# Patient Record
Sex: FEMALE | Race: BLACK OR AFRICAN AMERICAN | NOT HISPANIC OR LATINO | Employment: OTHER | URBAN - METROPOLITAN AREA
[De-identification: names, ages, dates, MRNs, and addresses within clinical notes are randomized per-mention and may not be internally consistent; named-entity substitution may affect disease eponyms.]

---

## 2023-02-21 LAB
APPEARANCE, URINE: NORMAL
BILIRUBIN, URINE: NEGATIVE
BLOOD, URINE: NEGATIVE
COLOR, URINE: YELLOW
GLUCOSE, URINE: NEGATIVE MG/DL
KETONES, URINE: NEGATIVE MG/DL
LEUKOCYTE ESTERASE, URINE: NEGATIVE
NITRITE, URINE: NEGATIVE
PH, URINE: 6 (ref 5–8)
PROTEIN, URINE: NEGATIVE MG/DL
SPECIFIC GRAVITY, URINE: 1.01 (ref 1–1.03)
UROBILINOGEN, URINE: <2 MG/DL (ref 0–1.9)

## 2023-02-24 LAB — URINE CULTURE: ABNORMAL

## 2023-04-04 ENCOUNTER — HOSPITAL ENCOUNTER (OUTPATIENT)
Dept: DATA CONVERSION | Facility: HOSPITAL | Age: 58
End: 2023-04-04
Attending: ANESTHESIOLOGY
Payer: MEDICARE

## 2023-04-04 DIAGNOSIS — I50.9 HEART FAILURE, UNSPECIFIED (MULTI): ICD-10-CM

## 2023-04-04 DIAGNOSIS — F12.90 CANNABIS USE, UNSPECIFIED, UNCOMPLICATED: ICD-10-CM

## 2023-04-04 DIAGNOSIS — J45.909 UNSPECIFIED ASTHMA, UNCOMPLICATED (HHS-HCC): ICD-10-CM

## 2023-04-04 DIAGNOSIS — I11.0 HYPERTENSIVE HEART DISEASE WITH HEART FAILURE (MULTI): ICD-10-CM

## 2023-04-04 DIAGNOSIS — M48.02 SPINAL STENOSIS, CERVICAL REGION: ICD-10-CM

## 2023-04-04 DIAGNOSIS — M54.12 RADICULOPATHY, CERVICAL REGION: ICD-10-CM

## 2023-04-04 DIAGNOSIS — E11.9 TYPE 2 DIABETES MELLITUS WITHOUT COMPLICATIONS (MULTI): ICD-10-CM

## 2023-04-04 DIAGNOSIS — M54.2 CERVICALGIA: ICD-10-CM

## 2023-04-04 LAB — POCT GLUCOSE: 90 MG/DL (ref 74–99)

## 2023-04-11 LAB — OSMOLALITY, RANDOM URINE: 314 MOSM/KG (ref 200–1200)

## 2023-04-12 LAB
APPEARANCE, URINE: NORMAL
BILIRUBIN, URINE: NEGATIVE
BLOOD, URINE: NEGATIVE
COLOR, URINE: YELLOW
GLUCOSE, URINE: NEGATIVE MG/DL
KETONES, URINE: NEGATIVE MG/DL
LEUKOCYTE ESTERASE, URINE: NEGATIVE
NITRITE, URINE: NEGATIVE
PH, URINE: 7 (ref 5–8)
PROTEIN, URINE: NEGATIVE MG/DL
RBC, URINE: NORMAL /HPF (ref 0–5)
SPECIFIC GRAVITY, URINE: 1.01 (ref 1–1.03)
SQUAMOUS EPITHELIAL CELLS, URINE: 11 /HPF
UROBILINOGEN, URINE: <2 MG/DL (ref 0–1.9)
WBC, URINE: 1 /HPF (ref 0–5)

## 2023-04-13 LAB — URINE CULTURE: NORMAL

## 2023-04-17 LAB
ALANINE AMINOTRANSFERASE (SGPT) (U/L) IN SER/PLAS: 16 U/L (ref 7–45)
ALBUMIN (G/DL) IN SER/PLAS: 4 G/DL (ref 3.4–5)
ALKALINE PHOSPHATASE (U/L) IN SER/PLAS: 138 U/L (ref 33–110)
ANION GAP IN SER/PLAS: 14 MMOL/L (ref 10–20)
ASPARTATE AMINOTRANSFERASE (SGOT) (U/L) IN SER/PLAS: 10 U/L (ref 9–39)
BILIRUBIN TOTAL (MG/DL) IN SER/PLAS: 0.4 MG/DL (ref 0–1.2)
CALCIDIOL (25 OH VITAMIN D3) (NG/ML) IN SER/PLAS: 50 NG/ML
CALCIUM (MG/DL) IN SER/PLAS: 9 MG/DL (ref 8.6–10.6)
CARBON DIOXIDE, TOTAL (MMOL/L) IN SER/PLAS: 26 MMOL/L (ref 21–32)
CHLORIDE (MMOL/L) IN SER/PLAS: 102 MMOL/L (ref 98–107)
CREATININE (MG/DL) IN SER/PLAS: 0.59 MG/DL (ref 0.5–1.05)
ESTIMATED AVERAGE GLUCOSE FOR HBA1C: 114 MG/DL
GFR FEMALE: >90 ML/MIN/1.73M2
GLUCOSE (MG/DL) IN SER/PLAS: 87 MG/DL (ref 74–99)
HEMOGLOBIN A1C/HEMOGLOBIN TOTAL IN BLOOD: 5.6 %
POTASSIUM (MMOL/L) IN SER/PLAS: 3.7 MMOL/L (ref 3.5–5.3)
PROTEIN TOTAL: 5.8 G/DL (ref 6.4–8.2)
SODIUM (MMOL/L) IN SER/PLAS: 138 MMOL/L (ref 136–145)
THYROTROPIN (MIU/L) IN SER/PLAS BY DETECTION LIMIT <= 0.05 MIU/L: 0.65 MIU/L (ref 0.44–3.98)
UREA NITROGEN (MG/DL) IN SER/PLAS: 7 MG/DL (ref 6–23)

## 2023-04-19 LAB
THYROGLOBULIN AB (IU/ML) IN SER/PLAS: <0.9 IU/ML (ref 0–4)
THYROGLOBULIN LC-MS/MS: NORMAL NG/ML (ref 1.3–31.8)
THYROGLOBULIN: 5.9 NG/ML (ref 1.3–31.8)

## 2023-05-05 LAB
BASOPHILS (10*3/UL) IN BLOOD BY AUTOMATED COUNT: 0.06 X10E9/L (ref 0–0.1)
BASOPHILS/100 LEUKOCYTES IN BLOOD BY AUTOMATED COUNT: 0.9 % (ref 0–2)
EOSINOPHILS (10*3/UL) IN BLOOD BY AUTOMATED COUNT: 0.08 X10E9/L (ref 0–0.7)
EOSINOPHILS/100 LEUKOCYTES IN BLOOD BY AUTOMATED COUNT: 1.2 % (ref 0–6)
ERYTHROCYTE DISTRIBUTION WIDTH (RATIO) BY AUTOMATED COUNT: 14.3 % (ref 11.5–14.5)
ERYTHROCYTE MEAN CORPUSCULAR HEMOGLOBIN CONCENTRATION (G/DL) BY AUTOMATED: 31.1 G/DL (ref 32–36)
ERYTHROCYTE MEAN CORPUSCULAR VOLUME (FL) BY AUTOMATED COUNT: 88 FL (ref 80–100)
ERYTHROCYTES (10*6/UL) IN BLOOD BY AUTOMATED COUNT: 4.99 X10E12/L (ref 4–5.2)
HEMATOCRIT (%) IN BLOOD BY AUTOMATED COUNT: 44 % (ref 36–46)
HEMOGLOBIN (G/DL) IN BLOOD: 13.7 G/DL (ref 12–16)
IMMATURE GRANULOCYTES/100 LEUKOCYTES IN BLOOD BY AUTOMATED COUNT: 0.1 % (ref 0–0.9)
LEUKOCYTES (10*3/UL) IN BLOOD BY AUTOMATED COUNT: 6.8 X10E9/L (ref 4.4–11.3)
LYMPHOCYTES (10*3/UL) IN BLOOD BY AUTOMATED COUNT: 2.08 X10E9/L (ref 1.2–4.8)
LYMPHOCYTES/100 LEUKOCYTES IN BLOOD BY AUTOMATED COUNT: 30.6 % (ref 13–44)
MONOCYTES (10*3/UL) IN BLOOD BY AUTOMATED COUNT: 0.77 X10E9/L (ref 0.1–1)
MONOCYTES/100 LEUKOCYTES IN BLOOD BY AUTOMATED COUNT: 11.3 % (ref 2–10)
NEUTROPHILS (10*3/UL) IN BLOOD BY AUTOMATED COUNT: 3.8 X10E9/L (ref 1.2–7.7)
NEUTROPHILS/100 LEUKOCYTES IN BLOOD BY AUTOMATED COUNT: 55.9 % (ref 40–80)
PLATELETS (10*3/UL) IN BLOOD AUTOMATED COUNT: 313 X10E9/L (ref 150–450)

## 2023-08-19 PROBLEM — M19.071 ARTHRITIS OF ANKLE, RIGHT: Status: ACTIVE | Noted: 2023-08-19

## 2023-08-19 PROBLEM — E89.0 POSTOPERATIVE HYPOTHYROIDISM: Status: ACTIVE | Noted: 2023-08-19

## 2023-08-19 PROBLEM — R94.6 ABNORMAL THYROID FUNCTION TEST: Status: ACTIVE | Noted: 2023-08-19

## 2023-08-19 PROBLEM — N81.89 PELVIC FLOOR WEAKNESS: Status: ACTIVE | Noted: 2023-08-19

## 2023-08-19 PROBLEM — M19.072 ARTHRITIS OF ANKLE, LEFT: Status: ACTIVE | Noted: 2023-08-19

## 2023-08-19 PROBLEM — R07.89 ATYPICAL CHEST PAIN: Status: ACTIVE | Noted: 2023-08-19

## 2023-08-19 PROBLEM — M54.16 LUMBAR RADICULAR PAIN: Status: ACTIVE | Noted: 2023-08-19

## 2023-08-19 PROBLEM — M17.0 PRIMARY OSTEOARTHRITIS OF BOTH KNEES: Status: ACTIVE | Noted: 2023-08-19

## 2023-08-19 PROBLEM — E78.2 DM TYPE 2 WITH DIABETIC MIXED HYPERLIPIDEMIA (MULTI): Status: ACTIVE | Noted: 2023-08-19

## 2023-08-19 PROBLEM — M51.17 INTERVERTEBRAL DISC DISORDER WITH RADICULOPATHY OF LUMBOSACRAL REGION: Status: ACTIVE | Noted: 2023-08-19

## 2023-08-19 PROBLEM — E04.9 GOITER: Status: ACTIVE | Noted: 2023-08-19

## 2023-08-19 PROBLEM — N81.11 MIDLINE CYSTOCELE: Status: ACTIVE | Noted: 2023-08-19

## 2023-08-19 PROBLEM — M25.562 CHRONIC PAIN OF BOTH KNEES: Status: ACTIVE | Noted: 2023-08-19

## 2023-08-19 PROBLEM — M17.12 ARTHRITIS OF KNEE, LEFT: Status: ACTIVE | Noted: 2023-08-19

## 2023-08-19 PROBLEM — J38.00: Status: ACTIVE | Noted: 2023-08-19

## 2023-08-19 PROBLEM — M25.579 ANKLE PAIN: Status: ACTIVE | Noted: 2023-08-19

## 2023-08-19 PROBLEM — R39.15 URINARY URGENCY: Status: ACTIVE | Noted: 2023-08-19

## 2023-08-19 PROBLEM — C73 MALIGNANT NEOPLASM OF THYROID GLAND (MULTI): Status: ACTIVE | Noted: 2023-08-19

## 2023-08-19 PROBLEM — K59.09 CHRONIC CONSTIPATION: Status: ACTIVE | Noted: 2023-08-19

## 2023-08-19 PROBLEM — G47.33 OSA ON CPAP: Status: ACTIVE | Noted: 2023-08-19

## 2023-08-19 PROBLEM — M46.1 SACROILIITIS (CMS-HCC): Status: ACTIVE | Noted: 2023-08-19

## 2023-08-19 PROBLEM — N81.4 UTERINE PROLAPSE: Status: ACTIVE | Noted: 2023-08-19

## 2023-08-19 PROBLEM — E04.2 MULTINODULAR THYROID: Status: ACTIVE | Noted: 2023-08-19

## 2023-08-19 PROBLEM — R76.8 ANA POSITIVE: Status: ACTIVE | Noted: 2023-08-19

## 2023-08-19 PROBLEM — I72.9 ANEURYSM (CMS-HCC): Status: ACTIVE | Noted: 2023-08-19

## 2023-08-19 PROBLEM — N39.0 CHRONIC UTI: Status: ACTIVE | Noted: 2023-08-19

## 2023-08-19 PROBLEM — R25.2 MUSCLE CRAMPS: Status: ACTIVE | Noted: 2023-08-19

## 2023-08-19 PROBLEM — Z96.0 VAGINAL PESSARY PRESENT: Status: ACTIVE | Noted: 2023-08-19

## 2023-08-19 PROBLEM — R53.1 WEAKNESS: Status: ACTIVE | Noted: 2023-08-19

## 2023-08-19 PROBLEM — E11.69 DM TYPE 2 WITH DIABETIC MIXED HYPERLIPIDEMIA (MULTI): Status: ACTIVE | Noted: 2023-08-19

## 2023-08-19 PROBLEM — M54.50 LOW BACK PAIN: Status: ACTIVE | Noted: 2023-08-19

## 2023-08-19 PROBLEM — I50.33 ACUTE ON CHRONIC DIASTOLIC HEART FAILURE (MULTI): Status: ACTIVE | Noted: 2023-08-19

## 2023-08-19 PROBLEM — N39.3 FEMALE STRESS INCONTINENCE: Status: ACTIVE | Noted: 2023-08-19

## 2023-08-19 PROBLEM — R35.0 URINARY FREQUENCY: Status: ACTIVE | Noted: 2023-08-19

## 2023-08-19 PROBLEM — M79.7 FIBROMYALGIA: Status: ACTIVE | Noted: 2023-08-19

## 2023-08-19 PROBLEM — E78.5 DYSLIPIDEMIA: Status: ACTIVE | Noted: 2023-08-19

## 2023-08-19 PROBLEM — I72.0 CAROTID PSEUDOANEURYSM (CMS-HCC): Status: ACTIVE | Noted: 2023-08-19

## 2023-08-19 PROBLEM — M54.2 NECK PAIN: Status: ACTIVE | Noted: 2023-08-19

## 2023-08-19 PROBLEM — G51.0 BELL'S PALSY: Status: ACTIVE | Noted: 2023-08-19

## 2023-08-19 PROBLEM — R49.0 MUSCLE TENSION DYSPHONIA: Status: ACTIVE | Noted: 2023-08-19

## 2023-08-19 PROBLEM — M54.12 CERVICAL RADICULOPATHY, CHRONIC: Status: ACTIVE | Noted: 2023-08-19

## 2023-08-19 PROBLEM — N36.8 PROLAPSE OF URETHRA: Status: ACTIVE | Noted: 2023-08-19

## 2023-08-19 PROBLEM — N95.2 ATROPHIC VAGINITIS: Status: ACTIVE | Noted: 2023-08-19

## 2023-08-19 PROBLEM — R06.09 DOE (DYSPNEA ON EXERTION): Status: ACTIVE | Noted: 2023-08-19

## 2023-08-19 PROBLEM — R20.2 PARESTHESIA: Status: ACTIVE | Noted: 2023-08-19

## 2023-08-19 PROBLEM — E55.9 VITAMIN D INSUFFICIENCY: Status: ACTIVE | Noted: 2023-08-19

## 2023-08-19 PROBLEM — M25.561 CHRONIC PAIN OF BOTH KNEES: Status: ACTIVE | Noted: 2023-08-19

## 2023-08-19 PROBLEM — N39.41 URGE INCONTINENCE: Status: ACTIVE | Noted: 2023-08-19

## 2023-08-19 PROBLEM — I72.9 PSEUDOANEURYSM (CMS-HCC): Status: ACTIVE | Noted: 2023-08-19

## 2023-08-19 PROBLEM — R22.1 NECK MASS: Status: ACTIVE | Noted: 2023-08-19

## 2023-08-19 PROBLEM — G89.29 CHRONIC PAIN OF BOTH KNEES: Status: ACTIVE | Noted: 2023-08-19

## 2023-08-19 PROBLEM — E87.6 HYPOKALEMIA: Status: ACTIVE | Noted: 2023-08-19

## 2023-08-19 PROBLEM — M17.11 ARTHRITIS OF KNEE, RIGHT: Status: ACTIVE | Noted: 2023-08-19

## 2023-08-19 RX ORDER — ILOPERIDONE 8 MG/1
8 TABLET ORAL 2 TIMES DAILY
COMMUNITY
Start: 2023-01-19 | End: 2023-12-28 | Stop reason: ENTERED-IN-ERROR

## 2023-08-19 RX ORDER — PRAZOSIN HYDROCHLORIDE 5 MG/1
1 CAPSULE ORAL NIGHTLY
COMMUNITY

## 2023-08-19 RX ORDER — BUMETANIDE 2 MG/1
2 TABLET ORAL 2 TIMES DAILY
Status: ON HOLD | COMMUNITY
End: 2024-01-01 | Stop reason: SDUPTHER

## 2023-08-19 RX ORDER — VIT C/E/ZN/COPPR/LUTEIN/ZEAXAN 250MG-90MG
1 CAPSULE ORAL DAILY
COMMUNITY
Start: 2021-02-11

## 2023-08-19 RX ORDER — QUETIAPINE FUMARATE 50 MG/1
50 TABLET, FILM COATED ORAL 2 TIMES DAILY
COMMUNITY
End: 2023-12-28 | Stop reason: ENTERED-IN-ERROR

## 2023-08-19 RX ORDER — POTASSIUM CHLORIDE 1500 MG/1
2 TABLET, EXTENDED RELEASE ORAL 2 TIMES DAILY
COMMUNITY
Start: 2023-01-12 | End: 2024-01-22 | Stop reason: SDUPTHER

## 2023-08-19 RX ORDER — IBUPROFEN 200 MG
1 CAPSULE ORAL DAILY
COMMUNITY
Start: 2020-04-29 | End: 2024-01-01 | Stop reason: HOSPADM

## 2023-08-19 RX ORDER — ATORVASTATIN CALCIUM 20 MG/1
1 TABLET, FILM COATED ORAL NIGHTLY
COMMUNITY
End: 2024-01-22 | Stop reason: SDUPTHER

## 2023-08-19 RX ORDER — OMEPRAZOLE 40 MG/1
1 CAPSULE, DELAYED RELEASE ORAL DAILY
COMMUNITY

## 2023-08-19 RX ORDER — CLONAZEPAM 1 MG/1
1 TABLET ORAL EVERY MORNING
COMMUNITY

## 2023-08-19 RX ORDER — ILOPERIDONE 10 MG/1
10 TABLET ORAL 2 TIMES DAILY
COMMUNITY
Start: 2023-03-17 | End: 2023-12-28 | Stop reason: ENTERED-IN-ERROR

## 2023-08-19 RX ORDER — POTASSIUM CHLORIDE 600 MG/1
8 TABLET, FILM COATED, EXTENDED RELEASE ORAL 2 TIMES DAILY
COMMUNITY
End: 2023-12-28 | Stop reason: ENTERED-IN-ERROR

## 2023-08-19 RX ORDER — LUMATEPERONE 42 MG/1
42 CAPSULE ORAL NIGHTLY
COMMUNITY
End: 2023-12-28 | Stop reason: ENTERED-IN-ERROR

## 2023-08-19 RX ORDER — BREXPIPRAZOLE 3 MG/1
1 TABLET ORAL DAILY
COMMUNITY
Start: 2022-08-17 | End: 2023-12-28 | Stop reason: ENTERED-IN-ERROR

## 2023-08-19 RX ORDER — AMITRIPTYLINE HYDROCHLORIDE 10 MG/1
1 TABLET, FILM COATED ORAL NIGHTLY
COMMUNITY

## 2023-08-19 RX ORDER — BACLOFEN 10 MG/1
10 TABLET ORAL 3 TIMES DAILY PRN
COMMUNITY
End: 2023-12-28 | Stop reason: ENTERED-IN-ERROR

## 2023-08-19 RX ORDER — SERTRALINE HYDROCHLORIDE 100 MG/1
200 TABLET, FILM COATED ORAL EVERY MORNING
COMMUNITY
Start: 2021-09-17 | End: 2024-01-01 | Stop reason: HOSPADM

## 2023-08-19 RX ORDER — CARVEDILOL 25 MG/1
1 TABLET ORAL 2 TIMES DAILY
COMMUNITY
End: 2024-01-08

## 2023-08-19 RX ORDER — OXYBUTYNIN CHLORIDE 10 MG/1
1 TABLET, EXTENDED RELEASE ORAL DAILY
COMMUNITY
End: 2024-02-05 | Stop reason: ALTCHOICE

## 2023-08-19 RX ORDER — LOSARTAN POTASSIUM 25 MG/1
2 TABLET ORAL DAILY
COMMUNITY
Start: 2020-04-16 | End: 2023-12-28 | Stop reason: ENTERED-IN-ERROR

## 2023-08-19 RX ORDER — PHENAZOPYRIDINE HYDROCHLORIDE 200 MG/1
200 TABLET, FILM COATED ORAL
COMMUNITY
Start: 2022-04-29 | End: 2023-12-28 | Stop reason: ENTERED-IN-ERROR

## 2023-08-19 RX ORDER — ASPIRIN 81 MG/1
1 TABLET ORAL DAILY
COMMUNITY
Start: 2020-04-29 | End: 2024-03-28 | Stop reason: HOSPADM

## 2023-08-19 RX ORDER — POLYETHYLENE GLYCOL 3350 17 G/17G
17 POWDER, FOR SOLUTION ORAL DAILY
COMMUNITY
Start: 2020-09-01

## 2023-08-19 RX ORDER — AMANTADINE HYDROCHLORIDE 100 MG/1
1 CAPSULE, GELATIN COATED ORAL DAILY
COMMUNITY
Start: 2023-03-20 | End: 2023-12-28 | Stop reason: ENTERED-IN-ERROR

## 2023-08-19 RX ORDER — MELOXICAM 7.5 MG/1
1 TABLET ORAL DAILY
COMMUNITY
End: 2023-12-28 | Stop reason: ENTERED-IN-ERROR

## 2023-08-19 RX ORDER — MELOXICAM 100 %
POWDER (GRAM) MISCELLANEOUS
COMMUNITY
End: 2023-12-28 | Stop reason: ENTERED-IN-ERROR

## 2023-08-19 RX ORDER — ROPINIROLE 1 MG/1
1 TABLET, FILM COATED ORAL DAILY
COMMUNITY
Start: 2023-03-18 | End: 2023-12-28 | Stop reason: ENTERED-IN-ERROR

## 2023-08-19 RX ORDER — BUDESONIDE AND FORMOTEROL FUMARATE DIHYDRATE 160; 4.5 UG/1; UG/1
AEROSOL RESPIRATORY (INHALATION)
COMMUNITY
Start: 2023-01-10 | End: 2023-12-28 | Stop reason: ENTERED-IN-ERROR

## 2023-08-19 RX ORDER — MUPIROCIN 20 MG/G
1 OINTMENT TOPICAL SEE ADMIN INSTRUCTIONS
COMMUNITY
Start: 2020-03-09 | End: 2023-12-28 | Stop reason: ENTERED-IN-ERROR

## 2023-08-19 RX ORDER — METFORMIN HYDROCHLORIDE 500 MG/1
1 TABLET, EXTENDED RELEASE ORAL 2 TIMES DAILY
COMMUNITY
Start: 2018-11-26 | End: 2023-08-27 | Stop reason: SDUPTHER

## 2023-08-19 RX ORDER — CHOLECALCIFEROL (VITAMIN D3) 50 MCG
1 TABLET ORAL DAILY
COMMUNITY

## 2023-08-19 RX ORDER — TOPIRAMATE 200 MG/1
200 TABLET ORAL 2 TIMES DAILY
Status: ON HOLD | COMMUNITY
End: 2024-01-01 | Stop reason: SDUPTHER

## 2023-08-19 RX ORDER — LIDOCAINE 50 MG/G
1 PATCH TOPICAL DAILY
COMMUNITY
Start: 2022-03-17 | End: 2023-12-18

## 2023-08-19 RX ORDER — FUROSEMIDE 20 MG/1
1 TABLET ORAL
COMMUNITY
Start: 2023-01-16 | End: 2023-12-28 | Stop reason: ENTERED-IN-ERROR

## 2023-08-19 RX ORDER — LEVOTHYROXINE SODIUM 100 UG/1
1 TABLET ORAL
COMMUNITY
End: 2024-04-12 | Stop reason: HOSPADM

## 2023-08-19 RX ORDER — IPRATROPIUM BROMIDE AND ALBUTEROL SULFATE 2.5; .5 MG/3ML; MG/3ML
3 SOLUTION RESPIRATORY (INHALATION) EVERY 4 HOURS PRN
COMMUNITY
Start: 2023-07-12

## 2023-08-19 RX ORDER — ILOPERIDONE 4 MG/1
4 TABLET ORAL 2 TIMES DAILY
COMMUNITY
Start: 2022-11-17 | End: 2023-12-28 | Stop reason: ENTERED-IN-ERROR

## 2023-08-19 RX ORDER — OLANZAPINE 5 MG/1
1 TABLET ORAL 2 TIMES DAILY
COMMUNITY
Start: 2022-10-08 | End: 2023-12-28 | Stop reason: ENTERED-IN-ERROR

## 2023-08-19 RX ORDER — ALBUTEROL SULFATE 90 UG/1
2 AEROSOL, METERED RESPIRATORY (INHALATION) EVERY 4 HOURS PRN
COMMUNITY
Start: 2023-07-12

## 2023-08-19 RX ORDER — BACLOFEN 20 MG/1
1 TABLET ORAL 3 TIMES DAILY
COMMUNITY
Start: 2023-03-13 | End: 2023-11-22

## 2023-08-19 RX ORDER — FLUTICASONE FUROATE, UMECLIDINIUM BROMIDE AND VILANTEROL TRIFENATATE 200; 62.5; 25 UG/1; UG/1; UG/1
1 POWDER RESPIRATORY (INHALATION) DAILY PRN
COMMUNITY
Start: 2023-07-06

## 2023-08-19 RX ORDER — SERTRALINE HYDROCHLORIDE 150 MG/1
1 CAPSULE ORAL
COMMUNITY
End: 2023-12-28 | Stop reason: ENTERED-IN-ERROR

## 2023-08-19 RX ORDER — TRAMADOL HYDROCHLORIDE 50 MG/1
1 TABLET ORAL EVERY 8 HOURS PRN
COMMUNITY
Start: 2022-04-09 | End: 2023-12-28 | Stop reason: ENTERED-IN-ERROR

## 2023-08-19 RX ORDER — LUMATEPERONE 21 MG/1
1 CAPSULE ORAL NIGHTLY
COMMUNITY
Start: 2023-02-02 | End: 2023-12-28 | Stop reason: ENTERED-IN-ERROR

## 2023-08-19 RX ORDER — ILOPERIDONE 2 MG/1
2 TABLET ORAL 2 TIMES DAILY
COMMUNITY
Start: 2022-10-20 | End: 2023-12-28 | Stop reason: ENTERED-IN-ERROR

## 2023-08-19 RX ORDER — AZELASTINE HCL 205.5 UG/1
2 SPRAY NASAL DAILY
COMMUNITY
Start: 2022-09-22 | End: 2022-10-22 | Stop reason: WASHOUT

## 2023-08-19 RX ORDER — OXCARBAZEPINE 600 MG/1
1 TABLET, FILM COATED ORAL EVERY MORNING
COMMUNITY

## 2023-08-19 RX ORDER — MELOXICAM 15 MG/1
1 TABLET ORAL DAILY PRN
COMMUNITY
Start: 2023-03-08 | End: 2024-03-28 | Stop reason: HOSPADM

## 2023-08-19 RX ORDER — LOSARTAN POTASSIUM 50 MG/1
1 TABLET ORAL DAILY
COMMUNITY
End: 2024-01-22 | Stop reason: SDUPTHER

## 2023-08-19 RX ORDER — METFORMIN HYDROCHLORIDE 500 MG/1
1 TABLET ORAL
COMMUNITY
Start: 2023-03-16

## 2023-08-19 RX ORDER — ILOPERIDONE 6 MG/1
6 TABLET ORAL 2 TIMES DAILY
COMMUNITY
Start: 2022-12-08 | End: 2023-12-28 | Stop reason: ENTERED-IN-ERROR

## 2023-08-19 RX ORDER — GABAPENTIN 300 MG/1
300 CAPSULE ORAL 3 TIMES DAILY
COMMUNITY

## 2023-08-19 RX ORDER — BLOOD-GLUCOSE METER
1 EACH MISCELLANEOUS
COMMUNITY
Start: 2021-09-02 | End: 2024-01-01 | Stop reason: HOSPADM

## 2023-08-19 RX ORDER — MINOCYCLINE HYDROCHLORIDE 100 MG/1
100 CAPSULE ORAL NIGHTLY
COMMUNITY
Start: 2023-03-07 | End: 2024-01-01 | Stop reason: HOSPADM

## 2023-08-19 RX ORDER — QUETIAPINE FUMARATE 300 MG/1
1 TABLET, FILM COATED ORAL NIGHTLY
COMMUNITY
End: 2024-03-06 | Stop reason: WASHOUT

## 2023-08-19 RX ORDER — ESTRADIOL 0.1 MG/G
1 CREAM VAGINAL 3 TIMES WEEKLY
COMMUNITY
Start: 2021-03-02

## 2023-08-19 RX ORDER — OXCARBAZEPINE 600 MG/1
2 TABLET, FILM COATED ORAL NIGHTLY
COMMUNITY
Start: 2022-04-29

## 2023-08-19 RX ORDER — IBUPROFEN 100 MG/5ML
1 SUSPENSION, ORAL (FINAL DOSE FORM) ORAL DAILY
COMMUNITY
Start: 2021-02-13

## 2023-08-19 RX ORDER — AZELASTINE 1 MG/ML
1 SPRAY, METERED NASAL 2 TIMES DAILY
COMMUNITY

## 2023-08-19 RX ORDER — POTASSIUM CHLORIDE 750 MG/1
20 CAPSULE, EXTENDED RELEASE ORAL 2 TIMES DAILY
COMMUNITY
End: 2023-12-28 | Stop reason: ENTERED-IN-ERROR

## 2023-08-19 RX ORDER — VALBENAZINE 40 MG/1
40 CAPSULE ORAL DAILY
COMMUNITY
Start: 2023-03-15 | End: 2023-12-28 | Stop reason: ENTERED-IN-ERROR

## 2023-08-19 RX ORDER — TIZANIDINE 4 MG/1
1 TABLET ORAL 3 TIMES DAILY
COMMUNITY
Start: 2023-03-07 | End: 2024-04-12 | Stop reason: HOSPADM

## 2023-08-27 PROBLEM — R20.9 SENSORY DISORDER: Status: ACTIVE | Noted: 2023-08-27

## 2023-08-27 PROBLEM — M62.82 RHABDOMYOLYSIS: Status: ACTIVE | Noted: 2023-08-27

## 2023-08-27 PROBLEM — R10.9 ABDOMINAL PAIN: Status: ACTIVE | Noted: 2023-08-27
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
## 2023-08-27 PROBLEM — Z85.850 HISTORY OF MALIGNANT NEOPLASM OF THYROID: Status: ACTIVE | Noted: 2022-03-07

## 2023-08-27 PROBLEM — G89.29 CHRONIC PAIN OF MULTIPLE SITES: Status: ACTIVE | Noted: 2023-08-27

## 2023-08-27 PROBLEM — M54.9 BACK PAIN: Status: ACTIVE | Noted: 2023-08-27

## 2023-08-27 PROBLEM — G24.9 DYSTONIA: Status: ACTIVE | Noted: 2023-08-27

## 2023-08-27 PROBLEM — R33.9 ACUTE ON CHRONIC URINARY RETENTION: Status: ACTIVE | Noted: 2023-08-27

## 2023-08-27 PROBLEM — K21.9 GASTROESOPHAGEAL REFLUX DISEASE: Status: ACTIVE | Noted: 2022-03-07

## 2023-08-27 PROBLEM — Z90.89 HISTORY OF THYROIDECTOMY: Status: ACTIVE | Noted: 2022-03-07

## 2023-08-27 PROBLEM — F43.10 PTSD (POST-TRAUMATIC STRESS DISORDER): Status: ACTIVE | Noted: 2023-08-27

## 2023-08-27 PROBLEM — I50.20 SYSTOLIC HEART FAILURE (MULTI): Status: ACTIVE | Noted: 2022-03-07

## 2023-08-27 PROBLEM — I11.0 BENIGN HYPERTENSIVE HEART DISEASE WITH HEART FAILURE (MULTI): Status: ACTIVE | Noted: 2022-03-07

## 2023-08-27 PROBLEM — R45.1 RESTLESSNESS AND AGITATION: Status: ACTIVE | Noted: 2023-08-27

## 2023-08-27 PROBLEM — R53.1 WEAKNESS PRESENT: Status: ACTIVE | Noted: 2023-08-27

## 2023-08-27 PROBLEM — F41.0 GENERALIZED ANXIETY DISORDER WITH PANIC ATTACKS: Status: ACTIVE | Noted: 2023-08-27

## 2023-08-27 PROBLEM — R30.0 DYSURIA: Status: ACTIVE | Noted: 2023-08-27

## 2023-08-27 PROBLEM — R26.9 GAIT ABNORMALITY: Status: ACTIVE | Noted: 2023-08-27

## 2023-08-27 PROBLEM — R29.6 FREQUENT FALLS: Status: ACTIVE | Noted: 2023-08-27

## 2023-08-27 PROBLEM — R52 CHRONIC PAIN OF MULTIPLE SITES: Status: ACTIVE | Noted: 2023-08-27

## 2023-08-27 PROBLEM — E78.5 HYPERLIPIDEMIA: Status: ACTIVE | Noted: 2022-03-07

## 2023-08-27 PROBLEM — G62.9 NEUROPATHY: Status: ACTIVE | Noted: 2023-08-27

## 2023-08-27 PROBLEM — F41.1 GENERALIZED ANXIETY DISORDER WITH PANIC ATTACKS: Status: ACTIVE | Noted: 2023-08-27

## 2023-08-27 PROBLEM — R25.1 TREMOR: Status: ACTIVE | Noted: 2023-08-27

## 2023-08-27 PROBLEM — R33.9 URINARY RETENTION: Status: ACTIVE | Noted: 2023-08-27

## 2023-08-27 PROBLEM — M50.10 CERVICAL DISC DISORDER WITH RADICULOPATHY: Status: ACTIVE | Noted: 2023-08-27

## 2023-08-27 PROBLEM — Z91.410 HISTORY OF ADULT PHYSICAL AND SEXUAL ABUSE: Status: ACTIVE | Noted: 2022-03-07

## 2023-08-27 PROBLEM — M25.469 KNEE EFFUSION: Status: ACTIVE | Noted: 2023-08-27

## 2023-08-27 PROBLEM — E11.42 DIABETIC PERIPHERAL NEUROPATHY (MULTI): Status: ACTIVE | Noted: 2023-08-27

## 2023-08-27 PROBLEM — R11.0 NAUSEA: Status: ACTIVE | Noted: 2023-08-27

## 2023-08-27 PROBLEM — I63.9 STROKE (MULTI): Status: ACTIVE | Noted: 2023-05-16

## 2023-08-27 PROBLEM — E89.0 HISTORY OF THYROIDECTOMY: Status: ACTIVE | Noted: 2022-03-07

## 2023-08-27 PROBLEM — Z98.890 HISTORY OF THYROIDECTOMY: Status: ACTIVE | Noted: 2022-03-07

## 2023-08-27 PROBLEM — I10 PRIMARY HYPERTENSION: Status: ACTIVE | Noted: 2023-08-27

## 2023-08-27 PROBLEM — E04.1 THYROID NODULE: Status: ACTIVE | Noted: 2020-11-19

## 2023-08-27 PROBLEM — R10.2 PELVIC PAIN IN FEMALE: Status: ACTIVE | Noted: 2023-08-27

## 2023-08-27 PROBLEM — R55 DROP ATTACK: Status: ACTIVE | Noted: 2023-08-27

## 2023-08-27 PROBLEM — R52 GENERALIZED PAIN: Status: ACTIVE | Noted: 2023-08-27

## 2023-08-27 PROBLEM — F31.62 BIPOLAR DISORDER, CURRENT EPISODE MIXED, MODERATE (MULTI): Status: ACTIVE | Noted: 2023-08-27

## 2023-08-27 PROBLEM — M40.209 KYPHOSIS: Status: ACTIVE | Noted: 2023-08-27

## 2023-08-27 RX ORDER — PRAZOSIN HYDROCHLORIDE 2 MG/1
2 CAPSULE ORAL NIGHTLY
COMMUNITY
End: 2023-12-28 | Stop reason: ENTERED-IN-ERROR

## 2023-08-27 RX ORDER — LEVOTHYROXINE SODIUM 200 UG/1
1 TABLET ORAL 2 TIMES WEEKLY
COMMUNITY
End: 2024-04-12 | Stop reason: HOSPADM

## 2023-08-27 RX ORDER — QUETIAPINE FUMARATE 100 MG/1
1 TABLET, FILM COATED ORAL EVERY MORNING
COMMUNITY
End: 2024-03-06 | Stop reason: WASHOUT

## 2023-08-27 RX ORDER — PANTOPRAZOLE SODIUM 40 MG/1
40 TABLET, DELAYED RELEASE ORAL
COMMUNITY
End: 2023-12-28 | Stop reason: ENTERED-IN-ERROR

## 2023-08-27 RX ORDER — TIZANIDINE 2 MG/1
2 TABLET ORAL
COMMUNITY
End: 2023-12-28 | Stop reason: ENTERED-IN-ERROR

## 2023-08-27 RX ORDER — CLONAZEPAM 1 MG/1
2 TABLET ORAL EVERY EVENING
COMMUNITY
End: 2024-05-16 | Stop reason: WASHOUT

## 2023-08-27 RX ORDER — LANCETS
1 EACH MISCELLANEOUS 3 TIMES DAILY
COMMUNITY

## 2023-08-27 RX ORDER — TERCONAZOLE 8 MG/G
1 CREAM VAGINAL NIGHTLY
COMMUNITY
End: 2023-12-28 | Stop reason: ENTERED-IN-ERROR

## 2023-08-27 RX ORDER — DICLOFENAC SODIUM 10 MG/G
4.5 GEL TOPICAL 3 TIMES DAILY PRN
COMMUNITY

## 2023-09-14 LAB
ALBUMIN (G/DL) IN SER/PLAS: 4.2 G/DL (ref 3.4–5)
ANION GAP IN SER/PLAS: 11 MMOL/L (ref 10–20)
CALCIUM (MG/DL) IN SER/PLAS: 9.1 MG/DL (ref 8.6–10.3)
CARBON DIOXIDE, TOTAL (MMOL/L) IN SER/PLAS: 32 MMOL/L (ref 21–32)
CHLORIDE (MMOL/L) IN SER/PLAS: 103 MMOL/L (ref 98–107)
CREATININE (MG/DL) IN SER/PLAS: 0.88 MG/DL (ref 0.5–1.05)
GFR FEMALE: 76 ML/MIN/1.73M2
GLUCOSE (MG/DL) IN SER/PLAS: 89 MG/DL (ref 74–99)
NATRIURETIC PEPTIDE B (PG/ML) IN SER/PLAS: 43 PG/ML (ref 0–99)
PHOSPHATE (MG/DL) IN SER/PLAS: 4 MG/DL (ref 2.5–4.9)
POTASSIUM (MMOL/L) IN SER/PLAS: 3.8 MMOL/L (ref 3.5–5.3)
SODIUM (MMOL/L) IN SER/PLAS: 142 MMOL/L (ref 136–145)
UREA NITROGEN (MG/DL) IN SER/PLAS: 19 MG/DL (ref 6–23)

## 2023-09-23 LAB
AGNA-1, S(MAYO): NEGATIVE
AMPA-R AB CBA, S(MAYO): NEGATIVE
AMPHIPHYSIN AB, S(MAYO): NEGATIVE
ANNA-1, S(MAYO): NEGATIVE
ANNA-2, S(MAYO): NEGATIVE
ANNA-3, S(MAYO): NEGATIVE
CASPR2-IGG CBA, S(MAYO): NEGATIVE
CRMP-5-IGG, S(MAYO): NEGATIVE
DPPX AB IFA, S(MAYO): NEGATIVE
ENCEPHALOPATHY, INTERPRETATION, S(MAYO): NORMAL
GABA-B-R AB CBA, S(MAYO): NEGATIVE
GFAP IFA, S(MAYO): NEGATIVE
GLUTAMATE DECARBOXYLASE 65 AB (U/ML) IN SERUM: 0 NMOL/L
IFA NOTES (ENS1): NORMAL
IGLON5 IFA, S(MAYO): NEGATIVE
LGI1-IGG CBA, S(MAYO): NEGATIVE
MGLUR1 AB IFA, S(MAYO): NEGATIVE
NEUROCHONDRIN IFA, S: NEGATIVE
NIF IFA, S(MAYO): NEGATIVE
NMDA-R AB CBA, S(MAYO): NEGATIVE
PCA-1, S(MAYO): NEGATIVE
PCA-2, S(MAYO): NEGATIVE
PCA-TR, S(MAYO): NEGATIVE
SEPTIN-7 IFA, S: NEGATIVE

## 2023-10-02 NOTE — OP NOTE
Post Operative Note:     PreOp Diagnosis: Cervical Radiculopathy   Post-Procedure Diagnosis: Cervical Radiculopathy   Procedure: 1. C6-C7 interlaminar epidural steroid  injection  2. Moderate Sedation   Surgeon: Maria Isabel Crow MD PhD   Resident/Fellow/Other Assistant: Elias Garsia MD   Estimated Blood Loss (mL): none   Specimen: no   Findings: none     Operative Report Dictated:  Dictation: not applicable - note contains Operative  Report   Operative Report:    58 year old female with MRI evidence of moderate central canal stenosis at C6-C7 presents for C6-C7 interlaminar epidural steroid injection.  She has had prior cervical  interlaminar epidural steroid injections with significant relief most recently in february 2022.    After informed consent was obtained, the patient was brought to the operating  room and placed in the prone position.  Pulse oximetry and blood pressure cuff were placed.  The neck area was prepped and draped in the usual sterile fashion.  Using fluoroscopic  guidance, the skin and subcutaneous tissue overlying needle trajectory to the C6-C7 interlaminar epidural space was anesthetized with 0.5% lidocaine.  An 18-gauge Tuohy needle was then advanced in the paraspinous approach into the epidural space.  Entry  into the epidural space was confirmed using the loss of resistance technique with a glass syringe and 2 cc of air.  Injection of contrast revealed appropriate spread without vascular uptake.  Subsequently, 2 mL of 0.5% lidocaine and 40 mg methylprednisolone  were injected.  The patient tolerated the procedure well and the needle was removed.  They were then transferred to PACU in stable condition.      FOLLOW UP:  The patient will update us on their response to this procedure, and agrees to continue currently prescribed/recommended therapies    Attestation:   Note Completion:  I am a: Resident/Fellow   Attending Attestation I was present for the entire procedure         Electronic  Signatures:  Elias Garsia (Fellow))  (Signed 04-Apr-2023 08:54)   Authored: Post Operative Note, Note Completion  Maria Isabel Crow)  (Signed 04-Apr-2023 09:04)   Authored: Note Completion   Co-Signer: Post Operative Note, Note Completion      Last Updated: 04-Apr-2023 09:04 by Maria Isabel Crow)

## 2023-10-05 ENCOUNTER — ANCILLARY PROCEDURE (OUTPATIENT)
Dept: CARDIOLOGY | Facility: CLINIC | Age: 58
End: 2023-10-05
Payer: MEDICARE

## 2023-10-05 DIAGNOSIS — I50.9 HEART FAILURE, UNSPECIFIED HF CHRONICITY, UNSPECIFIED HEART FAILURE TYPE (MULTI): ICD-10-CM

## 2023-10-05 LAB — EJECTION FRACTION APICAL 4 CHAMBER: 66.2

## 2023-10-05 PROCEDURE — 93306 TTE W/DOPPLER COMPLETE: CPT | Performed by: STUDENT IN AN ORGANIZED HEALTH CARE EDUCATION/TRAINING PROGRAM

## 2023-10-05 PROCEDURE — 93306 TTE W/DOPPLER COMPLETE: CPT

## 2023-10-05 NOTE — PROGRESS NOTES
Chief Complaint     Follow-up regarding a thyroid nodule and dysphonia.      History of Present IllnessThis lady was seen in September 2021 at the request of her endocrinologist. Approximately 5 years ago she had a total thyroidectomy for what was labeled a multinodular goiter. 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 She had significant voice issues after the procedure. She was left with a very hoarse voice. She was found to have a thyroid nodule on the left side which may need to be removed. On an ultrasound that was done in July 2021 she had a residual left side that measured 4.8 cm. She had a needle aspirate that was done in July 2021 that showed a follicular nodule. She had markers done that showed the likelihood of malignancy to be very low. I did send her for CT scanning that was done on October 8, 2021. I personally reviewed that scan. It does show this left-sided mass in the paratracheal area that is consistent with thyroid tissue. She had a repeat ultrasound on December 2, 2021 that showed a 1 cm node in the level 3 on the left side. She had a repeat ultrasound done in November 2022 that showed no changes. Her thyroglobulin level in April 2023 was 5.9 which was slightly lower than previously.  She does have some significant neurologic issues. This is being worked up.     Physical Exam  Palpation of the parotid, neck, and thyroid field fails to show any worrisome masses or adenopathies. More specifically I cannot appreciate this thyroid nodule and I also cannot feel that adenopathy.         Provider Impressions     Status post total thyroidectomy. The patient has some residual or recurrent thyroid tumor tissue on the left side. Her last thyroglobulin was slightly lower than the previous 1.  I ordered a TSH level as well as a thyroglobulin.  I also ordered an ultrasound.  I will see her in 6 months.

## 2023-10-06 ENCOUNTER — OFFICE VISIT (OUTPATIENT)
Dept: OTOLARYNGOLOGY | Facility: HOSPITAL | Age: 58
End: 2023-10-06
Payer: MEDICARE

## 2023-10-06 ENCOUNTER — APPOINTMENT (OUTPATIENT)
Dept: RADIOLOGY | Facility: CLINIC | Age: 58
End: 2023-10-06
Payer: MEDICARE

## 2023-10-06 VITALS — TEMPERATURE: 97.3 F | BODY MASS INDEX: 34.15 KG/M2 | WEIGHT: 225.34 LBS | HEIGHT: 68 IN

## 2023-10-06 DIAGNOSIS — C73 MALIGNANT NEOPLASM OF THYROID GLAND (MULTI): Primary | ICD-10-CM

## 2023-10-06 PROCEDURE — 99213 OFFICE O/P EST LOW 20 MIN: CPT | Performed by: OTOLARYNGOLOGY

## 2023-10-06 PROCEDURE — 3044F HG A1C LEVEL LT 7.0%: CPT | Performed by: OTOLARYNGOLOGY

## 2023-10-06 PROCEDURE — 3008F BODY MASS INDEX DOCD: CPT | Performed by: OTOLARYNGOLOGY

## 2023-10-06 PROCEDURE — 4010F ACE/ARB THERAPY RXD/TAKEN: CPT | Performed by: OTOLARYNGOLOGY

## 2023-10-06 RX ORDER — CALCIUM CARBONATE 500(1250)
1 TABLET ORAL 3 TIMES DAILY
COMMUNITY
Start: 2017-11-10

## 2023-10-06 RX ORDER — CARBIDOPA AND LEVODOPA 25; 100 MG/1; MG/1
1 TABLET ORAL 3 TIMES DAILY
COMMUNITY
Start: 2023-09-12 | End: 2024-01-22 | Stop reason: SDUPTHER

## 2023-10-06 RX ORDER — AMMONIUM LACTATE 12 G/100G
1 LOTION TOPICAL AS NEEDED
COMMUNITY
Start: 2021-07-26

## 2023-10-06 RX ORDER — CEPHALEXIN 500 MG/1
1 CAPSULE ORAL DAILY
COMMUNITY
Start: 2023-08-25 | End: 2024-03-26 | Stop reason: SDUPTHER

## 2023-10-06 RX ORDER — AMOXICILLIN AND CLAVULANATE POTASSIUM 875; 125 MG/1; MG/1
875 TABLET, FILM COATED ORAL 2 TIMES DAILY
COMMUNITY
Start: 2023-10-03 | End: 2023-10-10

## 2023-10-06 RX ORDER — ACETAMINOPHEN 500 MG
1 TABLET ORAL EVERY 4 HOURS PRN
COMMUNITY
Start: 2017-11-10

## 2023-10-06 RX ORDER — BUTALB/ACETAMINOPHEN/CAFFEINE 50-325-40
1 TABLET ORAL DAILY
COMMUNITY
Start: 2018-11-26 | End: 2023-10-06 | Stop reason: SDUPTHER

## 2023-10-06 ASSESSMENT — PATIENT HEALTH QUESTIONNAIRE - PHQ9
SUM OF ALL RESPONSES TO PHQ9 QUESTIONS 1 & 2: 0
2. FEELING DOWN, DEPRESSED OR HOPELESS: NOT AT ALL
1. LITTLE INTEREST OR PLEASURE IN DOING THINGS: NOT AT ALL

## 2023-10-10 ENCOUNTER — ANCILLARY PROCEDURE (OUTPATIENT)
Dept: RADIOLOGY | Facility: CLINIC | Age: 58
End: 2023-10-10
Payer: MEDICARE

## 2023-10-10 ENCOUNTER — OFFICE VISIT (OUTPATIENT)
Dept: ORTHOPEDIC SURGERY | Facility: HOSPITAL | Age: 58
End: 2023-10-10
Payer: MEDICARE

## 2023-10-10 ENCOUNTER — HOME HEALTH ADMISSION (OUTPATIENT)
Dept: HOME HEALTH SERVICES | Facility: HOME HEALTH | Age: 58
End: 2023-10-10
Payer: MEDICARE

## 2023-10-10 ENCOUNTER — LAB (OUTPATIENT)
Dept: LAB | Facility: LAB | Age: 58
End: 2023-10-10
Payer: MEDICARE

## 2023-10-10 DIAGNOSIS — M17.11 ARTHRITIS OF KNEE, RIGHT: ICD-10-CM

## 2023-10-10 DIAGNOSIS — R92.2 INCONCLUSIVE MAMMOGRAM: ICD-10-CM

## 2023-10-10 DIAGNOSIS — C73 MALIGNANT NEOPLASM OF THYROID GLAND (MULTI): ICD-10-CM

## 2023-10-10 LAB — TSH SERPL-ACNC: 0.16 MIU/L (ref 0.44–3.98)

## 2023-10-10 PROCEDURE — 77061 BREAST TOMOSYNTHESIS UNI: CPT | Mod: LEFT SIDE | Performed by: STUDENT IN AN ORGANIZED HEALTH CARE EDUCATION/TRAINING PROGRAM

## 2023-10-10 PROCEDURE — 3008F BODY MASS INDEX DOCD: CPT | Performed by: STUDENT IN AN ORGANIZED HEALTH CARE EDUCATION/TRAINING PROGRAM

## 2023-10-10 PROCEDURE — 99214 OFFICE O/P EST MOD 30 MIN: CPT | Performed by: STUDENT IN AN ORGANIZED HEALTH CARE EDUCATION/TRAINING PROGRAM

## 2023-10-10 PROCEDURE — 36415 COLL VENOUS BLD VENIPUNCTURE: CPT

## 2023-10-10 PROCEDURE — 86800 THYROGLOBULIN ANTIBODY: CPT

## 2023-10-10 PROCEDURE — 77065 DX MAMMO INCL CAD UNI: CPT | Mod: LEFT SIDE | Performed by: STUDENT IN AN ORGANIZED HEALTH CARE EDUCATION/TRAINING PROGRAM

## 2023-10-10 PROCEDURE — 77061 BREAST TOMOSYNTHESIS UNI: CPT | Mod: LT

## 2023-10-10 PROCEDURE — 4010F ACE/ARB THERAPY RXD/TAKEN: CPT | Performed by: STUDENT IN AN ORGANIZED HEALTH CARE EDUCATION/TRAINING PROGRAM

## 2023-10-10 PROCEDURE — 3044F HG A1C LEVEL LT 7.0%: CPT | Performed by: STUDENT IN AN ORGANIZED HEALTH CARE EDUCATION/TRAINING PROGRAM

## 2023-10-10 PROCEDURE — 84443 ASSAY THYROID STIM HORMONE: CPT

## 2023-10-10 PROCEDURE — 84432 ASSAY OF THYROGLOBULIN: CPT

## 2023-10-10 NOTE — PROGRESS NOTES
"REFERRAL SOURCE: No ref. provider found     CHIEF COMPLAINT: right knee pain    HISTORY OF PRESENT ILLNESS  Prema Hair is a very pleasant 58 y.o. female with history of type 2 diabetes, heart failure, vitamin D deficiency, pseudoaneurysm, fibromyalgia, torticollis  who is here for evaluation of right knee pain.     Previous history:  8/22/2023: PREMA HAIR is here for evaluation of chronic right knee pain. Patient has been previously treated by Dr. Crow and has gotten steroid injections, viscosupplementation, and right genicular nerve RFA with variable amounts of relief. She states that her knee has lots of \"crunches\" that cause pain that will radiate to her right hip. He right knee occasionally locks up. Patient has a history of multiple falls and has been in and out of the hospital and discharged with home therapy. She has developed torticollis over the past year without a clear etiology. Patient is also scheduled to meet with neurology for possible Parkinson's. Shes been using voltaren gel for the knee pain.     Interval history:  10/10/2023: Her pain is unchanged. She is here today with her daughter who provides some of the history.     MEDS    Current Outpatient Medications:     acetaminophen (Tylenol) 500 mg tablet, Take 1 tablet (500 mg) by mouth every 4 hours if needed., Disp: , Rfl:     albuterol 90 mcg/actuation inhaler, Inhale 2 puffs every 4 hours if needed., Disp: , Rfl:     amantadine (Symmetrel) 100 mg capsule, Take 1 capsule (100 mg) by mouth once daily., Disp: , Rfl:     amitriptyline (Elavil) 10 mg tablet, Take 1 tablet (10 mg) by mouth once daily at bedtime., Disp: , Rfl:     ammonium lactate (Lac-Hydrin) 12 % lotion, Apply 1 Application topically twice a day., Disp: , Rfl:     amoxicillin-pot clavulanate (Augmentin) 875-125 mg tablet, Take 1 tablet (875 mg) by mouth twice a day., Disp: , Rfl:     ascorbic acid (Vitamin C) 1,000 mg tablet, Take 1 tablet (1,000 mg) by mouth once daily., " Disp: , Rfl:     aspirin 81 mg EC tablet, Take 1 tablet (81 mg) by mouth once daily., Disp: , Rfl:     atorvastatin (Lipitor) 20 mg tablet, Take 1 tablet (20 mg) by mouth once daily at bedtime., Disp: , Rfl:     azelastine (Astelin) 137 mcg (0.1 %) nasal spray, Administer 1 spray into each nostril 2 times a day., Disp: , Rfl:     baclofen (Lioresal) 10 mg tablet, Take 1 tablet (10 mg) by mouth 3 times a day as needed for muscle spasms., Disp: , Rfl:     baclofen (Lioresal) 20 mg tablet, Take 1 tablet (20 mg) by mouth 3 times a day., Disp: , Rfl:     blood sugar diagnostic (OneTouch Verio test strips) strip, 1 strip by in vitro route once every day., Disp: , Rfl:     brexpiprazole (Rexulti) 3 mg tablet, Take 1 tablet by mouth once daily., Disp: , Rfl:     bumetanide (Bumex) 2 mg tablet, Take 1 tablet (2 mg) by mouth twice a day., Disp: , Rfl:     calcium carbonate (Oscal) 500 mg calcium (1,250 mg) tablet, Take 1 tablet (1,250 mg) by mouth 3 times a day., Disp: , Rfl:     calcium carbonate-vitamin D3 500 mg-3.125 mcg (125 unit) tablet tablet, Take 1 tablet by mouth once daily., Disp: , Rfl:     Caplyta 21 mg capsule, Take 1 capsule by mouth once daily at bedtime., Disp: , Rfl:     Caplyta 42 mg capsule, Take 1 capsule (42 mg) by mouth once daily at bedtime., Disp: , Rfl:     carbidopa-levodopa (Sinemet)  mg tablet, PLEASE SEE ATTACHED FOR DETAILED DIRECTIONS, Disp: , Rfl:     carvedilol (Coreg) 25 mg tablet, Take 1 tablet (25 mg) by mouth 2 times a day., Disp: , Rfl:     cephalexin (Keftab) 500 mg tablet, TAKE 1 TABLET TWICE DAILY FOR 7 DAYS THEN DAILY THEREAFTER., Disp: , Rfl:     cholecalciferol (Vitamin D-3) 50 MCG (2000 UT) tablet, Take 1 tablet (50 mcg) by mouth once daily., Disp: , Rfl:     clonazePAM (KlonoPIN) 0.5 mg tablet, 1 tablet (0.5 mg) as needed at bedtime for anxiety. 1/2 TABLET BY MOUTH EVERY EVENING AND AN ADDITIONAL 1/2 AS NEEDED, Disp: , Rfl:     clonazePAM (KlonoPIN) 0.5 mg tablet, Take 1  tablet (0.5 mg) by mouth 3 times a day as needed for anxiety., Disp: , Rfl:     deutetrabenazine (Austedo) 6 mg tablet, Take 1 tablet (6 mg) by mouth., Disp: , Rfl:     diclofenac sodium (Voltaren) 1 % gel gel, Apply 1 Application topically once daily., Disp: , Rfl:     DOCOSAHEXAENOIC ACID ORAL, Take 1 capsule by mouth once daily with a meal., Disp: , Rfl:     estradiol (Estrace) 0.01 % (0.1 mg/gram) vaginal cream, Insert 1 Application into the vagina 3 (three) times a week., Disp: , Rfl:     Fanapt 10 mg tablet, Take 1 tablet (10 mg) by mouth 2 times a day. Take with food., Disp: , Rfl:     Fanapt 2 mg tablet, Take 1 tablet (2 mg) by mouth 2 times a day. TAKE WITH FOOD, Disp: , Rfl:     Fanapt 4 mg tablet, Take 1 tablet (4 mg) by mouth 2 times a day. Take with food., Disp: , Rfl:     Fanapt 6 mg tablet, Take 1 tablet (6 mg) by mouth 2 times a day. Take with food., Disp: , Rfl:     Fanapt 8 mg tablet, Take 1 tablet (8 mg) by mouth 2 times a day. Take with food., Disp: , Rfl:     furosemide (Lasix) 20 mg tablet, Take 1 tablet (20 mg) by mouth every 6 hours during the day., Disp: , Rfl:     gabapentin (Neurontin) 300 mg capsule, Take 1 capsule (300 mg) by mouth 3 times a day., Disp: , Rfl:     iloperidone (Fanapt) 12 mg tablet, Take 1 tablet (12 mg) by mouth 2 times a day., Disp: , Rfl:     Ingrezza 40 mg capsule, Take 1 capsule (40 mg) by mouth once daily., Disp: , Rfl:     ipratropium-albuteroL (Duo-Neb) 0.5-2.5 mg/3 mL nebulizer solution, Inhale 3 mL every 4 hours if needed., Disp: , Rfl:     Klor-Con M20 20 mEq ER tablet, Take 1 tablet (20 mEq) by mouth 2 times a day., Disp: , Rfl:     lancets misc, 1 each 3 times a day., Disp: , Rfl:     levothyroxine (Synthroid, Levoxyl) 100 mcg tablet, Take 1 tablet (100 mcg) by mouth once daily. 1 tablet 5 days a week Mon-Friday and two tabs on Saturdays and Sundays, Disp: , Rfl:     levothyroxine (Synthroid, Levoxyl) 200 mcg tablet, Take 1 tablet (200 mcg) by mouth., Disp: ,  Rfl:     lidocaine (Lidoderm) 5 % patch, Place 1 patch on the skin once daily. Apply 1 patch and leave in place for 12 hours, then remove and leave off for 12 hours., Disp: , Rfl:     losartan (Cozaar) 25 mg tablet, Take 2 tablets (50 mg) by mouth once daily., Disp: , Rfl:     losartan (Cozaar) 50 mg tablet, Take 1 tablet (50 mg) by mouth once daily., Disp: , Rfl:     meloxicam (Mobic) 15 mg tablet, Take 1 tablet (15 mg) by mouth once daily., Disp: , Rfl:     meloxicam (Mobic) 7.5 mg tablet, Take 1 tablet (7.5 mg) by mouth once daily., Disp: , Rfl:     meloxicam, bulk, 100 % powder, USE AS DIRECTED., Disp: , Rfl:     metFORMIN (Glucophage) 500 mg tablet, Take 1 tablet (500 mg) by mouth 2 times a day with meals., Disp: , Rfl:     minocycline 100 mg capsule, Take 1 capsule (100 mg) by mouth once daily at bedtime., Disp: , Rfl:     mupirocin (Bactroban) 2 % ointment, Apply 1 Application topically see administration instructions. Apply as directed by physician, Disp: , Rfl:     OLANZapine (ZyPREXA) 5 mg tablet, Take 1 tablet (5 mg) by mouth 2 times a day., Disp: , Rfl:     omega-3 fatty acids-fish oil 360-1,200 mg capsule, Take 1 capsule (1,200 mg) by mouth once daily., Disp: , Rfl:     omeprazole (PriLOSEC) 40 mg DR capsule, Take 1 capsule (40 mg) by mouth once daily. With dinner, Disp: , Rfl:     OXcarbazepine (Trileptal) 300 mg tablet, Take 1 tablet (300 mg) by mouth once every day., Disp: , Rfl:     OXcarbazepine (Trileptal) 600 mg tablet, Take 1 tablet (600 mg) by mouth once daily at bedtime., Disp: , Rfl:     oxybutynin XL (Ditropan-XL) 10 mg 24 hr tablet, Take 1 tablet (10 mg) by mouth once daily., Disp: , Rfl:     pantoprazole (ProtoNix) 40 mg EC tablet, Take 1 tablet (40 mg) by mouth. Do not crush, chew, or split., Disp: , Rfl:     phenazopyridine (Pyridium) 200 mg tablet, Take 1 tablet (200 mg) by mouth 3 times a day after meals., Disp: , Rfl:     polyethylene glycol (Glycolax, Miralax) 17 gram/dose powder,  Take 17 g by mouth once daily. In 8oz of water, juice, or tea and drink daily, Disp: , Rfl:     potassium chloride CR (Klor-Con) 8 mEq ER tablet, Take 1 tablet (8 mEq) by mouth 2 times a day., Disp: , Rfl:     potassium chloride ER (Micro-K) 10 mEq ER capsule, Take 2 capsules (20 mEq) by mouth 2 times a day., Disp: , Rfl:     prazosin (Minipress) 2 mg capsule, Take 1 capsule (2 mg) by mouth once daily at bedtime., Disp: , Rfl:     prazosin (Minipress) 5 mg capsule, Take 1 capsule (5 mg) by mouth once daily at bedtime., Disp: , Rfl:     QUEtiapine (SEROquel) 100 mg tablet, Take 1 tablet (100 mg) by mouth., Disp: , Rfl:     QUEtiapine (Seroquel) 25 mg tablet, Take 1 tablet (25 mg) by mouth 2 times a day before meals., Disp: , Rfl:     QUEtiapine (SEROquel) 50 mg tablet, Take 1 tablet (50 mg) by mouth 2 times a day., Disp: , Rfl:     rOPINIRole (Requip) 1 mg tablet, Take 1 tablet (1 mg) by mouth once daily., Disp: , Rfl:     sertraline (Zoloft) 100 mg tablet, Take 0.5 tablets (50 mg) by mouth once daily., Disp: , Rfl:     sertraline 150 mg capsule, Take 1 capsule (150 mg) by mouth once every day., Disp: , Rfl:     Symbicort 160-4.5 mcg/actuation inhaler, Inhale., Disp: , Rfl:     terconazole (Terazol 3) 0.8 % vaginal cream, Insert 1 applicator into the vagina once daily at bedtime., Disp: , Rfl:     tiZANidine (Zanaflex) 2 mg tablet, Take 1 tablet (2 mg) by mouth. 1 tab up to three times a day as needed for neck pain, Disp: , Rfl:     tiZANidine (Zanaflex) 4 mg tablet, Take 1 tablet (4 mg) by mouth once every day., Disp: , Rfl:     topiramate (Topamax) 200 mg tablet, Take 1 tablet (200 mg) by mouth 2 times a day., Disp: , Rfl:     traMADol (Ultram) 50 mg tablet, Take 1 tablet (50 mg) by mouth every 8 hours if needed for moderate pain (4 - 6)., Disp: , Rfl:     Trelegy Ellipta 200-62.5-25 mcg blister with device, Inhale 1 puff once daily., Disp: , Rfl:     ALLERGIES  No Active Allergies    PAST MEDICAL HISTORY  Past  Medical History:   Diagnosis Date    Cervicalgia 10/09/2020    Neck pain    Dysphonia 07/05/2022    Muscle tension dysphonia    Encounter for fitting and adjustment of other specified devices 02/16/2021    Fitting and adjustment of pessary    Hyperlipidemia, unspecified 01/03/2023    Dyslipidemia    Hypokalemia 01/20/2022    Hypokalemia    Low back pain, unspecified 02/22/2021    Low back pain    Obstructive sleep apnea (adult) (pediatric) 01/03/2023    ZEINA on CPAP    Paralysis of vocal cords and larynx, 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    Laryngeal paresis    Type 2 diabetes mellitus with diabetic neuropathy, unspecified (CMS/HCC) 10/09/2020    Controlled type 2 diabetes mellitus with neuropathy    Type 2 diabetes mellitus with other specified complication (CMS/HCC) 01/03/2023    DM type 2 with diabetic mixed hyperlipidemia    Type 2 diabetes mellitus with other specified complication (CMS/HCC) 09/14/2021    Diabetes mellitus type 2 in obese       PAST SURGICAL HISTORY  Past Surgical History:   Procedure Laterality Date    OTHER SURGICAL HISTORY  10/09/2020    Hemithyroidectomy    US GUIDED THYROID BIOPSY  11/19/2020    US GUIDED THYROID BIOPSY 11/19/2020 [facility]    US GUIDED THYROID BIOPSY  11/19/2020    US GUIDED THYROID BIOPSY 11/19/2020 [facility]       SOCIAL HISTORY   Social History     Socioeconomic History    Marital status: Single     Spouse name: Not on file    Number of children: Not on file    Years of education: Not on file    Highest education level: Not on file   Occupational History    Not on file   Tobacco Use    Smoking status: Never    Smokeless tobacco: Never   Substance and Sexual Activity    Alcohol use: Not on file    Drug use: Not on file    Sexual activity: Not on file   Other Topics Concern    Not on file   Social History Narrative    Not on file     Social Determinants of Health     Financial Resource Strain: Not on file   Food Insecurity: Not on file   Transportation Needs: Not on file    Physical Activity: Not on file   Stress: Not on file   Social Connections: Not on file   Intimate Partner Violence: Not on file   Housing Stability: Not on file       FAMILY HISTORY  Family History   Problem Relation Name Age of Onset    Other (cardiac disorder) Mother      Heart failure Mother      Heart failure Father         REVIEW OF SYSTEMS  Except for those mentioned in the history of present illness, and below, a complete review of systems is negative.     Review of Systems    VITALS  There were no vitals filed for this visit.    PHYSICAL EXAMINATION   GENERAL: Awake, alert, and oriented, no apparent distress, pleasant, and cooperative  PSYC: Mood is euthymic, affect is congruent  EAR, NOSE, THROAT: Normocephalic, atraumatic, moist membranes, anicteric sclera  LUNG: Nonlabored breathing  HEART: No clubbing or cyanosis  SKIN: No increased erythema, warmth, rashes, or concerning skin lesions  NEURO: Sensation is intact in the bilateral lower extremities. Strength is grossly 5 out of 5 throughout the bilateral lower extremities, unless noted below.  GAIT: Antalgic  MUSCULOSKELETAL:  Examination of the right knee: Range of motion is significant decreased and painful, only able to flex knee to about 70 degrees, positive patellar grind, tenderness to palpation of the medial or lateral joint lines, and inferior patellar facets. Varus and valgus stress test cause pain. Positive Osmel's.     IMAGING STUDIES: MRI of the right knee dated 9/14/2023 was personally reviewed and interpreted by me, Dr. Zonia Clarke, and the findings shared with the patient.  I agree with radiology report: 1. Moderate to severe tricompartmental articular cartilage loss of the knee. This is most prominent in the lateral compartment which demonstrates large areas of full-thickness articular cartilage loss. Tricompartmental osteophytosis is noted.  2. Complex tearing involving the anterior horn, body, and posterior horn of the lateral  meniscus with horizontal oblique and radial components. Inner free edge blunting of the medial meniscus body segment with question partial thickness radial tear of the posterior root. 3. Low grade strain of the biceps femoris muscle bellies is partially visualized. 4. A mild sprain of the medial collateral ligament is also noted.     IMPRESSION  #1  Right knee pain due to acute exacerbation of chronic severe right knee arthritis with degenerative meniscus tearing and biceps femoris strain    PLAN  The following was discussed with the patient:     Prema Hair is a very pleasant 58 y.o. female Prema Hair is a very pleasant 58 y.o. female with history of type 2 diabetes, heart failure, vitamin D deficiency, pseudoaneurysm, fibromyalgia, torticollis  who is here for evaluation of right knee pain due to acute exacerbation of chronic severe right knee osteoarthritis.  She has evidence of severe arthritis on radiographs and we discussed that a knee replacement is likely going to be her only long-term solution.  However, given her medical history, she is likely not able to have a replacement.  -Physical therapy: Work on hip abductor, knee extensor, core strengthening and flexibility with PT. continue with physical therapy. At this point, she is done many months of physical therapy.  -Weight loss and physical activity: The benefits of weight loss and physical activity on improving pain experienced with arthritis were discussed.  -Bracing: Knee bracing can be considered.  -Medications: Continue to take Tylenol over the counter. We discussed that she can return to pain management for consideration of medications for her pain.  -Injections: Injections, such as corticosteroid, viscosupplementation, and PRP can be utilized. She has tried corticosteroid, viscosupplementation, and RFA without significant or lasting relief.    -Continue with neurologic evaluation for Parkinson's  -Follow-up as needed.     The patient was  counseled to remain active, but avoid activities that worsen symptoms. The patient or her daughter were in agreement with this plan. All questions were answered to the best of my ability.    PATIENT EDUCATION:  Education was discussed at today's appointment. A learning needs assessment was performed.    Primary learner: Prema Hair  Barriers to learning: None  Preferred language: English  Learning preferences include: Seeing and doing.  Discussed: Diagnosis and treatment plan.  Demonstrated: Understanding of material discussed.  Patient education materials given: None.  Learner response: Learner demonstrated understanding.    This note was dictated using Dragon speech recognition software and was not corrected for spelling or grammatical errors.

## 2023-10-10 NOTE — RESULT ENCOUNTER NOTE
Program reviewed.  Category 3 due to mild asymmetry left breast.  No definite abnormality noted.  Would like to do follow-up 6-month mammogram left breast for stability.  Order placed

## 2023-10-12 ENCOUNTER — TELEPHONE (OUTPATIENT)
Dept: OBSTETRICS AND GYNECOLOGY | Facility: CLINIC | Age: 58
End: 2023-10-12
Payer: MEDICAID

## 2023-10-13 LAB
BILL ONLY-THYROGLOBULIN: NORMAL
THYROGLOB AB SERPL-ACNC: <0.9 IU/ML (ref 0–4)
THYROGLOB SERPL-MCNC: 3.2 NG/ML (ref 1.3–31.8)
THYROGLOB SERPL-MCNC: NORMAL NG/ML (ref 1.3–31.8)

## 2023-10-19 ENCOUNTER — OFFICE VISIT (OUTPATIENT)
Dept: CARDIOLOGY | Facility: CLINIC | Age: 58
End: 2023-10-19
Payer: MEDICARE

## 2023-10-19 VITALS
OXYGEN SATURATION: 96 % | SYSTOLIC BLOOD PRESSURE: 126 MMHG | DIASTOLIC BLOOD PRESSURE: 78 MMHG | BODY MASS INDEX: 36.26 KG/M2 | HEART RATE: 79 BPM | WEIGHT: 231 LBS | HEIGHT: 67 IN

## 2023-10-19 DIAGNOSIS — I50.33 ACUTE ON CHRONIC DIASTOLIC HEART FAILURE (MULTI): Primary | ICD-10-CM

## 2023-10-19 DIAGNOSIS — E78.2 DM TYPE 2 WITH DIABETIC MIXED HYPERLIPIDEMIA (MULTI): ICD-10-CM

## 2023-10-19 DIAGNOSIS — E11.69 DM TYPE 2 WITH DIABETIC MIXED HYPERLIPIDEMIA (MULTI): ICD-10-CM

## 2023-10-19 DIAGNOSIS — E78.5 DYSLIPIDEMIA: ICD-10-CM

## 2023-10-19 DIAGNOSIS — E78.1 PURE HYPERTRIGLYCERIDEMIA: ICD-10-CM

## 2023-10-19 DIAGNOSIS — R06.09 DOE (DYSPNEA ON EXERTION): ICD-10-CM

## 2023-10-19 PROCEDURE — 99215 OFFICE O/P EST HI 40 MIN: CPT | Performed by: STUDENT IN AN ORGANIZED HEALTH CARE EDUCATION/TRAINING PROGRAM

## 2023-10-19 PROCEDURE — 3008F BODY MASS INDEX DOCD: CPT | Performed by: STUDENT IN AN ORGANIZED HEALTH CARE EDUCATION/TRAINING PROGRAM

## 2023-10-19 PROCEDURE — 3074F SYST BP LT 130 MM HG: CPT | Performed by: STUDENT IN AN ORGANIZED HEALTH CARE EDUCATION/TRAINING PROGRAM

## 2023-10-19 PROCEDURE — 4010F ACE/ARB THERAPY RXD/TAKEN: CPT | Performed by: STUDENT IN AN ORGANIZED HEALTH CARE EDUCATION/TRAINING PROGRAM

## 2023-10-19 PROCEDURE — 3078F DIAST BP <80 MM HG: CPT | Performed by: STUDENT IN AN ORGANIZED HEALTH CARE EDUCATION/TRAINING PROGRAM

## 2023-10-19 PROCEDURE — 3044F HG A1C LEVEL LT 7.0%: CPT | Performed by: STUDENT IN AN ORGANIZED HEALTH CARE EDUCATION/TRAINING PROGRAM

## 2023-10-19 PROCEDURE — 1036F TOBACCO NON-USER: CPT | Performed by: STUDENT IN AN ORGANIZED HEALTH CARE EDUCATION/TRAINING PROGRAM

## 2023-10-19 RX ORDER — METOLAZONE 2.5 MG/1
2.5 TABLET ORAL DAILY
Qty: 30 TABLET | Refills: 11 | Status: SHIPPED | OUTPATIENT
Start: 2023-10-19 | End: 2023-11-22

## 2023-10-19 NOTE — PATIENT INSTRUCTIONS
We will start Metolazone 2.5 mg daily.    Please continue your current cardiac medication including bumetanide 2 mg tablet twice daily, aspirin 81 mg, atorvastatin 20 mg, carvedilol 25 mg twice daily, losartan 50 mg daily.    We will plan to recheck blood work including renal function panel and BNP before your follow-up visit.    Please followup with me in Cardiology clinic within the next 2-3 months.  Please return to clinic sooner or seek emergent care if your symptoms reoccur or worsen.

## 2023-10-19 NOTE — PROGRESS NOTES
Follow-up (4 mo fuv)     HPI:    Prema Hair is a 58 y.o. 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    She notes increasing dyspnea on exertion and lower extremity edema.  No exacerbating or relieving factors.  Patient denies chest pain and angina.  Pt denies orthopnea, and paroxysmal nocturnal dyspnea.   Pt denies palpitations or syncope.  No recent falls.  No fever or chills.  No cough.  No change in bowel or bladder habits.  No sick contacts.  No recent travel.    12 point review of systems including (Constitutional, Eyes, ENMT, Respiratory, Cardiac, Gastrointestinal, Neurological, Psychiatric, and Hematologic) was performed and is otherwise negative.    Past medical history reviewed:   has a past medical history of Cervicalgia (10/09/2020), Dysphonia (07/05/2022), Encounter for fitting and adjustment of other specified devices (02/16/2021), Hyperlipidemia, unspecified (01/03/2023), Hypokalemia (01/20/2022), Low back pain, unspecified (02/22/2021), Obstructive sleep apnea (adult) (pediatric) (01/03/2023), Paralysis of vocal cords and larynx, unspecified (10/07/2022), Type 2 diabetes mellitus with diabetic neuropathy, unspecified (CMS/[facility])  (10/09/2020), Type 2 diabetes mellitus with other specified complication (CMS/HCC) (01/03/2023), and Type 2 diabetes mellitus with other specified complication (CMS/HCC) (09/14/2021).    Past surgical history reviewed:   has a past surgical history that includes Other surgical history (10/09/2020); US guided thyroid biopsy (11/19/2020); and US guided thyroid biopsy (11/19/2020).    Social history reviewed:   reports that she has never smoked. She has never used smokeless tobacco. No history on file for alcohol use and drug use.     Family history reviewed:    Family History   Problem Relation Name Age of Onset    Other (cardiac disorder) Mother      Heart failure Mother      Heart failure Father         Allergies reviewed: Lisinopril     Medications reviewed:   Current Outpatient Medications   Medication Instructions    acetaminophen (TYLENOL) 500 mg, oral, Every 4 hours PRN    albuterol 90 mcg/actuation inhaler 2 puffs, inhalation, Every 4 hours PRN    amantadine (Symmetrel) 100 mg capsule 1 capsule, oral, Daily    amitriptyline (ELAVIL) 10 mg, oral, Nightly    ammonium lactate (Lac-Hydrin) 12 % lotion 1 Application, Topical, 2 times daily    ascorbic acid (Vitamin C) 1,000 mg tablet 1 tablet, oral, Daily    aspirin 81 mg EC tablet 1 tablet, oral, Daily    atorvastatin (LIPITOR) 20 mg, oral, Nightly    azelastine (Astelin) 137 mcg (0.1 %) nasal spray 1 spray, Each Nostril, 2 times daily    baclofen (Lioresal) 20 mg tablet 1 tablet, oral, 3 times daily    baclofen (LIORESAL) 10 mg, oral, 3 times daily PRN    blood sugar diagnostic (OneTouch Verio test strips) strip 1 strip, in vitro, Every Day    brexpiprazole (Rexulti) 3 mg tablet 1 tablet, oral, Daily    bumetanide (BUMEX) 2 mg, oral, 2 times daily    calcium carbonate (OSCAL) 1,250 mg, oral, 3 times daily    calcium carbonate-vitamin D3 500 mg-3.125 mcg (125 unit) tablet tablet 1 tablet, oral, Daily    Caplyta 21 mg capsule 1 capsule, oral, Nightly    Caplyta 42  mg, oral, Nightly    carbidopa-levodopa (Sinemet)  mg tablet PLEASE SEE ATTACHED FOR DETAILED DIRECTIONS    carvedilol (COREG) 25 mg, oral, 2 times daily    cephalexin (Keftab) 500 mg tablet TAKE 1 TABLET TWICE DAILY FOR 7 DAYS THEN DAILY THEREAFTER.    cholecalciferol (Vitamin D-3) 50 MCG (2000 UT) tablet 1 tablet, oral, Daily    clonazePAM (KLONOPIN) 0.5 mg, Nightly PRN, 1/2 TABLET BY MOUTH EVERY EVENING AND AN ADDITIONAL 1/2 AS NEEDED<BR>    clonazePAM (KLONOPIN) 0.5 mg, oral, 3 times daily PRN    deutetrabenazine (AUSTEDO) 6 mg, oral    diclofenac sodium (Voltaren) 1 % gel gel 1 Application, Topical, Daily    DOCOSAHEXAENOIC ACID ORAL 1 capsule, oral, Daily with breakfast    estradiol (Estrace) 0.01 % (0.1 mg/gram) vaginal cream 1 Application, vaginal, 3 times weekly    Fanapt 10 mg, oral, 2 times daily, Take with food.    Fanapt 2 mg, oral, 2 times daily, TAKE WITH FOOD    Fanapt 4 mg, oral, 2 times daily, Take with food.    Fanapt 6 mg, oral, 2 times daily, Take with food.    Fanapt 8 mg, oral, 2 times daily, Take with food.    furosemide (Lasix) 20 mg tablet 1 tablet, oral, Every 6 hours during day    gabapentin (NEURONTIN) 300 mg, oral, 3 times daily    iloperidone (FANAPT) 12 mg, oral, 2 times daily    Ingrezza 40 mg, oral, Daily    ipratropium-albuteroL (Duo-Neb) 0.5-2.5 mg/3 mL nebulizer solution 3 mL, inhalation, Every 4 hours PRN    Klor-Con M20 20 mEq ER tablet 1 tablet, oral, 2 times daily    lancets misc 1 each, miscellaneous, 3 times daily    levothyroxine (SYNTHROID, LEVOXYL) 100 mcg, oral, Daily, 1 tablet 5 days a week Mon-Friday and two tabs on Saturdays and Sundays     levothyroxine (SYNTHROID, LEVOXYL) 200 mcg, oral    lidocaine (Lidoderm) 5 % patch 1 patch, transdermal, Daily, Apply 1 patch and leave in place for 12 hours, then remove and leave off for 12 hours.    losartan (Cozaar) 25 mg tablet 2 tablets, oral, Daily    losartan (COZAAR) 50 mg, oral, Daily    meloxicam (Mobic) 15 mg  tablet 1 tablet, oral, Daily    meloxicam (Mobic) 7.5 mg tablet 1 tablet, oral, Daily    meloxicam, bulk, 100 % powder USE AS DIRECTED.    metFORMIN (GLUCOPHAGE) 500 mg, oral, 2 times daily with meals    minocycline 100 mg, oral, Nightly    mupirocin (Bactroban) 2 % ointment 1 Application, Topical, See admin instructions, Apply as directed by physician    OLANZapine (ZyPREXA) 5 mg tablet 1 tablet, oral, 2 times daily    omega-3 fatty acids-fish oil 360-1,200 mg capsule 1 capsule, oral, Daily    omeprazole (PRILOSEC) 40 mg, oral, Daily, With dinner<BR>    OXcarbazepine (Trileptal) 600 mg tablet 1 tablet, oral, Nightly    OXcarbazepine (TRILEPTAL) 300 mg, oral, Every Day    oxybutynin XL (DITROPAN-XL) 10 mg, oral, Daily    pantoprazole (PROTONIX) 40 mg, oral, Do not crush, chew, or split.    phenazopyridine (PYRIDIUM) 200 mg, oral, 3 times daily after meals    polyethylene glycol (GLYCOLAX, MIRALAX) 17 g, oral, Daily, In 8oz of water, juice, or tea and drink daily<BR>    potassium chloride CR (Klor-Con) 8 mEq ER tablet 8 mEq, oral, 2 times daily    potassium chloride ER (Micro-K) 10 mEq ER capsule 20 mEq, oral, 2 times daily    prazosin (MINIPRESS) 5 mg, oral, Nightly    prazosin (MINIPRESS) 2 mg, oral, Nightly    QUEtiapine (SEROQUEL) 50 mg, oral, 2 times daily    QUEtiapine (SEROQUEL) 25 mg, oral, 2 times daily before meals    QUEtiapine (SEROQUEL) 100 mg, oral    rOPINIRole (Requip) 1 mg tablet 1 tablet, oral, Daily    sertraline (Zoloft) 100 mg tablet 0.5 tablets, oral, Daily    sertraline 150 mg capsule 1 capsule, oral, Every Day    Symbicort 160-4.5 mcg/actuation inhaler inhalation    terconazole (Terazol 3) 0.8 % vaginal cream 1 applicator, vaginal, Nightly    tiZANidine (ZANAFLEX) 4 mg, oral, Every Day    tiZANidine (ZANAFLEX) 2 mg, oral, 1 tab up to three times a day as needed for neck pain    topiramate (TOPAMAX) 200 mg, oral, 2 times daily    traMADol (Ultram) 50 mg tablet 1 tablet, oral, Every 8 hours PRN     Malgorzata Ellipta 200-62.5-25 mcg blister with device 1 puff, inhalation, Daily RT        Vitals reviewed: Visit Vitals  /78   Pulse 79       Physical Exam:   General:  Patient is awake, alert, and oriented.  Patient is in no acute distress.  HEENT:  Pupils equal and reactive.  Normocephalic.  Moist mucosa.    Neck:  No thyromegaly.  12 cm Jugular Venous Pressure.  Cardiovascular:  Regular rate and rhythm.  Normal S1 and S2.  1/6 LARY.  Pulmonary:  Clear to auscultation bilaterally.  Abdomen:  Soft. Non-tender.   Non-distended.  Positive bowel sounds.  Lower Extremities:  2+ pedal pulses. 2+ LE edema.  Neurologic:  Cranial nerves intact.  No focal deficit.   Skin: Skin warm and dry, normal skin turgor.   Psychiatric: Normal affect.    Last Labs:  CBC -      Lab Results   Component Value Date    WBC CANCELED 05/18/2023    HGB CANCELED 05/18/2023    HCT CANCELED 05/18/2023    PLT CANCELED 05/18/2023        CMP-  Lab Results   Component Value Date    GLUCOSE 89 09/14/2023     09/14/2023    K 3.8 09/14/2023     09/14/2023    CO2 32 09/14/2023    ANIONGAP 11 09/14/2023    BUN 19 09/14/2023    CREATININE 0.88 09/14/2023    CALCIUM 9.1 09/14/2023    PHOS 4.0 09/14/2023    PROT 6.1 (L) 05/15/2023    ALBUMIN 4.2 09/14/2023    AST 33 05/15/2023    ALT 21 05/15/2023    ALKPHOS 84 05/15/2023    BILITOT 0.3 05/15/2023        LIPIDS-  Lab Results   Component Value Date    CHOL 158 12/29/2022    TRIG 76 12/29/2022    HDL 62.3 12/29/2022    CHHDL 2.5 12/29/2022    VLDL 15 12/29/2022        OTHERS-  Lab Results   Component Value Date    HGBA1C 5.6 04/17/2023    HGBA1C 6.3 (H) 07/17/2020    BNP 43 09/14/2023        I personally reviewed the patient's recent vitals, labs, medications, orders, EKGs, pertinent cardiac imaging/ echocardiography and ischemic evaluations including stress testing/ cardiac catheterization.    Assessment and Plan:    Prema Hair is a 58 y.o. 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  She notes increasing dyspnea on exertion and lower extremity edema.      We will start Metolazone 2.5 mg daily.    Please continue your current cardiac medication including bumetanide 2 mg tablet twice daily, aspirin 81 mg, atorvastatin 20 mg, carvedilol 25 mg twice daily, losartan 50 mg daily.    We will plan to recheck blood work including renal function panel and BNP before your follow-up visit.    Please followup with me in Cardiology clinic within the next 2-3 months.  Please return to clinic sooner or seek emergent care if your symptoms reoccur or worsen.    Thank you for allowing me to participate in their care.  Please feel free to call me with any further questions or concerns.        Sanjiv Madison MD, FAC, DADA IVERSON  Division of Cardiovascular Medicine  Medical Director, [facility]  [facility]  Assistant Clinical Professor, Medicine  [facility]  [email or web address]  Office:  [phone]

## 2023-10-26 ENCOUNTER — APPOINTMENT (OUTPATIENT)
Dept: RADIOLOGY | Facility: CLINIC | Age: 58
End: 2023-10-26
Payer: MEDICARE

## 2023-10-27 ENCOUNTER — OFFICE VISIT (OUTPATIENT)
Dept: PAIN MEDICINE | Facility: HOSPITAL | Age: 58
End: 2023-10-27
Payer: MEDICARE

## 2023-10-27 DIAGNOSIS — M54.12 CERVICAL RADICULOPATHY, CHRONIC: ICD-10-CM

## 2023-10-27 DIAGNOSIS — M54.16 LUMBAR RADICULAR PAIN: ICD-10-CM

## 2023-10-27 DIAGNOSIS — M25.561 CHRONIC PAIN OF RIGHT KNEE: Primary | ICD-10-CM

## 2023-10-27 DIAGNOSIS — G89.29 CHRONIC PAIN OF RIGHT KNEE: Primary | ICD-10-CM

## 2023-10-27 PROCEDURE — 3008F BODY MASS INDEX DOCD: CPT | Performed by: ANESTHESIOLOGY

## 2023-10-27 PROCEDURE — 99213 OFFICE O/P EST LOW 20 MIN: CPT | Performed by: ANESTHESIOLOGY

## 2023-10-27 PROCEDURE — 99213 OFFICE O/P EST LOW 20 MIN: CPT | Mod: 25 | Performed by: ANESTHESIOLOGY

## 2023-10-27 PROCEDURE — 3044F HG A1C LEVEL LT 7.0%: CPT | Performed by: ANESTHESIOLOGY

## 2023-10-27 PROCEDURE — 1036F TOBACCO NON-USER: CPT | Performed by: ANESTHESIOLOGY

## 2023-10-27 PROCEDURE — 20610 DRAIN/INJ JOINT/BURSA W/O US: CPT | Performed by: ANESTHESIOLOGY

## 2023-10-27 PROCEDURE — 4010F ACE/ARB THERAPY RXD/TAKEN: CPT | Performed by: ANESTHESIOLOGY

## 2023-10-27 ASSESSMENT — PAIN SCALES - GENERAL: PAINLEVEL: 10-WORST PAIN EVER

## 2023-10-27 NOTE — PROGRESS NOTES
Subjective   Patient ID: Prema Hair is a 58 y.o. female.    Knee Pain       Is a 58-year-old female who presents as a follow-up visit in pain clinic today for right-sided knee pain.  Patient was last seen in the clinic on 8/2/2023, and was referred to sports medicine for additional work-up.  MRI revealed severe osteoarthritis as well as complex tear of the lateral meniscus.  Patient was also referred to neurology given shuffling gait as well as focal changes, and diagnosed with parkinsonian features; primary versus drug-induced for schizoaffective disorder.    Review of Systems  A 13 point comprehensive review of system was negative except for specific complaints as listed in the HPI.    Objective   Physical Exam  PHYSICAL EXAM  Vitals signs reviewed  Constitutional:    General: Morbidly obese, not in acute distress   Appearance: Normal appearance. Not ill-appearing.  HENT:   Head: Normocephalic and atraumatic  Eyes:   Conjunctiva/sclera normal  Cardiovascular:  No jugular venous distention bilaterally  No gross edema in lower extremities  Pulmonary:   Effort: No respiratory distress  Abdominal:  Abdomen appears nondistended  Musculoskeletal:   Patellar grind test was positive the right  Left and right knees were nonedematous, nonerythematous, nontender to palpation.  Anterior drawer test was negative on the right  Skin:   General: Skin is warm and dry  Neurological:  Shuffling gait  No discernible spasticity in bilateral upper extremities  Psychiatric:    Mood and Affect: Mood normal    Behavior: Behavior normal    Joint Injection/Aspiration    Date/Time: 10/27/2023 7:48 AM    Performed by: Suleman Becerra MD  Authorized by: Maria Isabel Crow MD PhD    Consent:     Consent obtained:  Verbal    Consent given by:  Patient    Risks, benefits, and alternatives were discussed: yes      Risks discussed:  Bleeding, infection, pain and nerve damage    Alternatives discussed:  No treatment  Universal protocol:      Procedure explained and questions answered to patient or proxy's satisfaction: yes      Relevant documents present and verified: yes      Test results available: yes      Imaging studies available: yes      Required blood products, implants, devices, and special equipment available: yes      Site/side marked: yes      Immediately prior to procedure, a time out was called: yes      Patient identity confirmed:  Verbally with patient  Location:     Location:  Knee    Knee:  R knee  Anesthesia:     Anesthesia method:  None  Procedure details:     Steroid injected: yes      Specimen collected: no    Post-procedure details:     Dressing:  Adhesive bandage    Procedure completion:  Tolerated well, no immediate complications  Comments:      The procedure risks, benefits, alternatives were reviewed with the patient. Informed verbal consent was obtained. The patient had her right knee exposed and prepped using chlorhexidine. After negative aspiration using a anterior medial approach, a 3 cc mixture containing 2.5 cc of ropivacaine 0.2% and 0.5 cc of 20 mg of methylprednisolone was injected. Needle removed, bandage applied, tolerated procedure well with no immediate complication.       Assessment/Plan   There are no diagnoses linked to this encounter.  Patient is a 58-year-old female presents as a follow-up visit in pain clinic today for right knee pain.  MRI from 9/14/2023 reviewed, notable for severe osteoarthritis as well as complex tear of the lateral meniscus.  Patient will need to lose weight prior to being a surgical candidate.  Plan on intra-articular knee injection as described above.  Offered patient genicular nerve block should pain be refractory, however counseled her that there could be decreased efficacy given her severe osteoarthritis.    Plan:  -Right intra-articular knee injection as above  - Consider genicular nerve block of right knee if pain refractory      The patient was invited to contact us back anytime  with any questions or concerns and follow-up with us in the office as needed.

## 2023-10-30 ENCOUNTER — APPOINTMENT (OUTPATIENT)
Dept: RADIOLOGY | Facility: CLINIC | Age: 58
End: 2023-10-30
Payer: MEDICARE

## 2023-10-30 ENCOUNTER — PROCEDURE VISIT (OUTPATIENT)
Dept: NEUROLOGY | Facility: CLINIC | Age: 58
End: 2023-10-30
Payer: MEDICARE

## 2023-10-30 VITALS
DIASTOLIC BLOOD PRESSURE: 81 MMHG | TEMPERATURE: 97.3 F | SYSTOLIC BLOOD PRESSURE: 138 MMHG | WEIGHT: 229 LBS | BODY MASS INDEX: 36.8 KG/M2 | HEIGHT: 66 IN | HEART RATE: 69 BPM

## 2023-10-30 DIAGNOSIS — M43.6 TORTICOLLIS: Primary | ICD-10-CM

## 2023-10-30 PROCEDURE — 64616 CHEMODENERV MUSC NECK DYSTON: CPT | Performed by: PSYCHIATRY & NEUROLOGY

## 2023-10-30 PROCEDURE — BOTOX BOTOX 1 UNIT: Performed by: PSYCHIATRY & NEUROLOGY

## 2023-10-30 NOTE — PROGRESS NOTES
"Subjective     Prema Hair is a 58 y.o. year old female here for botulinum toxin injections.    She reports that the botulinum toxin helped the torticollis, straightening her neck, for about two months before the torticollis returned.  She is now back fully in torticollis.    Allergies   Allergen Reactions    Lisinopril Unknown       Objective     /81   Pulse 69   Temp 36.3 °C (97.3 °F)   Ht 1.676 m (5' 6\")   Wt 104 kg (229 lb)   BMI 36.96 kg/m²     Botox Injection - Neck Pain, Headaches   Procedure: Botox injection.   Indication: torticollis.   Risk, benefits, alternatives, risk of worsening pain, risk of URI, risk of dysphagia, risk of focal weakness and risk of facial paresis were discussed with the patient. Verbal consent was obtained prior to the procedure. Alcohol was used to prep the area.  Procedure Note:   The patient was placed in the upright position. 200 units of Botulinum Toxin were injected 80 u right SCM in 3 sites, 20 u right trapezius in 2 sites, 80 u left splenius capitis in 3 sites, 20 u left trapezius in 2 sites.Lot number: [id number] . Expiration date: 02/2026 .  Post-Procedure: the patient tolerated the procedure well. Complications: None.   Follow-up in the office in 12 weeks for repeat injection(s).    Assessment/Plan   Diagnoses and all orders for this visit:  Torticollis      David Swanson Jr., M.D., FAAN     "

## 2023-11-02 ENCOUNTER — APPOINTMENT (OUTPATIENT)
Dept: NEUROLOGY | Facility: CLINIC | Age: 58
End: 2023-11-02
Payer: MEDICAID

## 2023-11-09 ENCOUNTER — APPOINTMENT (OUTPATIENT)
Dept: CARDIOLOGY | Facility: CLINIC | Age: 58
End: 2023-11-09
Payer: MEDICARE

## 2023-11-14 ENCOUNTER — APPOINTMENT (OUTPATIENT)
Dept: CARDIOLOGY | Facility: CLINIC | Age: 58
End: 2023-11-14
Payer: MEDICARE

## 2023-11-20 DIAGNOSIS — E11.69 DM TYPE 2 WITH DIABETIC MIXED HYPERLIPIDEMIA (MULTI): ICD-10-CM

## 2023-11-20 DIAGNOSIS — E78.2 DM TYPE 2 WITH DIABETIC MIXED HYPERLIPIDEMIA (MULTI): ICD-10-CM

## 2023-11-20 DIAGNOSIS — R06.09 DOE (DYSPNEA ON EXERTION): ICD-10-CM

## 2023-11-20 DIAGNOSIS — I50.33 ACUTE ON CHRONIC DIASTOLIC HEART FAILURE (MULTI): ICD-10-CM

## 2023-11-20 DIAGNOSIS — E78.5 DYSLIPIDEMIA: ICD-10-CM

## 2023-11-20 DIAGNOSIS — E78.1 PURE HYPERTRIGLYCERIDEMIA: ICD-10-CM

## 2023-11-21 ENCOUNTER — APPOINTMENT (OUTPATIENT)
Dept: UROLOGY | Facility: CLINIC | Age: 58
End: 2023-11-21
Payer: MEDICARE

## 2023-11-22 DIAGNOSIS — M54.16 RADICULOPATHY, LUMBAR REGION: ICD-10-CM

## 2023-11-22 DIAGNOSIS — M54.12 CERVICAL RADICULOPATHY, CHRONIC: Primary | ICD-10-CM

## 2023-11-22 RX ORDER — BACLOFEN 20 MG/1
20 TABLET ORAL 3 TIMES DAILY
Qty: 270 TABLET | Refills: 1 | Status: SHIPPED | OUTPATIENT
Start: 2023-11-22 | End: 2024-04-23

## 2023-11-22 RX ORDER — METOLAZONE 2.5 MG/1
2.5 TABLET ORAL DAILY
Qty: 90 TABLET | Refills: 4 | Status: SHIPPED | OUTPATIENT
Start: 2023-11-22 | End: 2024-01-01 | Stop reason: HOSPADM

## 2023-11-27 ENCOUNTER — APPOINTMENT (OUTPATIENT)
Dept: CARDIOLOGY | Facility: CLINIC | Age: 58
End: 2023-11-27
Payer: MEDICARE

## 2023-11-27 ENCOUNTER — APPOINTMENT (OUTPATIENT)
Dept: PAIN MEDICINE | Facility: HOSPITAL | Age: 58
End: 2023-11-27
Payer: MEDICARE

## 2023-11-28 ENCOUNTER — APPOINTMENT (OUTPATIENT)
Dept: UROLOGY | Facility: CLINIC | Age: 58
End: 2023-11-28
Payer: MEDICARE

## 2023-11-29 ENCOUNTER — APPOINTMENT (OUTPATIENT)
Dept: UROLOGY | Facility: CLINIC | Age: 58
End: 2023-11-29
Payer: MEDICARE

## 2023-11-30 ENCOUNTER — APPOINTMENT (OUTPATIENT)
Dept: CARDIOLOGY | Facility: CLINIC | Age: 58
End: 2023-11-30
Payer: MEDICARE

## 2023-12-04 ENCOUNTER — APPOINTMENT (OUTPATIENT)
Dept: PAIN MEDICINE | Facility: HOSPITAL | Age: 58
End: 2023-12-04
Payer: MEDICARE

## 2023-12-04 ENCOUNTER — OFFICE VISIT (OUTPATIENT)
Dept: PAIN MEDICINE | Facility: HOSPITAL | Age: 58
End: 2023-12-04
Payer: MEDICARE

## 2023-12-04 DIAGNOSIS — M17.11 ARTHRITIS OF KNEE, RIGHT: Primary | ICD-10-CM

## 2023-12-04 PROCEDURE — 99214 OFFICE O/P EST MOD 30 MIN: CPT | Performed by: ANESTHESIOLOGY

## 2023-12-04 PROCEDURE — 3008F BODY MASS INDEX DOCD: CPT | Performed by: ANESTHESIOLOGY

## 2023-12-04 PROCEDURE — 3044F HG A1C LEVEL LT 7.0%: CPT | Performed by: ANESTHESIOLOGY

## 2023-12-04 PROCEDURE — 1036F TOBACCO NON-USER: CPT | Performed by: ANESTHESIOLOGY

## 2023-12-04 PROCEDURE — 4010F ACE/ARB THERAPY RXD/TAKEN: CPT | Performed by: ANESTHESIOLOGY

## 2023-12-04 ASSESSMENT — ENCOUNTER SYMPTOMS
CARDIOVASCULAR NEGATIVE: 1
ARTHRALGIAS: 1
GASTROINTESTINAL NEGATIVE: 1
RESPIRATORY NEGATIVE: 1
TREMORS: 1
WEAKNESS: 1
CONSTITUTIONAL NEGATIVE: 1

## 2023-12-04 ASSESSMENT — PAIN SCALES - GENERAL: PAINLEVEL: 7

## 2023-12-08 ENCOUNTER — APPOINTMENT (OUTPATIENT)
Dept: PAIN MEDICINE | Facility: HOSPITAL | Age: 58
End: 2023-12-08
Payer: MEDICARE

## 2023-12-17 DIAGNOSIS — R52 CHRONIC PAIN OF MULTIPLE SITES: Primary | ICD-10-CM

## 2023-12-17 DIAGNOSIS — E11.69 DM TYPE 2 WITH DIABETIC MIXED HYPERLIPIDEMIA (MULTI): ICD-10-CM

## 2023-12-17 DIAGNOSIS — M51.17 INTERVERTEBRAL DISC DISORDER WITH RADICULOPATHY OF LUMBOSACRAL REGION: ICD-10-CM

## 2023-12-17 DIAGNOSIS — E78.2 DM TYPE 2 WITH DIABETIC MIXED HYPERLIPIDEMIA (MULTI): ICD-10-CM

## 2023-12-17 DIAGNOSIS — G89.29 CHRONIC PAIN OF MULTIPLE SITES: Primary | ICD-10-CM

## 2023-12-18 RX ORDER — LIDOCAINE 50 MG/G
1 PATCH TOPICAL EVERY 12 HOURS
Qty: 30 PATCH | Refills: 11 | Status: SHIPPED | OUTPATIENT
Start: 2023-12-18

## 2023-12-20 ENCOUNTER — APPOINTMENT (OUTPATIENT)
Dept: CARDIOLOGY | Facility: CLINIC | Age: 58
End: 2023-12-20
Payer: MEDICARE

## 2023-12-26 ENCOUNTER — APPOINTMENT (OUTPATIENT)
Dept: RADIOLOGY | Facility: HOSPITAL | Age: 58
End: 2023-12-26
Payer: MEDICARE

## 2023-12-27 ENCOUNTER — APPOINTMENT (OUTPATIENT)
Dept: CARDIOLOGY | Facility: HOSPITAL | Age: 58
DRG: 683 | End: 2023-12-27
Payer: MEDICARE

## 2023-12-27 ENCOUNTER — APPOINTMENT (OUTPATIENT)
Dept: RADIOLOGY | Facility: HOSPITAL | Age: 58
DRG: 683 | End: 2023-12-27
Payer: MEDICARE

## 2023-12-27 ENCOUNTER — HOSPITAL ENCOUNTER (INPATIENT)
Facility: HOSPITAL | Age: 58
LOS: 4 days | Discharge: HOME | DRG: 683 | End: 2024-01-01
Attending: EMERGENCY MEDICINE | Admitting: INTERNAL MEDICINE
Payer: MEDICARE

## 2023-12-27 DIAGNOSIS — N17.9 AKI (ACUTE KIDNEY INJURY) (CMS-HCC): Primary | ICD-10-CM

## 2023-12-27 DIAGNOSIS — M54.50 CHRONIC BILATERAL LOW BACK PAIN, UNSPECIFIED WHETHER SCIATICA PRESENT: ICD-10-CM

## 2023-12-27 DIAGNOSIS — G89.29 CHRONIC PAIN OF MULTIPLE SITES: ICD-10-CM

## 2023-12-27 DIAGNOSIS — E87.6 HYPOKALEMIA: ICD-10-CM

## 2023-12-27 DIAGNOSIS — G89.29 CHRONIC BILATERAL LOW BACK PAIN, UNSPECIFIED WHETHER SCIATICA PRESENT: ICD-10-CM

## 2023-12-27 DIAGNOSIS — R52 GENERALIZED PAIN: ICD-10-CM

## 2023-12-27 DIAGNOSIS — R52 CHRONIC PAIN OF MULTIPLE SITES: ICD-10-CM

## 2023-12-27 DIAGNOSIS — E87.1 HYPONATREMIA: ICD-10-CM

## 2023-12-27 DIAGNOSIS — I10 PRIMARY HYPERTENSION: ICD-10-CM

## 2023-12-27 DIAGNOSIS — R53.1 WEAKNESS PRESENT: ICD-10-CM

## 2023-12-27 LAB
ABO GROUP (TYPE) IN BLOOD: NORMAL
ALBUMIN SERPL BCP-MCNC: 3.9 G/DL (ref 3.4–5)
ALP SERPL-CCNC: 115 U/L (ref 33–110)
ALT SERPL W P-5'-P-CCNC: 16 U/L (ref 7–45)
ANION GAP SERPL CALC-SCNC: 16 MMOL/L (ref 10–20)
ANTIBODY SCREEN: NORMAL
APTT PPP: 31 SECONDS (ref 27–38)
AST SERPL W P-5'-P-CCNC: 29 U/L (ref 9–39)
BASOPHILS # BLD AUTO: 0.03 X10*3/UL (ref 0–0.1)
BASOPHILS NFR BLD AUTO: 0.4 %
BILIRUB SERPL-MCNC: 0.5 MG/DL (ref 0–1.2)
BUN SERPL-MCNC: 57 MG/DL (ref 6–23)
CALCIUM SERPL-MCNC: 8.5 MG/DL (ref 8.6–10.3)
CARDIAC TROPONIN I PNL SERPL HS: 8 NG/L (ref 0–13)
CHLORIDE SERPL-SCNC: 80 MMOL/L (ref 98–107)
CO2 SERPL-SCNC: 36 MMOL/L (ref 21–32)
CREAT SERPL-MCNC: 2.07 MG/DL (ref 0.5–1.05)
EOSINOPHIL # BLD AUTO: 0.01 X10*3/UL (ref 0–0.7)
EOSINOPHIL NFR BLD AUTO: 0.1 %
ERYTHROCYTE [DISTWIDTH] IN BLOOD BY AUTOMATED COUNT: 14.6 % (ref 11.5–14.5)
GFR SERPL CREATININE-BSD FRML MDRD: 27 ML/MIN/1.73M*2
GLUCOSE BLD MANUAL STRIP-MCNC: 129 MG/DL (ref 74–99)
GLUCOSE SERPL-MCNC: 117 MG/DL (ref 74–99)
HCT VFR BLD AUTO: 38.8 % (ref 36–46)
HGB BLD-MCNC: 12.7 G/DL (ref 12–16)
IMM GRANULOCYTES # BLD AUTO: 0.01 X10*3/UL (ref 0–0.7)
IMM GRANULOCYTES NFR BLD AUTO: 0.1 % (ref 0–0.9)
INR PPP: 1 (ref 0.9–1.1)
LYMPHOCYTES # BLD AUTO: 1.38 X10*3/UL (ref 1.2–4.8)
LYMPHOCYTES NFR BLD AUTO: 19.9 %
MAGNESIUM SERPL-MCNC: 2.5 MG/DL (ref 1.6–2.4)
MCH RBC QN AUTO: 28.3 PG (ref 26–34)
MCHC RBC AUTO-ENTMCNC: 32.7 G/DL (ref 32–36)
MCV RBC AUTO: 86 FL (ref 80–100)
MONOCYTES # BLD AUTO: 0.73 X10*3/UL (ref 0.1–1)
MONOCYTES NFR BLD AUTO: 10.5 %
NEUTROPHILS # BLD AUTO: 4.76 X10*3/UL (ref 1.2–7.7)
NEUTROPHILS NFR BLD AUTO: 69 %
NRBC BLD-RTO: 0 /100 WBCS (ref 0–0)
PLATELET # BLD AUTO: 238 X10*3/UL (ref 150–450)
POTASSIUM SERPL-SCNC: 3.1 MMOL/L (ref 3.5–5.3)
PROT SERPL-MCNC: 6.8 G/DL (ref 6.4–8.2)
PROTHROMBIN TIME: 11.7 SECONDS (ref 9.8–12.8)
RBC # BLD AUTO: 4.49 X10*6/UL (ref 4–5.2)
RH FACTOR (ANTIGEN D): NORMAL
SODIUM SERPL-SCNC: 129 MMOL/L (ref 136–145)
TSH SERPL-ACNC: 0.92 MIU/L (ref 0.44–3.98)
WBC # BLD AUTO: 6.9 X10*3/UL (ref 4.4–11.3)

## 2023-12-27 PROCEDURE — 82947 ASSAY GLUCOSE BLOOD QUANT: CPT

## 2023-12-27 PROCEDURE — 99285 EMERGENCY DEPT VISIT HI MDM: CPT | Performed by: EMERGENCY MEDICINE

## 2023-12-27 PROCEDURE — 70498 CT ANGIOGRAPHY NECK: CPT

## 2023-12-27 PROCEDURE — 96365 THER/PROPH/DIAG IV INF INIT: CPT

## 2023-12-27 PROCEDURE — G0378 HOSPITAL OBSERVATION PER HR: HCPCS

## 2023-12-27 PROCEDURE — 70496 CT ANGIOGRAPHY HEAD: CPT

## 2023-12-27 PROCEDURE — 36415 COLL VENOUS BLD VENIPUNCTURE: CPT | Performed by: STUDENT IN AN ORGANIZED HEALTH CARE EDUCATION/TRAINING PROGRAM

## 2023-12-27 PROCEDURE — 70450 CT HEAD/BRAIN W/O DYE: CPT

## 2023-12-27 PROCEDURE — 2500000004 HC RX 250 GENERAL PHARMACY W/ HCPCS (ALT 636 FOR OP/ED): Performed by: STUDENT IN AN ORGANIZED HEALTH CARE EDUCATION/TRAINING PROGRAM

## 2023-12-27 PROCEDURE — 85610 PROTHROMBIN TIME: CPT | Performed by: STUDENT IN AN ORGANIZED HEALTH CARE EDUCATION/TRAINING PROGRAM

## 2023-12-27 PROCEDURE — 86901 BLOOD TYPING SEROLOGIC RH(D): CPT | Performed by: STUDENT IN AN ORGANIZED HEALTH CARE EDUCATION/TRAINING PROGRAM

## 2023-12-27 PROCEDURE — 84484 ASSAY OF TROPONIN QUANT: CPT | Performed by: STUDENT IN AN ORGANIZED HEALTH CARE EDUCATION/TRAINING PROGRAM

## 2023-12-27 PROCEDURE — 85730 THROMBOPLASTIN TIME PARTIAL: CPT | Performed by: STUDENT IN AN ORGANIZED HEALTH CARE EDUCATION/TRAINING PROGRAM

## 2023-12-27 PROCEDURE — 70498 CT ANGIOGRAPHY NECK: CPT | Performed by: STUDENT IN AN ORGANIZED HEALTH CARE EDUCATION/TRAINING PROGRAM

## 2023-12-27 PROCEDURE — 83735 ASSAY OF MAGNESIUM: CPT | Performed by: STUDENT IN AN ORGANIZED HEALTH CARE EDUCATION/TRAINING PROGRAM

## 2023-12-27 PROCEDURE — 70496 CT ANGIOGRAPHY HEAD: CPT | Performed by: STUDENT IN AN ORGANIZED HEALTH CARE EDUCATION/TRAINING PROGRAM

## 2023-12-27 PROCEDURE — 2550000001 HC RX 255 CONTRASTS: Performed by: EMERGENCY MEDICINE

## 2023-12-27 PROCEDURE — 84075 ASSAY ALKALINE PHOSPHATASE: CPT | Performed by: STUDENT IN AN ORGANIZED HEALTH CARE EDUCATION/TRAINING PROGRAM

## 2023-12-27 PROCEDURE — 84443 ASSAY THYROID STIM HORMONE: CPT | Performed by: STUDENT IN AN ORGANIZED HEALTH CARE EDUCATION/TRAINING PROGRAM

## 2023-12-27 PROCEDURE — 93005 ELECTROCARDIOGRAM TRACING: CPT

## 2023-12-27 PROCEDURE — 85025 COMPLETE CBC W/AUTO DIFF WBC: CPT | Performed by: STUDENT IN AN ORGANIZED HEALTH CARE EDUCATION/TRAINING PROGRAM

## 2023-12-27 RX ORDER — POTASSIUM CHLORIDE 14.9 MG/ML
20 INJECTION INTRAVENOUS
Status: DISPENSED | OUTPATIENT
Start: 2023-12-27 | End: 2023-12-28

## 2023-12-27 RX ADMIN — SODIUM CHLORIDE 1000 ML: 9 INJECTION, SOLUTION INTRAVENOUS at 21:51

## 2023-12-27 RX ADMIN — IOHEXOL 75 ML: 350 INJECTION, SOLUTION INTRAVENOUS at 18:15

## 2023-12-27 RX ADMIN — POTASSIUM CHLORIDE 20 MEQ: 14.9 INJECTION, SOLUTION INTRAVENOUS at 21:52

## 2023-12-27 NOTE — ED PROVIDER NOTES
HPI  Patient is a 58-year-old female with PMH of Bell's palsy, HTN, DM 2, schizophrenia, CHF, and bipolar disorder presenting to the emergency department due to concerns for strokelike symptoms.  Last known well approximately 2 days prior.  Per EMS, patient was lasting by her daughter approximately 2 days prior.  Daughter evaluate her today she appeared to be in some position as when she had been in the recliner raising concerns that she had been acting really.  Patient states that she fell off of her baseline, and states that she has been feeling this for the past 2 days or so.  Is reporting some left upper extremity as well as left neck weakness.  Does have a baseline Bell's palsy, however she does feel like she is different from her baseline at this time.  Denies any recent falls.  No anticoagulation.    Physical Exam  VITALS: Vital signs reviewed in nursing triage note, EMR flow sheets, and at patient's bedside  CONSTITUTIONAL: Well-appearing, in no apparent distress  HEAD: NCAT  EYES: PERRL, EOMI  NECK: Supple, non-tender, full ROM  CARD: RRR, no m/r/g, no JVD  RESP: LCTAB, speaking full sentences, no increased work of breathing, no use of accessory respiratory muscles  ABD: Bowel sounds present, non-distended, soft and non-tender, no palpable organomegaly, no masses, no guarding or rebound tenderness  BACK: No vertebral point or CVA tenderness, no obvious bony deformities, no spinal step-offs   EXT: Normal ROM in all four extremities, 2+ radial and DP pulses bilaterally, no edema  SKIN: Dry with appropriate turgor, no apparent rashes, suspicious lesions, or masses   NEURO:       - LKW: 2 days prior to ED presentation      - 1a. Level of Consciousness: 0/3      - 1b. LOC Questions: 1/2      - 1c. LOC Commands: 0/2      - 2. Best Gaze: 0/2      - 3. Visual Fields: 0/3      - 4. Facial Palsy: 2/3      - 5a. Motor Arm (Left): 1/4      - 5b. Motor Arm (Right): 0/4      - 6a. Motor Leg (Left): 0/4      - 6b. Motor  Leg (Right): 0/4      - 7. Limb Ataxia: 0/2      - 8. Sensory: 0/2      - 9. Best Language: 0/3      - 10. Dysarthria: 1/2      - 11. Extinction/Neglect: 0/2      - TOTAL: 5/42      - TIME: 1749      - VAN: Negative  PSYCH: Appropriate mood and affect, no HI/SI, not responding to internal stimuli    Vitals:    12/27/23 1747   BP: 127/78   Pulse: 90   Resp: 16   Temp: 37 °C (98.6 °F)   SpO2: 98%       Assessment/Plan/MDM  Patient clinical stable upon arrival to the emergency department.  Normal vital signs and normal blood sugar appreciated in triage.  NIH score initially 5 with obvious left-sided deficits.  Given this, stroke alert was called and patient was sent for CT head as well as CTA.  No acute ischemic or hemorrhagic changes appreciated on initial CT imaging.  Full laboratory workup obtained including CBC, CMP, magnesium, coags, type and screen, UDS, troponin, as well as TSH with reflex T4.  EKG additionally obtained.  Ultimate disposition will be contingent on laboratory workup as well as conversation with patient and family.  If patient remains altered or different from her baseline, may require admission for nonemergent MRI.    Results  *See section(s) entitled ``Lab Results´´, ``Diagnostic Imaging Results Review´´ for entirety.  Notable results listed below  - Labs: I independently interpreted as sodium 129, potassium 3.1, creatinine 2.07, BUN 57  - Images: I independently interpreted as all CT imaging negative for ischemic or hemorrhagic changes, no other intracranial processes appreciated    ED Course as of 12/27/23 2106   Wed Dec 27, 2023   1840 Radiology states no acute ischemic or hemorrhagic changes appreciated on initial scan, will await final read [JV]   2041 SODIUM(!): 129 [JV]   2041 POTASSIUM(!): 3.1 [JV]   2041 Creatinine(!): 2.07 [JV]   2105   EKG interpreted at 1839, normal sinus rhythm, rate of 65, QTc interval 607 ms, all other intervals normal length, normal axis, no ST elevations, no  prior EKGs to compare to  [JV]      ED Course User Index  [JV] Luis Armando Garcia MD        ED Course/Progress  Patient remains clinically stable.  CT is negative on final read.  Laboratory workup does demonstrate a slight elevation in creatinine with hyponatremia and hypokalemia.  Suspect that this is likely secondary to diminished p.o. intake which would explain her weakness.  Lower concern for acute neurologic process, particularly with her returning more to baseline.  Per the daughter, she does still look slightly weaker, therefore will opt for admission in the setting of an GASTON with associated weakness as well as electrolyte disturbances.  Potassium was repleted here in the emergency department was provided with IV fluids.  Admitted in stable condition.    Clinical Impression  GASTON  Hypokalemia  Hyponatremia    Dispo  Admit to medicine    Patient seen and discussed with attending physician Dr. Conde.    Luis Armando Garcia MD  Emergency Medicine, PGY-3    Was dictated using Dragon dictation. Please excuse any errors found in the note.     Luis Armando Garcia MD  Resident  12/27/23 4641

## 2023-12-28 ENCOUNTER — APPOINTMENT (OUTPATIENT)
Dept: RADIOLOGY | Facility: HOSPITAL | Age: 58
DRG: 683 | End: 2023-12-28
Payer: MEDICARE

## 2023-12-28 ENCOUNTER — APPOINTMENT (OUTPATIENT)
Dept: NEUROLOGY | Facility: CLINIC | Age: 58
End: 2023-12-28
Payer: MEDICARE

## 2023-12-28 LAB
ALBUMIN SERPL BCP-MCNC: 4.4 G/DL (ref 3.4–5)
ALP SERPL-CCNC: 139 U/L (ref 33–110)
ALT SERPL W P-5'-P-CCNC: 23 U/L (ref 7–45)
ANION GAP SERPL CALC-SCNC: 18 MMOL/L (ref 10–20)
AST SERPL W P-5'-P-CCNC: 22 U/L (ref 9–39)
BILIRUB SERPL-MCNC: 0.5 MG/DL (ref 0–1.2)
BUN SERPL-MCNC: 32 MG/DL (ref 6–23)
CALCIUM SERPL-MCNC: 9.3 MG/DL (ref 8.6–10.3)
CHLORIDE SERPL-SCNC: 86 MMOL/L (ref 98–107)
CO2 SERPL-SCNC: 33 MMOL/L (ref 21–32)
CREAT SERPL-MCNC: 0.73 MG/DL (ref 0.5–1.05)
ERYTHROCYTE [DISTWIDTH] IN BLOOD BY AUTOMATED COUNT: 14.6 % (ref 11.5–14.5)
GFR SERPL CREATININE-BSD FRML MDRD: >90 ML/MIN/1.73M*2
GLUCOSE BLD MANUAL STRIP-MCNC: 140 MG/DL (ref 74–99)
GLUCOSE BLD MANUAL STRIP-MCNC: 164 MG/DL (ref 74–99)
GLUCOSE SERPL-MCNC: 139 MG/DL (ref 74–99)
HCT VFR BLD AUTO: 45.6 % (ref 36–46)
HGB BLD-MCNC: 14.7 G/DL (ref 12–16)
MAGNESIUM SERPL-MCNC: 2.8 MG/DL (ref 1.6–2.4)
MCH RBC QN AUTO: 28.3 PG (ref 26–34)
MCHC RBC AUTO-ENTMCNC: 32.2 G/DL (ref 32–36)
MCV RBC AUTO: 88 FL (ref 80–100)
NRBC BLD-RTO: 0 /100 WBCS (ref 0–0)
PLATELET # BLD AUTO: 284 X10*3/UL (ref 150–450)
POTASSIUM SERPL-SCNC: 2.6 MMOL/L (ref 3.5–5.3)
PROT SERPL-MCNC: 7.5 G/DL (ref 6.4–8.2)
RBC # BLD AUTO: 5.2 X10*6/UL (ref 4–5.2)
SODIUM SERPL-SCNC: 134 MMOL/L (ref 136–145)
WBC # BLD AUTO: 8.4 X10*3/UL (ref 4.4–11.3)

## 2023-12-28 PROCEDURE — 82947 ASSAY GLUCOSE BLOOD QUANT: CPT

## 2023-12-28 PROCEDURE — 1100000001 HC PRIVATE ROOM DAILY

## 2023-12-28 PROCEDURE — 85027 COMPLETE CBC AUTOMATED: CPT | Performed by: INTERNAL MEDICINE

## 2023-12-28 PROCEDURE — 83735 ASSAY OF MAGNESIUM: CPT

## 2023-12-28 PROCEDURE — 2500000002 HC RX 250 W HCPCS SELF ADMINISTERED DRUGS (ALT 637 FOR MEDICARE OP, ALT 636 FOR OP/ED): Performed by: INTERNAL MEDICINE

## 2023-12-28 PROCEDURE — 84075 ASSAY ALKALINE PHOSPHATASE: CPT | Performed by: INTERNAL MEDICINE

## 2023-12-28 PROCEDURE — 2500000001 HC RX 250 WO HCPCS SELF ADMINISTERED DRUGS (ALT 637 FOR MEDICARE OP): Performed by: INTERNAL MEDICINE

## 2023-12-28 PROCEDURE — 36415 COLL VENOUS BLD VENIPUNCTURE: CPT | Performed by: INTERNAL MEDICINE

## 2023-12-28 PROCEDURE — 72125 CT NECK SPINE W/O DYE: CPT | Performed by: RADIOLOGY

## 2023-12-28 PROCEDURE — 96372 THER/PROPH/DIAG INJ SC/IM: CPT | Performed by: INTERNAL MEDICINE

## 2023-12-28 PROCEDURE — 2500000004 HC RX 250 GENERAL PHARMACY W/ HCPCS (ALT 636 FOR OP/ED)

## 2023-12-28 PROCEDURE — 72125 CT NECK SPINE W/O DYE: CPT

## 2023-12-28 PROCEDURE — 94660 CPAP INITIATION&MGMT: CPT

## 2023-12-28 PROCEDURE — 2500000004 HC RX 250 GENERAL PHARMACY W/ HCPCS (ALT 636 FOR OP/ED): Performed by: INTERNAL MEDICINE

## 2023-12-28 RX ORDER — CARVEDILOL 25 MG/1
25 TABLET ORAL 2 TIMES DAILY
Status: DISCONTINUED | OUTPATIENT
Start: 2023-12-28 | End: 2024-01-01 | Stop reason: HOSPADM

## 2023-12-28 RX ORDER — ASPIRIN 81 MG/1
81 TABLET ORAL DAILY
Status: DISCONTINUED | OUTPATIENT
Start: 2023-12-28 | End: 2024-01-01 | Stop reason: HOSPADM

## 2023-12-28 RX ORDER — AMITRIPTYLINE HYDROCHLORIDE 10 MG/1
10 TABLET, FILM COATED ORAL NIGHTLY
Status: DISCONTINUED | OUTPATIENT
Start: 2023-12-28 | End: 2024-01-01 | Stop reason: HOSPADM

## 2023-12-28 RX ORDER — ACETAMINOPHEN 325 MG/1
650 TABLET ORAL EVERY 6 HOURS PRN
Status: DISCONTINUED | OUTPATIENT
Start: 2023-12-28 | End: 2024-01-01 | Stop reason: HOSPADM

## 2023-12-28 RX ORDER — SODIUM CHLORIDE, SODIUM LACTATE, POTASSIUM CHLORIDE, CALCIUM CHLORIDE 600; 310; 30; 20 MG/100ML; MG/100ML; MG/100ML; MG/100ML
50 INJECTION, SOLUTION INTRAVENOUS CONTINUOUS
Status: ACTIVE | OUTPATIENT
Start: 2023-12-28 | End: 2023-12-29

## 2023-12-28 RX ORDER — SODIUM CHLORIDE 9 MG/ML
75 INJECTION, SOLUTION INTRAVENOUS CONTINUOUS
Status: DISCONTINUED | OUTPATIENT
Start: 2023-12-28 | End: 2023-12-28

## 2023-12-28 RX ORDER — POLYETHYLENE GLYCOL 3350 17 G/17G
17 POWDER, FOR SOLUTION ORAL DAILY
Status: DISCONTINUED | OUTPATIENT
Start: 2023-12-28 | End: 2024-01-01 | Stop reason: HOSPADM

## 2023-12-28 RX ORDER — BACLOFEN 10 MG/1
10 TABLET ORAL 3 TIMES DAILY PRN
Status: DISCONTINUED | OUTPATIENT
Start: 2023-12-28 | End: 2023-12-28 | Stop reason: ENTERED-IN-ERROR

## 2023-12-28 RX ORDER — ALPRAZOLAM 0.5 MG/1
0.5 TABLET ORAL 2 TIMES DAILY PRN
Status: DISCONTINUED | OUTPATIENT
Start: 2023-12-28 | End: 2023-12-30 | Stop reason: ALTCHOICE

## 2023-12-28 RX ORDER — HEPARIN SODIUM 5000 [USP'U]/ML
5000 INJECTION, SOLUTION INTRAVENOUS; SUBCUTANEOUS EVERY 8 HOURS SCHEDULED
Status: DISCONTINUED | OUTPATIENT
Start: 2023-12-28 | End: 2024-01-01 | Stop reason: HOSPADM

## 2023-12-28 RX ORDER — CARBIDOPA AND LEVODOPA 25; 100 MG/1; MG/1
1 TABLET ORAL 3 TIMES DAILY
Status: DISCONTINUED | OUTPATIENT
Start: 2023-12-28 | End: 2024-01-01 | Stop reason: HOSPADM

## 2023-12-28 RX ORDER — TALC
6 POWDER (GRAM) TOPICAL NIGHTLY PRN
Status: DISCONTINUED | OUTPATIENT
Start: 2023-12-28 | End: 2024-01-01 | Stop reason: HOSPADM

## 2023-12-28 RX ORDER — POTASSIUM CHLORIDE 14.9 MG/ML
20 INJECTION INTRAVENOUS ONCE
Status: COMPLETED | OUTPATIENT
Start: 2023-12-28 | End: 2023-12-28

## 2023-12-28 RX ORDER — SERTRALINE HYDROCHLORIDE 50 MG/1
150 TABLET, FILM COATED ORAL DAILY
Status: DISCONTINUED | OUTPATIENT
Start: 2023-12-28 | End: 2023-12-31

## 2023-12-28 RX ORDER — POTASSIUM CHLORIDE 20 MEQ/1
40 TABLET, EXTENDED RELEASE ORAL ONCE
Status: COMPLETED | OUTPATIENT
Start: 2023-12-28 | End: 2023-12-28

## 2023-12-28 RX ORDER — DEXTROSE 50 % IN WATER (D50W) INTRAVENOUS SYRINGE
25
Status: DISCONTINUED | OUTPATIENT
Start: 2023-12-28 | End: 2024-01-01 | Stop reason: HOSPADM

## 2023-12-28 RX ORDER — LEVOTHYROXINE SODIUM 100 UG/1
200 TABLET ORAL
Status: DISCONTINUED | OUTPATIENT
Start: 2023-12-30 | End: 2024-01-01 | Stop reason: HOSPADM

## 2023-12-28 RX ORDER — GUAIFENESIN/DEXTROMETHORPHAN 100-10MG/5
5 SYRUP ORAL EVERY 4 HOURS PRN
Status: DISCONTINUED | OUTPATIENT
Start: 2023-12-28 | End: 2024-01-01 | Stop reason: HOSPADM

## 2023-12-28 RX ORDER — LEVOTHYROXINE SODIUM 100 UG/1
100 TABLET ORAL
Status: DISCONTINUED | OUTPATIENT
Start: 2023-12-29 | End: 2024-01-01 | Stop reason: HOSPADM

## 2023-12-28 RX ORDER — PANTOPRAZOLE SODIUM 40 MG/1
40 TABLET, DELAYED RELEASE ORAL
Status: DISCONTINUED | OUTPATIENT
Start: 2023-12-29 | End: 2023-12-28 | Stop reason: ENTERED-IN-ERROR

## 2023-12-28 RX ORDER — ACETAMINOPHEN 325 MG/1
975 TABLET ORAL ONCE
Status: COMPLETED | OUTPATIENT
Start: 2023-12-28 | End: 2023-12-28

## 2023-12-28 RX ORDER — ATORVASTATIN CALCIUM 20 MG/1
20 TABLET, FILM COATED ORAL NIGHTLY
Status: DISCONTINUED | OUTPATIENT
Start: 2023-12-28 | End: 2024-01-01 | Stop reason: HOSPADM

## 2023-12-28 RX ORDER — IPRATROPIUM BROMIDE AND ALBUTEROL SULFATE 2.5; .5 MG/3ML; MG/3ML
3 SOLUTION RESPIRATORY (INHALATION) EVERY 4 HOURS PRN
Status: DISCONTINUED | OUTPATIENT
Start: 2023-12-28 | End: 2024-01-01 | Stop reason: HOSPADM

## 2023-12-28 RX ORDER — ONDANSETRON HYDROCHLORIDE 2 MG/ML
4 INJECTION, SOLUTION INTRAVENOUS EVERY 4 HOURS PRN
Status: DISCONTINUED | OUTPATIENT
Start: 2023-12-28 | End: 2024-01-01 | Stop reason: HOSPADM

## 2023-12-28 RX ORDER — MORPHINE SULFATE 4 MG/ML
4 INJECTION, SOLUTION INTRAMUSCULAR; INTRAVENOUS
Status: DISCONTINUED | OUTPATIENT
Start: 2023-12-28 | End: 2024-01-01 | Stop reason: HOSPADM

## 2023-12-28 RX ORDER — POTASSIUM CHLORIDE 14.9 MG/ML
20 INJECTION INTRAVENOUS
Status: DISCONTINUED | OUTPATIENT
Start: 2023-12-28 | End: 2023-12-28

## 2023-12-28 RX ORDER — INSULIN LISPRO 100 [IU]/ML
0-5 INJECTION, SOLUTION INTRAVENOUS; SUBCUTANEOUS
Status: DISCONTINUED | OUTPATIENT
Start: 2023-12-28 | End: 2024-01-01 | Stop reason: HOSPADM

## 2023-12-28 RX ORDER — AMANTADINE HYDROCHLORIDE 100 MG/1
100 CAPSULE, GELATIN COATED ORAL DAILY
Status: DISCONTINUED | OUTPATIENT
Start: 2023-12-28 | End: 2023-12-28 | Stop reason: ENTERED-IN-ERROR

## 2023-12-28 RX ORDER — DIPHENHYDRAMINE HCL 25 MG
25 CAPSULE ORAL 2 TIMES DAILY PRN
Status: DISCONTINUED | OUTPATIENT
Start: 2023-12-28 | End: 2024-01-01 | Stop reason: HOSPADM

## 2023-12-28 RX ORDER — DEXTROSE MONOHYDRATE 100 MG/ML
0.3 INJECTION, SOLUTION INTRAVENOUS ONCE AS NEEDED
Status: DISCONTINUED | OUTPATIENT
Start: 2023-12-28 | End: 2024-01-01 | Stop reason: HOSPADM

## 2023-12-28 RX ADMIN — CARVEDILOL 25 MG: 25 TABLET, FILM COATED ORAL at 21:55

## 2023-12-28 RX ADMIN — SODIUM CHLORIDE 75 ML/HR: 9 INJECTION, SOLUTION INTRAVENOUS at 13:23

## 2023-12-28 RX ADMIN — MORPHINE SULFATE 4 MG: 4 INJECTION, SOLUTION INTRAMUSCULAR; INTRAVENOUS at 11:39

## 2023-12-28 RX ADMIN — SERTRALINE HYDROCHLORIDE 150 MG: 50 TABLET ORAL at 13:23

## 2023-12-28 RX ADMIN — ASPIRIN 81 MG: 81 TABLET, COATED ORAL at 09:00

## 2023-12-28 RX ADMIN — INSULIN LISPRO 1 UNITS: 100 INJECTION, SOLUTION INTRAVENOUS; SUBCUTANEOUS at 17:30

## 2023-12-28 RX ADMIN — POTASSIUM CHLORIDE 40 MEQ: 1500 TABLET, EXTENDED RELEASE ORAL at 15:49

## 2023-12-28 RX ADMIN — ACETAMINOPHEN 975 MG: 325 TABLET ORAL at 05:00

## 2023-12-28 RX ADMIN — POLYETHYLENE GLYCOL 3350 17 G: 17 POWDER, FOR SOLUTION ORAL at 13:22

## 2023-12-28 RX ADMIN — HEPARIN SODIUM 5000 UNITS: 5000 INJECTION INTRAVENOUS; SUBCUTANEOUS at 13:23

## 2023-12-28 RX ADMIN — CARBIDOPA AND LEVODOPA 1 TABLET: 25; 100 TABLET ORAL at 21:55

## 2023-12-28 RX ADMIN — MORPHINE SULFATE 4 MG: 4 INJECTION, SOLUTION INTRAMUSCULAR; INTRAVENOUS at 15:49

## 2023-12-28 RX ADMIN — HEPARIN SODIUM 5000 UNITS: 5000 INJECTION INTRAVENOUS; SUBCUTANEOUS at 08:50

## 2023-12-28 RX ADMIN — CARBIDOPA AND LEVODOPA 1 TABLET: 25; 100 TABLET ORAL at 17:30

## 2023-12-28 RX ADMIN — HEPARIN SODIUM 5000 UNITS: 5000 INJECTION INTRAVENOUS; SUBCUTANEOUS at 21:55

## 2023-12-28 RX ADMIN — POTASSIUM CHLORIDE 20 MEQ: 14.9 INJECTION, SOLUTION INTRAVENOUS at 15:49

## 2023-12-28 RX ADMIN — CARVEDILOL 25 MG: 25 TABLET, FILM COATED ORAL at 13:23

## 2023-12-28 RX ADMIN — ATORVASTATIN CALCIUM 20 MG: 20 TABLET, FILM COATED ORAL at 21:55

## 2023-12-28 RX ADMIN — CARBIDOPA AND LEVODOPA 1 TABLET: 25; 100 TABLET ORAL at 13:23

## 2023-12-28 RX ADMIN — AMITRIPTYLINE HYDROCHLORIDE 10 MG: 10 TABLET, FILM COATED ORAL at 21:55

## 2023-12-28 SDOH — SOCIAL STABILITY: SOCIAL INSECURITY: WERE YOU ABLE TO COMPLETE ALL THE BEHAVIORAL HEALTH SCREENINGS?: YES

## 2023-12-28 SDOH — SOCIAL STABILITY: SOCIAL INSECURITY: DOES ANYONE TRY TO KEEP YOU FROM HAVING/CONTACTING OTHER FRIENDS OR DOING THINGS OUTSIDE YOUR HOME?: NO

## 2023-12-28 SDOH — SOCIAL STABILITY: SOCIAL INSECURITY: ABUSE: ADULT

## 2023-12-28 SDOH — SOCIAL STABILITY: SOCIAL INSECURITY: HAS ANYONE EVER THREATENED TO HURT YOUR FAMILY OR YOUR PETS?: NO

## 2023-12-28 SDOH — SOCIAL STABILITY: SOCIAL INSECURITY: HAVE YOU HAD THOUGHTS OF HARMING ANYONE ELSE?: NO

## 2023-12-28 SDOH — SOCIAL STABILITY: SOCIAL INSECURITY: DO YOU FEEL ANYONE HAS EXPLOITED OR TAKEN ADVANTAGE OF YOU FINANCIALLY OR OF YOUR PERSONAL PROPERTY?: NO

## 2023-12-28 SDOH — SOCIAL STABILITY: SOCIAL INSECURITY: ARE YOU OR HAVE YOU BEEN THREATENED OR ABUSED PHYSICALLY, EMOTIONALLY, OR SEXUALLY BY ANYONE?: NO

## 2023-12-28 SDOH — SOCIAL STABILITY: SOCIAL INSECURITY: ARE THERE ANY APPARENT SIGNS OF INJURIES/BEHAVIORS THAT COULD BE RELATED TO ABUSE/NEGLECT?: NO

## 2023-12-28 SDOH — SOCIAL STABILITY: SOCIAL INSECURITY: DO YOU FEEL UNSAFE GOING BACK TO THE PLACE WHERE YOU ARE LIVING?: NO

## 2023-12-28 ASSESSMENT — COGNITIVE AND FUNCTIONAL STATUS - GENERAL
STANDING UP FROM CHAIR USING ARMS: TOTAL
MOVING FROM LYING ON BACK TO SITTING ON SIDE OF FLAT BED WITH BEDRAILS: A LITTLE
DRESSING REGULAR UPPER BODY CLOTHING: TOTAL
EATING MEALS: A LITTLE
CLIMB 3 TO 5 STEPS WITH RAILING: TOTAL
PERSONAL GROOMING: A LITTLE
DRESSING REGULAR UPPER BODY CLOTHING: A LITTLE
PATIENT BASELINE BEDBOUND: NO
DRESSING REGULAR LOWER BODY CLOTHING: A LOT
HELP NEEDED FOR BATHING: A LOT
DRESSING REGULAR LOWER BODY CLOTHING: A LOT
DAILY ACTIVITIY SCORE: 16
MOVING TO AND FROM BED TO CHAIR: A LOT
WALKING IN HOSPITAL ROOM: TOTAL
MOVING FROM LYING ON BACK TO SITTING ON SIDE OF FLAT BED WITH BEDRAILS: A LOT
STANDING UP FROM CHAIR USING ARMS: A LOT
TOILETING: TOTAL
TURNING FROM BACK TO SIDE WHILE IN FLAT BAD: A LOT
MOBILITY SCORE: 13
CLIMB 3 TO 5 STEPS WITH RAILING: A LOT
MOVING TO AND FROM BED TO CHAIR: A LOT
WALKING IN HOSPITAL ROOM: A LOT
MOBILITY SCORE: 9
TOILETING: A LITTLE
TURNING FROM BACK TO SIDE WHILE IN FLAT BAD: A LOT
HELP NEEDED FOR BATHING: A LOT
PERSONAL GROOMING: TOTAL
DAILY ACTIVITIY SCORE: 11

## 2023-12-28 ASSESSMENT — ACTIVITIES OF DAILY LIVING (ADL)
BATHING: NEEDS ASSISTANCE
ADEQUATE_TO_COMPLETE_ADL: YES
HEARING - RIGHT EAR: FUNCTIONAL
HEARING - LEFT EAR: FUNCTIONAL
ASSISTIVE_DEVICE: WALKER;CANE
PATIENT'S MEMORY ADEQUATE TO SAFELY COMPLETE DAILY ACTIVITIES?: NO
FEEDING YOURSELF: NEEDS ASSISTANCE
LACK_OF_TRANSPORTATION: NO
JUDGMENT_ADEQUATE_SAFELY_COMPLETE_DAILY_ACTIVITIES: NO
WALKS IN HOME: NEEDS ASSISTANCE
TOILETING: NEEDS ASSISTANCE
GROOMING: NEEDS ASSISTANCE
DRESSING YOURSELF: NEEDS ASSISTANCE

## 2023-12-28 ASSESSMENT — COLUMBIA-SUICIDE SEVERITY RATING SCALE - C-SSRS
6. HAVE YOU EVER DONE ANYTHING, STARTED TO DO ANYTHING, OR PREPARED TO DO ANYTHING TO END YOUR LIFE?: NO
2. HAVE YOU ACTUALLY HAD ANY THOUGHTS OF KILLING YOURSELF?: NO
1. IN THE PAST MONTH, HAVE YOU WISHED YOU WERE DEAD OR WISHED YOU COULD GO TO SLEEP AND NOT WAKE UP?: NO

## 2023-12-28 ASSESSMENT — LIFESTYLE VARIABLES
HOW OFTEN DO YOU HAVE 6 OR MORE DRINKS ON ONE OCCASION: NEVER
HOW MANY STANDARD DRINKS CONTAINING ALCOHOL DO YOU HAVE ON A TYPICAL DAY: PATIENT DOES NOT DRINK
HOW OFTEN DO YOU HAVE A DRINK CONTAINING ALCOHOL: NEVER
AUDIT-C TOTAL SCORE: 0
AUDIT-C TOTAL SCORE: 0
SKIP TO QUESTIONS 9-10: 1

## 2023-12-28 ASSESSMENT — PAIN - FUNCTIONAL ASSESSMENT
PAIN_FUNCTIONAL_ASSESSMENT: 0-10
PAIN_FUNCTIONAL_ASSESSMENT: 0-10

## 2023-12-28 ASSESSMENT — PATIENT HEALTH QUESTIONNAIRE - PHQ9
2. FEELING DOWN, DEPRESSED OR HOPELESS: NEARLY EVERY DAY
SUM OF ALL RESPONSES TO PHQ9 QUESTIONS 1 & 2: 4
1. LITTLE INTEREST OR PLEASURE IN DOING THINGS: SEVERAL DAYS

## 2023-12-28 ASSESSMENT — PAIN SCALES - GENERAL
PAINLEVEL_OUTOF10: 0 - NO PAIN
PAINLEVEL_OUTOF10: 7

## 2023-12-28 NOTE — PROGRESS NOTES
Transitional Care Coordination Progress Note:  Plan per Medical/Surgical team: treatment of weakness due to poor intake causing depleted electrolytes with electrolyte replacement & IV fluids  Status: inpatient  Payor source: Katie ELMORE  Discharge disposition: home alone  Potential Barriers: , K 3.1, renal 57/2.07  ADOD: 12/30/2023  L Tessa Knight RN, BSN Transitional Care Coordinator ED# [phone]      12/28/23 0659   Discharge Planning   Living Arrangements Alone   Support Systems Children   Assistance Needed IV fluids   Type of Residence Private residence   Number of Stairs to Enter Residence 12   Number of Stairs Within Residence 0   Do you have animals or pets at home? No   Home or Post Acute Services None   Patient expects to be discharged to: home alone   Does the patient need discharge transport arranged? Yes   RoundTrip coordination needed? Yes   Has discharge transport been arranged? No   Financial Resource Strain   How hard is it for you to pay for the very basics like food, housing, medical care, and heating? Not hard   Housing Stability   In the last 12 months, was there a time when you were not able to pay the mortgage or rent on time? N   In the last 12 months, how many places have you lived? 1   In the last 12 months, was there a time when you did not have a steady place to sleep or slept in a shelter (including now)? N   Transportation Needs   In the past 12 months, has lack of transportation kept you from medical appointments or from getting medications? no   In the past 12 months, has lack of transportation kept you from meetings, work, or from getting things needed for daily living? No

## 2023-12-28 NOTE — PROGRESS NOTES
Pharmacy Medication History Review    Prema Hair is a 58 y.o. female admitted for GASTON (acute kidney injury) (CMS/[facility]). Pharmacy reviewed the patient's [id number] medications and allergies for accuracy.    The list below reflectives the updated PTA list. Please review each medication in order reconciliation for additional clarification and justification.  (Not in a hospital admission)       The list below reflectives the updated allergy list. Please review each documented allergy for additional clarification and justification.  Allergies  Reviewed by Mickey Mcqueen MD on 12/27/2023        Severity Reactions Comments    Lisinopril Not Specified Unknown             Below are additional concerns with the patient's PTA list.  Prior to Admission Medications   Prescriptions Last Dose Informant Patient Reported? Taking?   Klor-Con M20 20 mEq ER tablet   Yes No   Sig: Take 2 tablets (40 mEq) by mouth 2 times a day.   OXcarbazepine (Trileptal) 600 mg tablet   Yes No   Sig: Take 1 tablet (600 mg) by mouth once daily in the morning.   OXcarbazepine (Trileptal) 600 mg tablet   Yes No   Sig: Take 2 tablets (1,200 mg) by mouth once daily at bedtime.   QUEtiapine (SEROquel) 100 mg tablet   Yes No   Sig: Take 1 tablet (100 mg) by mouth once daily in the morning.   QUEtiapine (SEROquel) 300 mg tablet   Yes No   Sig: Take 1 tablet (300 mg) by mouth once daily at bedtime.   Trelegy Ellipta 200-62.5-25 mcg blister with device   Yes No   Sig: Inhale 1 puff once daily as needed.   acetaminophen (Tylenol) 500 mg tablet   Yes No   Sig: Take 1 tablet (500 mg) by mouth every 4 hours if needed.   albuterol 90 mcg/actuation inhaler   Yes No   Sig: Inhale 2 puffs every 4 hours if needed.   amitriptyline (Elavil) 10 mg tablet   Yes No   Sig: Take 1 tablet (10 mg) by mouth once daily at bedtime.   ammonium lactate (Lac-Hydrin) 12 % lotion   Yes No   Sig: Apply 1 Application topically twice a day.   ascorbic acid (Vitamin C)  1,000 mg tablet   Yes No   Sig: Take 1 tablet (1,000 mg) by mouth once daily.   aspirin 81 mg EC tablet   Yes No   Sig: Take 1 tablet (81 mg) by mouth once daily.   atorvastatin (Lipitor) 20 mg tablet   Yes No   Sig: Take 1 tablet (20 mg) by mouth once daily at bedtime.   azelastine (Astelin) 137 mcg (0.1 %) nasal spray   Yes No   Sig: Administer 1 spray into each nostril 2 times a day.   baclofen (Lioresal) 20 mg tablet   No No   Sig: TAKE 1 TABLET BY MOUTH THREE TIMES A DAY   blood sugar diagnostic (OneTouch Verio test strips) strip   Yes No   Si strip by in vitro route once every day.   bumetanide (Bumex) 2 mg tablet   Yes No   Sig: Take 1 tablet (2 mg) by mouth twice a day.   calcium carbonate (Oscal) 500 mg calcium (1,250 mg) tablet   Yes No   Sig: Take 1 tablet (1,250 mg) by mouth 3 times a day.   calcium carbonate-vitamin D3 500 mg-3.125 mcg (125 unit) tablet tablet   Yes No   Sig: Take 1 tablet by mouth once daily.   carbidopa-levodopa (Sinemet)  mg tablet   Yes No   Sig: Take 1 tablet by mouth 3 times a day.   carvedilol (Coreg) 25 mg tablet   Yes No   Sig: Take 1 tablet (25 mg) by mouth 2 times a day.   cephalexin (Keflex) 500 mg capsule   Yes No   Sig: Take 1 capsule (500 mg) by mouth once daily.   cholecalciferol (Vitamin D-3) 50 MCG (2000 UT) tablet   Yes No   Sig: Take 1 tablet (2,000 Units) by mouth once daily.   clonazePAM (KlonoPIN) 1 mg tablet   Yes No   Sig: Take 1 tablet (1 mg) by mouth once daily in the morning. 1/2 TABLET BY MOUTH EVERY EVENING AND AN ADDITIONAL 1/2 AS NEEDED   clonazePAM (KlonoPIN) 1 mg tablet   Yes No   Sig: Take 2 tablets (2 mg) by mouth once daily in the evening.   diclofenac sodium (Voltaren) 1 % gel gel   Yes No   Sig: Apply 1 Application topically 3 times a day as needed.   estradiol (Estrace) 0.01 % (0.1 mg/gram) vaginal cream   Yes No   Sig: Insert 1 Application into the vagina 3 (three) times a week. Pea-sized amount   gabapentin (Neurontin) 300 mg capsule    Yes No   Sig: Take 3 capsules (900 mg) by mouth 3 times a day.   ipratropium-albuteroL (Duo-Neb) 0.5-2.5 mg/3 mL nebulizer solution   Yes No   Sig: Inhale 3 mL every 4 hours if needed.   lancets misc   Yes No   Si each 3 times a day.   levothyroxine (Synthroid, Levoxyl) 100 mcg tablet   Yes No   Sig: Take 1 tablet (100 mcg) by mouth once daily (M-). 1 tablet 5 days a week Mon-Friday and two tabs on  and Sundays   levothyroxine (Synthroid, Levoxyl) 200 mcg tablet   Yes No   Sig: Take 1 tablet (200 mcg) by mouth 2 times a week. Saturday and    lidocaine (Lidoderm) 5 % patch   No No   Sig: APPLY 1 PATCH AND LEAVE IN PLACE FOR 12 HOURS, THEN REMOVE AND LEAVE OFF FOR 12 HOURS   Patient taking differently: Place 1 patch on the skin once daily as needed for mild pain (1 - 3). Apply 1 patch and leave in place for 12 hours, then remove and leave off for 12 hours.   losartan (Cozaar) 50 mg tablet   Yes No   Sig: Take 1 tablet (50 mg) by mouth once daily.   meloxicam (Mobic) 15 mg tablet   Yes No   Sig: Take 1 tablet (15 mg) by mouth once daily as needed for mild pain (1 - 3).   metFORMIN (Glucophage) 500 mg tablet   Yes No   Sig: Take 1 tablet (500 mg) by mouth 2 times a day with meals.   metOLazone (Zaroxolyn) 2.5 mg tablet   No No   Sig: TAKE 1 TABLET BY MOUTH ONCE DAILY.   minocycline 100 mg capsule   Yes No   Sig: Take 1 capsule (100 mg) by mouth once daily at bedtime.   omega-3 fatty acids-fish oil 360-1,200 mg capsule   Yes No   Sig: Take 1 capsule (1,200 mg) by mouth once daily.   omeprazole (PriLOSEC) 40 mg DR capsule   Yes No   Sig: Take 1 capsule (40 mg) by mouth once daily. With dinner   oxybutynin XL (Ditropan-XL) 10 mg 24 hr tablet   Yes No   Sig: Take 1 tablet (10 mg) by mouth once daily.   polyethylene glycol (Glycolax, Miralax) 17 gram/dose powder   Yes No   Sig: Take 17 g by mouth once daily. In 8oz of water, juice, or tea and drink daily   prazosin (Minipress) 5 mg capsule   Yes No   Sig:  Take 1 capsule (5 mg) by mouth once daily at bedtime.   sertraline (Zoloft) 100 mg tablet   Yes No   Sig: Take 2 tablets (200 mg) by mouth once daily in the morning.   tiZANidine (Zanaflex) 4 mg tablet   Yes No   Sig: Take 1 tablet (4 mg) by mouth 3 times a day.   topiramate (Topamax) 200 mg tablet   Yes No   Sig: Take 1 tablet (200 mg) by mouth 2 times a day.      Facility-Administered Medications: None      Per daughters.     Patrica Hernandez, ALINAhT

## 2023-12-28 NOTE — H&P
HISTORY AND PHYSICAL EXAMINATION    PATIENT NAME: Prema Hair    MRN: [id number]  SERVICE DATE: 12/28/2023       PRIMARY CARE PHYSICIAN: Shabana Rodriguez MD          ASSESSMENT AND PLAN   # Stuttering speech/generalized weakness/confusion  -Patient initially was evaluated for rule out stroke  -Will consult neurology  #GASTON (acute kidney injury) (CMS/[facility])  -Improving well with IV fluids  -Will consult nephrology  # Hyponatremia  -As above  # Hypokalemia will replace  # History of Bell's Belsey  # History of torticollis requiring Botox injections  -Last injection in October 2023  # Diabetes mellitus   - insulin sliding scale  # Hypertension  -Continue home meds  # History of fibromyalgia  # History of schizophrenia        Discussed with nurses/case management team and the specialists involved in this patient's care. Reviewed the EMR and documentation from other care-givers.    SUBJECTIVE  CHIEF COMPLAINT:      HPI: This is a 58 y.o. female who came to the emergency room from home for stuttering speech and confusion.  She was initially evaluated for rule out CVA.  Patient has had chronic left Bell's palsy which was confounding the presentation.  She also admits to generalized weakness.  She was found to be in acute renal failure.  And hence has been admitted for evaluation and management of all the above    Patient is a 58-year-old female with PMH of Bell's palsy, HTN, DM 2, schizophrenia, CHF, and bipolar disorder presenting to the emergency department due to concerns for strokelike symptoms.  Last known well approximately 2 days prior.  Per EMS, patient was lasting by her daughter approximately 2 days prior.  Daughter evaluate her today she appeared to be in some position as when she had been in the recliner raising concerns that she had been acting really.  Patient states that she fell off of her baseline, and states that she has been feeling this for the past 2 days or so.  Is reporting some left upper  extremity as well as left neck weakness.  Does have a baseline Bell's palsy, however she does feel like she is different from her baseline at this time.  Denies any recent falls.  No anticoagulation.     PAST MEDICAL HISTORY:   Past Medical History:   Diagnosis Date    Cervicalgia 10/09/2020    Neck pain    Dysphonia 07/05/2022    Muscle tension dysphonia    Encounter for fitting and adjustment of other specified devices 02/16/2021    Fitting and adjustment of pessary    Hyperlipidemia, unspecified 01/03/2023    Dyslipidemia    Hypokalemia 01/20/2022    Hypokalemia    Low back pain, unspecified 02/22/2021    Low back pain    Obstructive sleep apnea (adult) (pediatric) 01/03/2023    ZEINA on CPAP    Paralysis of vocal cords and larynx, 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    Laryngeal paresis    Type 2 diabetes mellitus with diabetic neuropathy, unspecified (CMS/[facility]) 10/09/2020    Controlled type 2 diabetes mellitus with neuropathy    Type 2 diabetes mellitus with other specified complication (CMS/[facility]) 01/03/2023    DM type 2 with diabetic mixed hyperlipidemia    Type 2 diabetes mellitus with other specified complication (CMS/[facility]) 09/14/2021    Diabetes mellitus type 2 in obese     PAST SURGICAL HISTORY:   Past Surgical History:   Procedure Laterality Date    OTHER SURGICAL HISTORY  10/09/2020    Hemithyroidectomy    US GUIDED THYROID BIOPSY  11/19/2020    US GUIDED THYROID BIOPSY 11/19/2020 [facility]    US GUIDED THYROID BIOPSY  11/19/2020    US GUIDED THYROID BIOPSY 11/19/2020 [facility]     FAMILY HISTORY:   Family History   Problem Relation Name Age of Onset    Other (cardiac disorder) Mother      Heart failure Mother      Heart failure Father       SOCIAL HISTORY:   Social History     Tobacco Use    Smoking status: Never    Smokeless tobacco: Never   Substance Use Topics    Alcohol use: Not Currently    Drug use: Never     Problems    · Abnormal thyroid function test (794.5) (R94.6)   · Acute on chronic diastolic heart  failure (428.33) (I50.33)   · CATALINA positive (795.79) (R76.8)   · Aneurysm (442.9) (I72.9)   · Ankle pain (719.47) (M25.579)   · Arthritis of ankle, left (716.97) (M19.072)   · Arthritis of ankle, right (716.97) (M19.071)   · Arthritis of knee, left (716.96) (M17.12)   · Arthritis of knee, right (716.96) (M17.11)   · Atrophic vaginitis (627.3) (N95.2)   · Atypical chest pain (786.59) (R07.89)   · 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)   · Cervical radiculopathy, chronic (723.4) (M54.12)   · Cervicalgia (723.1) (M54.2)   · Chronic constipation (564.00) (K59.09)   · Chronic pain of both knees (719.46,338.29) (M25.561,M25.562,G89.29)   · Chronic UTI (599.0) (N39.0)   · Controlled type 2 diabetes mellitus with neuropathy (250.60,357.2) (E11.40)   · Diabetes mellitus type 2 in obese (250.00,278.00) (E11.69,E66.9)   · DM type 2 with diabetic mixed hyperlipidemia (250.80,272.2) (E11.69,E78.2)   · LUONG (dyspnea on exertion) (786.09) (R06.09)   · Dyslipidemia (272.4) (E78.5)   · Encounter for annual routine gynecological examination (V72.31) (Z01.419)   · Female stress incontinence (625.6) (N39.3)   · Fibromyalgia (729.1) (M79.7)   · Goiter (240.9) (E04.9)   · Hypokalemia (276.8) (E87.6)   · Intervertebral disc disorder with radiculopathy of lumbosacral region (724.4) (M51.17)   · 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)   · Low back pain (724.2) (M54.50)   · Lumbar radicular pain (724.4) (M54.16)   · Malignant neoplasm of thyroid gland (193) (C73)   · Midline cystocele (618.01) (N81.11)   · Multinodular thyroid (241.1) (E04.2)   · Muscle cramps (729.82) (R25.2)   · Muscle tension dysphonia (784.42) (R49.0)   · Neck mass (784.2) (R22.1)   · Neck pain (723.1) (M54.2)   · ZEINA on CPAP (327.23,V46.8) (G47.33,Z99.89)   · Osteoarthritis of both knees, unspecified osteoarthritis type (715.96) (M17.0)   · Pap smear, as part of routine gynecological examination (V76.2) (Z01.419)   · Paresthesia (782.0) (R20.2)   · Pelvic floor weakness  (618.89) (N81.89)   · Pessary maintenance (V53.99) (Z46.89)   · Postoperative hypothyroidism (244.0) (E89.0)   · Prolapse of urethra (599.5) (N36.8)   · Pseudoaneurysm (442.9) (I72.9)   · Sacroiliitis (720.2) (M46.1)   · Symptoms of urinary tract infection (788.99) (R39.9)   · Urge incontinence (788.31) (N39.41)   · Urinary frequency (788.41) (R35.0)   · Urinary urgency (788.63) (R39.15)   · Uterine prolapse (618.1) (N81.4)   · UTI (urinary tract infection) (599.0) (N39.0)   · Vaginal pessary present (V45.89) (Z96.0)   · Vitamin D insufficiency (268.9) (E55.9)   · Weakness (780.79) (R53.1)     Past Medical History  Problems    · Controlled type 2 diabetes mellitus with neuropathy (250.60,357.2) (E11.40)   · Diabetes mellitus type 2 in obese (250.00,278.00) (E11.69,E66.9)   · DM type 2 with diabetic mixed hyperlipidemia (250.80,272.2) (E11.69,E78.2)   · Dyslipidemia (272.4) (E78.5)   · History of Fitting and adjustment of pessary (V53.99) (Z46.89)   · Resolved Date: 02 Mar 2021   · Hypokalemia (276.8) (E87.6)   · 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)   · Low back pain (724.2) (M54.50)   · Muscle tension dysphonia (784.42) (R49.0)   · Neck pain (723.1) (M54.2)   · ZEINA on CPAP (327.23,V46.8) (G47.33,Z99.89)     Surgical History  Problems    · History of Hemithyroidectomy     Family History  Mother    · Family history of cardiac disorder (V17.49) (Z82.49)   · Family history of congestive heart failure (V17.49) (Z82.49)  Father    · Family history of heart failure (V17.49) (Z82.49)     Social History  Problems    · Current non-drinker of alcohol (V49.89) (Z78.9)   · Medical marijuana daily   · Never a smoker   · Never smoked any substance (V49.89) (Z78.9)     Allergies  Medication    · Lisinopril TABS   Recorded By: Casandra Abraham; 9/1/2020 4:13:42 PM  MEDICATIONS: Prior to Admission Medications  (Not in a hospital admission)     CURRENT ALLERGIES:   Allergies   Allergen Reactions    Lisinopril Unknown       COMPLETE REVIEW  "OF SYSTEMS:      GENERAL: No fever, appetite stable.  HEENT: No epistaxis, no mouth ulcers  NECK: no neck pain  RESPIRATORY: No new resp symptoms.  CARDIOVASCULAR: No cp, no leg edema, No orthopnea  GI: No NVD, no GI Bleed  : No hematuria, no dysuria  MUSCULOSKELETAL: No new jt pains or swelling  SKIN: No rashes, no ulcers  PSYCH: Denies feeling anxious or depressed.   HEMATOLOGY/LYMPHOLOGY: No bruising, no hx of VTE  ENDOCRINE: No hx of DM  NEURO: No hx of seizures or syncope, No hx of CVA      OBJECTIVE  PHYSICAL EXAM:   Heart Rate:  [62-90]   Temp:  [36.3 °C (97.3 °F)-37 °C (98.6 °F)]   Resp:  [14-18]   BP: (126-140)/(63-98)   Height:  [175.3 cm (5' 9\")]   Weight:  [104 kg (229 lb 4.5 oz)]   SpO2:  [98 %-99 %]     Body mass index is 33.86 kg/m².    GENERAL: awake, alert, Ox3, cooperative resting comfortably  SKIN: Skin turgor normal. No rashes  HEENT: no epistaxis, Moist mucosa.  NECK: supple  BACK: spine nontender to palpation, No CVAT.  LUNGS: Vesicular breath sounds, with no wheeze, no crepitations.   CARDIAC: REGULAR. S1 and S2; no rubs, no murmur  ABDOMEN: Abdomen soft, non-tender. BS+  EXTREMITIES: No edema, Good capillary refill.   NEURO: [facility]ight GOOD. Motor and sensory exam wnl.  Stuttering speech   MUSCULOSKELETAL: No acute inflammation       .  Current Facility-Administered Medications:     acetaminophen (Tylenol) tablet 650 mg, 650 mg, oral, q6h PRN, Mickey Mcqueen MD    ALPRAZolam (Xanax) tablet 0.5 mg, 0.5 mg, oral, BID PRN, Mickey Mcqueen MD    amitriptyline (Elavil) tablet 10 mg, 10 mg, oral, Nightly, Mickey Mcqueen MD    aspirin EC tablet 81 mg, 81 mg, oral, Daily, Mickey Mcqueen MD, 81 mg at 12/28/23 0900    atorvastatin (Lipitor) tablet 20 mg, 20 mg, oral, Nightly, Mickey Mcqueen MD    carbidopa-levodopa (Sinemet)  mg per tablet 1 tablet, 1 tablet, oral, TID, Mickey Mcqueen MD    carvedilol (Coreg) tablet 25 mg, 25 mg, oral, BID, Mickey Mcqueen MD    " dextromethorphan-guaifenesin (Robitussin DM)  mg/5 mL oral liquid 5 mL, 5 mL, oral, q4h PRN, Miceky Mcqueen MD    diphenhydrAMINE (BENADryl) capsule 25 mg, 25 mg, oral, BID PRN, Mickey Mcqueen MD    heparin (porcine) injection 5,000 Units, 5,000 Units, subcutaneous, q8h KATHERINE, Mickey Mcqueen MD, 5,000 Units at 12/28/23 0850    ipratropium-albuteroL (Duo-Neb) 0.5-2.5 mg/3 mL nebulizer solution 3 mL, 3 mL, nebulization, q4h PRN, Mickey Mcqueen MD    [START ON 12/29/2023] levothyroxine (Synthroid, Levoxyl) tablet 100 mcg, 100 mcg, oral, Once per day on Mon Tue Wed Thu Fri, Mickey Mcqueen MD    [START ON 12/30/2023] levothyroxine (Synthroid, Levoxyl) tablet 200 mcg, 200 mcg, oral, Once per day on Sun Sat, Mickey Mcqueen MD    melatonin tablet 6 mg, 6 mg, oral, Nightly PRN, Mickey Mcqueen MD    morphine injection 4 mg, 4 mg, intravenous, q3h PRN, Mickey Mcqueen MD, 4 mg at 12/28/23 1139    ondansetron (Zofran) injection 4 mg, 4 mg, intravenous, q4h PRN, Mickey Mcqueen MD    oxygen (O2) therapy, , inhalation, Continuous PRN - O2/gases, Mickey Mcqueen MD    polyethylene glycol (Glycolax, Miralax) packet 17 g, 17 g, oral, Daily, Mickey Mcqueen MD    sertraline (Zoloft) tablet 150 mg, 150 mg, oral, Daily, Mickey Mcqueen MD    Current Outpatient Medications:     acetaminophen (Tylenol) 500 mg tablet, Take 1 tablet (500 mg) by mouth every 4 hours if needed., Disp: , Rfl:     albuterol 90 mcg/actuation inhaler, Inhale 2 puffs every 4 hours if needed., Disp: , Rfl:     amitriptyline (Elavil) 10 mg tablet, Take 1 tablet (10 mg) by mouth once daily at bedtime., Disp: , Rfl:     ammonium lactate (Lac-Hydrin) 12 % lotion, Apply 1 Application topically twice a day., Disp: , Rfl:     ascorbic acid (Vitamin C) 1,000 mg tablet, Take 1 tablet (1,000 mg) by mouth once daily., Disp: , Rfl:     aspirin 81 mg EC tablet, Take 1 tablet (81 mg) by mouth once daily., Disp: , Rfl:      atorvastatin (Lipitor) 20 mg tablet, Take 1 tablet (20 mg) by mouth once daily at bedtime., Disp: , Rfl:     azelastine (Astelin) 137 mcg (0.1 %) nasal spray, Administer 1 spray into each nostril 2 times a day., Disp: , Rfl:     baclofen (Lioresal) 20 mg tablet, TAKE 1 TABLET BY MOUTH THREE TIMES A DAY, Disp: 270 tablet, Rfl: 1    blood sugar diagnostic (OneTouch Verio test strips) strip, 1 strip by in vitro route once every day., Disp: , Rfl:     bumetanide (Bumex) 2 mg tablet, Take 1 tablet (2 mg) by mouth twice a day., Disp: , Rfl:     calcium carbonate (Oscal) 500 mg calcium (1,250 mg) tablet, Take 1 tablet (1,250 mg) by mouth 3 times a day., Disp: , Rfl:     calcium carbonate-vitamin D3 500 mg-3.125 mcg (125 unit) tablet tablet, Take 1 tablet by mouth once daily., Disp: , Rfl:     carbidopa-levodopa (Sinemet)  mg tablet, Take 1 tablet by mouth 3 times a day., Disp: , Rfl:     carvedilol (Coreg) 25 mg tablet, Take 1 tablet (25 mg) by mouth 2 times a day., Disp: , Rfl:     cephalexin (Keflex) 500 mg capsule, Take 1 capsule (500 mg) by mouth once daily., Disp: , Rfl:     cholecalciferol (Vitamin D-3) 50 MCG (2000 UT) tablet, Take 1 tablet (2,000 Units) by mouth once daily., Disp: , Rfl:     clonazePAM (KlonoPIN) 1 mg tablet, Take 1 tablet (1 mg) by mouth once daily in the morning. 1/2 TABLET BY MOUTH EVERY EVENING AND AN ADDITIONAL 1/2 AS NEEDED, Disp: , Rfl:     clonazePAM (KlonoPIN) 1 mg tablet, Take 2 tablets (2 mg) by mouth once daily in the evening., Disp: , Rfl:     diclofenac sodium (Voltaren) 1 % gel gel, Apply 1 Application topically 3 times a day as needed., Disp: , Rfl:     estradiol (Estrace) 0.01 % (0.1 mg/gram) vaginal cream, Insert 1 Application into the vagina 3 (three) times a week. Pea-sized amount, Disp: , Rfl:     gabapentin (Neurontin) 300 mg capsule, Take 3 capsules (900 mg) by mouth 3 times a day., Disp: , Rfl:     ipratropium-albuteroL (Duo-Neb) 0.5-2.5 mg/3 mL nebulizer solution,  Inhale 3 mL every 4 hours if needed., Disp: , Rfl:     Klor-Con M20 20 mEq ER tablet, Take 2 tablets (40 mEq) by mouth 2 times a day., Disp: , Rfl:     lancets misc, 1 each 3 times a day., Disp: , Rfl:     levothyroxine (Synthroid, Levoxyl) 100 mcg tablet, Take 1 tablet (100 mcg) by mouth once daily (M-F). 1 tablet 5 days a week Mon-Friday and two tabs on Saturdays and Sundays, Disp: , Rfl:     levothyroxine (Synthroid, Levoxyl) 200 mcg tablet, Take 1 tablet (200 mcg) by mouth 2 times a week. Saturday and sunday, Disp: , Rfl:     lidocaine (Lidoderm) 5 % patch, APPLY 1 PATCH AND LEAVE IN PLACE FOR 12 HOURS, THEN REMOVE AND LEAVE OFF FOR 12 HOURS (Patient taking differently: Place 1 patch on the skin once daily as needed for mild pain (1 - 3). Apply 1 patch and leave in place for 12 hours, then remove and leave off for 12 hours.), Disp: 30 patch, Rfl: 11    losartan (Cozaar) 50 mg tablet, Take 1 tablet (50 mg) by mouth once daily., Disp: , Rfl:     meloxicam (Mobic) 15 mg tablet, Take 1 tablet (15 mg) by mouth once daily as needed for mild pain (1 - 3)., Disp: , Rfl:     metFORMIN (Glucophage) 500 mg tablet, Take 1 tablet (500 mg) by mouth 2 times a day with meals., Disp: , Rfl:     metOLazone (Zaroxolyn) 2.5 mg tablet, TAKE 1 TABLET BY MOUTH ONCE DAILY., Disp: 90 tablet, Rfl: 4    minocycline 100 mg capsule, Take 1 capsule (100 mg) by mouth once daily at bedtime., Disp: , Rfl:     omega-3 fatty acids-fish oil 360-1,200 mg capsule, Take 1 capsule (1,200 mg) by mouth once daily., Disp: , Rfl:     omeprazole (PriLOSEC) 40 mg DR capsule, Take 1 capsule (40 mg) by mouth once daily. With dinner, Disp: , Rfl:     OXcarbazepine (Trileptal) 600 mg tablet, Take 1 tablet (600 mg) by mouth once daily in the morning., Disp: , Rfl:     OXcarbazepine (Trileptal) 600 mg tablet, Take 2 tablets (1,200 mg) by mouth once daily at bedtime., Disp: , Rfl:     oxybutynin XL (Ditropan-XL) 10 mg 24 hr tablet, Take 1 tablet (10 mg) by mouth  once daily., Disp: , Rfl:     polyethylene glycol (Glycolax, Miralax) 17 gram/dose powder, Take 17 g by mouth once daily. In 8oz of water, juice, or tea and drink daily, Disp: , Rfl:     prazosin (Minipress) 5 mg capsule, Take 1 capsule (5 mg) by mouth once daily at bedtime., Disp: , Rfl:     QUEtiapine (SEROquel) 100 mg tablet, Take 1 tablet (100 mg) by mouth once daily in the morning., Disp: , Rfl:     QUEtiapine (SEROquel) 300 mg tablet, Take 1 tablet (300 mg) by mouth once daily at bedtime., Disp: , Rfl:     sertraline (Zoloft) 100 mg tablet, Take 2 tablets (200 mg) by mouth once daily in the morning., Disp: , Rfl:     tiZANidine (Zanaflex) 4 mg tablet, Take 1 tablet (4 mg) by mouth 3 times a day., Disp: , Rfl:     topiramate (Topamax) 200 mg tablet, Take 1 tablet (200 mg) by mouth 2 times a day., Disp: , Rfl:     Trelegy Ellipta 200-62.5-25 mcg blister with device, Inhale 1 puff once daily as needed., Disp: , Rfl:     DATA:   Diagnostic tests reviewed for today's visit:    Most recent labs  Admission on 12/27/2023   Component Date Value Ref Range Status    WBC 12/27/2023 6.9  4.4 - 11.3 x10*3/uL Final    nRBC 12/27/2023 0.0  0.0 - 0.0 /100 WBCs Final    RBC 12/27/2023 4.49  4.00 - 5.20 x10*6/uL Final    Hemoglobin 12/27/2023 12.7  12.0 - 16.0 g/dL Final    Hematocrit 12/27/2023 38.8  36.0 - 46.0 % Final    MCV 12/27/2023 86  80 - 100 fL Final    MCH 12/27/2023 28.3  26.0 - 34.0 pg Final    MCHC 12/27/2023 32.7  32.0 - 36.0 g/dL Final    RDW 12/27/2023 14.6 (H)  11.5 - 14.5 % Final    Platelets 12/27/2023 238  150 - 450 x10*3/uL Final    Neutrophils % 12/27/2023 69.0  40.0 - 80.0 % Final    Immature Granulocytes %, Automated 12/27/2023 0.1  0.0 - 0.9 % Final    Immature Granulocyte Count (IG) includes promyelocytes, myelocytes and metamyelocytes but does not include bands. Percent differential counts (%) should be interpreted in the context of the absolute cell counts (cells/UL).    Lymphocytes % 12/27/2023 19.9   13.0 - 44.0 % Final    Monocytes % 12/27/2023 10.5  2.0 - 10.0 % Final    Eosinophils % 12/27/2023 0.1  0.0 - 6.0 % Final    Basophils % 12/27/2023 0.4  0.0 - 2.0 % Final    Neutrophils Absolute 12/27/2023 4.76  1.20 - 7.70 x10*3/uL Final    Percent differential counts (%) should be interpreted in the context of the absolute cell counts (cells/uL).    Immature Granulocytes Absolute, Au* 12/27/2023 0.01  0.00 - 0.70 x10*3/uL Final    Lymphocytes Absolute 12/27/2023 1.38  1.20 - 4.80 x10*3/uL Final    Monocytes Absolute 12/27/2023 0.73  0.10 - 1.00 x10*3/uL Final    Eosinophils Absolute 12/27/2023 0.01  0.00 - 0.70 x10*3/uL Final    Basophils Absolute 12/27/2023 0.03  0.00 - 0.10 x10*3/uL Final    Glucose 12/27/2023 117 (H)  74 - 99 mg/dL Final    Sodium 12/27/2023 129 (L)  136 - 145 mmol/L Final    Potassium 12/27/2023 3.1 (L)  3.5 - 5.3 mmol/L Final    MILD HEMOLYSIS DETECTED. The result may be falsely elevated due to hemolysis or other interferents. Clinical correlation is recommended. Repeat testing may be considered.    Chloride 12/27/2023 80 (L)  98 - 107 mmol/L Final    Bicarbonate 12/27/2023 36 (H)  21 - 32 mmol/L Final    Anion Gap 12/27/2023 16  10 - 20 mmol/L Final    Urea Nitrogen 12/27/2023 57 (H)  6 - 23 mg/dL Final    Creatinine 12/27/2023 2.07 (H)  0.50 - 1.05 mg/dL Final    eGFR 12/27/2023 27 (L)  >60 mL/min/1.73m*2 Final    Calculations of estimated GFR are performed using the 2021 CKD-EPI Study Refit equation without the race variable for the IDMS-Traceable creatinine methods.  https://jasn.asnjournals.org/content/early/2021/09/22/ASN.7607066126    Calcium 12/27/2023 8.5 (L)  8.6 - 10.3 mg/dL Final    Albumin 12/27/2023 3.9  3.4 - 5.0 g/dL Final    Alkaline Phosphatase 12/27/2023 115 (H)  33 - 110 U/L Final    Total Protein 12/27/2023 6.8  6.4 - 8.2 g/dL Final    AST 12/27/2023 29  9 - 39 U/L Final    MILD HEMOLYSIS DETECTED. The result may be falsely elevated due to hemolysis or other  "interferents. Clinical correlation is recommended. Repeat testing may be considered.    Bilirubin, Total 12/27/2023 0.5  0.0 - 1.2 mg/dL Final    ALT 12/27/2023 16  7 - 45 U/L Final    Patients treated with Sulfasalazine may generate falsely decreased results for ALT.    Troponin I, High Sensitivity 12/27/2023 8  0 - 13 ng/L Final    Protime 12/27/2023 11.7  9.8 - 12.8 seconds Final    INR 12/27/2023 1.0  0.9 - 1.1 Final    aPTT 12/27/2023 31  27 - 38 seconds Final    Ventricular Rate 12/27/2023 65  BPM Preliminary    Atrial Rate 12/27/2023 65  BPM Preliminary    SD Interval 12/27/2023 184  ms Preliminary    QRS Duration 12/27/2023 98  ms Preliminary    QT Interval 12/27/2023 584  ms Preliminary    QTC Calculation(Bazett) 12/27/2023 607  ms Preliminary    P Axis 12/27/2023 49  degrees Preliminary    R Axis 12/27/2023 15  degrees Preliminary    T Axis 12/27/2023 11  degrees Preliminary    QRS Count 12/27/2023 11  beats Preliminary    Q Onset 12/27/2023 214  ms Preliminary    P Onset 12/27/2023 122  ms Preliminary    P Offset 12/27/2023 187  ms Preliminary    T Offset 12/27/2023 506  ms Preliminary    QTC Fredericia 12/27/2023 599  ms Preliminary    Thyroid Stimulating Hormone 12/27/2023 0.92  0.44 - 3.98 mIU/L Final    Magnesium 12/27/2023 2.50 (H)  1.60 - 2.40 mg/dL Final    MILD HEMOLYSIS DETECTED. The result may be falsely elevated due to hemolysis or other interferents. Clinical correlation is recommended. Repeat testing may be considered.    ABO TYPE 12/27/2023 O   Final    Rh TYPE 12/27/2023 POS   Final    ANTIBODY SCREEN 12/27/2023 NEG   Final    POCT Glucose 12/27/2023 129 (H)  74 - 99 mg/dL Final       No results found for: \"GLU\"     ECG 12 lead  Normal sinus rhythm  Minimal voltage criteria for LVH, may be normal variant ( R in aVL )  Possible Inferior infarct , age undetermined  Prolonged QT  Abnormal ECG  When compared with ECG of 15-MAY-2023 09:25,  Previous ECG has undetermined rhythm, needs " review  Borderline criteria for Inferior infarct are now Present  ST no longer elevated in Inferior leads  QT has lengthened        EKG:   Tele:     SIGNATURE: Mickey Mcqueen MD  DATE: December 28, 2023  TIME: 11:54 AM

## 2023-12-28 NOTE — PROGRESS NOTES
12/28/23 0658   [facility] Disability Status   Are you deaf or do you have serious difficulty hearing? N   Are you blind or do you have serious difficulty seeing, even when wearing glasses? N   Because of a physical, mental, or emotional condition, do you have serious difficulty concentrating, remembering, or making decisions? (5 years old or older) Y  (schizophrenia)   Do you have serious difficulty walking or climbing stairs? Y  (rollator, cane)   Do you have serious difficulty dressing or bathing? N   Because of a physical, mental, or emotional condition, do you have serious difficulty doing errands alone such as visiting the doctor? Y

## 2023-12-28 NOTE — PROGRESS NOTES
Home alone     12/28/23 0658   Current Planned Discharge Disposition   Current Planned Discharge Disposition Home

## 2023-12-29 ENCOUNTER — APPOINTMENT (OUTPATIENT)
Dept: CARDIOLOGY | Facility: HOSPITAL | Age: 58
DRG: 683 | End: 2023-12-29
Payer: MEDICARE

## 2023-12-29 LAB
ALBUMIN SERPL BCP-MCNC: 3.9 G/DL (ref 3.4–5)
ALP SERPL-CCNC: 121 U/L (ref 33–110)
ALT SERPL W P-5'-P-CCNC: 4 U/L (ref 7–45)
ANION GAP SERPL CALC-SCNC: 15 MMOL/L (ref 10–20)
AST SERPL W P-5'-P-CCNC: 22 U/L (ref 9–39)
BILIRUB SERPL-MCNC: 0.5 MG/DL (ref 0–1.2)
BUN SERPL-MCNC: 22 MG/DL (ref 6–23)
CALCIUM SERPL-MCNC: 9 MG/DL (ref 8.6–10.3)
CHLORIDE SERPL-SCNC: 87 MMOL/L (ref 98–107)
CO2 SERPL-SCNC: 34 MMOL/L (ref 21–32)
CREAT SERPL-MCNC: 0.56 MG/DL (ref 0.5–1.05)
ERYTHROCYTE [DISTWIDTH] IN BLOOD BY AUTOMATED COUNT: 14.8 % (ref 11.5–14.5)
GFR SERPL CREATININE-BSD FRML MDRD: >90 ML/MIN/1.73M*2
GLUCOSE BLD MANUAL STRIP-MCNC: 131 MG/DL (ref 74–99)
GLUCOSE BLD MANUAL STRIP-MCNC: 147 MG/DL (ref 74–99)
GLUCOSE SERPL-MCNC: 114 MG/DL (ref 74–99)
HCT VFR BLD AUTO: 46 % (ref 36–46)
HGB BLD-MCNC: 14.5 G/DL (ref 12–16)
MCH RBC QN AUTO: 28.7 PG (ref 26–34)
MCHC RBC AUTO-ENTMCNC: 31.5 G/DL (ref 32–36)
MCV RBC AUTO: 91 FL (ref 80–100)
NRBC BLD-RTO: 0 /100 WBCS (ref 0–0)
PLATELET # BLD AUTO: 243 X10*3/UL (ref 150–450)
POTASSIUM SERPL-SCNC: 3.2 MMOL/L (ref 3.5–5.3)
PROT SERPL-MCNC: 6.8 G/DL (ref 6.4–8.2)
RBC # BLD AUTO: 5.05 X10*6/UL (ref 4–5.2)
SODIUM SERPL-SCNC: 133 MMOL/L (ref 136–145)
WBC # BLD AUTO: 6.8 X10*3/UL (ref 4.4–11.3)

## 2023-12-29 PROCEDURE — 94660 CPAP INITIATION&MGMT: CPT

## 2023-12-29 PROCEDURE — 2500000004 HC RX 250 GENERAL PHARMACY W/ HCPCS (ALT 636 FOR OP/ED): Performed by: INTERNAL MEDICINE

## 2023-12-29 PROCEDURE — 93005 ELECTROCARDIOGRAM TRACING: CPT

## 2023-12-29 PROCEDURE — 96372 THER/PROPH/DIAG INJ SC/IM: CPT | Performed by: INTERNAL MEDICINE

## 2023-12-29 PROCEDURE — 99223 1ST HOSP IP/OBS HIGH 75: CPT | Performed by: STUDENT IN AN ORGANIZED HEALTH CARE EDUCATION/TRAINING PROGRAM

## 2023-12-29 PROCEDURE — 80053 COMPREHEN METABOLIC PANEL: CPT | Performed by: INTERNAL MEDICINE

## 2023-12-29 PROCEDURE — 85027 COMPLETE CBC AUTOMATED: CPT | Performed by: INTERNAL MEDICINE

## 2023-12-29 PROCEDURE — 82947 ASSAY GLUCOSE BLOOD QUANT: CPT

## 2023-12-29 PROCEDURE — 36415 COLL VENOUS BLD VENIPUNCTURE: CPT | Performed by: INTERNAL MEDICINE

## 2023-12-29 PROCEDURE — 1100000001 HC PRIVATE ROOM DAILY

## 2023-12-29 PROCEDURE — 2500000001 HC RX 250 WO HCPCS SELF ADMINISTERED DRUGS (ALT 637 FOR MEDICARE OP): Performed by: INTERNAL MEDICINE

## 2023-12-29 RX ORDER — POTASSIUM CHLORIDE 20 MEQ/1
40 TABLET, EXTENDED RELEASE ORAL ONCE
Status: COMPLETED | OUTPATIENT
Start: 2023-12-29 | End: 2023-12-29

## 2023-12-29 RX ADMIN — ASPIRIN 81 MG: 81 TABLET, COATED ORAL at 09:06

## 2023-12-29 RX ADMIN — LEVOTHYROXINE SODIUM 100 MCG: 100 TABLET ORAL at 06:16

## 2023-12-29 RX ADMIN — POTASSIUM CHLORIDE 40 MEQ: 1500 TABLET, EXTENDED RELEASE ORAL at 18:20

## 2023-12-29 RX ADMIN — SERTRALINE HYDROCHLORIDE 150 MG: 50 TABLET ORAL at 09:06

## 2023-12-29 RX ADMIN — MORPHINE SULFATE 4 MG: 4 INJECTION, SOLUTION INTRAMUSCULAR; INTRAVENOUS at 21:47

## 2023-12-29 RX ADMIN — MORPHINE SULFATE 4 MG: 4 INJECTION, SOLUTION INTRAMUSCULAR; INTRAVENOUS at 15:53

## 2023-12-29 RX ADMIN — HEPARIN SODIUM 5000 UNITS: 5000 INJECTION INTRAVENOUS; SUBCUTANEOUS at 06:16

## 2023-12-29 RX ADMIN — HEPARIN SODIUM 5000 UNITS: 5000 INJECTION INTRAVENOUS; SUBCUTANEOUS at 13:16

## 2023-12-29 RX ADMIN — CARVEDILOL 25 MG: 25 TABLET, FILM COATED ORAL at 20:14

## 2023-12-29 RX ADMIN — ATORVASTATIN CALCIUM 20 MG: 20 TABLET, FILM COATED ORAL at 20:14

## 2023-12-29 RX ADMIN — POTASSIUM CHLORIDE 40 MEQ: 1500 TABLET, EXTENDED RELEASE ORAL at 13:16

## 2023-12-29 RX ADMIN — MORPHINE SULFATE 4 MG: 4 INJECTION, SOLUTION INTRAMUSCULAR; INTRAVENOUS at 08:15

## 2023-12-29 RX ADMIN — CARBIDOPA AND LEVODOPA 1 TABLET: 25; 100 TABLET ORAL at 09:06

## 2023-12-29 RX ADMIN — CARBIDOPA AND LEVODOPA 1 TABLET: 25; 100 TABLET ORAL at 20:14

## 2023-12-29 RX ADMIN — HEPARIN SODIUM 5000 UNITS: 5000 INJECTION INTRAVENOUS; SUBCUTANEOUS at 20:14

## 2023-12-29 RX ADMIN — ACETAMINOPHEN 650 MG: 325 TABLET ORAL at 09:06

## 2023-12-29 RX ADMIN — CARVEDILOL 25 MG: 25 TABLET, FILM COATED ORAL at 09:06

## 2023-12-29 RX ADMIN — AMITRIPTYLINE HYDROCHLORIDE 10 MG: 10 TABLET, FILM COATED ORAL at 20:14

## 2023-12-29 RX ADMIN — CARBIDOPA AND LEVODOPA 1 TABLET: 25; 100 TABLET ORAL at 14:51

## 2023-12-29 ASSESSMENT — COGNITIVE AND FUNCTIONAL STATUS - GENERAL
DRESSING REGULAR UPPER BODY CLOTHING: TOTAL
STANDING UP FROM CHAIR USING ARMS: TOTAL
MOBILITY SCORE: 9
EATING MEALS: TOTAL
DRESSING REGULAR LOWER BODY CLOTHING: A LOT
PERSONAL GROOMING: TOTAL
MOVING FROM LYING ON BACK TO SITTING ON SIDE OF FLAT BED WITH BEDRAILS: A LOT
WALKING IN HOSPITAL ROOM: TOTAL
TURNING FROM BACK TO SIDE WHILE IN FLAT BAD: A LOT
TOILETING: TOTAL
HELP NEEDED FOR BATHING: A LOT
DAILY ACTIVITIY SCORE: 8
MOVING TO AND FROM BED TO CHAIR: A LOT
CLIMB 3 TO 5 STEPS WITH RAILING: TOTAL

## 2023-12-29 ASSESSMENT — PAIN SCALES - GENERAL
PAINLEVEL_OUTOF10: 7
PAINLEVEL_OUTOF10: 8
PAINLEVEL_OUTOF10: 8

## 2023-12-29 ASSESSMENT — VISUAL ACUITY: VA_NORMAL: 1

## 2023-12-29 ASSESSMENT — PAIN - FUNCTIONAL ASSESSMENT: PAIN_FUNCTIONAL_ASSESSMENT: 0-10

## 2023-12-29 NOTE — PROGRESS NOTES
Occupational Therapy                 Therapy Communication Note    Patient Name: Prema Hair  MRN: [id number]  Today's Date: 12/29/2023     Discipline: Occupational Therapy    Missed Visit Reason: Missed Visit Reason: Patient refused (OT chart reviewed, spoke with RN and with DO of Nephrology who was leaving pts room, both stating pt is in increasing pain and most likely will refuse. Pt did refuse OT eval at this time, requesting to come by tomorrow, despite much encouragement.)    Missed Time: Attempt    Comment:

## 2023-12-29 NOTE — CONSULTS
Reason For Consult  Acute kidney injury, hypokalemia    History Of Present Illness  Prema Hair is a 58 y.o. female with a past medical history of HFpEF, moderate pulmonary hypertension and tricuspid regurg, obstructive sleep apnea on CPAP, diabetes type 2, hypertension, TIA, torticollis, thyroid cancer status post resection complicated by vocal cord injury, pseudoaneurysm of the internal carotid artery who presents with altered mental status with concern for strokelike symptoms.  CT head and CT imaging was negative for acute pathology.  There was no evidence of large vessel occlusion or cervical stenosis.  A right sided stent was noted.  Blood pressures were not low albeit she was found to be suffering an acute kidney injury with a creatinine of 2 mg as a deciliter up from a normal baseline on 9/14. Her sodium was 129, potassium of 3.  Her Bumex 2 mg twice daily, losartan and meloxicam were held.  She was placed on IV volume expansion. Nephrology is consulted for renal care.    More alert/lucid.   She does not offer specific complaints.     Physical Exam   General:  Patient is awake, alert, and oriented.  Patient is in no acute distress.  HEENT:  Normocephalic.  Moist mucosa.    Neck:  No thyromegaly. No Jugular Venous Pressure elevation.  Cardiovascular:  Regular rate and rhythm.  Normal S1 and S2. + LARY.  Pulmonary:  Clear to auscultation bilaterally.  Abdomen:  Soft. Non-tender.   Non-distended.  Positive bowel sounds.  Lower Extremities:  trace to 1+ LE edema.  Neurologic:  Cranial nerves intact.  Lower limb weakness noted  Skin: Skin warm and dry, normal skin turgor.   Psychiatric: Normal affect.       I&O 24HR    Intake/Output Summary (Last 24 hours) at 12/29/2023 1553  Last data filed at 12/29/2023 1500  Gross per 24 hour   Intake 767.92 ml   Output 1800 ml   Net -1032.08 ml         Vitals 24HR  Heart Rate:  [56-65]   Temp:  [35.7 °C (96.2 °F)-36.9 °C (98.4 °F)]   Resp:  [18-21]   BP: (129-143)/(84-96)    SpO2:  [97 %-100 %]     Scheduled Medications  amitriptyline, 10 mg, oral, Nightly  aspirin, 81 mg, oral, Daily  atorvastatin, 20 mg, oral, Nightly  carbidopa-levodopa, 1 tablet, oral, TID  carvedilol, 25 mg, oral, BID  heparin (porcine), 5,000 Units, subcutaneous, q8h KATHERINE  insulin lispro, 0-5 Units, subcutaneous, TID with meals  levothyroxine, 100 mcg, oral, Once per day on Mon Tue Wed Thu Fri  [START ON 12/30/2023] levothyroxine, 200 mcg, oral, Once per day on Sun Sat  polyethylene glycol, 17 g, oral, Daily  potassium chloride CR, 40 mEq, oral, Once  sertraline, 150 mg, oral, Daily      Continuous medications         PRN medications: acetaminophen, ALPRAZolam, dextromethorphan-guaifenesin, dextrose 10 % in water (D10W), dextrose, diphenhydrAMINE, glucagon, ipratropium-albuteroL, melatonin, morphine, ondansetron, oxygen     Relevant Results  Results from last 7 days   Lab Units 12/29/23  0731 12/28/23  1428 12/27/23  1822   WBC AUTO x10*3/uL 6.8 8.4 6.9   HEMOGLOBIN g/dL 14.5 14.7 12.7   HEMATOCRIT % 46.0 45.6 38.8   PLATELETS AUTO x10*3/uL 243 284 238   NEUTROS PCT AUTO %  --   --  69.0   LYMPHS PCT AUTO %  --   --  19.9   MONOS PCT AUTO %  --   --  10.5   EOS PCT AUTO %  --   --  0.1        Results from last 7 days   Lab Units 12/29/23  0731 12/28/23  1427 12/27/23  1822   SODIUM mmol/L 133* 134* 129*   POTASSIUM mmol/L 3.2* 2.6* 3.1*   CHLORIDE mmol/L 87* 86* 80*   CO2 mmol/L 34* 33* 36*   BUN mg/dL 22 32* 57*   CREATININE mg/dL 0.56 0.73 2.07*   CALCIUM mg/dL 9.0 9.3 8.5*   PROTEIN TOTAL g/dL 6.8 7.5 6.8   BILIRUBIN TOTAL mg/dL 0.5 0.5 0.5   ALK PHOS U/L 121* 139* 115*   ALT U/L 4* 23 16   AST U/L 22 22 29   GLUCOSE mg/dL 114* 139* 117*        CT cervical spine wo IV contrast   Final Result   No acute cervical spine fracture is noted.        There is again evidence of reversal of the normal lordotic curvature   of the cervical spine. There is again evidence of grade 1   anterolisthesis of C3 on C4 and C4 on  C5 similar when compared with   04/20/2023.        On the coronal images, the patient's neck is tilted to the left.        There is again evidence of bony ankylosis of the left facets at the   C4/5 and C3/4 levels.        There is again evidence of multilevel cervical spondylosis with the   most pronounced posterior osteophytic spurring noted at the C5/6 and   C6/7 levels. Anterior osteophytic spurring is again noted most   pronounced at the C4/5, C5/6, and C6/7 levels. Degenerative   uncovertebral joint changes and degenerative facet changes   contributing to bony encroachment upon the left neural foramen at the   C3/4 and C4/5 levels and right neural foramen at the C6/7 level.        There is again evidence of metallic artifact from a stent and   endovascular coil mass within the suprahyoid right neck along the   expected course of the right internal carotid artery.        There is again evidence of absence of the right lobe of the thyroid   gland which may be related to previous surgical resection.             I personally reviewed the images/study and I agree with the findings   as stated by Dr. Paul Burks. This study was interpreted at   [facility], [place], [place].        Signed by: Joseph Mejia 12/29/2023 3:22 PM   Dictation workstation:   [id number]      CT angio brain attack head w IV contrast and post procedure   Final Result   1. No large vessel occlusion is identified in the proximal   intracranial circulation. No significant stenosis is present in the   visualized large arteries of the neck, although the distal most   extracranial segment of the right internal carotid artery is not well   visualized due to presence of the adjacent embolization coil. A   patent vascular stent is present in the mid course of the right   extracranial internal carotid artery.   2. Multilevel degenerative changes of the cervical spine, with likely   at least mild-to-moderate spinal  canal narrowing present at several   levels due to combination of disc osteophyte complex,   spondylolisthesis and ligamentum flavum thickening.        MACRO:   None        Signed by: Artur Nogueira 12/27/2023 6:46 PM   Dictation workstation:   [id number]      CT angio brain attack neck w IV contrast and post procedure   Final Result   1. No large vessel occlusion is identified in the proximal   intracranial circulation. No significant stenosis is present in the   visualized large arteries of the neck, although the distal most   extracranial segment of the right internal carotid artery is not well   visualized due to presence of the adjacent embolization coil. A   patent vascular stent is present in the mid course of the right   extracranial internal carotid artery.   2. Multilevel degenerative changes of the cervical spine, with likely   at least mild-to-moderate spinal canal narrowing present at several   levels due to combination of disc osteophyte complex,   spondylolisthesis and ligamentum flavum thickening.        MACRO:   None        Signed by: Artur Nogueira 12/27/2023 6:46 PM   Dictation workstation:   [id number]      CT brain attack head wo IV contrast   Final Result   1.  No evidence of hemorrhage, CT apparent transcortical infarct, or   other acute intracranial abnormality.   2. Subtle attenuation changes present in the periventricular and   subcortical white matter of bilateral cerebral hemispheres are   nonspecific, but favored to represent early changes of microvascular   disease.        MACRO:   Artur Nogueira discussed the significance and urgency of this   critical finding by epic chat with  LYNNETTE BECERRA on 12/27/2023   at 6:09 pm.  (**-RCF-**) Findings:  See findings.             Signed by: Artur Nogueira 12/27/2023 6:25 PM   Dictation workstation:   [id number]            Assessment/Plan   Prema KARLY Hair is a 58 y.o. female with a past medical history of  HFpEF, moderate pulmonary hypertension and tricuspid regurg, obstructive sleep apnea on CPAP, diabetes type 2, hypertension, TIA, torticollis, thyroid cancer status post resection complicated by vocal cord injury, pseudoaneurysm of the internal carotid artery who presents with altered mental status with concern for strokelike symptoms.  CT head and CT imaging was negative for acute pathology.  There was no evidence of large vessel occlusion or cervical stenosis.  A right sided stent was noted.  Blood pressures were not low albeit she was found to be suffering an acute kidney injury with a creatinine of 2 mg as a deciliter up from a normal baseline on 9/14.  Her sodium was 129, potassium of 3.  Her Bumex 2 mg twice daily, losartan and meloxicam were held.  She was placed on IV volume expansion.  Nephrology is consulted for renal care.     Despite not having low blood pressure Ms. Hair suffered hemodynamic acute kidney injury now improved with IV volume expansion and off the ARB/Bumex.  The hyponatremia was likely related to volume depletion which has reasonably corrected. She remains on IV lactated ringers but we must avoid overloading her. I have order additional potassium to be given today.  Nephrology will sign off. Please call with questions.     Principal Problem:    GASTON (acute kidney injury) (CMS/[facility])  Active Problems:    Hyponatremia        Bharathi Dyson, DO  Consults

## 2023-12-29 NOTE — CONSULTS
Subjective   History Of Present Illness  Prema Hair is a 58 y.o. female presenting with extensive past medical history as noted below came to the emergency department 12/27 with last known well 12/25 for symptoms concerning of stroke.  It is reported that she was in her chair for several days and has been generally feeling off.    On evaluation in the emergency room, NIH was 5 with left-sided deficits.  Patient endorses to me that she has had left-sided weakness in the past but unclear reason why.  She states she feels very weak currently and foggy but otherwise unable to give specifics.    Neurology visit records are reviewed, extensively documented 9/12/2023 by Dr Adorno -there he notes 2022 Bell's palsy diagnosis and baseline exam with left greater than right tremor, left laterocollis suggestive of drug-induced or tardive parkinsonism. She was noted to have the following symptoms: tremor at rest, pill-rolling hand tremor, loss of postural reflexes, frequent falls, postural instability, depression, anxiety and bradykinesia.     Recently filled medications are reviewed and include amitriptyline 10, atorvastatin 20, baclofen 20mg 3 times daily, Sinemet 25/100 TID, carvedilol 25, Klonopin 1 mg daily, gabapentin 300 every day, levothyroxine 100, losartan, oxcarbazepine 600 mg daily, oxybutynin 10, prazosin 5, quetiapine 350, sertraline 100, tizanidine 4 mg daily, topiramate 200 mg,    Review of systems as noted above, otherwise negative.    Past Medical History  Past Medical History:   Diagnosis Date    Cervicalgia 10/09/2020    Neck pain    Dysphonia 07/05/2022    Muscle tension dysphonia    Encounter for fitting and adjustment of other specified devices 02/16/2021    Fitting and adjustment of pessary    Hyperlipidemia, unspecified 01/03/2023    Dyslipidemia    Hypokalemia 01/20/2022    Hypokalemia    Low back pain, unspecified 02/22/2021    Low back pain    Obstructive sleep apnea (adult) (pediatric)  "01/03/2023    ZEINA on CPAP    Paralysis of vocal cords and larynx, 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    Laryngeal paresis    Type 2 diabetes mellitus with diabetic neuropathy, unspecified (CMS/[facility]) 10/09/2020    Controlled type 2 diabetes mellitus with neuropathy    Type 2 diabetes mellitus with other specified complication (CMS/[facility]) 01/03/2023    DM type 2 with diabetic mixed hyperlipidemia    Type 2 diabetes mellitus with other specified complication (CMS/[facility]) 09/14/2021    Diabetes mellitus type 2 in obese     Surgical History  Past Surgical History:   Procedure Laterality Date    OTHER SURGICAL HISTORY  10/09/2020    Hemithyroidectomy    US GUIDED THYROID BIOPSY  11/19/2020    US GUIDED THYROID BIOPSY 11/19/2020 [facility]    US GUIDED THYROID BIOPSY  11/19/2020    US GUIDED THYROID BIOPSY 11/19/2020 [facility]     Social History  Social History     Tobacco Use    Smoking status: Never    Smokeless tobacco: Never   Substance Use Topics    Alcohol use: Not Currently    Drug use: Never     Allergies  Reviewed in EMR       Objective   Last Recorded Vitals  Blood pressure 138/84, pulse 63, temperature 36.9 °C (98.4 °F), resp. rate 21, height 1.727 m (5' 8\"), weight 99.8 kg (220 lb), SpO2 99 %.    Scheduled medications  amitriptyline, 10 mg, oral, Nightly  aspirin, 81 mg, oral, Daily  atorvastatin, 20 mg, oral, Nightly  carbidopa-levodopa, 1 tablet, oral, TID  carvedilol, 25 mg, oral, BID  heparin (porcine), 5,000 Units, subcutaneous, q8h KATHERINE  insulin lispro, 0-5 Units, subcutaneous, TID with meals  levothyroxine, 100 mcg, oral, Once per day on Mon Tue Wed Thu Fri  [START ON 12/30/2023] levothyroxine, 200 mcg, oral, Once per day on Sun Sat  polyethylene glycol, 17 g, oral, Daily  sertraline, 150 mg, oral, Daily      Continuous medications     PRN medications  PRN medications: acetaminophen, ALPRAZolam, dextromethorphan-guaifenesin, dextrose 10 % in water (D10W), dextrose, diphenhydrAMINE, glucagon, ipratropium-albuteroL, " melatonin, morphine, ondansetron, oxygen         Exam   Neurological Exam  Mental Status   Oriented only to person, place and time. Recalls 0 of 3 objects immediately. Speech is normal. Language is fluent with no aphasia.  Patient with impaired attention and limited history, cannot elaborate on situation.    Cranial Nerves  CN II: Visual acuity is normal. Visual fields full to confrontation.  CN III, IV, VI: Extraocular movements intact bilaterally. Pupils equal round and reactive to light bilaterally.  CN V: Facial sensation is normal.  CN VII:  Left: There is peripheral facial weakness.  CN VIII: Hearing is normal.  CN IX, X: Palate elevates symmetrically. Normal gag reflex.  CN XI:  Left: Sternocleidomastoid strength is weak. Increased tone, consistent with laterocollis .  CN XII: Tongue midline without atrophy or fasciculations.    Motor  Normal muscle bulk throughout.  There is some asymmetry with left proximal weakness when compared to right.  Cooperation is limited by both mentation and pain.  Not out of proportion to weakness  Rest tremor noted L>R UE.    Sensory  Difficult to adequately assess, patient states light touch is similar bilaterally arm drop test is positive..    Reflexes                                            Right                      Left  Brachioradialis                    1+                         1+  Biceps                                 1+                         1+  Patellar                                1+                         1+    Coordination  Right: Finger-to-nose normal.Left: Finger-to-nose normal.    Physical Exam  Eyes:      Extraocular Movements: Extraocular movements intact.      Pupils: Pupils are equal, round, and reactive to light.   Neurological:      Deep Tendon Reflexes:      Reflex Scores:       Bicep reflexes are 1+ on the right side and 1+ on the left side.       Brachioradialis reflexes are 1+ on the right side and 1+ on the left side.       Patellar reflexes  are 1+ on the right side and 1+ on the left side.  Psychiatric:         Speech: Speech normal.         Relevant Results    Lab Results  Results from last 72 hours   Lab Units 12/29/23  0731 12/28/23  1427 12/27/23  1822   GLUCOSE mg/dL 114* 139* 117*   SODIUM mmol/L 133* 134* 129*   POTASSIUM mmol/L 3.2* 2.6* 3.1*   CHLORIDE mmol/L 87* 86* 80*   CO2 mmol/L 34* 33* 36*   ANION GAP mmol/L 15 18 16   BUN mg/dL 22 32* 57*   CREATININE mg/dL 0.56 0.73 2.07*   EGFR mL/min/1.73m*2 >90 >90 27*   CALCIUM mg/dL 9.0 9.3 8.5*   ALBUMIN g/dL 3.9 4.4 3.9   MAGNESIUM mg/dL  --  2.80* 2.50*      Results from last 72 hours   Lab Units 12/29/23  0731 12/28/23  1428   WBC AUTO x10*3/uL 6.8 8.4   NRBC AUTO /100 WBCs 0.0 0.0   RBC AUTO x10*6/uL 5.05 5.20   HEMOGLOBIN g/dL 14.5 14.7   HEMATOCRIT % 46.0 45.6   MCV fL 91 88   MCH pg 28.7 28.3   MCHC g/dL 31.5* 32.2   RDW % 14.8* 14.6*   PLATELETS AUTO x10*3/uL 243 284        CT angio brain attack head w IV contrast and post procedure   Final Result   1. No large vessel occlusion is identified in the proximal   intracranial circulation. No significant stenosis is present in the   visualized large arteries of the neck, although the distal most   extracranial segment of the right internal carotid artery is not well   visualized due to presence of the adjacent embolization coil. A   patent vascular stent is present in the mid course of the right   extracranial internal carotid artery.   2. Multilevel degenerative changes of the cervical spine, with likely   at least mild-to-moderate spinal canal narrowing present at several   levels due to combination of disc osteophyte complex,   spondylolisthesis and ligamentum flavum thickening.        MACRO:   None        Signed by: Artur Nogueira 12/27/2023 6:46 PM   Dictation workstation:   [id number]      CT angio brain attack neck w IV contrast and post procedure   Final Result   1. No large vessel occlusion is identified in the proximal    intracranial circulation. No significant stenosis is present in the   visualized large arteries of the neck, although the distal most   extracranial segment of the right internal carotid artery is not well   visualized due to presence of the adjacent embolization coil. A   patent vascular stent is present in the mid course of the right   extracranial internal carotid artery.   2. Multilevel degenerative changes of the cervical spine, with likely   at least mild-to-moderate spinal canal narrowing present at several   levels due to combination of disc osteophyte complex,   spondylolisthesis and ligamentum flavum thickening.        MACRO:   None        Signed by: Artur Nogueira 12/27/2023 6:46 PM   Dictation workstation:   [id number]      CT brain attack head wo IV contrast   Final Result   1.  No evidence of hemorrhage, CT apparent transcortical infarct, or   other acute intracranial abnormality.   2. Subtle attenuation changes present in the periventricular and   subcortical white matter of bilateral cerebral hemispheres are   nonspecific, but favored to represent early changes of microvascular   disease.        MACRO:   Artur Nogueira discussed the significance and urgency of this   critical finding by epic chat with  LYNNETTE BECERRA on 12/27/2023   at 6:09 pm.  (**-RCF-**) Findings:  See findings.             Signed by: Artur Nogueira 12/27/2023 6:25 PM   Dictation workstation:   [id number]      CT cervical spine wo IV contrast    (Results Pending)         Assessment/Plan     #Generalized weakness, multifactorial   #Toxic metabolic encephalopathy  Pt reports prior left sided weakness though details are questionable. On my review, CT of the head with some chronic changes consistent with CT and MRI.  Chronic C spine degenerative changes noted 4/2022 as well. Multiple laboratory abnormalities including GASTON, hyponatremia, hypokalemia, and elevated bicarb suggest to dehydration/poor nutrition  superimposed on polypharmacy as primary etiology. Psychogenic overlay or recrudescence phenomenon cannot be excluded    Defer MRI for now  if patient fails to improve with optimization of polypharmacy, can reconsider imaging and call back if positive.   Carbidopa levodopa 25/100 prescribed by neurology as outpatient at  8 AM, 12 PM, and 4 PM for symptomatic management of tremors. Would reduce to half dose for now while admitted and patient remains confused.   Continue ASA 81 given microvascular disease  Target normotension  Optimize stroke risk factors including LDL, DM  Continue hydration, nutrition, PT/OT eval per primary  Recommend Delirium/Dementia Precautions:       - Minimize use of benzos, opiates, anticholinergics, as these may worsen mental status       - Would use caution with narcotic pain medications       - Would still adequately controlling pain, as uncontrolled pain is also a risk factor for delirium       - Reinforce sleep hygiene; encourage patient to stay awake during the day       - Keep curtains/blinds open during the day and closed at night.       - Would recommend reorienting/redirecting patient as much as possible,        - Aim for consistent staffing, familiar objects, avoiding bright lights and loud noises, etc.       - Can consider melatonin at night before bed.    Please call with concern or questions         I spent 80 minutes in the professional and overall care of this patient.      Piotr Fraire MD

## 2023-12-29 NOTE — PROGRESS NOTES
Physical Therapy                 Therapy Communication Note    Patient Name: Prema Hair  MRN: [id number]  Today's Date: 12/29/2023     Discipline: Physical Therapy    Missed Visit Reason: Missed Visit Reason: Patient refused (Recieved PT consult, performed chart review. Notified by OT that patient already politely declined due to increased pain, attempted at later time, pt continues to declines reporting continued pain and not feeling well. Requesting reattempt at later date.)

## 2023-12-29 NOTE — CONSULTS
Reason For Consult  Acute kidney injury, hypokalemia    History Of Present Illness  Prema Hair is a 58 y.o. female with a past medical history of HFpEF, moderate pulmonary hypertension and tricuspid regurg, obstructive sleep apnea on CPAP, diabetes type 2, hypertension, TIA, torticollis, thyroid cancer status post resection complicated by vocal cord injury, pseudoaneurysm of the internal carotid artery who presents with altered mental status with concern for strokelike symptoms.  CT head and CT imaging was negative for acute pathology.  There was no evidence of large vessel occlusion or cervical stenosis.  A right sided stent was noted.  Blood pressures were not low albeit she was found to be suffering an acute kidney injury with a creatinine of 2 mg as a deciliter up from a normal baseline on 9/14.  Her sodium was 129, potassium of 3.  Her Bumex 2 mg twice daily, losartan and meloxicam were held.  She was placed on IV volume expansion.  Nephrology is consulted for renal care.     Past Medical History:   Diagnosis Date    Cervicalgia 10/09/2020    Neck pain    Dysphonia 07/05/2022    Muscle tension dysphonia    Encounter for fitting and adjustment of other specified devices 02/16/2021    Fitting and adjustment of pessary    Hyperlipidemia, unspecified 01/03/2023    Dyslipidemia    Hypokalemia 01/20/2022    Hypokalemia    Low back pain, unspecified 02/22/2021    Low back pain    Obstructive sleep apnea (adult) (pediatric) 01/03/2023    ZEINA on CPAP    Paralysis of vocal cords and larynx, 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    Laryngeal paresis    Type 2 diabetes mellitus with diabetic neuropathy, unspecified (CMS/[facility]) 10/09/2020    Controlled type 2 diabetes mellitus with neuropathy    Type 2 diabetes mellitus with other specified complication (CMS/[facility]) 01/03/2023    DM type 2 with diabetic mixed hyperlipidemia    Type 2 diabetes mellitus with other specified complication (CMS/[facility]) 09/14/2021    Diabetes mellitus type 2 in  "obese       Past Surgical History:   Procedure Laterality Date    OTHER SURGICAL HISTORY  10/09/2020    Hemithyroidectomy    US GUIDED THYROID BIOPSY  11/19/2020    US GUIDED THYROID BIOPSY 11/19/2020 [facility]    US GUIDED THYROID BIOPSY  11/19/2020    US GUIDED THYROID BIOPSY 11/19/2020 [facility]       Social History     Tobacco Use    Smoking status: Never    Smokeless tobacco: Never   Substance Use Topics    Alcohol use: Not Currently    Drug use: Never        Family History  Family History   Problem Relation Name Age of Onset    Other (cardiac disorder) Mother      Heart failure Mother      Heart failure Father          Allergies  Lisinopril    Review of Systems   Review of systems obtained and negative unless stated in HPI  Physical Exam   General:  Patient is awake, alert, and oriented.  Patient is in no acute distress.  HEENT:  Normocephalic.  Moist mucosa.    Neck:  No thyromegaly. No Jugular Venous Pressure elevation.  Cardiovascular:  Regular rate and rhythm.  Normal S1 and S2. + LARY.  Pulmonary:  Clear to auscultation bilaterally.  Abdomen:  Soft. Non-tender.   Non-distended.  Positive bowel sounds.  Lower Extremities:  trace to 1+ LE edema.  Neurologic:  Cranial nerves intact.  Lower limb weakness noted  Skin: Skin warm and dry, normal skin turgor.   Psychiatric: Normal affect.       I&O 24HR    Intake/Output Summary (Last 24 hours) at 12/28/2023 2031  Last data filed at 12/28/2023 1600  Gross per 24 hour   Intake --   Output 400 ml   Net -400 ml       Vitals 24HR  Heart Rate:  [62-81]   Temp:  [36.2 °C (97.1 °F)-36.3 °C (97.4 °F)]   Resp:  [14-18]   BP: (126-140)/(63-98)   Height:  [172.7 cm (5' 8\")]   Weight:  [99.8 kg (220 lb)]   SpO2:  [96 %-99 %]     Scheduled Medications  amitriptyline, 10 mg, oral, Nightly  aspirin, 81 mg, oral, Daily  atorvastatin, 20 mg, oral, Nightly  carbidopa-levodopa, 1 tablet, oral, TID  carvedilol, 25 mg, oral, BID  heparin (porcine), 5,000 Units, subcutaneous, " q8h [facility]  insulin lispro, 0-5 Units, subcutaneous, TID with meals  [START ON 12/29/2023] levothyroxine, 100 mcg, oral, Once per day on Mon Tue Wed Thu Fri  [START ON 12/30/2023] levothyroxine, 200 mcg, oral, Once per day on Sun Sat  polyethylene glycol, 17 g, oral, Daily  sertraline, 150 mg, oral, Daily      Continuous medications  sodium chloride 0.9%, 75 mL/hr, Last Rate: 75 mL/hr (12/28/23 1323)        PRN medications: acetaminophen, ALPRAZolam, dextromethorphan-guaifenesin, dextrose 10 % in water (D10W), dextrose, diphenhydrAMINE, glucagon, ipratropium-albuteroL, melatonin, morphine, ondansetron, oxygen     Relevant Results  Results from last 7 days   Lab Units 12/28/23  1428 12/27/23  1822   WBC AUTO x10*3/uL 8.4 6.9   HEMOGLOBIN g/dL 14.7 12.7   HEMATOCRIT % 45.6 38.8   PLATELETS AUTO x10*3/uL 284 238   NEUTROS PCT AUTO %  --  69.0   LYMPHS PCT AUTO %  --  19.9   MONOS PCT AUTO %  --  10.5   EOS PCT AUTO %  --  0.1      Results from last 7 days   Lab Units 12/28/23  1427 12/27/23  1822   SODIUM mmol/L 134* 129*   POTASSIUM mmol/L 2.6* 3.1*   CHLORIDE mmol/L 86* 80*   CO2 mmol/L 33* 36*   BUN mg/dL 32* 57*   CREATININE mg/dL 0.73 2.07*   CALCIUM mg/dL 9.3 8.5*   PROTEIN TOTAL g/dL 7.5 6.8   BILIRUBIN TOTAL mg/dL 0.5 0.5   ALK PHOS U/L 139* 115*   ALT U/L 23 16   AST U/L 22 29   GLUCOSE mg/dL 139* 117*      CT angio brain attack head w IV contrast and post procedure   Final Result   1. No large vessel occlusion is identified in the proximal   intracranial circulation. No significant stenosis is present in the   visualized large arteries of the neck, although the distal most   extracranial segment of the right internal carotid artery is not well   visualized due to presence of the adjacent embolization coil. A   patent vascular stent is present in the mid course of the right   extracranial internal carotid artery.   2. Multilevel degenerative changes of the cervical spine, with likely   at least mild-to-moderate  spinal canal narrowing present at several   levels due to combination of disc osteophyte complex,   spondylolisthesis and ligamentum flavum thickening.        MACRO:   None        Signed by: Artur Nogueira 12/27/2023 6:46 PM   Dictation workstation:   [id number]      CT angio brain attack neck w IV contrast and post procedure   Final Result   1. No large vessel occlusion is identified in the proximal   intracranial circulation. No significant stenosis is present in the   visualized large arteries of the neck, although the distal most   extracranial segment of the right internal carotid artery is not well   visualized due to presence of the adjacent embolization coil. A   patent vascular stent is present in the mid course of the right   extracranial internal carotid artery.   2. Multilevel degenerative changes of the cervical spine, with likely   at least mild-to-moderate spinal canal narrowing present at several   levels due to combination of disc osteophyte complex,   spondylolisthesis and ligamentum flavum thickening.        MACRO:   None        Signed by: Artur Nogueira 12/27/2023 6:46 PM   Dictation workstation:   [id number]      CT brain attack head wo IV contrast   Final Result   1.  No evidence of hemorrhage, CT apparent transcortical infarct, or   other acute intracranial abnormality.   2. Subtle attenuation changes present in the periventricular and   subcortical white matter of bilateral cerebral hemispheres are   nonspecific, but favored to represent early changes of microvascular   disease.        MACRO:   Artur Nogueira discussed the significance and urgency of this   critical finding by epic chat with  LYNNETTE BECERRA on 12/27/2023   at 6:09 pm.  (**-RCF-**) Findings:  See findings.             Signed by: Artur Nogueira 12/27/2023 6:25 PM   Dictation workstation:   [id number]      CT cervical spine wo IV contrast    (Results Pending)         Assessment/Plan   Prema E  Laxmi is a 58 y.o. female with a past medical history of HFpEF, moderate pulmonary hypertension and tricuspid regurg, obstructive sleep apnea on CPAP, diabetes type 2, hypertension, TIA, torticollis, thyroid cancer status post resection complicated by vocal cord injury, pseudoaneurysm of the internal carotid artery who presents with altered mental status with concern for strokelike symptoms.  CT head and CT imaging was negative for acute pathology.  There was no evidence of large vessel occlusion or cervical stenosis.  A right sided stent was noted.  Blood pressures were not low albeit she was found to be suffering an acute kidney injury with a creatinine of 2 mg as a deciliter up from a normal baseline on 9/14.  Her sodium was 129, potassium of 3.  Her Bumex 2 mg twice daily, losartan and meloxicam were held.  She was placed on IV volume expansion.  Nephrology is consulted for renal care.     Despite not having low blood pressure Ms. Hair is suffering hemodynamic acute kidney injury now improved off the ARB/Bumex.  The hyponatremia is likely related to volume depletion which is recently correcting with volume expansion.  We want to avoid overloading her therefore I will adjust her IV fluid to lactated ringer to run at a slower rate overnight. Potassium replacement is ordered. She had progressive decline in her physical and mental status.  Neurology will see her.  Trend RFP.  Nephrology will follow.    Principal Problem:    GASTON (acute kidney injury) (CMS/[facility])  Active Problems:    Hyponatremia      I spent 50 minutes in the professional and overall care of this patient.      KAILA Shennpatient consult to Nephrology  Consult performed by: Bharathi Dyson DO  Consult ordered by: Mickey Mcqueen MD

## 2023-12-29 NOTE — PROGRESS NOTES
Prema Hair is a 58 y.o. female on day 1 of admission presenting with GASTON (acute kidney injury) (CMS/[facility]).      Subjective   HPI: This is a 58 y.o. female who came to the emergency room from home for stuttering speech and confusion.  She was initially evaluated for rule out CVA.  Patient has had chronic left Bell's palsy which was confounding the presentation.  She also admits to generalized weakness.  She was found to be in acute renal failure.  And hence has been admitted for evaluation and management of all the above        Objective     Last Recorded Vitals  /86   Pulse 59   Temp 36.2 °C (97.1 °F)   Resp 20   Wt 99.8 kg (220 lb)   SpO2 97%   Intake/Output last 3 Shifts:    Intake/Output Summary (Last 24 hours) at 12/29/2023 0811  Last data filed at 12/29/2023 0027  Gross per 24 hour   Intake 767.92 ml   Output 400 ml   Net 367.92 ml       Admission Weight  Weight: 104 kg (229 lb 4.5 oz) (12/27/23 1747)    Daily Weight  12/28/23 : 99.8 kg (220 lb)    Image Results  ECG 12 lead  Normal sinus rhythm  Minimal voltage criteria for LVH, may be normal variant ( R in aVL )  Possible Inferior infarct , age undetermined  Prolonged QT  Abnormal ECG  When compared with ECG of 15-MAY-2023 09:25,  Previous ECG has undetermined rhythm, needs review  Borderline criteria for Inferior infarct are now Present  ST no longer elevated in Inferior leads  QT has lengthened      Physical Exam  GENERAL: awake, alert, Ox3, cooperative resting comfortably  SKIN: Skin turgor normal. No rashes  HEENT: no epistaxis, Moist mucosa.  NECK: supple  BACK: spine nontender to palpation, No CVAT.  LUNGS: Vesicular breath sounds, with no wheeze, no crepitations.   CARDIAC: REGULAR. S1 and S2; no rubs, no murmur  ABDOMEN: Abdomen soft, non-tender. BS+  EXTREMITIES: No edema, Good capillary refill.   NEURO: [facility] GOOD. Motor and sensory exam wnl.  Stuttering speech   MUSCULOSKELETAL: No acute inflammation     Relevant Results  Results  for orders placed or performed during the hospital encounter of 12/27/23 (from the past 24 hour(s))   Comprehensive Metabolic Panel   Result Value Ref Range    Glucose 139 (H) 74 - 99 mg/dL    Sodium 134 (L) 136 - 145 mmol/L    Potassium 2.6 (LL) 3.5 - 5.3 mmol/L    Chloride 86 (L) 98 - 107 mmol/L    Bicarbonate 33 (H) 21 - 32 mmol/L    Anion Gap 18 10 - 20 mmol/L    Urea Nitrogen 32 (H) 6 - 23 mg/dL    Creatinine 0.73 0.50 - 1.05 mg/dL    eGFR >90 >60 mL/min/1.73m*2    Calcium 9.3 8.6 - 10.3 mg/dL    Albumin 4.4 3.4 - 5.0 g/dL    Alkaline Phosphatase 139 (H) 33 - 110 U/L    Total Protein 7.5 6.4 - 8.2 g/dL    AST 22 9 - 39 U/L    Bilirubin, Total 0.5 0.0 - 1.2 mg/dL    ALT 23 7 - 45 U/L   Magnesium   Result Value Ref Range    Magnesium 2.80 (H) 1.60 - 2.40 mg/dL   CBC   Result Value Ref Range    WBC 8.4 4.4 - 11.3 x10*3/uL    nRBC 0.0 0.0 - 0.0 /100 WBCs    RBC 5.20 4.00 - 5.20 x10*6/uL    Hemoglobin 14.7 12.0 - 16.0 g/dL    Hematocrit 45.6 36.0 - 46.0 %    MCV 88 80 - 100 fL    MCH 28.3 26.0 - 34.0 pg    MCHC 32.2 32.0 - 36.0 g/dL    RDW 14.6 (H) 11.5 - 14.5 %    Platelets 284 150 - 450 x10*3/uL   POCT GLUCOSE   Result Value Ref Range    POCT Glucose 164 (H) 74 - 99 mg/dL   POCT GLUCOSE   Result Value Ref Range    POCT Glucose 140 (H) 74 - 99 mg/dL   CBC   Result Value Ref Range    WBC 6.8 4.4 - 11.3 x10*3/uL    nRBC 0.0 0.0 - 0.0 /100 WBCs    RBC 5.05 4.00 - 5.20 x10*6/uL    Hemoglobin 14.5 12.0 - 16.0 g/dL    Hematocrit 46.0 36.0 - 46.0 %    MCV 91 80 - 100 fL    MCH 28.7 26.0 - 34.0 pg    MCHC 31.5 (L) 32.0 - 36.0 g/dL    RDW 14.8 (H) 11.5 - 14.5 %    Platelets 243 150 - 450 x10*3/uL   Comprehensive Metabolic Panel   Result Value Ref Range    Glucose 114 (H) 74 - 99 mg/dL    Sodium 133 (L) 136 - 145 mmol/L    Potassium 3.2 (L) 3.5 - 5.3 mmol/L    Chloride 87 (L) 98 - 107 mmol/L    Bicarbonate 34 (H) 21 - 32 mmol/L    Anion Gap 15 10 - 20 mmol/L    Urea Nitrogen 22 6 - 23 mg/dL    Creatinine 0.56 0.50 - 1.05  mg/dL    eGFR >90 >60 mL/min/1.73m*2    Calcium 9.0 8.6 - 10.3 mg/dL    Albumin 3.9 3.4 - 5.0 g/dL    Alkaline Phosphatase 121 (H) 33 - 110 U/L    Total Protein 6.8 6.4 - 8.2 g/dL    AST 22 9 - 39 U/L    Bilirubin, Total 0.5 0.0 - 1.2 mg/dL    ALT 4 (L) 7 - 45 U/L        Assessment/Plan      # Stuttering speech/generalized weakness/confusion  -Patient initially was evaluated for rule out stroke  -Will consult neurology  #GASTON (acute kidney injury) (CMS/[facility])  -Improving well with IV fluids  -Will consult nephrology  # Hyponatremia  -As above  # Hypokalemia will replace  # History of Bell's Belsey  # History of torticollis requiring Botox injections  -Last injection in October 2023  # Diabetes mellitus   - insulin sliding scale  # Hypertension  -Continue home meds  # History of fibromyalgia  # History of schizophrenia      12/29: Patient feels better today, discussed with daughter at bedside, will replace potassium.  Will await neuro input- speech has improved - will start planning discharge accordingly as renal functions have improved.         Mickey Mcqueen MD

## 2023-12-30 ENCOUNTER — APPOINTMENT (OUTPATIENT)
Dept: RADIOLOGY | Facility: HOSPITAL | Age: 58
DRG: 683 | End: 2023-12-30
Payer: MEDICARE

## 2023-12-30 ENCOUNTER — APPOINTMENT (OUTPATIENT)
Dept: CARDIOLOGY | Facility: HOSPITAL | Age: 58
DRG: 683 | End: 2023-12-30
Payer: MEDICARE

## 2023-12-30 LAB
ALBUMIN SERPL BCP-MCNC: 3.8 G/DL (ref 3.4–5)
ALP SERPL-CCNC: 120 U/L (ref 33–110)
ALT SERPL W P-5'-P-CCNC: 5 U/L (ref 7–45)
ANION GAP SERPL CALC-SCNC: 13 MMOL/L (ref 10–20)
AST SERPL W P-5'-P-CCNC: 18 U/L (ref 9–39)
ATRIAL RATE: 65 BPM
BILIRUB SERPL-MCNC: 0.5 MG/DL (ref 0–1.2)
BUN SERPL-MCNC: 14 MG/DL (ref 6–23)
CALCIUM SERPL-MCNC: 9.2 MG/DL (ref 8.6–10.3)
CHLORIDE SERPL-SCNC: 92 MMOL/L (ref 98–107)
CO2 SERPL-SCNC: 34 MMOL/L (ref 21–32)
CREAT SERPL-MCNC: 0.47 MG/DL (ref 0.5–1.05)
ERYTHROCYTE [DISTWIDTH] IN BLOOD BY AUTOMATED COUNT: 14.6 % (ref 11.5–14.5)
GFR SERPL CREATININE-BSD FRML MDRD: >90 ML/MIN/1.73M*2
GLUCOSE BLD MANUAL STRIP-MCNC: 116 MG/DL (ref 74–99)
GLUCOSE BLD MANUAL STRIP-MCNC: 144 MG/DL (ref 74–99)
GLUCOSE BLD MANUAL STRIP-MCNC: 146 MG/DL (ref 74–99)
GLUCOSE BLD MANUAL STRIP-MCNC: 166 MG/DL (ref 74–99)
GLUCOSE SERPL-MCNC: 122 MG/DL (ref 74–99)
HCT VFR BLD AUTO: 45 % (ref 36–46)
HGB BLD-MCNC: 14.1 G/DL (ref 12–16)
MCH RBC QN AUTO: 28 PG (ref 26–34)
MCHC RBC AUTO-ENTMCNC: 31.3 G/DL (ref 32–36)
MCV RBC AUTO: 90 FL (ref 80–100)
NRBC BLD-RTO: 0 /100 WBCS (ref 0–0)
P AXIS: 49 DEGREES
P OFFSET: 187 MS
P ONSET: 122 MS
PLATELET # BLD AUTO: 241 X10*3/UL (ref 150–450)
POTASSIUM SERPL-SCNC: 3.6 MMOL/L (ref 3.5–5.3)
PR INTERVAL: 184 MS
PROT SERPL-MCNC: 6.5 G/DL (ref 6.4–8.2)
Q ONSET: 214 MS
QRS COUNT: 11 BEATS
QRS DURATION: 98 MS
QT INTERVAL: 584 MS
QTC CALCULATION(BAZETT): 607 MS
QTC FREDERICIA: 599 MS
R AXIS: 15 DEGREES
RBC # BLD AUTO: 5.03 X10*6/UL (ref 4–5.2)
SODIUM SERPL-SCNC: 135 MMOL/L (ref 136–145)
T AXIS: 11 DEGREES
T OFFSET: 506 MS
VENTRICULAR RATE: 65 BPM
WBC # BLD AUTO: 6.1 X10*3/UL (ref 4.4–11.3)

## 2023-12-30 PROCEDURE — 2500000001 HC RX 250 WO HCPCS SELF ADMINISTERED DRUGS (ALT 637 FOR MEDICARE OP): Performed by: INTERNAL MEDICINE

## 2023-12-30 PROCEDURE — 2500000002 HC RX 250 W HCPCS SELF ADMINISTERED DRUGS (ALT 637 FOR MEDICARE OP, ALT 636 FOR OP/ED): Performed by: INTERNAL MEDICINE

## 2023-12-30 PROCEDURE — 97165 OT EVAL LOW COMPLEX 30 MIN: CPT | Mod: GO

## 2023-12-30 PROCEDURE — 2500000004 HC RX 250 GENERAL PHARMACY W/ HCPCS (ALT 636 FOR OP/ED): Performed by: INTERNAL MEDICINE

## 2023-12-30 PROCEDURE — 1100000001 HC PRIVATE ROOM DAILY

## 2023-12-30 PROCEDURE — 96372 THER/PROPH/DIAG INJ SC/IM: CPT | Performed by: INTERNAL MEDICINE

## 2023-12-30 PROCEDURE — 93010 ELECTROCARDIOGRAM REPORT: CPT | Performed by: INTERNAL MEDICINE

## 2023-12-30 PROCEDURE — 2500000004 HC RX 250 GENERAL PHARMACY W/ HCPCS (ALT 636 FOR OP/ED): Performed by: STUDENT IN AN ORGANIZED HEALTH CARE EDUCATION/TRAINING PROGRAM

## 2023-12-30 PROCEDURE — 82947 ASSAY GLUCOSE BLOOD QUANT: CPT

## 2023-12-30 PROCEDURE — 97161 PT EVAL LOW COMPLEX 20 MIN: CPT | Mod: GP

## 2023-12-30 PROCEDURE — 70450 CT HEAD/BRAIN W/O DYE: CPT | Performed by: STUDENT IN AN ORGANIZED HEALTH CARE EDUCATION/TRAINING PROGRAM

## 2023-12-30 PROCEDURE — 2500000001 HC RX 250 WO HCPCS SELF ADMINISTERED DRUGS (ALT 637 FOR MEDICARE OP): Performed by: STUDENT IN AN ORGANIZED HEALTH CARE EDUCATION/TRAINING PROGRAM

## 2023-12-30 PROCEDURE — 36415 COLL VENOUS BLD VENIPUNCTURE: CPT | Performed by: INTERNAL MEDICINE

## 2023-12-30 PROCEDURE — 97535 SELF CARE MNGMENT TRAINING: CPT | Mod: GO

## 2023-12-30 PROCEDURE — 99222 1ST HOSP IP/OBS MODERATE 55: CPT | Performed by: INTERNAL MEDICINE

## 2023-12-30 PROCEDURE — 93005 ELECTROCARDIOGRAM TRACING: CPT

## 2023-12-30 PROCEDURE — 70450 CT HEAD/BRAIN W/O DYE: CPT

## 2023-12-30 PROCEDURE — 85027 COMPLETE CBC AUTOMATED: CPT | Performed by: INTERNAL MEDICINE

## 2023-12-30 PROCEDURE — 80053 COMPREHEN METABOLIC PANEL: CPT | Performed by: INTERNAL MEDICINE

## 2023-12-30 RX ORDER — OXCARBAZEPINE 300 MG/1
600 TABLET, FILM COATED ORAL NIGHTLY
Status: DISCONTINUED | OUTPATIENT
Start: 2023-12-30 | End: 2023-12-31

## 2023-12-30 RX ORDER — CLONAZEPAM 1 MG/1
1 TABLET ORAL 2 TIMES DAILY
Status: DISCONTINUED | OUTPATIENT
Start: 2023-12-30 | End: 2023-12-31

## 2023-12-30 RX ORDER — LORAZEPAM 2 MG/ML
2 INJECTION INTRAMUSCULAR ONCE
Status: COMPLETED | OUTPATIENT
Start: 2023-12-30 | End: 2023-12-30

## 2023-12-30 RX ORDER — OXCARBAZEPINE 300 MG/1
600 TABLET, FILM COATED ORAL NIGHTLY
Status: DISCONTINUED | OUTPATIENT
Start: 2023-12-30 | End: 2023-12-30

## 2023-12-30 RX ORDER — BACLOFEN 10 MG/1
20 TABLET ORAL ONCE
Status: COMPLETED | OUTPATIENT
Start: 2023-12-30 | End: 2023-12-30

## 2023-12-30 RX ORDER — BACLOFEN 10 MG/1
10 TABLET ORAL 3 TIMES DAILY
Status: DISCONTINUED | OUTPATIENT
Start: 2023-12-31 | End: 2023-12-31

## 2023-12-30 RX ORDER — QUETIAPINE FUMARATE 100 MG/1
300 TABLET, FILM COATED ORAL NIGHTLY
Status: DISCONTINUED | OUTPATIENT
Start: 2023-12-30 | End: 2024-01-01 | Stop reason: HOSPADM

## 2023-12-30 RX ADMIN — INSULIN LISPRO 1 UNITS: 100 INJECTION, SOLUTION INTRAVENOUS; SUBCUTANEOUS at 13:09

## 2023-12-30 RX ADMIN — CARBIDOPA AND LEVODOPA 1 TABLET: 25; 100 TABLET ORAL at 10:04

## 2023-12-30 RX ADMIN — SERTRALINE HYDROCHLORIDE 150 MG: 50 TABLET ORAL at 10:04

## 2023-12-30 RX ADMIN — CARBIDOPA AND LEVODOPA 1 TABLET: 25; 100 TABLET ORAL at 21:49

## 2023-12-30 RX ADMIN — HEPARIN SODIUM 5000 UNITS: 5000 INJECTION INTRAVENOUS; SUBCUTANEOUS at 21:48

## 2023-12-30 RX ADMIN — LEVOTHYROXINE SODIUM 200 MCG: 100 TABLET ORAL at 05:36

## 2023-12-30 RX ADMIN — MORPHINE SULFATE 4 MG: 4 INJECTION, SOLUTION INTRAMUSCULAR; INTRAVENOUS at 23:33

## 2023-12-30 RX ADMIN — CARBIDOPA AND LEVODOPA 1 TABLET: 25; 100 TABLET ORAL at 18:37

## 2023-12-30 RX ADMIN — AMITRIPTYLINE HYDROCHLORIDE 10 MG: 10 TABLET, FILM COATED ORAL at 21:49

## 2023-12-30 RX ADMIN — HEPARIN SODIUM 5000 UNITS: 5000 INJECTION INTRAVENOUS; SUBCUTANEOUS at 05:36

## 2023-12-30 RX ADMIN — LORAZEPAM 2 MG: 2 INJECTION INTRAMUSCULAR; INTRAVENOUS at 18:37

## 2023-12-30 RX ADMIN — HEPARIN SODIUM 5000 UNITS: 5000 INJECTION INTRAVENOUS; SUBCUTANEOUS at 13:09

## 2023-12-30 RX ADMIN — QUETIAPINE FUMARATE 300 MG: 100 TABLET ORAL at 23:26

## 2023-12-30 RX ADMIN — ATORVASTATIN CALCIUM 20 MG: 20 TABLET, FILM COATED ORAL at 21:49

## 2023-12-30 RX ADMIN — BACLOFEN 20 MG: 10 TABLET ORAL at 18:37

## 2023-12-30 RX ADMIN — CARVEDILOL 25 MG: 25 TABLET, FILM COATED ORAL at 10:04

## 2023-12-30 RX ADMIN — ASPIRIN 81 MG: 81 TABLET, COATED ORAL at 10:04

## 2023-12-30 RX ADMIN — OXCARBAZEPINE 600 MG: 300 TABLET, FILM COATED ORAL at 21:48

## 2023-12-30 ASSESSMENT — COGNITIVE AND FUNCTIONAL STATUS - GENERAL
MOVING TO AND FROM BED TO CHAIR: A LOT
DRESSING REGULAR LOWER BODY CLOTHING: A LOT
PERSONAL GROOMING: A LITTLE
DRESSING REGULAR UPPER BODY CLOTHING: A LOT
TOILETING: A LOT
CLIMB 3 TO 5 STEPS WITH RAILING: TOTAL
TURNING FROM BACK TO SIDE WHILE IN FLAT BAD: A LOT
EATING MEALS: A LITTLE
MOBILITY SCORE: 11
MOVING FROM LYING ON BACK TO SITTING ON SIDE OF FLAT BED WITH BEDRAILS: A LOT
CLIMB 3 TO 5 STEPS WITH RAILING: TOTAL
DRESSING REGULAR UPPER BODY CLOTHING: A LOT
MOVING TO AND FROM BED TO CHAIR: A LOT
STANDING UP FROM CHAIR USING ARMS: A LOT
HELP NEEDED FOR BATHING: A LOT
DRESSING REGULAR LOWER BODY CLOTHING: A LOT
STANDING UP FROM CHAIR USING ARMS: A LOT
TURNING FROM BACK TO SIDE WHILE IN FLAT BAD: A LOT
EATING MEALS: A LITTLE
PERSONAL GROOMING: A LITTLE
TOILETING: A LOT
WALKING IN HOSPITAL ROOM: A LOT
MOVING FROM LYING ON BACK TO SITTING ON SIDE OF FLAT BED WITH BEDRAILS: A LOT
WALKING IN HOSPITAL ROOM: A LOT
HELP NEEDED FOR BATHING: A LOT
DAILY ACTIVITIY SCORE: 14
DAILY ACTIVITIY SCORE: 14
MOBILITY SCORE: 11

## 2023-12-30 ASSESSMENT — PAIN SCALES - WONG BAKER: WONGBAKER_NUMERICALRESPONSE: HURTS EVEN MORE

## 2023-12-30 ASSESSMENT — PAIN - FUNCTIONAL ASSESSMENT
PAIN_FUNCTIONAL_ASSESSMENT: 0-10
PAIN_FUNCTIONAL_ASSESSMENT: 0-10
PAIN_FUNCTIONAL_ASSESSMENT: WONG-BAKER FACES

## 2023-12-30 ASSESSMENT — PAIN SCALES - GENERAL
PAINLEVEL_OUTOF10: 7
PAINLEVEL_OUTOF10: 9

## 2023-12-30 ASSESSMENT — PAIN DESCRIPTION - ORIENTATION: ORIENTATION: LEFT

## 2023-12-30 ASSESSMENT — ACTIVITIES OF DAILY LIVING (ADL)
HOME_MANAGEMENT_TIME_ENTRY: 10
ADL_ASSISTANCE: NEEDS ASSISTANCE
BATHING_ASSISTANCE: MAXIMAL
ADL_ASSISTANCE: NEEDS ASSISTANCE

## 2023-12-30 ASSESSMENT — PAIN DESCRIPTION - DESCRIPTORS: DESCRIPTORS: OTHER (COMMENT)

## 2023-12-30 ASSESSMENT — PAIN DESCRIPTION - LOCATION: LOCATION: FACE

## 2023-12-30 NOTE — CONSULTS
"Inpatient consult to Cardiology  Consult performed by: Sharla Pryor, APRN-CNP  Consult ordered by: Mickey Mcqueen MD  Reason for consult: bradycardia      History Of Present Illness:    Prema Hair is a 58 y.o. 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  Cardiology consulted for bradycardia overnight to the 30's.    States she is really unsure why she came to the hospital.  She is not even sure if she is in the hospital, and prefers to confirm with her daughter.  States she is currently \"hurting all over with fibromyalgia.\"   No exacerbating or relieving factors.  Patient denies chest pain and angina.  Pt denies shortness of breath, dyspnea on exertion, orthopnea, and paroxysmal nocturnal dyspnea.  Pt denies worsening lower extremity edema.  Pt denies palpitations or syncope.  No recent falls.  No fever or chills.  No cough.  No change in bowel or bladder habits.  No sick contacts.  No recent travel.     Current cardiac medication including metolazone 2.5mg daily, bumetanide 2 mg tablet twice daily, aspirin 81 mg, atorvastatin 20 mg, " carvedilol 25 mg twice daily, losartan 50 mg daily.     Follows with Dr. VAL Madison, last visit 10/19/2023    Past Cardiology Tests (Last 3 Years):  EKG:  Results for orders placed during the hospital encounter of 12/27/23    ECG 12 lead (Preliminary)  This result has not been signed. Information might be incomplete.    Narrative  Normal sinus rhythm  Minimal voltage criteria for LVH, may be normal variant ( R in aVL )  Possible Inferior infarct , age undetermined  Prolonged QT  Abnormal ECG  When compared with ECG of 15-MAY-2023 09:25,  Previous ECG has undetermined rhythm, needs review  Borderline criteria for Inferior infarct are now Present  ST no longer elevated in Inferior leads  QT has lengthened    Echo:  Results for orders placed in visit on 10/05/23    Transthoracic Echo (TTE) Complete    Narrative  [facility], [address] [place] [address]  Tel [phone] and Fax [phone]    TRANSTHORACIC ECHOCARDIOGRAM REPORT      Patient Name:      MANJU ISAAC     Reading Physician:    [id number] Madison MD  Study Date:        10/5/2023            Ordering Provider:    Karlene MAYFIELD    CONCLUSIONS:  1. Left ventricular systolic function is normal with a 55-60% estimated ejection fraction.  2. Spectral Doppler shows an impaired relaxation pattern of left ventricular diastolic filling.  3. Moderate tricuspid regurgitation visualized.  4. Moderately elevated pulmonary artery pressure.    Cath:  No results found for this or any previous visit.    Stress Test:  Results for orders placed in visit on 06/23/20    Nuclear Stress Test    Narrative  MRN: [id number]  Patient Name: MANJU ISAAC    Impression  1. Normal stress myocardial perfusion imaging in response to  pharmacologic stress.  2. Well-maintained left ventricular function.    Cardiac Imaging:  No results found for this or any previous visit.      Past Medical History:  She has a past medical history of Cervicalgia (10/09/2020),  Dysphonia (2022), Encounter for fitting and adjustment of other specified devices (2021), Hyperlipidemia, unspecified (2023), Hypokalemia (2022), Low back pain, unspecified (2021), Obstructive sleep apnea (adult) (pediatric) (2023), Paralysis of vocal cords and larynx, unspecified (10/07/2022), Type 2 diabetes mellitus with diabetic neuropathy, unspecified (CMS/HCC) (10/09/2020), Type 2 diabetes mellitus with other specified complication (CMS/[facility]) (2023), and Type 2 diabetes mellitus with other specified complication (CMS/[facility]) (2021).    Past Surgical History:  She has a past surgical history that includes Other surgical history (10/09/2020); US guided thyroid biopsy (2020); and US guided thyroid biopsy (2020).      Social History:  She reports that she has never smoked. She has never used smokeless tobacco. She reports that she does not currently use alcohol. She reports that she does not use drugs.    Family History:  Family History   Problem Relation Name Age of Onset    Other (cardiac disorder) Mother      Heart failure Mother      Heart failure Father          Allergies:  Lisinopril    ROS:  10 point review of systems including (Constitutional, Eyes, ENMT, Respiratory, Cardiac, Gastrointestinal, Neurological, Psychiatric, and Hematologic) was performed and is otherwise negative.    Objective Data:  Last Recorded Vitals:  Vitals:    23 1945 23 0045 23 0247 23 0800   BP: 131/84 127/85  122/82   BP Location: Right arm Right arm  Right arm   Patient Position: Lying Lying  Lying   Pulse: 96 96 53 51   Resp: 18 18 18 16   Temp: 36.4 °C (97.5 °F) 36.7 °C (98.1 °F) 36.6 °C (97.9 °F) 36.8 °C (98.2 °F)   TempSrc: Oral Oral Temporal Oral   SpO2: 100% 100% 100% 99%   Weight:       Height:         Medical Gas Therapy: Supplemental oxygen  O2 Delivery Method: Nasal cannula  Weight  Av kg (224 lb 10.2 oz)  Min: 99.8 kg (220 lb)  Max: 104  kg (229 lb 4.5 oz)    LABS:  CMP:  Results from last 7 days   Lab Units 12/30/23  0811 12/29/23  0731 12/28/23  1427 12/27/23  1822   SODIUM mmol/L 135* 133* 134* 129*   POTASSIUM mmol/L 3.6 3.2* 2.6* 3.1*   CHLORIDE mmol/L 92* 87* 86* 80*   CO2 mmol/L 34* 34* 33* 36*   ANION GAP mmol/L 13 15 18 16   BUN mg/dL 14 22 32* 57*   CREATININE mg/dL 0.47* 0.56 0.73 2.07*   EGFR mL/min/1.73m*2 >90 >90 >90 27*   MAGNESIUM mg/dL  --   --  2.80* 2.50*   ALBUMIN g/dL 3.8 3.9 4.4 3.9   ALT U/L 5* 4* 23 16   AST U/L 18 22 22 29   BILIRUBIN TOTAL mg/dL 0.5 0.5 0.5 0.5     CBC:  Results from last 7 days   Lab Units 12/30/23  0811 12/29/23  0731 12/28/23  1428 12/27/23  1822   WBC AUTO x10*3/uL 6.1 6.8 8.4 6.9   HEMOGLOBIN g/dL 14.1 14.5 14.7 12.7   HEMATOCRIT % 45.0 46.0 45.6 38.8   PLATELETS AUTO x10*3/uL 241 243 284 238   MCV fL 90 91 88 86     COAG:   Results from last 7 days   Lab Units 12/27/23  1822   INR  1.0     ABO:   ABO TYPE   Date Value Ref Range Status   12/27/2023 O  Final     HEME/ENDO:  Results from last 7 days   Lab Units 12/27/23  1822   TSH mIU/L 0.92      CARDIAC:   Results from last 7 days   Lab Units 12/27/23  1822   TROPHS ng/L 8             Last I/O:    Intake/Output Summary (Last 24 hours) at 12/30/2023 0943  Last data filed at 12/30/2023 0605  Gross per 24 hour   Intake --   Output 1000 ml   Net -1000 ml     Net IO Since Admission: -1,632.08 mL [12/30/23 0943]      Imaging Results:  CT cervical spine wo IV contrast    Result Date: 12/29/2023  Interpreted By:  Joseph Mejia and Ohs Zachary STUDY: CT CERVICAL SPINE WO IV CONTRAST;  12/28/2023 3:19 pm   INDICATION: Signs/Symptoms:Torticollis, restricted ROM.   COMPARISON: CT of the cervical spine dated 04/20/2023   ACCESSION NUMBER(S): [id number]   ORDERING CLINICIAN: ANABELLA MIKE   TECHNIQUE: Thin cut axial CT images through the cervical spine were obtained and reconstructed in the coronal and sagittal planes.   FINDINGS: No acute cervical spine  fracture is noted.   There is again evidence of reversal of the normal lordotic curvature of the cervical spine. There is again evidence of grade 1 anterolisthesis of C3 on C4 and C4 on C5 similar when compared with 04/20/2023.   On the coronal images, the patient's neck is tilted to the left.   There is again evidence of bony ankylosis of the left facets at the C4/5 and C3/4 levels.   There is again evidence of multilevel cervical spondylosis with the most pronounced posterior osteophytic spurring noted at the C5/6 and C6/7 levels. Anterior osteophytic spurring is again noted most pronounced at the C4/5, C5/6, and C6/7 levels. Degenerative uncovertebral joint changes and degenerative facet changes contributing to bony encroachment upon the left neural foramen at the C3/4 and C4/5 levels and right neural foramen at the C6/7 level.   There is again evidence of metallic artifact from a stent and endovascular coil mass within the suprahyoid right neck along the expected course of the right internal carotid artery.   There is again evidence of absence of the right lobe of the thyroid gland which may be related to previous surgical resection.       No acute cervical spine fracture is noted.   There is again evidence of reversal of the normal lordotic curvature of the cervical spine. There is again evidence of grade 1 anterolisthesis of C3 on C4 and C4 on C5 similar when compared with 04/20/2023.   On the coronal images, the patient's neck is tilted to the left.   There is again evidence of bony ankylosis of the left facets at the C4/5 and C3/4 levels.   There is again evidence of multilevel cervical spondylosis with the most pronounced posterior osteophytic spurring noted at the C5/6 and C6/7 levels. Anterior osteophytic spurring is again noted most pronounced at the C4/5, C5/6, and C6/7 levels. Degenerative uncovertebral joint changes and degenerative facet changes contributing to bony encroachment upon the left neural  foramen at the C3/4 and C4/5 levels and right neural foramen at the C6/7 level.   There is again evidence of metallic artifact from a stent and endovascular coil mass within the suprahyoid right neck along the expected course of the right internal carotid artery.   There is again evidence of absence of the right lobe of the thyroid gland which may be related to previous surgical resection.     I personally reviewed the images/study and I agree with the findings as stated by Dr. Paul Burks. This study was interpreted at [place], [place].   Signed by: Joseph Mejia 12/29/2023 3:22 PM Dictation workstation:   [id number]    ECG 12 lead    Result Date: 12/28/2023  Normal sinus rhythm Minimal voltage criteria for LVH, may be normal variant ( R in aVL ) Possible Inferior infarct , age undetermined Prolonged QT Abnormal ECG When compared with ECG of 15-MAY-2023 09:25, Previous ECG has undetermined rhythm, needs review Borderline criteria for Inferior infarct are now Present ST no longer elevated in Inferior leads QT has lengthened    CT angio brain attack head w IV contrast and post procedure    Result Date: 12/27/2023  Interpreted By:  Artur Nogueira, STUDY: CT ANGIO BRAIN ATTACK NECK W IV CONTRAST AND POST PROCEDURE; CT ANGIO BRAIN ATTACK HEAD W IV CONTRAST AND POST PROCEDURE;  12/27/2023 6:14 pm   INDICATION: Signs/Symptoms:Stroke Evaluation with VAN Positive.   COMPARISON: Noncontrast CT of the head.   ACCESSION NUMBER(S): [id number]; [id number]   ORDERING CLINICIAN: LYNNETTE BECERRA   TECHNIQUE: After 75 mL of Omnipaque 350 was administered intravenously and axial images of the head and neck were acquired.  Coronal, sagittal, and 3-D reconstructions were provided for review.   FINDINGS: Please refer to separately dictated same day noncontrast CT of the head for further characterization of nonvascular intracranial findings.   CTA HEAD FINDINGS:   Exam is  somewhat degraded by beam hardening artifact from mass coil artifact in the right retropharyngeal soft tissues, which is located immediately adjacent to the distal extracranial right internal carotid artery.   Within limits of the study, the intracranial carotid arteries demonstrate symmetric opacification bilaterally, without evidence of focal vessel occlusion. Slight atherosclerotic changes are present in the cavernous segments of the ophthalmic segments on the left. Carotid terminals demonstrate symmetric opacification.   The anterior cerebral arteries demonstrate symmetric opacification bilaterally, without evidence of focal vessel occlusion.   Middle cerebral arteries are symmetric in appearance, without evidence of high-grade stenosis in the proximal M1 segments or focal vessel occlusion in the more distal cortical M2 and M3 branches. Intracranial vertebral arteries and basilar artery are somewhat hypoplastic in appearance, likely due to the posterior cerebral arteries being supplied predominantly by the posterior communicating arteries bilaterally. No focal occlusion is identified.   The posterior cerebral arteries demonstrate symmetric opacification without evidence of focal vessel occlusion bilaterally proximally.   No enhancing masses or vascular malformations are identified.   CTA NECK FINDINGS:   A three-vessel aortic arch is present, without significant atherosclerotic changes to the arch in origin of the great vessels.   Visualized common carotid arteries demonstrate symmetric opacification bilaterally, without evidence of stenosis or focal vessel occlusion.   The proximal extracranial internal carotid arteries demonstrate symmetric opacification bilaterally without evidence of significant stenosis or occlusion.   A patent vascular stent is present in the mid to distal right extracranial internal carotid artery, with the segment of the vessel not visualized due to beam hardening artifact from adjacent  coil. More distally the extracranial internal carotid artery near the skull base is patent without evidence of occlusion or stenosis.   Extracranial vertebral arteries originate from the subclavian arteries bilaterally are somewhat hypoplastic in appearance but do not demonstrate any evidence of occlusion.   Soft tissues of the face and skull base do not demonstrate any evidence of acute abnormality.   The left thyroid lobe is heterogeneous in appearance with several low-density nodules, incompletely characterized on current exam.   Multilevel degenerative changes are present in the cervical spine with likely at least mild-to-moderate spinal canal narrowing at the level of C4-C5 due to spondylolisthesis and disc osteophyte complex and C5-C6 due to disc osteophyte complex.   Multilevel neural foraminal stenosis is present due to endplate spurring and hypertrophic facet changes.   Included lung apices are clear. Small amount of mucus/secretions are present in the partially visualized trachea.       1. No large vessel occlusion is identified in the proximal intracranial circulation. No significant stenosis is present in the visualized large arteries of the neck, although the distal most extracranial segment of the right internal carotid artery is not well visualized due to presence of the adjacent embolization coil. A patent vascular stent is present in the mid course of the right extracranial internal carotid artery. 2. Multilevel degenerative changes of the cervical spine, with likely at least mild-to-moderate spinal canal narrowing present at several levels due to combination of disc osteophyte complex, spondylolisthesis and ligamentum flavum thickening.   MACRO: None   Signed by: Artur Nogueira 12/27/2023 6:46 PM Dictation workstation:   [id number]    CT angio brain attack neck w IV contrast and post procedure    Result Date: 12/27/2023  Interpreted By:  Artur Nogueira, STUDY: CT ANGIO BRAIN ATTACK  NECK W IV CONTRAST AND POST PROCEDURE; CT ANGIO BRAIN ATTACK HEAD W IV CONTRAST AND POST PROCEDURE;  12/27/2023 6:14 pm   INDICATION: Signs/Symptoms:Stroke Evaluation with VAN Positive.   COMPARISON: Noncontrast CT of the head.   ACCESSION NUMBER(S): [id number]; [id number]   ORDERING CLINICIAN: LYNNETTE BECERRA   TECHNIQUE: After 75 mL of Omnipaque 350 was administered intravenously and axial images of the head and neck were acquired.  Coronal, sagittal, and 3-D reconstructions were provided for review.   FINDINGS: Please refer to separately dictated same day noncontrast CT of the head for further characterization of nonvascular intracranial findings.   CTA HEAD FINDINGS:   Exam is somewhat degraded by beam hardening artifact from mass coil artifact in the right retropharyngeal soft tissues, which is located immediately adjacent to the distal extracranial right internal carotid artery.   Within limits of the study, the intracranial carotid arteries demonstrate symmetric opacification bilaterally, without evidence of focal vessel occlusion. Slight atherosclerotic changes are present in the cavernous segments of the ophthalmic segments on the left. Carotid terminals demonstrate symmetric opacification.   The anterior cerebral arteries demonstrate symmetric opacification bilaterally, without evidence of focal vessel occlusion.   Middle cerebral arteries are symmetric in appearance, without evidence of high-grade stenosis in the proximal M1 segments or focal vessel occlusion in the more distal cortical M2 and M3 branches. Intracranial vertebral arteries and basilar artery are somewhat hypoplastic in appearance, likely due to the posterior cerebral arteries being supplied predominantly by the posterior communicating arteries bilaterally. No focal occlusion is identified.   The posterior cerebral arteries demonstrate symmetric opacification without evidence of focal vessel occlusion bilaterally proximally.   No  enhancing masses or vascular malformations are identified.   CTA NECK FINDINGS:   A three-vessel aortic arch is present, without significant atherosclerotic changes to the arch in origin of the great vessels.   Visualized common carotid arteries demonstrate symmetric opacification bilaterally, without evidence of stenosis or focal vessel occlusion.   The proximal extracranial internal carotid arteries demonstrate symmetric opacification bilaterally without evidence of significant stenosis or occlusion.   A patent vascular stent is present in the mid to distal right extracranial internal carotid artery, with the segment of the vessel not visualized due to beam hardening artifact from adjacent coil. More distally the extracranial internal carotid artery near the skull base is patent without evidence of occlusion or stenosis.   Extracranial vertebral arteries originate from the subclavian arteries bilaterally are somewhat hypoplastic in appearance but do not demonstrate any evidence of occlusion.   Soft tissues of the face and skull base do not demonstrate any evidence of acute abnormality.   The left thyroid lobe is heterogeneous in appearance with several low-density nodules, incompletely characterized on current exam.   Multilevel degenerative changes are present in the cervical spine with likely at least mild-to-moderate spinal canal narrowing at the level of C4-C5 due to spondylolisthesis and disc osteophyte complex and C5-C6 due to disc osteophyte complex.   Multilevel neural foraminal stenosis is present due to endplate spurring and hypertrophic facet changes.   Included lung apices are clear. Small amount of mucus/secretions are present in the partially visualized trachea.       1. No large vessel occlusion is identified in the proximal intracranial circulation. No significant stenosis is present in the visualized large arteries of the neck, although the distal most extracranial segment of the right internal  carotid artery is not well visualized due to presence of the adjacent embolization coil. A patent vascular stent is present in the mid course of the right extracranial internal carotid artery. 2. Multilevel degenerative changes of the cervical spine, with likely at least mild-to-moderate spinal canal narrowing present at several levels due to combination of disc osteophyte complex, spondylolisthesis and ligamentum flavum thickening.   MACRO: None   Signed by: Artur Nogueira 12/27/2023 6:46 PM Dictation workstation:   [id number]    CT brain attack head wo IV contrast    Result Date: 12/27/2023  Interpreted By:  Artur Nogueira, STUDY: CT BRAIN ATTACK HEAD WO IV CONTRAST;  12/27/2023 6:04 pm   INDICATION: Signs/Symptoms:Stroke Evaluation.   COMPARISON: CT of the head dated 05/15/2023.   ACCESSION NUMBER(S): [id number]   ORDERING CLINICIAN: LYNNETTE BECERRA   TECHNIQUE: Noncontrast axial CT scan of head was performed. Angled reformats in brain and bone windows were generated. The images were reviewed in bone, brain, blood and soft tissue windows.   FINDINGS: No hyperdense intracranial hemorrhage is evident. There is no mass effect or midline shift.   Gray-white differentiation is intact, without evidence of CT apparent transcortical infarct. Subtle attenuation changes present in the periventricular and subcortical white matter of bilateral cerebral hemispheres are nonspecific, but favored to represent early sequela of microvascular disease.     No ventricular dilatation is present. Basal cisterns are patent. No extra-axial fluid collections are identified.   Scalp soft tissues do not demonstrate any acute abnormalities. Calvarium is unremarkable in appearance. Mastoid air cells and middle ear cavities are well aerated without evidence of fluid fluid levels.   Visualized paranasal sinuses are unremarkable in appearance.   Embolization coil artifact is again partially visualized in the right  prevertebral soft tissues at the level of C1.       1.  No evidence of hemorrhage, CT apparent transcortical infarct, or other acute intracranial abnormality. 2. Subtle attenuation changes present in the periventricular and subcortical white matter of bilateral cerebral hemispheres are nonspecific, but favored to represent early changes of microvascular disease.   MACRO: Artur Nogueira discussed the significance and urgency of this critical finding by epic chat with  LYNNETTE BECERRA on 12/27/2023 at 6:09 pm.  (**-RCF-**) Findings:  See findings.     Signed by: Artur Nogueira 12/27/2023 6:25 PM Dictation workstation:   [id number]      Inpatient Medications:  Scheduled medications   Medication Dose Route Frequency    amitriptyline  10 mg oral Nightly    aspirin  81 mg oral Daily    atorvastatin  20 mg oral Nightly    carbidopa-levodopa  1 tablet oral TID    carvedilol  25 mg oral BID    heparin (porcine)  5,000 Units subcutaneous q8h KATHERINE    insulin lispro  0-5 Units subcutaneous TID with meals    levothyroxine  100 mcg oral Once per day on Mon Tue Wed Thu Fri    levothyroxine  200 mcg oral Once per day on Sun Sat    polyethylene glycol  17 g oral Daily    sertraline  150 mg oral Daily     PRN medications   Medication    acetaminophen    ALPRAZolam    dextromethorphan-guaifenesin    dextrose 10 % in water (D10W)    dextrose    diphenhydrAMINE    glucagon    ipratropium-albuteroL    melatonin    morphine    ondansetron    oxygen     Continuous Medications   Medication Dose Last Rate       Outpatient Medications:  Current Outpatient Medications   Medication Instructions    acetaminophen (TYLENOL) 500 mg, oral, Every 4 hours PRN    albuterol 90 mcg/actuation inhaler 2 puffs, inhalation, Every 4 hours PRN    amitriptyline (ELAVIL) 10 mg, oral, Nightly    ammonium lactate (Lac-Hydrin) 12 % lotion 1 Application, Topical, 2 times daily    ascorbic acid (Vitamin C) 1,000 mg tablet 1 tablet, oral, Daily     aspirin 81 mg EC tablet 1 tablet, oral, Daily    atorvastatin (LIPITOR) 20 mg, oral, Nightly    azelastine (Astelin) 137 mcg (0.1 %) nasal spray 1 spray, Each Nostril, 2 times daily    baclofen (LIORESAL) 20 mg, oral, 3 times daily    blood sugar diagnostic (OneTouch Verio test strips) strip 1 strip, in vitro, Every Day    bumetanide (BUMEX) 2 mg, oral, 2 times daily    calcium carbonate (OSCAL) 1,250 mg, oral, 3 times daily    calcium carbonate-vitamin D3 500 mg-3.125 mcg (125 unit) tablet tablet 1 tablet, oral, Daily    carbidopa-levodopa (Sinemet)  mg tablet Take 1 tablet by mouth 3 times a day.    carvedilol (COREG) 25 mg, oral, 2 times daily    cephalexin (Keflex) 500 mg capsule Take 1 capsule (500 mg) by mouth once daily.    cholecalciferol (Vitamin D-3) 50 MCG (2000 UT) tablet 1 tablet, oral, Daily    clonazePAM (KLONOPIN) 1 mg, oral, Every morning, 1/2 TABLET BY MOUTH EVERY EVENING AND AN ADDITIONAL 1/2 AS NEEDED<BR>    clonazePAM (KLONOPIN) 2 mg, oral, Every evening    diclofenac sodium (Voltaren) 1 % gel gel 1 Application, Topical, 3 times daily PRN    estradiol (Estrace) 0.01 % (0.1 mg/gram) vaginal cream 1 Application, vaginal, 3 times weekly, Pea-sized amount    gabapentin (NEURONTIN) 900 mg, oral, 3 times daily    ipratropium-albuteroL (Duo-Neb) 0.5-2.5 mg/3 mL nebulizer solution 3 mL, inhalation, Every 4 hours PRN    Klor-Con M20 20 mEq ER tablet 2 tablets, oral, 2 times daily    lancets misc 1 each, miscellaneous, 3 times daily    levothyroxine (SYNTHROID, LEVOXYL) 100 mcg, oral, Once daily (morning) M-F (5 days a week), 1 tablet 5 days a week Mon-Friday and two tabs on Saturdays and Sundays     levothyroxine (SYNTHROID, LEVOXYL) 200 mcg, oral, 2 times weekly, Saturday and sunday    lidocaine (Lidoderm) 5 % patch 1 patch, transdermal, Every 12 hours, Apply 1 patch and leave in place for 12 hours, then remove and leave off for 12 hours.    losartan (COZAAR) 50 mg, oral, Daily    meloxicam  (Mobic) 15 mg tablet 1 tablet, oral, Daily PRN    metFORMIN (GLUCOPHAGE) 500 mg, oral, 2 times daily with meals    metOLazone (ZAROXOLYN) 2.5 mg, oral, Daily    minocycline 100 mg, oral, Nightly    omega-3 fatty acids-fish oil 360-1,200 mg capsule 1 capsule, oral, Daily    omeprazole (PRILOSEC) 40 mg, oral, Daily, With dinner<BR>    OXcarbazepine (Trileptal) 600 mg tablet 2 tablets, oral, Nightly    OXcarbazepine (TRILEPTAL) 600 mg, oral, Every morning    oxybutynin XL (DITROPAN-XL) 10 mg, oral, Daily    polyethylene glycol (GLYCOLAX, MIRALAX) 17 g, oral, Daily, In 8oz of water, juice, or tea and drink daily<BR>    prazosin (MINIPRESS) 5 mg, oral, Nightly    QUEtiapine (SEROQUEL) 300 mg, oral, Nightly    QUEtiapine (SEROQUEL) 100 mg, oral, Every morning    sertraline (ZOLOFT) 200 mg, oral, Every morning    tiZANidine (ZANAFLEX) 4 mg, oral, 3 times daily    topiramate (TOPAMAX) 200 mg, oral, 2 times daily    Trelegy Ellipta 200-62.5-25 mcg blister with device 1 puff, inhalation, Daily PRN       Physical Exam:    General:  Patient is awake, alert, and oriented to self only, confused to location and time.   Patient is in no acute distress.  HEENT:  Normocephalic.  Moist mucosa.    Neck:  No thyromegaly. No Jugular Venous Pressure elevation.  Cardiovascular:  Regular rate and rhythm.  Normal S1 and S2. + LARY.  Pulmonary:  Clear to auscultation bilaterally.  Abdomen:  Soft. Non-tender.   Non-distended.  Positive bowel sounds.  Lower Extremities:  trace to 1+ LE edema.  Neurologic:  Lower limb weakness noted  Skin: Skin warm and dry, normal skin turgor.   Psychiatric: Normal affect.     Assessment/Plan   Prema Hair is a 58 y.o. 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  Cardiology consulted for bradycardia overnight to the 30's     12/30 > CT head and CT imaging was negative for acute pathology.  There was no evidence of large vessel occlusion or cervical stenosis.  GASTON with creatinine 2.0 and back down to 0.47 after IVF.   Sodium was 129 and up to 135. She is normotensive.  She remains confused after correction of electrolytes.     Assessment:  # Asymptomatic bradycardia, with chronic bradycardia.   # Diastolic heart failure, dehydrated on admit.   # Hypertension  # ZEINA on CPAP  # GASTON, s/p fluid bolus with creatinine back to baseline  # Hyponatremia, corrected  # Hypokalemia, corrected  # Altered mental status - neurology following , thought to be due to GASTON, electrolyte abnormalities vs dehydration vs psych component  # Prolonged QT interval, improved, now at 489    Plan:  - Decrease carvedilol from 25mg BID to 12.5mg BID  - Holding home Bumex, plan to restart at 2mg oral BID  - Plan to restart home losartan 25mg BID as BP stabilizes   - Continue ASA / statin  - Do not recommend restarting metolazone 2.5mg daily up on discharge as this likely led to her overdiuresis on admit.   - Avoid any QT prolonging medications    Code Status:  Full Code    STAFF ADDENDUM:    Both the CAROLE and I have had a face to face encounter with the patient today. I have examined  the patient and edited the documented physical examination as necessary.  I personally reviewed the patient's vital signs, telemetry, recent labs, medications, orders, EKGs, and pertinent cardiac imaging/ echocardiography.  I have reviewed the CAROLE's encounter note, approve the CAROLE's documentation and have edited the note to reflect my diagnostic and therapeutic plan.      Admission concerning for overdiuresis due to recent addition of metolazone started 2 months ago.  Renal function and potassium have now resolved.  Incidental asymptomatic bradycardia which is not new and seen previously on multiple ECGs.  We will reduce carvedilol to 12.5 mg twice daily.  ECG showing prolonged QT but improving.    No further cardiac recommendations at this time  Signing off  Please contact us back if can be of further assistance.    Jaime Schaffer, DO

## 2023-12-30 NOTE — PROGRESS NOTES
Prema Hair is a 58 y.o. female on day 2 of admission presenting with GASTON (acute kidney injury) (CMS/[facility]).      Subjective   HPI: This is a 58 y.o. female who came to the emergency room from home for stuttering speech and confusion.  She was initially evaluated for rule out CVA.  Patient has had chronic left Bell's palsy which was confounding the presentation.  She also admits to generalized weakness.  She was found to be in acute renal failure.  And hence has been admitted for evaluation and management of all the above        Objective     Last Recorded Vitals  /83   Pulse (!) 48   Temp 36.8 °C (98.2 °F) (Oral)   Resp 16   Wt 99.8 kg (220 lb)   SpO2 100%   Intake/Output last 3 Shifts:    Intake/Output Summary (Last 24 hours) at 12/30/2023 1218  Last data filed at 12/30/2023 1157  Gross per 24 hour   Intake 120 ml   Output 1000 ml   Net -880 ml         Admission Weight  Weight: 104 kg (229 lb 4.5 oz) (12/27/23 1747)    Daily Weight  12/28/23 : 99.8 kg (220 lb)    Image Results  CT cervical spine wo IV contrast  Narrative: Interpreted By:  Joseph Mejia  and Vitaliy Miller   STUDY:  CT CERVICAL SPINE WO IV CONTRAST;  12/28/2023 3:19 pm      INDICATION:  Signs/Symptoms:Torticollis, restricted ROM.      COMPARISON:  CT of the cervical spine dated 04/20/2023      ACCESSION NUMBER(S):  [id number]      ORDERING CLINICIAN:  ANABELLA MIKE      TECHNIQUE:  Thin cut axial CT images through the cervical spine were obtained and  reconstructed in the coronal and sagittal planes.      FINDINGS:  No acute cervical spine fracture is noted.      There is again evidence of reversal of the normal lordotic curvature  of the cervical spine. There is again evidence of grade 1  anterolisthesis of C3 on C4 and C4 on C5 similar when compared with  04/20/2023.      On the coronal images, the patient's neck is tilted to the left.      There is again evidence of bony ankylosis of the left facets at the  C4/5 and C3/4  levels.      There is again evidence of multilevel cervical spondylosis with the  most pronounced posterior osteophytic spurring noted at the C5/6 and  C6/7 levels. Anterior osteophytic spurring is again noted most  pronounced at the C4/5, C5/6, and C6/7 levels. Degenerative  uncovertebral joint changes and degenerative facet changes  contributing to bony encroachment upon the left neural foramen at the  C3/4 and C4/5 levels and right neural foramen at the C6/7 level.      There is again evidence of metallic artifact from a stent and  endovascular coil mass within the suprahyoid right neck along the  expected course of the right internal carotid artery.      There is again evidence of absence of the right lobe of the thyroid  gland which may be related to previous surgical resection.      Impression: No acute cervical spine fracture is noted.      There is again evidence of reversal of the normal lordotic curvature  of the cervical spine. There is again evidence of grade 1  anterolisthesis of C3 on C4 and C4 on C5 similar when compared with  04/20/2023.      On the coronal images, the patient's neck is tilted to the left.      There is again evidence of bony ankylosis of the left facets at the  C4/5 and C3/4 levels.      There is again evidence of multilevel cervical spondylosis with the  most pronounced posterior osteophytic spurring noted at the C5/6 and  C6/7 levels. Anterior osteophytic spurring is again noted most  pronounced at the C4/5, C5/6, and C6/7 levels. Degenerative  uncovertebral joint changes and degenerative facet changes  contributing to bony encroachment upon the left neural foramen at the  C3/4 and C4/5 levels and right neural foramen at the C6/7 level.      There is again evidence of metallic artifact from a stent and  endovascular coil mass within the suprahyoid right neck along the  expected course of the right internal carotid artery.      There is again evidence of absence of the right lobe  of the thyroid  gland which may be related to previous surgical resection.          I personally reviewed the images/study and I agree with the findings  as stated by Dr. Paul Burks. This study was interpreted at  [facility], [place], [place].      Signed by: Joseph Mejia 12/29/2023 3:22 PM  Dictation workstation:   [id number]      Physical Exam  GENERAL: awake, alert, Ox3, cooperative resting comfortably  SKIN: Skin turgor normal. No rashes  HEENT: no epistaxis, Moist mucosa.  NECK: supple  BACK: spine nontender to palpation, No CVAT.  LUNGS: Vesicular breath sounds, with no wheeze, no crepitations.   CARDIAC: REGULAR. S1 and S2; no rubs, no murmur  ABDOMEN: Abdomen soft, non-tender. BS+  EXTREMITIES: No edema, Good capillary refill.   NEURO: [facility] GOOD. Motor and sensory exam wnl.  Stuttering speech   MUSCULOSKELETAL: No acute inflammation     Relevant Results  Results for orders placed or performed during the hospital encounter of 12/27/23 (from the past 24 hour(s))   POCT GLUCOSE   Result Value Ref Range    POCT Glucose 131 (H) 74 - 99 mg/dL   POCT GLUCOSE   Result Value Ref Range    POCT Glucose 147 (H) 74 - 99 mg/dL   POCT GLUCOSE   Result Value Ref Range    POCT Glucose 146 (H) 74 - 99 mg/dL   CBC   Result Value Ref Range    WBC 6.1 4.4 - 11.3 x10*3/uL    nRBC 0.0 0.0 - 0.0 /100 WBCs    RBC 5.03 4.00 - 5.20 x10*6/uL    Hemoglobin 14.1 12.0 - 16.0 g/dL    Hematocrit 45.0 36.0 - 46.0 %    MCV 90 80 - 100 fL    MCH 28.0 26.0 - 34.0 pg    MCHC 31.3 (L) 32.0 - 36.0 g/dL    RDW 14.6 (H) 11.5 - 14.5 %    Platelets 241 150 - 450 x10*3/uL   Comprehensive Metabolic Panel   Result Value Ref Range    Glucose 122 (H) 74 - 99 mg/dL    Sodium 135 (L) 136 - 145 mmol/L    Potassium 3.6 3.5 - 5.3 mmol/L    Chloride 92 (L) 98 - 107 mmol/L    Bicarbonate 34 (H) 21 - 32 mmol/L    Anion Gap 13 10 - 20 mmol/L    Urea Nitrogen 14 6 - 23 mg/dL    Creatinine 0.47 (L) 0.50 - 1.05 mg/dL    eGFR  >90 >60 mL/min/1.73m*2    Calcium 9.2 8.6 - 10.3 mg/dL    Albumin 3.8 3.4 - 5.0 g/dL    Alkaline Phosphatase 120 (H) 33 - 110 U/L    Total Protein 6.5 6.4 - 8.2 g/dL    AST 18 9 - 39 U/L    Bilirubin, Total 0.5 0.0 - 1.2 mg/dL    ALT 5 (L) 7 - 45 U/L        Assessment/Plan      # Stuttering speech/generalized weakness/confusion  -Patient initially was evaluated for rule out stroke  -Will consult neurology  #GASTON (acute kidney injury) (CMS/[facility])  -Improving well with IV fluids  -Will consult nephrology  # Hyponatremia  -As above  # Hypokalemia will replace  # History of Bell's Belsey  # History of torticollis requiring Botox injections  -Last injection in October 2023  # Diabetes mellitus   - insulin sliding scale  # Hypertension  -Continue home meds  # History of fibromyalgia  # History of schizophrenia      12/29: Patient feels better today, discussed with daughter at bedside, will replace potassium.  Will await neuro input- speech has improved - will start planning discharge accordingly as renal functions have improved.     12/30-neuro input appreciated CT head results noted discussed with radiologist.  Patient on a long list of medications, will attempt to rationalize them.  Will await further neuro input.    Mickey Mcqueen MD

## 2023-12-30 NOTE — SIGNIFICANT EVENT
Brief neurology update note:    Contacted by nursing staff following stroke alert. CT head stable from prior however clinical concern for facial twitching. No hx of seizure in the past known however multiple home meds held on admission due to concern for polypharmacy. Cannot rule out baclofen withdraw or other withdraw.     -20mg baclofen now, continue at 10mg TID  -Resume home   -2mg Ativan now  -Stat cEEG ordered, if unavailable she should be transferred to [facility]   -If seizure generalizes, please transfer to ICU  -Neuro will continue to follow      Piotr Fraire MD

## 2023-12-30 NOTE — PROGRESS NOTES
"Subjective   History Of Present Illness  Prema Hair is a 58 y.o. female presenting with generalized weakness and concern for toxic metabolic encephalopathy. Initially I was contacted overnight by nursing staff with concern for worsening twitching.  At this time, there was a concern for focal seizures given the withdrawal of multiple medications.  Symptoms slowly improved/stabilized over the course of the evening.     On evaluation the morning of 12/31, movements are not seen while patient is asleep and occur semiregularly when she is awakened or attempts to converse.  Discussed extensively with daughter at bedside regarding the patient's medical past and workup.  She states this is typical of her dystonia for which she is being followed by outpatient neurology.           Objective   Last Recorded Vitals  Blood pressure 109/77, pulse 57, temperature 36.7 °C (98 °F), resp. rate 20, height 1.727 m (5' 8\"), weight 99.8 kg (220 lb), SpO2 99 %.    Scheduled medications  amitriptyline, 10 mg, oral, Nightly  aspirin, 81 mg, oral, Daily  atorvastatin, 20 mg, oral, Nightly  baclofen, 10 mg, oral, TID  carbidopa-levodopa, 1 tablet, oral, TID  [Held by provider] carvedilol, 25 mg, oral, BID  clonazePAM, 1 mg, oral, BID  heparin (porcine), 5,000 Units, subcutaneous, q8h KATHERINE  insulin lispro, 0-5 Units, subcutaneous, TID with meals  levothyroxine, 100 mcg, oral, Once per day on Mon Tue Wed Thu Fri  levothyroxine, 200 mcg, oral, Once per day on Sun Sat  OXcarbazepine, 600 mg, oral, Nightly  polyethylene glycol, 17 g, oral, Daily  QUEtiapine, 300 mg, oral, Nightly  sertraline, 150 mg, oral, Daily      Continuous medications     PRN medications  PRN medications: acetaminophen, dextromethorphan-guaifenesin, dextrose 10 % in water (D10W), dextrose, diphenhydrAMINE, glucagon, ipratropium-albuteroL, melatonin, morphine, ondansetron, oxygen         Exam   Neurological Exam  Mental Status  Arousable to verbal stimuli. Oriented only " to person and place. dysarthria present. Follows two-step commands.  Somewhat decreased attention, requires multiple cues to maintain tracking.  Is able to be alerted with verbal stimuli and answer simple questions, though hypophonic and dysarthric speech at times.    Cranial Nerves  CN II: Visual fields full to confrontation.  CN III, IV, VI: Extraocular movements intact bilaterally. Pupils equal round and reactive to light bilaterally.  CN V:  Right: Facial sensation is normal.  CN VII: There is an intermittent semirhythmic twitching of the right face that is evident during volitional movement and attempted speech.  This is associated with a slurring of her speech.  Movements remit when patient is resting..  CN VIII: Hearing is normal.  CN XI: Increased tone.  CN XII: Tongue midline without atrophy or fasciculations.    Motor    Antigravity x 4.    Sensory  Response to touch in all 4 extremities.    Physical Exam  Eyes:      Extraocular Movements: Extraocular movements intact.      Pupils: Pupils are equal, round, and reactive to light.   Neurological:      Cranial Nerves: Dysarthria present.         Relevant Results    Lab Results  Results from last 72 hours   Lab Units 12/31/23 0724 12/30/23  0811 12/29/23  0731 12/28/23  1427   GLUCOSE mg/dL 100* 122*   < > 139*   SODIUM mmol/L 138 135*   < > 134*   POTASSIUM mmol/L 3.2* 3.6   < > 2.6*   CHLORIDE mmol/L 93* 92*   < > 86*   CO2 mmol/L 37* 34*   < > 33*   ANION GAP mmol/L 11 13   < > 18   BUN mg/dL 11 14   < > 32*   CREATININE mg/dL 0.45* 0.47*   < > 0.73   EGFR mL/min/1.73m*2 >90 >90   < > >90   CALCIUM mg/dL 8.7 9.2   < > 9.3   ALBUMIN g/dL 3.4 3.8   < > 4.4   MAGNESIUM mg/dL  --   --   --  2.80*    < > = values in this interval not displayed.      Results from last 72 hours   Lab Units 12/31/23 0724 12/30/23  0811   WBC AUTO x10*3/uL 6.0 6.1   NRBC AUTO /100 WBCs 0.0 0.0   RBC AUTO x10*6/uL 4.91 5.03   HEMOGLOBIN g/dL 13.8 14.1   HEMATOCRIT % 44.8 45.0   MCV  fL 91 90   MCH pg 28.1 28.0   MCHC g/dL 30.8* 31.3*   RDW % 14.3 14.6*   PLATELETS AUTO x10*3/uL 190 241        CT brain attack head wo IV contrast   Final Result   No evidence of acute cortical infarct or intracranial hemorrhage.        . Yeison Lackey discussed the significance and urgency of this   critical finding by telephone with  Mickey Mcqueen on 12/30/2023 at   5:10 Pm.  (**-RCF-**) Findings:  See findings.                  MACRO:   None        Signed by: Yeison Lackey 12/30/2023 5:17 PM   Dictation workstation:   [id number]      CT cervical spine wo IV contrast   Final Result   No acute cervical spine fracture is noted.        There is again evidence of reversal of the normal lordotic curvature   of the cervical spine. There is again evidence of grade 1   anterolisthesis of C3 on C4 and C4 on C5 similar when compared with   04/20/2023.        On the coronal images, the patient's neck is tilted to the left.        There is again evidence of bony ankylosis of the left facets at the   C4/5 and C3/4 levels.        There is again evidence of multilevel cervical spondylosis with the   most pronounced posterior osteophytic spurring noted at the C5/6 and   C6/7 levels. Anterior osteophytic spurring is again noted most   pronounced at the C4/5, C5/6, and C6/7 levels. Degenerative   uncovertebral joint changes and degenerative facet changes   contributing to bony encroachment upon the left neural foramen at the   C3/4 and C4/5 levels and right neural foramen at the C6/7 level.        There is again evidence of metallic artifact from a stent and   endovascular coil mass within the suprahyoid right neck along the   expected course of the right internal carotid artery.        There is again evidence of absence of the right lobe of the thyroid   gland which may be related to previous surgical resection.             I personally reviewed the images/study and I agree with the findings   as stated by Dr. Paul Burks.  This study was interpreted at   [facility], [place], [place].        Signed by: Joseph Mejia 12/29/2023 3:22 PM   Dictation workstation:   [id number]      CT angio brain attack head w IV contrast and post procedure   Final Result   1. No large vessel occlusion is identified in the proximal   intracranial circulation. No significant stenosis is present in the   visualized large arteries of the neck, although the distal most   extracranial segment of the right internal carotid artery is not well   visualized due to presence of the adjacent embolization coil. A   patent vascular stent is present in the mid course of the right   extracranial internal carotid artery.   2. Multilevel degenerative changes of the cervical spine, with likely   at least mild-to-moderate spinal canal narrowing present at several   levels due to combination of disc osteophyte complex,   spondylolisthesis and ligamentum flavum thickening.        MACRO:   None        Signed by: Artur Nogueira 12/27/2023 6:46 PM   Dictation workstation:   [id number]      CT angio brain attack neck w IV contrast and post procedure   Final Result   1. No large vessel occlusion is identified in the proximal   intracranial circulation. No significant stenosis is present in the   visualized large arteries of the neck, although the distal most   extracranial segment of the right internal carotid artery is not well   visualized due to presence of the adjacent embolization coil. A   patent vascular stent is present in the mid course of the right   extracranial internal carotid artery.   2. Multilevel degenerative changes of the cervical spine, with likely   at least mild-to-moderate spinal canal narrowing present at several   levels due to combination of disc osteophyte complex,   spondylolisthesis and ligamentum flavum thickening.        MACRO:   None        Signed by: Artur Nogueira 12/27/2023 6:46 PM   Dictation  workstation:   [id number]      CT brain attack head wo IV contrast   Final Result   1.  No evidence of hemorrhage, CT apparent transcortical infarct, or   other acute intracranial abnormality.   2. Subtle attenuation changes present in the periventricular and   subcortical white matter of bilateral cerebral hemispheres are   nonspecific, but favored to represent early changes of microvascular   disease.        MACRO:   Artur Nogueira discussed the significance and urgency of this   critical finding by epic chat with  LYNNETTE BECERRA on 12/27/2023   at 6:09 pm.  (**-RCF-**) Findings:  See findings.             Signed by: Artur Nogueira 12/27/2023 6:25 PM   Dictation workstation:   [id number]            Assessment/Plan     #Concern for focal seizure  #Facial spasm  On evaluation, facial twitching observed is present only during action and remits with rest and is also associated with mild dysarthria.  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    No further seizure or inpatient neurologic workup indicated  Slowly resume home medications when polypharmacy has been excluded  Continue home oxcarbazepine 600 nightly  Continue baclofen 10 mg 3 times daily  Continue home Klonopin 1 mg twice daily  Will defer adjustments of psychiatric medications to primary/psychiatry  Please continue to follow with Dr Adorno for additional recommendations/botox once medically optimized  Please call with concerns or questions         I spent 60 minutes in the professional and overall care of this patient.      Piotr Fraire MD

## 2023-12-30 NOTE — PROGRESS NOTES
Occupational Therapy    Evaluation    Patient Name: Prema Hair  MRN: [id number]  Today's Date: 12/30/2023  Time Calculation  Start Time: 1414  Stop Time: 1439  Time Calculation (min): 25 min        Assessment:  OT Assessment: Pt is 57 yo F to ER with stroke r/o. Found to have toxic metabolic encephalopathy, sympomatic bradycardia, and GASTON. Currently requiring x 1-2 assist for all functional tasks, at risk for further decline d/t decreased activity tolerance, overall weakness/fatigue, and decreased balance, as well as impaired cognition. Pt to benefit from further acute OT skilled services to maximize indep/safety needed for ADL routine.  Prognosis: Good  Evaluation/Treatment Tolerance: Patient limited by fatigue  Medical Staff Made Aware: Yes  End of Session Communication: Bedside nurse  End of Session Patient Position: Bed, 3 rail up, Alarm on  OT Assessment Results: Decreased ADL status, Decreased cognition, Decreased endurance, Decreased functional mobility, Decreased IADLs  Prognosis: Good  Evaluation/Treatment Tolerance: Patient limited by fatigue  Medical Staff Made Aware: Yes  Plan:  Treatment Interventions: ADL retraining, Functional transfer training, UE strengthening/ROM, Endurance training, Patient/family training, Equipment evaluation/education  OT Frequency: 3 times per week  OT Discharge Recommendations: Moderate intensity level of continued care  OT - OK to Discharge: Yes (per OT POC)  Treatment Interventions: ADL retraining, Functional transfer training, UE strengthening/ROM, Endurance training, Patient/family training, Equipment evaluation/education    Subjective   Current Problem:  1. GASTON (acute kidney injury) (CMS/HCC)        2. Hypokalemia        3. Hyponatremia          General:  General  Reason for Referral: 57 yo to ER with AMS/speech difficulties. R/o CVA. Found to have symptomatic bradycardia, GASTON, and toxic metabolic encephalopathy.  Referred By: ALESHA Mcqueen  Past Medical History Relevant  "to Rehab: Bell's palsy, HTN, DM2, schizophrenia, bipolar disorder  Family/Caregiver Present: No  Co-Treatment: PT  Co-Treatment Reason: to maximize pt safety/particpiation with OOB activity  Prior to Session Communication: Bedside nurse  Patient Position Received: Bed, 3 rail up, Alarm on  General Comment: pt cooperative t/o session, encouragement provided d/t increased fibromyalgia pain this date  Precautions:  Medical Precautions: Fall precautions  Precautions Comment: telemetry, purewick  Vital Signs:  Heart Rate:  (HR ranging from 55-69 t/o session. at times reports of \"wooziness.\" tele monitoring t/o entirety of session.)  Heart Rate Source: Monitor  Pain:  Pain Assessment  Pain Assessment: Méndez-Baker FACES  Méndez-Baker FACES Pain Rating: Hurts even more  Pain Type:  (fibromyalgia pain)    Objective   Cognition:  Overall Cognitive Status: Impaired (at times sensical speech noted re: situation. appears to be somewhat limited by speech difficulties. tearful t/o session.)  Orientation Level: Disoriented to situation, Disoriented to time    Home Living:  Type of Home: Apartment  Lives With: Alone  Home Adaptive Equipment: Walker rolling or standard, Cane (Rollator)  Home Layout:  (pt with difficulties reporting layout of home d/t cognition/difficulties with concentration.)  Bathroom Shower/Tub: Tub/shower unit  Bathroom Toilet: Handicapped height  Bathroom Equipment: Grab bars in shower, Grab bars around toilet  Prior Function:  Level of [place]: Needs assistance with ADLs, Needs assistance with homemaking (Pt reporting \"it's been getting harder\")  Receives Help From: Family (Children)  ADL Assistance: Needs assistance  Ambulatory Assistance: Independent (no AD)     ADL:  Eating Assistance: Stand by  Grooming Assistance: Minimal  Bathing Assistance: Maximal  UE Dressing Assistance: Moderate  LE Dressing Assistance: Maximal  Toileting Assistance with Device: Maximal  Functional Assistance: Maximal  Activity " Tolerance:  Endurance: Tolerates 10 - 20 min exercise with multiple rests  Bed Mobility/Transfers: Bed Mobility  Bed Mobility: Yes  Bed Mobility 1  Level of Assistance 1: Maximum assistance (x 2)  Bed Mobility Comments 1: pt with initiation to EOB, however assist with follow thru/completion of all tasks to upright sitting  Bed Mobility 2  Bed Mobility  2: Sitting to supine  Level of Assistance 2: Minimum assistance (for BLE navigation into bed)    Transfers  Transfer: Yes  Transfer 1  Transfer From 1: Bed to  Transfer to 1: Commode-drop arm  Technique 1: Squat pivot  Transfer Level of Assistance 1: Moderate assistance (x 2 person)  Trials/Comments 1: assist with sequencing transition, improved WBing thru BLEs for transition to commode. transfer to R side.  Transfers 2  Transfer From 2: Commode-drop arm to  Transfer to 2: Stand  Technique 2: Sit to stand  Transfer Device 2: Walker  Transfer Level of Assistance 2: Moderate assistance (x 1)  Trials/Comments 2: sit to stand transfer for standing rear dee dee care  Transfers 3  Transfer From 3: Stand to  Transfer to 3: Bed  Technique 3: Stand pivot  Transfer Device 3: Walker  Transfer Level of Assistance 3: Moderate assistance (x 1)  Trials/Comments 3: able to complete full upright stand pivot back to EOB for returning supine    Ambulation/Gait Training:  Ambulation/Gait Training  Ambulation/Gait Training Performed:  (pt x 2 person assist for all transfers this date, not safe for mobility at this time)      Perception:  Motor Planning: Appears intact  Coordination:  Movements are Fluid and Coordinated:  (grossly WFL, no incoordination deficits noted)      Extremities: RUE   RUE : Within Functional Limits and LUE   LUE: Within Functional Limits    Outcome Measures:[facility] Daily Activity  Putting on and taking off regular lower body clothing: A lot  Bathing (including washing, rinsing, drying): A lot  Putting on and taking off regular upper body clothing: A lot  Toileting,  which includes using toilet, bedpan or urinal: A lot  Taking care of personal grooming such as brushing teeth: A little  Eating Meals: A little  Daily Activity - Total Score: 14      Goals:  Encounter Problems       Encounter Problems (Active)       ADLs       Patient with complete upper body dressing with stand by assist level of assistance donning and doffing all UE clothes with PRN adaptive equipment  (Progressing)       Start:  12/30/23    Expected End:  01/13/24            Patient with complete lower body dressing with stand by assist level of assistance donning and doffing all LE clothes  with PRN adaptive equipment  (Progressing)       Start:  12/30/23    Expected End:  01/13/24            Patient will complete daily grooming tasks with set-up level of assistance and PRN adaptive equipment. (Progressing)       Start:  12/30/23    Expected End:  01/13/24            Patient will complete toileting including hygiene clothing management/hygiene with stand by assist level of assistance and PRN adaptive equipment. (Progressing)       Start:  12/30/23    Expected End:  01/13/24               TRANSFERS       Patient will complete functional ADL transfers with least restrictive device with stand by assist level of assistance. (Progressing)       Start:  12/30/23    Expected End:  01/13/24

## 2023-12-30 NOTE — PROGRESS NOTES
Physical Therapy    Physical Therapy Evaluation    Patient Name: Prema Hair  MRN: [id number]  Today's Date: 12/30/2023   Time Calculation  Start Time: 1415  Stop Time: 1438  Time Calculation (min): 23 min    Assessment/Plan   PT Assessment  PT Assessment Results: Decreased strength, Decreased endurance, Impaired balance, Decreased mobility, Pain  Rehab Prognosis: Good  Evaluation/Treatment Tolerance: Patient limited by fatigue, Patient limited by pain  Medical Staff Made Aware: Yes  Strengths: Premorbid level of function  Barriers to Participation: Coping skills  End of Session Communication: Bedside nurse  Assessment Comment: Pt presents today with decreased strength, balance, and activity tolerance. Currently, pt is below the reported PLOF requring mod assist for transfers and max assist for bed mobility. Continued PT during the current admission would benefit the pt to address the above factors and bring pt closer to the PLOF qh8ile reducing pt's risk of falls.  End of Session Patient Position: Bed, 3 rail up, Alarm on  IP OR SWING BED PT PLAN  Inpatient or Swing Bed: Inpatient  PT Plan  Treatment/Interventions: Bed mobility, Transfer training, Gait training, Balance training, Strengthening, Endurance training, Therapeutic exercise, Therapeutic activity, Home exercise program  PT Plan: Skilled PT  PT Frequency: 3 times per week  PT Discharge Recommendations: Moderate intensity level of continued care  Equipment Recommended upon Discharge: Wheeled walker  PT Recommended Transfer Status: Assist x1, Assistive device  PT - OK to Discharge: Yes (per PT POC)      Subjective   General Visit Information:  General  Reason for Referral: 58 y.o F presenting to the ED with stuttering speech and confusion  Referred By: ALESHA Mcqueen  Past Medical History Relevant to Rehab: bell's palsy, HTn, DM2, schizophrenia, CHF, bipolar disorder  Family/Caregiver Present: No  Co-Treatment: OT  Co-Treatment Reason: Co-evaluation with OT to  maximize pt safety, transfer ability, and participation.  Prior to Session Communication: Bedside nurse  Patient Position Received: Bed, 3 rail up, Alarm on  Preferred Learning Style: verbal, visual  General Comment: Pt supine in bed upon PT arrival. Cleared to participate with RN, and agreeable to co-evaluation with PT/OT.  Home Living:  Home Living  Type of Home: Apartment  Lives With: Alone  Home Adaptive Equipment: Walker rolling or standard, Cane (Rollator)  Home Layout: Other (Comment) (Unable to obtain home layout with pt reporting increased pain during subjective portion of evaluation.)  Bathroom Shower/Tub: Tub/shower unit  Bathroom Toilet: Handicapped height  Bathroom Equipment: Grab bars in shower, Grab bars around toilet  Prior Level of Function:  Prior Function Per Pt/Caregiver Report  Level of [place]: Needs assistance with ADLs, Needs assistance with homemaking  Receives Help From: Family (Pt reports her kids assist her with I/ADLs as needed.)  ADL Assistance: Needs assistance  Ambulatory Assistance: Independent (No AD)  Precautions:  Precautions  Medical Precautions: Fall precautions, Oxygen therapy device and L/min    Objective   Pain:  Pain Assessment  Pain Assessment: 0-10  Pain Score: 9  Pain Type: Chronic pain  Pain Location: Generalized  Pain Descriptors: Other (Comment) (Difficult to understand pt, but pt reports fibromyalgia)  Cognition:  Cognition  Orientation Level:  (Oriented to person, place, and time)    Activity Tolerance  Endurance: Tolerates 10 - 20 min exercise with multiple rests    Postural Control  Postural Control: Impaired  Head Control: Head rests in left lateral flexion in standing/sitting  Trunk Control: Forward felxed trunk in standing  Posture Comment: Rounded shoulders in sitting/standing    Static Sitting Balance  Static Sitting-Balance Support: Bilateral upper extremity supported, Feet supported  Static Sitting-Comment/Number of Minutes: Pt initialy CGA but  progressed to mostly close supervision    Static Standing Balance  Static Standing-Balance Support: Bilateral upper extremity supported  Static Standing-Comment/Number of Minutes: Pt able to tolerate static standing for about 1-2 minutes for hygiene care with mod assist and FWW.  Functional Assessments:  Bed Mobility  Bed Mobility: Yes  Bed Mobility 1  Bed Mobility 1: Supine to sitting  Level of Assistance 1: Maximum assistance (2-person)  Bed Mobility Comments 1: Assist with trunk and BLE into upright sitting. Pt able to begin progressing LLE toward EOB and minimally the RLE with TC.  Bed Mobility 2  Bed Mobility  2: Sitting to supine  Level of Assistance 2: Minimum assistance  Bed Mobility Comments 2: Assist with BLE lift into bed and trunk positioning in supine. Pt able to begin lifting BLE to bed but assist required to complete.  Bed Mobility 3  Bed Mobility 3: Scooting  Level of Assistance 3: Dependent  Bed Mobility Comments 3: 2-person dependent scoot toward HOB for better positioning in supine.    Transfers  Transfer: Yes  Transfer 1  Transfer From 1: Bed to  Transfer to 1: Commode-drop arm  Technique 1: Squat pivot  Transfer Level of Assistance 1: Moderate assistance, +2, Minimal verbal cues, Minimal tactile cues  Trials/Comments 1: VC for BUE push from bed to assist with transfer. Left knee block. Pt able to assist with push through BLE on floor to assist in transfer to commode. Assist with approximation of pt and commode seat.  Transfers 2  Transfer From 2: Commode-drop arm to  Transfer to 2: Bed  Technique 2: Stand pivot  Transfer Device 2: Walker  Transfer Level of Assistance 2: Moderate assistance, Moderate verbal cues, Moderate tactile cues  Trials/Comments 2: x1 person assist. VC/TC for BUE push from armrests of commode to stand. Decreased tolerance to standing with pt opting to sit mid transfer. Assist with approximating pt to bed.    Ambulation/Gait Training  Ambulation/Gait Training Performed:  Yes  Ambulation/Gait Training 1  Surface 1: Level tile  Device 1: Rolling walker  Assistance 1: Moderate assistance  Comments/Distance (ft) 1: 2-3 small steps from commode to bed during stand-pivot transfer. Decreased control with assist required to maintain balance. Pt forward flexed at trunk wtih head leaned toward left.    Stairs  Stairs: No  Extremity/Trunk Assessments:  RLE   RLE : Exceptions to WFL  Strength RLE  R Hip Flexion:  (At least 2+/5)  R Knee Extension:  (at least 2+/5)  R Ankle Dorsiflexion: 3/5  R Ankle Plantar Flexion:  (At least 2+/5)  LLE   LLE : Exceptions to WFL  Strength LLE  L Hip Flexion:  (At least 2+/5)  L Knee Extension:  (At least 2+/5)  L Ankle Dorsiflexion: 3/5  L Ankle Plantar Flexion:  (At least 2+/5)  Outcome Measures:  [facility] Basic Mobility  Turning from your back to your side while in a flat bed without using bedrails: A lot  Moving from lying on your back to sitting on the side of a flat bed without using bedrails: A lot  Moving to and from bed to chair (including a wheelchair): A lot  Standing up from a chair using your arms (e.g. wheelchair or bedside chair): A lot  To walk in hospital room: A lot  Climbing 3-5 steps with railing: Total  Basic Mobility - Total Score: 11    Encounter Problems       Encounter Problems (Active)       Balance       Goal 1 (Progressing)       Start:  12/30/23    Expected End:  01/13/24       Pt performs all sitting balance with close supervision and standing balance with min assist and LRAD            Mobility       STG - Patient will ambulate (Progressing)       Start:  12/30/23    Expected End:  01/13/24       10 ft with min assist and LRAD            PT Problem       PT Goal 1 (Not Progressing)       Start:  12/30/23    Expected End:  01/13/24       Pt demonstrates IND in performing 2 sets of 12 reps of prescribed BLE HEP            Transfers       STG - Patient to transfer to and from sit to supine (Progressing)       Start:  12/30/23     Expected End:  01/13/24       With mod assist x 1         STG - Patient will transfer sit to and from stand (Progressing)       Start:  12/30/23    Expected End:  01/13/24       With min assist x 1 and LRAD                Education Documentation  Body Mechanics, taught by Kevyn Zuniga, PT at 12/30/2023  3:36 PM.  Learner: Patient  Readiness: Acceptance  Method: Explanation, Demonstration  Response: Needs Reinforcement    Mobility Training, taught by Kevyn Zuniga PT at 12/30/2023  3:36 PM.  Learner: Patient  Readiness: Acceptance  Method: Explanation, Demonstration  Response: Needs Reinforcement    Education Comments  No comments found.

## 2023-12-31 LAB
ALBUMIN SERPL BCP-MCNC: 3.4 G/DL (ref 3.4–5)
ALP SERPL-CCNC: 103 U/L (ref 33–110)
ALT SERPL W P-5'-P-CCNC: 8 U/L (ref 7–45)
ANION GAP SERPL CALC-SCNC: 11 MMOL/L (ref 10–20)
AST SERPL W P-5'-P-CCNC: 17 U/L (ref 9–39)
BILIRUB SERPL-MCNC: 0.4 MG/DL (ref 0–1.2)
BUN SERPL-MCNC: 11 MG/DL (ref 6–23)
CALCIUM SERPL-MCNC: 8.7 MG/DL (ref 8.6–10.3)
CHLORIDE SERPL-SCNC: 93 MMOL/L (ref 98–107)
CO2 SERPL-SCNC: 37 MMOL/L (ref 21–32)
CREAT SERPL-MCNC: 0.45 MG/DL (ref 0.5–1.05)
ERYTHROCYTE [DISTWIDTH] IN BLOOD BY AUTOMATED COUNT: 14.3 % (ref 11.5–14.5)
GFR SERPL CREATININE-BSD FRML MDRD: >90 ML/MIN/1.73M*2
GLUCOSE BLD MANUAL STRIP-MCNC: 118 MG/DL (ref 74–99)
GLUCOSE BLD MANUAL STRIP-MCNC: 118 MG/DL (ref 74–99)
GLUCOSE BLD MANUAL STRIP-MCNC: 122 MG/DL (ref 74–99)
GLUCOSE SERPL-MCNC: 100 MG/DL (ref 74–99)
HCT VFR BLD AUTO: 44.8 % (ref 36–46)
HGB BLD-MCNC: 13.8 G/DL (ref 12–16)
MCH RBC QN AUTO: 28.1 PG (ref 26–34)
MCHC RBC AUTO-ENTMCNC: 30.8 G/DL (ref 32–36)
MCV RBC AUTO: 91 FL (ref 80–100)
NRBC BLD-RTO: 0 /100 WBCS (ref 0–0)
PLATELET # BLD AUTO: 190 X10*3/UL (ref 150–450)
POTASSIUM SERPL-SCNC: 3.2 MMOL/L (ref 3.5–5.3)
PROT SERPL-MCNC: 5.6 G/DL (ref 6.4–8.2)
RBC # BLD AUTO: 4.91 X10*6/UL (ref 4–5.2)
SODIUM SERPL-SCNC: 138 MMOL/L (ref 136–145)
TSH SERPL-ACNC: 6.8 MIU/L (ref 0.44–3.98)
WBC # BLD AUTO: 6 X10*3/UL (ref 4.4–11.3)

## 2023-12-31 PROCEDURE — 82947 ASSAY GLUCOSE BLOOD QUANT: CPT

## 2023-12-31 PROCEDURE — 1100000001 HC PRIVATE ROOM DAILY

## 2023-12-31 PROCEDURE — 2500000004 HC RX 250 GENERAL PHARMACY W/ HCPCS (ALT 636 FOR OP/ED): Performed by: INTERNAL MEDICINE

## 2023-12-31 PROCEDURE — 2500000001 HC RX 250 WO HCPCS SELF ADMINISTERED DRUGS (ALT 637 FOR MEDICARE OP): Performed by: INTERNAL MEDICINE

## 2023-12-31 PROCEDURE — 84443 ASSAY THYROID STIM HORMONE: CPT | Performed by: INTERNAL MEDICINE

## 2023-12-31 PROCEDURE — 2500000001 HC RX 250 WO HCPCS SELF ADMINISTERED DRUGS (ALT 637 FOR MEDICARE OP): Performed by: STUDENT IN AN ORGANIZED HEALTH CARE EDUCATION/TRAINING PROGRAM

## 2023-12-31 PROCEDURE — 96372 THER/PROPH/DIAG INJ SC/IM: CPT | Performed by: INTERNAL MEDICINE

## 2023-12-31 PROCEDURE — 99233 SBSQ HOSP IP/OBS HIGH 50: CPT | Performed by: STUDENT IN AN ORGANIZED HEALTH CARE EDUCATION/TRAINING PROGRAM

## 2023-12-31 PROCEDURE — 36415 COLL VENOUS BLD VENIPUNCTURE: CPT | Performed by: INTERNAL MEDICINE

## 2023-12-31 PROCEDURE — 85027 COMPLETE CBC AUTOMATED: CPT | Performed by: INTERNAL MEDICINE

## 2023-12-31 PROCEDURE — 84075 ASSAY ALKALINE PHOSPHATASE: CPT | Performed by: INTERNAL MEDICINE

## 2023-12-31 RX ORDER — POTASSIUM CHLORIDE 20 MEQ/1
40 TABLET, EXTENDED RELEASE ORAL 2 TIMES DAILY
Status: DISCONTINUED | OUTPATIENT
Start: 2023-12-31 | End: 2024-01-01 | Stop reason: HOSPADM

## 2023-12-31 RX ORDER — CLONAZEPAM 1 MG/1
2 TABLET ORAL NIGHTLY
Status: DISCONTINUED | OUTPATIENT
Start: 2023-12-31 | End: 2024-01-01 | Stop reason: HOSPADM

## 2023-12-31 RX ORDER — GABAPENTIN 300 MG/1
900 CAPSULE ORAL 3 TIMES DAILY
Status: DISCONTINUED | OUTPATIENT
Start: 2023-12-31 | End: 2024-01-01 | Stop reason: HOSPADM

## 2023-12-31 RX ORDER — QUETIAPINE FUMARATE 100 MG/1
100 TABLET, FILM COATED ORAL DAILY
Status: DISCONTINUED | OUTPATIENT
Start: 2024-01-01 | End: 2024-01-01 | Stop reason: HOSPADM

## 2023-12-31 RX ORDER — OXCARBAZEPINE 300 MG/1
600 TABLET, FILM COATED ORAL DAILY
Status: DISCONTINUED | OUTPATIENT
Start: 2024-01-01 | End: 2024-01-01 | Stop reason: HOSPADM

## 2023-12-31 RX ORDER — OXCARBAZEPINE 300 MG/1
1200 TABLET, FILM COATED ORAL NIGHTLY
Status: DISCONTINUED | OUTPATIENT
Start: 2023-12-31 | End: 2024-01-01 | Stop reason: HOSPADM

## 2023-12-31 RX ORDER — SERTRALINE HYDROCHLORIDE 50 MG/1
200 TABLET, FILM COATED ORAL DAILY
Status: DISCONTINUED | OUTPATIENT
Start: 2024-01-01 | End: 2024-01-01 | Stop reason: HOSPADM

## 2023-12-31 RX ORDER — POTASSIUM CHLORIDE 20 MEQ/1
20 TABLET, EXTENDED RELEASE ORAL ONCE
Status: COMPLETED | OUTPATIENT
Start: 2023-12-31 | End: 2023-12-31

## 2023-12-31 RX ORDER — PRAZOSIN HYDROCHLORIDE 1 MG/1
5 CAPSULE ORAL NIGHTLY
Status: DISCONTINUED | OUTPATIENT
Start: 2023-12-31 | End: 2024-01-01 | Stop reason: HOSPADM

## 2023-12-31 RX ORDER — BACLOFEN 10 MG/1
20 TABLET ORAL 3 TIMES DAILY
Status: DISCONTINUED | OUTPATIENT
Start: 2023-12-31 | End: 2024-01-01 | Stop reason: HOSPADM

## 2023-12-31 RX ORDER — CLONAZEPAM 1 MG/1
1 TABLET ORAL DAILY
Status: DISCONTINUED | OUTPATIENT
Start: 2024-01-01 | End: 2024-01-01 | Stop reason: HOSPADM

## 2023-12-31 RX ADMIN — POTASSIUM CHLORIDE 20 MEQ: 1500 TABLET, EXTENDED RELEASE ORAL at 18:10

## 2023-12-31 RX ADMIN — GABAPENTIN 900 MG: 300 CAPSULE ORAL at 21:59

## 2023-12-31 RX ADMIN — LEVOTHYROXINE SODIUM 200 MCG: 100 TABLET ORAL at 06:29

## 2023-12-31 RX ADMIN — CARBIDOPA AND LEVODOPA 1 TABLET: 25; 100 TABLET ORAL at 09:00

## 2023-12-31 RX ADMIN — QUETIAPINE FUMARATE 300 MG: 100 TABLET ORAL at 22:02

## 2023-12-31 RX ADMIN — CARBIDOPA AND LEVODOPA 1 TABLET: 25; 100 TABLET ORAL at 22:00

## 2023-12-31 RX ADMIN — AMITRIPTYLINE HYDROCHLORIDE 10 MG: 10 TABLET, FILM COATED ORAL at 22:01

## 2023-12-31 RX ADMIN — BACLOFEN 10 MG: 10 TABLET ORAL at 09:26

## 2023-12-31 RX ADMIN — CLONAZEPAM 1 MG: 1 TABLET ORAL at 02:41

## 2023-12-31 RX ADMIN — HEPARIN SODIUM 5000 UNITS: 5000 INJECTION INTRAVENOUS; SUBCUTANEOUS at 06:29

## 2023-12-31 RX ADMIN — POLYETHYLENE GLYCOL 3350 17 G: 17 POWDER, FOR SOLUTION ORAL at 09:26

## 2023-12-31 RX ADMIN — HEPARIN SODIUM 5000 UNITS: 5000 INJECTION INTRAVENOUS; SUBCUTANEOUS at 22:00

## 2023-12-31 RX ADMIN — CLONAZEPAM 2 MG: 1 TABLET ORAL at 22:00

## 2023-12-31 RX ADMIN — CARBIDOPA AND LEVODOPA 1 TABLET: 25; 100 TABLET ORAL at 16:49

## 2023-12-31 RX ADMIN — HEPARIN SODIUM 5000 UNITS: 5000 INJECTION INTRAVENOUS; SUBCUTANEOUS at 16:49

## 2023-12-31 RX ADMIN — MORPHINE SULFATE 4 MG: 4 INJECTION, SOLUTION INTRAMUSCULAR; INTRAVENOUS at 18:10

## 2023-12-31 RX ADMIN — OXCARBAZEPINE 1200 MG: 300 TABLET, FILM COATED ORAL at 21:59

## 2023-12-31 RX ADMIN — ATORVASTATIN CALCIUM 20 MG: 20 TABLET, FILM COATED ORAL at 22:00

## 2023-12-31 RX ADMIN — ASPIRIN 81 MG: 81 TABLET, COATED ORAL at 09:26

## 2023-12-31 RX ADMIN — SERTRALINE HYDROCHLORIDE 150 MG: 50 TABLET ORAL at 09:26

## 2023-12-31 RX ADMIN — CLONAZEPAM 1 MG: 1 TABLET ORAL at 09:27

## 2023-12-31 RX ADMIN — BACLOFEN 20 MG: 10 TABLET ORAL at 16:49

## 2023-12-31 RX ADMIN — POTASSIUM CHLORIDE 40 MEQ: 1500 TABLET, EXTENDED RELEASE ORAL at 22:05

## 2023-12-31 RX ADMIN — GABAPENTIN 900 MG: 300 CAPSULE ORAL at 18:03

## 2023-12-31 RX ADMIN — PRAZOSIN HYDROCHLORIDE 5 MG: 1 CAPSULE ORAL at 22:01

## 2023-12-31 RX ADMIN — BACLOFEN 20 MG: 10 TABLET ORAL at 22:01

## 2023-12-31 RX ADMIN — MORPHINE SULFATE 4 MG: 4 INJECTION, SOLUTION INTRAMUSCULAR; INTRAVENOUS at 04:22

## 2023-12-31 ASSESSMENT — COGNITIVE AND FUNCTIONAL STATUS - GENERAL
PERSONAL GROOMING: A LITTLE
TURNING FROM BACK TO SIDE WHILE IN FLAT BAD: A LOT
STANDING UP FROM CHAIR USING ARMS: A LOT
CLIMB 3 TO 5 STEPS WITH RAILING: A LOT
DAILY ACTIVITIY SCORE: 14
MOVING FROM LYING ON BACK TO SITTING ON SIDE OF FLAT BED WITH BEDRAILS: A LOT
DRESSING REGULAR LOWER BODY CLOTHING: A LOT
DAILY ACTIVITIY SCORE: 14
TOILETING: A LOT
CLIMB 3 TO 5 STEPS WITH RAILING: TOTAL
DRESSING REGULAR LOWER BODY CLOTHING: A LOT
MOVING TO AND FROM BED TO CHAIR: A LOT
PERSONAL GROOMING: A LITTLE
MOVING FROM LYING ON BACK TO SITTING ON SIDE OF FLAT BED WITH BEDRAILS: A LOT
MOBILITY SCORE: 11
WALKING IN HOSPITAL ROOM: A LOT
TURNING FROM BACK TO SIDE WHILE IN FLAT BAD: A LOT
HELP NEEDED FOR BATHING: A LOT
DRESSING REGULAR UPPER BODY CLOTHING: A LOT
STANDING UP FROM CHAIR USING ARMS: A LOT
WALKING IN HOSPITAL ROOM: A LOT
EATING MEALS: A LITTLE
MOBILITY SCORE: 12
HELP NEEDED FOR BATHING: A LOT
TOILETING: A LOT
MOVING TO AND FROM BED TO CHAIR: A LOT
DRESSING REGULAR UPPER BODY CLOTHING: A LOT
EATING MEALS: A LITTLE

## 2023-12-31 ASSESSMENT — PAIN DESCRIPTION - ORIENTATION: ORIENTATION: RIGHT

## 2023-12-31 ASSESSMENT — PAIN - FUNCTIONAL ASSESSMENT
PAIN_FUNCTIONAL_ASSESSMENT: 0-10
PAIN_FUNCTIONAL_ASSESSMENT: 0-10

## 2023-12-31 ASSESSMENT — PAIN SCALES - GENERAL
PAINLEVEL_OUTOF10: 8
PAINLEVEL_OUTOF10: 0 - NO PAIN
PAINLEVEL_OUTOF10: 7

## 2023-12-31 ASSESSMENT — PAIN DESCRIPTION - LOCATION: LOCATION: FACE

## 2023-12-31 NOTE — CARE PLAN
The patient's goals for the shift include      The clinical goals for the shift include pt will remain safe and clinically stable

## 2023-12-31 NOTE — NURSING NOTE
I found the note for a stroke alert that was called yesterday on 12/30/2023 at 4:31 p.m. misfiled in another's patient's chart. Timeline copied and pasted below regarding the stroke alert for this patient.     Speech garbled, facial twitching, stroke alert called  430 Stroke alert called  431 Dr. Sharma arrives  /97, HR 51, spO2 100, blood sugar 144  436 Dr. Sharma orders CT head

## 2023-12-31 NOTE — PROGRESS NOTES
Prema Hair is a 58 y.o. female on day 3 of admission presenting with GASTON (acute kidney injury) (CMS/[facility]).      Subjective   HPI: This is a 58 y.o. female who came to the emergency room from home for stuttering speech and confusion.  She was initially evaluated for rule out CVA.  Patient has had chronic left Bell's palsy which was confounding the presentation.  She also admits to generalized weakness.  She was found to be in acute renal failure.  And hence has been admitted for evaluation and management of all the above        Objective     Last Recorded Vitals  /80 (BP Location: Left arm, Patient Position: Lying)   Pulse 55   Temp 36.4 °C (97.5 °F) (Temporal)   Resp 18   Wt 99.8 kg (220 lb)   SpO2 100%   Intake/Output last 3 Shifts:    Intake/Output Summary (Last 24 hours) at 12/31/2023 1459  Last data filed at 12/30/2023 1500  Gross per 24 hour   Intake 120 ml   Output --   Net 120 ml         Admission Weight  Weight: 104 kg (229 lb 4.5 oz) (12/27/23 1747)    Daily Weight  12/28/23 : 99.8 kg (220 lb)    Image Results  ECG 12 lead  Normal sinus rhythm  Minimal voltage criteria for LVH, may be normal variant ( R in aVL )  Possible Inferior infarct , age undetermined  Prolonged QT  Abnormal ECG  When compared with ECG of 15-MAY-2023 09:25,  Previous ECG has undetermined rhythm, needs review  Borderline criteria for Inferior infarct are now Present  ST no longer elevated in Inferior leads  QT has lengthened  See ED provider note for full interpretation and clinical correlation  Confirmed by Charleen Guillaume ([id number]) on 12/30/2023 7:37:49 PM  CT brain attack head wo IV contrast  Narrative: Interpreted By:  Yeison Lackey,   STUDY:  CT BRAIN ATTACK HEAD WO IV CONTRAST;  12/30/2023 4:48 pm      INDICATION:  Signs/Symptoms:garbled speech.      COMPARISON:  CT and CTA dated 12/27/2022.      ACCESSION NUMBER(S):  [id number]      ORDERING CLINICIAN:  EVELYN EDMONDS      TECHNIQUE:  Noncontrast axial CT scan  of head was performed. Angled reformats in  brain and bone windows were generated. The images were reviewed in  bone, brain, blood and soft tissue windows.      FINDINGS:  CSF Spaces: The ventricles, sulci and basal cisterns are within  normal limits. There is no extraaxial fluid collection.      Parenchyma: There are mild periventricular and basal ganglia  hypodensities suggestive of mild chronic ischemic change. The  grey-white differentiation is intact. There is no mass effect or  midline shift.  There is no intracranial hemorrhage.      There are partially visualized surgical coils in the upper right neck  better evaluated on previous CTA.      Calvarium: The calvarium is unremarkable.      Paranasal sinuses and mastoids: Visualized paranasal sinuses and  mastoids are clear.      Impression: No evidence of acute cortical infarct or intracranial hemorrhage.      . Yeison Lackey discussed the significance and urgency of this  critical finding by telephone with  Mickey Mcqueen on 12/30/2023 at  5:10 Pm.  (**-RCF-**) Findings:  See findings.              MACRO:  None      Signed by: Yeison Lackey 12/30/2023 5:17 PM  Dictation workstation:   [id number]      Physical Exam  GENERAL: awake, alert, Ox3, cooperative resting comfortably  SKIN: Skin turgor normal. No rashes  HEENT: no epistaxis, Moist mucosa.  NECK: supple  BACK: spine nontender to palpation, No CVAT.  LUNGS: Vesicular breath sounds, with no wheeze, no crepitations.   CARDIAC: REGULAR. S1 and S2; no rubs, no murmur  ABDOMEN: Abdomen soft, non-tender. BS+  EXTREMITIES: No edema, Good capillary refill.   NEURO: [facility] GOOD. Motor and sensory exam wnl.  Stuttering speech   MUSCULOSKELETAL: No acute inflammation     Relevant Results  Results for orders placed or performed during the hospital encounter of 12/27/23 (from the past 24 hour(s))   POCT GLUCOSE   Result Value Ref Range    POCT Glucose 144 (H) 74 - 99 mg/dL   POCT GLUCOSE   Result Value Ref Range     POCT Glucose 116 (H) 74 - 99 mg/dL   CBC   Result Value Ref Range    WBC 6.0 4.4 - 11.3 x10*3/uL    nRBC 0.0 0.0 - 0.0 /100 WBCs    RBC 4.91 4.00 - 5.20 x10*6/uL    Hemoglobin 13.8 12.0 - 16.0 g/dL    Hematocrit 44.8 36.0 - 46.0 %    MCV 91 80 - 100 fL    MCH 28.1 26.0 - 34.0 pg    MCHC 30.8 (L) 32.0 - 36.0 g/dL    RDW 14.3 11.5 - 14.5 %    Platelets 190 150 - 450 x10*3/uL   Comprehensive Metabolic Panel   Result Value Ref Range    Glucose 100 (H) 74 - 99 mg/dL    Sodium 138 136 - 145 mmol/L    Potassium 3.2 (L) 3.5 - 5.3 mmol/L    Chloride 93 (L) 98 - 107 mmol/L    Bicarbonate 37 (H) 21 - 32 mmol/L    Anion Gap 11 10 - 20 mmol/L    Urea Nitrogen 11 6 - 23 mg/dL    Creatinine 0.45 (L) 0.50 - 1.05 mg/dL    eGFR >90 >60 mL/min/1.73m*2    Calcium 8.7 8.6 - 10.3 mg/dL    Albumin 3.4 3.4 - 5.0 g/dL    Alkaline Phosphatase 103 33 - 110 U/L    Total Protein 5.6 (L) 6.4 - 8.2 g/dL    AST 17 9 - 39 U/L    Bilirubin, Total 0.4 0.0 - 1.2 mg/dL    ALT 8 7 - 45 U/L   TSH   Result Value Ref Range    Thyroid Stimulating Hormone 6.80 (H) 0.44 - 3.98 mIU/L   POCT GLUCOSE   Result Value Ref Range    POCT Glucose 122 (H) 74 - 99 mg/dL        Assessment/Plan      # Stuttering speech/generalized weakness/confusion  -Patient initially was evaluated for rule out stroke  -Will consult neurology  #GASTON (acute kidney injury) (CMS/HCC)  -Improving well with IV fluids  -Will consult nephrology  # Hyponatremia  -As above  # Hypokalemia will replace  # History of Bell's [place]  # History of torticollis requiring Botox injections  -Last injection in October 2023  # Diabetes mellitus   - insulin sliding scale  # Hypertension  -Continue home meds  # History of fibromyalgia  # History of schizophrenia      12/29: Patient feels better today, discussed with daughter at bedside, will replace potassium.  Will await neuro input- speech has improved - will start planning discharge accordingly as renal functions have improved.     12/30-neuro input  appreciated CT head results noted discussed with radiologist.  Patient on a long list of medications, will attempt to rationalize them.  Will await further neuro input.    12/31-discussed with daughter at bedside, all medications reviewed with her.  Daughter wants most of her medications restarted, feels patient declines when meds are changed.  Neuro input appreciated.  Right facial spasms-has had history of the same.  Has history of torticollis for which she has received Botox injections.  I am still concerned about the long list of her medications.  Family insistent on having patient on them.  Med changes have to be addressed by PCP as outpatient.  Will plan discharge when okay with neurology.    Mickey Mcqueen MD

## 2024-01-01 VITALS
BODY MASS INDEX: 33.34 KG/M2 | HEIGHT: 68 IN | RESPIRATION RATE: 16 BRPM | SYSTOLIC BLOOD PRESSURE: 128 MMHG | TEMPERATURE: 98 F | HEART RATE: 63 BPM | WEIGHT: 220 LBS | DIASTOLIC BLOOD PRESSURE: 83 MMHG | OXYGEN SATURATION: 100 %

## 2024-01-01 LAB
ALBUMIN SERPL BCP-MCNC: 3.3 G/DL (ref 3.4–5)
ALP SERPL-CCNC: 98 U/L (ref 33–110)
ALT SERPL W P-5'-P-CCNC: 12 U/L (ref 7–45)
ANION GAP SERPL CALC-SCNC: 13 MMOL/L (ref 10–20)
AST SERPL W P-5'-P-CCNC: 15 U/L (ref 9–39)
BILIRUB SERPL-MCNC: 0.4 MG/DL (ref 0–1.2)
BUN SERPL-MCNC: 11 MG/DL (ref 6–23)
CALCIUM SERPL-MCNC: 8.7 MG/DL (ref 8.6–10.3)
CHLORIDE SERPL-SCNC: 94 MMOL/L (ref 98–107)
CO2 SERPL-SCNC: 35 MMOL/L (ref 21–32)
CREAT SERPL-MCNC: 0.48 MG/DL (ref 0.5–1.05)
ERYTHROCYTE [DISTWIDTH] IN BLOOD BY AUTOMATED COUNT: 14.6 % (ref 11.5–14.5)
GFR SERPL CREATININE-BSD FRML MDRD: >90 ML/MIN/1.73M*2
GLUCOSE BLD MANUAL STRIP-MCNC: 107 MG/DL (ref 74–99)
GLUCOSE BLD MANUAL STRIP-MCNC: 107 MG/DL (ref 74–99)
GLUCOSE BLD MANUAL STRIP-MCNC: 119 MG/DL (ref 74–99)
GLUCOSE BLD MANUAL STRIP-MCNC: 180 MG/DL (ref 74–99)
GLUCOSE SERPL-MCNC: 90 MG/DL (ref 74–99)
HCT VFR BLD AUTO: 42.8 % (ref 36–46)
HGB BLD-MCNC: 13.3 G/DL (ref 12–16)
MCH RBC QN AUTO: 27.9 PG (ref 26–34)
MCHC RBC AUTO-ENTMCNC: 31.1 G/DL (ref 32–36)
MCV RBC AUTO: 90 FL (ref 80–100)
NRBC BLD-RTO: 0 /100 WBCS (ref 0–0)
PLATELET # BLD AUTO: 172 X10*3/UL (ref 150–450)
POTASSIUM SERPL-SCNC: 3.8 MMOL/L (ref 3.5–5.3)
PROT SERPL-MCNC: 5.6 G/DL (ref 6.4–8.2)
RBC # BLD AUTO: 4.76 X10*6/UL (ref 4–5.2)
SODIUM SERPL-SCNC: 138 MMOL/L (ref 136–145)
WBC # BLD AUTO: 5.8 X10*3/UL (ref 4.4–11.3)

## 2024-01-01 PROCEDURE — 85027 COMPLETE CBC AUTOMATED: CPT | Performed by: INTERNAL MEDICINE

## 2024-01-01 PROCEDURE — 36415 COLL VENOUS BLD VENIPUNCTURE: CPT | Performed by: INTERNAL MEDICINE

## 2024-01-01 PROCEDURE — 82947 ASSAY GLUCOSE BLOOD QUANT: CPT

## 2024-01-01 PROCEDURE — 2500000004 HC RX 250 GENERAL PHARMACY W/ HCPCS (ALT 636 FOR OP/ED): Performed by: INTERNAL MEDICINE

## 2024-01-01 PROCEDURE — 96372 THER/PROPH/DIAG INJ SC/IM: CPT | Performed by: INTERNAL MEDICINE

## 2024-01-01 PROCEDURE — 84075 ASSAY ALKALINE PHOSPHATASE: CPT | Performed by: INTERNAL MEDICINE

## 2024-01-01 PROCEDURE — 2500000001 HC RX 250 WO HCPCS SELF ADMINISTERED DRUGS (ALT 637 FOR MEDICARE OP): Performed by: INTERNAL MEDICINE

## 2024-01-01 RX ORDER — SERTRALINE HYDROCHLORIDE 100 MG/1
200 TABLET, FILM COATED ORAL DAILY
Qty: 30 TABLET | Refills: 0 | Status: SHIPPED | OUTPATIENT
Start: 2024-01-02

## 2024-01-01 RX ORDER — TOPIRAMATE 200 MG/1
100 TABLET ORAL 2 TIMES DAILY
Qty: 60 TABLET | Refills: 0 | Status: SHIPPED | OUTPATIENT
Start: 2024-01-01 | End: 2024-03-07 | Stop reason: SDUPTHER

## 2024-01-01 RX ORDER — BUMETANIDE 2 MG/1
2 TABLET ORAL DAILY
Qty: 30 TABLET | Refills: 0 | Status: SHIPPED | OUTPATIENT
Start: 2024-01-01 | End: 2024-01-22 | Stop reason: SDUPTHER

## 2024-01-01 RX ADMIN — CARBIDOPA AND LEVODOPA 1 TABLET: 25; 100 TABLET ORAL at 11:05

## 2024-01-01 RX ADMIN — CARBIDOPA AND LEVODOPA 1 TABLET: 25; 100 TABLET ORAL at 14:50

## 2024-01-01 RX ADMIN — HEPARIN SODIUM 5000 UNITS: 5000 INJECTION INTRAVENOUS; SUBCUTANEOUS at 05:33

## 2024-01-01 RX ADMIN — GABAPENTIN 900 MG: 300 CAPSULE ORAL at 14:50

## 2024-01-01 RX ADMIN — BACLOFEN 20 MG: 10 TABLET ORAL at 11:06

## 2024-01-01 RX ADMIN — ASPIRIN 81 MG: 81 TABLET, COATED ORAL at 11:05

## 2024-01-01 RX ADMIN — POLYETHYLENE GLYCOL 3350 17 G: 17 POWDER, FOR SOLUTION ORAL at 11:04

## 2024-01-01 RX ADMIN — BACLOFEN 20 MG: 10 TABLET ORAL at 14:50

## 2024-01-01 RX ADMIN — OXCARBAZEPINE 600 MG: 300 TABLET, FILM COATED ORAL at 11:05

## 2024-01-01 RX ADMIN — SERTRALINE HYDROCHLORIDE 200 MG: 50 TABLET ORAL at 11:06

## 2024-01-01 RX ADMIN — HEPARIN SODIUM 5000 UNITS: 5000 INJECTION INTRAVENOUS; SUBCUTANEOUS at 14:50

## 2024-01-01 RX ADMIN — LEVOTHYROXINE SODIUM 100 MCG: 100 TABLET ORAL at 05:33

## 2024-01-01 RX ADMIN — QUETIAPINE FUMARATE 100 MG: 100 TABLET ORAL at 11:09

## 2024-01-01 RX ADMIN — CLONAZEPAM 1 MG: 1 TABLET ORAL at 11:06

## 2024-01-01 RX ADMIN — GABAPENTIN 900 MG: 300 CAPSULE ORAL at 11:05

## 2024-01-01 RX ADMIN — POTASSIUM CHLORIDE 40 MEQ: 1500 TABLET, EXTENDED RELEASE ORAL at 11:06

## 2024-01-01 RX ADMIN — ACETAMINOPHEN 650 MG: 325 TABLET ORAL at 11:09

## 2024-01-01 ASSESSMENT — PAIN - FUNCTIONAL ASSESSMENT: PAIN_FUNCTIONAL_ASSESSMENT: 0-10

## 2024-01-01 ASSESSMENT — COGNITIVE AND FUNCTIONAL STATUS - GENERAL
MOVING TO AND FROM BED TO CHAIR: A LITTLE
EATING MEALS: A LITTLE
TOILETING: A LOT
HELP NEEDED FOR BATHING: A LITTLE
TURNING FROM BACK TO SIDE WHILE IN FLAT BAD: A LITTLE
DRESSING REGULAR LOWER BODY CLOTHING: A LITTLE
MOBILITY SCORE: 17
STANDING UP FROM CHAIR USING ARMS: A LITTLE
MOVING FROM LYING ON BACK TO SITTING ON SIDE OF FLAT BED WITH BEDRAILS: A LITTLE
CLIMB 3 TO 5 STEPS WITH RAILING: A LOT
PERSONAL GROOMING: A LITTLE
DAILY ACTIVITIY SCORE: 17
DRESSING REGULAR UPPER BODY CLOTHING: A LITTLE
WALKING IN HOSPITAL ROOM: A LITTLE

## 2024-01-01 ASSESSMENT — PAIN SCALES - GENERAL: PAINLEVEL_OUTOF10: 0 - NO PAIN

## 2024-01-01 NOTE — DISCHARGE SUMMARY
Discharge Diagnosis  GASTON (acute kidney injury) (CMS/Formerly [facility])    Issues Requiring Follow-Up  Neurology  PCP    Discharge Meds     Your medication list        CHANGE how you take these medications        Instructions Last Dose Given Next Dose Due   bumetanide 2 mg tablet  Commonly known as: Bumex  What changed: when to take this      Take 1 tablet (2 mg) by mouth once daily.       lidocaine 5 % patch  Commonly known as: Lidoderm  What changed:   when to take this  reasons to take this      APPLY 1 PATCH AND LEAVE IN PLACE FOR 12 HOURS, THEN REMOVE AND LEAVE OFF FOR 12 HOURS       sertraline 100 mg tablet  Commonly known as: Zoloft  Start taking on: January 2, 2024  What changed: when to take this      Take 2 tablets (200 mg) by mouth once daily. Do not start before January 2, 2024.       topiramate 200 mg tablet  Commonly known as: Topamax  What changed: how much to take      Take 0.5 tablets (100 mg) by mouth 2 times a day.              CONTINUE taking these medications        Instructions Last Dose Given Next Dose Due   acetaminophen 500 mg tablet  Commonly known as: Tylenol           albuterol 90 mcg/actuation inhaler           amitriptyline 10 mg tablet  Commonly known as: Elavil           ammonium lactate 12 % lotion  Commonly known as: Lac-Hydrin           ascorbic acid 1,000 mg tablet  Commonly known as: Vitamin C           aspirin 81 mg EC tablet           atorvastatin 20 mg tablet  Commonly known as: Lipitor           azelastine 137 mcg (0.1 %) nasal spray  Commonly known as: Astelin           baclofen 20 mg tablet  Commonly known as: Lioresal      TAKE 1 TABLET BY MOUTH THREE TIMES A DAY       calcium carbonate 500 mg calcium (1,250 mg) tablet  Commonly known as: Oscal           carbidopa-levodopa  mg tablet  Commonly known as: Sinemet           carvedilol 25 mg tablet  Commonly known as: Coreg           cholecalciferol 50 MCG (2000 UT) tablet  Commonly known as: Vitamin D-3           clonazePAM 1 mg  tablet  Commonly known as: KlonoPIN           clonazePAM 1 mg tablet  Commonly known as: KlonoPIN           diclofenac sodium 1 % gel gel  Commonly known as: Voltaren           estradiol 0.01 % (0.1 mg/gram) vaginal cream  Commonly known as: Estrace           gabapentin 300 mg capsule  Commonly known as: Neurontin           ipratropium-albuteroL 0.5-2.5 mg/3 mL nebulizer solution  Commonly known as: Duo-Neb           Klor-Con M20 20 mEq ER tablet  Generic drug: potassium chloride CR           lancets misc           levothyroxine 200 mcg tablet  Commonly known as: Synthroid, Levoxyl           levothyroxine 100 mcg tablet  Commonly known as: Synthroid, Levoxyl           losartan 50 mg tablet  Commonly known as: Cozaar           meloxicam 15 mg tablet  Commonly known as: Mobic           metFORMIN 500 mg tablet  Commonly known as: Glucophage           omega-3 fatty acids-fish oil 360-1,200 mg capsule           omeprazole 40 mg DR capsule  Commonly known as: PriLOSEC           OXcarbazepine 600 mg tablet  Commonly known as: Trileptal           OXcarbazepine 600 mg tablet  Commonly known as: Trileptal           oxybutynin XL 10 mg 24 hr tablet  Commonly known as: Ditropan-XL           polyethylene glycol 17 gram/dose powder  Commonly known as: Glycolax, Miralax           prazosin 5 mg capsule  Commonly known as: Minipress           QUEtiapine 300 mg tablet  Commonly known as: SEROquel           QUEtiapine 100 mg tablet  Commonly known as: SEROquel           tiZANidine 4 mg tablet  Commonly known as: Zanaflex           Trelegy Ellipta 200-62.5-25 mcg blister with device  Generic drug: fluticasone-umeclidin-vilanter                  STOP taking these medications      calcium carbonate-vitamin D3 500 mg-3.125 mcg (125 unit) tablet tablet        cephalexin 500 mg capsule  Commonly known as: Keflex        metOLazone 2.5 mg tablet  Commonly known as: Zaroxolyn        minocycline 100 mg capsule        OneTouch Verio test strips  strip  Generic drug: blood sugar diagnostic                  Where to Get Your Medications        These medications were sent to [facility]  [address] , [address], [facility] [address]      Hours: 8 AM to 6 PM Mon-Fri, 9 AM to 1 PM Saturday Phone: [phone]   bumetanide 2 mg tablet  sertraline 100 mg tablet  topiramate 200 mg tablet         Test Results Pending At Discharge  Pending Labs       No current pending labs.            Hospital Course  HPI: This is a 58 y.o. female who came to the emergency room from home for stuttering speech and confusion.  She was initially evaluated for rule out CVA.  Patient has had chronic left Bell's palsy which was confounding the presentation.  She also admits to generalized weakness.  She was found to be in acute renal failure.  And hence has been admitted for evaluation and management of all the above   Assessment/Plan   # Stuttering speech/generalized weakness/confusion  -Patient initially was evaluated for rule out stroke  -Will consult neurology  #GASTON (acute kidney injury) (CMS/[facility])  -Improving well with IV fluids  -Will consult nephrology  # Hyponatremia  -As above  # Hypokalemia will replace  # History of Bell's Belsey  # History of torticollis requiring Botox injections  -Last injection in October 2023  # Diabetes mellitus   - insulin sliding scale  # Hypertension  -Continue home meds  # History of fibromyalgia  # History of schizophrenia      12/29: Patient feels better today, discussed with daughter at bedside, will replace potassium.  Will await neuro input- speech has improved - will start planning discharge accordingly as renal functions have improved.        12/30-neuro input appreciated CT head results noted discussed with radiologist.  Patient on a long list of medications, will attempt to rationalize them.  Will await further neuro input.     12/31-discussed with daughter at bedside, all medications reviewed with her.  Daughter wants most of her  medications restarted, feels patient declines when meds are changed.  Neuro input appreciated.  Right facial spasms-has had history of the same.  Has history of torticollis for which she has received Botox injections.  I am still concerned about the long list of her medications.  Family insistent on having patient on them.  Med changes have to be addressed by PCP as outpatient.  Will plan discharge when okay with neurology.     1/1-patient feels well, discussed with daughter at bedside, all medications for home-going discussed, changes with medications discussed, follow-up discussed.  They both feel comfortable with discharge. Discharge is planned for today . Discharge medications were reconciled. Discharge orders completed. Hospital course , medication changes and Investigation's conducted were reviewed with the patient / Family. Activity, Diet and Medications after discharge were reviewed with the patient / Family. Medication reconciliation done - pls refer to medication reconciliation sheet.Follow up with Primary Care Physician were reviewed with the patient / Family. Discharge planning was discussed with staff. Refer to discharge orders sheet. Total time to coordinate disch process incl counseling family, coordinating with other care givers,  and pharmacist was >35  Pertinent Physical Exam At Time of Discharge  Physical Exam    Outpatient Follow-Up  Future Appointments   Date Time Provider Department Center   1/2/2024  9:40 AM Norbert Acevedo MD [id number] [facility]   1/12/2024  9:00 AM Sanjiv GAYTAN MD [id number] None   1/18/2024  4:20 PM Angela Shah MD [id number] [facility]   1/22/2024  3:00 PM Deshawn Adorno MD [id number] [facility]   2/1/2024  3:30 PM David Swanson MD [id number] [facility]   4/5/2024 10:00 AM Jaycob Jurado MD [id number] [facility]   4/10/2024  1:00 PM [facility] [place] [facility] KEYONA Mcqueen MD

## 2024-01-01 NOTE — PROGRESS NOTES
Prema Hair is a 58 y.o. female on day 4 of admission presenting with GASTON (acute kidney injury) (CMS/[facility]).      Subjective   HPI: This is a 58 y.o. female who came to the emergency room from home for stuttering speech and confusion.  She was initially evaluated for rule out CVA.  Patient has had chronic left Bell's palsy which was confounding the presentation.  She also admits to generalized weakness.  She was found to be in acute renal failure.  And hence has been admitted for evaluation and management of all the above        Objective     Last Recorded Vitals  /75   Pulse 62   Temp 35.8 °C (96.5 °F) (Temporal)   Resp 17   Wt 99.8 kg (220 lb)   SpO2 98%   Intake/Output last 3 Shifts:  No intake or output data in the 24 hours ending 01/01/24 1458      Admission Weight  Weight: 104 kg (229 lb 4.5 oz) (12/27/23 1747)    Daily Weight  12/28/23 : 99.8 kg (220 lb)    Image Results  ECG 12 lead  Normal sinus rhythm  Minimal voltage criteria for LVH, may be normal variant ( R in aVL )  Possible Inferior infarct , age undetermined  Prolonged QT  Abnormal ECG  When compared with ECG of 15-MAY-2023 09:25,  Previous ECG has undetermined rhythm, needs review  Borderline criteria for Inferior infarct are now Present  ST no longer elevated in Inferior leads  QT has lengthened  See ED provider note for full interpretation and clinical correlation  Confirmed by Charleen Guillaume ([id number]) on 12/30/2023 7:37:49 PM  CT brain attack head wo IV contrast  Narrative: Interpreted By:  Yeison Lackey,   STUDY:  CT BRAIN ATTACK HEAD WO IV CONTRAST;  12/30/2023 4:48 pm      INDICATION:  Signs/Symptoms:garbled speech.      COMPARISON:  CT and CTA dated 12/27/2022.      ACCESSION NUMBER(S):  [id number]      ORDERING CLINICIAN:  EVELYN EDMONDS      TECHNIQUE:  Noncontrast axial CT scan of head was performed. Angled reformats in  brain and bone windows were generated. The images were reviewed in  bone, brain, blood and soft  tissue windows.      FINDINGS:  CSF Spaces: The ventricles, sulci and basal cisterns are within  normal limits. There is no extraaxial fluid collection.      Parenchyma: There are mild periventricular and basal ganglia  hypodensities suggestive of mild chronic ischemic change. The  grey-white differentiation is intact. There is no mass effect or  midline shift.  There is no intracranial hemorrhage.      There are partially visualized surgical coils in the upper right neck  better evaluated on previous CTA.      Calvarium: The calvarium is unremarkable.      Paranasal sinuses and mastoids: Visualized paranasal sinuses and  mastoids are clear.      Impression: No evidence of acute cortical infarct or intracranial hemorrhage.      . Yeison Lackey discussed the significance and urgency of this  critical finding by telephone with  Mickey Mcqueen on 12/30/2023 at  5:10 Pm.  (**-RCF-**) Findings:  See findings.              MACRO:  None      Signed by: Yeison Lackey 12/30/2023 5:17 PM  Dictation workstation:   [id number]      Physical Exam  GENERAL: awake, alert, Ox3, cooperative resting comfortably  SKIN: Skin turgor normal. No rashes  HEENT: no epistaxis, Moist mucosa.  NECK: supple  BACK: spine nontender to palpation, No CVAT.  LUNGS: Vesicular breath sounds, with no wheeze, no crepitations.   CARDIAC: REGULAR. S1 and S2; no rubs, no murmur  ABDOMEN: Abdomen soft, non-tender. BS+  EXTREMITIES: No edema, Good capillary refill.   NEURO: Insight GOOD. Motor and sensory exam wnl.  Stuttering speech   MUSCULOSKELETAL: No acute inflammation     Relevant Results  Results for orders placed or performed during the hospital encounter of 12/27/23 (from the past 24 hour(s))   POCT GLUCOSE   Result Value Ref Range    POCT Glucose 118 (H) 74 - 99 mg/dL   POCT GLUCOSE   Result Value Ref Range    POCT Glucose 118 (H) 74 - 99 mg/dL   CBC   Result Value Ref Range    WBC 5.8 4.4 - 11.3 x10*3/uL    nRBC 0.0 0.0 - 0.0 /100 WBCs    RBC  4.76 4.00 - 5.20 x10*6/uL    Hemoglobin 13.3 12.0 - 16.0 g/dL    Hematocrit 42.8 36.0 - 46.0 %    MCV 90 80 - 100 fL    MCH 27.9 26.0 - 34.0 pg    MCHC 31.1 (L) 32.0 - 36.0 g/dL    RDW 14.6 (H) 11.5 - 14.5 %    Platelets 172 150 - 450 x10*3/uL   Comprehensive Metabolic Panel   Result Value Ref Range    Glucose 90 74 - 99 mg/dL    Sodium 138 136 - 145 mmol/L    Potassium 3.8 3.5 - 5.3 mmol/L    Chloride 94 (L) 98 - 107 mmol/L    Bicarbonate 35 (H) 21 - 32 mmol/L    Anion Gap 13 10 - 20 mmol/L    Urea Nitrogen 11 6 - 23 mg/dL    Creatinine 0.48 (L) 0.50 - 1.05 mg/dL    eGFR >90 >60 mL/min/1.73m*2    Calcium 8.7 8.6 - 10.3 mg/dL    Albumin 3.3 (L) 3.4 - 5.0 g/dL    Alkaline Phosphatase 98 33 - 110 U/L    Total Protein 5.6 (L) 6.4 - 8.2 g/dL    AST 15 9 - 39 U/L    Bilirubin, Total 0.4 0.0 - 1.2 mg/dL    ALT 12 7 - 45 U/L   POCT GLUCOSE   Result Value Ref Range    POCT Glucose 107 (H) 74 - 99 mg/dL   POCT GLUCOSE   Result Value Ref Range    POCT Glucose 119 (H) 74 - 99 mg/dL        Assessment/Plan      # Stuttering speech/generalized weakness/confusion  -Patient initially was evaluated for rule out stroke  -Will consult neurology  #GASTON (acute kidney injury) (CMS/[facility])  -Improving well with IV fluids  -Will consult nephrology  # Hyponatremia  -As above  # Hypokalemia will replace  # History of Bell's Belsey  # History of torticollis requiring Botox injections  -Last injection in October 2023  # Diabetes mellitus   - insulin sliding scale  # Hypertension  -Continue home meds  # History of fibromyalgia  # History of schizophrenia      12/29: Patient feels better today, discussed with daughter at bedside, will replace potassium.  Will await neuro input- speech has improved - will start planning discharge accordingly as renal functions have improved.     12/30-neuro input appreciated CT head results noted discussed with radiologist.  Patient on a long list of medications, will attempt to rationalize them.  Will await further  neuro input.    12/31-discussed with daughter at bedside, all medications reviewed with her.  Daughter wants most of her medications restarted, feels patient declines when meds are changed.  Neuro input appreciated.  Right facial spasms-has had history of the same.  Has history of torticollis for which she has received Botox injections.  I am still concerned about the long list of her medications.  Family insistent on having patient on them.  Med changes have to be addressed by PCP as outpatient.  Will plan discharge when okay with neurology.    1/1-patient feels well, discussed with daughter at bedside, all medications for home-going discussed, changes with medications discussed, follow-up discussed.  They both feel comfortable with discharge. Discharge is planned for today . Discharge medications were reconciled. Discharge orders completed. Hospital course , medication changes and Investigation's conducted were reviewed with the patient / Family. Activity, Diet and Medications after discharge were reviewed with the patient / Family. Medication reconciliation done - pls refer to medication reconciliation sheet.Follow up with Primary Care Physician were reviewed with the patient / Family. Discharge planning was discussed with staff. Refer to discharge orders sheet. Total time to coordinate disch process incl counseling family, coordinating with other care givers,  and pharmacist was >35 min.     Mickey Mcqueen MD

## 2024-01-01 NOTE — CARE PLAN
The patient's goals for the shift include      The clinical goals for the shift include Patient will remain HD stable    Over the shift, the patient did make progress toward the following goals.

## 2024-01-02 ENCOUNTER — DOCUMENTATION (OUTPATIENT)
Dept: HOME HEALTH SERVICES | Facility: HOME HEALTH | Age: 59
End: 2024-01-02
Payer: MEDICARE

## 2024-01-02 ENCOUNTER — HOME HEALTH ADMISSION (OUTPATIENT)
Dept: HOME HEALTH SERVICES | Facility: HOME HEALTH | Age: 59
End: 2024-01-02
Payer: MEDICARE

## 2024-01-02 ENCOUNTER — APPOINTMENT (OUTPATIENT)
Dept: RADIOLOGY | Facility: HOSPITAL | Age: 59
End: 2024-01-02
Payer: MEDICARE

## 2024-01-02 NOTE — HH CARE COORDINATION
Home Care received a Referral for Nursing. PT OTWe have processed the referral for a Start of Care on 1.4.2024.     If you have any questions or concerns, please feel free to contact us at [phone]. Follow the prompts, enter your five digit zip code, and you will be directed to your care team on CENTL 2.

## 2024-01-05 ENCOUNTER — HOME CARE VISIT (OUTPATIENT)
Dept: HOME HEALTH SERVICES | Facility: HOME HEALTH | Age: 59
End: 2024-01-05
Payer: MEDICARE

## 2024-01-05 VITALS
TEMPERATURE: 96.8 F | HEART RATE: 64 BPM | DIASTOLIC BLOOD PRESSURE: 78 MMHG | OXYGEN SATURATION: 98 % | RESPIRATION RATE: 16 BRPM | SYSTOLIC BLOOD PRESSURE: 110 MMHG

## 2024-01-05 PROCEDURE — 169592 NO-PAY CLAIM PROCEDURE

## 2024-01-05 PROCEDURE — G0151 HHCP-SERV OF PT,EA 15 MIN: HCPCS | Mod: HHH

## 2024-01-05 PROCEDURE — 1090000001 HH PPS REVENUE CREDIT

## 2024-01-05 PROCEDURE — 0023 HH SOC

## 2024-01-05 PROCEDURE — 1090000002 HH PPS REVENUE DEBIT

## 2024-01-05 SDOH — HEALTH STABILITY: PHYSICAL HEALTH: EXERCISE TYPE: NOT TODAY

## 2024-01-05 ASSESSMENT — ENCOUNTER SYMPTOMS
PAIN LOCATION: RIGHT KNEE
MUSCLE WEAKNESS: 1
SUBJECTIVE PAIN PROGRESSION: UNCHANGED
LOWEST PAIN SEVERITY IN PAST 24 HOURS: 7/10
FATIGUES EASILY: 1
OCCASIONAL FEELINGS OF UNSTEADINESS: 0
HYPERTENSION: 1
PERSON REPORTING PAIN: PATIENT
HIGHEST PAIN SEVERITY IN PAST 24 HOURS: 10/10
PAIN: 1
PAIN LOCATION - RELIEVING FACTORS: MEDS
DEPRESSION: 0
LOSS OF SENSATION IN FEET: 1
PAIN LOCATION - EXACERBATING FACTORS: OA

## 2024-01-05 ASSESSMENT — PAIN SCALES - PAIN ASSESSMENT IN ADVANCED DEMENTIA (PAINAD)
TOTALSCORE: 0
NEGVOCALIZATION: 0 - NONE.
BODYLANGUAGE: 0
BREATHING: 0
BODYLANGUAGE: 0 - RELAXED.
NEGVOCALIZATION: 0
CONSOLABILITY: 0
CONSOLABILITY: 0 - NO NEED TO CONSOLE.
FACIALEXPRESSION: 0
FACIALEXPRESSION: 0 - SMILING OR INEXPRESSIVE.

## 2024-01-05 ASSESSMENT — ACTIVITIES OF DAILY LIVING (ADL)
OASIS_M1830: 03
AMBULATION ASSISTANCE: 1
CURRENT_FUNCTION: SUPERVISION
AMBULATION ASSISTANCE ON FLAT SURFACES: 1
PHYSICAL TRANSFERS ASSESSED: 1
AMBULATION ASSISTANCE: SUPERVISION
ENTERING_EXITING_HOME: NEEDS ASSISTANCE

## 2024-01-06 ENCOUNTER — HOME CARE VISIT (OUTPATIENT)
Dept: HOME HEALTH SERVICES | Facility: HOME HEALTH | Age: 59
End: 2024-01-06
Payer: MEDICARE

## 2024-01-06 PROCEDURE — 1090000001 HH PPS REVENUE CREDIT

## 2024-01-06 PROCEDURE — 1090000002 HH PPS REVENUE DEBIT

## 2024-01-07 DIAGNOSIS — I50.33 ACUTE ON CHRONIC DIASTOLIC HEART FAILURE (MULTI): Primary | ICD-10-CM

## 2024-01-07 PROCEDURE — 1090000001 HH PPS REVENUE CREDIT

## 2024-01-07 PROCEDURE — 1090000002 HH PPS REVENUE DEBIT

## 2024-01-08 ENCOUNTER — HOME CARE VISIT (OUTPATIENT)
Dept: HOME HEALTH SERVICES | Facility: HOME HEALTH | Age: 59
End: 2024-01-08
Payer: MEDICARE

## 2024-01-08 LAB
ATRIAL RATE: 49 BPM
P AXIS: 45 DEGREES
P OFFSET: 190 MS
P ONSET: 130 MS
PR INTERVAL: 176 MS
Q ONSET: 218 MS
QRS COUNT: 8 BEATS
QRS DURATION: 88 MS
QT INTERVAL: 544 MS
QTC CALCULATION(BAZETT): 491 MS
QTC FREDERICIA: 508 MS
R AXIS: -4 DEGREES
T AXIS: 12 DEGREES
T OFFSET: 490 MS
VENTRICULAR RATE: 49 BPM

## 2024-01-08 PROCEDURE — 1090000001 HH PPS REVENUE CREDIT

## 2024-01-08 PROCEDURE — G0152 HHCP-SERV OF OT,EA 15 MIN: HCPCS | Mod: HHH

## 2024-01-08 PROCEDURE — 1090000002 HH PPS REVENUE DEBIT

## 2024-01-08 RX ORDER — CARVEDILOL 25 MG/1
25 TABLET ORAL 2 TIMES DAILY
Qty: 180 TABLET | Refills: 1 | Status: SHIPPED | OUTPATIENT
Start: 2024-01-08 | End: 2024-01-22 | Stop reason: SDUPTHER

## 2024-01-08 ASSESSMENT — ENCOUNTER SYMPTOMS
PAIN LOCATION - RELIEVING FACTORS: MEDICATION, REST
PAIN: 1
PAIN SEVERITY GOAL: 5/10
PAIN LOCATION - PAIN SEVERITY: 10/10
PAIN LOCATION - EXACERBATING FACTORS: MOBILITY
PAIN LOCATION - PAIN DURATION: CONSTANT
HIGHEST PAIN SEVERITY IN PAST 24 HOURS: 10/10
PAIN LOCATION: LEFT KNEE
PERSON REPORTING PAIN: PATIENT
SUBJECTIVE PAIN PROGRESSION: WAXING AND WANING
PAIN LOCATION - PAIN QUALITY: ACHE
LOWEST PAIN SEVERITY IN PAST 24 HOURS: 7/10
PAIN LOCATION - PAIN FREQUENCY: CONSTANT

## 2024-01-08 ASSESSMENT — ACTIVITIES OF DAILY LIVING (ADL)
DRESSING_UB_CURRENT_FUNCTION: STAND BY ASSIST
LAUNDRY: DEPENDENT
BATHING ASSESSED: 1
BATHING_CURRENT_FUNCTION: MODERATE ASSIST
DRESSING_LB_CURRENT_FUNCTION: STAND BY ASSIST
PREPARING MEALS: DEPENDENT
LAUNDRY ASSESSED: 1

## 2024-01-09 PROCEDURE — 1090000002 HH PPS REVENUE DEBIT

## 2024-01-09 PROCEDURE — 1090000001 HH PPS REVENUE CREDIT

## 2024-01-10 ENCOUNTER — HOME CARE VISIT (OUTPATIENT)
Dept: HOME HEALTH SERVICES | Facility: HOME HEALTH | Age: 59
End: 2024-01-10
Payer: MEDICARE

## 2024-01-10 PROCEDURE — G0158 HHC OT ASSISTANT EA 15: HCPCS | Mod: HHH

## 2024-01-10 PROCEDURE — 1090000001 HH PPS REVENUE CREDIT

## 2024-01-10 PROCEDURE — G0157 HHC PT ASSISTANT EA 15: HCPCS | Mod: HHH

## 2024-01-10 PROCEDURE — 1090000002 HH PPS REVENUE DEBIT

## 2024-01-11 VITALS
HEART RATE: 73 BPM | OXYGEN SATURATION: 97 % | RESPIRATION RATE: 18 BRPM | TEMPERATURE: 97.6 F | DIASTOLIC BLOOD PRESSURE: 67 MMHG | SYSTOLIC BLOOD PRESSURE: 127 MMHG

## 2024-01-11 PROCEDURE — 1090000001 HH PPS REVENUE CREDIT

## 2024-01-11 PROCEDURE — 1090000002 HH PPS REVENUE DEBIT

## 2024-01-11 ASSESSMENT — ENCOUNTER SYMPTOMS
PAIN LOCATION - PAIN SEVERITY: 3/10
PAIN LOCATION: LEFT KNEE
PAIN LOCATION - RELIEVING FACTORS: REST
PAIN SEVERITY GOAL: 1/10
PAIN LOCATION - PAIN SEVERITY: 2/10
LOWEST PAIN SEVERITY IN PAST 24 HOURS: 1/10
PAIN LOCATION - EXACERBATING FACTORS: MOVING
PAIN LOCATION - PAIN FREQUENCY: INTERMITTENT
PAIN LOCATION - PAIN FREQUENCY: INTERMITTENT
HIGHEST PAIN SEVERITY IN PAST 24 HOURS: 5/10
PAIN LOCATION: NECK
PAIN LOCATION: RIGHT KNEE
PAIN LOCATION - RELIEVING FACTORS: REST
SUBJECTIVE PAIN PROGRESSION: UNCHANGED
PAIN LOCATION - PAIN FREQUENCY: INTERMITTENT
PAIN LOCATION - RELIEVING FACTORS: REST
PAIN LOCATION - PAIN SEVERITY: 4/10
PAIN LOCATION - EXACERBATING FACTORS: MOVING
PAIN LOCATION - EXACERBATING FACTORS: MOVING

## 2024-01-11 ASSESSMENT — ACTIVITIES OF DAILY LIVING (ADL)
DRESSING_UB_CURRENT_FUNCTION: MINIMUM ASSIST
AMBULATION ASSISTANCE: STAND BY ASSIST
DRESSING_UB_CURRENT_FUNCTION: CONTACT GUARD ASSIST
AMBULATION ASSISTANCE: CONTACT GUARD ASSIST
AMBULATION ASSISTANCE: 1

## 2024-01-12 ENCOUNTER — OFFICE VISIT (OUTPATIENT)
Dept: CARDIOLOGY | Facility: CLINIC | Age: 59
End: 2024-01-12
Payer: MEDICARE

## 2024-01-12 ENCOUNTER — HOME CARE VISIT (OUTPATIENT)
Dept: HOME HEALTH SERVICES | Facility: HOME HEALTH | Age: 59
End: 2024-01-12
Payer: MEDICARE

## 2024-01-12 VITALS
OXYGEN SATURATION: 99 % | HEART RATE: 79 BPM | HEIGHT: 68 IN | DIASTOLIC BLOOD PRESSURE: 70 MMHG | SYSTOLIC BLOOD PRESSURE: 118 MMHG | WEIGHT: 232 LBS | BODY MASS INDEX: 35.16 KG/M2

## 2024-01-12 DIAGNOSIS — E11.69 DM TYPE 2 WITH DIABETIC MIXED HYPERLIPIDEMIA (MULTI): ICD-10-CM

## 2024-01-12 DIAGNOSIS — E78.2 DM TYPE 2 WITH DIABETIC MIXED HYPERLIPIDEMIA (MULTI): ICD-10-CM

## 2024-01-12 DIAGNOSIS — R06.09 DOE (DYSPNEA ON EXERTION): ICD-10-CM

## 2024-01-12 DIAGNOSIS — I50.23 ACUTE ON CHRONIC SYSTOLIC HEART FAILURE (MULTI): ICD-10-CM

## 2024-01-12 DIAGNOSIS — E78.5 DYSLIPIDEMIA: ICD-10-CM

## 2024-01-12 DIAGNOSIS — I72.0 CAROTID PSEUDOANEURYSM (CMS-HCC): ICD-10-CM

## 2024-01-12 DIAGNOSIS — I50.33 ACUTE ON CHRONIC DIASTOLIC HEART FAILURE (MULTI): Primary | ICD-10-CM

## 2024-01-12 DIAGNOSIS — I11.0 BENIGN HYPERTENSIVE HEART DISEASE WITH HEART FAILURE (MULTI): ICD-10-CM

## 2024-01-12 PROCEDURE — 1036F TOBACCO NON-USER: CPT | Performed by: STUDENT IN AN ORGANIZED HEALTH CARE EDUCATION/TRAINING PROGRAM

## 2024-01-12 PROCEDURE — 3078F DIAST BP <80 MM HG: CPT | Performed by: STUDENT IN AN ORGANIZED HEALTH CARE EDUCATION/TRAINING PROGRAM

## 2024-01-12 PROCEDURE — 3074F SYST BP LT 130 MM HG: CPT | Performed by: STUDENT IN AN ORGANIZED HEALTH CARE EDUCATION/TRAINING PROGRAM

## 2024-01-12 PROCEDURE — 4010F ACE/ARB THERAPY RXD/TAKEN: CPT | Performed by: STUDENT IN AN ORGANIZED HEALTH CARE EDUCATION/TRAINING PROGRAM

## 2024-01-12 PROCEDURE — 3008F BODY MASS INDEX DOCD: CPT | Performed by: STUDENT IN AN ORGANIZED HEALTH CARE EDUCATION/TRAINING PROGRAM

## 2024-01-12 PROCEDURE — 1090000002 HH PPS REVENUE DEBIT

## 2024-01-12 PROCEDURE — 99215 OFFICE O/P EST HI 40 MIN: CPT | Performed by: STUDENT IN AN ORGANIZED HEALTH CARE EDUCATION/TRAINING PROGRAM

## 2024-01-12 PROCEDURE — 1090000001 HH PPS REVENUE CREDIT

## 2024-01-12 NOTE — PROGRESS NOTES
Follow-up     HPI:    Pream Hair is a 58 y.o. 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    She is accompanied by her daughter who provides history and has questions.  Unfortunately she had a fall this morning that was mechanical in nature.  We reviewed and discussed her recent hospital course.  She is no longer on metolazone.  Dyspnea on exertion lower extremity edema is relatively stable.  No exacerbating or relieving factors.  Patient denies chest pain and angina.  Pt denies orthopnea, and paroxysmal nocturnal dyspnea.   Pt denies palpitations or syncope.  No recent falls.  No fever or chills.  No cough.  No change in bowel or bladder habits.  No sick contacts.  No recent travel.    12 point review of systems including (Constitutional, Eyes, ENMT, Respiratory, Cardiac, Gastrointestinal, Neurological, Psychiatric, and Hematologic) was performed and is otherwise negative.    Past medical history reviewed:   has a past medical history of Cervicalgia (10/09/2020), Dysphonia (07/05/2022), Encounter for fitting and adjustment of other specified devices (02/16/2021), Hyperlipidemia, unspecified (01/03/2023), Hypokalemia  (01/20/2022), Low back pain, unspecified (02/22/2021), Obstructive sleep apnea (adult) (pediatric) (01/03/2023), Paralysis of vocal cords and larynx, unspecified (10/07/2022), Type 2 diabetes mellitus with diabetic neuropathy, unspecified (CMS/[facility]) (10/09/2020), Type 2 diabetes mellitus with other specified complication (CMS/[facility]) (01/03/2023), and Type 2 diabetes mellitus with other specified complication (CMS/[facility]) (09/14/2021).    Past surgical history reviewed:   has a past surgical history that includes Other surgical history (10/09/2020); US guided thyroid biopsy (11/19/2020); and US guided thyroid biopsy (11/19/2020).    Social history reviewed:   reports that she has never smoked. She has never used smokeless tobacco. She reports that she does not currently use alcohol. She reports that she does not use drugs.     Family history reviewed:    Family History   Problem Relation Name Age of Onset    Other (cardiac disorder) Mother      Heart failure Mother      Heart failure Father         Allergies reviewed: Lisinopril     Medications reviewed:   Current Outpatient Medications   Medication Instructions    acetaminophen (Tylenol) 500 mg tablet 1 tablet, oral, Every 4 hours PRN    albuterol 90 mcg/actuation inhaler 2 puffs, inhalation, Every 4 hours PRN    amitriptyline (Elavil) 10 mg tablet 1 tablet, oral, Nightly    ammonium lactate (Lac-Hydrin) 12 % lotion 1 Application, Topical, 2 times daily    ascorbic acid (Vitamin C) 1,000 mg tablet 1 tablet, oral, Daily    aspirin 81 mg EC tablet 1 tablet, oral, Daily    atorvastatin (Lipitor) 20 mg tablet 1 tablet, oral, Nightly    azelastine (Astelin) 137 mcg (0.1 %) nasal spray 1 spray, Each Nostril, 2 times daily    baclofen (LIORESAL) 20 mg, oral, 3 times daily    bumetanide (BUMEX) 2 mg, oral, Daily    calcium carbonate (Oscal) 500 mg calcium (1,250 mg) tablet 1 tablet, oral, 3 times daily    carbidopa-levodopa (Sinemet)  mg tablet Take 1 tablet by mouth 3  times a day.    carvedilol (COREG) 25 mg, oral, 2 times daily    cephalexin (Keflex) 500 mg capsule 1 capsule, oral, Daily    cholecalciferol (Vitamin D-3) 50 MCG (2000 UT) tablet 1 tablet, oral, Daily    clonazePAM (KlonoPIN) 1 mg tablet 1 tablet, oral, Every morning, 1/2 TABLET BY MOUTH EVERY EVENING AND AN ADDITIONAL 1/2 AS NEEDED<BR>    clonazePAM (KlonoPIN) 1 mg tablet 2 tablets, oral, Every evening    diclofenac sodium (Voltaren) 1 % gel gel 1 Application, Topical, 3 times daily PRN    estradiol (Estrace) 0.01 % (0.1 mg/gram) vaginal cream 1 Application, vaginal, 3 times weekly, Pea-sized amount    gabapentin (Neurontin) 300 mg capsule 3 capsules, oral, 3 times daily    ipratropium-albuteroL (Duo-Neb) 0.5-2.5 mg/3 mL nebulizer solution 3 mL, inhalation, Every 4 hours PRN    Klor-Con M20 20 mEq ER tablet 2 tablets, oral, 2 times daily    lancets misc 1 each, miscellaneous, 3 times daily    levothyroxine (Synthroid, Levoxyl) 100 mcg tablet 1 tablet, oral, Once daily (morning) M-F (5 days a week), 1 tablet 5 days a week Mon-Friday and two tabs on Saturdays and Sundays     levothyroxine (Synthroid, Levoxyl) 200 mcg tablet 1 tablet, oral, 2 times weekly, Saturday and sunday    lidocaine (Lidoderm) 5 % patch 1 patch, transdermal, Every 12 hours, Apply 1 patch and leave in place for 12 hours, then remove and leave off for 12 hours.    losartan (Cozaar) 50 mg tablet 1 tablet, oral, Daily    meloxicam (Mobic) 15 mg tablet 1 tablet, oral, Daily PRN    metFORMIN (Glucophage) 500 mg tablet 1 tablet, oral, 2 times daily with meals    omega-3 fatty acids-fish oil 360-1,200 mg capsule 1 capsule, oral, Daily    omeprazole (PriLOSEC) 40 mg DR capsule 1 capsule, oral, Daily, With dinner<BR>    OXcarbazepine (Trileptal) 600 mg tablet 1 tablet, oral, Every morning    OXcarbazepine (Trileptal) 600 mg tablet 2 tablets, oral, Nightly    oxybutynin XL (Ditropan-XL) 10 mg 24 hr tablet 1 tablet, oral, Daily    polyethylene glycol  (GLYCOLAX, MIRALAX) 17 g, oral, Daily, In 8oz of water, juice, or tea and drink daily<BR>    prazosin (Minipress) 5 mg capsule 1 capsule, oral, Nightly    QUEtiapine (SEROquel) 100 mg tablet 1 tablet, oral, Every morning    QUEtiapine (SEROquel) 300 mg tablet 1 tablet, oral, Nightly    sertraline (Zoloft) 100 mg tablet Take 2 tablets (200 mg) by mouth once daily. Do not start before January 2, 2024.    tiZANidine (Zanaflex) 4 mg tablet 1 tablet, oral, 3 times daily    topiramate (TOPAMAX) 100 mg, oral, 2 times daily    Trelegy Ellipta 200-62.5-25 mcg blister with device 1 puff, inhalation, Daily PRN        Vitals reviewed: Visit Vitals  /70   Pulse 79       Physical Exam:   General:  Patient is awake, alert, and oriented.  Patient is in no acute distress.  HEENT:  Pupils equal and reactive.  Normocephalic.  Moist mucosa.    Neck:  No thyromegaly.  10 cm Jugular Venous Pressure.  Cardiovascular:  Regular rate and rhythm.  Normal S1 and S2.  1/6 LARY.  Pulmonary:  Clear to auscultation bilaterally.  Abdomen:  Soft. Non-tender.   Non-distended.  Positive bowel sounds.  Lower Extremities:  2+ pedal pulses. 1+ LE edema.  Neurologic:  Cranial nerves intact.  No focal deficit.   Skin: Skin warm and dry, normal skin turgor.   Psychiatric: Normal affect.    Last Labs:  CBC -      Lab Results   Component Value Date    WBC 5.8 01/01/2024    HGB 13.3 01/01/2024    HCT 42.8 01/01/2024     01/01/2024        CMP-  Lab Results   Component Value Date    GLUCOSE 90 01/01/2024     01/01/2024    K 3.8 01/01/2024    CL 94 (L) 01/01/2024    CO2 35 (H) 01/01/2024    ANIONGAP 13 01/01/2024    BUN 11 01/01/2024    CREATININE 0.48 (L) 01/01/2024    EGFR >90 01/01/2024    CALCIUM 8.7 01/01/2024    PHOS 4.0 09/14/2023    PROT 5.6 (L) 01/01/2024    ALBUMIN 3.3 (L) 01/01/2024    AST 15 01/01/2024    ALT 12 01/01/2024    ALKPHOS 98 01/01/2024    BILITOT 0.4 01/01/2024        LIPIDS-  Lab Results   Component Value Date    CHOL  158 12/29/2022    TRIG 76 12/29/2022    HDL 62.3 12/29/2022    CHHDL 2.5 12/29/2022    VLDL 15 12/29/2022        OTHERS-  Lab Results   Component Value Date    HGBA1C 5.6 04/17/2023    HGBA1C 6.3 (H) 07/17/2020    BNP 43 09/14/2023        I personally reviewed the patient's recent vitals, labs, medications, orders, EKGs, pertinent cardiac imaging/ echocardiography and ischemic evaluations including stress testing/ cardiac catheterization.    Assessment and Plan:    Prema Hair is a 58 y.o. 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  She is accompanied by her daughter who provides history and has questions.  Unfortunately she had a fall this morning that was mechanical in nature.  We reviewed and discussed her recent hospital course.  She is no longer on metolazone.  Dyspnea on exertion lower extremity edema is relatively stable.  She appears near euvolemic.  Her BNP has come down and her creatinine is stable.    Please continue your current cardiac medication including bumetanide 2 mg tablet twice daily, aspirin 81 mg, atorvastatin 20 mg, carvedilol 25 mg twice daily, losartan 50 mg daily.    Please followup with me in  Cardiology clinic within the next 3 months.  Please return to clinic sooner or seek emergent care if your symptoms reoccur or worsen.    Thank you for allowing me to participate in their care.  Please feel free to call me with any further questions or concerns.        Sanjiv Madison MD, FAC, DADA IVERSON  Division of Cardiovascular Medicine  Medical Director, [facility]  [facility]  Assistant Clinical Professor, Medicine  [facility] [employer]  [email or web address]  Office:  [phone]

## 2024-01-12 NOTE — PATIENT INSTRUCTIONS
Please continue your current cardiac medication including bumetanide 2 mg tablet twice daily, aspirin 81 mg, atorvastatin 20 mg, carvedilol 25 mg twice daily, losartan 50 mg daily.    Please followup with me in Cardiology clinic within the next 3 months.  Please return to clinic sooner or seek emergent care if your symptoms reoccur or worsen.

## 2024-01-13 PROCEDURE — 1090000001 HH PPS REVENUE CREDIT

## 2024-01-13 PROCEDURE — 1090000002 HH PPS REVENUE DEBIT

## 2024-01-14 PROCEDURE — 1090000001 HH PPS REVENUE CREDIT

## 2024-01-14 PROCEDURE — 1090000002 HH PPS REVENUE DEBIT

## 2024-01-15 ENCOUNTER — HOME CARE VISIT (OUTPATIENT)
Dept: HOME HEALTH SERVICES | Facility: HOME HEALTH | Age: 59
End: 2024-01-15
Payer: MEDICARE

## 2024-01-15 PROCEDURE — 1090000001 HH PPS REVENUE CREDIT

## 2024-01-15 PROCEDURE — 1090000002 HH PPS REVENUE DEBIT

## 2024-01-15 PROCEDURE — G0157 HHC PT ASSISTANT EA 15: HCPCS | Mod: HHH

## 2024-01-16 ENCOUNTER — HOME CARE VISIT (OUTPATIENT)
Dept: HOME HEALTH SERVICES | Facility: HOME HEALTH | Age: 59
End: 2024-01-16
Payer: MEDICARE

## 2024-01-16 PROCEDURE — 1090000002 HH PPS REVENUE DEBIT

## 2024-01-16 PROCEDURE — G0158 HHC OT ASSISTANT EA 15: HCPCS | Mod: HHH

## 2024-01-16 PROCEDURE — 1090000001 HH PPS REVENUE CREDIT

## 2024-01-17 VITALS
HEART RATE: 76 BPM | DIASTOLIC BLOOD PRESSURE: 85 MMHG | TEMPERATURE: 97.4 F | SYSTOLIC BLOOD PRESSURE: 132 MMHG | RESPIRATION RATE: 19 BRPM | OXYGEN SATURATION: 96 %

## 2024-01-17 PROCEDURE — 1090000002 HH PPS REVENUE DEBIT

## 2024-01-17 PROCEDURE — 1090000001 HH PPS REVENUE CREDIT

## 2024-01-18 ENCOUNTER — HOME CARE VISIT (OUTPATIENT)
Dept: HOME HEALTH SERVICES | Facility: HOME HEALTH | Age: 59
End: 2024-01-18
Payer: MEDICARE

## 2024-01-18 ENCOUNTER — OFFICE VISIT (OUTPATIENT)
Dept: ENDOCRINOLOGY | Facility: CLINIC | Age: 59
End: 2024-01-18
Payer: MEDICARE

## 2024-01-18 ENCOUNTER — TELEPHONE (OUTPATIENT)
Dept: PAIN MEDICINE | Facility: HOSPITAL | Age: 59
End: 2024-01-18

## 2024-01-18 VITALS
BODY MASS INDEX: 37.07 KG/M2 | SYSTOLIC BLOOD PRESSURE: 136 MMHG | HEART RATE: 73 BPM | DIASTOLIC BLOOD PRESSURE: 94 MMHG | TEMPERATURE: 98 F | WEIGHT: 236.2 LBS | HEIGHT: 67 IN

## 2024-01-18 DIAGNOSIS — E04.1 THYROID NODULE: ICD-10-CM

## 2024-01-18 DIAGNOSIS — E89.0 POST-SURGICAL HYPOTHYROIDISM: Primary | ICD-10-CM

## 2024-01-18 PROCEDURE — 3080F DIAST BP >= 90 MM HG: CPT | Performed by: STUDENT IN AN ORGANIZED HEALTH CARE EDUCATION/TRAINING PROGRAM

## 2024-01-18 PROCEDURE — G0158 HHC OT ASSISTANT EA 15: HCPCS | Mod: HHH

## 2024-01-18 PROCEDURE — 3075F SYST BP GE 130 - 139MM HG: CPT | Performed by: STUDENT IN AN ORGANIZED HEALTH CARE EDUCATION/TRAINING PROGRAM

## 2024-01-18 PROCEDURE — 3008F BODY MASS INDEX DOCD: CPT | Performed by: STUDENT IN AN ORGANIZED HEALTH CARE EDUCATION/TRAINING PROGRAM

## 2024-01-18 PROCEDURE — 1090000001 HH PPS REVENUE CREDIT

## 2024-01-18 PROCEDURE — 99215 OFFICE O/P EST HI 40 MIN: CPT | Performed by: STUDENT IN AN ORGANIZED HEALTH CARE EDUCATION/TRAINING PROGRAM

## 2024-01-18 PROCEDURE — 4010F ACE/ARB THERAPY RXD/TAKEN: CPT | Performed by: STUDENT IN AN ORGANIZED HEALTH CARE EDUCATION/TRAINING PROGRAM

## 2024-01-18 PROCEDURE — 1090000002 HH PPS REVENUE DEBIT

## 2024-01-18 PROCEDURE — 1036F TOBACCO NON-USER: CPT | Performed by: STUDENT IN AN ORGANIZED HEALTH CARE EDUCATION/TRAINING PROGRAM

## 2024-01-18 PROCEDURE — G0157 HHC PT ASSISTANT EA 15: HCPCS | Mod: HHH

## 2024-01-18 NOTE — PATIENT INSTRUCTIONS
-get your thyroid ultrasound  -get your thyroid labs done     Follow up in May to June    Angela Shah MD  Divison of Endocrinology   [facility]   Phone: [phone]    option 4, then option 1  Fax: [phone]

## 2024-01-18 NOTE — TELEPHONE ENCOUNTER
Left msg with call back # to r/s patient as she is improperly scheduled for her RFA with Dr. Crow this Tuesday.

## 2024-01-18 NOTE — PROGRESS NOTES
58 [facility]: CHF, bells palsy, follows neurology for torticollis, HLD, ZEINA, ICA pseudoaneurysm    Following up today regarding her thyroid, previously seen Dr. 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   Was then found to have residual thyroid tissue on the left side.  FNA in 2020 was benign  On an ultrasound that was done in July 2021 she had a residual left sided tissue that measured about 3cm and 2 nodules left upper pole 1.3cm, left lower pole 1.8cm. She had an FNA in 7/2021 that showed follicular neoplasm with molecular analysis that was negative    Repeat ultrasoud in 11/2022  Left nodule 1.7cm and lower left meauring 2.6cm     Last seen Dr. Jurado in 10/2023   And ordered repeat ultrasound, this is still pending     Current regimen   100 M-F 200 on the weekends     She currently has multiple comorbidities, recently admitted in the hospital in December for a possible stroke       PMH: as above   Family History   Problem Relation Name Age of Onset    Other (cardiac disorder) Mother      Heart failure Mother      Heart failure Father       Social History     Socioeconomic History    Marital status: Single     Spouse name: Not on file    Number of children: Not on file    Years of education: Not on file    Highest education level: Not on file   Occupational History    Not on file   Tobacco Use    Smoking status: Never    Smokeless tobacco: Never   Substance and Sexual Activity    Alcohol use: Not Currently    Drug use: Never    Sexual activity: Not on file   Other Topics Concern    Not on file   Social History Narrative    Not on file     Social Determinants of Health     Financial Resource Strain: Low Risk  (12/28/2023)    Overall Financial Resource Strain (CARDIA)     Difficulty of Paying Living Expenses: Not hard at all   Food Insecurity: Not on file    Transportation Needs: No Transportation Needs (1/5/2024)    OASIS : Transportation     Lack of Transportation (Medical): No     Lack of Transportation (Non-Medical): No     Patient Unable or Declines to Respond: No   Physical Activity: Not on file   Stress: Not on file   Social Connections: Feeling Socially Integrated (1/5/2024)    OASIS : Social Isolation     Frequency of experiencing loneliness or isolation: Never   Intimate Partner Violence: Not on file   Housing Stability: Low Risk  (12/28/2023)    Housing Stability Vital Sign     Unable to Pay for Housing in the Last Year: No     Number of Places Lived in the Last Year: 1     Unstable Housing in the Last Year: No     ROS reviewed and is negative except for pertinent findings noted on HPI    Physical Exam  Neck:      Comments: Thyroidectomy scar, unable to appreciate nodule due positioning  Cardiovascular:      Rate and Rhythm: Normal rate and regular rhythm.   Abdominal:      Comments: Abdominal obesity   Musculoskeletal:      Comments: Neck contracture on the left side    Skin:     General: Skin is warm.   Neurological:      Mental Status: She is alert.      Comments: Left sided tremors, voice hoarseness           Problem List Items Addressed This Visit       Thyroid nodule    Relevant Orders    US thyroid     Other Visit Diagnoses       Post-surgical hypothyroidism    -  Primary    Relevant Orders    US thyroid    Thyroid Stimulating Hormone    Thyroxine, Free    Triiodothyronine, Total    Thyroglobulin and Antithyroglobulin          -I reordered the ultrasound that Dr. Jurado, and instructed patient this is to be done prior to her appt with him in April    Likely a high risk surgical candidate due to current comorbidities.  During my exam today she is unable to extend her neck due to the torticollis.  She is seeing neurology for this.  Although the patient did express that she would like to proceed with another FNA if necessary based on these  ultrasound results      -we will check TFTs with TG now     Will call daughter regarding levothyroxine dose adjustement    Follow up in May to June

## 2024-01-18 NOTE — Clinical Note
Saw Ms. Laxmi for the first time today.  I reordered the thyroid ultrasound you ordered in October since they had missed their appointment.    She has a ff up with you in April

## 2024-01-19 VITALS
DIASTOLIC BLOOD PRESSURE: 65 MMHG | OXYGEN SATURATION: 97 % | SYSTOLIC BLOOD PRESSURE: 128 MMHG | RESPIRATION RATE: 18 BRPM | HEART RATE: 76 BPM | TEMPERATURE: 97.4 F

## 2024-01-19 PROCEDURE — 1090000001 HH PPS REVENUE CREDIT

## 2024-01-19 PROCEDURE — 1090000002 HH PPS REVENUE DEBIT

## 2024-01-19 ASSESSMENT — ENCOUNTER SYMPTOMS
PAIN LOCATION - PAIN SEVERITY: 1/10
PAIN LOCATION - PAIN SEVERITY: 4/10
PAIN LOCATION - PAIN SEVERITY: 1/10
PAIN LOCATION: RIGHT KNEE
PAIN LOCATION - EXACERBATING FACTORS: MOVING
HIGHEST PAIN SEVERITY IN PAST 24 HOURS: 4/10
PAIN LOCATION: LEFT ARM
PAIN LOCATION - EXACERBATING FACTORS: MOVING
PAIN LOCATION - RELIEVING FACTORS: REST
PAIN LOCATION - RELIEVING FACTORS: REST
PAIN LOCATION - PAIN SEVERITY: 2/10
SUBJECTIVE PAIN PROGRESSION: UNCHANGED
PAIN LOCATION - EXACERBATING FACTORS: MOVING
PAIN LOCATION - EXACERBATING FACTORS: MOVING
PAIN LOCATION: BACK
PAIN LOCATION - PAIN FREQUENCY: INTERMITTENT
PAIN LOCATION - RELIEVING FACTORS: REST
PAIN LOCATION - PAIN FREQUENCY: INTERMITTENT
PAIN LOCATION - RELIEVING FACTORS: REST
PAIN LOCATION: NECK
PAIN LOCATION - PAIN FREQUENCY: INTERMITTENT
PAIN LOCATION - EXACERBATING FACTORS: MOVING
PAIN LOCATION - RELIEVING FACTORS: REST
PAIN LOCATION - PAIN SEVERITY: 3/10
PAIN LOCATION - PAIN FREQUENCY: INTERMITTENT
PAIN SEVERITY GOAL: 1/10
PAIN LOCATION - PAIN FREQUENCY: INTERMITTENT
LOWEST PAIN SEVERITY IN PAST 24 HOURS: 0/10
PAIN LOCATION: LEFT KNEE

## 2024-01-19 ASSESSMENT — ACTIVITIES OF DAILY LIVING (ADL)
AMBULATION ASSISTANCE: CONTACT GUARD ASSIST
AMBULATION ASSISTANCE: 1
AMBULATION ASSISTANCE: STAND BY ASSIST

## 2024-01-20 PROCEDURE — 1090000001 HH PPS REVENUE CREDIT

## 2024-01-20 PROCEDURE — 1090000002 HH PPS REVENUE DEBIT

## 2024-01-21 PROCEDURE — 1090000001 HH PPS REVENUE CREDIT

## 2024-01-21 PROCEDURE — 1090000002 HH PPS REVENUE DEBIT

## 2024-01-22 ENCOUNTER — OFFICE VISIT (OUTPATIENT)
Dept: NEUROLOGY | Facility: HOSPITAL | Age: 59
End: 2024-01-22
Payer: MEDICARE

## 2024-01-22 ENCOUNTER — HOME CARE VISIT (OUTPATIENT)
Dept: HOME HEALTH SERVICES | Facility: HOME HEALTH | Age: 59
End: 2024-01-22
Payer: MEDICARE

## 2024-01-22 ENCOUNTER — APPOINTMENT (OUTPATIENT)
Dept: NEUROLOGY | Facility: CLINIC | Age: 59
End: 2024-01-22
Payer: MEDICARE

## 2024-01-22 VITALS
BODY MASS INDEX: 37.04 KG/M2 | RESPIRATION RATE: 18 BRPM | HEART RATE: 78 BPM | TEMPERATURE: 97.7 F | HEIGHT: 67 IN | DIASTOLIC BLOOD PRESSURE: 79 MMHG | SYSTOLIC BLOOD PRESSURE: 126 MMHG | WEIGHT: 236 LBS

## 2024-01-22 DIAGNOSIS — G20.C PARKINSONISM, UNSPECIFIED PARKINSONISM TYPE (MULTI): Primary | ICD-10-CM

## 2024-01-22 DIAGNOSIS — I10 PRIMARY HYPERTENSION: ICD-10-CM

## 2024-01-22 DIAGNOSIS — I50.33 ACUTE ON CHRONIC DIASTOLIC HEART FAILURE (MULTI): ICD-10-CM

## 2024-01-22 PROCEDURE — 1090000001 HH PPS REVENUE CREDIT

## 2024-01-22 PROCEDURE — 99213 OFFICE O/P EST LOW 20 MIN: CPT | Performed by: PSYCHIATRY & NEUROLOGY

## 2024-01-22 PROCEDURE — 3078F DIAST BP <80 MM HG: CPT | Performed by: PSYCHIATRY & NEUROLOGY

## 2024-01-22 PROCEDURE — 4010F ACE/ARB THERAPY RXD/TAKEN: CPT | Performed by: PSYCHIATRY & NEUROLOGY

## 2024-01-22 PROCEDURE — G0157 HHC PT ASSISTANT EA 15: HCPCS | Mod: HHH

## 2024-01-22 PROCEDURE — 3008F BODY MASS INDEX DOCD: CPT | Performed by: PSYCHIATRY & NEUROLOGY

## 2024-01-22 PROCEDURE — 1090000002 HH PPS REVENUE DEBIT

## 2024-01-22 PROCEDURE — 3074F SYST BP LT 130 MM HG: CPT | Performed by: PSYCHIATRY & NEUROLOGY

## 2024-01-22 PROCEDURE — 1036F TOBACCO NON-USER: CPT | Performed by: PSYCHIATRY & NEUROLOGY

## 2024-01-22 RX ORDER — BUMETANIDE 2 MG/1
2 TABLET ORAL DAILY
Qty: 30 TABLET | Refills: 0 | Status: SHIPPED | OUTPATIENT
Start: 2024-01-22 | End: 2024-02-15

## 2024-01-22 RX ORDER — POTASSIUM CHLORIDE 1500 MG/1
40 TABLET, EXTENDED RELEASE ORAL 2 TIMES DAILY
Qty: 360 TABLET | Refills: 3 | Status: SHIPPED | OUTPATIENT
Start: 2024-01-22 | End: 2025-01-21

## 2024-01-22 RX ORDER — CARBIDOPA AND LEVODOPA 25; 100 MG/1; MG/1
1.5 TABLET ORAL 3 TIMES DAILY
Qty: 135 TABLET | Refills: 6 | Status: SHIPPED | OUTPATIENT
Start: 2024-01-22 | End: 2024-04-02

## 2024-01-22 RX ORDER — CARVEDILOL 25 MG/1
25 TABLET ORAL 2 TIMES DAILY
Qty: 180 TABLET | Refills: 1 | Status: SHIPPED | OUTPATIENT
Start: 2024-01-22

## 2024-01-22 RX ORDER — ATORVASTATIN CALCIUM 20 MG/1
20 TABLET, FILM COATED ORAL NIGHTLY
Qty: 90 TABLET | Refills: 3 | Status: SHIPPED | OUTPATIENT
Start: 2024-01-22 | End: 2025-01-21

## 2024-01-22 RX ORDER — LOSARTAN POTASSIUM 50 MG/1
50 TABLET ORAL DAILY
Qty: 90 TABLET | Refills: 3 | Status: SHIPPED | OUTPATIENT
Start: 2024-01-22 | End: 2025-01-21

## 2024-01-22 ASSESSMENT — PAIN SCALES - GENERAL: PAINLEVEL: 8

## 2024-01-22 NOTE — PATIENT INSTRUCTIONS
Your MRI of the brain was normal, which is great.     Your blood work was also negative for cancer-related antibodies against the brain, which is also great.     I'm glad your symptoms improved after you stopped the fanapt and started carbidopa/levdopa.     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     For now, I suggest you increase your carbidopa/levodopa to 1.5 tablet three times a day.     Please continue fast paced exercise and botox with Dr. Swanson.     Follow up after six months.     Thank you visiting the clinic today    Deshawn Adorno MD

## 2024-01-22 NOTE — PROGRESS NOTES
Diagnoses/Problems     · Parkinsonism (332.0) (G20)   · Laterocollis (723.8) (G24.3)   · Neck mass (784.2) (R22.1)       Chief Complaint  parkinsonism.      History of Present Illness  This is a 57 yo right handed AAF with hx of HTN, DM, HPL, ZEINA, obesity, OA, carotid pseudoaneurysm, thyroid cancer s/p thyroidectomy with residual paratracheal mass, PTSD, depression, and schizoaffective disorder with exposure to multiple antipsychotics who is here for follow up.     Interval hx:  Experienced noticeable improvement in her tremor and other symptoms after she stopped fanapt and started sinemet, and without side effects. Her brain MRI and ENS1 were negative. Had an episode of garbled speech last month and was found to have GASTON on admission. CT/CTA brain at that time were negative.         MANJU ISAAC is here for an initial evaluation. She is a right-handed 58 year-old woman   She is currently experiencing the following symptoms: tremor at rest, pill-rolling hand tremor, loss of postural reflexes, frequent falls, postural instability, depression, anxiety and bradykinesia.   She experiences no noticeable benefit from the medications. Her response is complicated by not dyskinesia.  By report, there is good compliance with treatment, poor tolerance of treatment and poor symptom control.   Previous medications included: ropinerole and amantadine.    She reports balance problems and falls. She reports 7 falls per year.   She reports speech problems.   She reports constipation.   She reports she has symptoms of depression and anxiety.   She reports no acting out of dreams.   She reports no hallucinations and no delusions.      Review of Systems  All systems reviewed and are negative except as mentioned in the HPI.        Active Problems  Problems    · Abnormal thyroid function test (794.5) (R94.6)   · Acute on chronic diastolic heart failure (428.33) (I50.33)   · Acute vaginitis (616.10) (N76.0)   · CATALINA positive (795.79)  (R76.8)   · Aneurysm (442.9) (I72.9)   · Ankle pain (719.47) (M25.579)   · Arthritis of ankle, left (716.97) (M19.072)   · Arthritis of ankle, right (716.97) (M19.071)   · Arthritis of knee, left (716.96) (M17.12)   · Arthritis of knee, right (716.96) (M17.11)   · Atrophic vaginitis (627.3) (N95.2)   · Atypical chest pain (786.59) (R07.89)   · Bell's palsy (351.0) (G51.0)   · 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)   · Cervical radiculopathy, chronic (723.4) (M54.12)   · Cervicalgia (723.1) (M54.2)   · Chronic constipation (564.00) (K59.09)   · Chronic pain of both knees (719.46,338.29) (M25.561,M25.562,G89.29)   · Chronic UTI (599.0) (N39.0)   · Controlled type 2 diabetes mellitus with neuropathy (250.60,357.2) (E11.40)   · Diabetes mellitus type 2 in obese (250.00,278.00) (E11.69,E66.9)   · DM type 2 with diabetic mixed hyperlipidemia (250.80,272.2) (E11.69,E78.2)   · LUONG (dyspnea on exertion) (786.09) (R06.09)   · Dyslipidemia (272.4) (E78.5)   · Encounter for annual routine gynecological examination (V72.31) (Z01.419)   · Encounter for screening mammogram for malignant neoplasm of breast (V76.12)  (Z12.31)   · Female stress incontinence (625.6) (N39.3)   · Fibromyalgia (729.1) (M79.7)   · Fitting and adjustment of pessary (V53.99) (Z46.89)   · Goiter (240.9) (E04.9)   · Hypokalemia (276.8) (E87.6)   · Hyponatremia (276.1) (E87.1)   · Incomplete bladder emptying (788.21) (R33.9)   · Intervertebral disc disorder with radiculopathy of lumbosacral region (724.4) (M51.17)   · 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)   · Low back pain (724.2) (M54.50)   · Lumbar radicular pain (724.4) (M54.16)   · Malignant neoplasm of thyroid gland (193) (C73)   · Midline cystocele (618.01) (N81.11)   · Multinodular thyroid (241.1) (E04.2)   · Muscle cramps (729.82) (R25.2)   · Muscle tension dysphonia (784.42) (R49.0)   · Neck mass (784.2) (R22.1)   · Neck pain (723.1) (M54.2)   · ZEINA on CPAP (327.23) (G47.33)   · Osteopenia of both hips  (733.90) (M85.851,M85.852)   · Pap smear, as part of routine gynecological examination (V76.2) (Z01.419)   · Paresthesia (782.0) (R20.2)   · Pelvic floor weakness (618.89) (N81.89)   · Pessary maintenance (V53.99) (Z46.89)   · Postoperative hypothyroidism (244.0) (E89.0)   · Primary osteoarthritis of both knees (715.16) (M17.0)   · Prolapse of urethra (599.5) (N36.8)   · Pseudoaneurysm (442.9) (I72.9)   · Sacroiliitis (720.2) (M46.1)   · Spasmodic torticollis (333.83) (G24.3)   · Strain of neck muscle, initial encounter (847.0) (S16.1XXA)   · Symptoms of urinary tract infection (788.99) (R39.9)   · Tear of meniscus, medial (836.0) (S83.249A)   · Tremor (781.0) (R25.1)   · Urge incontinence (788.31) (N39.41)   · Urinary frequency (788.41) (R35.0)   · Urinary urgency (788.63) (R39.15)   · Uterine prolapse (618.1) (N81.4)   · UTI (urinary tract infection) (599.0) (N39.0)   · Vaginal pessary present (V45.89) (Z96.0)   · Vitamin D insufficiency (268.9) (E55.9)   · Weakness (780.79) (R53.1)     Past Medical History  Problems    · Controlled type 2 diabetes mellitus with neuropathy (250.60,357.2) (E11.40)   · Diabetes mellitus type 2 in obese (250.00,278.00) (E11.69,E66.9)   · DM type 2 with diabetic mixed hyperlipidemia (250.80,272.2) (E11.69,E78.2)   · Dyslipidemia (272.4) (E78.5)   · Hypokalemia (276.8) (E87.6)   · 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)   · Low back pain (724.2) (M54.50)   · Muscle tension dysphonia (784.42) (R49.0)   · Neck pain (723.1) (M54.2)   · ZEINA on CPAP (327.23) (G47.33)     Surgical History  Problems    · History of Hemithyroidectomy     Family History  Mother    · Family history of cardiac disorder (V17.49) (Z82.49)   · Family history of congestive heart failure (V17.49) (Z82.49)  Father    · Family history of heart failure (V17.49) (Z82.49)     Social History  Problems    · Current non-drinker of alcohol (V49.89) (Z78.9)   · Medical marijuana daily   · Never a smoker   · Never smoked any  substance (V49.89) (Z78.9)     Allergies  Medication    · Lisinopril TABS  Recorded By: Casandra Abraham; 9/1/2020 4:13:42 PM          Physical Exam  Constitutional: General appearance: no acute distress   Mental status: The patient was in no distress, alert, interactive and cooperative. Affect is appropriate.   Orientation: oriented to person, oriented to place and oriented to time.   Memory: recent memory intact and remote memory intact.   Attention: normal attention span and normal concentrating ability.   Language: normal comprehension and no difficulty naming common objects.   Fund of knowledge: Patient displays adequate knowledge of current events, adequate fund of knowledge regarding past history and adequate fund of knowledge regarding vocabulary. voice tremor noted   Cranial nerve II: Visual fields full to confrontation.   Cranial nerves III, IV, and VI: Pupils round, equally reactive to light; no ptosis. EOMs intact. No nystagmus.   Cranial Nerve V: Facial sensation intact bilaterally.   Cranial nerve VII:. hypomimia and positive glabellar.   Cranial nerve VIII: Hearing is intact bilaterally to finger rub / whisper.   Cranial nerves IX and X: Palate elevates symmetrically.   Cranial nerve XI: Shoulder shrug and neck rotation strength are intact . right shoulder elevation and left laterocollis noted.   Cranial nerve XII: Tongue midline with normal strength.   Motor: Muscle bulk was normal in both upper and lower extremities. slight rigidity in the LUE. Muscle strength was 5/5 throughout. moderately severe rest tremor of the LUE and LLE and to a lesser extent the right side.   Deep Tendon Reflexes: left biceps 0 , right biceps 0, left triceps 0, right triceps 0, left brachioradialis 0, right brachioradialis 0, left patella 2+, right patella 2+, left ankle jerk 0, right ankle jerk 0   Sensory Exam:. no hemihyposthesia.   Coordination: There is no limb dystaxia and rapid alternating movements are intact. diffuse  bradykinesia more on the left.   Gait:. uses a walker with short steps but not shuffling, freezing, or festination.     Provider Impressions  This is a 59 yo right handed AAF with hx of HTN, DM, HPL, ZEINA, obesity, OA, carotid pseudoaneurysm, thyroid cancer s/p thyroidectomy with residual paratracheal mass, PTSD, depression, and schizoaffective disorder with exposure to multiple antipsychotics (currently fanapt, previously haldol, zyprexa, risperidone, ruxelti) who presens with at least one-year-hx of rest tremor of all limbs more on the left, poor balance with frequent falls, and six months of neck dystonia. After being diagnosed with schizoaffective disorder in her twenties, she did well mentally off antipsychotics for several years befor she was put back on fanapt and seroquel in 2019 after she had a mental breakdown and started thinking about past traumatic experiences and having delusions. she did not respond to and could not tolerate austedo, ingrezza, requip, and amantadine. She has since been cutting down on her fanapt dose with some subsequent improvement in tremor. Dr. Swanson started her on botox for her cervical dystonia with some short lived benefit. She denies dream enactment but admits to chronic constipation and a constant sense of bad smell. she had an episode of decreased responsiveness and right facial droop in 2022 and was diagnosed with Bell's palsy at that time (CT unremarkable). Her exam is positive for L>R tremor-predominant parkinsonism and left laterocollis with right shoulder elevation. 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 However, underlying Parkinson's disease is possible given asymmetric findings. We also need to rule out other secondary causes of parkinsonism given her hx of cancer and prior neurological events. Will start with brain MRI and neuronal antibodies.     1/22/2024: Experienced noticeable improvement in her tremor and other symptoms after she stopped  fanapt and started sinemet, and without side effects. Her brain MRI and ENS1 were negative. Had an episode of garbled speech last month and was found to have GASTON on admission. CT/CTA brain at that time were negative. Still very symptomatic on exam. Will increase sinemet to 1.5 TID and will consider adding a fourth dose in the next visit.       PLAN:  Your MRI of the brain was normal, which is great.     Your blood work was also negative for cancer-related antibodies against the brain, which is also great.     I'm glad your symptoms improved after you stopped the fanapt and started carbidopa/levdopa.     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     For now, I suggest you increase your carbidopa/levodopa to 1.5 tablet three times a day.     Please continue fast paced exercise and botox with Dr. Swanson.     Follow up after six months.     The impression and plan were discussed with the patient and their family (if present) in detail and all questions answered. They agreed to the plan as outlined above.     Will communicate my recommendations to the referring physician via e-chart or written report.     I spent 20 minutes with this patient. Greater than 50% of this time was spent in counseling and/or coordination of care.        Deshawn Adorno MD, PhD   of Neurology  [facility] [employer]  Director of Neuroimmunology and staff neurologist at the [facility]  [facility]

## 2024-01-23 ENCOUNTER — HOME CARE VISIT (OUTPATIENT)
Dept: HOME HEALTH SERVICES | Facility: HOME HEALTH | Age: 59
End: 2024-01-23
Payer: MEDICARE

## 2024-01-23 ENCOUNTER — APPOINTMENT (OUTPATIENT)
Dept: RADIOLOGY | Facility: HOSPITAL | Age: 59
End: 2024-01-23
Payer: MEDICARE

## 2024-01-23 VITALS
TEMPERATURE: 97 F | HEART RATE: 71 BPM | RESPIRATION RATE: 17 BRPM | SYSTOLIC BLOOD PRESSURE: 126 MMHG | DIASTOLIC BLOOD PRESSURE: 80 MMHG | OXYGEN SATURATION: 98 %

## 2024-01-23 PROCEDURE — 1090000001 HH PPS REVENUE CREDIT

## 2024-01-23 PROCEDURE — 1090000002 HH PPS REVENUE DEBIT

## 2024-01-23 PROCEDURE — G0158 HHC OT ASSISTANT EA 15: HCPCS | Mod: HHH

## 2024-01-24 PROCEDURE — 1090000002 HH PPS REVENUE DEBIT

## 2024-01-24 PROCEDURE — 1090000001 HH PPS REVENUE CREDIT

## 2024-01-25 ENCOUNTER — HOME CARE VISIT (OUTPATIENT)
Dept: HOME HEALTH SERVICES | Facility: HOME HEALTH | Age: 59
End: 2024-01-25
Payer: MEDICARE

## 2024-01-25 PROCEDURE — 1090000002 HH PPS REVENUE DEBIT

## 2024-01-25 PROCEDURE — G0158 HHC OT ASSISTANT EA 15: HCPCS | Mod: HHH

## 2024-01-25 PROCEDURE — 1090000001 HH PPS REVENUE CREDIT

## 2024-01-26 ENCOUNTER — HOME CARE VISIT (OUTPATIENT)
Dept: HOME HEALTH SERVICES | Facility: HOME HEALTH | Age: 59
End: 2024-01-26
Payer: MEDICARE

## 2024-01-26 PROCEDURE — 1090000002 HH PPS REVENUE DEBIT

## 2024-01-26 PROCEDURE — 1090000001 HH PPS REVENUE CREDIT

## 2024-01-26 PROCEDURE — G0157 HHC PT ASSISTANT EA 15: HCPCS | Mod: HHH

## 2024-01-27 PROCEDURE — 1090000002 HH PPS REVENUE DEBIT

## 2024-01-27 PROCEDURE — 1090000001 HH PPS REVENUE CREDIT

## 2024-01-28 PROCEDURE — 1090000002 HH PPS REVENUE DEBIT

## 2024-01-28 PROCEDURE — 1090000001 HH PPS REVENUE CREDIT

## 2024-01-29 PROCEDURE — 1090000002 HH PPS REVENUE DEBIT

## 2024-01-29 PROCEDURE — 1090000001 HH PPS REVENUE CREDIT

## 2024-01-30 ENCOUNTER — HOSPITAL ENCOUNTER (OUTPATIENT)
Dept: RADIOLOGY | Facility: HOSPITAL | Age: 59
Discharge: HOME | End: 2024-01-30
Payer: MEDICARE

## 2024-01-30 VITALS
BODY MASS INDEX: 35.16 KG/M2 | OXYGEN SATURATION: 97 % | RESPIRATION RATE: 16 BRPM | HEART RATE: 75 BPM | WEIGHT: 232 LBS | TEMPERATURE: 97.6 F | DIASTOLIC BLOOD PRESSURE: 90 MMHG | HEIGHT: 68 IN | SYSTOLIC BLOOD PRESSURE: 123 MMHG

## 2024-01-30 VITALS
DIASTOLIC BLOOD PRESSURE: 67 MMHG | TEMPERATURE: 97.8 F | SYSTOLIC BLOOD PRESSURE: 124 MMHG | OXYGEN SATURATION: 98 % | RESPIRATION RATE: 18 BRPM

## 2024-01-30 DIAGNOSIS — M17.11 ARTHRITIS OF KNEE, RIGHT: ICD-10-CM

## 2024-01-30 PROCEDURE — 64624 DSTRJ NULYT AGT GNCLR NRV: CPT | Performed by: ANESTHESIOLOGY

## 2024-01-30 PROCEDURE — 99153 MOD SED SAME PHYS/QHP EA: CPT | Performed by: ANESTHESIOLOGY

## 2024-01-30 PROCEDURE — 1090000002 HH PPS REVENUE DEBIT

## 2024-01-30 PROCEDURE — 2500000004 HC RX 250 GENERAL PHARMACY W/ HCPCS (ALT 636 FOR OP/ED): Performed by: ANESTHESIOLOGY

## 2024-01-30 PROCEDURE — 64624 DSTRJ NULYT AGT GNCLR NRV: CPT | Mod: RT | Performed by: ANESTHESIOLOGY

## 2024-01-30 PROCEDURE — 1090000001 HH PPS REVENUE CREDIT

## 2024-01-30 PROCEDURE — 77003 FLUOROGUIDE FOR SPINE INJECT: CPT

## 2024-01-30 PROCEDURE — 2720000007 HC OR 272 NO HCPCS

## 2024-01-30 PROCEDURE — 77003 FLUOROGUIDE FOR SPINE INJECT: CPT | Performed by: ANESTHESIOLOGY

## 2024-01-30 PROCEDURE — 99152 MOD SED SAME PHYS/QHP 5/>YRS: CPT | Performed by: ANESTHESIOLOGY

## 2024-01-30 RX ORDER — FENTANYL CITRATE 50 UG/ML
INJECTION, SOLUTION INTRAMUSCULAR; INTRAVENOUS
Status: COMPLETED | OUTPATIENT
Start: 2024-01-30 | End: 2024-01-30

## 2024-01-30 RX ORDER — MIDAZOLAM HYDROCHLORIDE 1 MG/ML
INJECTION INTRAMUSCULAR; INTRAVENOUS
Status: COMPLETED | OUTPATIENT
Start: 2024-01-30 | End: 2024-01-30

## 2024-01-30 RX ADMIN — MIDAZOLAM HYDROCHLORIDE 2 MG: 1 INJECTION INTRAMUSCULAR; INTRAVENOUS at 08:10

## 2024-01-30 RX ADMIN — FENTANYL CITRATE 50 MCG: 50 INJECTION, SOLUTION INTRAMUSCULAR; INTRAVENOUS at 08:10

## 2024-01-30 ASSESSMENT — PAIN - FUNCTIONAL ASSESSMENT
PAIN_FUNCTIONAL_ASSESSMENT: 0-10

## 2024-01-30 ASSESSMENT — PAIN SCALES - GENERAL
PAINLEVEL_OUTOF10: 3
PAINLEVEL_OUTOF10: 9
PAINLEVEL_OUTOF10: 6
PAINLEVEL_OUTOF10: 4
PAINLEVEL_OUTOF10: 10 - WORST POSSIBLE PAIN
PAINLEVEL_OUTOF10: 3
PAINLEVEL_OUTOF10: 5 - MODERATE PAIN
PAINLEVEL_OUTOF10: 4

## 2024-01-30 NOTE — H&P
History Of Present Illness  Prema Hair is a 58 y.o. female presents for procedure state below. Endorses no changes in past medical history or medical health since last seen in clinic.     Past Medical History  She has a past medical history of Cervicalgia (10/09/2020), Dysphonia (07/05/2022), Encounter for fitting and adjustment of other specified devices (02/16/2021), Hyperlipidemia, unspecified (01/03/2023), Hypokalemia (01/20/2022), Low back pain, unspecified (02/22/2021), Obstructive sleep apnea (adult) (pediatric) (01/03/2023), Paralysis of vocal cords and larynx, unspecified (10/07/2022), Type 2 diabetes mellitus with diabetic neuropathy, unspecified (CMS/HCC) (10/09/2020), Type 2 diabetes mellitus with other specified complication (CMS/HCC) (01/03/2023), and Type 2 diabetes mellitus with other specified complication (CMS/HCC) (09/14/2021).    Surgical History  She has a past surgical history that includes Other surgical history (10/09/2020); US guided thyroid biopsy (11/19/2020); and US guided thyroid biopsy (11/19/2020).     Social History  She reports that she has never smoked. She has never used smokeless tobacco. She reports that she does not currently use alcohol. She reports that she does not use drugs.    Family History  Family History   Problem Relation Name Age of Onset    Other (cardiac disorder) Mother      Heart failure Mother      Heart failure Father          Allergies  Lisinopril    Review of Symptoms:   Constitutional: Negative for chills, diaphoresis or fever  HENT: Negative for neck swelling  Eyes:.  Negative for eye pain  Respiratory:.  Negative for cough, shortness of breath or wheezing    Cardiovascular:.  Negative for chest pain or palpitations  Gastrointestinal:.  Negative for abdominal pain, nausea and vomiting  Genitourinary:.  Negative for urgency  Musculoskeletal:  Positive for bilateral knee pain. Positive for joint pain. Denies falls within the past 3 months.  Skin: Negative  for wounds or itching   Neurological: Negative for dizziness, seizures, loss of consciousness and weakness  Endo/Heme/Allergies: Does not bruise/bleed easily  Psychiatric/Behavioral: Negative for depression. The patient does not appear anxious.       PHYSICAL EXAM  Vitals signs reviewed  Constitutional:       General: Not in acute distress     Appearance: Normal appearance. Not ill-appearing.  HENT:     Head: Normocephalic and atraumatic  Eyes:     Conjunctiva/sclera: Conjunctivae normal  Cardiovascular:     Rate and Rhythm: Normal rate and regular rhythm  Pulmonary:     Effort: No respiratory distress  Abdominal:     Palpations: Abdomen is soft  Musculoskeletal: ALCALA  Skin:     General: Skin is warm and dry  Neurological:     General: No focal deficit present  Psychiatric:         Mood and Affect: Mood normal         Behavior: Behavior normal     Last Recorded Vitals  There were no vitals taken for this visit.    Relevant Results  Current Outpatient Medications   Medication Instructions    acetaminophen (Tylenol) 500 mg tablet 1 tablet, oral, Every 4 hours PRN    albuterol 90 mcg/actuation inhaler 2 puffs, inhalation, Every 4 hours PRN    amitriptyline (Elavil) 10 mg tablet 1 tablet, oral, Nightly    ammonium lactate (Lac-Hydrin) 12 % lotion 1 Application, Topical, 2 times daily    ascorbic acid (Vitamin C) 1,000 mg tablet 1 tablet, oral, Daily    aspirin 81 mg EC tablet 1 tablet, oral, Daily    atorvastatin (LIPITOR) 20 mg, oral, Nightly    azelastine (Astelin) 137 mcg (0.1 %) nasal spray 1 spray, Each Nostril, 2 times daily    baclofen (LIORESAL) 20 mg, oral, 3 times daily    bumetanide (BUMEX) 2 mg, oral, Daily    calcium carbonate (Oscal) 500 mg calcium (1,250 mg) tablet 1 tablet, oral, 3 times daily    carbidopa-levodopa (Sinemet)  mg tablet 1.5 tablets, oral, 3 times daily    carvedilol (COREG) 25 mg, oral, 2 times daily    cephalexin (Keflex) 500 mg capsule 1 capsule, oral, Daily    cholecalciferol  (Vitamin D-3) 50 MCG (2000 UT) tablet 1 tablet, oral, Daily    clonazePAM (KlonoPIN) 1 mg tablet 1 tablet, oral, Every morning, 1/2 TABLET BY MOUTH EVERY EVENING AND AN ADDITIONAL 1/2 AS NEEDED<BR>    clonazePAM (KlonoPIN) 1 mg tablet 2 tablets, oral, Every evening    diclofenac sodium (Voltaren) 1 % gel gel 1 Application, Topical, 3 times daily PRN    estradiol (Estrace) 0.01 % (0.1 mg/gram) vaginal cream 1 Application, vaginal, 3 times weekly, Pea-sized amount    gabapentin (Neurontin) 300 mg capsule 3 capsules, oral, 3 times daily    ipratropium-albuteroL (Duo-Neb) 0.5-2.5 mg/3 mL nebulizer solution 3 mL, inhalation, Every 4 hours PRN    Klor-Con M20 20 mEq ER tablet 40 mEq, oral, 2 times daily    lancets misc 1 each, miscellaneous, 3 times daily    levothyroxine (Synthroid, Levoxyl) 100 mcg tablet 1 tablet, oral, Once daily (morning) M-F (5 days a week), 1 tablet 5 days a week Mon-Friday and two tabs on Saturdays and Sundays     levothyroxine (Synthroid, Levoxyl) 200 mcg tablet 1 tablet, oral, 2 times weekly, Saturday and sunday    lidocaine (Lidoderm) 5 % patch 1 patch, transdermal, Every 12 hours, Apply 1 patch and leave in place for 12 hours, then remove and leave off for 12 hours.    losartan (COZAAR) 50 mg, oral, Daily    meloxicam (Mobic) 15 mg tablet 1 tablet, oral, Daily PRN    metFORMIN (Glucophage) 500 mg tablet 1 tablet, oral, 2 times daily with meals    omega-3 fatty acids-fish oil 360-1,200 mg capsule 1 capsule, oral, Daily    omeprazole (PriLOSEC) 40 mg DR capsule 1 capsule, oral, Daily, With dinner<BR>    OXcarbazepine (Trileptal) 600 mg tablet 1 tablet, oral, Every morning    OXcarbazepine (Trileptal) 600 mg tablet 2 tablets, oral, Nightly    oxybutynin XL (Ditropan-XL) 10 mg 24 hr tablet 1 tablet, oral, Daily    polyethylene glycol (GLYCOLAX, MIRALAX) 17 g, oral, Daily, In 8oz of water, juice, or tea and drink daily<BR>    prazosin (Minipress) 5 mg capsule 1 capsule, oral, Nightly    QUEtiapine  (SEROquel) 100 mg tablet 1 tablet, oral, Every morning    QUEtiapine (SEROquel) 300 mg tablet 1 tablet, oral, Nightly    sertraline (Zoloft) 100 mg tablet Take 2 tablets (200 mg) by mouth once daily. Do not start before January 2, 2024.    tiZANidine (Zanaflex) 4 mg tablet 1 tablet, oral, 3 times daily    topiramate (TOPAMAX) 100 mg, oral, 2 times daily    Trelegy Ellipta 200-62.5-25 mcg blister with device 1 puff, inhalation, Daily PRN         XR CERVICAL SPINE 2-3 VIEWS 01/16/2023    Narrative  MRN: [id number]  Patient Name: MANJU ISAAC    STUDY:  SPINE, CERVICAL, 2 OR 3 VIEWS;  1/16/2023 8:59 am    INDICATION:  Cervicalgia.    COMPARISON:  None.    ACCESSION NUMBER(S):  [id number]    ORDERING CLINICIAN:  OFELIA WALLACE    TECHNIQUE:  AP and lateral cervical spine images were obtained.    FINDINGS:  There is moderate reversal of normal cervical lordosis centered at  C6. Moderate disc space narrowing with endplate osteophytosis at C5-6  and C6-7 with mild changes at C4-5. No cervical spine compression  fracture or listhesis. There is uncovertebral hypertrophy with spur  formation at C5-6 and C6-7. No prevertebral soft tissue swelling. No  destructive bone lesion. There is no tracheal deviation or  pneumothorax. The imaged medial lung apices were clear.    Impression  Arthritic changes in the cervical spine as described.      MR LUMBAR SPINE WO IV CONTRAST 04/20/2022    Narrative  MRN: [id number]  Patient Name: MANJU ISAAC    STUDY:  MRI L-SPINE WO;  4/20/2022 3:40 pm    INDICATION:  Neck pain and lower back pain with perineal parasthesia .    COMPARISON:  None.    ACCESSION NUMBER(S):  [id number]    ORDERING CLINICIAN:  OFELIA WALLACE    TECHNIQUE:  Sagittal T1, T2, STIR, axial T1 and T2 weighted images of the lumbar  spine were acquired.    FINDINGS:  For counting purposes the last lumbarized vertebral body is labeled  L5. Alignment, vertebral body heights and marrow signal pattern are  within normal limits.  No bone marrow edema. Desiccated disc signal  throughout the lumbar spine without significant loss of disc height.  The conus terminates at L1 and is unremarkable. Evaluation of the  paraspinal soft tissues is unremarkable.    Evaluation by level:    T12-L1: No significant spinal canal or neural foraminal stenosis.    L1-L2: No significant spinal canal or neural foraminal stenosis.    L2-L3: Mild disc bulge and facet arthrosis. No significant spinal  canal stenosis. No significant neural foraminal stenosis.    L3-L4: Mild disc bulge and facet arthrosis. No significant spinal  canal stenosis. Mild left and no significant right neural foraminal  stenosis.    L4-L5: Disc bulge and facet arthrosis. No significant spinal canal  stenosis, narrowing of the left subarticular recess and mild  bilateral neural foraminal stenosis.    L5-S1: No significant spinal canal stenosis. No significant neural  foraminal stenosis.    Impression  Mild degenerative changes of the lumbar spine without significant  spinal canal stenosis. Mild neural foraminal narrowing as detailed  above.      MR CERVICAL SPINE WO IV CONTRAST 04/20/2022    Narrative  MRN: [id number]  Patient Name: MANJU ISAAC    STUDY:  MRI CERVICAL WO;  4/20/2022 3:39 pm    INDICATION:  Neck pain .    COMPARISON:  CT 04/09/2022.    ACCESSION NUMBER(S):  [id number]    ORDERING CLINICIAN:  OFELIA WALLACE    TECHNIQUE:  Sagittal T1, T2, STIR, axial T1 and axial T2 weighted images were  acquired through the cervical spine.    FINDINGS:  Craniocervical junction is within normal limits. Cerebellar tonsils  are at the level of the foramen magnum. There is straightening the  normal cervical lordosis. There is trace anterolisthesis of C4 on C5,  similar to the prior CT. Alignment, vertebral body heights and marrow  signal pattern otherwise within normal limits. No bone marrow edema  is noted. Desiccated disc signal throughout the cervical spine with  mild disc height loss C3-C4,  C4-C5, C5-C6 and C6-C7. Cervical cord is  unremarkable. Prevertebral soft tissues are not thickened.    Evaluation by level:    C1-C2: No significant spinal canal stenosis.    C2-C3: Mild uncovertebral joint hypertrophy. No significant spinal  canal and mild neural foraminal stenosis.    C3-C4: Disc osteophyte complex and uncovertebral joint hypertrophy  and facet arthrosis. Mild spinal canal stenosis, moderate to severe  left and mild right neural foraminal stenosis.    C4-C5: Disc osteophyte complex, uncovertebral joint hypertrophy and  facet arthrosis. No significant spinal canal stenosis, moderate left  and no significant right neural foraminal stenosis.    C5-C6: Disc osteophyte complex and uncovertebral joint hypertrophy  and facet arthrosis. No significant spinal canal and mild bilateral  neural foraminal stenosis.    C6-C7: Disc osteophyte complex and uncovertebral joint hypertrophy  and facet arthrosis. Moderate spinal canal and neural foraminal  stenosis. Prominent nerve root sleeve versus perineural cyst  bilaterally.    C7-T1: No significant spinal canal or neural foraminal stenosis    Impression  Multilevel degenerative changes most pronounced at C6-C7 with  moderate spinal canal and moderate bilateral neural foraminal  stenosis.      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    COMPARISON:  CT of the neck dated 10/08/2021.    ACCESSION NUMBER(S):  [id number]; [id number]; [id number]    ORDERING CLINICIAN:  JACINDA MORALES    TECHNIQUE:  T2 sagittal, T1 sagittal, and stir sagittal, images of the cervical,  thoracic and lumbar spine were obtained. Per technician  documentation: Patient was unable to tolerate  completion of exam.  Limited axial T1 and T2 images of the cervical and thoracic spine  were obtained.    FINDINGS:  Limitations: Exam is limited due to patient movement. In addition,  patient was unable to tolerate the completion of exam.    Cervical spine:    Alignment: There is straightening of the normal cervical lordosis,  likely positional versus muscle spasm.    Vertebrae/Intervertebral Discs: Vertebral body heights are  maintained. Bone marrow signal is benign and there is no evidence of  bone marrow edema. Multilevel endplate degenerative changes with  posterior osteophytosis C5-C6 and C6-C7.    Spinal cord: No gross spinal cord signal abnormality however  evaluation is significantly limited by motion artifact.    C1-C2: The cervicomedullary junction appears unremarkable. There is  no spinal canal stenosis.    C2-C3: No significant canal stenosis.    C3-C4: Disc bulge and posterolateral osseous spurring contributing to  mild to moderate left foraminal stenosis. No significant right  foraminal or central canal stenosis.    C4-C5: Disc bulge and posterolateral osseous spurring contributes to  effacement of the ventral thecal sac with mild central canal stenosis  and mild to moderate left foraminal stenosis. No significant right  foraminal stenosis.    C5-C6: There is a disc osteophyte complex with more focal  osteophytosis in the left paracentral region with resultant  effacement of the left ventral thecal space and mild central canal  stenosis. There is mild bilateral foraminal narrowing.    C6-C7: Disc osteophyte complex and ligamentum flavum infolding  results in almost complete effacement of the thecal space with mild  to moderate central canal stenosis. There is also mild to moderate  right greater than left foraminal stenosis.    C7-T1: No significant canal stenosis.      The prevertebral and posterior paraspinous soft tissues are within  normal limits. There is a 3 cm lobulated soft tissue in the  left  hemithyroidectomy bed, not significantly changed from prior CT of the  neck taking into account differences in technique.      Thoracic spine:    Alignment: There is normal thoracic kyphosis..    Vertebrae/Intervertebral Discs: The vertebral body heights are  preserved. Bone marrow signal is benign. There is no significant bone  marrow edema. Multilevel disc desiccation is seen.    Spinal cord: The spinal cord is normal in caliber and signal  intensity.    T1-12:There is no high-grade spinal canal or neural foraminal  stenosis. There are a few small disc bulges and disc protrusions, for  example left paracentral disc protrusion at T7-T8, resulting in mild  central canal stenosis.    Paraspinous Soft Tissues: Within normal limits.      Lumbar spine:    Alignment: There is normal lumbar lordosis.    Vertebrae/Intervertebral Discs: The vertebral bodies demonstrate  expected height.There is no significant bone marrow edema on stir  sagittal images. There are mild endplate degenerative changes and a  few disc bulges with resultant mild bilateral foraminal narrowing at  L4-L5 due to disc bulge. There is no evidence of high-grade central  canal foraminal stenosis.    Conus medullaris: The conus medullaris terminates at approximately  the L1 level.    Impression  1. Limited incomplete evaluation with motion artifact, without gross  evidence of suspicious bone marrow lesions or cord edema.  2. Degenerative changes most pronounced at C6-C7, where disc  osteophyte complex results in mild to moderate central canal stenosis.      I personally reviewed the images/study and I agree with the findings  as stated. This study was interpreted at [place], [place].      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    COMPARISON:  CT of the neck dated 10/08/2021.    ACCESSION NUMBER(S):  [id number]; [id number]; [id number]    ORDERING CLINICIAN:  JACINDA MORALES    TECHNIQUE:  T2 sagittal, T1 sagittal, and stir sagittal, images of the cervical,  thoracic and lumbar spine were obtained. Per technician  documentation: Patient was unable to tolerate completion of exam.  Limited axial T1 and T2 images of the cervical and thoracic spine  were obtained.    FINDINGS:  Limitations: Exam is limited due to patient movement. In addition,  patient was unable to tolerate the completion of exam.    Cervical spine:    Alignment: There is straightening of the normal cervical lordosis,  likely positional versus muscle spasm.    Vertebrae/Intervertebral Discs: Vertebral body heights are  maintained. Bone marrow signal is benign and there is no evidence of  bone marrow edema. Multilevel endplate degenerative changes with  posterior osteophytosis C5-C6 and C6-C7.    Spinal cord: No gross spinal cord signal abnormality however  evaluation is significantly limited by motion artifact.    C1-C2: The cervicomedullary junction appears unremarkable. There is  no spinal canal stenosis.    C2-C3: No significant canal stenosis.    C3-C4: Disc bulge and posterolateral osseous spurring contributing to  mild to moderate left foraminal stenosis. No significant right  foraminal or central canal stenosis.    C4-C5: Disc bulge and posterolateral osseous spurring contributes to  effacement of the ventral thecal sac with mild central canal stenosis  and mild to moderate left foraminal stenosis. No significant right  foraminal stenosis.    C5-C6: There is a disc osteophyte complex with more focal  osteophytosis in the left paracentral region with resultant  effacement of the left ventral thecal space and mild central canal  stenosis. There is  mild bilateral foraminal narrowing.    C6-C7: Disc osteophyte complex and ligamentum flavum infolding  results in almost complete effacement of the thecal space with mild  to moderate central canal stenosis. There is also mild to moderate  right greater than left foraminal stenosis.    C7-T1: No significant canal stenosis.      The prevertebral and posterior paraspinous soft tissues are within  normal limits. There is a 3 cm lobulated soft tissue in the left  hemithyroidectomy bed, not significantly changed from prior CT of the  neck taking into account differences in technique.      Thoracic spine:    Alignment: There is normal thoracic kyphosis..    Vertebrae/Intervertebral Discs: The vertebral body heights are  preserved. Bone marrow signal is benign. There is no significant bone  marrow edema. Multilevel disc desiccation is seen.    Spinal cord: The spinal cord is normal in caliber and signal  intensity.    T1-12:There is no high-grade spinal canal or neural foraminal  stenosis. There are a few small disc bulges and disc protrusions, for  example left paracentral disc protrusion at T7-T8, resulting in mild  central canal stenosis.    Paraspinous Soft Tissues: Within normal limits.      Lumbar spine:    Alignment: There is normal lumbar lordosis.    Vertebrae/Intervertebral Discs: The vertebral bodies demonstrate  expected height.There is no significant bone marrow edema on stir  sagittal images. There are mild endplate degenerative changes and a  few disc bulges with resultant mild bilateral foraminal narrowing at  L4-L5 due to disc bulge. There is no evidence of high-grade central  canal foraminal stenosis.    Conus medullaris: The conus medullaris terminates at approximately  the L1 level.    Impression  1. Limited incomplete evaluation with motion artifact, without gross  evidence of suspicious bone marrow lesions or cord edema.  2. Degenerative changes most pronounced at C6-C7, where disc  osteophyte complex  results in mild to moderate central canal stenosis.      I personally reviewed the images/study and I agree with the findings  as stated. This study was interpreted at [facility], [place], [place].     No image results found.       1. Arthritis of knee, right  Radiofrequency Ablation    Radiofrequency Ablation    FL pain management TC    FL pain management TC           ASSESSMENT/PLAN  Prema Hair is a 58 y.o. female presents for a right genicular nerve RFA    Our plan is as follows:  - Follow In pain clinic  - Continue to participate in physical therapy as well as physician directed home exercises  - Continue pain medications as prescribed       Antonio Arellano MD

## 2024-01-30 NOTE — PROCEDURES
Pre-Op Diagnosis: Right knee degeneration and pain  Post-Procedure Diagnosis: Same as preop diagnosis  Procedure: Right knee genicular radiofrequency neurotomy using fluoroscopic guidance  Surgeon: Maria Isabel Crow MD, PhD   Resident/Fellow/Other Assistant: Antonio Arellano MD     Procedure Note:     Ms. Prema Hair is a 58 y.o. female with bilateral knee degeneration presents today for right knee genicular radiofrequency neurotomy.  Following informed consent, the patient was brought to the operating room and placed in the supine position with a roll underneath the right knee.  Chlorhexidine was used for skin prep and the area was draped in a sterile fashion.  The skin and subcutaneous tissue overlying needle trajectories to the target sites were anesthetized with a total of 4 cc 0.5% lidocaine superficially.  16-gauge radiofrequency needles with 10 mm active tips were then advanced under fluoroscopic guidance to the appropriate target locations.  Stimulation at 50 Hz resulted in paresthesias at current below 0.5 V.  Thereafter, 1 cc 2% lidocaine was applied at each needle tip and radiofrequency neurotomy was carried out at 80 degrees x 2.  At the conclusion of the procedures the needles were removed, Band-Aids were applied and the patient was transferred to recovery room in stable condition.  The patient will follow-up with us on an outpatient basis and update us on the response to the procedure.  The patient has knee degeneration on the left side and has requested that we perform the procedure also on the left.

## 2024-01-31 ENCOUNTER — HOME CARE VISIT (OUTPATIENT)
Dept: HOME HEALTH SERVICES | Facility: HOME HEALTH | Age: 59
End: 2024-01-31
Payer: MEDICARE

## 2024-01-31 PROCEDURE — G0157 HHC PT ASSISTANT EA 15: HCPCS | Mod: HHH

## 2024-01-31 PROCEDURE — G0158 HHC OT ASSISTANT EA 15: HCPCS | Mod: HHH

## 2024-01-31 PROCEDURE — 1090000001 HH PPS REVENUE CREDIT

## 2024-01-31 PROCEDURE — 1090000002 HH PPS REVENUE DEBIT

## 2024-02-01 ENCOUNTER — HOME CARE VISIT (OUTPATIENT)
Dept: HOME HEALTH SERVICES | Facility: HOME HEALTH | Age: 59
End: 2024-02-01
Payer: MEDICARE

## 2024-02-01 ENCOUNTER — OFFICE VISIT (OUTPATIENT)
Dept: NEUROLOGY | Facility: CLINIC | Age: 59
End: 2024-02-01
Payer: MEDICARE

## 2024-02-01 ENCOUNTER — APPOINTMENT (OUTPATIENT)
Dept: NEUROLOGY | Facility: CLINIC | Age: 59
End: 2024-02-01
Payer: MEDICARE

## 2024-02-01 VITALS
SYSTOLIC BLOOD PRESSURE: 97 MMHG | TEMPERATURE: 96.9 F | HEART RATE: 80 BPM | WEIGHT: 231 LBS | DIASTOLIC BLOOD PRESSURE: 71 MMHG | BODY MASS INDEX: 35.65 KG/M2

## 2024-02-01 DIAGNOSIS — M43.6 TORTICOLLIS: Primary | ICD-10-CM

## 2024-02-01 PROCEDURE — 4010F ACE/ARB THERAPY RXD/TAKEN: CPT | Performed by: PSYCHIATRY & NEUROLOGY

## 2024-02-01 PROCEDURE — 3078F DIAST BP <80 MM HG: CPT | Performed by: PSYCHIATRY & NEUROLOGY

## 2024-02-01 PROCEDURE — 3008F BODY MASS INDEX DOCD: CPT | Performed by: PSYCHIATRY & NEUROLOGY

## 2024-02-01 PROCEDURE — 1090000001 HH PPS REVENUE CREDIT

## 2024-02-01 PROCEDURE — 3074F SYST BP LT 130 MM HG: CPT | Performed by: PSYCHIATRY & NEUROLOGY

## 2024-02-01 PROCEDURE — 1090000002 HH PPS REVENUE DEBIT

## 2024-02-01 PROCEDURE — G0151 HHCP-SERV OF PT,EA 15 MIN: HCPCS | Mod: HHH

## 2024-02-01 PROCEDURE — 1036F TOBACCO NON-USER: CPT | Performed by: PSYCHIATRY & NEUROLOGY

## 2024-02-01 PROCEDURE — 64616 CHEMODENERV MUSC NECK DYSTON: CPT | Performed by: PSYCHIATRY & NEUROLOGY

## 2024-02-01 PROCEDURE — G0152 HHCP-SERV OF OT,EA 15 MIN: HCPCS | Mod: HHH

## 2024-02-01 SDOH — HEALTH STABILITY: PHYSICAL HEALTH: EXERCISE TYPE: EXS TO IMPROVE MM STRENGTH OF BLE AND IMPROVE FUNCTIONAL MOBILITY

## 2024-02-01 ASSESSMENT — ENCOUNTER SYMPTOMS
PAIN LOCATION - RELIEVING FACTORS: MEDS
PAIN: 1
PERSON REPORTING PAIN: PATIENT
HIGHEST PAIN SEVERITY IN PAST 24 HOURS: 8/10
PAIN LOCATION - PAIN FREQUENCY: CONSTANT
SUBJECTIVE PAIN PROGRESSION: UNCHANGED
LOWEST PAIN SEVERITY IN PAST 24 HOURS: 5/10
MUSCLE WEAKNESS: 1
PAIN LOCATION: GENERALIZED

## 2024-02-01 ASSESSMENT — PAIN SCALES - PAIN ASSESSMENT IN ADVANCED DEMENTIA (PAINAD)
BODYLANGUAGE: 0
TOTALSCORE: 0
FACIALEXPRESSION: 0
BREATHING: 0
BODYLANGUAGE: 0 - RELAXED.
CONSOLABILITY: 0
NEGVOCALIZATION: 0
NEGVOCALIZATION: 0 - NONE.
FACIALEXPRESSION: 0 - SMILING OR INEXPRESSIVE.
CONSOLABILITY: 0 - NO NEED TO CONSOLE.

## 2024-02-01 ASSESSMENT — GAIT ASSESSMENTS
TRUNK SCORE: 0
TRUNK: 0 - MARKED SWAY OR USES WALKING AID
STEP SYMMETRY: 1 - RIGHT AND LEFT STEP LENGTH APPEAR EQUAL
GAIT SCORE: 8
BALANCE AND GAIT SCORE: 13
STEP CONTINUITY: 1 - STEPS APPEAR CONTINUOUS
PATH SCORE: 1
INITIATION OF GAIT IMMEDIATELY AFTER GO: 1 - NO HESITANCY
WALKING STANCE: 1 - HEELS ALMOST TOUCHING WHILE WALKING
PATH: 1 - MILD/MODERATE DEVIATION OR USES WALKING AID

## 2024-02-01 ASSESSMENT — ACTIVITIES OF DAILY LIVING (ADL)
AMBULATION ASSISTANCE: ONE PERSON
CURRENT_FUNCTION: SUPERVISION
PHYSICAL TRANSFERS ASSESSED: 1
AMBULATION ASSISTANCE: SUPERVISION
AMBULATION ASSISTANCE ON FLAT SURFACES: 1
CURRENT_FUNCTION: ONE PERSON
AMBULATION ASSISTANCE: 1

## 2024-02-01 ASSESSMENT — BALANCE ASSESSMENTS
NUDGED SCORE: 0
EYES CLOSED AT MAXIMUM POSITION NUDGED: 0 - UNSTEADY
NUDGED: 0 - BEGINS TO FALL
BALANCE SCORE: 5
SITTING DOWN: 0 - UNSAFE (MISJUDGES DISTANCE, FALLS INTO CHAIR)
IMMEDIATE STANDING BALANCE FIRST 5 SECONDS: 1 - STEADY BUT USES WALKER OR OTHER SUPPORT
ATTEMPTS TO ARISE: 1 - ABLE, REQUIRES MORE THAN ONE ATTEMPT
TURNING 360 DEGREES STEPS: 0 - DISCONTINUOUS STEPS
ARISING SCORE: 1
STANDING BALANCE: 1 - STEADY BUT WIDE STANCE AND USES CANE OR OTHER SUPPORT
ARISES: 1 - ABLE, USES ARMS TO HELP
SITTING BALANCE: 1 - STEADY, SAFE

## 2024-02-01 NOTE — PROGRESS NOTES
Subjective     Prema Hair is a 58 y.o. year old female here for botulinum toxin injections.    She reports that the botulinum toxin helps the torticollis, straightening her neck, for about two months before the torticollis returned.  She is now back fully in torticollis.  Further, she has been imbalanced since a recent hospitalization; she is about a week home from acute rehab.    Allergies   Allergen Reactions    Lisinopril Unknown       Objective     There were no vitals taken for this visit.    Botox Injection - Torticollis  Procedure: Botox injection.   Indication: chronic migraine headaches.   Risk, benefits, alternatives, risk of worsening pain, risk of URI, risk of dysphagia, risk of focal weakness and risk of facial paresis were discussed with the patient. Verbal consent was obtained prior to the procedure. Alcohol was used to prep the area.  Procedure Note:   The patient was placed in the upright position. 200 units of Botulinum Toxin were injected 80 u right SCM in 3 sites, 20 u right trapezius in 2 sites, 80 u left splenius capitis in 3 sites, 20 u left trapezius in 2 sites. Lot number: [id number] . Expiration date: 06/2026.  Post-Procedure: the patient tolerated the procedure well. Complications: None.   Follow-up in the office in 12 weeks for repeat injection(s).    Assessment/Plan   Diagnoses and all orders for this visit:  Cecillis      David Swanson Jr., M.D., FAAN

## 2024-02-02 ENCOUNTER — APPOINTMENT (OUTPATIENT)
Dept: ORTHOPEDIC SURGERY | Facility: HOSPITAL | Age: 59
End: 2024-02-02
Payer: MEDICARE

## 2024-02-02 VITALS
OXYGEN SATURATION: 96 % | TEMPERATURE: 97.7 F | HEART RATE: 75 BPM | SYSTOLIC BLOOD PRESSURE: 129 MMHG | RESPIRATION RATE: 17 BRPM | DIASTOLIC BLOOD PRESSURE: 85 MMHG

## 2024-02-02 DIAGNOSIS — M54.16 LUMBAR RADICULAR PAIN: Primary | ICD-10-CM

## 2024-02-02 PROCEDURE — 1090000002 HH PPS REVENUE DEBIT

## 2024-02-02 PROCEDURE — 1090000001 HH PPS REVENUE CREDIT

## 2024-02-02 ASSESSMENT — ENCOUNTER SYMPTOMS
PAIN LOCATION - PAIN QUALITY: ACHE
PAIN LOCATION - PAIN SEVERITY: 8/10
PAIN LOCATION - PAIN SEVERITY: 8/10
PAIN LOCATION - PAIN FREQUENCY: CONSTANT
PAIN LOCATION - PAIN QUALITY: ACHE
PAIN LOCATION - PAIN DURATION: CONSTANT
PAIN LOCATION - EXACERBATING FACTORS: MOBILITY
PERSON REPORTING PAIN: PATIENT
PAIN LOCATION: RIGHT KNEE
PAIN: 1
PAIN LOCATION - PAIN DURATION: CONSTANT
LOWEST PAIN SEVERITY IN PAST 24 HOURS: 5/10
HIGHEST PAIN SEVERITY IN PAST 24 HOURS: 8/10
PAIN LOCATION - EXACERBATING FACTORS: MOBILITY
PAIN LOCATION - RELIEVING FACTORS: MEDICATION
PAIN LOCATION - PAIN FREQUENCY: CONSTANT
SUBJECTIVE PAIN PROGRESSION: WAXING AND WANING
PAIN SEVERITY GOAL: 3/10
PAIN LOCATION - RELIEVING FACTORS: MEDICATION
PAIN LOCATION: LEFT KNEE

## 2024-02-03 PROCEDURE — 1090000002 HH PPS REVENUE DEBIT

## 2024-02-03 PROCEDURE — 1090000001 HH PPS REVENUE CREDIT

## 2024-02-04 PROCEDURE — 1090000001 HH PPS REVENUE CREDIT

## 2024-02-04 PROCEDURE — 1090000002 HH PPS REVENUE DEBIT

## 2024-02-05 ENCOUNTER — OFFICE VISIT (OUTPATIENT)
Dept: UROLOGY | Facility: CLINIC | Age: 59
End: 2024-02-05
Payer: MEDICARE

## 2024-02-05 VITALS
SYSTOLIC BLOOD PRESSURE: 134 MMHG | HEART RATE: 73 BPM | DIASTOLIC BLOOD PRESSURE: 69 MMHG | BODY MASS INDEX: 35.15 KG/M2 | WEIGHT: 227.8 LBS

## 2024-02-05 DIAGNOSIS — N81.89 PELVIC FLOOR WEAKNESS: ICD-10-CM

## 2024-02-05 DIAGNOSIS — N81.11 MIDLINE CYSTOCELE: ICD-10-CM

## 2024-02-05 DIAGNOSIS — R33.9 URINARY RETENTION: ICD-10-CM

## 2024-02-05 DIAGNOSIS — R35.0 URINARY FREQUENCY: ICD-10-CM

## 2024-02-05 DIAGNOSIS — Z96.0 VAGINAL PESSARY PRESENT: Primary | ICD-10-CM

## 2024-02-05 DIAGNOSIS — N95.2 ATROPHIC VAGINITIS: ICD-10-CM

## 2024-02-05 LAB
POC APPEARANCE, URINE: CLEAR
POC BILIRUBIN, URINE: NEGATIVE
POC BLOOD, URINE: NEGATIVE
POC COLOR, URINE: YELLOW
POC GLUCOSE, URINE: NEGATIVE MG/DL
POC KETONES, URINE: NEGATIVE MG/DL
POC LEUKOCYTES, URINE: NEGATIVE
POC NITRITE,URINE: NEGATIVE
POC PH, URINE: 5.5 PH
POC PROTEIN, URINE: NEGATIVE MG/DL
POC SPECIFIC GRAVITY, URINE: 1.01
POC UROBILINOGEN, URINE: 0.2 EU/DL

## 2024-02-05 PROCEDURE — 51701 INSERT BLADDER CATHETER: CPT | Performed by: OBSTETRICS & GYNECOLOGY

## 2024-02-05 PROCEDURE — 1090000001 HH PPS REVENUE CREDIT

## 2024-02-05 PROCEDURE — 1090000002 HH PPS REVENUE DEBIT

## 2024-02-05 PROCEDURE — 3008F BODY MASS INDEX DOCD: CPT | Performed by: OBSTETRICS & GYNECOLOGY

## 2024-02-05 PROCEDURE — 57160 INSERT PESSARY/OTHER DEVICE: CPT | Performed by: OBSTETRICS & GYNECOLOGY

## 2024-02-05 PROCEDURE — 3078F DIAST BP <80 MM HG: CPT | Performed by: OBSTETRICS & GYNECOLOGY

## 2024-02-05 PROCEDURE — 99214 OFFICE O/P EST MOD 30 MIN: CPT | Performed by: OBSTETRICS & GYNECOLOGY

## 2024-02-05 PROCEDURE — 87086 URINE CULTURE/COLONY COUNT: CPT | Performed by: OBSTETRICS & GYNECOLOGY

## 2024-02-05 PROCEDURE — 4010F ACE/ARB THERAPY RXD/TAKEN: CPT | Performed by: OBSTETRICS & GYNECOLOGY

## 2024-02-05 PROCEDURE — 3075F SYST BP GE 130 - 139MM HG: CPT | Performed by: OBSTETRICS & GYNECOLOGY

## 2024-02-05 PROCEDURE — 81003 URINALYSIS AUTO W/O SCOPE: CPT | Performed by: OBSTETRICS & GYNECOLOGY

## 2024-02-05 PROCEDURE — 1036F TOBACCO NON-USER: CPT | Performed by: OBSTETRICS & GYNECOLOGY

## 2024-02-05 RX ORDER — TAMSULOSIN HYDROCHLORIDE 0.4 MG/1
0.4 CAPSULE ORAL DAILY
Qty: 30 CAPSULE | Refills: 11 | Status: SHIPPED | OUTPATIENT
Start: 2024-02-05 | End: 2024-04-05 | Stop reason: SDUPTHER

## 2024-02-05 NOTE — PROGRESS NOTES
Subjective   Patient ID: Prema Hair is a 58 y.o. female who presents for follow up.    HPI  58-year-old with symptomatic cystocele, pelvic floor weakness, urinary urgency and frequency, and vaginal atrophy with pessary in place with history of incomplete bladder emptying and worsening urinary incontinence complaints.    The patient presents with her daughter. She notes worsening urinary urgency and frequency with incontinence.  She is continuing her Oxybutynin without much relief. Her  ml by ultrasound. She denies any UTI like symptoms.     She thinks her pessary expulsed due to the constipation a little over 1 week ago.  She denies any vaginal complaints, no abnormal bleeding or discharge.  She is utilizing the vaginal estrogen cream.  She does feel her vaginal bulge with Valsalva.     She also complaints of constipation, she has been utilizing colace and MiraLAX. No fecal or flatal incontinence.     She has no other complaints.     From Previous note  This visit was performed through telemedicine  58-year-old with symptomatic cystocele, pelvic floor weakness, urinary urgency and frequency, and vaginal atrophy with pessary in place with history of incomplete bladder emptying and worsening urinary incontinence complaints.     The patient has had improved control with her oxybutynin. She denies any dry mouth or constipation complaints. She denies any retention concerns. However she has noted terminal urge incontinence and stress incontinence complaints when she waits 4 to 5 hours between urinations. She denies any UTI symptoms.     She denies any bowel related complaints.      She has no other complaints.        From previous note  58-year-old with symptomatic cystocele, pelvic floor weakness, urinary urgency and frequency, and vaginal atrophy with pessary in place with history of incomplete bladder emptying and worsening stress urinary incontinence complaints.     The patient reports that she has been  "leaking urine more but had no problem emptying her bladder since the last visit. She states that she is wet during the night. She is compliant with tamsulosin. PVR today is 0. Urine appears negative.     She denies any vaginal complaints, no abnormal vaginal bleeding or discharge. She is satisfied from the pessary standpoint. She is utilizing her vaginal estrogen therapy.     She denies any bowel related complaints, no fecal or flatal incontinence.     She has no other complaints.     From previous note  58-year-old with symptomatic cystocele, pelvic floor weakness, urinary urgency and frequency, and vaginal atrophy with pessary in place with incomplete bladder emptying.     The patient appears to be emptying her bladder well, PVR today is 0. She is utilizing the tamsulosin with improvements in her bladder emptying but does need to double void. She continues to utilize the daily cephalexin therapy, cranberry extract tablets and denies any UTI like symptoms. he does note episodic leaking on laughing, coughing and sneezing.     She denies any vaginal complaints, no abnormal vaginal bleeding or discharge. She is satisfied from the pessary standpoint. She is utilizing her vaginal estrogen therapy.     She denies any bowel related complaints, no fecal or flatal incontinence.     She has no other complaints.     From previous note  58-year-old with symptomatic cystocele, pelvic floor weakness, urinary urgency and frequency, and vaginal atrophy with pessary in place with incomplete bladder emptying.     The patient appears to be emptying her bladder well, PVR today is 0. She states the past week was \"challenging\" and she has to double void and bear down to empty. She has stopped her oxybutynin and appears to be having benefits with her tamsulosin. She continues her daily low-dose cephalexin. She denies any UTI like symptoms. Urinalysis is negative today      She denies any vaginal complaints, no abnormal vaginal bleeding " or discharge. She is satisfied from the pessary standpoint. She is utilizing her vaginal estrogen therapy.     She denies any bowel related complaints, no fecal or flatal incontinence.     She has no other complaints.     From previous note  58-year-old with symptomatic cystocele, pelvic floor weakness, urinary urgency and frequency, and vaginal atrophy with pessary in place presenting for follow-up regarding her pessary and acute urinary tract infection.     The patient presents today with a roughly 70 history of worsening lower urinary tract symptoms. PVR today is 300 cc with a negative urinalysis. She did undergo a cervical epidural C6/C7 injection 1 week ago. She continues her oxybutynin therapy and daily low-dose cephalexin. She is utilizing her vaginal estrogen therapy.     She denies any worsening constipation complaints. She was recently started on a new diuretic therapy.     She has no other complaints.     From previous note  57-year-old with symptomatic cystocele, pelvic floor weakness, urinary urgency and frequency, and vaginal atrophy with pessary in place presenting for follow-up regarding her lower urinary tract symptoms and recent urinary retention.     The patient underwent pain management surgery on 02/03/23. She states at that time her physician told her that she had a urinary tract infection. She notes some sensation of this prior to this procedure. The symptoms are now bothersome for her as she is experiencing discomfort, burning micturition and urgency. She continues to utilize the Macrodantin, Oxybutynin and vaginal estrogen cream. She is satisfied from the pessary standpoint.      She denies any vaginal complaints, no abnormal vaginal bleeding or discharge.     She denies any bowel related complaints, no fecal or flatal incontinence.     She has no other complaints.        From previous note  57-year-old with symptomatic cystocele, pelvic floor weakness, urinary urgency and frequency, and  "vaginal atrophy with pessary in place presenting for follow-up regarding her lower urinary tract symptoms and recent urinary retention.     The patient states that she is not is not emptying well for the past week. She has been UTI free for 6 months.     Urinalysis today is grossly infected.     She denies any abnormal vaginal bleeding or discharge. However, she states that she is experiencing vaginal pain for the past week.      She does have a longstanding history of chronic constipation, she states that her bowel complaints have improved. She otherwise denies any fecal or flatal incontinence.        She has no new complaints.      From previous note   The following visit was performed to telemedicine  57-year-old presenting with history of symptomatic cystocele, urinary urgency, pelvic floor weakness, and vaginal atrophy with a #3 ring without support.     The patient is overall very satisfied with her present therapy. She is utilizing her vaginal estrogen therapy and her oxybutynin. She feels well controlled from a urinary urgency and frequency standpoint. She denies any abnormal vaginal bleeding or discharge. She feels that her pessary is \"working\".      She has no new complaints.     From previous note  55-year-old presenting as referral from Dr. Marino with complaints of \"bladder and uterus falling\".     The patient noted roughly 1 month ago a feeling of a vaginal bulge with acute onset vaginal pressure and pulling. She took a picture of this and was evaluated by Dr. Marino who noted an anterior wall descent. Since that time she has denied any abnormal vaginal bleeding or discharge. She is not sexually active. She denies any significant changes in her bowel related complaints. She does have a longer standing history of urinary urgency and stress urinary incontinence complaints predating the prolapse complaints. She presents today to discuss her prolapse complaints.     The patient notes a history of stress " urinary incontinence both with a full and empty bladder noted over the last year. She also notes urinary urgency up to every 1-2 hours. She notes 0-1 episodes of nocturia. She denies enuresis. She denies a history of nephrolithiasis, chronic urinary tract infections, or any gross hematuria. She is never been evaluated for these complaints.     She does have a longstanding history of chronic constipation for which she takes daily fiber therapy and titrates MiraLAX for a soft bowel movement every day to 2 days. She otherwise denies any fecal or flatal incontinence.     As above, the patient is not sexually active. She denies any abnormal vaginal bleeding or discharge.     The patient does drink a large volume of fluid up to 6 to 816 ounce bottles of water daily. She also utilizes a CPAP machine at night.     She has no other complaints.   Review of Systems  Constitutional: No fever, No chills and No fatigue.   Eyes: No vision problems and No dryness of the eyes.   ENT: No dry mouth, No hearing loss and No nosebleeds.   Cardiovascular: No chest pain, No palpitations and No orthopnea.   Respiratory: No shortness of breath, No cough and No wheezing.   Gastrointestinal: No abdominal pain, No constipation, No nausea, No diarrhea, No vomiting and No melena.   Genitourinary: As noted in HPI.   Musculoskeletal: No back pain, No myalgias, No muscle weakness, No joint swelling and No leg edema.   Integumentary: No rashes, No skin lesion and No itching.   Neurological: No headache, No numbness and No dizziness.   Psychiatric: No sleep disturbances, No anxiety and No depression.   Endocrine: No hot flashes, No loss of hair and No hirsutism.   Hematologic/Lymphatic: No swollen glands, No tendency for easy bleeding and No tendency for easy bruising.   All other systems have been reviewed and are negative for complaint.        Objective   Physical Exam    PHYSICAL EXAMINATION:  No LMP recorded.  Body mass index is 35.15 kg/m².  BP  134/69   Pulse 73   Wt 103 kg (227 lb 12.8 oz)   BMI 35.15 kg/m²   General Appearance: well appearing, significantly decreased hand mobility using a walker with the assistance of her daughter.  Answering questions appropriately.  Neuro: Alert and oriented   HEENT: mucous membranes moist, neck supple  Resp: No respiratory distress, normal work of breathing  MSK: normal range of motion, gait appropriate    Under normal sterile technique, the bladder was catheterized with a 12 Scottish straight cath for 400 cc of clear urine.    No vaginal excoriations.  No other gross abnormalities.  No pessary is noted on exam.  The patient was refitted with a 3 inch ring with support without difficulty.    Assessment/Plan   58-year-old with symptomatic cystocele, pelvic floor weakness, urinary urgency and frequency, and vaginal atrophy with 3 inch ring with support pessary in place with worsening incomplete bladder emptying and urinary incontinence complaints.     1. We have previously discussed the patient's symptomatic vaginal bulge complaints. She was successfully fitted with a #3 ring with support 2/16/2021. She has noted worsening stress urinary incontinence complaints. We discussed refitting her with a #3 ring with knob which was performed 6/27/2023.  This was expulsed a few weeks ago.  She was refitted with a 3 inch ring with support today 2/5/2024.  She will require every 4 month pessary follow-up.      2. The patient was again noted to have incomplete bladder emptying 4/12/2023 with a PVR of 300 cc and today 2/5/2024 with 400 cc.  We discussed stopping her oxybutynin therapy now.  She has previously had improvements with improved voiding techniques and tamsulosin.  She will be restarted on tamsulosin therapy now.  The patient successfully passed her voiding trial 5/31/2022. It is unclear why she had an acute episode of urinary retention and was likely related to a urinary tract infection. She appears to be having benefits  with her oxybutynin and will continue this moving forward. We again discussed the importance of timed voiding every 3 hours. We did discuss utilizing CIC but she is unable to do this due to fine motor skills and she is not interested in a catheter placement. We did discuss the possibility of sacral neuromodulation moving forward. Should she continue to have bothersome urinary incontinence complaints, we discussed proceeding with urodynamic testing.     3. We discussed the safety, efficacy, proper utilization of vaginal estrogen therapy. She will continue her vaginal estrogen therapy 3 times weekly.     4. As above, we discussed the patient's weak pelvic floor as relates to her lower urinary tract symptoms and symptomatic pelvic organ prolapse. She is not interested in pelvic floor physical therapy at this time.     5. We again discussed her chronic urinary tract infections. She will continue her low-dose cephalexin therapy. She has previously utilized low-dose Macrobid but this has been changed due to recent sensitivities. She was provided a new standing order for urinalysis and urine culture. As above she will continue her vaginal estrogen therapy. She will also continue daily cranberry extract tablets and continue to push fluids.  Urine appears negative today 2/5/2024.     6. The patient will follow-up in June 2024 for pessary maintenance.  She will be contacted with the results of her kidney ultrasound when available to discuss her urinary retention complaints.     TYREL Acevedo MD       Scribe Attestation  By signing my name below, Charito PRAJAPATI Scribe attest that this documentation has been prepared under the direction and in the presence of Norbert Acevedo MD. All medical record entries made by the Scribe were at my direction or personally dictated by me. I have reviewed the chart and agree that the record accurately reflects my personal performance of the history, physical exam, discussion and  plan.

## 2024-02-05 NOTE — PATIENT INSTRUCTIONS
Please follow-up with a kidney ultrasound.  Get this scheduled by calling [phone].    You will be contacted with these results and to discuss her lower urinary tract symptoms when available.    Please stop your oxybutynin therapy now.    Please restart your tamsulosin therapy.    Please work aggressively on treating your constipation complaints.    Please follow-up in June 2024 for pessary maintenance.    Please contact the clinic with any questions or concerns.    [phone]

## 2024-02-06 ENCOUNTER — HOSPITAL ENCOUNTER (OUTPATIENT)
Dept: RADIOLOGY | Facility: CLINIC | Age: 59
Discharge: HOME | End: 2024-02-06
Payer: MEDICARE

## 2024-02-06 ENCOUNTER — OFFICE VISIT (OUTPATIENT)
Dept: ORTHOPEDIC SURGERY | Facility: CLINIC | Age: 59
End: 2024-02-06
Payer: MEDICARE

## 2024-02-06 ENCOUNTER — APPOINTMENT (OUTPATIENT)
Dept: RADIOLOGY | Facility: CLINIC | Age: 59
End: 2024-02-06
Payer: MEDICARE

## 2024-02-06 ENCOUNTER — HOME CARE VISIT (OUTPATIENT)
Dept: HOME HEALTH SERVICES | Facility: HOME HEALTH | Age: 59
End: 2024-02-06
Payer: MEDICARE

## 2024-02-06 VITALS
HEART RATE: 74 BPM | RESPIRATION RATE: 19 BRPM | OXYGEN SATURATION: 96 % | SYSTOLIC BLOOD PRESSURE: 134 MMHG | DIASTOLIC BLOOD PRESSURE: 64 MMHG | TEMPERATURE: 97.4 F

## 2024-02-06 VITALS — WEIGHT: 227 LBS | HEIGHT: 67 IN | BODY MASS INDEX: 35.63 KG/M2

## 2024-02-06 DIAGNOSIS — M25.562 ACUTE BILATERAL KNEE PAIN: ICD-10-CM

## 2024-02-06 DIAGNOSIS — M17.0 PRIMARY OSTEOARTHRITIS OF BOTH KNEES: Primary | ICD-10-CM

## 2024-02-06 DIAGNOSIS — M25.561 ACUTE BILATERAL KNEE PAIN: ICD-10-CM

## 2024-02-06 PROCEDURE — 2500000004 HC RX 250 GENERAL PHARMACY W/ HCPCS (ALT 636 FOR OP/ED): Performed by: ORTHOPAEDIC SURGERY

## 2024-02-06 PROCEDURE — 1036F TOBACCO NON-USER: CPT | Performed by: ORTHOPAEDIC SURGERY

## 2024-02-06 PROCEDURE — 20610 DRAIN/INJ JOINT/BURSA W/O US: CPT | Performed by: ORTHOPAEDIC SURGERY

## 2024-02-06 PROCEDURE — 4010F ACE/ARB THERAPY RXD/TAKEN: CPT | Performed by: ORTHOPAEDIC SURGERY

## 2024-02-06 PROCEDURE — 1090000001 HH PPS REVENUE CREDIT

## 2024-02-06 PROCEDURE — 3008F BODY MASS INDEX DOCD: CPT | Performed by: ORTHOPAEDIC SURGERY

## 2024-02-06 PROCEDURE — 2500000005 HC RX 250 GENERAL PHARMACY W/O HCPCS: Performed by: ORTHOPAEDIC SURGERY

## 2024-02-06 PROCEDURE — 1090000002 HH PPS REVENUE DEBIT

## 2024-02-06 PROCEDURE — 73560 X-RAY EXAM OF KNEE 1 OR 2: CPT | Mod: 50

## 2024-02-06 PROCEDURE — G0158 HHC OT ASSISTANT EA 15: HCPCS | Mod: HHH

## 2024-02-06 PROCEDURE — 99214 OFFICE O/P EST MOD 30 MIN: CPT | Mod: 57 | Performed by: ORTHOPAEDIC SURGERY

## 2024-02-06 PROCEDURE — 0023 HH SOC

## 2024-02-06 PROCEDURE — 99204 OFFICE O/P NEW MOD 45 MIN: CPT | Performed by: ORTHOPAEDIC SURGERY

## 2024-02-06 RX ORDER — LIDOCAINE HYDROCHLORIDE 20 MG/ML
2 INJECTION, SOLUTION INFILTRATION; PERINEURAL
Status: COMPLETED | OUTPATIENT
Start: 2024-02-06 | End: 2024-02-06

## 2024-02-06 RX ORDER — METHYLPREDNISOLONE ACETATE 40 MG/ML
40 INJECTION, SUSPENSION INTRA-ARTICULAR; INTRALESIONAL; INTRAMUSCULAR; SOFT TISSUE
Status: COMPLETED | OUTPATIENT
Start: 2024-02-06 | End: 2024-02-06

## 2024-02-06 RX ADMIN — LIDOCAINE HYDROCHLORIDE 2 ML: 20 INJECTION, SOLUTION INFILTRATION; PERINEURAL at 17:57

## 2024-02-06 RX ADMIN — METHYLPREDNISOLONE ACETATE 40 MG: 40 INJECTION, SUSPENSION INTRALESIONAL; INTRAMUSCULAR; INTRASYNOVIAL; SOFT TISSUE at 17:57

## 2024-02-06 ASSESSMENT — PAIN SCALES - GENERAL: PAINLEVEL_OUTOF10: 10 - WORST POSSIBLE PAIN

## 2024-02-06 ASSESSMENT — PAIN - FUNCTIONAL ASSESSMENT: PAIN_FUNCTIONAL_ASSESSMENT: 0-10

## 2024-02-06 ASSESSMENT — PAIN DESCRIPTION - DESCRIPTORS: DESCRIPTORS: SHARP;ACHING;THROBBING

## 2024-02-06 NOTE — PROGRESS NOTES
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  All other body systems have been reviewed and are negative for complaint.  Constitutional: Awake alert appropriate  Contorted neck     Eyes: Sclerae anicteric, pupils equal and round  HENT: Normocephalic atraumatic  Cardiovascular: Pulses full, regular rate and rhythm  Respiratory: Breathing not labored, no wheezing  Integumentary: Skin intact, no lesions or rashes  Neurological: Sensation intact, no gross strength deficits, reflexes equal  Psychiatric: Alert oriented and appropriate  Hematologic/lymphatic: No lymphadenopathy  Right knee: Valgus deformity range of motion 5 to about 90 degrees small effusion maximally tender laterally no gross instability left knee: Generalized joint line tenderness and crepitus mobility is maintained  X-rays of the right knee show KL 4 arthritis  Left knee less severe arthritis  Impression bilateral knee arthritis exhausted treatment options for the right knee recommended proceeding with total knee replacement  This patient has failed conservative therapy for knee arthritis.  This has included activity modification, attempts at weight loss, antiinflammatory medication, injectables, and physical therapy.  Pain is severely affecting activities of daily living and present at rest as well.    I have recommended proceeding with total knee replacement.  We discussed the potential risks including but not limited to persistent pain, DVT, infection, stiffness, periprosthetic fracture, and loosening.   Additionally we have discussed potential prolonged rehabilitation of several months.  We have also discussed preoperative medical screening and joint class.  The plan is for the procedure to be done as an outpatient.  The patient has been instructed that they need to make appropriate preparations for assistance at home in the immediate postoperative period  Injected right knee today  L Inj/Asp: R knee on 2/6/2024 5:57 PM  Indications: pain, joint swelling and diagnostic evaluation  Details: 21 G needle, anterolateral approach  Medications: 40 mg methylPREDNISolone acetate 40 mg/mL; 2 mL lidocaine 20 mg/mL (2 %)  Outcome: tolerated well, no immediate complications  Procedure, treatment alternatives, risks and benefits explained, specific risks discussed. Consent was given by the patient. Immediately prior to procedure a time out was called to verify the correct patient, procedure, equipment, support staff and site/side marked as required. Patient was prepped and draped in the usual sterile fashion.

## 2024-02-07 ENCOUNTER — HOME CARE VISIT (OUTPATIENT)
Dept: HOME HEALTH SERVICES | Facility: HOME HEALTH | Age: 59
End: 2024-02-07
Payer: MEDICARE

## 2024-02-07 LAB — BACTERIA UR CULT: NO GROWTH

## 2024-02-07 PROCEDURE — 1090000001 HH PPS REVENUE CREDIT

## 2024-02-07 PROCEDURE — 1090000002 HH PPS REVENUE DEBIT

## 2024-02-07 PROCEDURE — G0157 HHC PT ASSISTANT EA 15: HCPCS | Mod: HHH

## 2024-02-08 ENCOUNTER — DOCUMENTATION (OUTPATIENT)
Dept: CARDIOLOGY | Facility: CLINIC | Age: 59
End: 2024-02-08
Payer: MEDICARE

## 2024-02-08 ENCOUNTER — HOME CARE VISIT (OUTPATIENT)
Dept: HOME HEALTH SERVICES | Facility: HOME HEALTH | Age: 59
End: 2024-02-08
Payer: MEDICARE

## 2024-02-08 VITALS
HEART RATE: 72 BPM | RESPIRATION RATE: 17 BRPM | DIASTOLIC BLOOD PRESSURE: 78 MMHG | TEMPERATURE: 97.2 F | SYSTOLIC BLOOD PRESSURE: 134 MMHG

## 2024-02-08 PROCEDURE — G0158 HHC OT ASSISTANT EA 15: HCPCS | Mod: HHH

## 2024-02-08 PROCEDURE — 1090000001 HH PPS REVENUE CREDIT

## 2024-02-08 PROCEDURE — 1090000002 HH PPS REVENUE DEBIT

## 2024-02-09 ENCOUNTER — HOME CARE VISIT (OUTPATIENT)
Dept: HOME HEALTH SERVICES | Facility: HOME HEALTH | Age: 59
End: 2024-02-09
Payer: MEDICARE

## 2024-02-09 PROCEDURE — 1090000002 HH PPS REVENUE DEBIT

## 2024-02-09 PROCEDURE — 1090000001 HH PPS REVENUE CREDIT

## 2024-02-09 PROCEDURE — G0157 HHC PT ASSISTANT EA 15: HCPCS | Mod: HHH

## 2024-02-09 NOTE — PROGRESS NOTES
Our cardiology office at the [facility] has been asked to perform perioperative risk stratification.  Prior cardiac testing including blood work, ECG, echocardiography, stress testing, ischemic evaluations, and functional status have been reviewed.    Prema Hair is a 58 y.o. 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  Dyspnea on exertion lower extremity edema is relatively stable.  She appears near euvolemic.  Her BNP has come down and her creatinine is stable.     The patient is LOW TO INTERMEDIATE risk for moderate risk surgery and can PROCEED without further risk stratification.  Please contact us with any further questions or concerns.    -Aspirin can be held for 5 days prior to surgery and restarted when safe from a surgical perspective.        Sanjiv Madison MD, FAC, DADA IVERSON  Medical Director, [facility]  Clinical , [facility] [employer]  [email or web address]\[email or web address] [place] [email or web address]\"".[facility]

## 2024-02-10 PROCEDURE — 1090000002 HH PPS REVENUE DEBIT

## 2024-02-10 PROCEDURE — 1090000001 HH PPS REVENUE CREDIT

## 2024-02-11 DIAGNOSIS — M54.16 RADICULOPATHY, LUMBAR REGION: ICD-10-CM

## 2024-02-11 PROCEDURE — 1090000001 HH PPS REVENUE CREDIT

## 2024-02-11 PROCEDURE — 1090000002 HH PPS REVENUE DEBIT

## 2024-02-12 PROCEDURE — 1090000002 HH PPS REVENUE DEBIT

## 2024-02-12 PROCEDURE — 1090000001 HH PPS REVENUE CREDIT

## 2024-02-12 RX ORDER — BACLOFEN 10 MG/1
10 TABLET ORAL 3 TIMES DAILY PRN
Qty: 270 TABLET | Refills: 3 | Status: SHIPPED | OUTPATIENT
Start: 2024-02-12 | End: 2024-03-28 | Stop reason: HOSPADM

## 2024-02-13 ENCOUNTER — HOME CARE VISIT (OUTPATIENT)
Dept: HOME HEALTH SERVICES | Facility: HOME HEALTH | Age: 59
End: 2024-02-13
Payer: MEDICARE

## 2024-02-13 PROCEDURE — 1090000001 HH PPS REVENUE CREDIT

## 2024-02-13 PROCEDURE — G0158 HHC OT ASSISTANT EA 15: HCPCS | Mod: HHH

## 2024-02-13 PROCEDURE — G0157 HHC PT ASSISTANT EA 15: HCPCS | Mod: HHH

## 2024-02-13 PROCEDURE — 1090000002 HH PPS REVENUE DEBIT

## 2024-02-14 PROCEDURE — 1090000002 HH PPS REVENUE DEBIT

## 2024-02-14 PROCEDURE — 1090000001 HH PPS REVENUE CREDIT

## 2024-02-15 ENCOUNTER — HOME CARE VISIT (OUTPATIENT)
Dept: HOME HEALTH SERVICES | Facility: HOME HEALTH | Age: 59
End: 2024-02-15
Payer: MEDICARE

## 2024-02-15 DIAGNOSIS — I10 PRIMARY HYPERTENSION: ICD-10-CM

## 2024-02-15 PROCEDURE — 1090000002 HH PPS REVENUE DEBIT

## 2024-02-15 PROCEDURE — G0158 HHC OT ASSISTANT EA 15: HCPCS | Mod: HHH

## 2024-02-15 PROCEDURE — 1090000001 HH PPS REVENUE CREDIT

## 2024-02-15 RX ORDER — BUMETANIDE 2 MG/1
2 TABLET ORAL DAILY
Qty: 90 TABLET | Refills: 1 | Status: ON HOLD | OUTPATIENT
Start: 2024-02-15 | End: 2024-03-28 | Stop reason: SDUPTHER

## 2024-02-16 ENCOUNTER — HOME CARE VISIT (OUTPATIENT)
Dept: HOME HEALTH SERVICES | Facility: HOME HEALTH | Age: 59
End: 2024-02-16
Payer: MEDICARE

## 2024-02-16 VITALS
DIASTOLIC BLOOD PRESSURE: 73 MMHG | DIASTOLIC BLOOD PRESSURE: 67 MMHG | RESPIRATION RATE: 16 BRPM | TEMPERATURE: 97.8 F | OXYGEN SATURATION: 97 % | SYSTOLIC BLOOD PRESSURE: 128 MMHG | SYSTOLIC BLOOD PRESSURE: 123 MMHG | HEART RATE: 75 BPM | OXYGEN SATURATION: 96 % | RESPIRATION RATE: 18 BRPM | HEART RATE: 67 BPM | TEMPERATURE: 97.8 F

## 2024-02-16 PROCEDURE — 1090000002 HH PPS REVENUE DEBIT

## 2024-02-16 PROCEDURE — 1090000001 HH PPS REVENUE CREDIT

## 2024-02-17 PROCEDURE — 1090000001 HH PPS REVENUE CREDIT

## 2024-02-17 PROCEDURE — 1090000002 HH PPS REVENUE DEBIT

## 2024-02-18 PROCEDURE — 1090000001 HH PPS REVENUE CREDIT

## 2024-02-18 PROCEDURE — 1090000002 HH PPS REVENUE DEBIT

## 2024-02-19 PROCEDURE — 1090000001 HH PPS REVENUE CREDIT

## 2024-02-19 PROCEDURE — 1090000002 HH PPS REVENUE DEBIT

## 2024-02-20 ENCOUNTER — HOSPITAL ENCOUNTER (OUTPATIENT)
Dept: RADIOLOGY | Facility: HOSPITAL | Age: 59
Discharge: HOME | End: 2024-02-20
Payer: MEDICARE

## 2024-02-20 ENCOUNTER — HOME CARE VISIT (OUTPATIENT)
Dept: HOME HEALTH SERVICES | Facility: HOME HEALTH | Age: 59
End: 2024-02-20
Payer: MEDICARE

## 2024-02-20 VITALS
DIASTOLIC BLOOD PRESSURE: 64 MMHG | TEMPERATURE: 96.9 F | OXYGEN SATURATION: 97 % | SYSTOLIC BLOOD PRESSURE: 115 MMHG | RESPIRATION RATE: 17 BRPM | HEART RATE: 71 BPM

## 2024-02-20 VITALS
OXYGEN SATURATION: 95 % | TEMPERATURE: 97.3 F | SYSTOLIC BLOOD PRESSURE: 98 MMHG | HEIGHT: 67 IN | WEIGHT: 220 LBS | DIASTOLIC BLOOD PRESSURE: 71 MMHG | HEART RATE: 65 BPM | RESPIRATION RATE: 16 BRPM | BODY MASS INDEX: 34.53 KG/M2

## 2024-02-20 DIAGNOSIS — M54.16 LUMBAR RADICULAR PAIN: ICD-10-CM

## 2024-02-20 PROCEDURE — 99153 MOD SED SAME PHYS/QHP EA: CPT | Performed by: ANESTHESIOLOGY

## 2024-02-20 PROCEDURE — 2720000007 HC OR 272 NO HCPCS

## 2024-02-20 PROCEDURE — 99152 MOD SED SAME PHYS/QHP 5/>YRS: CPT | Performed by: ANESTHESIOLOGY

## 2024-02-20 PROCEDURE — 77003 FLUOROGUIDE FOR SPINE INJECT: CPT

## 2024-02-20 PROCEDURE — 1090000001 HH PPS REVENUE CREDIT

## 2024-02-20 PROCEDURE — 64624 DSTRJ NULYT AGT GNCLR NRV: CPT | Mod: LT | Performed by: ANESTHESIOLOGY

## 2024-02-20 PROCEDURE — 1090000002 HH PPS REVENUE DEBIT

## 2024-02-20 PROCEDURE — 64624 DSTRJ NULYT AGT GNCLR NRV: CPT | Performed by: ANESTHESIOLOGY

## 2024-02-20 PROCEDURE — 2500000004 HC RX 250 GENERAL PHARMACY W/ HCPCS (ALT 636 FOR OP/ED): Performed by: ANESTHESIOLOGY

## 2024-02-20 PROCEDURE — G0158 HHC OT ASSISTANT EA 15: HCPCS | Mod: HHH

## 2024-02-20 RX ORDER — FENTANYL CITRATE 50 UG/ML
INJECTION, SOLUTION INTRAMUSCULAR; INTRAVENOUS
Status: COMPLETED | OUTPATIENT
Start: 2024-02-20 | End: 2024-02-20

## 2024-02-20 RX ORDER — MIDAZOLAM HYDROCHLORIDE 1 MG/ML
INJECTION INTRAMUSCULAR; INTRAVENOUS
Status: COMPLETED | OUTPATIENT
Start: 2024-02-20 | End: 2024-02-20

## 2024-02-20 RX ADMIN — FENTANYL CITRATE 50 MCG: 50 INJECTION, SOLUTION INTRAMUSCULAR; INTRAVENOUS at 09:03

## 2024-02-20 RX ADMIN — MIDAZOLAM HYDROCHLORIDE 2 MG: 1 INJECTION INTRAMUSCULAR; INTRAVENOUS at 09:03

## 2024-02-20 ASSESSMENT — PAIN SCALES - GENERAL
PAINLEVEL_OUTOF10: 0 - NO PAIN
PAINLEVEL_OUTOF10: 2
PAINLEVEL_OUTOF10: 0 - NO PAIN
PAINLEVEL_OUTOF10: 2
PAINLEVEL_OUTOF10: 10 - WORST POSSIBLE PAIN
PAINLEVEL_OUTOF10: 0 - NO PAIN
PAINLEVEL_OUTOF10: 1
PAINLEVEL_OUTOF10: 2

## 2024-02-20 ASSESSMENT — PAIN - FUNCTIONAL ASSESSMENT
PAIN_FUNCTIONAL_ASSESSMENT: 0-10

## 2024-02-20 NOTE — H&P
History Of Present Illness  Prema Hair is a 58 y.o. female presents for procedure state below. Endorses no changes in past medical history or medical health since last seen in clinic.     Past Medical History  She has a past medical history of Cervicalgia (10/09/2020), Dysphonia (07/05/2022), Encounter for fitting and adjustment of other specified devices (02/16/2021), Hyperlipidemia, unspecified (01/03/2023), Hypokalemia (01/20/2022), Low back pain, unspecified (02/22/2021), Obstructive sleep apnea (adult) (pediatric) (01/03/2023), Paralysis of vocal cords and larynx, unspecified (10/07/2022), Type 2 diabetes mellitus with diabetic neuropathy, unspecified (CMS/HCC) (10/09/2020), Type 2 diabetes mellitus with other specified complication (CMS/HCC) (01/03/2023), and Type 2 diabetes mellitus with other specified complication (CMS/HCC) (09/14/2021).    Surgical History  She has a past surgical history that includes Other surgical history (10/09/2020); US guided thyroid biopsy (11/19/2020); and US guided thyroid biopsy (11/19/2020).     Social History  She reports that she has never smoked. She has never used smokeless tobacco. She reports that she does not currently use alcohol. She reports that she does not use drugs.    Family History  Family History   Problem Relation Name Age of Onset    Other (cardiac disorder) Mother      Heart failure Mother      Heart failure Father          Allergies  Lisinopril    Review of Symptoms:   Constitutional: Negative for chills, diaphoresis or fever  HENT: Negative for neck swelling  Eyes:.  Negative for eye pain  Respiratory:.  Negative for cough, shortness of breath or wheezing    Cardiovascular:.  Negative for chest pain or palpitations  Gastrointestinal:.  Negative for abdominal pain, nausea and vomiting  Genitourinary:.  Negative for urgency  Musculoskeletal:  Positive for knee pain. Positive for joint pain. Denies falls within the past 3 months.  Skin: Negative for  wounds or itching   Neurological: Negative for dizziness, seizures, loss of consciousness and weakness  Endo/Heme/Allergies: Does not bruise/bleed easily  Psychiatric/Behavioral: Negative for depression. The patient does not appear anxious.       PHYSICAL EXAM  Vitals signs reviewed  Constitutional:       General: Not in acute distress     Appearance: Normal appearance. Not ill-appearing.  HENT:     Head: Normocephalic and atraumatic  Eyes:     Conjunctiva/sclera: Conjunctivae normal  Cardiovascular:     Rate and Rhythm: Normal rate and regular rhythm  Pulmonary:     Effort: No respiratory distress  Abdominal:     Palpations: Abdomen is soft  Musculoskeletal: ALCALA  Skin:     General: Skin is warm and dry  Neurological:     General: No focal deficit present  Psychiatric:         Mood and Affect: Mood normal         Behavior: Behavior normal     Last Recorded Vitals  /64   Pulse 73   Temp 36.5 °C (97.7 °F)   Resp 17   Wt 99.8 kg (220 lb)   SpO2 94%     Relevant Results  Current Outpatient Medications   Medication Instructions    acetaminophen (Tylenol) 500 mg tablet 1 tablet, oral, Every 4 hours PRN    albuterol 90 mcg/actuation inhaler 2 puffs, inhalation, Every 4 hours PRN    amitriptyline (Elavil) 10 mg tablet 1 tablet, oral, Nightly    ammonium lactate (Lac-Hydrin) 12 % lotion 1 Application, Topical, 2 times daily    ascorbic acid (Vitamin C) 1,000 mg tablet 1 tablet, oral, Daily    aspirin 81 mg EC tablet 1 tablet, oral, Daily    atorvastatin (LIPITOR) 20 mg, oral, Nightly    azelastine (Astelin) 137 mcg (0.1 %) nasal spray 1 spray, Each Nostril, 2 times daily    baclofen (LIORESAL) 20 mg, oral, 3 times daily    baclofen (LIORESAL) 10 mg, oral, 3 times daily PRN    bumetanide (BUMEX) 2 mg, oral, Daily    calcium carbonate (Oscal) 500 mg calcium (1,250 mg) tablet 1 tablet, oral, 3 times daily    carbidopa-levodopa (Sinemet)  mg tablet 1.5 tablets, oral, 3 times daily    carvedilol (COREG) 25 mg,  oral, 2 times daily    cephalexin (Keflex) 500 mg capsule 1 capsule, oral, Daily    cholecalciferol (Vitamin D-3) 50 MCG (2000 UT) tablet 1 tablet, oral, Daily    clonazePAM (KlonoPIN) 1 mg tablet 1 tablet, oral, Every morning, 1/2 TABLET BY MOUTH EVERY EVENING AND AN ADDITIONAL 1/2 AS NEEDED<BR>    clonazePAM (KlonoPIN) 1 mg tablet 2 tablets, oral, Every evening    diclofenac sodium (Voltaren) 1 % gel gel 1 Application, Topical, 3 times daily PRN    estradiol (Estrace) 0.01 % (0.1 mg/gram) vaginal cream 1 Application, vaginal, 3 times weekly, Pea-sized amount    gabapentin (Neurontin) 300 mg capsule 3 capsules, oral, 3 times daily    ipratropium-albuteroL (Duo-Neb) 0.5-2.5 mg/3 mL nebulizer solution 3 mL, inhalation, Every 4 hours PRN    Klor-Con M20 20 mEq ER tablet 40 mEq, oral, 2 times daily    lancets misc 1 each, miscellaneous, 3 times daily    levothyroxine (Synthroid, Levoxyl) 100 mcg tablet 1 tablet, oral, Once daily (morning) M-F (5 days a week), 1 tablet 5 days a week Mon-Friday and two tabs on Saturdays and Sundays     levothyroxine (Synthroid, Levoxyl) 200 mcg tablet 1 tablet, oral, 2 times weekly, Saturday and sunday    lidocaine (Lidoderm) 5 % patch 1 patch, transdermal, Every 12 hours, Apply 1 patch and leave in place for 12 hours, then remove and leave off for 12 hours.    losartan (COZAAR) 50 mg, oral, Daily    meloxicam (Mobic) 15 mg tablet 1 tablet, oral, Daily PRN    meloxicam (MOBIC) 15 mg, oral, Daily, pain    metFORMIN (Glucophage) 500 mg tablet 1 tablet, oral, 2 times daily with meals    omega-3 fatty acids-fish oil 360-1,200 mg capsule 1 capsule, oral, Daily    omeprazole (PriLOSEC) 40 mg DR capsule 1 capsule, oral, Daily, With dinner<BR>    OXcarbazepine (Trileptal) 600 mg tablet 1 tablet, oral, Every morning    OXcarbazepine (Trileptal) 600 mg tablet 2 tablets, oral, Nightly    polyethylene glycol (GLYCOLAX, MIRALAX) 17 g, oral, Daily, In 8oz of water, juice, or tea and drink daily<BR>     prazosin (Minipress) 5 mg capsule 1 capsule, oral, Nightly    QUEtiapine (SEROquel) 100 mg tablet 1 tablet, oral, Every morning    QUEtiapine (SEROquel) 300 mg tablet 1 tablet, oral, Nightly    QUEtiapine (SEROQUEL) 400 mg, oral, Nightly    sertraline (Zoloft) 100 mg tablet Take 2 tablets (200 mg) by mouth once daily. Do not start before January 2, 2024.    tamsulosin (FLOMAX) 0.4 mg, oral, Daily    tiZANidine (Zanaflex) 4 mg tablet 1 tablet, oral, 3 times daily    topiramate (TOPAMAX) 100 mg, oral, 2 times daily    Trelegy Ellipta 200-62.5-25 mcg blister with device 1 puff, inhalation, Daily PRN         XR CERVICAL SPINE 2-3 VIEWS 01/16/2023    Narrative  MRN: [id number]  Patient Name: MANJU ISAAC    STUDY:  SPINE, CERVICAL, 2 OR 3 VIEWS;  1/16/2023 8:59 am    INDICATION:  Cervicalgia.    COMPARISON:  None.    ACCESSION NUMBER(S):  [id number]    ORDERING CLINICIAN:  OFELIA WALLACE    TECHNIQUE:  AP and lateral cervical spine images were obtained.    FINDINGS:  There is moderate reversal of normal cervical lordosis centered at  C6. Moderate disc space narrowing with endplate osteophytosis at C5-6  and C6-7 with mild changes at C4-5. No cervical spine compression  fracture or listhesis. There is uncovertebral hypertrophy with spur  formation at C5-6 and C6-7. No prevertebral soft tissue swelling. No  destructive bone lesion. There is no tracheal deviation or  pneumothorax. The imaged medial lung apices were clear.    Impression  Arthritic changes in the cervical spine as described.      MR LUMBAR SPINE WO IV CONTRAST 04/20/2022    Narrative  MRN: [id number]  Patient Name: PONCHOMANJU JUNE    STUDY:  MRI L-SPINE WO;  4/20/2022 3:40 pm    INDICATION:  Neck pain and lower back pain with perineal parasthesia .    COMPARISON:  None.    ACCESSION NUMBER(S):  [id number]    ORDERING CLINICIAN:  OFELIA WALLACE    TECHNIQUE:  Sagittal T1, T2, STIR, axial T1 and T2 weighted images of the lumbar  spine were  acquired.    FINDINGS:  For counting purposes the last lumbarized vertebral body is labeled  L5. Alignment, vertebral body heights and marrow signal pattern are  within normal limits. No bone marrow edema. Desiccated disc signal  throughout the lumbar spine without significant loss of disc height.  The conus terminates at L1 and is unremarkable. Evaluation of the  paraspinal soft tissues is unremarkable.    Evaluation by level:    T12-L1: No significant spinal canal or neural foraminal stenosis.    L1-L2: No significant spinal canal or neural foraminal stenosis.    L2-L3: Mild disc bulge and facet arthrosis. No significant spinal  canal stenosis. No significant neural foraminal stenosis.    L3-L4: Mild disc bulge and facet arthrosis. No significant spinal  canal stenosis. Mild left and no significant right neural foraminal  stenosis.    L4-L5: Disc bulge and facet arthrosis. No significant spinal canal  stenosis, narrowing of the left subarticular recess and mild  bilateral neural foraminal stenosis.    L5-S1: No significant spinal canal stenosis. No significant neural  foraminal stenosis.    Impression  Mild degenerative changes of the lumbar spine without significant  spinal canal stenosis. Mild neural foraminal narrowing as detailed  above.      MR CERVICAL SPINE WO IV CONTRAST 04/20/2022    Narrative  MRN: [id number]  Patient Name: MANJU ISAAC    STUDY:  MRI CERVICAL WO;  4/20/2022 3:39 pm    INDICATION:  Neck pain .    COMPARISON:  CT 04/09/2022.    ACCESSION NUMBER(S):  [id number]    ORDERING CLINICIAN:  OFELIA WALLACE    TECHNIQUE:  Sagittal T1, T2, STIR, axial T1 and axial T2 weighted images were  acquired through the cervical spine.    FINDINGS:  Craniocervical junction is within normal limits. Cerebellar tonsils  are at the level of the foramen magnum. There is straightening the  normal cervical lordosis. There is trace anterolisthesis of C4 on C5,  similar to the prior CT. Alignment, vertebral body  heights and marrow  signal pattern otherwise within normal limits. No bone marrow edema  is noted. Desiccated disc signal throughout the cervical spine with  mild disc height loss C3-C4, C4-C5, C5-C6 and C6-C7. Cervical cord is  unremarkable. Prevertebral soft tissues are not thickened.    Evaluation by level:    C1-C2: No significant spinal canal stenosis.    C2-C3: Mild uncovertebral joint hypertrophy. No significant spinal  canal and mild neural foraminal stenosis.    C3-C4: Disc osteophyte complex and uncovertebral joint hypertrophy  and facet arthrosis. Mild spinal canal stenosis, moderate to severe  left and mild right neural foraminal stenosis.    C4-C5: Disc osteophyte complex, uncovertebral joint hypertrophy and  facet arthrosis. No significant spinal canal stenosis, moderate left  and no significant right neural foraminal stenosis.    C5-C6: Disc osteophyte complex and uncovertebral joint hypertrophy  and facet arthrosis. No significant spinal canal and mild bilateral  neural foraminal stenosis.    C6-C7: Disc osteophyte complex and uncovertebral joint hypertrophy  and facet arthrosis. Moderate spinal canal and neural foraminal  stenosis. Prominent nerve root sleeve versus perineural cyst  bilaterally.    C7-T1: No significant spinal canal or neural foraminal stenosis    Impression  Multilevel degenerative changes most pronounced at C6-C7 with  moderate spinal canal and moderate bilateral neural foraminal  stenosis.      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    COMPARISON:  CT of the neck dated 10/08/2021.    ACCESSION NUMBER(S):  [id number]; [id number]; [id number]    ORDERING CLINICIAN:  JACINDA  DIMEO    TECHNIQUE:  T2 sagittal, T1 sagittal, and stir sagittal, images of the cervical,  thoracic and lumbar spine were obtained. Per technician  documentation: Patient was unable to tolerate completion of exam.  Limited axial T1 and T2 images of the cervical and thoracic spine  were obtained.    FINDINGS:  Limitations: Exam is limited due to patient movement. In addition,  patient was unable to tolerate the completion of exam.    Cervical spine:    Alignment: There is straightening of the normal cervical lordosis,  likely positional versus muscle spasm.    Vertebrae/Intervertebral Discs: Vertebral body heights are  maintained. Bone marrow signal is benign and there is no evidence of  bone marrow edema. Multilevel endplate degenerative changes with  posterior osteophytosis C5-C6 and C6-C7.    Spinal cord: No gross spinal cord signal abnormality however  evaluation is significantly limited by motion artifact.    C1-C2: The cervicomedullary junction appears unremarkable. There is  no spinal canal stenosis.    C2-C3: No significant canal stenosis.    C3-C4: Disc bulge and posterolateral osseous spurring contributing to  mild to moderate left foraminal stenosis. No significant right  foraminal or central canal stenosis.    C4-C5: Disc bulge and posterolateral osseous spurring contributes to  effacement of the ventral thecal sac with mild central canal stenosis  and mild to moderate left foraminal stenosis. No significant right  foraminal stenosis.    C5-C6: There is a disc osteophyte complex with more focal  osteophytosis in the left paracentral region with resultant  effacement of the left ventral thecal space and mild central canal  stenosis. There is mild bilateral foraminal narrowing.    C6-C7: Disc osteophyte complex and ligamentum flavum infolding  results in almost complete effacement of the thecal space with mild  to moderate central canal stenosis. There is also mild to moderate  right greater than left  foraminal stenosis.    C7-T1: No significant canal stenosis.      The prevertebral and posterior paraspinous soft tissues are within  normal limits. There is a 3 cm lobulated soft tissue in the left  hemithyroidectomy bed, not significantly changed from prior CT of the  neck taking into account differences in technique.      Thoracic spine:    Alignment: There is normal thoracic kyphosis..    Vertebrae/Intervertebral Discs: The vertebral body heights are  preserved. Bone marrow signal is benign. There is no significant bone  marrow edema. Multilevel disc desiccation is seen.    Spinal cord: The spinal cord is normal in caliber and signal  intensity.    T1-12:There is no high-grade spinal canal or neural foraminal  stenosis. There are a few small disc bulges and disc protrusions, for  example left paracentral disc protrusion at T7-T8, resulting in mild  central canal stenosis.    Paraspinous Soft Tissues: Within normal limits.      Lumbar spine:    Alignment: There is normal lumbar lordosis.    Vertebrae/Intervertebral Discs: The vertebral bodies demonstrate  expected height.There is no significant bone marrow edema on stir  sagittal images. There are mild endplate degenerative changes and a  few disc bulges with resultant mild bilateral foraminal narrowing at  L4-L5 due to disc bulge. There is no evidence of high-grade central  canal foraminal stenosis.    Conus medullaris: The conus medullaris terminates at approximately  the L1 level.    Impression  1. Limited incomplete evaluation with motion artifact, without gross  evidence of suspicious bone marrow lesions or cord edema.  2. Degenerative changes most pronounced at C6-C7, where disc  osteophyte complex results in mild to moderate central canal stenosis.      I personally reviewed the images/study and I agree with the findings  as stated. This study was interpreted at [facility], [place], [place].      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    COMPARISON:  CT of the neck dated 10/08/2021.    ACCESSION NUMBER(S):  [id number]; [id number]; [id number]    ORDERING CLINICIAN:  JACINDA MORALES    TECHNIQUE:  T2 sagittal, T1 sagittal, and stir sagittal, images of the cervical,  thoracic and lumbar spine were obtained. Per technician  documentation: Patient was unable to tolerate completion of exam.  Limited axial T1 and T2 images of the cervical and thoracic spine  were obtained.    FINDINGS:  Limitations: Exam is limited due to patient movement. In addition,  patient was unable to tolerate the completion of exam.    Cervical spine:    Alignment: There is straightening of the normal cervical lordosis,  likely positional versus muscle spasm.    Vertebrae/Intervertebral Discs: Vertebral body heights are  maintained. Bone marrow signal is benign and there is no evidence of  bone marrow edema. Multilevel endplate degenerative changes with  posterior osteophytosis C5-C6 and C6-C7.    Spinal cord: No gross spinal cord signal abnormality however  evaluation is significantly limited by motion artifact.    C1-C2: The cervicomedullary junction appears unremarkable. There is  no spinal canal stenosis.    C2-C3: No significant canal stenosis.    C3-C4: Disc bulge and posterolateral osseous spurring contributing to  mild to moderate left foraminal stenosis. No significant right  foraminal or central canal stenosis.    C4-C5: Disc bulge and posterolateral osseous spurring contributes to  effacement of the ventral thecal sac with mild central canal stenosis  and mild to moderate left foraminal stenosis. No significant right  foraminal stenosis.    C5-C6: There is  a disc osteophyte complex with more focal  osteophytosis in the left paracentral region with resultant  effacement of the left ventral thecal space and mild central canal  stenosis. There is mild bilateral foraminal narrowing.    C6-C7: Disc osteophyte complex and ligamentum flavum infolding  results in almost complete effacement of the thecal space with mild  to moderate central canal stenosis. There is also mild to moderate  right greater than left foraminal stenosis.    C7-T1: No significant canal stenosis.      The prevertebral and posterior paraspinous soft tissues are within  normal limits. There is a 3 cm lobulated soft tissue in the left  hemithyroidectomy bed, not significantly changed from prior CT of the  neck taking into account differences in technique.      Thoracic spine:    Alignment: There is normal thoracic kyphosis..    Vertebrae/Intervertebral Discs: The vertebral body heights are  preserved. Bone marrow signal is benign. There is no significant bone  marrow edema. Multilevel disc desiccation is seen.    Spinal cord: The spinal cord is normal in caliber and signal  intensity.    T1-12:There is no high-grade spinal canal or neural foraminal  stenosis. There are a few small disc bulges and disc protrusions, for  example left paracentral disc protrusion at T7-T8, resulting in mild  central canal stenosis.    Paraspinous Soft Tissues: Within normal limits.      Lumbar spine:    Alignment: There is normal lumbar lordosis.    Vertebrae/Intervertebral Discs: The vertebral bodies demonstrate  expected height.There is no significant bone marrow edema on stir  sagittal images. There are mild endplate degenerative changes and a  few disc bulges with resultant mild bilateral foraminal narrowing at  L4-L5 due to disc bulge. There is no evidence of high-grade central  canal foraminal stenosis.    Conus medullaris: The conus medullaris terminates at approximately  the L1 level.    Impression  1. Limited  incomplete evaluation with motion artifact, without gross  evidence of suspicious bone marrow lesions or cord edema.  2. Degenerative changes most pronounced at C6-C7, where disc  osteophyte complex results in mild to moderate central canal stenosis.      I personally reviewed the images/study and I agree with the findings  as stated. This study was interpreted at [facility], [place], [place].     No image results found.       1. Lumbar radicular pain  FL pain management TC    FL pain management TC    Radiofrequency Ablation    Radiofrequency Ablation           ASSESSMENT/PLAN  Prema Hari is a 58 y.o. female presents for a left genicular nerve radiofrequency ablation    Our plan is as follows:  - Follow In pain clinic  - Continue to participate in physical therapy as well as physician directed home exercises  - Continue pain medications as prescribed       Antonio Arellano MD

## 2024-02-20 NOTE — PROCEDURES
Pre-Op Diagnosis: Left knee degeneration and pain   Post-Procedure Diagnosis: Same as preop diagnosis  Procedure: Left genicular radiofrequency ablation using fluoroscopic guidance  Surgeon: Maria Isabel Crow MD, PhD   Resident/Fellow/Other Assistant: Antonio Arellano MD     Procedure Note:     Ms. Prema Hair is a 58 y.o. female with history of knee degeneration pain presents today for radiofrequency neurotomy of the left genicular nerves.  She has had a similar procedure in the past with good relief.  Following informed consent, the patient was brought to the operating room and placed in the supine position with a roll underneath the left knee.  Chlorhexidine was used for skin prep and the area was draped in a sterile fashion.  The skin and subcutaneous tissue overlying needle trajectories to the target sites were anesthetized with a total of 4 cc 0.5% lidocaine superficially.  16-gauge radiofrequency needles with 10 mm active tips were then advanced under fluoroscopic guidance to the appropriate target locations.  Stimulation at 2 Hz did not result in lower extremity contractions.  Thereafter, 1 cc 2% lidocaine was applied at each needle tip and radiofrequency neurotomy was carried out at 80 degrees x 2.  At the conclusion of the procedures the needles were removed, Band-Aids were applied and the patient was transferred to recovery room in stable condition.  The patient will follow-up with us on an outpatient basis and update us on the response to the procedure

## 2024-02-21 ENCOUNTER — HOME CARE VISIT (OUTPATIENT)
Dept: HOME HEALTH SERVICES | Facility: HOME HEALTH | Age: 59
End: 2024-02-21
Payer: MEDICARE

## 2024-02-21 PROCEDURE — G0151 HHCP-SERV OF PT,EA 15 MIN: HCPCS | Mod: HHH

## 2024-02-21 PROCEDURE — 1090000002 HH PPS REVENUE DEBIT

## 2024-02-21 PROCEDURE — 1090000001 HH PPS REVENUE CREDIT

## 2024-02-21 SDOH — HEALTH STABILITY: PHYSICAL HEALTH: EXERCISE TYPE: INDEP WITH CURRENT HEP

## 2024-02-21 ASSESSMENT — PAIN SCALES - PAIN ASSESSMENT IN ADVANCED DEMENTIA (PAINAD)
NEGVOCALIZATION: 0
FACIALEXPRESSION: 0 - SMILING OR INEXPRESSIVE.
CONSOLABILITY: 0
CONSOLABILITY: 0 - NO NEED TO CONSOLE.
BODYLANGUAGE: 0
NEGVOCALIZATION: 0 - NONE.
FACIALEXPRESSION: 0
TOTALSCORE: 0
BREATHING: 0
BODYLANGUAGE: 0 - RELAXED.

## 2024-02-21 ASSESSMENT — GAIT ASSESSMENTS
BALANCE AND GAIT SCORE: 16
GAIT SCORE: 9
WALKING STANCE: 1 - HEELS ALMOST TOUCHING WHILE WALKING
TRUNK SCORE: 0
STEP CONTINUITY: 1 - STEPS APPEAR CONTINUOUS
INITIATION OF GAIT IMMEDIATELY AFTER GO: 1 - NO HESITANCY
PATH SCORE: 1
PATH: 1 - MILD/MODERATE DEVIATION OR USES WALKING AID
STEP SYMMETRY: 1 - RIGHT AND LEFT STEP LENGTH APPEAR EQUAL
TRUNK: 0 - MARKED SWAY OR USES WALKING AID

## 2024-02-21 ASSESSMENT — ACTIVITIES OF DAILY LIVING (ADL)
PHYSICAL TRANSFERS ASSESSED: 1
AMBULATION ASSISTANCE ON FLAT SURFACES: 1
AMBULATION ASSISTANCE: 1

## 2024-02-21 ASSESSMENT — ENCOUNTER SYMPTOMS
PAIN LOCATION - EXACERBATING FACTORS: ARTHRITIS
LOWEST PAIN SEVERITY IN PAST 24 HOURS: 7/10
PAIN: 1
PAIN LOCATION - RELIEVING FACTORS: MEDS
PAIN LOCATION - RELIEVING FACTORS: MEDS
PAIN LOCATION: RIGHT KNEE
PAIN LOCATION - EXACERBATING FACTORS: ARTHRITIS
PAIN SEVERITY GOAL: 6/10
HIGHEST PAIN SEVERITY IN PAST 24 HOURS: 9/10
PAIN LOCATION: LEFT KNEE
LOSS OF SENSATION IN FEET: 1
OCCASIONAL FEELINGS OF UNSTEADINESS: 1
PAIN LOCATION - PAIN FREQUENCY: CONSTANT
SUBJECTIVE PAIN PROGRESSION: UNCHANGED
PAIN LOCATION - PAIN FREQUENCY: CONSTANT
DEPRESSION: 1

## 2024-02-21 ASSESSMENT — BALANCE ASSESSMENTS
SITTING DOWN: 0 - UNSAFE (MISJUDGES DISTANCE, FALLS INTO CHAIR)
BALANCE SCORE: 7
NUDGED SCORE: 1
EYES CLOSED AT MAXIMUM POSITION NUDGED: 0 - UNSTEADY
ARISES: 1 - ABLE, USES ARMS TO HELP
ATTEMPTS TO ARISE: 2 - ABLE TO RISE, ONE ATTEMPT
STANDING BALANCE: 1 - STEADY BUT WIDE STANCE AND USES CANE OR OTHER SUPPORT
TURNING 360 DEGREES STEPS: 0 - DISCONTINUOUS STEPS
ARISING SCORE: 1
SITTING BALANCE: 1 - STEADY, SAFE
IMMEDIATE STANDING BALANCE FIRST 5 SECONDS: 1 - STEADY BUT USES WALKER OR OTHER SUPPORT
NUDGED: 1 - STAGGERS, GRABS, CATCHES SELF

## 2024-02-22 ENCOUNTER — HOME CARE VISIT (OUTPATIENT)
Dept: HOME HEALTH SERVICES | Facility: HOME HEALTH | Age: 59
End: 2024-02-22
Payer: MEDICARE

## 2024-02-22 PROCEDURE — 1090000001 HH PPS REVENUE CREDIT

## 2024-02-22 PROCEDURE — G0158 HHC OT ASSISTANT EA 15: HCPCS | Mod: HHH

## 2024-02-22 PROCEDURE — 1090000002 HH PPS REVENUE DEBIT

## 2024-02-23 PROCEDURE — 1090000001 HH PPS REVENUE CREDIT

## 2024-02-23 PROCEDURE — 1090000002 HH PPS REVENUE DEBIT

## 2024-02-24 VITALS
TEMPERATURE: 97.5 F | RESPIRATION RATE: 16 BRPM | OXYGEN SATURATION: 96 % | HEART RATE: 72 BPM | SYSTOLIC BLOOD PRESSURE: 116 MMHG | DIASTOLIC BLOOD PRESSURE: 78 MMHG

## 2024-02-24 PROCEDURE — 1090000001 HH PPS REVENUE CREDIT

## 2024-02-24 PROCEDURE — 1090000002 HH PPS REVENUE DEBIT

## 2024-02-25 PROCEDURE — 1090000002 HH PPS REVENUE DEBIT

## 2024-02-25 PROCEDURE — 1090000001 HH PPS REVENUE CREDIT

## 2024-02-26 PROCEDURE — 1090000001 HH PPS REVENUE CREDIT

## 2024-02-26 PROCEDURE — 1090000002 HH PPS REVENUE DEBIT

## 2024-02-27 ENCOUNTER — HOME CARE VISIT (OUTPATIENT)
Dept: HOME HEALTH SERVICES | Facility: HOME HEALTH | Age: 59
End: 2024-02-27
Payer: MEDICARE

## 2024-02-27 VITALS
HEART RATE: 67 BPM | RESPIRATION RATE: 17 BRPM | TEMPERATURE: 97.1 F | SYSTOLIC BLOOD PRESSURE: 125 MMHG | OXYGEN SATURATION: 97 % | DIASTOLIC BLOOD PRESSURE: 72 MMHG

## 2024-02-27 PROCEDURE — 1090000001 HH PPS REVENUE CREDIT

## 2024-02-27 PROCEDURE — G0158 HHC OT ASSISTANT EA 15: HCPCS | Mod: HHH

## 2024-02-27 PROCEDURE — 1090000002 HH PPS REVENUE DEBIT

## 2024-02-28 PROCEDURE — 1090000002 HH PPS REVENUE DEBIT

## 2024-02-28 PROCEDURE — 1090000001 HH PPS REVENUE CREDIT

## 2024-02-29 PROCEDURE — 1090000001 HH PPS REVENUE CREDIT

## 2024-02-29 PROCEDURE — 1090000002 HH PPS REVENUE DEBIT

## 2024-03-01 ENCOUNTER — HOME CARE VISIT (OUTPATIENT)
Dept: HOME HEALTH SERVICES | Facility: HOME HEALTH | Age: 59
End: 2024-03-01
Payer: MEDICARE

## 2024-03-01 PROCEDURE — G0152 HHCP-SERV OF OT,EA 15 MIN: HCPCS | Mod: HHH

## 2024-03-01 PROCEDURE — 1090000001 HH PPS REVENUE CREDIT

## 2024-03-01 PROCEDURE — 1090000002 HH PPS REVENUE DEBIT

## 2024-03-01 ASSESSMENT — ENCOUNTER SYMPTOMS
PAIN LOCATION - PAIN DURATION: CONSTANT
SUBJECTIVE PAIN PROGRESSION: WAXING AND WANING
PAIN LOCATION - PAIN QUALITY: ACHE
PAIN SEVERITY GOAL: 4/10
PAIN LOCATION - RELIEVING FACTORS: REST, ICE
PAIN LOCATION - PAIN SEVERITY: 7/10
PAIN: 1
LOWEST PAIN SEVERITY IN PAST 24 HOURS: 7/10
HIGHEST PAIN SEVERITY IN PAST 24 HOURS: 10/10
PAIN LOCATION - EXACERBATING FACTORS: MOBILITY
PAIN LOCATION - PAIN FREQUENCY: CONSTANT
PAIN LOCATION: RIGHT KNEE
PERSON REPORTING PAIN: PATIENT

## 2024-03-01 ASSESSMENT — ACTIVITIES OF DAILY LIVING (ADL)
OASIS_M1830: 03
ENTERING_EXITING_HOME: STAND BY ASSIST

## 2024-03-02 VITALS
SYSTOLIC BLOOD PRESSURE: 118 MMHG | OXYGEN SATURATION: 96 % | TEMPERATURE: 97.9 F | RESPIRATION RATE: 16 BRPM | HEART RATE: 70 BPM | DIASTOLIC BLOOD PRESSURE: 70 MMHG

## 2024-03-02 PROCEDURE — 1090000002 HH PPS REVENUE DEBIT

## 2024-03-02 PROCEDURE — 1090000001 HH PPS REVENUE CREDIT

## 2024-03-03 PROCEDURE — 1090000002 HH PPS REVENUE DEBIT

## 2024-03-03 PROCEDURE — 1090000001 HH PPS REVENUE CREDIT

## 2024-03-04 PROCEDURE — 1090000003 HH PPS REVENUE ADJ

## 2024-03-04 PROCEDURE — 1090000001 HH PPS REVENUE CREDIT

## 2024-03-04 PROCEDURE — 1090000002 HH PPS REVENUE DEBIT

## 2024-03-05 ENCOUNTER — HOME CARE VISIT (OUTPATIENT)
Dept: HOME HEALTH SERVICES | Facility: HOME HEALTH | Age: 59
End: 2024-03-05
Payer: MEDICARE

## 2024-03-05 VITALS
TEMPERATURE: 97.2 F | SYSTOLIC BLOOD PRESSURE: 95 MMHG | OXYGEN SATURATION: 95 % | DIASTOLIC BLOOD PRESSURE: 61 MMHG | HEART RATE: 72 BPM | RESPIRATION RATE: 16 BRPM

## 2024-03-05 PROCEDURE — 1090000001 HH PPS REVENUE CREDIT

## 2024-03-05 PROCEDURE — 400014 HH F/U

## 2024-03-05 PROCEDURE — G0158 HHC OT ASSISTANT EA 15: HCPCS | Mod: HHH

## 2024-03-05 PROCEDURE — 1090000002 HH PPS REVENUE DEBIT

## 2024-03-06 ENCOUNTER — HOME CARE VISIT (OUTPATIENT)
Dept: HOME HEALTH SERVICES | Facility: HOME HEALTH | Age: 59
End: 2024-03-06
Payer: MEDICARE

## 2024-03-06 ENCOUNTER — TELEMEDICINE CLINICAL SUPPORT (OUTPATIENT)
Dept: PREADMISSION TESTING | Facility: HOSPITAL | Age: 59
End: 2024-03-06
Payer: MEDICARE

## 2024-03-06 DIAGNOSIS — M17.11 UNILATERAL PRIMARY OSTEOARTHRITIS, RIGHT KNEE: ICD-10-CM

## 2024-03-06 PROCEDURE — 1090000002 HH PPS REVENUE DEBIT

## 2024-03-06 PROCEDURE — G0158 HHC OT ASSISTANT EA 15: HCPCS | Mod: HHH

## 2024-03-06 PROCEDURE — 1090000001 HH PPS REVENUE CREDIT

## 2024-03-06 RX ORDER — DOCUSATE SODIUM 100 MG/1
100 CAPSULE, LIQUID FILLED ORAL 2 TIMES DAILY
COMMUNITY
End: 2024-03-28 | Stop reason: HOSPADM

## 2024-03-07 DIAGNOSIS — M54.50 CHRONIC BILATERAL LOW BACK PAIN, UNSPECIFIED WHETHER SCIATICA PRESENT: ICD-10-CM

## 2024-03-07 DIAGNOSIS — G89.29 CHRONIC BILATERAL LOW BACK PAIN, UNSPECIFIED WHETHER SCIATICA PRESENT: ICD-10-CM

## 2024-03-07 PROCEDURE — 1090000001 HH PPS REVENUE CREDIT

## 2024-03-07 PROCEDURE — 1090000002 HH PPS REVENUE DEBIT

## 2024-03-07 RX ORDER — TOPIRAMATE 200 MG/1
200 TABLET ORAL 2 TIMES DAILY
Qty: 60 TABLET | Refills: 3 | Status: SHIPPED | OUTPATIENT
Start: 2024-03-07 | End: 2024-05-09 | Stop reason: SDUPTHER

## 2024-03-08 PROCEDURE — 1090000002 HH PPS REVENUE DEBIT

## 2024-03-08 PROCEDURE — 1090000001 HH PPS REVENUE CREDIT

## 2024-03-09 VITALS
HEART RATE: 73 BPM | TEMPERATURE: 97.4 F | SYSTOLIC BLOOD PRESSURE: 123 MMHG | OXYGEN SATURATION: 97 % | RESPIRATION RATE: 17 BRPM | DIASTOLIC BLOOD PRESSURE: 67 MMHG

## 2024-03-09 PROCEDURE — 1090000002 HH PPS REVENUE DEBIT

## 2024-03-09 PROCEDURE — 1090000001 HH PPS REVENUE CREDIT

## 2024-03-10 PROCEDURE — 1090000001 HH PPS REVENUE CREDIT

## 2024-03-10 PROCEDURE — 1090000002 HH PPS REVENUE DEBIT

## 2024-03-11 PROCEDURE — 1090000002 HH PPS REVENUE DEBIT

## 2024-03-11 PROCEDURE — 1090000001 HH PPS REVENUE CREDIT

## 2024-03-12 ENCOUNTER — HOME CARE VISIT (OUTPATIENT)
Dept: HOME HEALTH SERVICES | Facility: HOME HEALTH | Age: 59
End: 2024-03-12
Payer: MEDICARE

## 2024-03-12 VITALS
OXYGEN SATURATION: 98 % | DIASTOLIC BLOOD PRESSURE: 60 MMHG | RESPIRATION RATE: 18 BRPM | SYSTOLIC BLOOD PRESSURE: 112 MMHG | TEMPERATURE: 96.5 F | HEART RATE: 67 BPM

## 2024-03-12 PROCEDURE — 1090000001 HH PPS REVENUE CREDIT

## 2024-03-12 PROCEDURE — G0158 HHC OT ASSISTANT EA 15: HCPCS | Mod: HHH

## 2024-03-12 PROCEDURE — 1090000002 HH PPS REVENUE DEBIT

## 2024-03-13 ENCOUNTER — APPOINTMENT (OUTPATIENT)
Dept: PREADMISSION TESTING | Facility: HOSPITAL | Age: 59
End: 2024-03-13
Payer: MEDICARE

## 2024-03-13 PROCEDURE — 1090000001 HH PPS REVENUE CREDIT

## 2024-03-13 PROCEDURE — 1090000002 HH PPS REVENUE DEBIT

## 2024-03-14 PROCEDURE — 1090000001 HH PPS REVENUE CREDIT

## 2024-03-14 PROCEDURE — 1090000002 HH PPS REVENUE DEBIT

## 2024-03-15 ENCOUNTER — HOME CARE VISIT (OUTPATIENT)
Dept: HOME HEALTH SERVICES | Facility: HOME HEALTH | Age: 59
End: 2024-03-15
Payer: MEDICARE

## 2024-03-15 VITALS
HEART RATE: 67 BPM | OXYGEN SATURATION: 97 % | DIASTOLIC BLOOD PRESSURE: 82 MMHG | RESPIRATION RATE: 18 BRPM | TEMPERATURE: 97.1 F | SYSTOLIC BLOOD PRESSURE: 124 MMHG

## 2024-03-15 PROCEDURE — G0158 HHC OT ASSISTANT EA 15: HCPCS | Mod: HHH

## 2024-03-15 PROCEDURE — 1090000001 HH PPS REVENUE CREDIT

## 2024-03-15 PROCEDURE — 1090000002 HH PPS REVENUE DEBIT

## 2024-03-16 PROCEDURE — 1090000001 HH PPS REVENUE CREDIT

## 2024-03-16 PROCEDURE — 1090000002 HH PPS REVENUE DEBIT

## 2024-03-17 ENCOUNTER — HOME CARE VISIT (OUTPATIENT)
Dept: HOME HEALTH SERVICES | Facility: HOME HEALTH | Age: 59
End: 2024-03-17
Payer: MEDICARE

## 2024-03-17 PROCEDURE — 1090000002 HH PPS REVENUE DEBIT

## 2024-03-17 PROCEDURE — 1090000001 HH PPS REVENUE CREDIT

## 2024-03-18 ASSESSMENT — ACTIVITIES OF DAILY LIVING (ADL)
OASIS_M1830: 02
HOME_HEALTH_OASIS: 01

## 2024-03-19 ENCOUNTER — APPOINTMENT (OUTPATIENT)
Dept: ORTHOPEDIC SURGERY | Facility: CLINIC | Age: 59
End: 2024-03-19
Payer: MEDICARE

## 2024-03-21 ENCOUNTER — TELEPHONE (OUTPATIENT)
Dept: ORTHOPEDIC SURGERY | Facility: HOSPITAL | Age: 59
End: 2024-03-21
Payer: MEDICARE

## 2024-03-21 ASSESSMENT — KOOS JR
KOOS JR SCORING: 0
HOW SEVERE IS YOUR KNEE STIFFNESS AFTER FIRST WAKING IN MORNING: EXTREME
RISING FROM SITTING: EXTREME
STANDING UPRIGHT: EXTREME
GOING UP OR DOWN STAIRS: EXTREME
BENDING TO THE FLOOR TO PICK UP OBJECT: EXTREME
STRAIGHTENING KNEE FULLY: EXTREME
TWISING OR PIVOTING ON KNEE: EXTREME

## 2024-03-21 NOTE — TELEPHONE ENCOUNTER
Prema Hair  attended joint class on 3/21/2024 and Brought a care partner to the class. The preop survey was completed and the patient provided attestation that they understand the content reviewed and that they do have a care partner identified to assist with recovery. Patient was provided with a folder of materials and instructed to call orthopedic navigator with questions.

## 2024-03-22 ENCOUNTER — LAB (OUTPATIENT)
Dept: LAB | Facility: LAB | Age: 59
End: 2024-03-22
Payer: MEDICARE

## 2024-03-22 ENCOUNTER — HOSPITAL ENCOUNTER (OUTPATIENT)
Dept: RADIOLOGY | Facility: CLINIC | Age: 59
Discharge: HOME | End: 2024-03-22
Payer: MEDICARE

## 2024-03-22 ENCOUNTER — PRE-ADMISSION TESTING (OUTPATIENT)
Dept: PREADMISSION TESTING | Facility: HOSPITAL | Age: 59
End: 2024-03-22
Payer: MEDICARE

## 2024-03-22 VITALS
WEIGHT: 216.49 LBS | SYSTOLIC BLOOD PRESSURE: 126 MMHG | HEART RATE: 77 BPM | OXYGEN SATURATION: 99 % | TEMPERATURE: 96.8 F | DIASTOLIC BLOOD PRESSURE: 76 MMHG | RESPIRATION RATE: 18 BRPM | BODY MASS INDEX: 34.79 KG/M2 | HEIGHT: 66 IN

## 2024-03-22 DIAGNOSIS — R52 PAIN: Primary | ICD-10-CM

## 2024-03-22 DIAGNOSIS — Z01.818 PREOP TESTING: ICD-10-CM

## 2024-03-22 DIAGNOSIS — R73.9 ELEVATED BLOOD SUGAR: ICD-10-CM

## 2024-03-22 DIAGNOSIS — R52 PAIN: ICD-10-CM

## 2024-03-22 DIAGNOSIS — E89.0 POST-SURGICAL HYPOTHYROIDISM: ICD-10-CM

## 2024-03-22 LAB
ANION GAP SERPL CALC-SCNC: 15 MMOL/L (ref 10–20)
BASOPHILS # BLD AUTO: 0.04 X10*3/UL (ref 0–0.1)
BASOPHILS NFR BLD AUTO: 0.8 %
BUN SERPL-MCNC: 13 MG/DL (ref 6–23)
CALCIUM SERPL-MCNC: 8.8 MG/DL (ref 8.6–10.3)
CHLORIDE SERPL-SCNC: 101 MMOL/L (ref 98–107)
CO2 SERPL-SCNC: 29 MMOL/L (ref 21–32)
CREAT SERPL-MCNC: 0.73 MG/DL (ref 0.5–1.05)
EGFRCR SERPLBLD CKD-EPI 2021: >90 ML/MIN/1.73M*2
EOSINOPHIL # BLD AUTO: 0.12 X10*3/UL (ref 0–0.7)
EOSINOPHIL NFR BLD AUTO: 2.4 %
ERYTHROCYTE [DISTWIDTH] IN BLOOD BY AUTOMATED COUNT: 15.7 % (ref 11.5–14.5)
EST. AVERAGE GLUCOSE BLD GHB EST-MCNC: 117 MG/DL
GLUCOSE SERPL-MCNC: 88 MG/DL (ref 74–99)
HBA1C MFR BLD: 5.7 %
HCT VFR BLD AUTO: 42.8 % (ref 36–46)
HGB BLD-MCNC: 13.1 G/DL (ref 12–16)
IMM GRANULOCYTES # BLD AUTO: 0.02 X10*3/UL (ref 0–0.7)
IMM GRANULOCYTES NFR BLD AUTO: 0.4 % (ref 0–0.9)
LYMPHOCYTES # BLD AUTO: 1.55 X10*3/UL (ref 1.2–4.8)
LYMPHOCYTES NFR BLD AUTO: 31.3 %
MCH RBC QN AUTO: 27.7 PG (ref 26–34)
MCHC RBC AUTO-ENTMCNC: 30.6 G/DL (ref 32–36)
MCV RBC AUTO: 91 FL (ref 80–100)
MONOCYTES # BLD AUTO: 0.47 X10*3/UL (ref 0.1–1)
MONOCYTES NFR BLD AUTO: 9.5 %
NEUTROPHILS # BLD AUTO: 2.76 X10*3/UL (ref 1.2–7.7)
NEUTROPHILS NFR BLD AUTO: 55.6 %
NRBC BLD-RTO: 0 /100 WBCS (ref 0–0)
PLATELET # BLD AUTO: 247 X10*3/UL (ref 150–450)
POTASSIUM SERPL-SCNC: 3.8 MMOL/L (ref 3.5–5.3)
RBC # BLD AUTO: 4.73 X10*6/UL (ref 4–5.2)
SODIUM SERPL-SCNC: 141 MMOL/L (ref 136–145)
T3 SERPL-MCNC: 59 NG/DL (ref 60–200)
T4 FREE SERPL-MCNC: 0.69 NG/DL (ref 0.61–1.12)
TSH SERPL-ACNC: 0.6 MIU/L (ref 0.44–3.98)
WBC # BLD AUTO: 5 X10*3/UL (ref 4.4–11.3)

## 2024-03-22 PROCEDURE — 86800 THYROGLOBULIN ANTIBODY: CPT

## 2024-03-22 PROCEDURE — 83036 HEMOGLOBIN GLYCOSYLATED A1C: CPT

## 2024-03-22 PROCEDURE — 99204 OFFICE O/P NEW MOD 45 MIN: CPT | Performed by: PHYSICIAN ASSISTANT

## 2024-03-22 PROCEDURE — 85025 COMPLETE CBC W/AUTO DIFF WBC: CPT

## 2024-03-22 PROCEDURE — 77073 BONE LENGTH STUDIES: CPT | Performed by: RADIOLOGY

## 2024-03-22 PROCEDURE — 73562 X-RAY EXAM OF KNEE 3: CPT | Mod: RT

## 2024-03-22 PROCEDURE — 73562 X-RAY EXAM OF KNEE 3: CPT | Mod: RIGHT SIDE | Performed by: RADIOLOGY

## 2024-03-22 PROCEDURE — 77073 BONE LENGTH STUDIES: CPT

## 2024-03-22 PROCEDURE — 80048 BASIC METABOLIC PNL TOTAL CA: CPT

## 2024-03-22 PROCEDURE — 87081 CULTURE SCREEN ONLY: CPT | Mod: AHULAB | Performed by: PHYSICIAN ASSISTANT

## 2024-03-22 PROCEDURE — 36415 COLL VENOUS BLD VENIPUNCTURE: CPT

## 2024-03-22 PROCEDURE — 84480 ASSAY TRIIODOTHYRONINE (T3): CPT

## 2024-03-22 PROCEDURE — 84432 ASSAY OF THYROGLOBULIN: CPT

## 2024-03-22 PROCEDURE — 84439 ASSAY OF FREE THYROXINE: CPT

## 2024-03-22 PROCEDURE — 84443 ASSAY THYROID STIM HORMONE: CPT

## 2024-03-22 RX ORDER — CHLORHEXIDINE GLUCONATE ORAL RINSE 1.2 MG/ML
SOLUTION DENTAL
Qty: 473 ML | Refills: 0 | Status: SHIPPED | OUTPATIENT
Start: 2024-03-22 | End: 2024-03-28 | Stop reason: HOSPADM

## 2024-03-22 RX ORDER — NAPROXEN SODIUM 220 MG
220 TABLET ORAL
COMMUNITY
End: 2024-03-28 | Stop reason: HOSPADM

## 2024-03-22 ASSESSMENT — ENCOUNTER SYMPTOMS
GASTROINTESTINAL NEGATIVE: 1
HEMATOLOGIC/LYMPHATIC NEGATIVE: 1
EYES NEGATIVE: 1
ALLERGIC/IMMUNOLOGIC NEGATIVE: 1
CONSTITUTIONAL NEGATIVE: 1
ARTHRALGIAS: 1
CARDIOVASCULAR NEGATIVE: 1
ENDOCRINE NEGATIVE: 1
RESPIRATORY NEGATIVE: 1
TREMORS: 1
PSYCHIATRIC NEGATIVE: 1

## 2024-03-22 NOTE — PREPROCEDURE INSTRUCTIONS
Medication List            Accurate as of March 22, 2024  9:20 AM. Always use your most recent med list.                acetaminophen 500 mg tablet  Commonly known as: Tylenol  Notes to patient: Use if needed morning of surgery     albuterol 90 mcg/actuation inhaler  Notes to patient: Use morning of surgery     amitriptyline 10 mg tablet  Commonly known as: Elavil  Notes to patient: Continue night prior to surgery     ammonium lactate 12 % lotion  Commonly known as: Lac-Hydrin  Medication Adjustments for Surgery: Continue until night before surgery     ascorbic acid 1,000 mg tablet  Commonly known as: Vitamin C  Medication Adjustments for Surgery: Stop 7 days before surgery     aspirin 81 mg EC tablet  Medication Adjustments for Surgery: Take morning of surgery with sip of water, no other fluids     atorvastatin 20 mg tablet  Commonly known as: Lipitor  Take 1 tablet (20 mg) by mouth once daily at bedtime.  Notes to patient: Continue night prior to surgery     azelastine 137 mcg (0.1 %) nasal spray  Commonly known as: Astelin  Notes to patient: Use if needed morning of surgery     * baclofen 20 mg tablet  Commonly known as: Lioresal  TAKE 1 TABLET BY MOUTH THREE TIMES A DAY  Notes to patient: Use if needed morning of surgery     * baclofen 10 mg tablet  Commonly known as: Lioresal  TAKE 1 TABLET BY MOUTH THREE TIMES A DAY AS NEEDED FOR MUSCLE SPASMS  Notes to patient: States no longer taking     bumetanide 2 mg tablet  Commonly known as: Bumex  TAKE 1 TABLET BY MOUTH ONCE DAILY.  Medication Adjustments for Surgery: Take morning of surgery with sip of water, no other fluids     calcium carbonate 500 mg calcium (1,250 mg) tablet  Commonly known as: Oscal  Medication Adjustments for Surgery: Continue until night before surgery     carbidopa-levodopa  mg tablet  Commonly known as: Sinemet  Take 1.5 tablets by mouth 3 times a day.  Medication Adjustments for Surgery: Take morning of surgery with sip of water, no  other fluids     carvedilol 25 mg tablet  Commonly known as: Coreg  Take 1 tablet (25 mg) by mouth 2 times a day.  Medication Adjustments for Surgery: Take morning of surgery with sip of water, no other fluids     cephalexin 500 mg capsule  Commonly known as: Keflex  Medication Adjustments for Surgery: Take morning of surgery with sip of water, no other fluids     cholecalciferol 50 MCG (2000 UT) tablet  Commonly known as: Vitamin D-3  Medication Adjustments for Surgery: Continue until night before surgery     * clonazePAM 1 mg tablet  Commonly known as: KlonoPIN  Medication Adjustments for Surgery: Take morning of surgery with sip of water, no other fluids     * clonazePAM 1 mg tablet  Commonly known as: KlonoPIN  Medication Adjustments for Surgery: Take morning of surgery with sip of water, no other fluids     diclofenac sodium 1 % gel  Commonly known as: Voltaren  Medication Adjustments for Surgery: Stop 1 day before surgery     docusate sodium 100 mg capsule  Commonly known as: Colace  Medication Adjustments for Surgery: Continue until night before surgery     estradiol 0.01 % (0.1 mg/gram) vaginal cream  Commonly known as: Estrace  Medication Adjustments for Surgery: Continue until night before surgery     gabapentin 300 mg capsule  Commonly known as: Neurontin  Medication Adjustments for Surgery: Take morning of surgery with sip of water, no other fluids     ipratropium-albuteroL 0.5-2.5 mg/3 mL nebulizer solution  Commonly known as: Duo-Neb  Notes to patient: Use morning of surgery     Klor-Con M20 20 mEq ER tablet  Generic drug: potassium chloride CR  Take 2 tablets (40 mEq) by mouth 2 times a day.  Medication Adjustments for Surgery: Continue until night before surgery     lancets misc     * levothyroxine 200 mcg tablet  Commonly known as: Synthroid, Levoxyl  Medication Adjustments for Surgery: Take morning of surgery with sip of water, no other fluids     * levothyroxine 100 mcg tablet  Commonly known as:  Synthroid, Levoxyl  Medication Adjustments for Surgery: Take morning of surgery with sip of water, no other fluids     lidocaine 5 % patch  Commonly known as: Lidoderm  APPLY 1 PATCH AND LEAVE IN PLACE FOR 12 HOURS, THEN REMOVE AND LEAVE OFF FOR 12 HOURS  Medication Adjustments for Surgery: Continue until night before surgery     losartan 50 mg tablet  Commonly known as: Cozaar  Take 1 tablet (50 mg) by mouth once daily.  Notes to patient: HOLD 24 hours prior to surgery     meloxicam 15 mg tablet  Commonly known as: Mobic  Medication Adjustments for Surgery: Stop 7 days before surgery     metFORMIN 500 mg tablet  Commonly known as: Glucophage  Medication Adjustments for Surgery: Continue until night before surgery     naproxen sodium 220 mg tablet  Commonly known as: Aleve  Medication Adjustments for Surgery: Stop 7 days before surgery     omega-3 fatty acids-fish oil 360-1,200 mg capsule  Medication Adjustments for Surgery: Stop 7 days before surgery     omeprazole 40 mg DR capsule  Commonly known as: PriLOSEC  Medication Adjustments for Surgery: Take morning of surgery with sip of water, no other fluids     * OXcarbazepine 600 mg tablet  Commonly known as: Trileptal  Medication Adjustments for Surgery: Take morning of surgery with sip of water, no other fluids     * OXcarbazepine 600 mg tablet  Commonly known as: Trileptal  Medication Adjustments for Surgery: Take morning of surgery with sip of water, no other fluids     polyethylene glycol 17 gram/dose powder  Commonly known as: Glycolax, Miralax  Medication Adjustments for Surgery: Continue until night before surgery     prazosin 5 mg capsule  Commonly known as: Minipress  Medication Adjustments for Surgery: Take morning of surgery with sip of water, no other fluids     SeroqueL 400 mg tablet  Generic drug: QUEtiapine  Medication Adjustments for Surgery: Take morning of surgery with sip of water, no other fluids     sertraline 100 mg tablet  Commonly known as:  Zoloft  Take 2 tablets (200 mg) by mouth once daily. Do not start before January 2, 2024.  Medication Adjustments for Surgery: Take morning of surgery with sip of water, no other fluids     tamsulosin 0.4 mg 24 hr capsule  Commonly known as: Flomax  Take 1 capsule (0.4 mg) by mouth once daily.  Medication Adjustments for Surgery: Take morning of surgery with sip of water, no other fluids     tiZANidine 4 mg tablet  Commonly known as: Zanaflex  Medication Adjustments for Surgery: Take morning of surgery with sip of water, no other fluids     topiramate 200 mg tablet  Commonly known as: Topamax  Take 1 tablet (200 mg) by mouth 2 times a day.  Medication Adjustments for Surgery: Take morning of surgery with sip of water, no other fluids     Trelegy Ellipta 200-62.5-25 mcg blister with device  Generic drug: fluticasone-umeclidin-vilanter  Medication Adjustments for Surgery: Stop 7 days before surgery           * This list has 8 medication(s) that are the same as other medications prescribed for you. Read the directions carefully, and ask your doctor or other care provider to review them with you.                     Concerning above medication instructions- If medication is normally taken at night continue normal schedule - do not take night prior and morning of surgery.     CONTACT SURGEON'S OFFICE IF YOU DEVELOP:  * Fever = 100.4 F   * New respiratory symptoms (e.g. cough, shortness of breath, respiratory distress, sore throat)  * Recent loss of taste or smell  *Flu like symptoms such as headache, fatigue or gastrointestinal symptoms  * You develop any open sores, shingles, burning or painful urination   AND/OR:  * You no longer wish to have the surgery.  * Any other personal circumstances change that may lead to the need to cancel or defer this surgery.  *You were admitted to any hospital within one week of your planned procedure.    SMOKING:  *Quitting smoking can make a huge difference to your health and recovery  from surgery.    *If you need help with quitting, call 3-070-QUIT-NOW.    DAY BEFORE SURGERY:  Nothing by mouth (solid or liquid) after midnight the night before your surgery/procedure.           If DIABETIC - Please check fasting blood sugar upon waking up.  If fasting sugar is <80 mg/dl, please drink 100ml/3oz of apple juice no later than 2 hours prior to arrival time to surgery.      SURGICAL TIME  *You will be contacted between 2 p.m. and 6 p.m. the business day before your surgery with your arrival time.  *If you haven't received a call by 6pm, call [phone].  *Scheduled surgery times may change and you will be notified if this occurs-check your personal voicemail for any updates.    ON THE MORNING OF SURGERY:  *Wear comfortable, loose fitting clothing.   *Do not use moisturizers, creams, lotions or perfume.  *All jewelry and valuables should be left at home.  *Prosthetic devices such as contact lenses, hearing aids, dentures, eyelash extensions, hairpins and body piercing must be removed before surgery.    BRING WITH YOU:  *Photo ID and insurance card  *Current list of medicines and allergies  *Pacemaker/Defibrillator/Heart stent cards  *CPAP machine and mask  *Slings/splints/crutches  *Copy of your complete Advanced Directive/DHPOA-if applicable  *Neurostimulator implant remote    PARKING AND ARRIVAL:  *Check in at the Main Entrance desk and let them know you are here for surgery.  *You will be directed to the 2nd floor surgical waiting area.    AFTER OUTPATIENT SURGERY:  *A responsible adult MUST accompany you at the time of discharge and stay with you for 24 hours after your surgery.  *You may NOT drive yourself home after surgery.  *You may use a taxi or ride sharing service ([employer], Uber) to return home ONLY if you are accompanied by a friend or family member.  *Instructions for resuming your medications will be provided by your surgeon.

## 2024-03-22 NOTE — H&P (VIEW-ONLY)
[facility]/PAT Evaluation       Name: Prema Hair (Prema Hair)  /Age: 1965/59 y.o.     In-Person       Chief Complaint: Primary osteoarthritis of both knees     HPI    Date of Consult: 3/22/24    Referring Provider: Dr. Hammer     Surgery, Date, and Length: Right Knee Total Arthroplasty ; 3/27/24; 150 minutes     Prema Hair is a 59 year-old female who presents to the [facility] for perioperative risk assessment prior to surgery.   Patient presents with chronic progressive pain in both knees right worse than left she has had injections in the past no longer effective on the right side.  She Has Tried Extensive Physical Therapy and Injections on the Right Side without relief.  Constant pain in the knee with swelling, popping, clicking and instability of the knee.  Ambulating with rolling walker.    This note was created in part upon personal review of patient's medical records.      Patient is scheduled to have Right Knee Total Arthroplasty    Medical History  Past Medical History:   Diagnosis Date    CATALINA positive     Arthritis     Asthma     Bell's palsy     x 2    Bilateral leg edema     Bipolar disorder (CMS/HCC)     CHF (congestive heart failure) (CMS/[facility])     Chronic allergic rhinitis     Chronic constipation     Diabetes mellitus (CMS/HCC)     Diabetic neuropathy (CMS/HCC)     Dysphonia 2022    Muscle tension dysphonia    Fibromyalgia     GERD (gastroesophageal reflux disease)     under control with medication    High pulmonary arterial pressure (CMS/HCC)     moderately elevated on ECHO 10.5.23    Hyperlipidemia, unspecified 2023    Dyslipidemia    Hypertension     Hypokalemia     1.1.24- K 3.8 - on oral supplements    Hypothyroidism     Low back pain, unspecified 2021    Low back pain    Moderate tricuspid regurgitation     Obstructive sleep apnea (adult) (pediatric) 2023    ZEINA on CPAP    Paralysis of vocal cords and larynx, 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    Laryngeal paresis     Parkinsonism     Pseudoaneurysm of carotid artery (CMS/HCC) 2020    s/p right pipeline and coil embolization of Right ICA pseudoaneurysm    PTSD (post-traumatic stress disorder)     Thyroid cancer (CMS/HCC)     s/p total thyroidectomy, residual tissue on left side - FNA benign 2020 and 2021    Torticollis     botox injections    Vitamin D deficiency         STOP BANG = +ZEINA    Caprini =  7  (age, surgery, BMI>25, hx malignancy)    Surgical History  Past Surgical History:   Procedure Laterality Date    COLONOSCOPY      OTHER SURGICAL HISTORY Right 2020    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    TOTAL THYROIDECTOMY  2017    US GUIDED THYROID BIOPSY  11/19/2020    US GUIDED THYROID BIOPSY 11/19/2020 [facility] AIB LEGACY    US GUIDED THYROID BIOPSY  11/19/2020    US GUIDED THYROID BIOPSY 11/19/2020 [facility]             Family history:  Family History   Problem Relation Name Age of Onset    Heart failure Mother      Pancreatic cancer Mother      Heart failure Father      Parkinsonism Father      Breast cancer Sister          Social history:  Social History     Socioeconomic History    Marital status: Single     Spouse name: Not on file    Number of children: Not on file    Years of education: Not on file    Highest education level: Not on file   Occupational History    Not on file   Tobacco Use    Smoking status: Never    Smokeless tobacco: Never   Substance and Sexual Activity    Alcohol use: Not Currently    Drug use: Not Currently     Comment: medical marijuana last used >1 year ago    Sexual activity: Not on file   Other Topics Concern    Not on file   Social History Narrative    Not on file     Social Determinants of Health     Financial Resource Strain: Low Risk  (12/28/2023)    Overall Financial Resource Strain (CARDIA)     Difficulty of Paying Living Expenses: Not hard at all   Food Insecurity: Not on file   Transportation Needs: No Transportation Needs (3/17/2024)    OASIS :  Transportation     Lack of Transportation (Medical): No     Lack of Transportation (Non-Medical): No     Patient Unable or Declines to Respond: No   Physical Activity: Not on file   Stress: Not on file   Social Connections: Feeling Socially Integrated (3/17/2024)    OASIS : Social Isolation     Frequency of experiencing loneliness or isolation: Never   Intimate Partner Violence: Not on file   Housing Stability: Low Risk  (12/28/2023)    Housing Stability Vital Sign     Unable to Pay for Housing in the Last Year: No     Number of Places Lived in the Last Year: 1     Unstable Housing in the Last Year: No          Current Outpatient Medications:     acetaminophen (Tylenol) 500 mg tablet, Take 1 tablet (500 mg) by mouth every 4 hours if needed., Disp: , Rfl:     albuterol 90 mcg/actuation inhaler, Inhale 2 puffs every 4 hours if needed., Disp: , Rfl:     amitriptyline (Elavil) 10 mg tablet, Take 1 tablet (10 mg) by mouth once daily at bedtime., Disp: , Rfl:     ammonium lactate (Lac-Hydrin) 12 % lotion, Apply 1 Application topically if needed., Disp: , Rfl:     ascorbic acid (Vitamin C) 1,000 mg tablet, Take 1 tablet (1,000 mg) by mouth once daily., Disp: , Rfl:     aspirin 81 mg EC tablet, Take 1 tablet (81 mg) by mouth once daily., Disp: , Rfl:     atorvastatin (Lipitor) 20 mg tablet, Take 1 tablet (20 mg) by mouth once daily at bedtime., Disp: 90 tablet, Rfl: 3    baclofen (Lioresal) 20 mg tablet, TAKE 1 TABLET BY MOUTH THREE TIMES A DAY, Disp: 270 tablet, Rfl: 1    bumetanide (Bumex) 2 mg tablet, TAKE 1 TABLET BY MOUTH ONCE DAILY. (Patient taking differently: Take 1 tablet (2 mg) by mouth 2 times a day.), Disp: 90 tablet, Rfl: 1    calcium carbonate (Oscal) 500 mg calcium (1,250 mg) tablet, Take 1 tablet (1,250 mg) by mouth 3 times a day., Disp: , Rfl:     carbidopa-levodopa (Sinemet)  mg tablet, Take 1.5 tablets by mouth 3 times a day., Disp: 135 tablet, Rfl: 6    carvedilol (Coreg) 25 mg tablet, Take 1  tablet (25 mg) by mouth 2 times a day., Disp: 180 tablet, Rfl: 1    cephalexin (Keflex) 500 mg capsule, Take 1 capsule (500 mg) by mouth once daily., Disp: , Rfl:     cholecalciferol (Vitamin D-3) 50 MCG (2000 UT) tablet, Take 1 tablet (2,000 Units) by mouth once daily., Disp: , Rfl:     clonazePAM (KlonoPIN) 1 mg tablet, Take 1 tablet (1 mg) by mouth once daily in the morning. 1/2 TABLET BY MOUTH EVERY EVENING AND AN ADDITIONAL 1/2 AS NEEDED, Disp: , Rfl:     clonazePAM (KlonoPIN) 1 mg tablet, Take 2 tablets (2 mg) by mouth once daily in the evening., Disp: , Rfl:     diclofenac sodium (Voltaren) 1 % gel gel, Apply 4.5 inches (1 Application) topically 3 times a day as needed., Disp: , Rfl:     docusate sodium (Colace) 100 mg capsule, Take 1 capsule (100 mg) by mouth 2 times a day., Disp: , Rfl:     estradiol (Estrace) 0.01 % (0.1 mg/gram) vaginal cream, Insert 1 Application into the vagina 3 (three) times a week. Pea-sized amount, Disp: , Rfl:     gabapentin (Neurontin) 300 mg capsule, Take 3 capsules (900 mg) by mouth 3 times a day., Disp: , Rfl:     ipratropium-albuteroL (Duo-Neb) 0.5-2.5 mg/3 mL nebulizer solution, Inhale 3 mL every 4 hours if needed., Disp: , Rfl:     Klor-Con M20 20 mEq ER tablet, Take 2 tablets (40 mEq) by mouth 2 times a day., Disp: 360 tablet, Rfl: 3    levothyroxine (Synthroid, Levoxyl) 100 mcg tablet, Take 1 tablet (100 mcg) by mouth once daily (M-F). 1 tablet 5 days a week Mon-Friday and two tabs on Saturdays and Sundays, Disp: , Rfl:     levothyroxine (Synthroid, Levoxyl) 200 mcg tablet, Take 1 tablet (200 mcg) by mouth 2 times a week. Saturday and sunday, Disp: , Rfl:     lidocaine (Lidoderm) 5 % patch, APPLY 1 PATCH AND LEAVE IN PLACE FOR 12 HOURS, THEN REMOVE AND LEAVE OFF FOR 12 HOURS, Disp: 30 patch, Rfl: 11    losartan (Cozaar) 50 mg tablet, Take 1 tablet (50 mg) by mouth once daily., Disp: 90 tablet, Rfl: 3    meloxicam (Mobic) 15 mg tablet, Take 1 tablet (15 mg) by mouth once  daily as needed for mild pain (1 - 3)., Disp: , Rfl:     metFORMIN (Glucophage) 500 mg tablet, Take 1 tablet (500 mg) by mouth 2 times a day with meals., Disp: , Rfl:     naproxen sodium (Aleve) 220 mg tablet, Take 1 tablet (220 mg) by mouth 2 times a day with meals., Disp: , Rfl:     omega-3 fatty acids-fish oil 360-1,200 mg capsule, Take 1 capsule (1,200 mg) by mouth once daily., Disp: , Rfl:     omeprazole (PriLOSEC) 40 mg DR capsule, Take 1 capsule (40 mg) by mouth once daily. With dinner, Disp: , Rfl:     OXcarbazepine (Trileptal) 600 mg tablet, Take 1 tablet (600 mg) by mouth once daily in the morning., Disp: , Rfl:     OXcarbazepine (Trileptal) 600 mg tablet, Take 2 tablets (1,200 mg) by mouth once daily at bedtime., Disp: , Rfl:     polyethylene glycol (Glycolax, Miralax) 17 gram/dose powder, Take 17 g by mouth once daily. In 8oz of water, juice, or tea and drink daily, Disp: , Rfl:     prazosin (Minipress) 5 mg capsule, Take 1 capsule (5 mg) by mouth once daily at bedtime., Disp: , Rfl:     QUEtiapine (SeroqueL) 400 mg tablet, Take 2 tablets (800 mg) by mouth once daily at bedtime., Disp: , Rfl:     sertraline (Zoloft) 100 mg tablet, Take 2 tablets (200 mg) by mouth once daily. Do not start before January 2, 2024., Disp: 30 tablet, Rfl: 0    tamsulosin (Flomax) 0.4 mg 24 hr capsule, Take 1 capsule (0.4 mg) by mouth once daily., Disp: 30 capsule, Rfl: 11    tiZANidine (Zanaflex) 4 mg tablet, Take 1 tablet (4 mg) by mouth 3 times a day., Disp: , Rfl:     topiramate (Topamax) 200 mg tablet, Take 1 tablet (200 mg) by mouth 2 times a day., Disp: 60 tablet, Rfl: 3    Trelegy Ellipta 200-62.5-25 mcg blister with device, Inhale 1 puff once daily as needed., Disp: , Rfl:     azelastine (Astelin) 137 mcg (0.1 %) nasal spray, Administer 1 spray into each nostril 2 times a day., Disp: , Rfl:     baclofen (Lioresal) 10 mg tablet, TAKE 1 TABLET BY MOUTH THREE TIMES A DAY AS NEEDED FOR MUSCLE SPASMS (Patient not taking:  "Reported on 3/22/2024), Disp: 270 tablet, Rfl: 3    chlorhexidine (Peridex) 0.12 % solution, 15 ml swish and spit for 30 seconds night prior to surgery and morning of surgery, Disp: 473 mL, Rfl: 0    lancets misc, 1 each 3 times a day., Disp: , Rfl:        Visit Vitals  /76   Pulse 77   Temp 36 °C (96.8 °F)   Resp 18   Ht 1.676 m (5' 6\")   Wt 98.2 kg (216 lb 7.9 oz)   SpO2 99%   BMI 34.94 kg/m²   OB Status Postmenopausal   Smoking Status Never   BSA 2.14 m²      Review of Systems   Constitutional: Negative.    HENT: Negative.     Eyes: Negative.    Respiratory: Negative.     Cardiovascular: Negative.         METS 3  normal self care / walking with rolling walker   Gastrointestinal: Negative.    Endocrine: Negative.    Genitourinary: Negative.    Musculoskeletal:  Positive for arthralgias.        Bilateral knee pain    Skin: Negative.    Allergic/Immunologic: Negative.    Neurological:  Positive for tremors (parkinson's associated).   Hematological: Negative.    Psychiatric/Behavioral: Negative.          Physical Exam  Vitals reviewed.   Constitutional:       Appearance: Normal appearance.   HENT:      Head: Normocephalic and atraumatic.      Right Ear: External ear normal.      Left Ear: External ear normal.      Nose: Nose normal.      Mouth/Throat:      Pharynx: Oropharynx is clear.   Eyes:      Extraocular Movements: Extraocular movements intact.      Conjunctiva/sclera: Conjunctivae normal.      Pupils: Pupils are equal, round, and reactive to light.   Neck:      Comments: Very limited ROM neck   Cardiovascular:      Rate and Rhythm: Normal rate and regular rhythm.      Heart sounds: Normal heart sounds.   Pulmonary:      Effort: Pulmonary effort is normal.      Breath sounds: Normal breath sounds.   Abdominal:      Palpations: Abdomen is soft.   Musculoskeletal:         General: Normal range of motion.   Skin:     General: Skin is warm and dry.   Neurological:      General: No focal deficit present.      " Mental Status: She is alert and oriented to person, place, and time.      Comments: Tremors left arm    Psychiatric:         Mood and Affect: Mood normal.         Behavior: Behavior normal.          PAT AIRWAY:   Airway:     Mallampati::  IV    Neck ROM::  Limited    Lab Results   Component Value Date    WBC 5.0 03/22/2024    HGB 13.1 03/22/2024    HCT 42.8 03/22/2024    MCV 91 03/22/2024     03/22/2024      Lab Results   Component Value Date    GLUCOSE 88 03/22/2024    CALCIUM 8.8 03/22/2024     03/22/2024    K 3.8 03/22/2024    CO2 29 03/22/2024     03/22/2024    BUN 13 03/22/2024    CREATININE 0.73 03/22/2024      Lab Results   Component Value Date    HGBA1C 5.7 (H) 03/22/2024      Encounter Date: 12/27/23   ECG 12 Lead   Result Value    Ventricular Rate 49    Atrial Rate 49    VT Interval 176    QRS Duration 88    QT Interval 544    QTC Calculation(Bazett) 491    P Axis 45    R Axis -4    T Axis 12    QRS Count 8    Q Onset 218    P Onset 130    P Offset 190    T Offset 490    QTC Fredericia 508    Narrative    Marked sinus bradycardia  Possible Anterolateral infarct , age undetermined  Abnormal ECG  When compared with ECG of 27-DEC-2023 18:33,  Fusion complexes are now Present  Borderline criteria for Anterior infarct are now Present  Borderline criteria for Anterolateral infarct are now Present  Borderline criteria for Inferior infarct are no longer Present  QT has shortened  Confirmed by Jaime Schaffer ([id number]) on 1/8/2024 11:41:36 AM        TTE 10/5/23  CONCLUSIONS:   1. Left ventricular systolic function is normal with a 55-60% estimated ejection fraction.   2. Spectral Doppler shows an impaired relaxation pattern of left ventricular diastolic filling.   3. Moderate tricuspid regurgitation visualized.   4. Moderately elevated pulmonary artery pressure.    RCRI  2 , 10 % Risk of MACE    Cardiac  Clearance Dr. Madison 2/8/24 (full note in EPIC) - The patient is LOW TO INTERMEDIATE risk for  moderate risk surgery and can PROCEED without further risk stratification.    Pulmonary  ZEINA - Please bring CPAP with you DOS    Hematology       Patient instructed to ambulate as soon as possible postoperatively to decrease thromboembolic risk.      Initiate mechanical DVT prophylaxis as soon as possible and initiate chemical prophylaxis when deemed safe from a bleeding standpoint post surgery.       VTE prophylaxis per surgical team       Tests ordered in PAT: cbc, bmp, A1c, mrsa  LABS REVIEWED: unremarkable     Risk assessment complete.  Patient is scheduled for a intermediate surgical risk procedure.        Preoperative medication instructions were provided and reviewed with the patient.  Any additional testing or evaluation was explained to the patient.  Nothing by mouth instructions were discussed and patient's questions were answered prior to conclusion to this encounter.  Patient verbalized understanding of preoperative instructions given in preadmission testing; discharge instructions available in EMR.    This note was dictated by a speech recognition.  Minor errors may have been detected in a speech recognition.

## 2024-03-22 NOTE — CPM/PAT H&P
[facility]/PAT Evaluation       Name: Prema Hair (Prema Hair)  /Age: 1965/59 y.o.     In-Person       Chief Complaint: Primary osteoarthritis of both knees     HPI    Date of Consult: 3/22/24    Referring Provider: Dr. Hammer     Surgery, Date, and Length: Right Knee Total Arthroplasty ; 3/27/24; 150 minutes     Prema Hair is a 59 year-old female who presents to the [facility] for perioperative risk assessment prior to surgery.   Patient presents with chronic progressive pain in both knees right worse than left she has had injections in the past no longer effective on the right side.  She Has Tried Extensive Physical Therapy and Injections on the Right Side without relief.  Constant pain in the knee with swelling, popping, clicking and instability of the knee.  Ambulating with rolling walker.    This note was created in part upon personal review of patient's medical records.      Patient is scheduled to have Right Knee Total Arthroplasty    Medical History  Past Medical History:   Diagnosis Date    CATALINA positive     Arthritis     Asthma     Bell's palsy     x 2    Bilateral leg edema     Bipolar disorder (CMS/HCC)     CHF (congestive heart failure) (CMS/[facility])     Chronic allergic rhinitis     Chronic constipation     Diabetes mellitus (CMS/HCC)     Diabetic neuropathy (CMS/HCC)     Dysphonia 2022    Muscle tension dysphonia    Fibromyalgia     GERD (gastroesophageal reflux disease)     under control with medication    High pulmonary arterial pressure (CMS/HCC)     moderately elevated on ECHO 10.5.23    Hyperlipidemia, unspecified 2023    Dyslipidemia    Hypertension     Hypokalemia     1.1.24- K 3.8 - on oral supplements    Hypothyroidism     Low back pain, unspecified 2021    Low back pain    Moderate tricuspid regurgitation     Obstructive sleep apnea (adult) (pediatric) 2023    ZEINA on CPAP    Paralysis of vocal cords and larynx, 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    Laryngeal paresis     Parkinsonism     Pseudoaneurysm of carotid artery (CMS/HCC) 2020    s/p right pipeline and coil embolization of Right ICA pseudoaneurysm    PTSD (post-traumatic stress disorder)     Thyroid cancer (CMS/HCC)     s/p total thyroidectomy, residual tissue on left side - FNA benign 2020 and 2021    Torticollis     botox injections    Vitamin D deficiency         STOP BANG = +ZEINA    Caprini =  7  (age, surgery, BMI>25, hx malignancy)    Surgical History  Past Surgical History:   Procedure Laterality Date    COLONOSCOPY      OTHER SURGICAL HISTORY Right 2020    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    TOTAL THYROIDECTOMY  2017    US GUIDED THYROID BIOPSY  11/19/2020    US GUIDED THYROID BIOPSY 11/19/2020 [facility]    US GUIDED THYROID BIOPSY  11/19/2020    US GUIDED THYROID BIOPSY 11/19/2020 [facility]             Family history:  Family History   Problem Relation Name Age of Onset    Heart failure Mother      Pancreatic cancer Mother      Heart failure Father      Parkinsonism Father      Breast cancer Sister          Social history:  Social History     Socioeconomic History    Marital status: Single     Spouse name: Not on file    Number of children: Not on file    Years of education: Not on file    Highest education level: Not on file   Occupational History    Not on file   Tobacco Use    Smoking status: Never    Smokeless tobacco: Never   Substance and Sexual Activity    Alcohol use: Not Currently    Drug use: Not Currently     Comment: medical marijuana last used >1 year ago    Sexual activity: Not on file   Other Topics Concern    Not on file   Social History Narrative    Not on file     Social Determinants of Health     Financial Resource Strain: Low Risk  (12/28/2023)    Overall Financial Resource Strain (CARDIA)     Difficulty of Paying Living Expenses: Not hard at all   Food Insecurity: Not on file   Transportation Needs: No Transportation Needs (3/17/2024)    OASIS :  Transportation     Lack of Transportation (Medical): No     Lack of Transportation (Non-Medical): No     Patient Unable or Declines to Respond: No   Physical Activity: Not on file   Stress: Not on file   Social Connections: Feeling Socially Integrated (3/17/2024)    OASIS : Social Isolation     Frequency of experiencing loneliness or isolation: Never   Intimate Partner Violence: Not on file   Housing Stability: Low Risk  (12/28/2023)    Housing Stability Vital Sign     Unable to Pay for Housing in the Last Year: No     Number of Places Lived in the Last Year: 1     Unstable Housing in the Last Year: No          Current Outpatient Medications:     acetaminophen (Tylenol) 500 mg tablet, Take 1 tablet (500 mg) by mouth every 4 hours if needed., Disp: , Rfl:     albuterol 90 mcg/actuation inhaler, Inhale 2 puffs every 4 hours if needed., Disp: , Rfl:     amitriptyline (Elavil) 10 mg tablet, Take 1 tablet (10 mg) by mouth once daily at bedtime., Disp: , Rfl:     ammonium lactate (Lac-Hydrin) 12 % lotion, Apply 1 Application topically if needed., Disp: , Rfl:     ascorbic acid (Vitamin C) 1,000 mg tablet, Take 1 tablet (1,000 mg) by mouth once daily., Disp: , Rfl:     aspirin 81 mg EC tablet, Take 1 tablet (81 mg) by mouth once daily., Disp: , Rfl:     atorvastatin (Lipitor) 20 mg tablet, Take 1 tablet (20 mg) by mouth once daily at bedtime., Disp: 90 tablet, Rfl: 3    baclofen (Lioresal) 20 mg tablet, TAKE 1 TABLET BY MOUTH THREE TIMES A DAY, Disp: 270 tablet, Rfl: 1    bumetanide (Bumex) 2 mg tablet, TAKE 1 TABLET BY MOUTH ONCE DAILY. (Patient taking differently: Take 1 tablet (2 mg) by mouth 2 times a day.), Disp: 90 tablet, Rfl: 1    calcium carbonate (Oscal) 500 mg calcium (1,250 mg) tablet, Take 1 tablet (1,250 mg) by mouth 3 times a day., Disp: , Rfl:     carbidopa-levodopa (Sinemet)  mg tablet, Take 1.5 tablets by mouth 3 times a day., Disp: 135 tablet, Rfl: 6    carvedilol (Coreg) 25 mg tablet, Take 1  tablet (25 mg) by mouth 2 times a day., Disp: 180 tablet, Rfl: 1    cephalexin (Keflex) 500 mg capsule, Take 1 capsule (500 mg) by mouth once daily., Disp: , Rfl:     cholecalciferol (Vitamin D-3) 50 MCG (2000 UT) tablet, Take 1 tablet (2,000 Units) by mouth once daily., Disp: , Rfl:     clonazePAM (KlonoPIN) 1 mg tablet, Take 1 tablet (1 mg) by mouth once daily in the morning. 1/2 TABLET BY MOUTH EVERY EVENING AND AN ADDITIONAL 1/2 AS NEEDED, Disp: , Rfl:     clonazePAM (KlonoPIN) 1 mg tablet, Take 2 tablets (2 mg) by mouth once daily in the evening., Disp: , Rfl:     diclofenac sodium (Voltaren) 1 % gel gel, Apply 4.5 inches (1 Application) topically 3 times a day as needed., Disp: , Rfl:     docusate sodium (Colace) 100 mg capsule, Take 1 capsule (100 mg) by mouth 2 times a day., Disp: , Rfl:     estradiol (Estrace) 0.01 % (0.1 mg/gram) vaginal cream, Insert 1 Application into the vagina 3 (three) times a week. Pea-sized amount, Disp: , Rfl:     gabapentin (Neurontin) 300 mg capsule, Take 3 capsules (900 mg) by mouth 3 times a day., Disp: , Rfl:     ipratropium-albuteroL (Duo-Neb) 0.5-2.5 mg/3 mL nebulizer solution, Inhale 3 mL every 4 hours if needed., Disp: , Rfl:     Klor-Con M20 20 mEq ER tablet, Take 2 tablets (40 mEq) by mouth 2 times a day., Disp: 360 tablet, Rfl: 3    levothyroxine (Synthroid, Levoxyl) 100 mcg tablet, Take 1 tablet (100 mcg) by mouth once daily (M-F). 1 tablet 5 days a week Mon-Friday and two tabs on Saturdays and Sundays, Disp: , Rfl:     levothyroxine (Synthroid, Levoxyl) 200 mcg tablet, Take 1 tablet (200 mcg) by mouth 2 times a week. Saturday and sunday, Disp: , Rfl:     lidocaine (Lidoderm) 5 % patch, APPLY 1 PATCH AND LEAVE IN PLACE FOR 12 HOURS, THEN REMOVE AND LEAVE OFF FOR 12 HOURS, Disp: 30 patch, Rfl: 11    losartan (Cozaar) 50 mg tablet, Take 1 tablet (50 mg) by mouth once daily., Disp: 90 tablet, Rfl: 3    meloxicam (Mobic) 15 mg tablet, Take 1 tablet (15 mg) by mouth once  daily as needed for mild pain (1 - 3)., Disp: , Rfl:     metFORMIN (Glucophage) 500 mg tablet, Take 1 tablet (500 mg) by mouth 2 times a day with meals., Disp: , Rfl:     naproxen sodium (Aleve) 220 mg tablet, Take 1 tablet (220 mg) by mouth 2 times a day with meals., Disp: , Rfl:     omega-3 fatty acids-fish oil 360-1,200 mg capsule, Take 1 capsule (1,200 mg) by mouth once daily., Disp: , Rfl:     omeprazole (PriLOSEC) 40 mg DR capsule, Take 1 capsule (40 mg) by mouth once daily. With dinner, Disp: , Rfl:     OXcarbazepine (Trileptal) 600 mg tablet, Take 1 tablet (600 mg) by mouth once daily in the morning., Disp: , Rfl:     OXcarbazepine (Trileptal) 600 mg tablet, Take 2 tablets (1,200 mg) by mouth once daily at bedtime., Disp: , Rfl:     polyethylene glycol (Glycolax, Miralax) 17 gram/dose powder, Take 17 g by mouth once daily. In 8oz of water, juice, or tea and drink daily, Disp: , Rfl:     prazosin (Minipress) 5 mg capsule, Take 1 capsule (5 mg) by mouth once daily at bedtime., Disp: , Rfl:     QUEtiapine (SeroqueL) 400 mg tablet, Take 2 tablets (800 mg) by mouth once daily at bedtime., Disp: , Rfl:     sertraline (Zoloft) 100 mg tablet, Take 2 tablets (200 mg) by mouth once daily. Do not start before January 2, 2024., Disp: 30 tablet, Rfl: 0    tamsulosin (Flomax) 0.4 mg 24 hr capsule, Take 1 capsule (0.4 mg) by mouth once daily., Disp: 30 capsule, Rfl: 11    tiZANidine (Zanaflex) 4 mg tablet, Take 1 tablet (4 mg) by mouth 3 times a day., Disp: , Rfl:     topiramate (Topamax) 200 mg tablet, Take 1 tablet (200 mg) by mouth 2 times a day., Disp: 60 tablet, Rfl: 3    Trelegy Ellipta 200-62.5-25 mcg blister with device, Inhale 1 puff once daily as needed., Disp: , Rfl:     azelastine (Astelin) 137 mcg (0.1 %) nasal spray, Administer 1 spray into each nostril 2 times a day., Disp: , Rfl:     baclofen (Lioresal) 10 mg tablet, TAKE 1 TABLET BY MOUTH THREE TIMES A DAY AS NEEDED FOR MUSCLE SPASMS (Patient not taking:  "Reported on 3/22/2024), Disp: 270 tablet, Rfl: 3    chlorhexidine (Peridex) 0.12 % solution, 15 ml swish and spit for 30 seconds night prior to surgery and morning of surgery, Disp: 473 mL, Rfl: 0    lancets misc, 1 each 3 times a day., Disp: , Rfl:        Visit Vitals  /76   Pulse 77   Temp 36 °C (96.8 °F)   Resp 18   Ht 1.676 m (5' 6\")   Wt 98.2 kg (216 lb 7.9 oz)   SpO2 99%   BMI 34.94 kg/m²   OB Status Postmenopausal   Smoking Status Never   BSA 2.14 m²      Review of Systems   Constitutional: Negative.    HENT: Negative.     Eyes: Negative.    Respiratory: Negative.     Cardiovascular: Negative.         METS 3  normal self care / walking with rolling walker   Gastrointestinal: Negative.    Endocrine: Negative.    Genitourinary: Negative.    Musculoskeletal:  Positive for arthralgias.        Bilateral knee pain    Skin: Negative.    Allergic/Immunologic: Negative.    Neurological:  Positive for tremors (parkinson's associated).   Hematological: Negative.    Psychiatric/Behavioral: Negative.          Physical Exam  Vitals reviewed.   Constitutional:       Appearance: Normal appearance.   HENT:      Head: Normocephalic and atraumatic.      Right Ear: External ear normal.      Left Ear: External ear normal.      Nose: Nose normal.      Mouth/Throat:      Pharynx: Oropharynx is clear.   Eyes:      Extraocular Movements: Extraocular movements intact.      Conjunctiva/sclera: Conjunctivae normal.      Pupils: Pupils are equal, round, and reactive to light.   Neck:      Comments: Very limited ROM neck   Cardiovascular:      Rate and Rhythm: Normal rate and regular rhythm.      Heart sounds: Normal heart sounds.   Pulmonary:      Effort: Pulmonary effort is normal.      Breath sounds: Normal breath sounds.   Abdominal:      Palpations: Abdomen is soft.   Musculoskeletal:         General: Normal range of motion.   Skin:     General: Skin is warm and dry.   Neurological:      General: No focal deficit present.      " Mental Status: She is alert and oriented to person, place, and time.      Comments: Tremors left arm    Psychiatric:         Mood and Affect: Mood normal.         Behavior: Behavior normal.          PAT AIRWAY:   Airway:     Mallampati::  IV    Neck ROM::  Limited    Lab Results   Component Value Date    WBC 5.0 03/22/2024    HGB 13.1 03/22/2024    HCT 42.8 03/22/2024    MCV 91 03/22/2024     03/22/2024      Lab Results   Component Value Date    GLUCOSE 88 03/22/2024    CALCIUM 8.8 03/22/2024     03/22/2024    K 3.8 03/22/2024    CO2 29 03/22/2024     03/22/2024    BUN 13 03/22/2024    CREATININE 0.73 03/22/2024      Lab Results   Component Value Date    HGBA1C 5.7 (H) 03/22/2024      Encounter Date: 12/27/23   ECG 12 Lead   Result Value    Ventricular Rate 49    Atrial Rate 49    LA Interval 176    QRS Duration 88    QT Interval 544    QTC Calculation(Bazett) 491    P Axis 45    R Axis -4    T Axis 12    QRS Count 8    Q Onset 218    P Onset 130    P Offset 190    T Offset 490    QTC Fredericia 508    Narrative    Marked sinus bradycardia  Possible Anterolateral infarct , age undetermined  Abnormal ECG  When compared with ECG of 27-DEC-2023 18:33,  Fusion complexes are now Present  Borderline criteria for Anterior infarct are now Present  Borderline criteria for Anterolateral infarct are now Present  Borderline criteria for Inferior infarct are no longer Present  QT has shortened  Confirmed by Jaime Schaffer ([id number]) on 1/8/2024 11:41:36 AM        TTE 10/5/23  CONCLUSIONS:   1. Left ventricular systolic function is normal with a 55-60% estimated ejection fraction.   2. Spectral Doppler shows an impaired relaxation pattern of left ventricular diastolic filling.   3. Moderate tricuspid regurgitation visualized.   4. Moderately elevated pulmonary artery pressure.    RCRI  2 , 10 % Risk of MACE    Cardiac  Clearance Dr. Madison 2/8/24 (full note in EPIC) - The patient is LOW TO INTERMEDIATE risk for  moderate risk surgery and can PROCEED without further risk stratification.    Pulmonary  ZEINA - Please bring CPAP with you DOS    Hematology       Patient instructed to ambulate as soon as possible postoperatively to decrease thromboembolic risk.      Initiate mechanical DVT prophylaxis as soon as possible and initiate chemical prophylaxis when deemed safe from a bleeding standpoint post surgery.       VTE prophylaxis per surgical team       Tests ordered in PAT: cbc, bmp, A1c, mrsa  LABS REVIEWED: unremarkable     Risk assessment complete.  Patient is scheduled for a intermediate surgical risk procedure.        Preoperative medication instructions were provided and reviewed with the patient.  Any additional testing or evaluation was explained to the patient.  Nothing by mouth instructions were discussed and patient's questions were answered prior to conclusion to this encounter.  Patient verbalized understanding of preoperative instructions given in preadmission testing; discharge instructions available in EMR.    This note was dictated by a speech recognition.  Minor errors may have been detected in a speech recognition.

## 2024-03-23 LAB
BILL ONLY-THYROGLOBULIN: NORMAL
THYROGLOB AB SERPL-ACNC: <0.9 IU/ML (ref 0–4)
THYROGLOB SERPL-MCNC: 3.4 NG/ML (ref 1.3–31.8)
THYROGLOB SERPL-MCNC: NORMAL NG/ML (ref 1.3–31.8)

## 2024-03-24 LAB — STAPHYLOCOCCUS SPEC CULT: NORMAL

## 2024-03-26 ENCOUNTER — LAB (OUTPATIENT)
Dept: LAB | Facility: LAB | Age: 59
End: 2024-03-26
Payer: MEDICARE

## 2024-03-26 DIAGNOSIS — E78.5 HYPERLIPIDEMIA, UNSPECIFIED: ICD-10-CM

## 2024-03-26 DIAGNOSIS — N39.0 CHRONIC UTI: Primary | ICD-10-CM

## 2024-03-26 DIAGNOSIS — E55.9 VITAMIN D DEFICIENCY, UNSPECIFIED: ICD-10-CM

## 2024-03-26 DIAGNOSIS — I10 ESSENTIAL (PRIMARY) HYPERTENSION: ICD-10-CM

## 2024-03-26 DIAGNOSIS — E11.8 TYPE 2 DIABETES MELLITUS WITH UNSPECIFIED COMPLICATIONS (MULTI): Primary | ICD-10-CM

## 2024-03-26 DIAGNOSIS — R73.9 HYPERGLYCEMIA, UNSPECIFIED: ICD-10-CM

## 2024-03-26 LAB
25(OH)D3 SERPL-MCNC: 42 NG/ML (ref 30–100)
ALBUMIN SERPL BCP-MCNC: 4.1 G/DL (ref 3.4–5)
ALP SERPL-CCNC: 127 U/L (ref 33–110)
ALT SERPL W P-5'-P-CCNC: 10 U/L (ref 7–45)
ANION GAP SERPL CALC-SCNC: 14 MMOL/L (ref 10–20)
AST SERPL W P-5'-P-CCNC: 11 U/L (ref 9–39)
BILIRUB SERPL-MCNC: 0.3 MG/DL (ref 0–1.2)
BUN SERPL-MCNC: 16 MG/DL (ref 6–23)
CALCIUM SERPL-MCNC: 9.5 MG/DL (ref 8.6–10.6)
CHLORIDE SERPL-SCNC: 104 MMOL/L (ref 98–107)
CHOLEST SERPL-MCNC: 267 MG/DL (ref 0–199)
CHOLESTEROL/HDL RATIO: 4.4
CO2 SERPL-SCNC: 28 MMOL/L (ref 21–32)
CREAT SERPL-MCNC: 0.7 MG/DL (ref 0.5–1.05)
EGFRCR SERPLBLD CKD-EPI 2021: >90 ML/MIN/1.73M*2
ERYTHROCYTE [DISTWIDTH] IN BLOOD BY AUTOMATED COUNT: 16.2 % (ref 11.5–14.5)
EST. AVERAGE GLUCOSE BLD GHB EST-MCNC: 123 MG/DL
GLUCOSE SERPL-MCNC: 89 MG/DL (ref 74–99)
HBA1C MFR BLD: 5.9 %
HCT VFR BLD AUTO: 46.1 % (ref 36–46)
HDLC SERPL-MCNC: 60.9 MG/DL
HGB BLD-MCNC: 13.9 G/DL (ref 12–16)
LDLC SERPL CALC-MCNC: 175 MG/DL
MCH RBC QN AUTO: 28.3 PG (ref 26–34)
MCHC RBC AUTO-ENTMCNC: 30.2 G/DL (ref 32–36)
MCV RBC AUTO: 94 FL (ref 80–100)
NON HDL CHOLESTEROL: 206 MG/DL (ref 0–149)
NRBC BLD-RTO: 0 /100 WBCS (ref 0–0)
PLATELET # BLD AUTO: 228 X10*3/UL (ref 150–450)
POTASSIUM SERPL-SCNC: 4.2 MMOL/L (ref 3.5–5.3)
PROT SERPL-MCNC: 6.6 G/DL (ref 6.4–8.2)
RBC # BLD AUTO: 4.91 X10*6/UL (ref 4–5.2)
SODIUM SERPL-SCNC: 142 MMOL/L (ref 136–145)
TRIGL SERPL-MCNC: 158 MG/DL (ref 0–149)
TSH SERPL-ACNC: 0.6 MIU/L (ref 0.44–3.98)
VLDL: 32 MG/DL (ref 0–40)
WBC # BLD AUTO: 5 X10*3/UL (ref 4.4–11.3)

## 2024-03-26 PROCEDURE — 82043 UR ALBUMIN QUANTITATIVE: CPT

## 2024-03-26 PROCEDURE — 83036 HEMOGLOBIN GLYCOSYLATED A1C: CPT

## 2024-03-26 PROCEDURE — 36415 COLL VENOUS BLD VENIPUNCTURE: CPT

## 2024-03-26 PROCEDURE — 84443 ASSAY THYROID STIM HORMONE: CPT

## 2024-03-26 PROCEDURE — 82306 VITAMIN D 25 HYDROXY: CPT

## 2024-03-26 PROCEDURE — 85027 COMPLETE CBC AUTOMATED: CPT

## 2024-03-26 PROCEDURE — 80061 LIPID PANEL: CPT

## 2024-03-26 PROCEDURE — 80053 COMPREHEN METABOLIC PANEL: CPT

## 2024-03-26 PROCEDURE — 82570 ASSAY OF URINE CREATININE: CPT

## 2024-03-26 RX ORDER — CEPHALEXIN 250 MG/1
250 CAPSULE ORAL DAILY
Qty: 90 CAPSULE | Refills: 3 | Status: SHIPPED | OUTPATIENT
Start: 2024-03-26 | End: 2024-03-28 | Stop reason: HOSPADM

## 2024-03-26 RX ORDER — CEPHALEXIN 500 MG/1
TABLET ORAL
Qty: 90 TABLET | Refills: 5 | OUTPATIENT
Start: 2024-03-26

## 2024-03-26 NOTE — TELEPHONE ENCOUNTER
Not sure if this is a duplicate but the pharmacy requested a refill on this medication for this patient.

## 2024-03-27 ENCOUNTER — HOSPITAL ENCOUNTER (OUTPATIENT)
Facility: HOSPITAL | Age: 59
LOS: 1 days | Discharge: HOME | End: 2024-03-28
Attending: ORTHOPAEDIC SURGERY | Admitting: ORTHOPAEDIC SURGERY
Payer: MEDICARE

## 2024-03-27 ENCOUNTER — ANESTHESIA EVENT (OUTPATIENT)
Dept: OPERATING ROOM | Facility: HOSPITAL | Age: 59
End: 2024-03-27
Payer: MEDICARE

## 2024-03-27 ENCOUNTER — ANESTHESIA (OUTPATIENT)
Dept: OPERATING ROOM | Facility: HOSPITAL | Age: 59
End: 2024-03-27
Payer: MEDICARE

## 2024-03-27 ENCOUNTER — HOME HEALTH ADMISSION (OUTPATIENT)
Dept: HOME HEALTH SERVICES | Facility: HOME HEALTH | Age: 59
End: 2024-03-27
Payer: MEDICARE

## 2024-03-27 ENCOUNTER — APPOINTMENT (OUTPATIENT)
Dept: RADIOLOGY | Facility: HOSPITAL | Age: 59
End: 2024-03-27
Payer: MEDICARE

## 2024-03-27 ENCOUNTER — DOCUMENTATION (OUTPATIENT)
Dept: HOME HEALTH SERVICES | Facility: HOME HEALTH | Age: 59
End: 2024-03-27

## 2024-03-27 DIAGNOSIS — I10 PRIMARY HYPERTENSION: ICD-10-CM

## 2024-03-27 DIAGNOSIS — M17.11 ARTHRITIS OF KNEE, RIGHT: Primary | ICD-10-CM

## 2024-03-27 DIAGNOSIS — M17.0 PRIMARY OSTEOARTHRITIS OF BOTH KNEES: ICD-10-CM

## 2024-03-27 PROBLEM — Z96.651 TOTAL KNEE REPLACEMENT STATUS, RIGHT: Status: ACTIVE | Noted: 2024-03-27

## 2024-03-27 LAB
CREAT UR-MCNC: 88.9 MG/DL (ref 20–320)
GLUCOSE BLD MANUAL STRIP-MCNC: 218 MG/DL (ref 74–99)
GLUCOSE BLD MANUAL STRIP-MCNC: 72 MG/DL (ref 74–99)
GLUCOSE BLD MANUAL STRIP-MCNC: 96 MG/DL (ref 74–99)
MICROALBUMIN UR-MCNC: <7 MG/L
MICROALBUMIN/CREAT UR: NORMAL MG/G{CREAT}

## 2024-03-27 PROCEDURE — 2500000004 HC RX 250 GENERAL PHARMACY W/ HCPCS (ALT 636 FOR OP/ED): Performed by: ANESTHESIOLOGY

## 2024-03-27 PROCEDURE — 97116 GAIT TRAINING THERAPY: CPT | Mod: GP

## 2024-03-27 PROCEDURE — 97161 PT EVAL LOW COMPLEX 20 MIN: CPT | Mod: GP

## 2024-03-27 PROCEDURE — 2500000004 HC RX 250 GENERAL PHARMACY W/ HCPCS (ALT 636 FOR OP/ED): Mod: MUE | Performed by: ORTHOPAEDIC SURGERY

## 2024-03-27 PROCEDURE — RXMED WILLOW AMBULATORY MEDICATION CHARGE

## 2024-03-27 PROCEDURE — 7100000011 HC EXTENDED STAY RECOVERY HOURLY - NURSING UNIT

## 2024-03-27 PROCEDURE — 2500000005 HC RX 250 GENERAL PHARMACY W/O HCPCS: Performed by: ANESTHESIOLOGIST ASSISTANT

## 2024-03-27 PROCEDURE — 2500000002 HC RX 250 W HCPCS SELF ADMINISTERED DRUGS (ALT 637 FOR MEDICARE OP, ALT 636 FOR OP/ED): Mod: MUE | Performed by: PHARMACIST

## 2024-03-27 PROCEDURE — C1713 ANCHOR/SCREW BN/BN,TIS/BN: HCPCS | Performed by: ORTHOPAEDIC SURGERY

## 2024-03-27 PROCEDURE — 27447 TOTAL KNEE ARTHROPLASTY: CPT | Performed by: ORTHOPAEDIC SURGERY

## 2024-03-27 PROCEDURE — 9420000001 HC RT PATIENT EDUCATION 5 MIN

## 2024-03-27 PROCEDURE — 2720000007 HC OR 272 NO HCPCS: Performed by: ORTHOPAEDIC SURGERY

## 2024-03-27 PROCEDURE — 3600000018 HC OR TIME - INITIAL BASE CHARGE - PROCEDURE LEVEL SIX: Performed by: ORTHOPAEDIC SURGERY

## 2024-03-27 PROCEDURE — 94640 AIRWAY INHALATION TREATMENT: CPT

## 2024-03-27 PROCEDURE — 2500000001 HC RX 250 WO HCPCS SELF ADMINISTERED DRUGS (ALT 637 FOR MEDICARE OP)

## 2024-03-27 PROCEDURE — 3700000001 HC GENERAL ANESTHESIA TIME - INITIAL BASE CHARGE: Performed by: ORTHOPAEDIC SURGERY

## 2024-03-27 PROCEDURE — 3600000017 HC OR TIME - EACH INCREMENTAL 1 MINUTE - PROCEDURE LEVEL SIX: Performed by: ORTHOPAEDIC SURGERY

## 2024-03-27 PROCEDURE — 2500000002 HC RX 250 W HCPCS SELF ADMINISTERED DRUGS (ALT 637 FOR MEDICARE OP, ALT 636 FOR OP/ED)

## 2024-03-27 PROCEDURE — 2500000005 HC RX 250 GENERAL PHARMACY W/O HCPCS

## 2024-03-27 PROCEDURE — C1776 JOINT DEVICE (IMPLANTABLE): HCPCS | Performed by: ORTHOPAEDIC SURGERY

## 2024-03-27 PROCEDURE — 2500000002 HC RX 250 W HCPCS SELF ADMINISTERED DRUGS (ALT 637 FOR MEDICARE OP, ALT 636 FOR OP/ED): Mod: MUE

## 2024-03-27 PROCEDURE — 2500000004 HC RX 250 GENERAL PHARMACY W/ HCPCS (ALT 636 FOR OP/ED): Performed by: ANESTHESIOLOGIST ASSISTANT

## 2024-03-27 PROCEDURE — 7100000001 HC RECOVERY ROOM TIME - INITIAL BASE CHARGE: Performed by: ORTHOPAEDIC SURGERY

## 2024-03-27 PROCEDURE — 2500000002 HC RX 250 W HCPCS SELF ADMINISTERED DRUGS (ALT 637 FOR MEDICARE OP, ALT 636 FOR OP/ED): Performed by: PHARMACIST

## 2024-03-27 PROCEDURE — A6213 FOAM DRG >16<=48 SQ IN W/BDR: HCPCS | Performed by: ORTHOPAEDIC SURGERY

## 2024-03-27 PROCEDURE — 64447 NJX AA&/STRD FEMORAL NRV IMG: CPT | Performed by: ANESTHESIOLOGY

## 2024-03-27 PROCEDURE — G0378 HOSPITAL OBSERVATION PER HR: HCPCS

## 2024-03-27 PROCEDURE — A27447 PR TOTAL KNEE ARTHROPLASTY: Performed by: ANESTHESIOLOGIST ASSISTANT

## 2024-03-27 PROCEDURE — 2500000001 HC RX 250 WO HCPCS SELF ADMINISTERED DRUGS (ALT 637 FOR MEDICARE OP): Performed by: PHARMACIST

## 2024-03-27 PROCEDURE — 3700000002 HC GENERAL ANESTHESIA TIME - EACH INCREMENTAL 1 MINUTE: Performed by: ORTHOPAEDIC SURGERY

## 2024-03-27 PROCEDURE — A27447 PR TOTAL KNEE ARTHROPLASTY: Performed by: ANESTHESIOLOGY

## 2024-03-27 PROCEDURE — 82947 ASSAY GLUCOSE BLOOD QUANT: CPT

## 2024-03-27 PROCEDURE — 2500000004 HC RX 250 GENERAL PHARMACY W/ HCPCS (ALT 636 FOR OP/ED)

## 2024-03-27 PROCEDURE — 73560 X-RAY EXAM OF KNEE 1 OR 2: CPT | Mod: RIGHT SIDE | Performed by: RADIOLOGY

## 2024-03-27 PROCEDURE — 7100000002 HC RECOVERY ROOM TIME - EACH INCREMENTAL 1 MINUTE: Performed by: ORTHOPAEDIC SURGERY

## 2024-03-27 PROCEDURE — 2780000003 HC OR 278 NO HCPCS: Performed by: ORTHOPAEDIC SURGERY

## 2024-03-27 PROCEDURE — 97110 THERAPEUTIC EXERCISES: CPT | Mod: GP

## 2024-03-27 PROCEDURE — A4217 STERILE WATER/SALINE, 500 ML: HCPCS | Mod: MUE | Performed by: ORTHOPAEDIC SURGERY

## 2024-03-27 PROCEDURE — 73560 X-RAY EXAM OF KNEE 1 OR 2: CPT | Mod: RT

## 2024-03-27 DEVICE — ATTUNE KNEE SYSTEM TIBIAL INSERT FIXED BEARING POSTERIOR STABILIZED 5 5MM AOX
Type: IMPLANTABLE DEVICE | Site: KNEE | Status: FUNCTIONAL
Brand: ATTUNE

## 2024-03-27 DEVICE — ATTUNE KNEE SYSTEM TIBIAL BASE FIXED BEARING SIZE 5 CEMENTED
Type: IMPLANTABLE DEVICE | Site: KNEE | Status: FUNCTIONAL
Brand: ATTUNE

## 2024-03-27 DEVICE — SMARTSET GHV GENTAMICIN HIGH VISCOSITY BONE CEMENT 40G
Type: IMPLANTABLE DEVICE | Site: KNEE | Status: FUNCTIONAL
Brand: SMARTSET

## 2024-03-27 DEVICE — ATTUNE PATELLA MEDIALIZED DOME 38MM CEMENTED AOX
Type: IMPLANTABLE DEVICE | Site: PATELLA | Status: FUNCTIONAL
Brand: ATTUNE

## 2024-03-27 DEVICE — ATTUNE KNEE SYSTEM FEMORAL POSTERIOR STABILIZED SIZE 5 RIGHT CEMENTED
Type: IMPLANTABLE DEVICE | Site: KNEE | Status: FUNCTIONAL
Brand: ATTUNE

## 2024-03-27 RX ORDER — ALBUTEROL SULFATE 90 UG/1
2 AEROSOL, METERED RESPIRATORY (INHALATION) EVERY 4 HOURS PRN
Status: DISCONTINUED | OUTPATIENT
Start: 2024-03-27 | End: 2024-03-27

## 2024-03-27 RX ORDER — DEXTROSE 50 % IN WATER (D50W) INTRAVENOUS SYRINGE
12.5
Status: DISCONTINUED | OUTPATIENT
Start: 2024-03-27 | End: 2024-03-28 | Stop reason: HOSPADM

## 2024-03-27 RX ORDER — ALUMINUM HYDROXIDE, MAGNESIUM HYDROXIDE, AND SIMETHICONE 1200; 120; 1200 MG/30ML; MG/30ML; MG/30ML
5 SUSPENSION ORAL ONCE AS NEEDED
Status: DISCONTINUED | OUTPATIENT
Start: 2024-03-27 | End: 2024-03-27 | Stop reason: HOSPADM

## 2024-03-27 RX ORDER — SODIUM CHLORIDE 0.9 G/100ML
IRRIGANT IRRIGATION AS NEEDED
Status: DISCONTINUED | OUTPATIENT
Start: 2024-03-27 | End: 2024-03-27 | Stop reason: HOSPADM

## 2024-03-27 RX ORDER — ONDANSETRON HYDROCHLORIDE 2 MG/ML
INJECTION, SOLUTION INTRAVENOUS AS NEEDED
Status: DISCONTINUED | OUTPATIENT
Start: 2024-03-27 | End: 2024-03-27

## 2024-03-27 RX ORDER — ACETAMINOPHEN 325 MG/1
650 TABLET ORAL EVERY 6 HOURS PRN
Status: DISCONTINUED | OUTPATIENT
Start: 2024-03-27 | End: 2024-03-28 | Stop reason: HOSPADM

## 2024-03-27 RX ORDER — CEFAZOLIN SODIUM 2 G/100ML
2 INJECTION, SOLUTION INTRAVENOUS EVERY 6 HOURS
Status: DISCONTINUED | OUTPATIENT
Start: 2024-03-27 | End: 2024-03-27 | Stop reason: HOSPADM

## 2024-03-27 RX ORDER — TIZANIDINE 4 MG/1
4 TABLET ORAL 3 TIMES DAILY
Status: DISCONTINUED | OUTPATIENT
Start: 2024-03-27 | End: 2024-03-28 | Stop reason: HOSPADM

## 2024-03-27 RX ORDER — ASPIRIN 81 MG/1
81 TABLET ORAL 2 TIMES DAILY
Status: DISCONTINUED | OUTPATIENT
Start: 2024-03-28 | End: 2024-03-28 | Stop reason: HOSPADM

## 2024-03-27 RX ORDER — CLONAZEPAM 0.5 MG/1
0.5 TABLET ORAL DAILY PRN
Status: DISCONTINUED | OUTPATIENT
Start: 2024-03-28 | End: 2024-03-28 | Stop reason: HOSPADM

## 2024-03-27 RX ORDER — ACETAMINOPHEN 325 MG/1
975 TABLET ORAL ONCE
Status: COMPLETED | OUTPATIENT
Start: 2024-03-27 | End: 2024-03-27

## 2024-03-27 RX ORDER — TRANEXAMIC ACID 100 MG/ML
INJECTION, SOLUTION INTRAVENOUS AS NEEDED
Status: DISCONTINUED | OUTPATIENT
Start: 2024-03-27 | End: 2024-03-27

## 2024-03-27 RX ORDER — CYCLOBENZAPRINE HCL 5 MG
5 TABLET ORAL 3 TIMES DAILY PRN
Status: DISCONTINUED | OUTPATIENT
Start: 2024-03-27 | End: 2024-03-28 | Stop reason: HOSPADM

## 2024-03-27 RX ORDER — SODIUM CHLORIDE, SODIUM LACTATE, POTASSIUM CHLORIDE, CALCIUM CHLORIDE 600; 310; 30; 20 MG/100ML; MG/100ML; MG/100ML; MG/100ML
100 INJECTION, SOLUTION INTRAVENOUS CONTINUOUS
Status: DISCONTINUED | OUTPATIENT
Start: 2024-03-27 | End: 2024-03-27 | Stop reason: HOSPADM

## 2024-03-27 RX ORDER — DOCUSATE SODIUM 100 MG/1
100 CAPSULE, LIQUID FILLED ORAL 2 TIMES DAILY
Status: DISCONTINUED | OUTPATIENT
Start: 2024-03-27 | End: 2024-03-28 | Stop reason: HOSPADM

## 2024-03-27 RX ORDER — FENTANYL CITRATE 50 UG/ML
INJECTION, SOLUTION INTRAMUSCULAR; INTRAVENOUS CONTINUOUS PRN
Status: DISCONTINUED | OUTPATIENT
Start: 2024-03-27 | End: 2024-03-27

## 2024-03-27 RX ORDER — BUMETANIDE 1 MG/1
2 TABLET ORAL 2 TIMES DAILY
Status: DISCONTINUED | OUTPATIENT
Start: 2024-03-27 | End: 2024-03-28 | Stop reason: HOSPADM

## 2024-03-27 RX ORDER — NALOXONE HYDROCHLORIDE 0.4 MG/ML
0.2 INJECTION, SOLUTION INTRAMUSCULAR; INTRAVENOUS; SUBCUTANEOUS EVERY 5 MIN PRN
Status: DISCONTINUED | OUTPATIENT
Start: 2024-03-27 | End: 2024-03-27 | Stop reason: HOSPADM

## 2024-03-27 RX ORDER — DOCUSATE SODIUM 100 MG/1
100 CAPSULE, LIQUID FILLED ORAL 2 TIMES DAILY
Qty: 60 CAPSULE | Refills: 0 | Status: SHIPPED | OUTPATIENT
Start: 2024-03-27 | End: 2024-04-27

## 2024-03-27 RX ORDER — BUDESONIDE 0.5 MG/2ML
0.5 INHALANT ORAL
Status: DISCONTINUED | OUTPATIENT
Start: 2024-03-27 | End: 2024-03-28 | Stop reason: HOSPADM

## 2024-03-27 RX ORDER — IPRATROPIUM BROMIDE AND ALBUTEROL SULFATE 2.5; .5 MG/3ML; MG/3ML
3 SOLUTION RESPIRATORY (INHALATION)
Status: DISCONTINUED | OUTPATIENT
Start: 2024-03-27 | End: 2024-03-28

## 2024-03-27 RX ORDER — BACLOFEN 10 MG/1
20 TABLET ORAL 3 TIMES DAILY
Status: DISCONTINUED | OUTPATIENT
Start: 2024-03-27 | End: 2024-03-28 | Stop reason: HOSPADM

## 2024-03-27 RX ORDER — OXCARBAZEPINE 300 MG/1
600 TABLET, FILM COATED ORAL EVERY MORNING
Status: DISCONTINUED | OUTPATIENT
Start: 2024-03-28 | End: 2024-03-28 | Stop reason: HOSPADM

## 2024-03-27 RX ORDER — NALOXONE HYDROCHLORIDE 0.4 MG/ML
0.2 INJECTION, SOLUTION INTRAMUSCULAR; INTRAVENOUS; SUBCUTANEOUS EVERY 5 MIN PRN
Status: DISCONTINUED | OUTPATIENT
Start: 2024-03-27 | End: 2024-03-28 | Stop reason: HOSPADM

## 2024-03-27 RX ORDER — SODIUM CHLORIDE, SODIUM LACTATE, POTASSIUM CHLORIDE, CALCIUM CHLORIDE 600; 310; 30; 20 MG/100ML; MG/100ML; MG/100ML; MG/100ML
100 INJECTION, SOLUTION INTRAVENOUS CONTINUOUS
Status: DISCONTINUED | OUTPATIENT
Start: 2024-03-27 | End: 2024-03-28 | Stop reason: HOSPADM

## 2024-03-27 RX ORDER — ATORVASTATIN CALCIUM 20 MG/1
20 TABLET, FILM COATED ORAL NIGHTLY
Status: DISCONTINUED | OUTPATIENT
Start: 2024-03-27 | End: 2024-03-28 | Stop reason: HOSPADM

## 2024-03-27 RX ORDER — FLUTICASONE FUROATE AND VILANTEROL 200; 25 UG/1; UG/1
1 POWDER RESPIRATORY (INHALATION)
Status: DISCONTINUED | OUTPATIENT
Start: 2024-03-28 | End: 2024-03-27

## 2024-03-27 RX ORDER — CARVEDILOL 25 MG/1
25 TABLET ORAL
Status: DISCONTINUED | OUTPATIENT
Start: 2024-03-28 | End: 2024-03-28 | Stop reason: HOSPADM

## 2024-03-27 RX ORDER — CARBIDOPA AND LEVODOPA 25; 100 MG/1; MG/1
1.5 TABLET ORAL 3 TIMES DAILY
Status: DISCONTINUED | OUTPATIENT
Start: 2024-03-27 | End: 2024-03-28 | Stop reason: HOSPADM

## 2024-03-27 RX ORDER — BISACODYL 10 MG/1
10 SUPPOSITORY RECTAL DAILY PRN
Status: DISCONTINUED | OUTPATIENT
Start: 2024-03-27 | End: 2024-03-28 | Stop reason: HOSPADM

## 2024-03-27 RX ORDER — ASPIRIN 81 MG/1
81 TABLET ORAL 2 TIMES DAILY
Qty: 56 TABLET | Refills: 0 | Status: SHIPPED | OUTPATIENT
Start: 2024-03-27 | End: 2024-04-25

## 2024-03-27 RX ORDER — QUETIAPINE FUMARATE 100 MG/1
800 TABLET, FILM COATED ORAL NIGHTLY
Status: DISCONTINUED | OUTPATIENT
Start: 2024-03-27 | End: 2024-03-28 | Stop reason: HOSPADM

## 2024-03-27 RX ORDER — MORPHINE SULFATE 0.5 MG/ML
INJECTION, SOLUTION EPIDURAL; INTRATHECAL; INTRAVENOUS AS NEEDED
Status: DISCONTINUED | OUTPATIENT
Start: 2024-03-27 | End: 2024-03-27 | Stop reason: HOSPADM

## 2024-03-27 RX ORDER — CEFAZOLIN SODIUM 2 G/100ML
2 INJECTION, SOLUTION INTRAVENOUS ONCE
Status: DISCONTINUED | OUTPATIENT
Start: 2024-03-27 | End: 2024-03-27 | Stop reason: HOSPADM

## 2024-03-27 RX ORDER — CLONAZEPAM 1 MG/1
1 TABLET ORAL EVERY MORNING
Status: DISCONTINUED | OUTPATIENT
Start: 2024-03-28 | End: 2024-03-28 | Stop reason: HOSPADM

## 2024-03-27 RX ORDER — CEFAZOLIN 1 G/1
INJECTION, POWDER, FOR SOLUTION INTRAVENOUS AS NEEDED
Status: DISCONTINUED | OUTPATIENT
Start: 2024-03-27 | End: 2024-03-27

## 2024-03-27 RX ORDER — OXYCODONE HYDROCHLORIDE 5 MG/1
5 TABLET ORAL EVERY 4 HOURS PRN
Status: DISCONTINUED | OUTPATIENT
Start: 2024-03-27 | End: 2024-03-28 | Stop reason: HOSPADM

## 2024-03-27 RX ORDER — DEXTROSE 50 % IN WATER (D50W) INTRAVENOUS SYRINGE
25
Status: DISCONTINUED | OUTPATIENT
Start: 2024-03-27 | End: 2024-03-28 | Stop reason: HOSPADM

## 2024-03-27 RX ORDER — PROMETHAZINE HYDROCHLORIDE 25 MG/ML
6.25 INJECTION, SOLUTION INTRAMUSCULAR; INTRAVENOUS ONCE AS NEEDED
Status: DISCONTINUED | OUTPATIENT
Start: 2024-03-27 | End: 2024-03-27 | Stop reason: HOSPADM

## 2024-03-27 RX ORDER — TRANEXAMIC ACID 650 MG/1
1950 TABLET ORAL ONCE
Status: COMPLETED | OUTPATIENT
Start: 2024-03-27 | End: 2024-03-27

## 2024-03-27 RX ORDER — OXYCODONE AND ACETAMINOPHEN 5; 325 MG/1; MG/1
1 TABLET ORAL EVERY 6 HOURS PRN
Qty: 28 TABLET | Refills: 0 | Status: SHIPPED | OUTPATIENT
Start: 2024-03-27 | End: 2024-04-12 | Stop reason: HOSPADM

## 2024-03-27 RX ORDER — CLONAZEPAM 1 MG/1
2 TABLET ORAL EVERY EVENING
Status: DISCONTINUED | OUTPATIENT
Start: 2024-03-27 | End: 2024-03-28 | Stop reason: HOSPADM

## 2024-03-27 RX ORDER — KETOROLAC TROMETHAMINE 30 MG/ML
30 INJECTION, SOLUTION INTRAMUSCULAR; INTRAVENOUS ONCE
Status: COMPLETED | OUTPATIENT
Start: 2024-03-27 | End: 2024-03-27

## 2024-03-27 RX ORDER — NORETHINDRONE AND ETHINYL ESTRADIOL 0.5-0.035
KIT ORAL CONTINUOUS PRN
Status: DISCONTINUED | OUTPATIENT
Start: 2024-03-27 | End: 2024-03-27

## 2024-03-27 RX ORDER — PROPOFOL 10 MG/ML
INJECTION, EMULSION INTRAVENOUS CONTINUOUS PRN
Status: DISCONTINUED | OUTPATIENT
Start: 2024-03-27 | End: 2024-03-27

## 2024-03-27 RX ORDER — OXCARBAZEPINE 300 MG/1
1200 TABLET, FILM COATED ORAL NIGHTLY
Status: DISCONTINUED | OUTPATIENT
Start: 2024-03-27 | End: 2024-03-28 | Stop reason: HOSPADM

## 2024-03-27 RX ORDER — OXYCODONE HYDROCHLORIDE 5 MG/1
5 TABLET ORAL EVERY 4 HOURS PRN
Status: DISCONTINUED | OUTPATIENT
Start: 2024-03-27 | End: 2024-03-27

## 2024-03-27 RX ORDER — CEFAZOLIN SODIUM 2 G/100ML
2 INJECTION, SOLUTION INTRAVENOUS EVERY 6 HOURS
Status: COMPLETED | OUTPATIENT
Start: 2024-03-27 | End: 2024-03-28

## 2024-03-27 RX ORDER — TOPIRAMATE 100 MG/1
200 TABLET, FILM COATED ORAL 2 TIMES DAILY
Status: DISCONTINUED | OUTPATIENT
Start: 2024-03-27 | End: 2024-03-28 | Stop reason: HOSPADM

## 2024-03-27 RX ORDER — OXYCODONE HYDROCHLORIDE 5 MG/1
10 TABLET ORAL EVERY 4 HOURS PRN
Status: DISCONTINUED | OUTPATIENT
Start: 2024-03-27 | End: 2024-03-28 | Stop reason: HOSPADM

## 2024-03-27 RX ORDER — CYCLOBENZAPRINE HCL 10 MG
10 TABLET ORAL ONCE AS NEEDED
Status: COMPLETED | OUTPATIENT
Start: 2024-03-27 | End: 2024-03-27

## 2024-03-27 RX ORDER — FORMOTEROL FUMARATE DIHYDRATE 20 UG/2ML
20 SOLUTION RESPIRATORY (INHALATION)
Status: DISCONTINUED | OUTPATIENT
Start: 2024-03-27 | End: 2024-03-28 | Stop reason: HOSPADM

## 2024-03-27 RX ORDER — ONDANSETRON 4 MG/1
4 TABLET, FILM COATED ORAL EVERY 8 HOURS PRN
Status: DISCONTINUED | OUTPATIENT
Start: 2024-03-27 | End: 2024-03-28 | Stop reason: HOSPADM

## 2024-03-27 RX ORDER — PRAZOSIN HYDROCHLORIDE 1 MG/1
5 CAPSULE ORAL NIGHTLY
Status: DISCONTINUED | OUTPATIENT
Start: 2024-03-27 | End: 2024-03-28 | Stop reason: HOSPADM

## 2024-03-27 RX ORDER — METOCLOPRAMIDE HYDROCHLORIDE 5 MG/ML
10 INJECTION INTRAMUSCULAR; INTRAVENOUS EVERY 6 HOURS PRN
Status: DISCONTINUED | OUTPATIENT
Start: 2024-03-27 | End: 2024-03-27

## 2024-03-27 RX ORDER — GABAPENTIN 300 MG/1
900 CAPSULE ORAL 3 TIMES DAILY
Status: DISCONTINUED | OUTPATIENT
Start: 2024-03-27 | End: 2024-03-28 | Stop reason: HOSPADM

## 2024-03-27 RX ORDER — DIPHENHYDRAMINE HYDROCHLORIDE 50 MG/ML
12.5 INJECTION INTRAMUSCULAR; INTRAVENOUS EVERY 6 HOURS PRN
Status: DISCONTINUED | OUTPATIENT
Start: 2024-03-27 | End: 2024-03-28 | Stop reason: HOSPADM

## 2024-03-27 RX ORDER — LEVOTHYROXINE SODIUM 100 UG/1
100 TABLET ORAL
Status: DISCONTINUED | OUTPATIENT
Start: 2024-03-28 | End: 2024-03-28 | Stop reason: HOSPADM

## 2024-03-27 RX ORDER — POLYETHYLENE GLYCOL 3350 17 G/17G
17 POWDER, FOR SOLUTION ORAL DAILY
Status: DISCONTINUED | OUTPATIENT
Start: 2024-03-28 | End: 2024-03-28 | Stop reason: HOSPADM

## 2024-03-27 RX ORDER — AMITRIPTYLINE HYDROCHLORIDE 10 MG/1
10 TABLET, FILM COATED ORAL NIGHTLY
Status: DISCONTINUED | OUTPATIENT
Start: 2024-03-27 | End: 2024-03-28 | Stop reason: HOSPADM

## 2024-03-27 RX ORDER — CLONAZEPAM 1 MG/1
0.5 TABLET ORAL EVERY EVENING
Status: DISCONTINUED | OUTPATIENT
Start: 2024-03-27 | End: 2024-03-27

## 2024-03-27 RX ORDER — LIDOCAINE HYDROCHLORIDE 10 MG/ML
0.1 INJECTION, SOLUTION EPIDURAL; INFILTRATION; INTRACAUDAL; PERINEURAL ONCE
Status: DISCONTINUED | OUTPATIENT
Start: 2024-03-27 | End: 2024-03-27 | Stop reason: HOSPADM

## 2024-03-27 RX ORDER — PREGABALIN 75 MG/1
75 CAPSULE ORAL ONCE
Status: DISCONTINUED | OUTPATIENT
Start: 2024-03-27 | End: 2024-03-27 | Stop reason: HOSPADM

## 2024-03-27 RX ORDER — BISACODYL 5 MG
10 TABLET, DELAYED RELEASE (ENTERIC COATED) ORAL DAILY PRN
Status: DISCONTINUED | OUTPATIENT
Start: 2024-03-27 | End: 2024-03-28 | Stop reason: HOSPADM

## 2024-03-27 RX ORDER — POTASSIUM CHLORIDE 20 MEQ/1
40 TABLET, EXTENDED RELEASE ORAL 2 TIMES DAILY
Status: DISCONTINUED | OUTPATIENT
Start: 2024-03-27 | End: 2024-03-28 | Stop reason: HOSPADM

## 2024-03-27 RX ORDER — ALBUTEROL SULFATE 0.83 MG/ML
2.5 SOLUTION RESPIRATORY (INHALATION) EVERY 4 HOURS PRN
Status: DISCONTINUED | OUTPATIENT
Start: 2024-03-27 | End: 2024-03-28 | Stop reason: HOSPADM

## 2024-03-27 RX ORDER — ONDANSETRON HYDROCHLORIDE 2 MG/ML
4 INJECTION, SOLUTION INTRAVENOUS EVERY 8 HOURS PRN
Status: DISCONTINUED | OUTPATIENT
Start: 2024-03-27 | End: 2024-03-28 | Stop reason: HOSPADM

## 2024-03-27 RX ORDER — PANTOPRAZOLE SODIUM 40 MG/1
40 TABLET, DELAYED RELEASE ORAL
Status: DISCONTINUED | OUTPATIENT
Start: 2024-03-28 | End: 2024-03-28 | Stop reason: HOSPADM

## 2024-03-27 RX ORDER — SERTRALINE HYDROCHLORIDE 50 MG/1
200 TABLET, FILM COATED ORAL DAILY
Status: DISCONTINUED | OUTPATIENT
Start: 2024-03-28 | End: 2024-03-28 | Stop reason: HOSPADM

## 2024-03-27 RX ORDER — HYDROMORPHONE HYDROCHLORIDE 1 MG/ML
1 INJECTION, SOLUTION INTRAMUSCULAR; INTRAVENOUS; SUBCUTANEOUS EVERY 2 HOUR PRN
Status: DISCONTINUED | OUTPATIENT
Start: 2024-03-27 | End: 2024-03-28 | Stop reason: HOSPADM

## 2024-03-27 RX ORDER — TAMSULOSIN HYDROCHLORIDE 0.4 MG/1
0.4 CAPSULE ORAL DAILY
Status: DISCONTINUED | OUTPATIENT
Start: 2024-03-28 | End: 2024-03-28 | Stop reason: HOSPADM

## 2024-03-27 RX ORDER — SODIUM CHLORIDE, SODIUM LACTATE, POTASSIUM CHLORIDE, CALCIUM CHLORIDE 600; 310; 30; 20 MG/100ML; MG/100ML; MG/100ML; MG/100ML
50 INJECTION, SOLUTION INTRAVENOUS CONTINUOUS
Status: DISCONTINUED | OUTPATIENT
Start: 2024-03-27 | End: 2024-03-28 | Stop reason: HOSPADM

## 2024-03-27 RX ORDER — AZELASTINE 1 MG/ML
1 SPRAY, METERED NASAL 2 TIMES DAILY
Status: DISCONTINUED | OUTPATIENT
Start: 2024-03-27 | End: 2024-03-27

## 2024-03-27 RX ORDER — BUPIVACAINE HYDROCHLORIDE 5 MG/ML
INJECTION, SOLUTION EPIDURAL; INTRACAUDAL AS NEEDED
Status: DISCONTINUED | OUTPATIENT
Start: 2024-03-27 | End: 2024-03-27 | Stop reason: HOSPADM

## 2024-03-27 RX ORDER — HYDROMORPHONE HYDROCHLORIDE 1 MG/ML
1 INJECTION, SOLUTION INTRAMUSCULAR; INTRAVENOUS; SUBCUTANEOUS EVERY 2 HOUR PRN
Status: DISCONTINUED | OUTPATIENT
Start: 2024-03-27 | End: 2024-03-27 | Stop reason: HOSPADM

## 2024-03-27 RX ORDER — METOCLOPRAMIDE 10 MG/1
10 TABLET ORAL EVERY 6 HOURS PRN
Status: DISCONTINUED | OUTPATIENT
Start: 2024-03-27 | End: 2024-03-27

## 2024-03-27 RX ORDER — ONDANSETRON HYDROCHLORIDE 2 MG/ML
4 INJECTION, SOLUTION INTRAVENOUS ONCE AS NEEDED
Status: DISCONTINUED | OUTPATIENT
Start: 2024-03-27 | End: 2024-03-27 | Stop reason: HOSPADM

## 2024-03-27 RX ORDER — KETOROLAC TROMETHAMINE 10 MG/1
10 TABLET, FILM COATED ORAL EVERY 8 HOURS
Qty: 9 TABLET | Refills: 0 | Status: SHIPPED | OUTPATIENT
Start: 2024-03-27 | End: 2024-03-31

## 2024-03-27 RX ORDER — ACETAMINOPHEN 325 MG/1
650 TABLET ORAL EVERY 4 HOURS PRN
Status: DISCONTINUED | OUTPATIENT
Start: 2024-03-27 | End: 2024-03-27 | Stop reason: HOSPADM

## 2024-03-27 RX ORDER — INSULIN LISPRO 100 [IU]/ML
0-10 INJECTION, SOLUTION INTRAVENOUS; SUBCUTANEOUS
Status: DISCONTINUED | OUTPATIENT
Start: 2024-03-28 | End: 2024-03-28 | Stop reason: HOSPADM

## 2024-03-27 RX ORDER — LEVOTHYROXINE SODIUM 100 UG/1
200 TABLET ORAL
Status: DISCONTINUED | OUTPATIENT
Start: 2024-03-30 | End: 2024-03-28 | Stop reason: HOSPADM

## 2024-03-27 RX ORDER — OXYCODONE HYDROCHLORIDE 5 MG/1
10 TABLET ORAL EVERY 4 HOURS PRN
Status: DISCONTINUED | OUTPATIENT
Start: 2024-03-27 | End: 2024-03-27

## 2024-03-27 RX ORDER — MELOXICAM 7.5 MG/1
7.5 TABLET ORAL ONCE
Status: DISCONTINUED | OUTPATIENT
Start: 2024-03-27 | End: 2024-03-27 | Stop reason: HOSPADM

## 2024-03-27 RX ADMIN — CEFAZOLIN SODIUM 2 G: 2 INJECTION, SOLUTION INTRAVENOUS at 21:26

## 2024-03-27 RX ADMIN — TIZANIDINE 4 MG: 4 TABLET ORAL at 21:30

## 2024-03-27 RX ADMIN — CEFAZOLIN 2 G: 1 INJECTION, POWDER, FOR SOLUTION INTRAMUSCULAR; INTRAVENOUS at 10:17

## 2024-03-27 RX ADMIN — CLONAZEPAM 2 MG: 1 TABLET ORAL at 21:57

## 2024-03-27 RX ADMIN — BUMETANIDE 2 MG: 1 TABLET ORAL at 21:40

## 2024-03-27 RX ADMIN — PROPOFOL 50 MCG/KG/MIN: 10 INJECTION, EMULSION INTRAVENOUS at 10:18

## 2024-03-27 RX ADMIN — CEFAZOLIN SODIUM 2 G: 2 INJECTION, SOLUTION INTRAVENOUS at 15:40

## 2024-03-27 RX ADMIN — SODIUM CHLORIDE, POTASSIUM CHLORIDE, SODIUM LACTATE AND CALCIUM CHLORIDE 100 ML/HR: 600; 310; 30; 20 INJECTION, SOLUTION INTRAVENOUS at 12:05

## 2024-03-27 RX ADMIN — ACETAMINOPHEN 975 MG: 325 TABLET ORAL at 09:18

## 2024-03-27 RX ADMIN — HYDROMORPHONE HYDROCHLORIDE 1 MG: 1 INJECTION, SOLUTION INTRAMUSCULAR; INTRAVENOUS; SUBCUTANEOUS at 17:27

## 2024-03-27 RX ADMIN — POVIDONE-IODINE 1 APPLICATION: 5 SOLUTION TOPICAL at 09:19

## 2024-03-27 RX ADMIN — ATORVASTATIN CALCIUM 20 MG: 20 TABLET, FILM COATED ORAL at 21:28

## 2024-03-27 RX ADMIN — TRANEXAMIC ACID 1000 MG: 1 INJECTION, SOLUTION INTRAVENOUS at 10:15

## 2024-03-27 RX ADMIN — HYDROMORPHONE HYDROCHLORIDE 0.5 MG: 1 INJECTION, SOLUTION INTRAMUSCULAR; INTRAVENOUS; SUBCUTANEOUS at 12:36

## 2024-03-27 RX ADMIN — OXCARBAZEPINE 1200 MG: 300 TABLET, FILM COATED ORAL at 21:28

## 2024-03-27 RX ADMIN — CYCLOBENZAPRINE 10 MG: 10 TABLET, FILM COATED ORAL at 15:56

## 2024-03-27 RX ADMIN — CARBIDOPA AND LEVODOPA 1.5 TABLET: 25; 100 TABLET ORAL at 21:29

## 2024-03-27 RX ADMIN — TOPIRAMATE 200 MG: 100 TABLET, FILM COATED ORAL at 21:29

## 2024-03-27 RX ADMIN — ACETAMINOPHEN 650 MG: 325 TABLET ORAL at 15:57

## 2024-03-27 RX ADMIN — BUDESONIDE INHALATION 0.5 MG: 0.5 SUSPENSION RESPIRATORY (INHALATION) at 22:20

## 2024-03-27 RX ADMIN — BACLOFEN 20 MG: 10 TABLET ORAL at 21:30

## 2024-03-27 RX ADMIN — QUETIAPINE FUMARATE 800 MG: 100 TABLET ORAL at 21:40

## 2024-03-27 RX ADMIN — POTASSIUM CHLORIDE 40 MEQ: 1500 TABLET, EXTENDED RELEASE ORAL at 21:28

## 2024-03-27 RX ADMIN — OXYCODONE HYDROCHLORIDE 5 MG: 5 TABLET ORAL at 18:52

## 2024-03-27 RX ADMIN — SODIUM CHLORIDE, SODIUM LACTATE, POTASSIUM CHLORIDE, AND CALCIUM CHLORIDE: 600; 310; 30; 20 INJECTION, SOLUTION INTRAVENOUS at 09:50

## 2024-03-27 RX ADMIN — DOCUSATE SODIUM 100 MG: 100 CAPSULE, LIQUID FILLED ORAL at 21:40

## 2024-03-27 RX ADMIN — SODIUM CHLORIDE, POTASSIUM CHLORIDE, SODIUM LACTATE AND CALCIUM CHLORIDE 100 ML/HR: 600; 310; 30; 20 INJECTION, SOLUTION INTRAVENOUS at 09:20

## 2024-03-27 RX ADMIN — AMITRIPTYLINE HYDROCHLORIDE 10 MG: 10 TABLET, FILM COATED ORAL at 21:29

## 2024-03-27 RX ADMIN — GABAPENTIN 900 MG: 300 CAPSULE ORAL at 21:28

## 2024-03-27 RX ADMIN — PRAZOSIN HYDROCHLORIDE 5 MG: 1 CAPSULE ORAL at 21:29

## 2024-03-27 RX ADMIN — FORMOTEROL FUMARATE DIHYDRATE 20 MCG: 20 SOLUTION RESPIRATORY (INHALATION) at 22:20

## 2024-03-27 RX ADMIN — ONDANSETRON 4 MG: 2 INJECTION INTRAMUSCULAR; INTRAVENOUS at 11:36

## 2024-03-27 RX ADMIN — Medication 2 L/MIN: at 21:00

## 2024-03-27 RX ADMIN — KETOROLAC TROMETHAMINE 30 MG: 30 INJECTION, SOLUTION INTRAMUSCULAR at 13:16

## 2024-03-27 RX ADMIN — TRANEXAMIC ACID 1950 MG: 650 TABLET ORAL at 14:06

## 2024-03-27 RX ADMIN — SODIUM CHLORIDE, POTASSIUM CHLORIDE, SODIUM LACTATE AND CALCIUM CHLORIDE 50 ML/HR: 600; 310; 30; 20 INJECTION, SOLUTION INTRAVENOUS at 21:41

## 2024-03-27 RX ADMIN — IPRATROPIUM BROMIDE AND ALBUTEROL SULFATE 3 ML: 2.5; .5 SOLUTION RESPIRATORY (INHALATION) at 22:20

## 2024-03-27 SDOH — SOCIAL STABILITY: SOCIAL INSECURITY: ARE YOU OR HAVE YOU BEEN THREATENED OR ABUSED PHYSICALLY, EMOTIONALLY, OR SEXUALLY BY ANYONE?: NO

## 2024-03-27 SDOH — SOCIAL STABILITY: SOCIAL INSECURITY: DO YOU FEEL ANYONE HAS EXPLOITED OR TAKEN ADVANTAGE OF YOU FINANCIALLY OR OF YOUR PERSONAL PROPERTY?: NO

## 2024-03-27 SDOH — SOCIAL STABILITY: SOCIAL INSECURITY: HAVE YOU HAD THOUGHTS OF HARMING ANYONE ELSE?: NO

## 2024-03-27 SDOH — SOCIAL STABILITY: SOCIAL INSECURITY: HAS ANYONE EVER THREATENED TO HURT YOUR FAMILY OR YOUR PETS?: NO

## 2024-03-27 SDOH — SOCIAL STABILITY: SOCIAL INSECURITY: DOES ANYONE TRY TO KEEP YOU FROM HAVING/CONTACTING OTHER FRIENDS OR DOING THINGS OUTSIDE YOUR HOME?: NO

## 2024-03-27 SDOH — SOCIAL STABILITY: SOCIAL INSECURITY: ABUSE: ADULT

## 2024-03-27 SDOH — SOCIAL STABILITY: SOCIAL INSECURITY: DO YOU FEEL UNSAFE GOING BACK TO THE PLACE WHERE YOU ARE LIVING?: NO

## 2024-03-27 SDOH — SOCIAL STABILITY: SOCIAL INSECURITY: ARE THERE ANY APPARENT SIGNS OF INJURIES/BEHAVIORS THAT COULD BE RELATED TO ABUSE/NEGLECT?: NO

## 2024-03-27 ASSESSMENT — COLUMBIA-SUICIDE SEVERITY RATING SCALE - C-SSRS
1. IN THE PAST MONTH, HAVE YOU WISHED YOU WERE DEAD OR WISHED YOU COULD GO TO SLEEP AND NOT WAKE UP?: NO
6. HAVE YOU EVER DONE ANYTHING, STARTED TO DO ANYTHING, OR PREPARED TO DO ANYTHING TO END YOUR LIFE?: NO
2. HAVE YOU ACTUALLY HAD ANY THOUGHTS OF KILLING YOURSELF?: NO

## 2024-03-27 ASSESSMENT — PAIN - FUNCTIONAL ASSESSMENT
PAIN_FUNCTIONAL_ASSESSMENT: 0-10

## 2024-03-27 ASSESSMENT — COGNITIVE AND FUNCTIONAL STATUS - GENERAL
MOVING FROM LYING ON BACK TO SITTING ON SIDE OF FLAT BED WITH BEDRAILS: A LITTLE
WALKING IN HOSPITAL ROOM: A LITTLE
MOVING TO AND FROM BED TO CHAIR: A LITTLE
CLIMB 3 TO 5 STEPS WITH RAILING: A LOT
STANDING UP FROM CHAIR USING ARMS: A LITTLE
MOVING TO AND FROM BED TO CHAIR: A LITTLE
DRESSING REGULAR UPPER BODY CLOTHING: A LITTLE
CLIMB 3 TO 5 STEPS WITH RAILING: A LOT
HELP NEEDED FOR BATHING: A LITTLE
TOILETING: A LITTLE
TURNING FROM BACK TO SIDE WHILE IN FLAT BAD: A LITTLE
TURNING FROM BACK TO SIDE WHILE IN FLAT BAD: A LITTLE
DRESSING REGULAR LOWER BODY CLOTHING: A LITTLE
PATIENT BASELINE BEDBOUND: NO
WALKING IN HOSPITAL ROOM: A LITTLE
TURNING FROM BACK TO SIDE WHILE IN FLAT BAD: A LITTLE
PERSONAL GROOMING: A LITTLE
CLIMB 3 TO 5 STEPS WITH RAILING: A LOT
MOBILITY SCORE: 17
MOBILITY SCORE: 17
MOVING TO AND FROM BED TO CHAIR: A LITTLE
WALKING IN HOSPITAL ROOM: A LITTLE
MOVING FROM LYING ON BACK TO SITTING ON SIDE OF FLAT BED WITH BEDRAILS: A LITTLE
MOVING FROM LYING ON BACK TO SITTING ON SIDE OF FLAT BED WITH BEDRAILS: A LITTLE
MOBILITY SCORE: 17
DAILY ACTIVITIY SCORE: 19
STANDING UP FROM CHAIR USING ARMS: A LITTLE
STANDING UP FROM CHAIR USING ARMS: A LITTLE

## 2024-03-27 ASSESSMENT — PAIN SCALES - GENERAL
PAIN_LEVEL: 1
PAINLEVEL_OUTOF10: 7
PAINLEVEL_OUTOF10: 3
PAINLEVEL_OUTOF10: 5 - MODERATE PAIN
PAINLEVEL_OUTOF10: 4
PAINLEVEL_OUTOF10: 5 - MODERATE PAIN
PAINLEVEL_OUTOF10: 7
PAINLEVEL_OUTOF10: 7
PAINLEVEL_OUTOF10: 3
PAINLEVEL_OUTOF10: 0 - NO PAIN
PAINLEVEL_OUTOF10: 3
PAINLEVEL_OUTOF10: 3
PAINLEVEL_OUTOF10: 7

## 2024-03-27 ASSESSMENT — ACTIVITIES OF DAILY LIVING (ADL)
GROOMING: INDEPENDENT
TOILETING: INDEPENDENT
ASSISTIVE_DEVICE: WALKER
JUDGMENT_ADEQUATE_SAFELY_COMPLETE_DAILY_ACTIVITIES: YES
LACK_OF_TRANSPORTATION: NO
FEEDING YOURSELF: INDEPENDENT
HEARING - RIGHT EAR: FUNCTIONAL
BATHING: INDEPENDENT
ADL_ASSISTANCE: NEEDS ASSISTANCE
PATIENT'S MEMORY ADEQUATE TO SAFELY COMPLETE DAILY ACTIVITIES?: YES
WALKS IN HOME: INDEPENDENT
DRESSING YOURSELF: INDEPENDENT
ADEQUATE_TO_COMPLETE_ADL: YES
HEARING - LEFT EAR: FUNCTIONAL

## 2024-03-27 ASSESSMENT — PATIENT HEALTH QUESTIONNAIRE - PHQ9
SUM OF ALL RESPONSES TO PHQ9 QUESTIONS 1 & 2: 4
1. LITTLE INTEREST OR PLEASURE IN DOING THINGS: SEVERAL DAYS
2. FEELING DOWN, DEPRESSED OR HOPELESS: NEARLY EVERY DAY

## 2024-03-27 ASSESSMENT — PAIN DESCRIPTION - ORIENTATION: ORIENTATION: RIGHT

## 2024-03-27 ASSESSMENT — LIFESTYLE VARIABLES
SUBSTANCE_ABUSE_PAST_12_MONTHS: NO
AUDIT-C TOTAL SCORE: 0
HOW OFTEN DO YOU HAVE 6 OR MORE DRINKS ON ONE OCCASION: NEVER
HOW MANY STANDARD DRINKS CONTAINING ALCOHOL DO YOU HAVE ON A TYPICAL DAY: PATIENT DOES NOT DRINK
HOW OFTEN DO YOU HAVE A DRINK CONTAINING ALCOHOL: NEVER
SKIP TO QUESTIONS 9-10: 1
AUDIT-C TOTAL SCORE: 0
PRESCIPTION_ABUSE_PAST_12_MONTHS: NO

## 2024-03-27 ASSESSMENT — PAIN SCALES - WONG BAKER: WONGBAKER_NUMERICALRESPONSE: HURTS WHOLE LOT

## 2024-03-27 ASSESSMENT — PAIN DESCRIPTION - LOCATION: LOCATION: KNEE

## 2024-03-27 NOTE — HH CARE COORDINATION
Home Care received a Referral for Physical Therapy. We have processed the referral for a Start of Care on 24 hours .     If you have any questions or concerns, please feel free to contact us at [phone]. Follow the prompts, enter your five digit zip code, and you will be directed to your care team on [facility] 2.

## 2024-03-27 NOTE — NURSING NOTE
12:05- Patient arrived to bay anesthesia at bedside VS WNL pt. Still sedated RASS score of -3 no s/s of pain or discomfort    12:30- Pt. Placed on 2L NC    12:32- pt. Had sips of water no s/s of nausea or vomiting    12:37 medicated for pain    12:45 pt. On RA    12:52- pt tolerating crackers    1307- Xray complete  1308- pt. Placed back on O2 3L NC     1400 pt. Received txa pt. Back on RA      1420- updated family via phone    14:55- therapy at bedside

## 2024-03-27 NOTE — DISCHARGE INSTRUCTIONS
Postoperative Instructions: Total Joint Replacement    POSTOPERATIVE MEDICATIONS  PAIN MEDICATION  Pain medications have been prescribed for post-operative pain control. Take in conjunction with ice/cold therapy to assist with swelling and pain. If prescribed multiple pain medications, be sure to alternate administration times throughout the day so that you can take something every few hours. Consider taking Tylenol in between narcotic administration times (keep in mind Percocet/Vicodin contain Tylenol and not to exceed Tylenol limit of 4grams in 24 hours). Prescription will generally be for 7 days at a time and refills will be sent upon request. For refills, request via [employer] or call surgeon's office during business hours and follow up with your pharmacy regarding status and pickup.    Side effects may be constipation and nausea, vomiting, sleepiness, dizziness, lightheadedness, headache, blurred vision, dry mouth, sweating, itching (if you have itching, over-the -counter Benadryl can be used as needed).  You may NOT operate a motor vehicle while taking narcotic pain medication.    BLOOD THINNER  Aspirin or another medication has been prescribed as a blood thinner to prevent blood clots in your leg or lungs. Take as prescribed on the bottle. You will not receive a refill on this medication.  Do not take this medication if you are on another blood thinner.  Do not take any anti-inflammatory medications such as Meloxicam, Celebrex, Ibuprofen, Motrin, Aleve, or Advil while using the Aspirin or another blood thinner unless instructed otherwise by your surgeon.       STOOL SOFTENERS/LAXATIVES  Post-operative constipation can result due to a combination of inactivity, anesthesia and pain medication. To help prevent this, you should increase your water and fiber intake. Physical activity, such as walking, will also help stimulate the bowels. If you are not having regular bowel movements, increase your bowel  "regimen!  Consider constipation prevention and treatment medications if not prescribed by your surgeon. These are available over the counter at the drug store (The pharmacist on staff may also make recommendations):  Stool Softener: Colace  Laxative: Senna, Miralax, Milk of Magnesia, Magnesium Citrate    WOUND CARE  Pain and swelling are normal following surgery and can last for weeks to months depending on the patient.  To help relieve these symptoms, please follow the post-operative pain regimen as it has been prescribed, use ice often, wear compression stockings every day as prescribed, and elevate your leg every hour.   You have a waterproof bandage on your wound and may shower with this on. The waterproof bandage is to remain in place for a minimum of 6 -7 days. Home care will remove it. You may leave your incision open to air after the bandage has been removed. Once the dressing is removed, you may see steri strips, surgical mesh, or glue. Do not peel or cut any of these items, they will fall off on their own or your surgeon will address at your follow up appointment.   DO NOT soak your incision in a bath, hot tub, pool or pond/lake for a minimum of 8 weeks following your surgery.  DO NOT use lotions, creams, ointments on your wound for a minimum of 6 weeks following your surgery. At that time you may use vitamin E to assist with softening of your incision.    _____ TOTAL HIP ARTHROPLASTY  After surgery, you will have a compression stocking on your operative leg. Continue to wear the compression stocking for 4 weeks to prevent blood clots in your leg or lungs. Remove compression stockings at night.  If there is continued drainage or bleeding, cover with an abdominal pad and tape.   posterior hip precautions:   Don't lean forward while you sit down or stand up, and don't bend past 90 degrees (like the angle in a letter \"L\"). This means you can't try to  something off the floor or bend down to tie your " shoes. Don't lift your knee higher than your hip.  Don't sit on low chairs, beds, or toilets. You may want to use a raised toilet seat for a while. Sit in chairs with arms. Imagine there's a line running down the middle of your body. Keep your legs from crossing over it.  When you get into a car, back up to the seat of the car, and then sit and slide across the seat toward the middle of the car with your knees about 12 inches apart. A plastic bag on the seat can help you slide in and out of the car.  Don't cross your legs when you sit.  Don't cross your ankles while lying down.  It may help to keep a pillow between your knees when you're in bed.    _____ TOTAL KNEE ARTHROPLASTY  After surgery, you will have a compression stocking and ACE wrap on your operative leg. Continue to wear the compression stocking for 4 weeks to prevent blood clots in your leg or lungs. Remove compression stockings at night. ACE wrap can be removed on postop day 1 or removed by homecare therapist at their first visit. Can use ACE wrap intermittently for swelling.  Elevate your leg periodically to help with swelling. BE SURE to place the support under your ankle, NOT under your knee. You want your knee as straight as possible.    JOINT CARE TEAM  For nursing or wound care questions within the first 6 weeks after surgery, please contact the joint replacement nurse at the facility where you had surgery.  If you are leaving a message, please include your full name, date of birth and date of surgery so that we can identify correctly identify you.  For messages left outside of normal business hours, your call will be returned on the next business day.  Please do not leave emergent messages outside of normal business hours that cannot wait until the next business day.  For orthopedic concerns longer than 6 weeks after your surgery, you will need to call the office to schedule an appointment to be seen.    [facility]:  Anoop Wiggins RN,  [phone] or Cookie Carrillo RN, [phone]        RESTARTING HOME MEDICATIONS/DIET  You may restart your home medications the following day after your surgery UNLESS you have been given alternate instructions.  Follow the instructions given to you on your hospital discharge instructions for more information regarding your home medications.  Resume your normal diet after surgery. If you are on a specific type of diet for your condition, resume that instead.  Choose foods that help promote good bowel habits and prevent constipation, such as foods high in fiber. Be sure to drink water to stay hydrated and to prevent constipation.       DENTAL PROCEDURES & CLEANINGS  All patients must wait a minimum of 3 months for elective dental appointments, including routine cleanings, as to prevent total joint replacement infections. Please refer to your surgeon as to whether you will need antibiotics for future dental appointments.     EMERGENCIES  When to contact our office immediately:  Fever >101.5 for at least 48 hours after surgery or chills.  Excessive bleeding from incision(s). A small amount of drainage is normal and expected.  Signs of infection of incision(s)-excessive drainage that is soaking through your dressing (especially if it is pus-like), redness that is spreading out from the edges of your incision, or increased warmth around the area.  Excruciating pain for which the pain medication, taken as instructed, is not helping.  Severe calf pain.  Go directly to the emergency room or call 911, if you are experiencing chest pain, shortness of breath, or difficulty breathing.    IN-HOME PHYSICAL THERAPY & OUTPATIENT PHYSICAL THERAPY  In-home physical therapy will start 1-2 days after you get home from the hospital.  The home care agency will call you prior to their first visit.  Please refer to the contact information on your hospital discharge instructions if you need to contact them.  Make sure to provide a  phone number with the ability for the home care staff to leave a message if you do not answer your phone.  Please continue to complete the assigned home exercises two times per day on the days that physical therapy is not scheduled to come to your home.    Following a total knee replacement, you should plan to transition from in-home therapy to outpatient therapy 2-3 weeks after surgery.  Patients should be making their first appointment several weeks in advance to avoid delays. You may use the physical therapy location of your choice; it is the patient's responsibility to make sure the location chosen is covered by insurance.  If you are having a hip replacement and need additional therapy, please contact the office for an order.    It is common to have a temporary increase in pain and swelling upon starting outpatient physical therapy and/or changing your exercise routine.  Continue to use ice to help with symptoms.    DRIVING & TRAVEL AFTER SURGERY   Patients should anticipate waiting at least 4-6 weeks before traveling long distances after surgery.  You will need to stop to walk around ever 1 hour during your travel to help with blood clot prevention.  Please call the office or your joint nurse to discuss prior to post-surgical travel.  Patients may not drive until cleared by the joint nurse or the office.    FOLLOW-UP APPOINTMENT  Please call your surgeon's office within 2 weeks following surgery to schedule a follow up visit.    ICE  You have been prescribed to ice your total joint at a minimum of twice per hour for 20 minutes while awake during the first 6 weeks after surgery if you are using ice packs. This will help with pain control. Be sure to have a layer of protection between the ice pack and your skin. Ice packs should be rotated on for 20 minutes and off for 20 minutes to prevent frost bite.   If you are using an ice machine, please follow ice machine instructions and tips below.    [facility] Cold  Therapy Machine Recommendations    Cold therapy devices can be used before and after surgery to assist in comfort and help to reduce pain and swelling.  These devices differ from ice or ice packs whereas the mechanism circulates water through tubing and a pad to provide longer periods of cold therapy to the desired site.  While in the hospital, you can use your cold devices around the clock for optimal comfort.  We recommend using cold therapy after working with therapy or completing exercises on your own.  Once you are discharged home, there is no set schedule in which you must follow while using cold therapy.  Below are a few points to remember when using a cold therapy device:    Read the 's instructions prior to first the use.  Follow instructions for filling the cooler (water first, then ice).  Always make sure there is a layer of protection between the cold pad and your skin (Clothing, Towel, Ace Bandage, etc.)  Allow the device to circulate cold water throughout the pad prior wrapping the pad around your leg (approximately 10 minutes).  Place the pad on your leg in the desired position to meet your pain management needs and use the wraps provided to secure the pad to your body.  The purpose of this device is to use consistently throughout the day.  You do not need to need to use the 20 on, 20 off method.  During waking hours, remove the cold pad every 1-2 hours to perform a skin check  Detach the pad from the cooler and ambulate at least once every hour  After removing the pad, allow at least 30 minutes before resuming cold therapy  You may wear the cold therapy device during periods of sleep including overnight    If you wake up during the night, you can check the skin at this time.  You do not need to wake up specifically to perform skin checks.  Empty the cooler and pad when device is not in use.  Follow 's instructions for cleaning your cold therapy device.     DME can assist  with problems related to products purchased through the [facility]  [phone] - Delia   or   [phone] - Shira

## 2024-03-27 NOTE — ANESTHESIA PROCEDURE NOTES
Peripheral Block    Patient location during procedure: pre-op  Start time: 3/27/2024 9:30 AM  End time: 3/27/2024 9:40 AM  Reason for block: at surgeon's request and post-op pain management  Staffing  Performed: attending   Authorized by: Krissy Haley MD    Performed by: Krissy Haley MD  Preanesthetic Checklist  Completed: patient identified, IV checked, site marked, risks and benefits discussed, surgical consent, monitors and equipment checked, pre-op evaluation and timeout performed   Timeout performed at: 3/27/2024 9:30 AM  Peripheral Block  Patient position: laying flat  Prep: ChloraPrep  Patient monitoring: heart rate and continuous pulse ox  Block type: adductor canal  Laterality: right  Injection technique: single-shot  Guidance: ultrasound guided  Infiltration strength: 0.5 %  Dose: 30 mL  Needle  Needle type: pencil-tip   Needle length: 8 cm  Needle localization: ultrasound guidance  Assessment  Injection assessment: negative aspiration for heme, no paresthesia on injection, incremental injection and local visualized surrounding nerve on ultrasound  Heart rate change: no  Slow fractionated injection: yes  Additional Notes  Decadron 4 mg, Epinephrine 5 mcg/ml added to the block solution.

## 2024-03-27 NOTE — ANESTHESIA PROCEDURE NOTES
Spinal Block    Patient location during procedure: OR  Start time: 3/27/2024 10:00 AM  End time: 3/27/2024 10:10 AM  Reason for block: primary anesthetic and at surgeon's request  Staffing  Performed: KENROY   Authorized by: Krissy Haley MD    Performed by: KENROY Ortiz    Preanesthetic Checklist  Completed: patient identified, IV checked, risks and benefits discussed, surgical consent, pre-op evaluation, timeout performed and sterile techniques followed  Block Timeout  RN/Licensed healthcare professional reads aloud to the Anesthesia provider and entire team: Patient identity, procedure with side and site, patient position, and as applicable the availability of implants/special equipment/special requirements.  Patient on coagulant treatment: no  Timeout performed at:   Spinal Block  Patient position: sitting  Prep: Betadine  Sterility prep: gloves and drape  Sedation level: moderate sedation  Patient monitoring: continuous pulse oximetry and blood pressure  Approach: midline  Vertebral space: L3-4  Injection technique: single-shot  Needle  Needle type: short-bevel   Needle gauge: 24 G  Needle length: 4 in  Free flowing CSF: yes    Assessment  Sensory level: T10 bilateral  Block outcome: patient comfortable  Procedure assessment: patient sedated but conversant throughout procedure and patient tolerated procedure well with no immediate complications  Additional Notes  Skin wheal wih 1% lidocaine made   1.6mL spinal marcaine 0.75% with dextrose 8.25% injected

## 2024-03-27 NOTE — OP NOTE
Right Knee Total Arthroplasty (R) Operative Note     Date: 3/27/2024  OR Location: [facility] A OR    Name: Prema Hair, : 1965, Age: 59 y.o., MRN: [id number], Sex: female    Diagnosis  Pre-op Diagnosis     * Primary osteoarthritis of both knees [M17.0] Post-op Diagnosis     * Primary osteoarthritis of both knees [M17.0]     Procedures  Right Knee Total Arthroplasty  88739 - [place] ARTHRP KNE CONDYLE&PLATU MEDIAL&LAT COMPARTMENTS      Surgeons      * Jasiel Hammer - Primary    Resident/Fellow/Other Assistant:  Surgeon(s) and Role:     * Asaf Paz MD - Resident - Assisting    Procedure Summary  Anesthesia: Consult  ASA: III  Anesthesia Staff: Anesthesiologist: Krissy Haley MD  C-AA: KENROY Ortiz  Estimated Blood Loss: 0mL  Intra-op Medications:   Administrations occurring from 1000 to 1230 on 24:   Medication Name Total Dose   morphine PF (Duramorph) injection 5 mg   bupivacaine PF (Marcaine) 0.5 % (5 mg/mL) injection 30 mL   sodium chloride 0.9 % irrigation solution 3,000 mL   lactated Ringer's infusion 41.67 mL              Anesthesia Record               Intraprocedure I/O Totals          Intake    LR bolus 900.00 mL    Tranexamic Acid 0.00 mL    The total shown is the total volume documented since Anesthesia Start was filed.    Propofol Drip 0.00 mL    The total shown is the total volume documented since Anesthesia Start was filed.    Total Intake 900 mL       Output    Est. Blood Loss 20 mL    Total Output 20 mL       Net    Net Volume 880 mL          Specimen: No specimens collected     Staff:   Circulator: Bianca Gaaln RN  Relief Circulator: Cookie Crooks RN  Relief Scrub: Mini Guerrero  Scrub Person: Jolanta Zuniga; Sobia Lazo         Drains and/or Catheters: * None in log *    Tourniquet Times:     Total Tourniquet Time Documented:  Thigh (Right) - 92 minutes  Total: Thigh (Right) - 92 minutes      Implants:  Implants       Type Name Action Serial No.       Implant CEMENT, GENTAMICIN, SMARSET GHV, 40 GM - [id number] Implanted      Joint Knee INSERT, ATTUNE PS FB, SZ 5, 5MM - SNA - [id number] Implanted NA     Joint Knee TIBAL BASE ATTUNE FB, SZ 5 CHANDLER - SNA - [id number] Implanted NA     Joint Knee FEMORAL, ATTUNE PS, CHANDLER, SZ 5, RT - SNA - [id number] Implanted NA     Joint Knee DOME, PATELLA, MEDIALIZED, 38MM - SNA - [id number] Implanted NA              Findings: djd    Indications: Prema Hair is an 59 y.o. female who is having surgery for Primary osteoarthritis of both knees [M17.0].     The patient was seen in the preoperative area. The risks, benefits, complications, treatment options, non-operative alternatives, expected recovery and outcomes were discussed with the patient. The possibilities of reaction to medication, pulmonary aspiration, injury to surrounding structures, bleeding, recurrent infection, the need for additional procedures, failure to diagnose a condition, and creating a complication requiring transfusion or operation were discussed with the patient. The patient concurred with the proposed plan, giving informed consent.  The site of surgery was properly noted/marked if necessary per policy. The patient has been actively warmed in preoperative area. Preoperative antibiotics have been ordered and given within 1 hours of incision. Venous thrombosis prophylaxis have been ordered including unilateral sequential compression device    Procedure Details: Preoperative huddle was performed patient identification procedure and site verification.  Patient given a spinal anesthetic with IV sedation.  Prophylactic antibiotics and TXA were given.  Patient was positioned in the supine position.  The right leg was sterilely prepped and draped in usual fashion.  Thigh tourniquet was inflated after exsanguination Esmarch bandage.  A standard medial parapatellar retinacular incision was made.  Patella was everted and the fat pad was excised.  Minor medial release was  performed for exposure.  Anterior synovectomy was performed.  Restore Flow Allografts posterior stabilized system was utilized.  The femur was cut with a 9 mm distal femoral cut 5 degrees of valgus using intramedullary guide.  The tibia was cut using extramedullary guide.  The patella was cut freehand.  Ultimately a size 5 femur was utilized a size 5 tibial baseplate and a 5 mm poly insert with a 38 mm talar dome.  All components were cemented using antibiotic impregnated cement given the patient's history of diabetes.  Components were balanced in flexion flexion and extension.  The patella tracked nicely.  Prior to closure the wound was vigorously irrigated and locally infiltrated with Marcaine and Duramorph.  Wound was closed in layers.  The skin was closed in running to running subcuticular suture.  Sterile dressings and applied  Complications:  None; patient tolerated the procedure well.    Disposition: PACU - hemodynamically stable.  Condition: stable         Additional Details:   Attending Attestation: I was present for the entire procedure.    Jasiel Hammer  Phone Number: [phone]

## 2024-03-27 NOTE — BRIEF OP NOTE
Date: 3/27/2024  OR Location: [facility] OR    Name: Prema Hair, : 1965, Age: 59 y.o., MRN: [id number], Sex: female    Diagnosis  Pre-op Diagnosis     * Primary osteoarthritis of both knees [M17.0] Post-op Diagnosis     * Primary osteoarthritis of both knees [M17.0]     Procedures  Right Knee Total Arthroplasty  69605 - [place] ARTHRP KNE CONDYLE&PLATU MEDIAL&LAT COMPARTMENTS      Surgeons      * Jasiel Hammer - Primary    Resident/Fellow/Other Assistant:  Surgeon(s) and Role:     * Asaf Paz MD - Resident - Assisting    Procedure Summary  Anesthesia: Consult  ASA: III  Anesthesia Staff: Anesthesiologist: Krissy Haley MD  C-AA: KENROY Ortiz  Estimated Blood Loss: 0mL  Intra-op Medications:   Administrations occurring from 1000 to 1230 on 24:   Medication Name Total Dose   morphine PF (Duramorph) injection 5 mg   bupivacaine PF (Marcaine) 0.5 % (5 mg/mL) injection 30 mL   sodium chloride 0.9 % irrigation solution 3,000 mL              Anesthesia Record               Intraprocedure I/O Totals          Intake    LR bolus 900.00 mL    Tranexamic Acid 0.00 mL    The total shown is the total volume documented since Anesthesia Start was filed.    Propofol Drip 0.00 mL    The total shown is the total volume documented since Anesthesia Start was filed.    Total Intake 900 mL       Output    Est. Blood Loss 20 mL    Total Output 20 mL       Net    Net Volume 880 mL          Specimen: No specimens collected     Staff:   Circulator: Bianca Galan RN  Relief Circulator: Cookie Crooks RN  Scrub Person: Jolanta Zuniga; Sobia Lazo          Findings: DJD R knee    Complications:  None; patient tolerated the procedure well.     Disposition: PACU - hemodynamically stable.  Condition: stable  Specimens Collected: No specimens collected  Attending Attestation:     Jasiel Hammer  Phone Number: [phone]

## 2024-03-27 NOTE — ANESTHESIA PREPROCEDURE EVALUATION
Patient: Prema Hair    Procedure Information       Date/Time: 03/27/24 1000    Procedure: Right Knee Total Arthroplasty (Right: Knee)    Location: [facility] OR 05 / [facility] OR    Surgeons: Jasiel Hammer MD            Relevant Problems   Cardiac   (+) Atypical chest pain   (+) DM type 2 with diabetic mixed hyperlipidemia (CMS/HCC)   (+) Hyperlipidemia   (+) Primary hypertension      Pulmonary   (+) LUONG (dyspnea on exertion)   (+) ZEINA on CPAP      Neuro   (+) Bell's palsy   (+) Bipolar disorder, current episode mixed, moderate (CMS/HCC)   (+) Carotid pseudoaneurysm (CMS/HCC)   (+) Cervical radiculopathy, chronic   (+) Diabetic peripheral neuropathy (CMS/HCC)   (+) Generalized anxiety disorder with panic attacks   (+) Intervertebral disc disorder with radiculopathy of lumbosacral region   (+) PTSD (post-traumatic stress disorder)   (+) Stroke (CMS/HCC)      GI   (+) Gastroesophageal reflux disease      /Renal   (+) Chronic UTI   (+) Hyponatremia      Endocrine   (+) Diabetic peripheral neuropathy (CMS/HCC)   (+) Goiter   (+) Malignant neoplasm of thyroid gland (CMS/HCC)   (+) Multinodular thyroid   (+) Postoperative hypothyroidism      Musculoskeletal   (+) Fibromyalgia   (+) Primary osteoarthritis of both knees      ID   (+) Chronic UTI       Clinical information reviewed:   Tobacco  Allergies  Meds   Med Hx  Surg Hx  OB Status  Fam Hx  Soc   Hx        NPO Detail:  NPO/Void Status  Carbohydrate Drink Given Prior to Surgery? : N  Date of Last Liquid: 03/27/24  Time of Last Liquid: 0600  Date of Last Solid: 03/26/24  Time of Last Solid: 2000  Last Intake Type: Clear fluids  Time of Last Void: 0600         PHYSICAL EXAM    Anesthesia Plan    History of general anesthesia?: yes  History of complications of general anesthesia?: no    ASA 3     spinal and regional     intravenous induction   Postoperative administration of opioids is intended.  Anesthetic plan and risks discussed with patient.    Plan  discussed with CAA and CRNA.

## 2024-03-27 NOTE — ANESTHESIA POSTPROCEDURE EVALUATION
Patient: Prema Hair    Procedure Summary       Date: 03/27/24 Room / Location: [facility] A OR 05 / [facility] A OR    Anesthesia Start: 0954 Anesthesia Stop: 1209    Procedure: Right Knee Total Arthroplasty (Right: Knee) Diagnosis:       Primary osteoarthritis of both knees      (Primary osteoarthritis of both knees [M17.0])    Surgeons: Jasiel Hammer MD Responsible Provider: Krissy Haley MD    Anesthesia Type: spinal, regional ASA Status: 3            Anesthesia Type: spinal, regional    Vitals Value Taken Time   BP 96/46 03/27/24 1247   Temp 37.1 °C (98.8 °F) 03/27/24 1245   Pulse 55 03/27/24 1253   Resp 12 03/27/24 1245   SpO2 100 % 03/27/24 1253   Vitals shown include unvalidated device data.    Anesthesia Post Evaluation    Patient location during evaluation: PACU  Patient participation: complete - patient participated  Level of consciousness: awake and alert  Pain score: 1  Pain management: adequate  Airway patency: patent  Cardiovascular status: acceptable  Respiratory status: acceptable  Hydration status: acceptable  Postoperative Nausea and Vomiting: none        No notable events documented.

## 2024-03-27 NOTE — ANESTHESIA PREPROCEDURE EVALUATION
Patient: Prema Hair    Procedure Information       Anesthesia Start Date/Time: 03/27/24 0954    Procedure: Right Knee Total Arthroplasty (Right: Knee)    Location: [facility] A OR 05 / [facility] A OR    Surgeons: Jasiel Hammer MD            Relevant Problems   Cardiac   (+) Atypical chest pain   (+) DM type 2 with diabetic mixed hyperlipidemia (CMS/HCC)   (+) Hyperlipidemia   (+) Primary hypertension      Pulmonary   (+) LUONG (dyspnea on exertion)   (+) ZEINA on CPAP      Neuro   (+) Bell's palsy   (+) Bipolar disorder, current episode mixed, moderate (CMS/HCC)   (+) Carotid pseudoaneurysm (CMS/HCC)   (+) Cervical radiculopathy, chronic   (+) Diabetic peripheral neuropathy (CMS/HCC)   (+) Generalized anxiety disorder with panic attacks   (+) Intervertebral disc disorder with radiculopathy of lumbosacral region   (+) PTSD (post-traumatic stress disorder)   (+) Stroke (CMS/HCC)      GI   (+) Gastroesophageal reflux disease      /Renal   (+) Chronic UTI   (+) Hyponatremia      Endocrine   (+) Diabetic peripheral neuropathy (CMS/HCC)   (+) Goiter   (+) Malignant neoplasm of thyroid gland (CMS/HCC)   (+) Multinodular thyroid   (+) Postoperative hypothyroidism      Musculoskeletal   (+) Fibromyalgia   (+) Primary osteoarthritis of both knees      ID   (+) Chronic UTI       Clinical information reviewed:   Tobacco  Allergies  Meds   Med Hx  Surg Hx  OB Status  Fam Hx  Soc   Hx        NPO Detail:  NPO/Void Status  Carbohydrate Drink Given Prior to Surgery? : N  Date of Last Liquid: 03/27/24  Time of Last Liquid: 0600  Date of Last Solid: 03/26/24  Time of Last Solid: 2000  Last Intake Type: Clear fluids  Time of Last Void: 0600         Physical Exam    Airway  Mallampati: IV  TM distance: >3 FB  Neck ROM: limited     Cardiovascular    Dental    Pulmonary    Abdominal            Anesthesia Plan    History of general anesthesia?: yes  History of complications of general anesthesia?: no    ASA 3     spinal and  regional       Plan discussed with attending and CAA.

## 2024-03-27 NOTE — PROGRESS NOTES
"Prema Hair is a 59 y.o. female on day 0 of admission presenting with Primary osteoarthritis of both knees.    Subjective   Pt evaluated in PACU. Pain controlled. Denies n/t, cp, sob.     ROS: complete ROS reviewed and negative    Objective     Constitutional: Comfortable, NAD  HEENT: hearing and vision grossly intact, MMM  Resp: breathing comfortably   CV: extremities warm, well perfused  GI: abd soft  Neuro: AAOx3, sensory and motor grossly intact  Psych: Appropriate mood and affect  MSK: RLE  - dressing cdi  - motor/sensation intact munguia/sa/tib/dp  - palpable DP pulse  - compartments soft    Last Recorded Vitals  Blood pressure (!) 114/97, pulse 60, temperature 36.3 °C (97.3 °F), temperature source Tympanic, resp. rate 18, height 1.702 m (5' 7\"), weight 100 kg (221 lb 1.9 oz), SpO2 94 %.  Intake/Output last 3 Shifts:  No intake/output data recorded.    Relevant Results      Scheduled medications  ceFAZolin, 2 g, intravenous, Once  ceFAZolin, 2 g, intravenous, q6h  lactated Ringer's, 500 mL, intravenous, Once  lidocaine, 0.1 mL, subcutaneous, Once  meloxicam, 7.5 mg, oral, Once  pregabalin, 75 mg, oral, Once      Continuous medications  lactated Ringer's, 100 mL/hr, Last Rate: 100 mL/hr (03/27/24 0920)  lactated Ringer's, 100 mL/hr, Last Rate: 100 mL/hr (03/27/24 1205)      PRN medications  PRN medications: acetaminophen, alum-mag hydroxide-simeth, benzocaine-menthol, cyclobenzaprine, HYDROmorphone, HYDROmorphone, HYDROmorphone, naloxone, ondansetron, promethazine  Results for orders placed or performed during the hospital encounter of 03/27/24 (from the past 24 hour(s))   POCT GLUCOSE   Result Value Ref Range    POCT Glucose 72 (L) 74 - 99 mg/dL   POCT GLUCOSE   Result Value Ref Range    POCT Glucose 96 74 - 99 mg/dL                            Assessment/Plan   Principal Problem:    Primary osteoarthritis of both knees    Patient is s/p R TKA on 3/27, doing well.    Plan:  - Weight bearing: WBAT  - DVT ppx: " SCDs. ASA 81 BID  - Diet: Clear liquid diet. advanced as tolerated  - Pain protocol  - Antibiotics: perioperative abx x 3  - FEN: Continue NS at 100cc/hr; HLIV with good PO intake  - Bowel Regimen  - PT/OT consult  - Pulm: Encourage IS  - Continue home medications    Dispo: anticipate same day DC pending PT             Asaf Paz MD

## 2024-03-27 NOTE — PROGRESS NOTES
Physical Therapy    Physical Therapy Evaluation & Treatment    Patient Name: Prema Hair  MRN: [id number]  Today's Date: 3/27/2024   Time Calculation  Start Time: 1448  Stop Time: 1535  Time Calculation (min): 47 min    Assessment/Plan   PT Assessment  PT Assessment Results: Decreased strength, Decreased range of motion, Decreased endurance, Impaired balance, Decreased mobility, Orthopedic restrictions, Pain  Rehab Prognosis: Good  End of Session Communication: Bedside nurse  Assessment Comment: Pt is POD #0, s/p L TKA & is WBAT.  The pt presents with diminished safety & independence regarding bed mobility, transfers, and gait.  The patient is also unable to ascend/descend stairs and requires cueing in regard to TKA precautions.  Contributing to these impairments are post-op pain, decreased L knee ROM & strength.  The patient would benefit from continued PT to address the above functional limitations & improve independence/safety.  Anticipate low frequency PT needs, at discharge  End of Session Patient Position: Up in chair, Alarm off, not on at start of session (in PACU)   IP OR SWING BED PT PLAN  Inpatient or Swing Bed: Inpatient  PT Plan  Treatment/Interventions: Bed mobility, Transfer training, Gait training, Stair training, Balance training, Strengthening, Endurance training, Range of motion, Therapeutic exercise, Therapeutic activity, Home exercise program  PT Plan: Skilled PT  PT Frequency: BID  PT Discharge Recommendations: Low intensity level of continued care  PT Recommended Transfer Status: Assist x1  PT - OK to Discharge: Yes (PT POC established.)      Subjective     General Visit Information:  General  Reason for Referral: R TKA  Referred By: Asaf Paz MD  Past Medical History Relevant to Rehab:   Past Medical History:   Diagnosis Date    CATALINA positive     Arthritis     Asthma     Bell's palsy     x 2    Bilateral leg edema     Bipolar disorder (CMS/HCC)     CHF (congestive heart failure)  (CMS/HCC)     Chronic allergic rhinitis     Chronic constipation     Diabetes mellitus (CMS/HCC)     Diabetic neuropathy (CMS/HCC)     Dysphonia 07/05/2022    Muscle tension dysphonia    Fibromyalgia     GERD (gastroesophageal reflux disease)     under control with medication    High pulmonary arterial pressure (CMS/HCC)     moderately elevated on ECHO 10.5.23    Hyperlipidemia, unspecified 01/03/2023    Dyslipidemia    Hypertension     Hypokalemia     1.1.24- K 3.8 - on oral supplements    Hypothyroidism     Low back pain, unspecified 02/22/2021    Low back pain    Moderate tricuspid regurgitation     Obstructive sleep apnea (adult) (pediatric) 01/03/2023    ZEINA on CPAP    Paralysis of vocal cords and larynx, 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    Laryngeal paresis    Parkinsonism     Pseudoaneurysm of carotid artery (CMS/HCC) 2020    s/p right pipeline and coil embolization of Right ICA pseudoaneurysm    PTSD (post-traumatic stress disorder)     Thyroid cancer (CMS/HCC)     s/p total thyroidectomy, residual tissue on left side - FNA benign 2020 and 2021    Torticollis     botox injections    Vitamin D deficiency        Prior to Session Communication: Bedside nurse  Patient Position Received: On cart, Alarm off, not on at start of session  General Comment: Pt pleasant and agreeable to eval. Pt's daughters present for session.  Home Living:  Home Living  Type of Home: Apartment  Lives With: Alone  Home Adaptive Equipment: Walker rolling or standard, Cane (rollator, toilet riser)  Home Layout: One level  Home Access:  (threshold entrance)  Prior Level of Function:  Prior Function Per Pt/Caregiver Report  Level of [place]: Needs assistance with ADLs, Needs assistance with homemaking  Receives Help From:  (Daughters will be staying with patient to assist)  ADL Assistance: Needs assistance  Homemaking Assistance: Needs assistance  Ambulatory Assistance: Needs assistance  Prior Function Comments: Reports 6 falls in the past 2  months. Does not drive.  Precautions:  Precautions  LE Weight Bearing Status: Weight Bearing as Tolerated  Medical Precautions: Fall precautions  Post-Surgical Precautions: Right total knee precautions  Vital Signs:  Vital Signs  BP: (!) 129/100 (Sitting 133/88)    Objective   Pain:  Pain Assessment  Pain Assessment: 0-10  Pain Score: 5 - Moderate pain  Pain Location:  (R knee)  Cognition:  Cognition  Overall Cognitive Status: Within Functional Limits (Lethargic, easily arousable to voice/light touch)  Orientation Level: Oriented X4    General Assessments:  Activity Tolerance  Endurance: Tolerates 30 min exercise with multiple rests    Sensation  Light Touch: No apparent deficits    Postural Control  Postural Control: Within Functional Limits    Static Sitting Balance  Static Sitting-Balance Support: Feet supported, Bilateral upper extremity supported  Static Sitting-Level of Assistance: Contact guard  Dynamic Sitting Balance  Dynamic Sitting-Balance Support: Feet supported, Bilateral upper extremity supported  Dynamic Sitting-Comments: CGA-min A due to retro leaning.    Static Standing Balance  Static Standing-Balance Support: Bilateral upper extremity supported  Static Standing-Level of Assistance: Contact guard  Dynamic Standing Balance  Dynamic Standing-Balance Support: Bilateral upper extremity supported  Dynamic Standing-Comments: CGA-min A  Functional Assessments:  Bed Mobility  Bed Mobility: Yes  Bed Mobility 1  Bed Mobility 1: Supine to sitting  Level of Assistance 1: Close supervision  Bed Mobility Comments 1: Increased time and effort to complete.    Transfers  Transfer: Yes  Transfer 1  Technique 1: Sit to stand, Stand to sit  Transfer Device 1: Walker  Transfer Level of Assistance 1: Minimum assistance  Trials/Comments 1: Cues for hand placement and to back up fully prior to sitting.    Ambulation/Gait Training  Ambulation/Gait Training Performed: Yes  Ambulation/Gait Training 1  Surface 1: Level  tile  Device 1: Rolling walker  Assistance 1: Minimum assistance  Comments/Distance (ft) 1: 35 ft. Pt ambulates with decreased toshia and step length. Pt moderately unsteady with assist increasing as patient fatigues. Decreased TKE kathleen. Moderate swaying.  Extremity/Trunk Assessments:  RUE   RUE : Within Functional Limits  LUE   LUE: Within Functional Limits  RLE   RLE :  (R knee 7-72 degrees. Strength grossly 3/5 based on observation of functional mobility.)  LLE   LLE : Within Functional Limits  Treatments:  Therapeutic Exercise  Therapeutic Exercise Performed: Yes  Therapeutic Exercise Activity 1: Pt completes each of the following x15 reps in order to improve strength and endurance: ankle pumps, glute sets, quad sets, heel slides (x10), SAQs, LAQs, seated knee flexion. Cues for proper form and technique.    Therapeutic Activity  Therapeutic Activity Performed: Yes  Therapeutic Activity 1: Pt educated on proper transfer technique including hand placement, foot placement, scooting to the EOB and anterior weight shifting. Cues for gait training including walker placement and sequencing. Educated on WB status and TKA precautions.  Outcome Measures:  [facility] Basic Mobility  Turning from your back to your side while in a flat bed without using bedrails: A little  Moving from lying on your back to sitting on the side of a flat bed without using bedrails: A little  Moving to and from bed to chair (including a wheelchair): A little  Standing up from a chair using your arms (e.g. wheelchair or bedside chair): A little  To walk in hospital room: A little  Climbing 3-5 steps with railing: A lot  Basic Mobility - Total Score: 17    Encounter Problems       Encounter Problems (Active)       Mobility       STG - Patient will ambulate 150 ft mod I with a RW.        Start:  03/27/24    Expected End:  04/10/24            STG - Patient will ascend and descend 3 stairs using unilateral HR and LRAD.        Start:  03/27/24    Expected  End:  04/10/24            Pt will complete 15 reps of each LE exercise independently in order to improve strength and endurance.        Start:  03/27/24    Expected End:  04/10/24               PT Transfers       STG - Patient will perform bed mobility independently.        Start:  03/27/24    Expected End:  04/10/24            STG - Patient will transfer sit to and from stand mod I with a RW.        Start:  03/27/24    Expected End:  04/10/24            STG - Patient will perform car transfer mod I.        Start:  03/27/24    Expected End:  04/10/24                   Education Documentation  Handouts, taught by Zuly Montgomery, PT at 3/27/2024  4:14 PM.  Learner: Patient  Readiness: Acceptance  Method: Explanation  Response: Verbalizes Understanding    Precautions, taught by Zuly Montgomery PT at 3/27/2024  4:14 PM.  Learner: Patient  Readiness: Acceptance  Method: Explanation  Response: Verbalizes Understanding    Home Exercise Program, taught by Zuly Montgomery, PT at 3/27/2024  4:14 PM.  Learner: Patient  Readiness: Acceptance  Method: Explanation  Response: Verbalizes Understanding    Body Mechanics, taught by Zuly Montgomery, PT at 3/27/2024  4:14 PM.  Learner: Patient  Readiness: Acceptance  Method: Explanation  Response: Verbalizes Understanding    Mobility Training, taught by Zuly Montgomery, PT at 3/27/2024  4:14 PM.  Learner: Patient  Readiness: Acceptance  Method: Explanation  Response: Verbalizes Understanding    Education Comments  No comments found.

## 2024-03-28 ENCOUNTER — APPOINTMENT (OUTPATIENT)
Dept: CARDIOLOGY | Facility: HOSPITAL | Age: 59
End: 2024-03-28
Payer: MEDICARE

## 2024-03-28 ENCOUNTER — PHARMACY VISIT (OUTPATIENT)
Dept: PHARMACY | Facility: CLINIC | Age: 59
End: 2024-03-28
Payer: MEDICARE

## 2024-03-28 VITALS
BODY MASS INDEX: 34.71 KG/M2 | RESPIRATION RATE: 15 BRPM | OXYGEN SATURATION: 99 % | SYSTOLIC BLOOD PRESSURE: 111 MMHG | WEIGHT: 221.12 LBS | HEIGHT: 67 IN | DIASTOLIC BLOOD PRESSURE: 72 MMHG | TEMPERATURE: 98.2 F | HEART RATE: 87 BPM

## 2024-03-28 LAB
ANION GAP SERPL CALC-SCNC: 12 MMOL/L (ref 10–20)
BUN SERPL-MCNC: 15 MG/DL (ref 6–23)
CALCIUM SERPL-MCNC: 8.4 MG/DL (ref 8.6–10.3)
CHLORIDE SERPL-SCNC: 104 MMOL/L (ref 98–107)
CO2 SERPL-SCNC: 28 MMOL/L (ref 21–32)
CREAT SERPL-MCNC: 0.66 MG/DL (ref 0.5–1.05)
EGFRCR SERPLBLD CKD-EPI 2021: >90 ML/MIN/1.73M*2
ERYTHROCYTE [DISTWIDTH] IN BLOOD BY AUTOMATED COUNT: 16.2 % (ref 11.5–14.5)
GLUCOSE SERPL-MCNC: 105 MG/DL (ref 74–99)
HCT VFR BLD AUTO: 40.6 % (ref 36–46)
HGB BLD-MCNC: 11.6 G/DL (ref 12–16)
MCH RBC QN AUTO: 27.7 PG (ref 26–34)
MCHC RBC AUTO-ENTMCNC: 28.6 G/DL (ref 32–36)
MCV RBC AUTO: 97 FL (ref 80–100)
NRBC BLD-RTO: 0 /100 WBCS (ref 0–0)
PLATELET # BLD AUTO: 157 X10*3/UL (ref 150–450)
POTASSIUM SERPL-SCNC: 4.2 MMOL/L (ref 3.5–5.3)
RBC # BLD AUTO: 4.19 X10*6/UL (ref 4–5.2)
SODIUM SERPL-SCNC: 140 MMOL/L (ref 136–145)
WBC # BLD AUTO: 7.5 X10*3/UL (ref 4.4–11.3)

## 2024-03-28 PROCEDURE — 2500000001 HC RX 250 WO HCPCS SELF ADMINISTERED DRUGS (ALT 637 FOR MEDICARE OP)

## 2024-03-28 PROCEDURE — 2500000002 HC RX 250 W HCPCS SELF ADMINISTERED DRUGS (ALT 637 FOR MEDICARE OP, ALT 636 FOR OP/ED): Performed by: ORTHOPAEDIC SURGERY

## 2024-03-28 PROCEDURE — 82374 ASSAY BLOOD CARBON DIOXIDE: CPT

## 2024-03-28 PROCEDURE — 2500000002 HC RX 250 W HCPCS SELF ADMINISTERED DRUGS (ALT 637 FOR MEDICARE OP, ALT 636 FOR OP/ED): Mod: MUE | Performed by: PHARMACIST

## 2024-03-28 PROCEDURE — 93005 ELECTROCARDIOGRAM TRACING: CPT

## 2024-03-28 PROCEDURE — 2500000002 HC RX 250 W HCPCS SELF ADMINISTERED DRUGS (ALT 637 FOR MEDICARE OP, ALT 636 FOR OP/ED): Mod: MUE | Performed by: ORTHOPAEDIC SURGERY

## 2024-03-28 PROCEDURE — 85027 COMPLETE CBC AUTOMATED: CPT

## 2024-03-28 PROCEDURE — 7100000011 HC EXTENDED STAY RECOVERY HOURLY - NURSING UNIT

## 2024-03-28 PROCEDURE — 2500000002 HC RX 250 W HCPCS SELF ADMINISTERED DRUGS (ALT 637 FOR MEDICARE OP, ALT 636 FOR OP/ED)

## 2024-03-28 PROCEDURE — 97116 GAIT TRAINING THERAPY: CPT | Mod: GP,CQ

## 2024-03-28 PROCEDURE — 94640 AIRWAY INHALATION TREATMENT: CPT

## 2024-03-28 PROCEDURE — 2500000001 HC RX 250 WO HCPCS SELF ADMINISTERED DRUGS (ALT 637 FOR MEDICARE OP): Performed by: PHARMACIST

## 2024-03-28 PROCEDURE — 2500000002 HC RX 250 W HCPCS SELF ADMINISTERED DRUGS (ALT 637 FOR MEDICARE OP, ALT 636 FOR OP/ED): Performed by: PHARMACIST

## 2024-03-28 PROCEDURE — 2500000002 HC RX 250 W HCPCS SELF ADMINISTERED DRUGS (ALT 637 FOR MEDICARE OP, ALT 636 FOR OP/ED): Mod: MUE

## 2024-03-28 PROCEDURE — 2500000004 HC RX 250 GENERAL PHARMACY W/ HCPCS (ALT 636 FOR OP/ED)

## 2024-03-28 PROCEDURE — 9420000001 HC RT PATIENT EDUCATION 5 MIN

## 2024-03-28 PROCEDURE — 99232 SBSQ HOSP IP/OBS MODERATE 35: CPT | Performed by: STUDENT IN AN ORGANIZED HEALTH CARE EDUCATION/TRAINING PROGRAM

## 2024-03-28 PROCEDURE — 94660 CPAP INITIATION&MGMT: CPT

## 2024-03-28 PROCEDURE — 36415 COLL VENOUS BLD VENIPUNCTURE: CPT

## 2024-03-28 PROCEDURE — 97110 THERAPEUTIC EXERCISES: CPT | Mod: GP,CQ

## 2024-03-28 PROCEDURE — 97530 THERAPEUTIC ACTIVITIES: CPT | Mod: GP,CQ

## 2024-03-28 RX ORDER — BUMETANIDE 2 MG/1
2 TABLET ORAL 2 TIMES DAILY
COMMUNITY
Start: 2024-03-28

## 2024-03-28 RX ORDER — IPRATROPIUM BROMIDE AND ALBUTEROL SULFATE 2.5; .5 MG/3ML; MG/3ML
3 SOLUTION RESPIRATORY (INHALATION)
Status: DISCONTINUED | OUTPATIENT
Start: 2024-03-28 | End: 2024-03-28 | Stop reason: HOSPADM

## 2024-03-28 RX ADMIN — OXYCODONE HYDROCHLORIDE 10 MG: 5 TABLET ORAL at 09:42

## 2024-03-28 RX ADMIN — BUDESONIDE INHALATION 0.5 MG: 0.5 SUSPENSION RESPIRATORY (INHALATION) at 07:09

## 2024-03-28 RX ADMIN — CARVEDILOL 25 MG: 25 TABLET, FILM COATED ORAL at 08:55

## 2024-03-28 RX ADMIN — ASPIRIN 81 MG: 81 TABLET, COATED ORAL at 08:54

## 2024-03-28 RX ADMIN — CARVEDILOL 25 MG: 25 TABLET, FILM COATED ORAL at 17:51

## 2024-03-28 RX ADMIN — FORMOTEROL FUMARATE DIHYDRATE 20 MCG: 20 SOLUTION RESPIRATORY (INHALATION) at 19:04

## 2024-03-28 RX ADMIN — DOCUSATE SODIUM 100 MG: 100 CAPSULE, LIQUID FILLED ORAL at 09:00

## 2024-03-28 RX ADMIN — TIZANIDINE 4 MG: 4 TABLET ORAL at 15:48

## 2024-03-28 RX ADMIN — BUMETANIDE 2 MG: 1 TABLET ORAL at 17:51

## 2024-03-28 RX ADMIN — BACLOFEN 20 MG: 10 TABLET ORAL at 15:48

## 2024-03-28 RX ADMIN — CARBIDOPA AND LEVODOPA 1.5 TABLET: 25; 100 TABLET ORAL at 08:54

## 2024-03-28 RX ADMIN — TIZANIDINE 4 MG: 4 TABLET ORAL at 08:54

## 2024-03-28 RX ADMIN — GABAPENTIN 900 MG: 300 CAPSULE ORAL at 09:01

## 2024-03-28 RX ADMIN — SERTRALINE HYDROCHLORIDE 200 MG: 50 TABLET ORAL at 08:54

## 2024-03-28 RX ADMIN — BUDESONIDE INHALATION 0.5 MG: 0.5 SUSPENSION RESPIRATORY (INHALATION) at 19:04

## 2024-03-28 RX ADMIN — IPRATROPIUM BROMIDE AND ALBUTEROL SULFATE 3 ML: 2.5; .5 SOLUTION RESPIRATORY (INHALATION) at 07:09

## 2024-03-28 RX ADMIN — LEVOTHYROXINE SODIUM 100 MCG: 100 TABLET ORAL at 06:28

## 2024-03-28 RX ADMIN — CEFAZOLIN SODIUM 2 G: 2 INJECTION, SOLUTION INTRAVENOUS at 03:05

## 2024-03-28 RX ADMIN — IPRATROPIUM BROMIDE AND ALBUTEROL SULFATE 3 ML: 2.5; .5 SOLUTION RESPIRATORY (INHALATION) at 19:04

## 2024-03-28 RX ADMIN — TAMSULOSIN HYDROCHLORIDE 0.4 MG: 0.4 CAPSULE ORAL at 08:54

## 2024-03-28 RX ADMIN — BUMETANIDE 2 MG: 1 TABLET ORAL at 09:07

## 2024-03-28 RX ADMIN — OXYCODONE HYDROCHLORIDE 10 MG: 5 TABLET ORAL at 13:37

## 2024-03-28 RX ADMIN — CYCLOBENZAPRINE HYDROCHLORIDE 5 MG: 5 TABLET, FILM COATED ORAL at 08:54

## 2024-03-28 RX ADMIN — POTASSIUM CHLORIDE 40 MEQ: 1500 TABLET, EXTENDED RELEASE ORAL at 08:55

## 2024-03-28 RX ADMIN — OXCARBAZEPINE 600 MG: 300 TABLET, FILM COATED ORAL at 08:58

## 2024-03-28 RX ADMIN — GABAPENTIN 900 MG: 300 CAPSULE ORAL at 15:48

## 2024-03-28 RX ADMIN — TIOTROPIUM BROMIDE INHALATION SPRAY 2 PUFF: 3.12 SPRAY, METERED RESPIRATORY (INHALATION) at 07:13

## 2024-03-28 RX ADMIN — PANTOPRAZOLE SODIUM 40 MG: 40 TABLET, DELAYED RELEASE ORAL at 06:28

## 2024-03-28 RX ADMIN — TOPIRAMATE 200 MG: 100 TABLET, FILM COATED ORAL at 08:55

## 2024-03-28 RX ADMIN — POLYETHYLENE GLYCOL 3350 17 G: 17 POWDER, FOR SOLUTION ORAL at 08:53

## 2024-03-28 RX ADMIN — HYDROMORPHONE HYDROCHLORIDE 1 MG: 1 INJECTION, SOLUTION INTRAMUSCULAR; INTRAVENOUS; SUBCUTANEOUS at 15:52

## 2024-03-28 RX ADMIN — FORMOTEROL FUMARATE DIHYDRATE 20 MCG: 20 SOLUTION RESPIRATORY (INHALATION) at 07:09

## 2024-03-28 RX ADMIN — BACLOFEN 20 MG: 10 TABLET ORAL at 08:55

## 2024-03-28 RX ADMIN — CLONAZEPAM 1 MG: 1 TABLET ORAL at 08:55

## 2024-03-28 RX ADMIN — CARBIDOPA AND LEVODOPA 1.5 TABLET: 25; 100 TABLET ORAL at 15:47

## 2024-03-28 RX ADMIN — CEFAZOLIN SODIUM 2 G: 2 INJECTION, SOLUTION INTRAVENOUS at 08:54

## 2024-03-28 ASSESSMENT — PAIN SCALES - GENERAL
PAINLEVEL_OUTOF10: 5 - MODERATE PAIN
PAINLEVEL_OUTOF10: 10 - WORST POSSIBLE PAIN
PAINLEVEL_OUTOF10: 9
PAINLEVEL_OUTOF10: 10 - WORST POSSIBLE PAIN
PAINLEVEL_OUTOF10: 6
PAINLEVEL_OUTOF10: 10 - WORST POSSIBLE PAIN

## 2024-03-28 ASSESSMENT — COGNITIVE AND FUNCTIONAL STATUS - GENERAL
WALKING IN HOSPITAL ROOM: A LITTLE
TURNING FROM BACK TO SIDE WHILE IN FLAT BAD: A LITTLE
DRESSING REGULAR LOWER BODY CLOTHING: A LITTLE
MOVING FROM LYING ON BACK TO SITTING ON SIDE OF FLAT BED WITH BEDRAILS: A LITTLE
CLIMB 3 TO 5 STEPS WITH RAILING: A LOT
HELP NEEDED FOR BATHING: A LITTLE
STANDING UP FROM CHAIR USING ARMS: A LITTLE
MOVING TO AND FROM BED TO CHAIR: A LITTLE
STANDING UP FROM CHAIR USING ARMS: A LOT
WALKING IN HOSPITAL ROOM: A LITTLE
MOVING TO AND FROM BED TO CHAIR: A LITTLE
MOVING TO AND FROM BED TO CHAIR: A LITTLE
STANDING UP FROM CHAIR USING ARMS: A LITTLE
WALKING IN HOSPITAL ROOM: A LITTLE
CLIMB 3 TO 5 STEPS WITH RAILING: A LOT
MOVING FROM LYING ON BACK TO SITTING ON SIDE OF FLAT BED WITH BEDRAILS: A LITTLE
CLIMB 3 TO 5 STEPS WITH RAILING: A LOT
PERSONAL GROOMING: A LITTLE
MOBILITY SCORE: 17
TURNING FROM BACK TO SIDE WHILE IN FLAT BAD: A LITTLE
TOILETING: A LITTLE
MOBILITY SCORE: 17
MOBILITY SCORE: 16
DRESSING REGULAR UPPER BODY CLOTHING: A LITTLE
DAILY ACTIVITIY SCORE: 19
MOVING FROM LYING ON BACK TO SITTING ON SIDE OF FLAT BED WITH BEDRAILS: A LITTLE
TURNING FROM BACK TO SIDE WHILE IN FLAT BAD: A LITTLE

## 2024-03-28 ASSESSMENT — PAIN - FUNCTIONAL ASSESSMENT
PAIN_FUNCTIONAL_ASSESSMENT: 0-10

## 2024-03-28 ASSESSMENT — PAIN SCALES - WONG BAKER: WONGBAKER_NUMERICALRESPONSE: HURTS WORST

## 2024-03-28 ASSESSMENT — ACTIVITIES OF DAILY LIVING (ADL): LACK_OF_TRANSPORTATION: NO

## 2024-03-28 NOTE — SIGNIFICANT EVENT
Pt placed on bipap 20/10 set rate 14. Tried placing pt on autocpap [id number] per pt's home settings . However, pt went apneic multiple times and pt desat to 87%. RT attempted to start pt on lower bipap settings 10/5, 15/8 , however lower tidal volume and minute ventilation observed.  With the current bipap settings pt satting 97%. RN aware and message sent to Dr. Hammer and Dr. Paz and inform them about the changes.    03/28/24 0000   Non-Invasive Ventilation   Mode - Non-Invasive BiPAP   Mask Type Face mask   Mask Size B   NIV ID [id number]   NIV ON/OFF On   Skin Integrity Checked Yes   Inspiratory Positive Airway Pressure (IPAP) (cmH20) 20 cmH20   Expiratory Positive Airway Pressure (EPAP) (cmH20) 10 cmH20   Estimated Leak (L/m) 20 L/m   $ BiPAP/CPAP Charge BiPAP   Readings   Resp 14   Tidal Volume Spontaneous (mL)  434 mL   Minute Ventilation (L/min) 6.4 L/min   Alarms   High Respiratory Rate (breaths/min) 50 breaths/min   MV Low (L) 1 L   Alarm Volume audible   Disconnect Verification Performed yes

## 2024-03-28 NOTE — PROGRESS NOTES
Physical Therapy                 Therapy Communication Note    Patient Name: Prema Hair  MRN: [id number]  Today's Date: 3/28/2024     Discipline: Physical Therapy    Missed Visit Reason: Missed Visit Reason:  (pt eating lunch at this time, RN aware)    Missed Time: Attempt    Comment:

## 2024-03-28 NOTE — PROGRESS NOTES
Physical Therapy    Physical Therapy Treatment    Patient Name: Prema Hair  MRN: [id number]  Today's Date: 3/28/2024  Time Calculation  Start Time: 1415  Stop Time: 1448  Time Calculation (min): 33 min       Assessment/Plan   PT Assessment  End of Session Communication: Bedside nurse  End of Session Patient Position: Bed, 3 rail up, Alarm on  PT Plan  Inpatient/Swing Bed or Outpatient: Inpatient  PT Plan  Treatment/Interventions: Bed mobility, Transfer training, Gait training, Strengthening  PT Plan: Skilled PT  PT Frequency: BID  PT Discharge Recommendations: Low intensity level of continued care  PT Recommended Transfer Status: Assist x1 (mod assist)  PT - OK to Discharge:  (per POC)      General Visit Information:   PT  Visit  PT Received On: 03/28/24  General  Reason for Referral: R TKA  Referred By: Asaf Paz MD  Prior to Session Communication: Bedside nurse  Patient Position Received: Bed, 3 rail up, Alarm on  General Comment: pt req more time for tx, fatigue    Subjective   Precautions:  Precautions  LE Weight Bearing Status: Weight Bearing as Tolerated  Post-Surgical Precautions: Right total knee precautions  Vital Signs:  Vital Signs  Heart Rate:  (83-84 bpm)  SpO2:  (96%)    Objective   Pain:  Pain Assessment  Pain Assessment: 0-10  Pain Score: 5 - Moderate pain  Pain Type: Surgical pain  Pain Location: Knee  Pain Orientation: Right  Pain Interventions: Medication (See MAR)  Cognition:     Postural Control:     Extremity/Trunk Assessments:    Activity Tolerance:     Treatments:  Therapeutic Exercise  Therapeutic Exercise Performed: Yes (pt performed EOB knee flex 15x, LAQ 15x, vc/tc req for corrections)    Bed Mobility  Bed Mobility: Yes  Bed Mobility 1  Level of Assistance 1: Contact guard, Close supervision  Bed Mobility Comments 1:  (HOB elevated, pt slowly performed supine<>sit CGA/close SUP)    Ambulation/Gait Training  Ambulation/Gait Training Performed: Yes  Ambulation/Gait Training  1  Surface 1: Level tile  Device 1: Rolling walker  Gait Support Devices: Gait belt  Assistance 1: Contact guard  Quality of Gait 1: Antalgic, Decreased step length, Forward flexed posture  Comments/Distance (ft) 1: 30x1, pt req vc for hand placement, keeping AD closer, and posture correction, poor walker management bumping into objects    Transfers  Transfer: Yes  Transfer 1  Technique 1: Sit to stand, Stand to sit  Transfer Device 1: Walker, Gait belt  Transfer Level of Assistance 1: Moderate assistance  Trials/Comments 1: pt req vc for hand placement on bed and not RW    Outcome Measures:  [facility] Basic Mobility  Turning from your back to your side while in a flat bed without using bedrails: A little  Moving from lying on your back to sitting on the side of a flat bed without using bedrails: A little  Moving to and from bed to chair (including a wheelchair): A little  Standing up from a chair using your arms (e.g. wheelchair or bedside chair): A little  To walk in hospital room: A little  Climbing 3-5 steps with railing: A lot  Basic Mobility - Total Score: 17    Participated in performance of tx and documentation under the direct supervision of CI, student Sushila HEARD     Education Documentation  Handouts, taught by JOE Woody at 3/28/2024  3:27 PM.  Learner: Patient  Readiness: Acceptance  Method: Explanation  Response: Verbalizes Understanding    Precautions, taught by JOE Woody at 3/28/2024  3:27 PM.  Learner: Patient  Readiness: Acceptance  Method: Explanation  Response: Verbalizes Understanding    Home Exercise Program, taught by JOE Woody at 3/28/2024  3:27 PM.  Learner: Patient  Readiness: Acceptance  Method: Explanation  Response: Verbalizes Understanding    Body Mechanics, taught by JOE Woody at 3/28/2024  3:27 PM.  Learner: Patient  Readiness: Acceptance  Method: Explanation  Response: Verbalizes Understanding    Mobility Training, taught by  Sushila Dawson, S-PTA at 3/28/2024  3:27 PM.  Learner: Patient  Readiness: Acceptance  Method: Explanation  Response: Verbalizes Understanding    Education Comments  No comments found.        OP EDUCATION:       Encounter Problems       Encounter Problems (Active)       Mobility       STG - Patient will ambulate 150 ft mod I with a RW.  (Progressing)       Start:  03/27/24    Expected End:  04/10/24            STG - Patient will ascend and descend 3 stairs using unilateral HR and LRAD.  (Not Progressing)       Start:  03/27/24    Expected End:  04/10/24            Pt will complete 15 reps of each LE exercise independently in order to improve strength and endurance.  (Progressing)       Start:  03/27/24    Expected End:  04/10/24               PT Transfers       STG - Patient will perform bed mobility independently.  (Progressing)       Start:  03/27/24    Expected End:  04/10/24            STG - Patient will transfer sit to and from stand mod I with a RW.  (Progressing)       Start:  03/27/24    Expected End:  04/10/24            STG - Patient will perform car transfer mod I.  (Progressing)       Start:  03/27/24    Expected End:  04/10/24

## 2024-03-28 NOTE — DISCHARGE SUMMARY
Discharge Diagnosis  Primary osteoarthritis of both knees    Issues Requiring Follow-Up  S/P right knee replacement    Test Results Pending At Discharge  Pending Labs       No current pending labs.            Hospital Course   Briefly, the patient is a 59 year-old female with past Hx of osteoarthritis of right knee.  Pt is now S/P right knee replacement       HOSPITAL COURSE: The patient underwent right TKA on 3/27/24 which patient tolerated well and remained stable in the postoperative period. Patient was ordered oral and intravenous narcotics for pain control.  On day of discharge patient was tolerating a diet well, afebrile with stable vital signs and lab values, and had adequate pain control. The wound was clean and dry. Patient was instructed to follow up in the office within 2 weeks and was given prescription for oxycodone for pain. Aspirin 81 mg twice daily was prescribed and compression stockings were provided for DVT prophylaxis. Colace was prescribed as needed for constipation. Home PT/ OT was arranged prior to discharge.      Pertinent Physical Exam At Time of Discharge  PE:  Constitutional: A&Ox3, calm and cooperative, NAD  Eyes: EOMI, clear sclera   Cardiovascular: Normal rate and regular rhythm. No murmurs  Respiratory/Thorax: CTAB, on RA  Genitourinary: Voiding independently   Musculoskeletal: right knee mepilex CDI w/o surrounding erythema or drainage. fires EHL/FHL/TA/TP/EDL/FDL. SILT in SP/DP/T distribution. 2+ DP/PT, <2 CRT. Compartments soft, compressible.   Extremities: No peripheral edema  Neurological: A&Ox3, No focal deficits   Psychological: Appropriate mood and behavior       Home Medications     Medication List      START taking these medications     acetaminophen 500 mg tablet; Commonly known as: Tylenol   aspirin 81 mg EC tablet; Take 1 tablet (81 mg) by mouth 2 times a day   for 28 days.   ketorolac 10 mg tablet; Commonly known as: Toradol; Take 1 tablet (10   mg) by mouth every 8 hours  for 3 days.   oxyCODONE-acetaminophen 5-325 mg tablet; Commonly known as: Percocet;   Take 1 tablet by mouth every 6 hours if needed for severe pain (7 - 10)   for up to 7 days.   polyethylene glycol 17 gram/dose powder; Commonly known as: Glycolax,   Miralax     CONTINUE taking these medications     albuterol 90 mcg/actuation inhaler   amitriptyline 10 mg tablet; Commonly known as: Elavil   ammonium lactate 12 % lotion; Commonly known as: Lac-Hydrin   ascorbic acid 1,000 mg tablet; Commonly known as: Vitamin C   atorvastatin 20 mg tablet; Commonly known as: Lipitor; Take 1 tablet (20   mg) by mouth once daily at bedtime.   azelastine 137 mcg (0.1 %) nasal spray; Commonly known as: Astelin   baclofen 20 mg tablet; Commonly known as: Lioresal; TAKE 1 TABLET BY   MOUTH THREE TIMES A DAY   bumetanide 2 mg tablet; Commonly known as: Bumex   calcium carbonate 500 mg calcium (1,250 mg) tablet; Commonly known as:   Oscal   carbidopa-levodopa  mg tablet; Commonly known as: Sinemet; Take   1.5 tablets by mouth 3 times a day.   carvedilol 25 mg tablet; Commonly known as: Coreg; Take 1 tablet (25 mg)   by mouth 2 times a day.   cholecalciferol 50 MCG (2000 UT) tablet; Commonly known as: Vitamin D-3   * clonazePAM 1 mg tablet; Commonly known as: KlonoPIN   * clonazePAM 1 mg tablet; Commonly known as: KlonoPIN   diclofenac sodium 1 % gel; Commonly known as: Voltaren   docusate sodium 100 mg capsule; Commonly known as: Colace; Take 1   capsule (100 mg) by mouth 2 times a day.   estradiol 0.01 % (0.1 mg/gram) vaginal cream; Commonly known as: Estrace   gabapentin 300 mg capsule; Commonly known as: Neurontin   ipratropium-albuteroL 0.5-2.5 mg/3 mL nebulizer solution; Commonly known   as: Duo-Neb   Klor-Con M20 20 mEq ER tablet; Generic drug: potassium chloride CR; Take   2 tablets (40 mEq) by mouth 2 times a day.   lancets misc   * levothyroxine 200 mcg tablet; Commonly known as: Synthroid, Levoxyl   * levothyroxine 100 mcg  tablet; Commonly known as: Synthroid, Levoxyl   lidocaine 5 % patch; Commonly known as: Lidoderm; APPLY 1 PATCH AND   LEAVE IN PLACE FOR 12 HOURS, THEN REMOVE AND LEAVE OFF FOR 12 HOURS   losartan 50 mg tablet; Commonly known as: Cozaar; Take 1 tablet (50 mg)   by mouth once daily.   metFORMIN 500 mg tablet; Commonly known as: Glucophage   omega-3 fatty acids-fish oil 360-1,200 mg capsule   omeprazole 40 mg DR capsule; Commonly known as: PriLOSEC   * OXcarbazepine 600 mg tablet; Commonly known as: Trileptal   * OXcarbazepine 600 mg tablet; Commonly known as: Trileptal   prazosin 5 mg capsule; Commonly known as: Minipress   SeroqueL 400 mg tablet; Generic drug: QUEtiapine   sertraline 100 mg tablet; Commonly known as: Zoloft; Take 2 tablets (200   mg) by mouth once daily. Do not start before January 2, 2024.   tamsulosin 0.4 mg 24 hr capsule; Commonly known as: Flomax; Take 1   capsule (0.4 mg) by mouth once daily.   tiZANidine 4 mg tablet; Commonly known as: Zanaflex   topiramate 200 mg tablet; Commonly known as: Topamax; Take 1 tablet (200   mg) by mouth 2 times a day.   Trelegy Ellipta 200-62.5-25 mcg blister with device; Generic drug:   fluticasone-umeclidin-vilanter  * This list has 6 medication(s) that are the same as other medications   prescribed for you. Read the directions carefully, and ask your doctor or   other care provider to review them with you.     STOP taking these medications     cephalexin 250 mg capsule; Commonly known as: Keflex   chlorhexidine 0.12 % solution; Commonly known as: Peridex   meloxicam 15 mg tablet; Commonly known as: Mobic   naproxen sodium 220 mg tablet; Commonly known as: Aleve       Outpatient Follow-Up  Future Appointments   Date Time Provider Department [place]   3/29/2024  2:00 PM Jazmin Hawkins PT [facility]   4/5/2024 10:00 AM Jaycob Jurado MD [address]   4/10/2024  1:00 PM [facility]   5/2/2024  3:45 PM David Swanson MD  [id number] [facility]   5/16/2024  9:45 AM Sanjiv GAYTAN MD [id number] None   6/4/2024  3:30 PM Norbert Acevedo MD [id number] [facility]   7/25/2024  3:00 PM Angela Shah MD [id number] [facility]       GRICEL MorenoC

## 2024-03-28 NOTE — PROGRESS NOTES
Prema Hair is a 59 y.o. female on day 1 of admission presenting with Primary osteoarthritis of both knees.      Subjective   DANIKA. Pain controlled, tolerating diet, +void    Denies N/T, F/C, CP, SOB, N/V      Objective     PE:  Constitutional: A&Ox3, calm and cooperative, NAD  Eyes: EOMI, clear sclera   Cardiovascular: Normal rate and regular rhythm. No murmurs  Respiratory/Thorax: CTAB, on RA  Genitourinary: Voiding independently   Musculoskeletal: right knee mepilex CDI w/o surrounding erythema or drainage. fires EHL/FHL/TA/TP/EDL/FDL. SILT in SP/DP/T distribution. 2+ DP/PT, <2 CRT. Compartments soft, compressible.   Extremities: No peripheral edema  Neurological: A&Ox3, No focal deficits   Psychological: Appropriate mood and behavior      Last Recorded Vitals  Vitals:    03/28/24 0109 03/28/24 0307 03/28/24 0709 03/28/24 0741   BP: 127/75   100/85   BP Location:       Patient Position:       Pulse: 66   82   Resp: 18 14  18   Temp: 36.9 °C (98.5 °F)   36.1 °C (97 °F)   TempSrc:    Temporal   SpO2:  98% 97% 97%   Weight:       Height:             Relevant Results    Imaging:     .=== 03/27/24 ===    XR KNEE 1-2 VIEWS RIGHT    - Impression -  Status post right knee arthroplasty.    MACRO:  none    Signed by: Anoop Young 3/27/2024 2:02 PM  Dictation workstation:   [id number]   .=== 12/27/23 ===    CT BRAIN ATTACK HEAD WO CONTRAST    - Impression -  No evidence of acute cortical infarct or intracranial hemorrhage.    . Yeison Lackey discussed the significance and urgency of this  critical finding by telephone with  Mickey Mcqueen on 12/30/2023 at  5:10 Pm.  (**-RCF-**) Findings:  See findings.        MACRO:  None    Signed by: Yeison Lackey 12/30/2023 5:17 PM  Dictation workstation:   [id number]         Lab Results:   Lab Results   Component Value Date    WBC 7.5 03/28/2024    HGB 11.6 (L) 03/28/2024    HCT 40.6 03/28/2024    MCV 97 03/28/2024     03/28/2024     Lab Results   Component Value Date     GLUCOSE 105 (H) 03/28/2024    CALCIUM 8.4 (L) 03/28/2024     03/28/2024    K 4.2 03/28/2024    CO2 28 03/28/2024     03/28/2024    BUN 15 03/28/2024    CREATININE 0.66 03/28/2024     Results from last 72 hours   Lab Units 03/26/24  1024   ALK PHOS U/L 127*   BILIRUBIN TOTAL mg/dL 0.3   PROTEIN TOTAL g/dL 6.6   ALT U/L 10   AST U/L 11   ALBUMIN g/dL 4.1     Estimated Creatinine Clearance: 111.6 mL/min (by C-G formula based on SCr of 0.66 mg/dL).  CRP   Date/Time Value Ref Range Status   12/16/2020 05:10 PM 0.41 mg/dL Final     Comment:     REF VALUE  < 1.00           Assessment/Plan   Prema Hair is a 59 y.o. female on day 1 of admission presenting with Primary osteoarthritis of both knees.      POD1 R TKA   Ortho/ Musculoskeletal: Denies numbness, dressing C/D/I, able to wiggle toes, neurovascularly intact, <CRT, 2+ DP/PT pulses  -WBAT with FWW   -OOBT/ PT/ Mobilize   -Ice to affected area   -Keep mepilex in place x1 week postoperative     Neuro: Acute postop pain as expected - well controlled on current medication regimen   Hx: bells palsy x 2, bipolar disorder, PTSD, Parkinson's, dysphonia, fibromyalgia,   -Continue current pain regimen   - continue home sinemet, klonopin, oxcarbazepine, prazosin, quetiapine, and  zoloft    - continue home baclofen, gabapentin, and zanaflex   - continue home topamax    CV: BP stable  Hx: bilateral leg edema, CHF, pulmonary hypertension, HLD, HTN, mod tricuspid regurgitation, carotid artery pseudoaneurysm  -Continue to monitor vital signs   - continue home statin and coreg  -continue home bumax     Resp: CTAB, on RA  Hx: Asthma, ZEINA on CPAP, laryngeneal paresis  -IS Q 1 hour while awake   - continue home pulmicort, perforomist, duoneb,.  spirivia     GI: Tolerating diet  -Regular diet   -PRN Antiemetic   -Stool softener/ laxative   - daily PPI    : Voiding independently   Hx: hypokalemia,   - Cr. 0.66  -continue home  flomax   - cointnue home Klor-con      Heme: H/H 11.6/40.6  -DVT proph: SCDs/ TEDs   -ASA 81mg BID       Endo:   Hx: DM, thyroid CA S/P total thyroidectomy, hypothyroidism   - ISS 1-10   - continue ome synthroid     ID: Afebrile, wbc 7.5  -Monitor for s/s of infection   - ancef x 3, completed    Dispo: Anticipate discharge today pending PT eval.     I spent 35 minutes in the professional and overall care of this patient.      GRICEL MorenoC

## 2024-03-28 NOTE — PROGRESS NOTES
03/28/24 1232   Discharge Planning   Living Arrangements Children   Support Systems Children   Assistance Needed None   Type of Residence Private residence   Number of Stairs to Enter Residence 12   Number of Stairs Within Residence 0   Do you have animals or pets at home? No   Who is requesting discharge planning? Patient   Home or Post Acute Services None;In home services   Type of Home Care Services Home OT;Home PT   Patient expects to be discharged to: Home   Does the patient need discharge transport arranged? No   RoundTrip coordination needed? No   Financial Resource Strain   How hard is it for you to pay for the very basics like food, housing, medical care, and heating? Not hard   Housing Stability   In the last 12 months, was there a time when you were not able to pay the mortgage or rent on time? N   In the last 12 months, was there a time when you did not have a steady place to sleep or slept in a shelter (including now)? N   Transportation Needs   In the past 12 months, has lack of transportation kept you from medical appointments or from getting medications? no   In the past 12 months, has lack of transportation kept you from meetings, work, or from getting things needed for daily living? No   Patient Choice   Provider Choice list and CMS website (https://medicare.gov/care-compare#search) for post-acute Quality and Resource Measure Data were provided and reviewed with: Patient   Patient / Family choosing to utilize agency / facility established prior to hospitalization Yes     Patient is POD 1 for a right TKA.  [facility] confirmed SOC for 3/29 per the daughter's request.  PLAN: PT, pain control  DISP: home with [facility]  ADOD: today  Tamanna Angel RN

## 2024-03-28 NOTE — CARE PLAN
Problem: Pain  Goal: My pain/discomfort is manageable  Outcome: Progressing   The patient's goals for the shift include safety/pain control    The clinical goals for the shift include safety/pain control

## 2024-03-28 NOTE — NURSING NOTE
Interdisciplinary team present: NP, PT, NM, CC, SW, Orthopedic Coordinator, and bedside RN.  Pain - controlled  Nausea - none  Discharge barrier - none  Discharge plan - Home with [facility]  Discharge date/time - today after PT session

## 2024-03-28 NOTE — PROGRESS NOTES
Physical Therapy    Physical Therapy Treatment    Patient Name: Prema Hair  MRN: [id number]  Today's Date: 3/28/2024  Time Calculation  Start Time: 0923  Stop Time: 1018  Time Calculation (min): 55 min       Assessment/Plan   PT Assessment  End of Session Communication: Bedside nurse  End of Session Patient Position: Bed, 3 rail up, Alarm on  PT Plan  Inpatient/Swing Bed or Outpatient: Inpatient  PT Plan  Treatment/Interventions: Bed mobility, Transfer training, Gait training, Strengthening  PT Plan: Skilled PT  PT Frequency: BID  PT Discharge Recommendations: Low intensity level of continued care  PT Recommended Transfer Status: Assist x1 (mod assist)  PT - OK to Discharge: Yes (per POC)      General Visit Information:   PT  Visit  PT Received On: 03/28/24  General  Reason for Referral: R TKA  Referred By: Asaf Paz MD  Family/Caregiver Present: No  Prior to Session Communication: Bedside nurse  Patient Position Received: Bed, 3 rail up, Alarm on  General Comment: pt req increaesd time with all activiity 2/2 pain and difficult time keeping eyes open    Subjective   Precautions:     Vital Signs:  Vital Signs  Heart Rate: 86  SpO2: 98 %    Objective   Pain:  Pain Assessment  Pain Score: 10 - Worst possible pain  Pain Type: Surgical pain  Pain Location: Knee  Pain Orientation: Right  Pain Interventions: Medication (See MAR)  Cognition:  Cognition  Overall Cognitive Status: Within Functional Limits  Postural Control:     Extremity/Trunk Assessments:    Activity Tolerance:     Treatments:  Therapeutic Exercise  Therapeutic Exercise Performed: Yes (pt performed supine ther ex of RLE x10-20: AP, QS, GS, SAQ AA, SLR AA, supine knee flexion AA,  vc/tc for correct form and hold time)         Bed Mobility  Bed Mobility: Yes  Bed Mobility 1  Bed Mobility 1: Supine to sitting, Sitting to supine  Level of Assistance 1: Contact guard  Bed Mobility Comments 1: HOB elevated, assist to bring RLE over EOB, pt performed  slowly with all bed mobility    Ambulation/Gait Training  Ambulation/Gait Training Performed: Yes  Ambulation/Gait Training 1  Surface 1: Level tile  Device 1: Rolling walker  Gait Support Devices: Gait belt  Assistance 1: Contact guard  Quality of Gait 1: Antalgic, Decreased step length, Forward flexed posture  Comments/Distance (ft) 1: 10x2 (VC to stay inside RW, pt performed very slowly 2/2 pain)  Transfers  Transfer: Yes  Transfer 1  Technique 1: Sit to stand, Stand to sit  Transfer Device 1: Walker  Transfer Level of Assistance 1: Moderate assistance, Maximum assistance  Trials/Comments 1: increased assist req from low surface, VC for hand placement  on solid sitting surfaces and not RW.  pt performed x2    Outcome Measures:  [facility] Basic Mobility  Turning from your back to your side while in a flat bed without using bedrails: A little  Moving from lying on your back to sitting on the side of a flat bed without using bedrails: A little  Moving to and from bed to chair (including a wheelchair): A little  Standing up from a chair using your arms (e.g. wheelchair or bedside chair): A lot  To walk in hospital room: A little  Climbing 3-5 steps with railing: A lot  Basic Mobility - Total Score: 16    Education Documentation  Precautions, taught by John Malone PTA at 3/28/2024  1:03 PM.  Learner: Patient  Readiness: Acceptance  Method: Explanation  Response: Verbalizes Understanding    Home Exercise Program, taught by John Malone PTA at 3/28/2024  1:03 PM.  Learner: Patient  Readiness: Acceptance  Method: Explanation  Response: Verbalizes Understanding    Mobility Training, taught by John Malone PTA at 3/28/2024  1:03 PM.  Learner: Patient  Readiness: Acceptance  Method: Explanation  Response: Verbalizes Understanding    Education Comments  No comments found.        OP EDUCATION:  Outpatient Education  Education Comment: edcuated pt on  knee precatuions, pt stated understanding    Encounter Problems        Encounter Problems (Active)       Mobility       STG - Patient will ambulate 150 ft mod I with a RW.  (Progressing)       Start:  03/27/24    Expected End:  04/10/24            STG - Patient will ascend and descend 3 stairs using unilateral HR and LRAD.  (Not Progressing)       Start:  03/27/24    Expected End:  04/10/24            Pt will complete 15 reps of each LE exercise independently in order to improve strength and endurance.  (Progressing)       Start:  03/27/24    Expected End:  04/10/24               PT Transfers       STG - Patient will perform bed mobility independently.  (Progressing)       Start:  03/27/24    Expected End:  04/10/24            STG - Patient will transfer sit to and from stand mod I with a RW.  (Progressing)       Start:  03/27/24    Expected End:  04/10/24            STG - Patient will perform car transfer mod I.        Start:  03/27/24    Expected End:  04/10/24

## 2024-03-28 NOTE — NURSING NOTE
Met with Patient at bedside- Patient is s/p Right Total Knee Replacement with Dr. Hammer. Discussion with patient included education on the following topics: [facility] Education: Wound Care, Post-Op Activity, Post-Op Precautions, Cold-Therapy, Importance of post-op prescriptions, When to call the Surgeon's Office, Use of MyChart, and When to call 9-1-1. Patient Live class prior to surgery. Patient is able to verbalize understanding of class content/discussion. Contact information was provided to patient for support and assistance during the post-operative period. Met with patient, discussed total knee replacement, importance of range of motion/physical therapy, and activity/assistive device. Reviewed constipation, pain management/medications, and cold therapy. Reviewed signs and symptoms of infection, when to call MD/navigator, when to call 911. Discussed DVT prophylaxis and discharge planning. Patient verbalizes understanding of when to call Navigator/MD with questions or concerns following discharge. Patient was given My Medication Education tool as a reference for her discharge medications.

## 2024-03-28 NOTE — CARE PLAN
The patient's goals for the shift include safety/pain control    The clinical goals for the shift include safety/pain control    Over the shift, the patient did not make progress toward the following goals. Barriers to progression include . Recommendations to address these barriers include .

## 2024-03-28 NOTE — NURSING NOTE
Pt assisted up to BSC at this time. Pt transfers with assist x2 and walker. Pt abble to take a few steps with minimal pain. Pt tolerated sitting on BSC x 20 mins.

## 2024-03-28 NOTE — NURSING NOTE
Received pt back from PACU via wheelchair. Pt transferred into bed with 2 person assist. Pt oriented to surroundings. Call light reviewed with pt. Daughter at bedside. Admission completed at this time.

## 2024-03-29 ENCOUNTER — HOME CARE VISIT (OUTPATIENT)
Dept: HOME HEALTH SERVICES | Facility: HOME HEALTH | Age: 59
End: 2024-03-29
Payer: MEDICARE

## 2024-03-29 PROCEDURE — 1090000002 HH PPS REVENUE DEBIT

## 2024-03-29 PROCEDURE — 1090000001 HH PPS REVENUE CREDIT

## 2024-03-29 PROCEDURE — G0151 HHCP-SERV OF PT,EA 15 MIN: HCPCS | Mod: HHH

## 2024-03-29 PROCEDURE — 0023 HH SOC

## 2024-03-29 PROCEDURE — 169592 NO-PAY CLAIM PROCEDURE

## 2024-03-30 PROCEDURE — 1090000001 HH PPS REVENUE CREDIT

## 2024-03-30 PROCEDURE — 1090000002 HH PPS REVENUE DEBIT

## 2024-03-31 PROCEDURE — 1090000001 HH PPS REVENUE CREDIT

## 2024-03-31 PROCEDURE — 1090000002 HH PPS REVENUE DEBIT

## 2024-04-01 DIAGNOSIS — G20.C PARKINSONISM, UNSPECIFIED PARKINSONISM TYPE (MULTI): ICD-10-CM

## 2024-04-01 PROCEDURE — 1090000002 HH PPS REVENUE DEBIT

## 2024-04-01 PROCEDURE — 1090000001 HH PPS REVENUE CREDIT

## 2024-04-02 ENCOUNTER — TELEPHONE (OUTPATIENT)
Dept: ENDOCRINOLOGY | Facility: HOSPITAL | Age: 59
End: 2024-04-02
Payer: MEDICARE

## 2024-04-02 ENCOUNTER — TELEPHONE (OUTPATIENT)
Dept: ORTHOPEDIC SURGERY | Facility: HOSPITAL | Age: 59
End: 2024-04-02
Payer: MEDICARE

## 2024-04-02 PROCEDURE — 1090000001 HH PPS REVENUE CREDIT

## 2024-04-02 PROCEDURE — 1090000002 HH PPS REVENUE DEBIT

## 2024-04-02 RX ORDER — CARBIDOPA AND LEVODOPA 25; 100 MG/1; MG/1
TABLET ORAL
Qty: 270 TABLET | Refills: 2 | Status: ON HOLD | OUTPATIENT
Start: 2024-04-02 | End: 2024-04-11

## 2024-04-02 NOTE — TELEPHONE ENCOUNTER
Spoke with patient during follow-up phone call.  Patient indicates that they are having a lot of pain .  She is taking the pain medications as prescribed.  She is using ice therapy when her family is with her. Recommended she try 2 tabs of Oxycodone when her next dose is due.  Patient denies questions related to discharge instructions or home going medications.  Patient is aware of follow up visit with surgeon's office.  Home Care has NOT established a first visit with patient.   Home care was contacted to find out why patient has not been seen. The SOC was dated for 3/29/2024. The [facility] note said HHC incomplete. Awaiting a call back from Nuzhat from [facility].  Patient denies addtional questions or concerns at this time and verbalizes understanding to call RN Navigator or Surgeon's office with any new or worsening symptoms.

## 2024-04-02 NOTE — TELEPHONE ENCOUNTER
Home health care returned my call and patient will be seen on 4/3/2024. Patient notified that PT will be out tomorrow.

## 2024-04-03 ENCOUNTER — TELEPHONE (OUTPATIENT)
Dept: ENDOCRINOLOGY | Facility: HOSPITAL | Age: 59
End: 2024-04-03
Payer: MEDICARE

## 2024-04-03 ENCOUNTER — HOME CARE VISIT (OUTPATIENT)
Dept: HOME HEALTH SERVICES | Facility: HOME HEALTH | Age: 59
End: 2024-04-03
Payer: MEDICARE

## 2024-04-03 PROCEDURE — 1090000001 HH PPS REVENUE CREDIT

## 2024-04-03 PROCEDURE — 1090000002 HH PPS REVENUE DEBIT

## 2024-04-03 NOTE — TELEPHONE ENCOUNTER
Left voicemail to give attached message from provider. Office number was given for a call back in case of any questions or concerns.

## 2024-04-04 PROCEDURE — 1090000001 HH PPS REVENUE CREDIT

## 2024-04-04 PROCEDURE — 1090000002 HH PPS REVENUE DEBIT

## 2024-04-04 SDOH — HEALTH STABILITY: PHYSICAL HEALTH: EXERCISE TYPE: NONE EXISTING

## 2024-04-04 SDOH — HEALTH STABILITY: PHYSICAL HEALTH: EXERCISE COMMENTS: M AND TECHNIQUE.

## 2024-04-04 SDOH — HEALTH STABILITY: PHYSICAL HEALTH
EXERCISE COMMENTS: INSTRUCTED PT AND PERFORMED SEATED THERAPEUTIC EXERCISES TO INCREASE STRENGTH AND IMPROVE FUNCTIONAL TRANSFERS AND GAIT WITH LAQ, HIP FLEX, HIP ABD/ADD WITH RESISTANCE AND HAMSTRING CURLS WITH THERABAND, 2 SETS OF 10 WITH VERBAL CUEING FOR PROPER FOR

## 2024-04-04 SDOH — HEALTH STABILITY: PHYSICAL HEALTH: EXERCISE TYPE: BLE STRENGTHENING EX

## 2024-04-04 ASSESSMENT — ENCOUNTER SYMPTOMS
PAIN LOCATION - PAIN QUALITY: ACHE
PAIN LOCATION - PAIN SEVERITY: 7/10
PAIN LOCATION - PAIN FREQUENCY: CONSTANT
HYPERTENSION: 1
HIGHEST PAIN SEVERITY IN PAST 24 HOURS: 7/10
DIZZINESS: 1
AGITATION: 1
PERSON REPORTING PAIN: PATIENT
PAIN LOCATION - PAIN FREQUENCY: CONSTANT
PAIN LOCATION - PAIN FREQUENCY: CONSTANT
DEPRESSION: 1
PAIN LOCATION - PAIN QUALITY: ACHING
PAIN LOCATION - PAIN SEVERITY: 7/10
PAIN LOCATION: RIGHT KNEE
PAIN LOCATION - PAIN QUALITY: ACHE
PAIN: 1
FATIGUES EASILY: 1
PAIN LOCATION - PAIN DURATION: -
PAIN LOCATION - PAIN QUALITY: ACHE
HIGHEST PAIN SEVERITY IN PAST 24 HOURS: 10/10
PAIN SEVERITY GOAL: 0/10
OCCASIONAL FEELINGS OF UNSTEADINESS: 1
PAIN LOCATION: RIGHT KNEE
SHORTNESS OF BREATH: T
LOSS OF SENSATION IN FEET: 1
PAIN LOCATION - PAIN SEVERITY: 10/10
LOWEST PAIN SEVERITY IN PAST 24 HOURS: 7/10
PAIN LOCATION: BACK
LIMITED RANGE OF MOTION: 1
PAIN LOCATION - RELIEVING FACTORS: MEDS AND ICE
SUBJECTIVE PAIN PROGRESSION: WAXING AND WANING
DYSPNEA ON EXERTION: 1
PAIN LOCATION: NECK
PERSON REPORTING PAIN: PATIENT
PAIN LOCATION - PAIN SEVERITY: 7/10
MUSCLE WEAKNESS: 1
ARTHRALGIAS: 1
PAIN LOCATION - PAIN DURATION: SINCE SX
PAIN: 1

## 2024-04-04 ASSESSMENT — ACTIVITIES OF DAILY LIVING (ADL)
AMBULATION ASSISTANCE: 1
PHYSICAL_TRANSFERS_DEVICES: FWW
AMBULATION_DISTANCE/DURATION_TOLERATED: 20
AMBULATION ASSISTANCE ON FLAT SURFACES: 1
AMBULATION_DISTANCE/DURATION_TOLERATED: 30-40FT
CURRENT_FUNCTION: MINIMUM ASSIST
AMBULATION ASSISTANCE: STAND BY ASSIST
AMBULATION ASSISTANCE ON FLAT SURFACES: 1
PHYSICAL TRANSFERS ASSESSED: 1
ENTERING_EXITING_HOME: ONE PERSON

## 2024-04-04 NOTE — HOME HEALTH
needs ST for: volume, check swallow (coughs when eating), memory.  speech slurred at times.   Has Parkinsons, on carbidopa . 'Has hx nodules in neck that are cancerous.   L radiculitis,   Takes muscle relaxants and botulin toxin for extreme torticollis and head laterally flexed 45 deg at rest to the Left, demonstrating poor head control.  pt states Left breast is pre-cancerous - it's pinching her. they monitor every 6 mos.  has prescription glasses.  Cee Biggs - counselor-does virtual visits since she moved her office to [place] . I  helped her get over her moms death in 2018. Pain is still poignant for her .  has a CPAP  mon, wed.

## 2024-04-05 ENCOUNTER — TELEPHONE (OUTPATIENT)
Dept: UROLOGY | Facility: CLINIC | Age: 59
End: 2024-04-05

## 2024-04-05 ENCOUNTER — HOME CARE VISIT (OUTPATIENT)
Dept: HOME HEALTH SERVICES | Facility: HOME HEALTH | Age: 59
End: 2024-04-05
Payer: MEDICARE

## 2024-04-05 ENCOUNTER — APPOINTMENT (OUTPATIENT)
Dept: OTOLARYNGOLOGY | Facility: HOSPITAL | Age: 59
End: 2024-04-05
Payer: MEDICARE

## 2024-04-05 DIAGNOSIS — R33.9 URINARY RETENTION: ICD-10-CM

## 2024-04-05 DIAGNOSIS — N39.0 CHRONIC UTI: ICD-10-CM

## 2024-04-05 PROCEDURE — 1090000002 HH PPS REVENUE DEBIT

## 2024-04-05 PROCEDURE — G0157 HHC PT ASSISTANT EA 15: HCPCS | Mod: HHH

## 2024-04-05 PROCEDURE — 1090000001 HH PPS REVENUE CREDIT

## 2024-04-05 RX ORDER — TAMSULOSIN HYDROCHLORIDE 0.4 MG/1
0.4 CAPSULE ORAL DAILY
Qty: 30 CAPSULE | Refills: 11 | Status: ON HOLD | OUTPATIENT
Start: 2024-04-05 | End: 2024-04-08 | Stop reason: SDUPTHER

## 2024-04-05 RX ORDER — CEPHALEXIN 250 MG/1
250 CAPSULE ORAL DAILY
Qty: 90 CAPSULE | Refills: 3 | Status: ON HOLD | OUTPATIENT
Start: 2024-04-05 | End: 2024-04-08 | Stop reason: SDUPTHER

## 2024-04-06 PROCEDURE — 1090000001 HH PPS REVENUE CREDIT

## 2024-04-06 PROCEDURE — 1090000002 HH PPS REVENUE DEBIT

## 2024-04-06 ASSESSMENT — ACTIVITIES OF DAILY LIVING (ADL): OASIS_M1830: 05

## 2024-04-07 ENCOUNTER — APPOINTMENT (OUTPATIENT)
Dept: CARDIOLOGY | Facility: HOSPITAL | Age: 59
DRG: 092 | End: 2024-04-07
Payer: MEDICARE

## 2024-04-07 ENCOUNTER — HOSPITAL ENCOUNTER (INPATIENT)
Facility: HOSPITAL | Age: 59
LOS: 4 days | Discharge: NURSING, REHAB OR LONG-TERM CARE | DRG: 092 | End: 2024-04-12
Attending: GENERAL PRACTICE | Admitting: INTERNAL MEDICINE
Payer: MEDICARE

## 2024-04-07 ENCOUNTER — APPOINTMENT (OUTPATIENT)
Dept: RADIOLOGY | Facility: HOSPITAL | Age: 59
DRG: 092 | End: 2024-04-07
Payer: MEDICARE

## 2024-04-07 DIAGNOSIS — E11.69 DM TYPE 2 WITH DIABETIC MIXED HYPERLIPIDEMIA (MULTI): ICD-10-CM

## 2024-04-07 DIAGNOSIS — N30.00 ACUTE CYSTITIS WITHOUT HEMATURIA: ICD-10-CM

## 2024-04-07 DIAGNOSIS — Z96.651 TOTAL KNEE REPLACEMENT STATUS, RIGHT: ICD-10-CM

## 2024-04-07 DIAGNOSIS — M17.11 ARTHRITIS OF KNEE, RIGHT: ICD-10-CM

## 2024-04-07 DIAGNOSIS — G20.C PARKINSONISM, UNSPECIFIED PARKINSONISM TYPE (MULTI): ICD-10-CM

## 2024-04-07 DIAGNOSIS — L73.9 FOLLICULITIS: ICD-10-CM

## 2024-04-07 DIAGNOSIS — R41.0 DELIRIUM: Primary | ICD-10-CM

## 2024-04-07 DIAGNOSIS — C73 CANCER OF THYROID (MULTI): ICD-10-CM

## 2024-04-07 DIAGNOSIS — E78.2 DM TYPE 2 WITH DIABETIC MIXED HYPERLIPIDEMIA (MULTI): ICD-10-CM

## 2024-04-07 LAB
ALBUMIN SERPL BCP-MCNC: 3.7 G/DL (ref 3.4–5)
ALP SERPL-CCNC: 135 U/L (ref 33–110)
ALT SERPL W P-5'-P-CCNC: 6 U/L (ref 7–45)
ANION GAP SERPL CALC-SCNC: 14 MMOL/L (ref 10–20)
APAP SERPL-MCNC: 27.2 UG/ML
AST SERPL W P-5'-P-CCNC: 19 U/L (ref 9–39)
BASOPHILS # BLD AUTO: 0.07 X10*3/UL (ref 0–0.1)
BASOPHILS NFR BLD AUTO: 0.8 %
BILIRUB SERPL-MCNC: 0.5 MG/DL (ref 0–1.2)
BUN SERPL-MCNC: 26 MG/DL (ref 6–23)
CALCIUM SERPL-MCNC: 8.7 MG/DL (ref 8.6–10.3)
CARDIAC TROPONIN I PNL SERPL HS: 3 NG/L (ref 0–13)
CHLORIDE SERPL-SCNC: 99 MMOL/L (ref 98–107)
CK SERPL-CCNC: 108 U/L (ref 0–215)
CO2 SERPL-SCNC: 26 MMOL/L (ref 21–32)
CREAT SERPL-MCNC: 0.83 MG/DL (ref 0.5–1.05)
EGFRCR SERPLBLD CKD-EPI 2021: 81 ML/MIN/1.73M*2
EOSINOPHIL # BLD AUTO: 0.2 X10*3/UL (ref 0–0.7)
EOSINOPHIL NFR BLD AUTO: 2.4 %
ERYTHROCYTE [DISTWIDTH] IN BLOOD BY AUTOMATED COUNT: 16.8 % (ref 11.5–14.5)
ETHANOL SERPL-MCNC: <10 MG/DL
FLUAV RNA RESP QL NAA+PROBE: NOT DETECTED
FLUBV RNA RESP QL NAA+PROBE: NOT DETECTED
GLUCOSE BLD MANUAL STRIP-MCNC: 92 MG/DL (ref 74–99)
GLUCOSE SERPL-MCNC: 100 MG/DL (ref 74–99)
HCT VFR BLD AUTO: 35 % (ref 36–46)
HGB BLD-MCNC: 10.9 G/DL (ref 12–16)
IMM GRANULOCYTES # BLD AUTO: 0.07 X10*3/UL (ref 0–0.7)
IMM GRANULOCYTES NFR BLD AUTO: 0.8 % (ref 0–0.9)
LYMPHOCYTES # BLD AUTO: 1.38 X10*3/UL (ref 1.2–4.8)
LYMPHOCYTES NFR BLD AUTO: 16.6 %
MAGNESIUM SERPL-MCNC: 2.1 MG/DL (ref 1.6–2.4)
MCH RBC QN AUTO: 28.4 PG (ref 26–34)
MCHC RBC AUTO-ENTMCNC: 31.1 G/DL (ref 32–36)
MCV RBC AUTO: 91 FL (ref 80–100)
MONOCYTES # BLD AUTO: 0.8 X10*3/UL (ref 0.1–1)
MONOCYTES NFR BLD AUTO: 9.6 %
NEUTROPHILS # BLD AUTO: 5.8 X10*3/UL (ref 1.2–7.7)
NEUTROPHILS NFR BLD AUTO: 69.8 %
NRBC BLD-RTO: 0 /100 WBCS (ref 0–0)
PLATELET # BLD AUTO: 301 X10*3/UL (ref 150–450)
POTASSIUM SERPL-SCNC: 4.2 MMOL/L (ref 3.5–5.3)
PROT SERPL-MCNC: 6.4 G/DL (ref 6.4–8.2)
RBC # BLD AUTO: 3.84 X10*6/UL (ref 4–5.2)
SALICYLATES SERPL-MCNC: <3 MG/DL
SARS-COV-2 RNA RESP QL NAA+PROBE: NOT DETECTED
SODIUM SERPL-SCNC: 135 MMOL/L (ref 136–145)
WBC # BLD AUTO: 8.3 X10*3/UL (ref 4.4–11.3)

## 2024-04-07 PROCEDURE — 36415 COLL VENOUS BLD VENIPUNCTURE: CPT | Performed by: GENERAL PRACTICE

## 2024-04-07 PROCEDURE — 82947 ASSAY GLUCOSE BLOOD QUANT: CPT

## 2024-04-07 PROCEDURE — 70450 CT HEAD/BRAIN W/O DYE: CPT | Performed by: RADIOLOGY

## 2024-04-07 PROCEDURE — 73560 X-RAY EXAM OF KNEE 1 OR 2: CPT | Mod: RIGHT SIDE | Performed by: RADIOLOGY

## 2024-04-07 PROCEDURE — 73560 X-RAY EXAM OF KNEE 1 OR 2: CPT | Mod: RT

## 2024-04-07 PROCEDURE — 70450 CT HEAD/BRAIN W/O DYE: CPT

## 2024-04-07 PROCEDURE — 99285 EMERGENCY DEPT VISIT HI MDM: CPT | Mod: 25

## 2024-04-07 PROCEDURE — 85025 COMPLETE CBC W/AUTO DIFF WBC: CPT | Performed by: GENERAL PRACTICE

## 2024-04-07 PROCEDURE — 80143 DRUG ASSAY ACETAMINOPHEN: CPT | Performed by: GENERAL PRACTICE

## 2024-04-07 PROCEDURE — 84484 ASSAY OF TROPONIN QUANT: CPT | Performed by: GENERAL PRACTICE

## 2024-04-07 PROCEDURE — 83735 ASSAY OF MAGNESIUM: CPT | Performed by: GENERAL PRACTICE

## 2024-04-07 PROCEDURE — 82550 ASSAY OF CK (CPK): CPT | Performed by: GENERAL PRACTICE

## 2024-04-07 PROCEDURE — 93005 ELECTROCARDIOGRAM TRACING: CPT

## 2024-04-07 PROCEDURE — 73552 X-RAY EXAM OF FEMUR 2/>: CPT | Mod: RT

## 2024-04-07 PROCEDURE — 87636 SARSCOV2 & INF A&B AMP PRB: CPT | Performed by: GENERAL PRACTICE

## 2024-04-07 PROCEDURE — 1090000002 HH PPS REVENUE DEBIT

## 2024-04-07 PROCEDURE — 84520 ASSAY OF UREA NITROGEN: CPT | Performed by: GENERAL PRACTICE

## 2024-04-07 PROCEDURE — 71045 X-RAY EXAM CHEST 1 VIEW: CPT

## 2024-04-07 PROCEDURE — 73552 X-RAY EXAM OF FEMUR 2/>: CPT | Mod: RIGHT SIDE | Performed by: RADIOLOGY

## 2024-04-07 PROCEDURE — 71045 X-RAY EXAM CHEST 1 VIEW: CPT | Mod: FOREIGN READ | Performed by: RADIOLOGY

## 2024-04-07 PROCEDURE — 1090000001 HH PPS REVENUE CREDIT

## 2024-04-07 ASSESSMENT — COLUMBIA-SUICIDE SEVERITY RATING SCALE - C-SSRS
6. HAVE YOU EVER DONE ANYTHING, STARTED TO DO ANYTHING, OR PREPARED TO DO ANYTHING TO END YOUR LIFE?: NO
1. IN THE PAST MONTH, HAVE YOU WISHED YOU WERE DEAD OR WISHED YOU COULD GO TO SLEEP AND NOT WAKE UP?: NO
2. HAVE YOU ACTUALLY HAD ANY THOUGHTS OF KILLING YOURSELF?: NO

## 2024-04-07 ASSESSMENT — PAIN DESCRIPTION - PROGRESSION: CLINICAL_PROGRESSION: RESOLVED

## 2024-04-07 ASSESSMENT — PAIN - FUNCTIONAL ASSESSMENT: PAIN_FUNCTIONAL_ASSESSMENT: 0-10

## 2024-04-07 ASSESSMENT — PAIN SCALES - GENERAL
PAINLEVEL_OUTOF10: 0 - NO PAIN
PAINLEVEL_OUTOF10: 0 - NO PAIN

## 2024-04-07 NOTE — ED TRIAGE NOTES
Pt presents to ED via EMS for a complaint of lethargy and increase in weakness. Pt presents GCS 12 (Normal basline per EMS), has a patent airway, and seems clammy and scared.   Pt has a hx of Belles palsy and TIA. Per EMS: the daughter repots the pts last known well was the night prior between 21:00-23:00.  Per EMS: the daughter has concern of her mother being more confused than normal.

## 2024-04-08 ENCOUNTER — APPOINTMENT (OUTPATIENT)
Dept: ORTHOPEDIC SURGERY | Facility: CLINIC | Age: 59
End: 2024-04-08
Payer: MEDICARE

## 2024-04-08 ENCOUNTER — APPOINTMENT (OUTPATIENT)
Dept: RADIOLOGY | Facility: CLINIC | Age: 59
End: 2024-04-08
Payer: MEDICARE

## 2024-04-08 DIAGNOSIS — R33.9 URINARY RETENTION: ICD-10-CM

## 2024-04-08 DIAGNOSIS — N39.0 CHRONIC UTI: ICD-10-CM

## 2024-04-08 PROBLEM — R41.0 DELIRIUM: Status: ACTIVE | Noted: 2024-04-08

## 2024-04-08 LAB
ALBUMIN SERPL BCP-MCNC: 3.4 G/DL (ref 3.4–5)
ALP SERPL-CCNC: 124 U/L (ref 33–110)
ALT SERPL W P-5'-P-CCNC: 13 U/L (ref 7–45)
ANION GAP SERPL CALC-SCNC: 14 MMOL/L (ref 10–20)
APPEARANCE UR: CLEAR
AST SERPL W P-5'-P-CCNC: 15 U/L (ref 9–39)
ATRIAL RATE: 68 BPM
BASOPHILS # BLD AUTO: 0.05 X10*3/UL (ref 0–0.1)
BASOPHILS NFR BLD AUTO: 0.7 %
BILIRUB SERPL-MCNC: 0.4 MG/DL (ref 0–1.2)
BILIRUB UR STRIP.AUTO-MCNC: NEGATIVE MG/DL
BUN SERPL-MCNC: 22 MG/DL (ref 6–23)
CALCIUM SERPL-MCNC: 8.3 MG/DL (ref 8.6–10.3)
CHLORIDE SERPL-SCNC: 101 MMOL/L (ref 98–107)
CO2 SERPL-SCNC: 26 MMOL/L (ref 21–32)
COLOR UR: NORMAL
CREAT SERPL-MCNC: 0.64 MG/DL (ref 0.5–1.05)
EGFRCR SERPLBLD CKD-EPI 2021: >90 ML/MIN/1.73M*2
EOSINOPHIL # BLD AUTO: 0.27 X10*3/UL (ref 0–0.7)
EOSINOPHIL NFR BLD AUTO: 3.8 %
ERYTHROCYTE [DISTWIDTH] IN BLOOD BY AUTOMATED COUNT: 17.2 % (ref 11.5–14.5)
GLUCOSE BLD MANUAL STRIP-MCNC: 104 MG/DL (ref 74–99)
GLUCOSE BLD MANUAL STRIP-MCNC: 71 MG/DL (ref 74–99)
GLUCOSE BLD MANUAL STRIP-MCNC: 98 MG/DL (ref 74–99)
GLUCOSE SERPL-MCNC: 84 MG/DL (ref 74–99)
GLUCOSE UR STRIP.AUTO-MCNC: NORMAL MG/DL
HCT VFR BLD AUTO: 35.3 % (ref 36–46)
HGB BLD-MCNC: 10.6 G/DL (ref 12–16)
IMM GRANULOCYTES # BLD AUTO: 0.02 X10*3/UL (ref 0–0.7)
IMM GRANULOCYTES NFR BLD AUTO: 0.3 % (ref 0–0.9)
KETONES UR STRIP.AUTO-MCNC: NEGATIVE MG/DL
LEUKOCYTE ESTERASE UR QL STRIP.AUTO: NEGATIVE
LYMPHOCYTES # BLD AUTO: 1.22 X10*3/UL (ref 1.2–4.8)
LYMPHOCYTES NFR BLD AUTO: 17 %
MCH RBC QN AUTO: 28.2 PG (ref 26–34)
MCHC RBC AUTO-ENTMCNC: 30 G/DL (ref 32–36)
MCV RBC AUTO: 94 FL (ref 80–100)
MONOCYTES # BLD AUTO: 0.82 X10*3/UL (ref 0.1–1)
MONOCYTES NFR BLD AUTO: 11.4 %
NEUTROPHILS # BLD AUTO: 4.8 X10*3/UL (ref 1.2–7.7)
NEUTROPHILS NFR BLD AUTO: 66.8 %
NITRITE UR QL STRIP.AUTO: NEGATIVE
NRBC BLD-RTO: 0 /100 WBCS (ref 0–0)
P AXIS: 62 DEGREES
P OFFSET: 183 MS
P ONSET: 122 MS
PH UR STRIP.AUTO: 5 [PH]
PLATELET # BLD AUTO: 291 X10*3/UL (ref 150–450)
POTASSIUM SERPL-SCNC: 3.9 MMOL/L (ref 3.5–5.3)
PR INTERVAL: 190 MS
PROT SERPL-MCNC: 5.7 G/DL (ref 6.4–8.2)
PROT UR STRIP.AUTO-MCNC: NEGATIVE MG/DL
Q ONSET: 217 MS
QRS COUNT: 11 BEATS
QRS DURATION: 90 MS
QT INTERVAL: 438 MS
QTC CALCULATION(BAZETT): 465 MS
QTC FREDERICIA: 456 MS
R AXIS: 18 DEGREES
RBC # BLD AUTO: 3.76 X10*6/UL (ref 4–5.2)
RBC # UR STRIP.AUTO: NEGATIVE /UL
SODIUM SERPL-SCNC: 137 MMOL/L (ref 136–145)
SP GR UR STRIP.AUTO: 1.01
T AXIS: 31 DEGREES
T OFFSET: 436 MS
UROBILINOGEN UR STRIP.AUTO-MCNC: NORMAL MG/DL
VENTRICULAR RATE: 68 BPM
WBC # BLD AUTO: 7.2 X10*3/UL (ref 4.4–11.3)

## 2024-04-08 PROCEDURE — 2500000002 HC RX 250 W HCPCS SELF ADMINISTERED DRUGS (ALT 637 FOR MEDICARE OP, ALT 636 FOR OP/ED): Mod: MUE | Performed by: PHARMACIST

## 2024-04-08 PROCEDURE — 2500000001 HC RX 250 WO HCPCS SELF ADMINISTERED DRUGS (ALT 637 FOR MEDICARE OP): Performed by: PHYSICIAN ASSISTANT

## 2024-04-08 PROCEDURE — 82947 ASSAY GLUCOSE BLOOD QUANT: CPT

## 2024-04-08 PROCEDURE — 2500000004 HC RX 250 GENERAL PHARMACY W/ HCPCS (ALT 636 FOR OP/ED): Performed by: PHYSICIAN ASSISTANT

## 2024-04-08 PROCEDURE — 94640 AIRWAY INHALATION TREATMENT: CPT

## 2024-04-08 PROCEDURE — 2500000002 HC RX 250 W HCPCS SELF ADMINISTERED DRUGS (ALT 637 FOR MEDICARE OP, ALT 636 FOR OP/ED): Performed by: PHARMACIST

## 2024-04-08 PROCEDURE — 96374 THER/PROPH/DIAG INJ IV PUSH: CPT

## 2024-04-08 PROCEDURE — 2500000005 HC RX 250 GENERAL PHARMACY W/O HCPCS: Performed by: INTERNAL MEDICINE

## 2024-04-08 PROCEDURE — 2500000002 HC RX 250 W HCPCS SELF ADMINISTERED DRUGS (ALT 637 FOR MEDICARE OP, ALT 636 FOR OP/ED): Performed by: INTERNAL MEDICINE

## 2024-04-08 PROCEDURE — 1200000002 HC GENERAL ROOM WITH TELEMETRY DAILY

## 2024-04-08 PROCEDURE — 1090000001 HH PPS REVENUE CREDIT

## 2024-04-08 PROCEDURE — 2500000004 HC RX 250 GENERAL PHARMACY W/ HCPCS (ALT 636 FOR OP/ED): Performed by: INTERNAL MEDICINE

## 2024-04-08 PROCEDURE — 99223 1ST HOSP IP/OBS HIGH 75: CPT | Performed by: PSYCHIATRY & NEUROLOGY

## 2024-04-08 PROCEDURE — 80053 COMPREHEN METABOLIC PANEL: CPT | Performed by: PHYSICIAN ASSISTANT

## 2024-04-08 PROCEDURE — 2500000004 HC RX 250 GENERAL PHARMACY W/ HCPCS (ALT 636 FOR OP/ED): Performed by: EMERGENCY MEDICINE

## 2024-04-08 PROCEDURE — 2500000002 HC RX 250 W HCPCS SELF ADMINISTERED DRUGS (ALT 637 FOR MEDICARE OP, ALT 636 FOR OP/ED): Mod: MUE | Performed by: INTERNAL MEDICINE

## 2024-04-08 PROCEDURE — 1090000002 HH PPS REVENUE DEBIT

## 2024-04-08 PROCEDURE — 81003 URINALYSIS AUTO W/O SCOPE: CPT | Performed by: GENERAL PRACTICE

## 2024-04-08 PROCEDURE — 85025 COMPLETE CBC W/AUTO DIFF WBC: CPT | Performed by: PHYSICIAN ASSISTANT

## 2024-04-08 PROCEDURE — 2500000001 HC RX 250 WO HCPCS SELF ADMINISTERED DRUGS (ALT 637 FOR MEDICARE OP): Performed by: INTERNAL MEDICINE

## 2024-04-08 PROCEDURE — 36415 COLL VENOUS BLD VENIPUNCTURE: CPT | Performed by: PHYSICIAN ASSISTANT

## 2024-04-08 RX ORDER — TALC
3 POWDER (GRAM) TOPICAL
Status: DISCONTINUED | OUTPATIENT
Start: 2024-04-08 | End: 2024-04-08

## 2024-04-08 RX ORDER — TOPIRAMATE 100 MG/1
200 TABLET, FILM COATED ORAL 2 TIMES DAILY
Status: DISCONTINUED | OUTPATIENT
Start: 2024-04-08 | End: 2024-04-12 | Stop reason: HOSPADM

## 2024-04-08 RX ORDER — BUDESONIDE 0.5 MG/2ML
0.5 INHALANT ORAL
Status: DISCONTINUED | OUTPATIENT
Start: 2024-04-08 | End: 2024-04-12 | Stop reason: HOSPADM

## 2024-04-08 RX ORDER — DEXTROSE 50 % IN WATER (D50W) INTRAVENOUS SYRINGE
25
Status: DISCONTINUED | OUTPATIENT
Start: 2024-04-08 | End: 2024-04-12 | Stop reason: HOSPADM

## 2024-04-08 RX ORDER — FORMOTEROL FUMARATE DIHYDRATE 20 UG/2ML
2 SOLUTION RESPIRATORY (INHALATION) EVERY 12 HOURS
COMMUNITY
Start: 2024-03-27

## 2024-04-08 RX ORDER — ATORVASTATIN CALCIUM 20 MG/1
20 TABLET, FILM COATED ORAL NIGHTLY
Status: DISCONTINUED | OUTPATIENT
Start: 2024-04-08 | End: 2024-04-12 | Stop reason: HOSPADM

## 2024-04-08 RX ORDER — AMITRIPTYLINE HYDROCHLORIDE 10 MG/1
10 TABLET, FILM COATED ORAL NIGHTLY
Status: DISCONTINUED | OUTPATIENT
Start: 2024-04-08 | End: 2024-04-12 | Stop reason: HOSPADM

## 2024-04-08 RX ORDER — MELOXICAM 15 MG/1
15 TABLET ORAL DAILY
COMMUNITY
End: 2024-04-12 | Stop reason: HOSPADM

## 2024-04-08 RX ORDER — CLONAZEPAM 1 MG/1
2 TABLET ORAL EVERY EVENING
Status: DISCONTINUED | OUTPATIENT
Start: 2024-04-08 | End: 2024-04-12 | Stop reason: HOSPADM

## 2024-04-08 RX ORDER — CEFTRIAXONE 1 G/50ML
1 INJECTION, SOLUTION INTRAVENOUS EVERY 24 HOURS
Status: DISCONTINUED | OUTPATIENT
Start: 2024-04-09 | End: 2024-04-08

## 2024-04-08 RX ORDER — METOLAZONE 2.5 MG/1
2.5 TABLET ORAL
COMMUNITY
Start: 2023-10-19 | End: 2024-04-12 | Stop reason: HOSPADM

## 2024-04-08 RX ORDER — HEPARIN SODIUM 5000 [USP'U]/ML
5000 INJECTION, SOLUTION INTRAVENOUS; SUBCUTANEOUS EVERY 8 HOURS SCHEDULED
Status: DISCONTINUED | OUTPATIENT
Start: 2024-04-08 | End: 2024-04-08

## 2024-04-08 RX ORDER — BUMETANIDE 1 MG/1
2 TABLET ORAL 2 TIMES DAILY
Status: DISCONTINUED | OUTPATIENT
Start: 2024-04-08 | End: 2024-04-12 | Stop reason: HOSPADM

## 2024-04-08 RX ORDER — INSULIN LISPRO 100 [IU]/ML
0-0.1 INJECTION, SOLUTION INTRAVENOUS; SUBCUTANEOUS
COMMUNITY
Start: 2024-03-28 | End: 2024-04-30

## 2024-04-08 RX ORDER — DOCUSATE SODIUM 100 MG/1
100 CAPSULE, LIQUID FILLED ORAL 2 TIMES DAILY
Status: DISCONTINUED | OUTPATIENT
Start: 2024-04-08 | End: 2024-04-08

## 2024-04-08 RX ORDER — DICLOFENAC SODIUM 10 MG/G
4 GEL TOPICAL 3 TIMES DAILY PRN
Status: DISCONTINUED | OUTPATIENT
Start: 2024-04-08 | End: 2024-04-12 | Stop reason: HOSPADM

## 2024-04-08 RX ORDER — QUETIAPINE FUMARATE 100 MG/1
800 TABLET, FILM COATED ORAL NIGHTLY
Status: DISCONTINUED | OUTPATIENT
Start: 2024-04-08 | End: 2024-04-12 | Stop reason: HOSPADM

## 2024-04-08 RX ORDER — ENOXAPARIN SODIUM 100 MG/ML
40 INJECTION SUBCUTANEOUS EVERY 24 HOURS
Status: DISCONTINUED | OUTPATIENT
Start: 2024-04-08 | End: 2024-04-12 | Stop reason: HOSPADM

## 2024-04-08 RX ORDER — POLYETHYLENE GLYCOL 3350 17 G/17G
17 POWDER, FOR SOLUTION ORAL DAILY
Status: DISCONTINUED | OUTPATIENT
Start: 2024-04-08 | End: 2024-04-12 | Stop reason: HOSPADM

## 2024-04-08 RX ORDER — DULOXETIN HYDROCHLORIDE 30 MG/1
30 CAPSULE, DELAYED RELEASE ORAL DAILY
COMMUNITY
Start: 2024-04-02 | End: 2024-04-12 | Stop reason: HOSPADM

## 2024-04-08 RX ORDER — DOCUSATE SODIUM 100 MG/1
100 CAPSULE, LIQUID FILLED ORAL 2 TIMES DAILY
Status: DISCONTINUED | OUTPATIENT
Start: 2024-04-08 | End: 2024-04-12 | Stop reason: HOSPADM

## 2024-04-08 RX ORDER — LEVOTHYROXINE SODIUM 100 UG/1
100 TABLET ORAL
Status: DISCONTINUED | OUTPATIENT
Start: 2024-04-10 | End: 2024-04-12 | Stop reason: HOSPADM

## 2024-04-08 RX ORDER — FLUTICASONE FUROATE AND VILANTEROL 200; 25 UG/1; UG/1
1 POWDER RESPIRATORY (INHALATION)
Status: DISCONTINUED | OUTPATIENT
Start: 2024-04-08 | End: 2024-04-08

## 2024-04-08 RX ORDER — GABAPENTIN 300 MG/1
900 CAPSULE ORAL 3 TIMES DAILY
Status: DISCONTINUED | OUTPATIENT
Start: 2024-04-08 | End: 2024-04-12 | Stop reason: HOSPADM

## 2024-04-08 RX ORDER — PRAZOSIN HYDROCHLORIDE 1 MG/1
5 CAPSULE ORAL NIGHTLY
Status: DISCONTINUED | OUTPATIENT
Start: 2024-04-08 | End: 2024-04-12 | Stop reason: HOSPADM

## 2024-04-08 RX ORDER — ASPIRIN 81 MG/1
81 TABLET ORAL 2 TIMES DAILY
Status: DISCONTINUED | OUTPATIENT
Start: 2024-04-08 | End: 2024-04-12 | Stop reason: HOSPADM

## 2024-04-08 RX ORDER — TAMSULOSIN HYDROCHLORIDE 0.4 MG/1
0.4 CAPSULE ORAL DAILY
Status: DISCONTINUED | OUTPATIENT
Start: 2024-04-08 | End: 2024-04-12 | Stop reason: HOSPADM

## 2024-04-08 RX ORDER — BUDESONIDE AND FORMOTEROL FUMARATE DIHYDRATE 160; 4.5 UG/1; UG/1
2 AEROSOL RESPIRATORY (INHALATION)
COMMUNITY
Start: 2024-02-19

## 2024-04-08 RX ORDER — TAMSULOSIN HYDROCHLORIDE 0.4 MG/1
0.4 CAPSULE ORAL DAILY
Qty: 30 CAPSULE | Refills: 11 | Status: SHIPPED | OUTPATIENT
Start: 2024-04-08 | End: 2024-04-25 | Stop reason: SDUPTHER

## 2024-04-08 RX ORDER — PANTOPRAZOLE SODIUM 40 MG/1
40 TABLET, DELAYED RELEASE ORAL
Status: DISCONTINUED | OUTPATIENT
Start: 2024-04-08 | End: 2024-04-08

## 2024-04-08 RX ORDER — SERTRALINE HYDROCHLORIDE 50 MG/1
200 TABLET, FILM COATED ORAL DAILY
Status: DISCONTINUED | OUTPATIENT
Start: 2024-04-08 | End: 2024-04-12 | Stop reason: HOSPADM

## 2024-04-08 RX ORDER — ONDANSETRON HYDROCHLORIDE 2 MG/ML
4 INJECTION, SOLUTION INTRAVENOUS EVERY 6 HOURS PRN
Status: DISCONTINUED | OUTPATIENT
Start: 2024-04-08 | End: 2024-04-12 | Stop reason: HOSPADM

## 2024-04-08 RX ORDER — ACETAMINOPHEN 325 MG/1
950 TABLET ORAL EVERY 6 HOURS PRN
Status: DISCONTINUED | OUTPATIENT
Start: 2024-04-08 | End: 2024-04-08

## 2024-04-08 RX ORDER — PANTOPRAZOLE SODIUM 40 MG/1
40 TABLET, DELAYED RELEASE ORAL
COMMUNITY
Start: 2023-05-03 | End: 2024-04-18

## 2024-04-08 RX ORDER — POTASSIUM CHLORIDE 20 MEQ/1
40 TABLET, EXTENDED RELEASE ORAL
Status: DISCONTINUED | OUTPATIENT
Start: 2024-04-08 | End: 2024-04-12 | Stop reason: HOSPADM

## 2024-04-08 RX ORDER — OXCARBAZEPINE 300 MG/1
600 TABLET, FILM COATED ORAL EVERY MORNING
Status: DISCONTINUED | OUTPATIENT
Start: 2024-04-08 | End: 2024-04-12 | Stop reason: HOSPADM

## 2024-04-08 RX ORDER — DEXTROSE 50 % IN WATER (D50W) INTRAVENOUS SYRINGE
12.5
Status: DISCONTINUED | OUTPATIENT
Start: 2024-04-08 | End: 2024-04-12 | Stop reason: HOSPADM

## 2024-04-08 RX ORDER — CEFTRIAXONE 1 G/50ML
1 INJECTION, SOLUTION INTRAVENOUS ONCE
Status: COMPLETED | OUTPATIENT
Start: 2024-04-08 | End: 2024-04-08

## 2024-04-08 RX ORDER — CLONAZEPAM 1 MG/1
1 TABLET ORAL EVERY MORNING
Status: DISCONTINUED | OUTPATIENT
Start: 2024-04-08 | End: 2024-04-12 | Stop reason: HOSPADM

## 2024-04-08 RX ORDER — ACETAMINOPHEN 325 MG/1
650 TABLET ORAL EVERY 6 HOURS PRN
Status: DISCONTINUED | OUTPATIENT
Start: 2024-04-08 | End: 2024-04-12 | Stop reason: HOSPADM

## 2024-04-08 RX ORDER — LEVOTHYROXINE SODIUM 100 UG/1
200 TABLET ORAL
Status: DISCONTINUED | OUTPATIENT
Start: 2024-04-09 | End: 2024-04-12 | Stop reason: HOSPADM

## 2024-04-08 RX ORDER — LOSARTAN POTASSIUM 50 MG/1
50 TABLET ORAL DAILY
Status: DISCONTINUED | OUTPATIENT
Start: 2024-04-08 | End: 2024-04-12 | Stop reason: HOSPADM

## 2024-04-08 RX ORDER — CARBIDOPA AND LEVODOPA 25; 100 MG/1; MG/1
1.5 TABLET ORAL
Status: DISCONTINUED | OUTPATIENT
Start: 2024-04-08 | End: 2024-04-12 | Stop reason: HOSPADM

## 2024-04-08 RX ORDER — BACLOFEN 10 MG/1
20 TABLET ORAL 3 TIMES DAILY
Status: DISCONTINUED | OUTPATIENT
Start: 2024-04-08 | End: 2024-04-12 | Stop reason: HOSPADM

## 2024-04-08 RX ORDER — INSULIN LISPRO 100 [IU]/ML
0-5 INJECTION, SOLUTION INTRAVENOUS; SUBCUTANEOUS
Status: DISCONTINUED | OUTPATIENT
Start: 2024-04-08 | End: 2024-04-12 | Stop reason: HOSPADM

## 2024-04-08 RX ORDER — FORMOTEROL FUMARATE DIHYDRATE 20 UG/2ML
20 SOLUTION RESPIRATORY (INHALATION)
Status: DISCONTINUED | OUTPATIENT
Start: 2024-04-08 | End: 2024-04-12 | Stop reason: HOSPADM

## 2024-04-08 RX ORDER — LEVOTHYROXINE SODIUM 100 UG/1
200 TABLET ORAL
Status: DISCONTINUED | OUTPATIENT
Start: 2024-04-13 | End: 2024-04-08

## 2024-04-08 RX ORDER — CARVEDILOL 25 MG/1
25 TABLET ORAL 2 TIMES DAILY
Status: DISCONTINUED | OUTPATIENT
Start: 2024-04-08 | End: 2024-04-12 | Stop reason: HOSPADM

## 2024-04-08 RX ORDER — OXCARBAZEPINE 300 MG/1
1200 TABLET, FILM COATED ORAL NIGHTLY
Status: DISCONTINUED | OUTPATIENT
Start: 2024-04-08 | End: 2024-04-12 | Stop reason: HOSPADM

## 2024-04-08 RX ORDER — CEPHALEXIN 250 MG/1
250 CAPSULE ORAL DAILY
Qty: 90 CAPSULE | Refills: 3 | Status: SHIPPED | OUTPATIENT
Start: 2024-04-08 | End: 2024-04-25 | Stop reason: SDUPTHER

## 2024-04-08 RX ORDER — PANTOPRAZOLE SODIUM 40 MG/1
40 TABLET, DELAYED RELEASE ORAL
Status: DISCONTINUED | OUTPATIENT
Start: 2024-04-08 | End: 2024-04-12 | Stop reason: HOSPADM

## 2024-04-08 RX ORDER — IPRATROPIUM BROMIDE AND ALBUTEROL SULFATE 2.5; .5 MG/3ML; MG/3ML
3 SOLUTION RESPIRATORY (INHALATION) EVERY 4 HOURS PRN
Status: DISCONTINUED | OUTPATIENT
Start: 2024-04-08 | End: 2024-04-12 | Stop reason: HOSPADM

## 2024-04-08 RX ORDER — AMMONIUM LACTATE 12 G/100G
1 LOTION TOPICAL AS NEEDED
Status: DISCONTINUED | OUTPATIENT
Start: 2024-04-08 | End: 2024-04-12 | Stop reason: HOSPADM

## 2024-04-08 RX ORDER — LEVOTHYROXINE SODIUM 100 UG/1
100 TABLET ORAL
Status: DISCONTINUED | OUTPATIENT
Start: 2024-04-08 | End: 2024-04-08

## 2024-04-08 RX ORDER — METFORMIN HYDROCHLORIDE 500 MG/1
500 TABLET ORAL
Status: DISCONTINUED | OUTPATIENT
Start: 2024-04-08 | End: 2024-04-12 | Stop reason: HOSPADM

## 2024-04-08 RX ORDER — LIDOCAINE 560 MG/1
1 PATCH PERCUTANEOUS; TOPICAL; TRANSDERMAL DAILY
Status: DISCONTINUED | OUTPATIENT
Start: 2024-04-08 | End: 2024-04-12 | Stop reason: HOSPADM

## 2024-04-08 RX ADMIN — TIOTROPIUM BROMIDE INHALATION SPRAY 2 PUFF: 3.12 SPRAY, METERED RESPIRATORY (INHALATION) at 11:24

## 2024-04-08 RX ADMIN — OXCARBAZEPINE 1200 MG: 300 TABLET, FILM COATED ORAL at 22:47

## 2024-04-08 RX ADMIN — POTASSIUM CHLORIDE 40 MEQ: 1500 TABLET, EXTENDED RELEASE ORAL at 16:48

## 2024-04-08 RX ADMIN — GABAPENTIN 900 MG: 300 CAPSULE ORAL at 08:59

## 2024-04-08 RX ADMIN — CLONAZEPAM 1 MG: 1 TABLET ORAL at 09:00

## 2024-04-08 RX ADMIN — CEFTRIAXONE SODIUM 1 G: 1 INJECTION, SOLUTION INTRAVENOUS at 02:01

## 2024-04-08 RX ADMIN — CARVEDILOL 25 MG: 25 TABLET, FILM COATED ORAL at 08:59

## 2024-04-08 RX ADMIN — BACLOFEN 20 MG: 10 TABLET ORAL at 16:47

## 2024-04-08 RX ADMIN — ASPIRIN 81 MG: 81 TABLET, COATED ORAL at 09:01

## 2024-04-08 RX ADMIN — BUMETANIDE 2 MG: 1 TABLET ORAL at 08:59

## 2024-04-08 RX ADMIN — BACLOFEN 20 MG: 10 TABLET ORAL at 09:00

## 2024-04-08 RX ADMIN — BUDESONIDE 0.5 MG: 0.5 INHALANT ORAL at 20:42

## 2024-04-08 RX ADMIN — POLYETHYLENE GLYCOL 3350 17 G: 17 POWDER, FOR SOLUTION ORAL at 09:15

## 2024-04-08 RX ADMIN — ENOXAPARIN SODIUM 40 MG: 40 INJECTION SUBCUTANEOUS at 03:50

## 2024-04-08 RX ADMIN — GABAPENTIN 900 MG: 300 CAPSULE ORAL at 22:46

## 2024-04-08 RX ADMIN — SERTRALINE HYDROCHLORIDE 200 MG: 50 TABLET ORAL at 09:01

## 2024-04-08 RX ADMIN — OXCARBAZEPINE 600 MG: 300 TABLET, FILM COATED ORAL at 09:01

## 2024-04-08 RX ADMIN — ATORVASTATIN CALCIUM 20 MG: 20 TABLET, FILM COATED ORAL at 22:47

## 2024-04-08 RX ADMIN — ONDANSETRON 4 MG: 2 INJECTION INTRAMUSCULAR; INTRAVENOUS at 16:48

## 2024-04-08 RX ADMIN — LOSARTAN POTASSIUM 50 MG: 50 TABLET, FILM COATED ORAL at 09:00

## 2024-04-08 RX ADMIN — ACETAMINOPHEN 650 MG: 325 TABLET ORAL at 22:47

## 2024-04-08 RX ADMIN — ASPIRIN 81 MG: 81 TABLET, COATED ORAL at 22:47

## 2024-04-08 RX ADMIN — PRAZOSIN HYDROCHLORIDE 5 MG: 1 CAPSULE ORAL at 22:46

## 2024-04-08 RX ADMIN — ACETAMINOPHEN 650 MG: 325 TABLET ORAL at 16:47

## 2024-04-08 RX ADMIN — TOPIRAMATE 200 MG: 100 TABLET, FILM COATED ORAL at 22:47

## 2024-04-08 RX ADMIN — CARBIDOPA AND LEVODOPA 1.5 TABLET: 25; 100 TABLET ORAL at 16:48

## 2024-04-08 RX ADMIN — ACETAMINOPHEN 650 MG: 325 TABLET ORAL at 09:15

## 2024-04-08 RX ADMIN — FORMOTEROL FUMARATE DIHYDRATE 20 MCG: 20 SOLUTION RESPIRATORY (INHALATION) at 20:42

## 2024-04-08 RX ADMIN — ACETAMINOPHEN 975 MG: 325 TABLET ORAL at 03:50

## 2024-04-08 RX ADMIN — FORMOTEROL FUMARATE DIHYDRATE 20 MCG: 20 SOLUTION RESPIRATORY (INHALATION) at 11:24

## 2024-04-08 RX ADMIN — POTASSIUM CHLORIDE 40 MEQ: 1500 TABLET, EXTENDED RELEASE ORAL at 08:59

## 2024-04-08 RX ADMIN — TAMSULOSIN HYDROCHLORIDE 0.4 MG: 0.4 CAPSULE ORAL at 09:00

## 2024-04-08 RX ADMIN — AMITRIPTYLINE HYDROCHLORIDE 10 MG: 10 TABLET, FILM COATED ORAL at 22:47

## 2024-04-08 RX ADMIN — BUMETANIDE 2 MG: 1 TABLET ORAL at 16:48

## 2024-04-08 RX ADMIN — DOCUSATE SODIUM 100 MG: 100 CAPSULE, LIQUID FILLED ORAL at 09:01

## 2024-04-08 RX ADMIN — DOCUSATE SODIUM 100 MG: 100 CAPSULE, LIQUID FILLED ORAL at 03:50

## 2024-04-08 RX ADMIN — CARBIDOPA AND LEVODOPA 1.5 TABLET: 25; 100 TABLET ORAL at 12:41

## 2024-04-08 RX ADMIN — CARBIDOPA AND LEVODOPA 1.5 TABLET: 25; 100 TABLET ORAL at 09:12

## 2024-04-08 RX ADMIN — BUDESONIDE 0.5 MG: 0.5 INHALANT ORAL at 11:23

## 2024-04-08 RX ADMIN — LIDOCAINE 4% 1 PATCH: 40 PATCH TOPICAL at 09:03

## 2024-04-08 RX ADMIN — PANTOPRAZOLE SODIUM 40 MG: 40 TABLET, DELAYED RELEASE ORAL at 06:51

## 2024-04-08 RX ADMIN — DOCUSATE SODIUM 100 MG: 100 CAPSULE, LIQUID FILLED ORAL at 22:47

## 2024-04-08 RX ADMIN — TOPIRAMATE 200 MG: 100 TABLET, FILM COATED ORAL at 08:59

## 2024-04-08 RX ADMIN — CARVEDILOL 25 MG: 25 TABLET, FILM COATED ORAL at 22:47

## 2024-04-08 RX ADMIN — BACLOFEN 20 MG: 10 TABLET ORAL at 22:47

## 2024-04-08 RX ADMIN — QUETIAPINE FUMARATE 800 MG: 100 TABLET ORAL at 22:47

## 2024-04-08 RX ADMIN — GABAPENTIN 900 MG: 300 CAPSULE ORAL at 16:47

## 2024-04-08 RX ADMIN — CLONAZEPAM 2 MG: 1 TABLET ORAL at 22:47

## 2024-04-08 SDOH — HEALTH STABILITY: MENTAL HEALTH: HOW MANY STANDARD DRINKS CONTAINING ALCOHOL DO YOU HAVE ON A TYPICAL DAY?: PATIENT DOES NOT DRINK

## 2024-04-08 SDOH — HEALTH STABILITY: MENTAL HEALTH: HOW OFTEN DO YOU HAVE A DRINK CONTAINING ALCOHOL?: NEVER

## 2024-04-08 SDOH — HEALTH STABILITY: MENTAL HEALTH: HOW OFTEN DO YOU HAVE 6 OR MORE DRINKS ON ONE OCCASION?: NEVER

## 2024-04-08 SDOH — SOCIAL STABILITY: SOCIAL INSECURITY: WERE YOU ABLE TO COMPLETE ALL THE BEHAVIORAL HEALTH SCREENINGS?: YES

## 2024-04-08 SDOH — SOCIAL STABILITY: SOCIAL INSECURITY: ABUSE: ADULT

## 2024-04-08 SDOH — ECONOMIC STABILITY: TRANSPORTATION INSECURITY
IN THE PAST 12 MONTHS, HAS THE LACK OF TRANSPORTATION KEPT YOU FROM MEDICAL APPOINTMENTS OR FROM GETTING MEDICATIONS?: NO

## 2024-04-08 SDOH — ECONOMIC STABILITY: TRANSPORTATION INSECURITY
IN THE PAST 12 MONTHS, HAS LACK OF TRANSPORTATION KEPT YOU FROM MEETINGS, WORK, OR FROM GETTING THINGS NEEDED FOR DAILY LIVING?: NO

## 2024-04-08 SDOH — ECONOMIC STABILITY: HOUSING INSECURITY
IN THE LAST 12 MONTHS, WAS THERE A TIME WHEN YOU DID NOT HAVE A STEADY PLACE TO SLEEP OR SLEPT IN A SHELTER (INCLUDING NOW)?: NO

## 2024-04-08 SDOH — SOCIAL STABILITY: SOCIAL INSECURITY: HAVE YOU HAD THOUGHTS OF HARMING ANYONE ELSE?: NO

## 2024-04-08 SDOH — SOCIAL STABILITY: SOCIAL INSECURITY: DO YOU FEEL ANYONE HAS EXPLOITED OR TAKEN ADVANTAGE OF YOU FINANCIALLY OR OF YOUR PERSONAL PROPERTY?: NO

## 2024-04-08 SDOH — ECONOMIC STABILITY: INCOME INSECURITY: HOW HARD IS IT FOR YOU TO PAY FOR THE VERY BASICS LIKE FOOD, HOUSING, MEDICAL CARE, AND HEATING?: NOT HARD AT ALL

## 2024-04-08 SDOH — SOCIAL STABILITY: SOCIAL INSECURITY: ARE YOU OR HAVE YOU BEEN THREATENED OR ABUSED PHYSICALLY, EMOTIONALLY, OR SEXUALLY BY ANYONE?: NO

## 2024-04-08 SDOH — ECONOMIC STABILITY: INCOME INSECURITY: IN THE LAST 12 MONTHS, WAS THERE A TIME WHEN YOU WERE NOT ABLE TO PAY THE MORTGAGE OR RENT ON TIME?: NO

## 2024-04-08 SDOH — SOCIAL STABILITY: SOCIAL INSECURITY: DO YOU FEEL UNSAFE GOING BACK TO THE PLACE WHERE YOU ARE LIVING?: NO

## 2024-04-08 SDOH — SOCIAL STABILITY: SOCIAL INSECURITY: HAS ANYONE EVER THREATENED TO HURT YOUR FAMILY OR YOUR PETS?: NO

## 2024-04-08 SDOH — ECONOMIC STABILITY: HOUSING INSECURITY: IN THE LAST 12 MONTHS, HOW MANY PLACES HAVE YOU LIVED?: 1

## 2024-04-08 SDOH — SOCIAL STABILITY: SOCIAL INSECURITY: DOES ANYONE TRY TO KEEP YOU FROM HAVING/CONTACTING OTHER FRIENDS OR DOING THINGS OUTSIDE YOUR HOME?: NO

## 2024-04-08 SDOH — SOCIAL STABILITY: SOCIAL INSECURITY: ARE THERE ANY APPARENT SIGNS OF INJURIES/BEHAVIORS THAT COULD BE RELATED TO ABUSE/NEGLECT?: NO

## 2024-04-08 ASSESSMENT — LIFESTYLE VARIABLES
SKIP TO QUESTIONS 9-10: 1
HOW OFTEN DO YOU HAVE A DRINK CONTAINING ALCOHOL: NEVER
HOW OFTEN DO YOU HAVE 6 OR MORE DRINKS ON ONE OCCASION: NEVER
AUDIT-C TOTAL SCORE: 0
SKIP TO QUESTIONS 9-10: 1
AUDIT-C TOTAL SCORE: 0
SKIP TO QUESTIONS 9-10: 1
AUDIT-C TOTAL SCORE: 0
HOW MANY STANDARD DRINKS CONTAINING ALCOHOL DO YOU HAVE ON A TYPICAL DAY: PATIENT DOES NOT DRINK
AUDIT-C TOTAL SCORE: 0

## 2024-04-08 ASSESSMENT — PAIN - FUNCTIONAL ASSESSMENT: PAIN_FUNCTIONAL_ASSESSMENT: 0-10

## 2024-04-08 ASSESSMENT — ACTIVITIES OF DAILY LIVING (ADL)
HEARING - RIGHT EAR: FUNCTIONAL
BATHING: NEEDS ASSISTANCE
JUDGMENT_ADEQUATE_SAFELY_COMPLETE_DAILY_ACTIVITIES: YES
ADEQUATE_TO_COMPLETE_ADL: YES
FEEDING YOURSELF: NEEDS ASSISTANCE
TOILETING: NEEDS ASSISTANCE
PATIENT'S MEMORY ADEQUATE TO SAFELY COMPLETE DAILY ACTIVITIES?: YES
HEARING - LEFT EAR: FUNCTIONAL
ASSISTIVE_DEVICE: WALKER;EYEGLASSES
GROOMING: NEEDS ASSISTANCE
DRESSING YOURSELF: NEEDS ASSISTANCE
WALKS IN HOME: NEEDS ASSISTANCE
LACK_OF_TRANSPORTATION: NO

## 2024-04-08 ASSESSMENT — PAIN DESCRIPTION - ORIENTATION: ORIENTATION: RIGHT

## 2024-04-08 ASSESSMENT — COGNITIVE AND FUNCTIONAL STATUS - GENERAL
TOILETING: A LOT
MOVING FROM LYING ON BACK TO SITTING ON SIDE OF FLAT BED WITH BEDRAILS: A LITTLE
WALKING IN HOSPITAL ROOM: A LOT
MOVING TO AND FROM BED TO CHAIR: A LOT
HELP NEEDED FOR BATHING: A LOT
DRESSING REGULAR UPPER BODY CLOTHING: A LITTLE
DRESSING REGULAR LOWER BODY CLOTHING: A LOT
TURNING FROM BACK TO SIDE WHILE IN FLAT BAD: A LITTLE
CLIMB 3 TO 5 STEPS WITH RAILING: TOTAL
PATIENT BASELINE BEDBOUND: NO
EATING MEALS: A LITTLE
PERSONAL GROOMING: A LOT
MOBILITY SCORE: 13
DAILY ACTIVITIY SCORE: 14
STANDING UP FROM CHAIR USING ARMS: A LOT

## 2024-04-08 ASSESSMENT — PAIN SCALES - WONG BAKER: WONGBAKER_NUMERICALRESPONSE: NO HURT

## 2024-04-08 ASSESSMENT — PATIENT HEALTH QUESTIONNAIRE - PHQ9
SUM OF ALL RESPONSES TO PHQ9 QUESTIONS 1 & 2: 2
1. LITTLE INTEREST OR PLEASURE IN DOING THINGS: SEVERAL DAYS
2. FEELING DOWN, DEPRESSED OR HOPELESS: SEVERAL DAYS

## 2024-04-08 ASSESSMENT — PAIN SCALES - GENERAL
PAINLEVEL_OUTOF10: 0 - NO PAIN
PAINLEVEL_OUTOF10: 10 - WORST POSSIBLE PAIN
PAINLEVEL_OUTOF10: 4

## 2024-04-08 ASSESSMENT — PAIN DESCRIPTION - LOCATION: LOCATION: KNEE

## 2024-04-08 NOTE — PROGRESS NOTES
Pharmacy Medication History Review    Prema Hair is a 59 y.o. female admitted for Delirium. Pharmacy reviewed the patient's [id number] medications and allergies for accuracy.    The list below reflectives the updated PTA list. Please review each medication in order reconciliation for additional clarification and justification.  Prior to Admission Medications   Prescriptions Last Dose Informant     DULoxetine (Cymbalta) 30 mg DR capsule       Sig: Take 1 capsule (30 mg) by mouth once daily.   Klor-Con M20 20 mEq ER tablet 4/6/2024      Sig: Take 2 tablets (40 mEq) by mouth 2 times a day.   OXcarbazepine (Trileptal) 600 mg tablet 4/6/2024      Sig: Take 1 tablet (600 mg) by mouth once daily in the morning.   OXcarbazepine (Trileptal) 600 mg tablet 4/6/2024      Sig: Take 2 tablets (1,200 mg) by mouth once daily at bedtime.   QUEtiapine (SeroqueL) 400 mg tablet 4/6/2024      Sig: Take 2 tablets (800 mg) by mouth once daily at bedtime.   Trelegy Ellipta 200-62.5-25 mcg blister with device Past Month      Sig: Inhale 1 puff once daily as needed.   acetaminophen (Tylenol) 500 mg tablet Past Week      Sig: Take 1 tablet (500 mg) by mouth every 4 hours if needed.   albuterol 90 mcg/actuation inhaler Past Week      Sig: Inhale 2 puffs every 4 hours if needed.   amitriptyline (Elavil) 10 mg tablet 4/6/2024      Sig: Take 1 tablet (10 mg) by mouth once daily at bedtime.   ammonium lactate (Lac-Hydrin) 12 % lotion Past Week      Sig: Apply 1 Application topically if needed.   ascorbic acid (Vitamin C) 1,000 mg tablet 4/6/2024      Sig: Take 1 tablet (1,000 mg) by mouth once daily.   aspirin 81 mg EC tablet 4/6/2024      Sig: Take 1 tablet (81 mg) by mouth 2 times a day for 28 days.   atorvastatin (Lipitor) 20 mg tablet 4/6/2024      Sig: Take 1 tablet (20 mg) by mouth once daily at bedtime.   azelastine (Astelin) 137 mcg (0.1 %) nasal spray Past Month      Sig: Administer 1 spray into each nostril 2 times a day.    baclofen (Lioresal) 20 mg tablet 4/6/2024      Sig: TAKE 1 TABLET BY MOUTH THREE TIMES A DAY   budesonide-formoteroL (Symbicort) 160-4.5 mcg/actuation inhaler Past Month      Sig: Inhale 2 puffs 2 times a day.   bumetanide (Bumex) 2 mg tablet 4/6/2024      Sig: Take 1 tablet (2 mg) by mouth 2 times a day.   calcium carbonate (Oscal) 500 mg calcium (1,250 mg) tablet Past Week      Sig: Take 1 tablet (1,250 mg) by mouth 3 times a day.   carbidopa-levodopa (Sinemet)  mg tablet 4/6/2024 Care Giver     Sig: TAKE 1/2 TABLET 3 TIMES DAILY AT 8 AM, 12 PM, 4 PM X2 WEEKS, THEN 1 TABLET 3 TIMES DAILY IF NO SIDE EFFECTS   Patient taking differently: 1.5 tablets 3x daily   carvedilol (Coreg) 25 mg tablet 4/6/2024      Sig: Take 1 tablet (25 mg) by mouth 2 times a day.   cephalexin (Keflex) 250 mg capsule 4/6/2024      Sig: Take 1 capsule (250 mg) by mouth once daily.   Patient taking differently: Take 2 capsules (500 mg) by mouth once daily.   cholecalciferol (Vitamin D-3) 50 MCG (2000 UT) tablet Past Week      Sig: Take 1 tablet (2,000 Units) by mouth once daily.   clonazePAM (KlonoPIN) 1 mg tablet 4/6/2024      Sig: Take 1 tablet (1 mg) by mouth once daily in the morning. 1/2 TABLET BY MOUTH EVERY EVENING AND AN ADDITIONAL 1/2 AS NEEDED   clonazePAM (KlonoPIN) 1 mg tablet 4/6/2024      Sig: Take 2 tablets (2 mg) by mouth once daily in the evening.   diclofenac sodium (Voltaren) 1 % gel gel Unknown      Sig: Apply 4.5 inches (1 Application) topically 3 times a day as needed.   docusate sodium (Colace) 100 mg capsule Past Month      Sig: Take 1 capsule (100 mg) by mouth 2 times a day.   estradiol (Estrace) 0.01 % (0.1 mg/gram) vaginal cream Past Month      Sig: Insert 1 Application into the vagina 3 (three) times a week. Pea-sized amount   formoterol (Perforomist) 20 mcg/2 mL nebulizer solution 4/5/2024      Sig: Inhale 2 mL (20 mcg) every 12 hours.   gabapentin (Neurontin) 300 mg capsule 4/6/2024      Sig: Take 3  capsules (900 mg) by mouth 3 times a day.   insulin lispro (HumaLOG) 100 unit/mL injection       Sig: Inject 0-0.1 mL (0-10 Units) under the skin 3 times a day with meals. If Blood Glucose is between 0-70= 0 unit(s); =2 unit(s); 151-200= 4 unit(s) 201-250=6 unit(s); 251-300=8 unit(s); 301-350=10 unit(s); 351-400 Notify provider   ipratropium-albuteroL (Duo-Neb) 0.5-2.5 mg/3 mL nebulizer solution Past Month      Sig: Inhale 3 mL every 4 hours if needed.   ketorolac (Toradol) 10 mg tablet       Sig: Take 1 tablet (10 mg) by mouth every 8 hours for 3 days.   ketorolac (Toradol) 10 mg tablet Past Week      Sig: Take 1 tablet (10 mg) by mouth every 8 hours if needed for severe pain (7 - 10) (x 3 days).   lancets misc Unknown      Si each 3 times a day.   levothyroxine (Synthroid, Levoxyl) 100 mcg tablet 2024      Sig: Take 1 tablet (100 mcg) by mouth once daily (M-F). 1 tablet 5 days a week Mon-Friday and two tabs on  and Sundays   levothyroxine (Synthroid, Levoxyl) 200 mcg tablet 2024      Sig: Take 1 tablet (200 mcg) by mouth 2 times a week. Saturday and    lidocaine (Lidoderm) 5 % patch Past Month      Sig: APPLY 1 PATCH AND LEAVE IN PLACE FOR 12 HOURS, THEN REMOVE AND LEAVE OFF FOR 12 HOURS   losartan (Cozaar) 50 mg tablet 2024      Sig: Take 1 tablet (50 mg) by mouth once daily.   meloxicam (Mobic) 15 mg tablet Past Week      Sig: Take 1 tablet (15 mg) by mouth once daily.   metFORMIN (Glucophage) 500 mg tablet 2024      Sig: Take 1 tablet (500 mg) by mouth 2 times a day with meals.             omega-3 fatty acids-fish oil 360-1,200 mg capsule Past Week      Sig: Take 1 capsule (1,200 mg) by mouth once daily.   omeprazole (PriLOSEC) 40 mg DR capsule Past Week      Sig: Take 1 capsule (40 mg) by mouth once daily. With dinner             oxyCODONE-acetaminophen (Percocet) 5-325 mg tablet Past Week      Sig: Take 1 tablet by mouth every 6 hours if needed for severe pain (7 -  10).   pantoprazole (ProtoNix) 40 mg EC tablet 4/6/2024      Sig: Take 1 tablet (40 mg) by mouth once daily in the morning. Take before meals.   polyethylene glycol (Glycolax, Miralax) 17 gram/dose powder Past Month      Sig: Take 17 g by mouth once daily. In 8oz of water, juice, or tea and drink daily   prazosin (Minipress) 5 mg capsule 4/6/2024      Sig: Take 1 capsule (5 mg) by mouth once daily at bedtime.   sertraline (Zoloft) 100 mg tablet 4/6/2024      Sig: Take 2 tablets (200 mg) by mouth once daily. Do not start before January 2, 2024.   tamsulosin (Flomax) 0.4 mg 24 hr capsule 4/5/2024      Sig: Take 1 capsule (0.4 mg) by mouth once daily.   tiZANidine (Zanaflex) 4 mg tablet 4/6/2024      Sig: Take 1 tablet (4 mg) by mouth 3 times a day.   tiotropium (Spiriva Respimat) 2.5 mcg/actuation inhaler Past Month      Sig: Inhale 2 puffs once daily.   topiramate (Topamax) 200 mg tablet 4/6/2024      Sig: Take 1 tablet (200 mg) by mouth 2 times a day.      Facility-Administered Medications: None      Allergies  Reviewed by Richard Soler RN on 4/8/2024        Severity Reactions Comments    Lisinopril Medium Swelling             Below are additional concerns with the patient's PTA list.  Patient and daughter verified all medications and doses.    Keren Broussard

## 2024-04-08 NOTE — PROGRESS NOTES
Emergency Medicine Transition of Care Note.    I received Prema Hair in signout from Dr. Leon.  Please see the previous ED provider note for all HPI, PE and MDM up to the time of signout at 8 PM . This is in addition to the primary record.    In brief Prema Hair is an 59 y.o. female presenting for   Chief Complaint   Patient presents with    Lethargy     At the time of signout we were awaiting: imaging patient was received in signout.    Diagnoses as of 04/08/24 0154   Delirium   Acute cystitis without hematuria       Medical Decision Making  She does present with worsening delirium, worsening weakness and multiple falls.  Trauma imaging was negative.  Per discussion with daughter patient has not been eating well for the last 2 days.  Concern for worsening mental status overall.  She does have a known history of UTIs.  Her labs do not show any elevated blood cell count for systemic infection.  No acute kidney injury.  No CK for rhabdo.  Negative troponin.  She did have Tylenol noted at 27 but no other acute toxicities.  No acute kidney injury.  She did have CT scan of the head which was negative for acute bleed.  Given patient's persistent delirium show require admission for further evaluation and management.  Case is signed out to Dr. Nguyễn for admission and reassessment      Amount and/or Complexity of Data Reviewed  Labs: ordered. Decision-making details documented in ED Course.  Radiology: ordered. Decision-making details documented in ED Course.        Final diagnoses:   [R41.0] Delirium   [N30.00] Acute cystitis without hematuria           Procedure  Procedures    Femi Rich MD

## 2024-04-08 NOTE — CONSULTS
INITIAL NEUROLOGY CONSULT NOTE    IMPRESSION:  Transient encephalopathy.  Most likely, mild concussion in the context of closed head injury with one of her falls.  UTI not yet ruled out as U/A pending.  Presentation not suggestive of stroke or TIA.    RECOMMENDATIONS:  Awaiting U/A.  No other acute neurological intervention required at this time.  I am happy to see her again as needed or as requested.    David Swanson Jr., M.D., FAAN       History Of Present Illness  Prema Hair is a 59 y.o. female presenting with altered mentation.    I see this patient on an outpatient basis for treatment of cervical torticollis with botulinum toxin.  I last saw her in 2/2024 for her routine injection.  She is currently at her cognitive baseline.    She was found lethargic yesterday evening after having been last well the night before.  She had reduced oral input in the prior two days and had been imbalanced when walking because of bilateral knee pain, more so in the right knee that was operated on 3/27/24.      Her daughter summoned EMS and she [place] [facility] [place]. She is cognitively better today than at admission.    She denies other focal neurological symptoms including dysarthria, dysphagia, diplopia, focal weakness, focal sensory change, ataxia, vertigo, or bowel/bladder incontinence, among others.      Past Medical History  Past Medical History:   Diagnosis Date    CATALINA positive     Arthritis     Asthma     Bell's palsy     x 2    Bilateral leg edema     Bipolar disorder (CMS/[facility])     CHF (congestive heart failure) (CMS/[facility])     Chronic allergic rhinitis     Chronic constipation     Diabetes mellitus (CMS/[facility])     Diabetic neuropathy (CMS/[facility])     Dysphonia 07/05/2022    Muscle tension dysphonia    Fibromyalgia     GERD (gastroesophageal reflux disease)     under control with medication    High pulmonary arterial pressure (CMS/[facility])     moderately elevated on ECHO 10.5.23    Hyperlipidemia,  unspecified 01/03/2023    Dyslipidemia    Hypertension     Hypokalemia     1.1.24- K 3.8 - on oral supplements    Hypothyroidism     Low back pain, unspecified 02/22/2021    Low back pain    Moderate tricuspid regurgitation     Obstructive sleep apnea (adult) (pediatric) 01/03/2023    ZEINA on CPAP    Paralysis of vocal cords and larynx, 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    Laryngeal paresis    Parkinsonism     Pseudoaneurysm of carotid artery (CMS/HCC) 2020    s/p right pipeline and coil embolization of Right ICA pseudoaneurysm    PTSD (post-traumatic stress disorder)     Thyroid cancer (CMS/[facility])     s/p total thyroidectomy, residual tissue on left side - FNA benign 2020 and 2021    Torticollis     botox injections    Vitamin D deficiency      Surgical History  Past Surgical History:   Procedure Laterality Date    COLONOSCOPY      KNEE ARTHROPLASTY      OTHER SURGICAL HISTORY Right 2020    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    TOTAL THYROIDECTOMY  2017    US GUIDED THYROID BIOPSY  11/19/2020    US GUIDED THYROID BIOPSY 11/19/2020 [facility]    US GUIDED THYROID BIOPSY  11/19/2020    US GUIDED THYROID BIOPSY 11/19/2020 [facility]     Social History  Social History     Tobacco Use    Smoking status: Never    Smokeless tobacco: Never   Substance Use Topics    Alcohol use: Not Currently    Drug use: Not Currently     Comment: medical marijuana last used >1 year ago       Scheduled medications  amitriptyline, 10 mg, oral, Nightly  aspirin, 81 mg, oral, BID  atorvastatin, 20 mg, oral, Nightly  baclofen, 20 mg, oral, TID  budesonide, 0.5 mg, nebulization, BID  bumetanide, 2 mg, oral, BID  carbidopa-levodopa, 1.5 tablet, oral, 3 times per day  carvedilol, 25 mg, oral, BID  clonazePAM, 1 mg, oral, q AM  clonazePAM, 2 mg, oral, q PM  docusate sodium, 100 mg, oral, BID  enoxaparin, 40 mg, subcutaneous, q24h  formoterol, 20 mcg, nebulization, BID  gabapentin, 900 mg, oral, TID  insulin lispro, 0-5 Units,  subcutaneous, Before meals & nightly  levothyroxine, 100 mcg, oral, Once per day on Mon Tue Wed Thu Fri  [START ON 4/13/2024] levothyroxine, 200 mcg, oral, Once per day on Sun Sat  lidocaine, 1 patch, transdermal, Daily  losartan, 50 mg, oral, Daily  metFORMIN, 500 mg, oral, BID with meals  OXcarbazepine, 1,200 mg, oral, Nightly  OXcarbazepine, 600 mg, oral, q AM  pantoprazole, 40 mg, oral, Daily before breakfast  polyethylene glycol, 17 g, oral, Daily  potassium chloride CR, 40 mEq, oral, BID with meals  prazosin, 5 mg, oral, Nightly  QUEtiapine, 800 mg, oral, Nightly  sertraline, 200 mg, oral, Daily  tamsulosin, 0.4 mg, oral, Daily  tiotropium, 2 puff, inhalation, Daily  topiramate, 200 mg, oral, BID      Continuous medications     PRN medications  PRN medications: acetaminophen, ammonium lactate, dextrose, dextrose, diclofenac sodium, glucagon, glucagon, ipratropium-albuteroL, ondansetron      Family History   Problem Relation Name Age of Onset    Heart failure Mother      Pancreatic cancer Mother      Heart failure Father      Parkinsonism Father      Breast cancer Sister       Allergies  Lisinopril  Medications Prior to Admission   Medication Sig Dispense Refill Last Dose    budesonide-formoteroL (Symbicort) 160-4.5 mcg/actuation inhaler Inhale 2 puffs 2 times a day.   Past Month    DULoxetine (Cymbalta) 30 mg DR capsule Take 1 capsule (30 mg) by mouth once daily.       formoterol (Perforomist) 20 mcg/2 mL nebulizer solution Inhale 2 mL (20 mcg) every 12 hours.   4/5/2024    insulin lispro (HumaLOG) 100 unit/mL injection Inject 0-0.1 mL (0-10 Units) under the skin 3 times a day with meals. If Blood Glucose is between 0-70= 0 unit(s); =2 unit(s); 151-200= 4 unit(s) 201-250=6 unit(s); 251-300=8 unit(s); 301-350=10 unit(s); 351-400 Notify provider       metOLazone (Zaroxolyn) 2.5 mg tablet Take 1 tablet (2.5 mg) by mouth once daily.       pantoprazole (ProtoNix) 40 mg EC tablet Take 1 tablet (40 mg) by mouth  once daily in the morning. Take before meals.   4/6/2024    tiotropium (Spiriva Respimat) 2.5 mcg/actuation inhaler Inhale 2 puffs once daily.   Past Month    acetaminophen (Tylenol) 500 mg tablet Take 1 tablet (500 mg) by mouth every 4 hours if needed.   Past Week    albuterol 90 mcg/actuation inhaler Inhale 2 puffs every 4 hours if needed.   Past Week    amitriptyline (Elavil) 10 mg tablet Take 1 tablet (10 mg) by mouth once daily at bedtime.   4/6/2024    ammonium lactate (Lac-Hydrin) 12 % lotion Apply 1 Application topically if needed.   Past Week    ascorbic acid (Vitamin C) 1,000 mg tablet Take 1 tablet (1,000 mg) by mouth once daily.   4/6/2024    aspirin 81 mg EC tablet Take 1 tablet (81 mg) by mouth 2 times a day for 28 days. 56 tablet 0 4/6/2024    atorvastatin (Lipitor) 20 mg tablet Take 1 tablet (20 mg) by mouth once daily at bedtime. 90 tablet 3 4/6/2024    azelastine (Astelin) 137 mcg (0.1 %) nasal spray Administer 1 spray into each nostril 2 times a day.   Past Month    baclofen (Lioresal) 20 mg tablet TAKE 1 TABLET BY MOUTH THREE TIMES A  tablet 1 4/6/2024    bumetanide (Bumex) 2 mg tablet Take 1 tablet (2 mg) by mouth 2 times a day.   4/6/2024    calcium carbonate (Oscal) 500 mg calcium (1,250 mg) tablet Take 1 tablet (1,250 mg) by mouth 3 times a day.   Past Week    carbidopa-levodopa (Sinemet)  mg tablet TAKE 1/2 TABLET 3 TIMES DAILY AT 8 AM, 12 PM, 4 PM X2 WEEKS, THEN 1 TABLET 3 TIMES DAILY IF NO SIDE EFFECTS (Patient taking differently: 1.5 tablets 3x daily) 270 tablet 2 4/6/2024    carvedilol (Coreg) 25 mg tablet Take 1 tablet (25 mg) by mouth 2 times a day. 180 tablet 1 4/6/2024    cephalexin (Keflex) 250 mg capsule Take 1 capsule (250 mg) by mouth once daily. (Patient taking differently: Take 2 capsules (500 mg) by mouth once daily.) 90 capsule 3 4/6/2024    cholecalciferol (Vitamin D-3) 50 MCG (2000 UT) tablet Take 1 tablet (2,000 Units) by mouth once daily.   Past Week     clonazePAM (KlonoPIN) 1 mg tablet Take 1 tablet (1 mg) by mouth once daily in the morning. 1/2 TABLET BY MOUTH EVERY EVENING AND AN ADDITIONAL 1/2 AS NEEDED   4/6/2024    clonazePAM (KlonoPIN) 1 mg tablet Take 2 tablets (2 mg) by mouth once daily in the evening.   4/6/2024    diclofenac sodium (Voltaren) 1 % gel gel Apply 4.5 inches (1 Application) topically 3 times a day as needed.   Unknown    docusate sodium (Colace) 100 mg capsule Take 1 capsule (100 mg) by mouth 2 times a day. 60 capsule 0 Past Month    estradiol (Estrace) 0.01 % (0.1 mg/gram) vaginal cream Insert 1 Application into the vagina 3 (three) times a week. Pea-sized amount   Past Month    gabapentin (Neurontin) 300 mg capsule Take 3 capsules (900 mg) by mouth 3 times a day.   4/6/2024    ipratropium-albuteroL (Duo-Neb) 0.5-2.5 mg/3 mL nebulizer solution Inhale 3 mL every 4 hours if needed.   Past Month    ketorolac (Toradol) 10 mg tablet Take 1 tablet (10 mg) by mouth every 8 hours if needed for severe pain (7 - 10) (x 3 days).   Past Week    Klor-Con M20 20 mEq ER tablet Take 2 tablets (40 mEq) by mouth 2 times a day. 360 tablet 3 4/6/2024    lancets misc 1 each 3 times a day.   Unknown    levothyroxine (Synthroid, Levoxyl) 100 mcg tablet Take 1 tablet (100 mcg) by mouth once daily (M-F). 1 tablet 5 days a week Mon-Friday and two tabs on Saturdays and Sundays   4/5/2024    levothyroxine (Synthroid, Levoxyl) 200 mcg tablet Take 1 tablet (200 mcg) by mouth 2 times a week. Saturday and sunday 4/6/2024    lidocaine (Lidoderm) 5 % patch APPLY 1 PATCH AND LEAVE IN PLACE FOR 12 HOURS, THEN REMOVE AND LEAVE OFF FOR 12 HOURS 30 patch 11 Past Month    losartan (Cozaar) 50 mg tablet Take 1 tablet (50 mg) by mouth once daily. 90 tablet 3 4/6/2024    meloxicam (Mobic) 15 mg tablet Take 1 tablet (15 mg) by mouth once daily.   Past Week    metFORMIN (Glucophage) 500 mg tablet Take 1 tablet (500 mg) by mouth 2 times a day with meals.   4/6/2024    omega-3 fatty  acids-fish oil 360-1,200 mg capsule Take 1 capsule (1,200 mg) by mouth once daily.   Past Week    omeprazole (PriLOSEC) 40 mg DR capsule Take 1 capsule (40 mg) by mouth once daily. With dinner   Past Week    OXcarbazepine (Trileptal) 600 mg tablet Take 1 tablet (600 mg) by mouth once daily in the morning.   2024    OXcarbazepine (Trileptal) 600 mg tablet Take 2 tablets (1,200 mg) by mouth once daily at bedtime.   2024    [] oxyCODONE-acetaminophen (Percocet) 5-325 mg tablet Take 1 tablet by mouth every 6 hours if needed for severe pain (7 - 10) for up to 7 days. 28 tablet 0     oxyCODONE-acetaminophen (Percocet) 5-325 mg tablet Take 1 tablet by mouth every 6 hours if needed for severe pain (7 - 10).   Past Week    polyethylene glycol (Glycolax, Miralax) 17 gram/dose powder Take 17 g by mouth once daily. In 8oz of water, juice, or tea and drink daily   Past Month    prazosin (Minipress) 5 mg capsule Take 1 capsule (5 mg) by mouth once daily at bedtime.   2024    QUEtiapine (SeroqueL) 400 mg tablet Take 2 tablets (800 mg) by mouth once daily at bedtime.   2024    sertraline (Zoloft) 100 mg tablet Take 2 tablets (200 mg) by mouth once daily. Do not start before 2024. 30 tablet 0 2024    tamsulosin (Flomax) 0.4 mg 24 hr capsule Take 1 capsule (0.4 mg) by mouth once daily. 30 capsule 11 2024    tiZANidine (Zanaflex) 4 mg tablet Take 1 tablet (4 mg) by mouth 3 times a day.   2024    topiramate (Topamax) 200 mg tablet Take 1 tablet (200 mg) by mouth 2 times a day. 60 tablet 3 2024    Trelegy Ellipta 200-62.5-25 mcg blister with device Inhale 1 puff once daily as needed.   Past Month       Scheduled medications  amitriptyline, 10 mg, oral, Nightly  aspirin, 81 mg, oral, BID  atorvastatin, 20 mg, oral, Nightly  baclofen, 20 mg, oral, TID  budesonide, 0.5 mg, nebulization, BID  bumetanide, 2 mg, oral, BID  carbidopa-levodopa, 1.5 tablet, oral, 3 times per day  carvedilol, 25  "mg, oral, BID  clonazePAM, 1 mg, oral, q AM  clonazePAM, 2 mg, oral, q PM  docusate sodium, 100 mg, oral, BID  enoxaparin, 40 mg, subcutaneous, q24h  formoterol, 20 mcg, nebulization, BID  gabapentin, 900 mg, oral, TID  insulin lispro, 0-5 Units, subcutaneous, Before meals & nightly  levothyroxine, 100 mcg, oral, Once per day on Mon Tue Wed Thu Fri  [START ON 4/13/2024] levothyroxine, 200 mcg, oral, Once per day on Sun Sat  lidocaine, 1 patch, transdermal, Daily  losartan, 50 mg, oral, Daily  metFORMIN, 500 mg, oral, BID with meals  OXcarbazepine, 1,200 mg, oral, Nightly  OXcarbazepine, 600 mg, oral, q AM  pantoprazole, 40 mg, oral, Daily before breakfast  polyethylene glycol, 17 g, oral, Daily  potassium chloride CR, 40 mEq, oral, BID with meals  prazosin, 5 mg, oral, Nightly  QUEtiapine, 800 mg, oral, Nightly  sertraline, 200 mg, oral, Daily  tamsulosin, 0.4 mg, oral, Daily  tiotropium, 2 puff, inhalation, Daily  topiramate, 200 mg, oral, BID      Continuous medications     PRN medications  PRN medications: acetaminophen, ammonium lactate, dextrose, dextrose, diclofenac sodium, glucagon, glucagon, ipratropium-albuteroL, ondansetron    Review of Systems   All other systems reviewed and are negative.      Last Recorded Vitals  Blood pressure (!) 149/93, pulse 78, temperature 36.4 °C (97.5 °F), temperature source Temporal, resp. rate 18, height 1.702 m (5' 7\"), weight 100 kg (221 lb), SpO2 99 %.    CONSTITUTIONAL:  No acute distress    CARDIOVASCULAR:  Normal pulses in the distal legs, no edema of either arm or either leg.  No carotid bruits.    MENTAL STATUS:  Awake, alert, fully oriented to self, place, and time, with present short-term memory, good awareness of recent events, normal attention span, concentration, and fund of knowledge.    SPEECH AND LANGUAGE:  Can name and repeat, follows all commands, has no dysarthria    FUNDOSCOPIC:  No papilledema    CRANIAL NERVES:  II-Vision present, visual fields full to " confrontational testing    III/IV/VI--EOMs are present in all directions.  Pupils are symmetrically reactive in dim light.  No ptosis.    V--Normal facial sensation.    VII--No facial asymmetry.    VIII--Hearing present to voice bilaterally.    IX/X--Symmetric soft palate rise.    XI--Normal trapezius power bilaterally.    XII--Tongue protrudes without deviation.    MOTOR:  Normal power, tone, and bulk in both arms and both legs.  Limited power of the right leg around the knee because of severe pain.    SENSORY:  Normal pin sensation in both arms and both legs without distal-proximal gradient, asymmetry, or spinal sensory level.    COORDINATION:  Normal finger-to-nose and heel-to-shin testing in both arms and both legs.    REFLEXES are normal and symmetric at the biceps, triceps, brachioradialis, patellae, and ankles.  The plantar responses are flexor.    GAIT deferred because of severe right knee pain.    Relevant Results  Results for orders placed or performed during the hospital encounter of 04/07/24 (from the past 24 hour(s))   Electrocardiogram, 12-lead PRN ACS symptoms   Result Value Ref Range    Ventricular Rate 68 BPM    Atrial Rate 68 BPM    MO Interval 190 ms    QRS Duration 90 ms    QT Interval 438 ms    QTC Calculation(Bazett) 465 ms    P Axis 62 degrees    R Axis 18 degrees    T Axis 31 degrees    QRS Count 11 beats    Q Onset 217 ms    P Onset 122 ms    P Offset 183 ms    T Offset 436 ms    QTC Fredericia 456 ms   CBC and Auto Differential   Result Value Ref Range    WBC 8.3 4.4 - 11.3 x10*3/uL    nRBC 0.0 0.0 - 0.0 /100 WBCs    RBC 3.84 (L) 4.00 - 5.20 x10*6/uL    Hemoglobin 10.9 (L) 12.0 - 16.0 g/dL    Hematocrit 35.0 (L) 36.0 - 46.0 %    MCV 91 80 - 100 fL    MCH 28.4 26.0 - 34.0 pg    MCHC 31.1 (L) 32.0 - 36.0 g/dL    RDW 16.8 (H) 11.5 - 14.5 %    Platelets 301 150 - 450 x10*3/uL    Neutrophils % 69.8 40.0 - 80.0 %    Immature Granulocytes %, Automated 0.8 0.0 - 0.9 %    Lymphocytes % 16.6 13.0 -  44.0 %    Monocytes % 9.6 2.0 - 10.0 %    Eosinophils % 2.4 0.0 - 6.0 %    Basophils % 0.8 0.0 - 2.0 %    Neutrophils Absolute 5.80 1.20 - 7.70 x10*3/uL    Immature Granulocytes Absolute, Automated 0.07 0.00 - 0.70 x10*3/uL    Lymphocytes Absolute 1.38 1.20 - 4.80 x10*3/uL    Monocytes Absolute 0.80 0.10 - 1.00 x10*3/uL    Eosinophils Absolute 0.20 0.00 - 0.70 x10*3/uL    Basophils Absolute 0.07 0.00 - 0.10 x10*3/uL   Magnesium   Result Value Ref Range    Magnesium 2.10 1.60 - 2.40 mg/dL   Comprehensive metabolic panel   Result Value Ref Range    Glucose 100 (H) 74 - 99 mg/dL    Sodium 135 (L) 136 - 145 mmol/L    Potassium 4.2 3.5 - 5.3 mmol/L    Chloride 99 98 - 107 mmol/L    Bicarbonate 26 21 - 32 mmol/L    Anion Gap 14 10 - 20 mmol/L    Urea Nitrogen 26 (H) 6 - 23 mg/dL    Creatinine 0.83 0.50 - 1.05 mg/dL    eGFR 81 >60 mL/min/1.73m*2    Calcium 8.7 8.6 - 10.3 mg/dL    Albumin 3.7 3.4 - 5.0 g/dL    Alkaline Phosphatase 135 (H) 33 - 110 U/L    Total Protein 6.4 6.4 - 8.2 g/dL    AST 19 9 - 39 U/L    Bilirubin, Total 0.5 0.0 - 1.2 mg/dL    ALT 6 (L) 7 - 45 U/L   Troponin I, High Sensitivity   Result Value Ref Range    Troponin I, High Sensitivity 3 0 - 13 ng/L   Sars-CoV-2 and Influenza A/B PCR   Result Value Ref Range    Flu A Result Not Detected Not Detected    Flu B Result Not Detected Not Detected    Coronavirus 2019, PCR Not Detected Not Detected   Acute Toxicology Panel, Blood   Result Value Ref Range    Acetaminophen 27.2 10.0 - 30.0 ug/mL    Salicylate  <3 4 - 20 mg/dL    Alcohol <10 <=10 mg/dL   Creatine Kinase   Result Value Ref Range    Creatine Kinase 108 0 - 215 U/L   POCT GLUCOSE   Result Value Ref Range    POCT Glucose 92 74 - 99 mg/dL   POCT GLUCOSE   Result Value Ref Range    POCT Glucose 71 (L) 74 - 99 mg/dL   Comprehensive metabolic panel   Result Value Ref Range    Glucose 84 74 - 99 mg/dL    Sodium 137 136 - 145 mmol/L    Potassium 3.9 3.5 - 5.3 mmol/L    Chloride 101 98 - 107 mmol/L     Bicarbonate 26 21 - 32 mmol/L    Anion Gap 14 10 - 20 mmol/L    Urea Nitrogen 22 6 - 23 mg/dL    Creatinine 0.64 0.50 - 1.05 mg/dL    eGFR >90 >60 mL/min/1.73m*2    Calcium 8.3 (L) 8.6 - 10.3 mg/dL    Albumin 3.4 3.4 - 5.0 g/dL    Alkaline Phosphatase 124 (H) 33 - 110 U/L    Total Protein 5.7 (L) 6.4 - 8.2 g/dL    AST 15 9 - 39 U/L    Bilirubin, Total 0.4 0.0 - 1.2 mg/dL    ALT 13 7 - 45 U/L   CBC and Auto Differential   Result Value Ref Range    WBC 7.2 4.4 - 11.3 x10*3/uL    nRBC 0.0 0.0 - 0.0 /100 WBCs    RBC 3.76 (L) 4.00 - 5.20 x10*6/uL    Hemoglobin 10.6 (L) 12.0 - 16.0 g/dL    Hematocrit 35.3 (L) 36.0 - 46.0 %    MCV 94 80 - 100 fL    MCH 28.2 26.0 - 34.0 pg    MCHC 30.0 (L) 32.0 - 36.0 g/dL    RDW 17.2 (H) 11.5 - 14.5 %    Platelets 291 150 - 450 x10*3/uL    Neutrophils % 66.8 40.0 - 80.0 %    Immature Granulocytes %, Automated 0.3 0.0 - 0.9 %    Lymphocytes % 17.0 13.0 - 44.0 %    Monocytes % 11.4 2.0 - 10.0 %    Eosinophils % 3.8 0.0 - 6.0 %    Basophils % 0.7 0.0 - 2.0 %    Neutrophils Absolute 4.80 1.20 - 7.70 x10*3/uL    Immature Granulocytes Absolute, Automated 0.02 0.00 - 0.70 x10*3/uL    Lymphocytes Absolute 1.22 1.20 - 4.80 x10*3/uL    Monocytes Absolute 0.82 0.10 - 1.00 x10*3/uL    Eosinophils Absolute 0.27 0.00 - 0.70 x10*3/uL    Basophils Absolute 0.05 0.00 - 0.10 x10*3/uL   POCT GLUCOSE   Result Value Ref Range    POCT Glucose 98 74 - 99 mg/dL         I have personally reviewed the following imaging results:  CT head wo IV contrast    Result Date: 4/7/2024  Interpreted By:  Arina Hodges, STUDY: CT HEAD WO IV CONTRAST;  4/7/2024 7:23 pm   INDICATION: Signs/Symptoms:Altered mental status, last time well unknown, history of chronic dysarthria.   COMPARISON: 12/30/2023   ACCESSION NUMBER(S): [id number]   ORDERING CLINICIAN: RAFA REDD   TECHNIQUE: Axial noncontrast CT images of the head.   FINDINGS: BRAIN PARENCHYMA: Generalized parenchymal volume loss noted with concordant ventricular  enlargement. Non-specific white matter changes noted, which may be related to small vessel disease.  No mass effect or midline shift. Expansile partially empty sella turcica.   HEMORRHAGE: No acute intracranial hemorrhage. VENTRICLES and EXTRA-AXIAL SPACES: Generalized parenchymal volume loss noted with concordant ventricular enlargement. Non-specific white matter changes noted, which may be related to small vessel disease. EXTRACRANIAL SOFT TISSUES: Within normal limits. PARANASAL SINUSES/MASTOIDS: The visualized paranasal sinuses and mastoid air cells are aerated. CALVARIUM: No depressed skull fracture. No destructive osseous lesion.   OTHER FINDINGS: Indeterminate metallic artifact near the level of C1 on the right, similar to prior imaging.       No acute intracranial hemorrhage or mass effect.   Nonspecific white matter changes and parenchymal volume loss. Findings are grossly similar to prior imaging.     MACRO: None.   Signed by: Arina Hodges 4/7/2024 8:18 PM Dictation workstation:   [id number]    No MRI head results found for the past 14 days   No results found for this or any previous visit.      Urine analysis pending    David Swanson Jr., M.D., FAAN

## 2024-04-08 NOTE — H&P
HISTORY AND PHYSICAL EXAMINATION    PATIENT NAME: Prema Hair    MRN: [id number]  SERVICE DATE: 4/8/2024       PRIMARY CARE PHYSICIAN: Shabana Rodriguez MD          ASSESSMENT AND PLAN     Principal Problem:    Delirium      Altered mental status - probable toxometabolic encephalopathy - multifactorial etiol. Opiates, head injury causing concussion.  Impaired mobility with falls at home. S/p R TKA 3/27/24  DM2 fair control. A1c 5.9 3/24  HTN contr  Hx of Ca thyroid on supplement Rx to suppress TSH  HPL  Drug induced Parkinson's  Obesity Class I.  Torticollis  OA knees      PLAN:  Defer Abx pending focus of infection.  Caution with opiates.  Fall prec  PT OT eval. Pt reluctantly agrees with placement.  Continue levothyroxine - aim for suppressed TSH (0.6 3/26/24)  DVT proph  D/w pt and dtr at bedside.    Discussed with nurses/case management team and the specialists involved in this patient's care. Reviewed the EMR and documentation from other care-givers.    SUBJECTIVE  CHIEF COMPLAINT:  lethargy, 2 falls at home    HPI: This is a 59 y.o. female who was brought to ER after she had 2 falls at home and had increasing difficulty with mobility; she had R TKA on 3/27 and was DC home post op. Pt declined SNF. She participated with PT OT at home and was moving around farily well, per dtr, until she fell 3d ago. Dtr noted pt was slightly confused and lethargic yest.    Today, she feels that pt is at baseline mentation.      PAST MEDICAL HISTORY:   Past Medical History:   Diagnosis Date    CATALINA positive     Arthritis     Asthma     Bell's palsy     x 2    Bilateral leg edema     Bipolar disorder (CMS/Formerly [facility])     CHF (congestive heart failure) (CMS/HCC)     Chronic allergic rhinitis     Chronic constipation     Diabetes mellitus (CMS/HCC)     Diabetic neuropathy (CMS/HCC)     Dysphonia 07/05/2022    Muscle tension dysphonia    Fibromyalgia     GERD (gastroesophageal reflux disease)     under control with medication    High  pulmonary arterial pressure (CMS/HCC)     moderately elevated on ECHO 10.5.23    Hyperlipidemia, unspecified 01/03/2023    Dyslipidemia    Hypertension     Hypokalemia     1.1.24- K 3.8 - on oral supplements    Hypothyroidism     Low back pain, unspecified 02/22/2021    Low back pain    Moderate tricuspid regurgitation     Obstructive sleep apnea (adult) (pediatric) 01/03/2023    ZEINA on CPAP    Paralysis of vocal cords and larynx, 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    Laryngeal paresis    Parkinsonism     Pseudoaneurysm of carotid artery (CMS/HCC) 2020    s/p right pipeline and coil embolization of Right ICA pseudoaneurysm    PTSD (post-traumatic stress disorder)     Thyroid cancer (CMS/Formerly [facility])     s/p total thyroidectomy, residual tissue on left side - FNA benign 2020 and 2021    Torticollis     botox injections    Vitamin D deficiency      PAST SURGICAL HISTORY:   Past Surgical History:   Procedure Laterality Date    COLONOSCOPY      KNEE ARTHROPLASTY      OTHER SURGICAL HISTORY Right 2020    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    TOTAL THYROIDECTOMY  2017    US GUIDED THYROID BIOPSY  11/19/2020    US GUIDED THYROID BIOPSY 11/19/2020 [facility]    US GUIDED THYROID BIOPSY  11/19/2020    US GUIDED THYROID BIOPSY 11/19/2020 [facility]     FAMILY HISTORY:   Family History   Problem Relation Name Age of Onset    Heart failure Mother      Pancreatic cancer Mother      Heart failure Father      Parkinsonism Father      Breast cancer Sister     throiidectomy 2017, right carotid stenosis - stented 2020.    SOCIAL HISTORY:   Social History     Tobacco Use    Smoking status: Never    Smokeless tobacco: Never   Substance Use Topics    Alcohol use: Not Currently    Drug use: Not Currently     Comment: medical marijuana last used >1 year ago       MEDICATIONS: Prior to Admission Medications  Medications Prior to Admission   Medication Sig Dispense Refill Last Dose    acetaminophen (Tylenol) 500 mg tablet Take 1  tablet (500 mg) by mouth every 4 hours if needed.   Unknown    albuterol 90 mcg/actuation inhaler Inhale 2 puffs every 4 hours if needed.   Unknown    amitriptyline (Elavil) 10 mg tablet Take 1 tablet (10 mg) by mouth once daily at bedtime.   Unknown    ammonium lactate (Lac-Hydrin) 12 % lotion Apply 1 Application topically if needed.   Unknown    ascorbic acid (Vitamin C) 1,000 mg tablet Take 1 tablet (1,000 mg) by mouth once daily.   Unknown    aspirin 81 mg EC tablet Take 1 tablet (81 mg) by mouth 2 times a day for 28 days. 56 tablet 0 Unknown    atorvastatin (Lipitor) 20 mg tablet Take 1 tablet (20 mg) by mouth once daily at bedtime. 90 tablet 3 Unknown    azelastine (Astelin) 137 mcg (0.1 %) nasal spray Administer 1 spray into each nostril 2 times a day.   Unknown    baclofen (Lioresal) 20 mg tablet TAKE 1 TABLET BY MOUTH THREE TIMES A  tablet 1 Unknown    bumetanide (Bumex) 2 mg tablet Take 1 tablet (2 mg) by mouth 2 times a day.   Unknown    calcium carbonate (Oscal) 500 mg calcium (1,250 mg) tablet Take 1 tablet (1,250 mg) by mouth 3 times a day.   Unknown    carbidopa-levodopa (Sinemet)  mg tablet TAKE 1/2 TABLET 3 TIMES DAILY AT 8 AM, 12 PM, 4 PM X2 WEEKS, THEN 1 TABLET 3 TIMES DAILY IF NO SIDE EFFECTS (Patient taking differently: 1.5 tablets 3x daily) 270 tablet 2 Unknown    carvedilol (Coreg) 25 mg tablet Take 1 tablet (25 mg) by mouth 2 times a day. 180 tablet 1 Unknown    cephalexin (Keflex) 250 mg capsule Take 1 capsule (250 mg) by mouth once daily. 90 capsule 3 Unknown    cholecalciferol (Vitamin D-3) 50 MCG (2000 UT) tablet Take 1 tablet (2,000 Units) by mouth once daily.   Unknown    clonazePAM (KlonoPIN) 1 mg tablet Take 1 tablet (1 mg) by mouth once daily in the morning. 1/2 TABLET BY MOUTH EVERY EVENING AND AN ADDITIONAL 1/2 AS NEEDED   Unknown    clonazePAM (KlonoPIN) 1 mg tablet Take 2 tablets (2 mg) by mouth once daily in the evening.   Unknown    diclofenac sodium (Voltaren) 1 %  gel gel Apply 4.5 inches (1 Application) topically 3 times a day as needed.   Unknown    docusate sodium (Colace) 100 mg capsule Take 1 capsule (100 mg) by mouth 2 times a day. 60 capsule 0 Unknown    estradiol (Estrace) 0.01 % (0.1 mg/gram) vaginal cream Insert 1 Application into the vagina 3 (three) times a week. Pea-sized amount   Unknown    gabapentin (Neurontin) 300 mg capsule Take 3 capsules (900 mg) by mouth 3 times a day.   Unknown    ipratropium-albuteroL (Duo-Neb) 0.5-2.5 mg/3 mL nebulizer solution Inhale 3 mL every 4 hours if needed.   Unknown    ketorolac (Toradol) 10 mg tablet Take 1 tablet (10 mg) by mouth every 8 hours if needed for severe pain (7 - 10) (x 3 days).   Unknown    Klor-Con M20 20 mEq ER tablet Take 2 tablets (40 mEq) by mouth 2 times a day. 360 tablet 3 Unknown    lancets misc 1 each 3 times a day.   Unknown    levothyroxine (Synthroid, Levoxyl) 100 mcg tablet Take 1 tablet (100 mcg) by mouth once daily (M-F). 1 tablet 5 days a week Mon-Friday and two tabs on Saturdays and Sundays   Unknown    levothyroxine (Synthroid, Levoxyl) 200 mcg tablet Take 1 tablet (200 mcg) by mouth 2 times a week. Saturday and sunday   Unknown    lidocaine (Lidoderm) 5 % patch APPLY 1 PATCH AND LEAVE IN PLACE FOR 12 HOURS, THEN REMOVE AND LEAVE OFF FOR 12 HOURS 30 patch 11 Unknown    losartan (Cozaar) 50 mg tablet Take 1 tablet (50 mg) by mouth once daily. 90 tablet 3 Unknown    meloxicam (Mobic) 15 mg tablet Take 1 tablet (15 mg) by mouth once daily.   Unknown    metFORMIN (Glucophage) 500 mg tablet Take 1 tablet (500 mg) by mouth 2 times a day with meals.   Unknown    omega-3 fatty acids-fish oil 360-1,200 mg capsule Take 1 capsule (1,200 mg) by mouth once daily.   Unknown    omeprazole (PriLOSEC) 40 mg DR capsule Take 1 capsule (40 mg) by mouth once daily. With dinner   Unknown    OXcarbazepine (Trileptal) 600 mg tablet Take 1 tablet (600 mg) by mouth once daily in the morning.   Unknown    OXcarbazepine  (Trileptal) 600 mg tablet Take 2 tablets (1,200 mg) by mouth once daily at bedtime.   Unknown    [] oxyCODONE-acetaminophen (Percocet) 5-325 mg tablet Take 1 tablet by mouth every 6 hours if needed for severe pain (7 - 10) for up to 7 days. 28 tablet 0     oxyCODONE-acetaminophen (Percocet) 5-325 mg tablet Take 1 tablet by mouth every 6 hours if needed for severe pain (7 - 10).   Unknown    polyethylene glycol (Glycolax, Miralax) 17 gram/dose powder Take 17 g by mouth once daily. In 8oz of water, juice, or tea and drink daily   Unknown    prazosin (Minipress) 5 mg capsule Take 1 capsule (5 mg) by mouth once daily at bedtime.   Unknown    QUEtiapine (SeroqueL) 400 mg tablet Take 2 tablets (800 mg) by mouth once daily at bedtime.   Unknown    sertraline (Zoloft) 100 mg tablet Take 2 tablets (200 mg) by mouth once daily. Do not start before 2024. 30 tablet 0 Unknown    tamsulosin (Flomax) 0.4 mg 24 hr capsule Take 1 capsule (0.4 mg) by mouth once daily. 30 capsule 11 Unknown    tiZANidine (Zanaflex) 4 mg tablet Take 1 tablet (4 mg) by mouth 3 times a day.   Unknown    topiramate (Topamax) 200 mg tablet Take 1 tablet (200 mg) by mouth 2 times a day. 60 tablet 3 Unknown    Trelegy Ellipta 200-62.5-25 mcg blister with device Inhale 1 puff once daily as needed.   Unknown      CURRENT ALLERGIES:   Allergies   Allergen Reactions    Lisinopril Swelling       COMPLETE REVIEW OF SYSTEMS:      GENERAL: No fever, appetite stable.  HEENT: No epistaxis, no mouth ulcers  NECK: no neck pain  RESPIRATORY: No new resp symptoms.  CARDIOVASCULAR: No cp, no leg edema, No orthopnea  GI: No NVD, no GI Bleed  : No hematuria, no dysuria  MUSCULOSKELETAL: R knee pain post op  SKIN: No rashes, no ulcers  PSYCH: Denies feeling anxious or depressed.   HEMATOLOGY/LYMPHOLOGY: No bruising, no hx of VTE  ENDOCRINE: No hx of DM  NEURO: No hx of seizures. +parkinson's      OBJECTIVE  PHYSICAL EXAM:       2024     7:13 PM  "4/7/2024     7:45 PM 4/7/2024     9:00 PM 4/7/2024    10:00 PM 4/8/2024     1:15 AM 4/8/2024     2:15 AM 4/8/2024     3:08 AM   Vitals   Systolic  111 144 127 150 152 169   Diastolic  82 91 79 93 100 102   Heart Rate  65 66 62 62 63 67   Temp       36.4 °C (97.5 °F)   Resp  18 17 18 10 16 18   Height (in) 1.702 m (5' 7\")         Weight (lb) 221         BMI 34.61 kg/m2         BSA (m2) 2.17 m2           Body mass index is 34.61 kg/m².    GENERAL: awake, alert, Ox3, cooperative resting comfortably. Obese  SKIN: Skin turgor normal. No rashes  HEENT: no epistaxis, Moist mucosa.  NECK: supple  BACK: spine nontender to palpation, No CVAT.  LUNGS: Vesicular breath sounds, with no wheeze, no crepitations.   CARDIAC: REGULAR. S1 and S2; no rubs, no murmur  ABDOMEN: Abdomen soft, non-tender. BS+  EXTREMITIES: +mild edema, Good capillary refill.   NEURO: [facility] GOOD. +invol movements  MUSCULOSKELETAL: R TKA surg wound clean.       .  Current Facility-Administered Medications:     acetaminophen (Tylenol) tablet 650 mg, 650 mg, oral, q6h PRN, Shabana Rodriguez MD    acetaminophen (Tylenol) tablet 975 mg, 975 mg, oral, q6h PRN, Iris Miranda PA-C, 975 mg at 04/08/24 0350    amitriptyline (Elavil) tablet 10 mg, 10 mg, oral, Nightly, Shabana Rodriguez MD    ammonium lactate (Lac-Hydrin) 12 % lotion 1 Application, 1 Application, Topical, PRN, Shabana Rodriguez MD    aspirin EC tablet 81 mg, 81 mg, oral, BID, Shabana Rodriguez MD    atorvastatin (Lipitor) tablet 20 mg, 20 mg, oral, Nightly, Shabana Rodriguez MD    baclofen (Lioresal) tablet 20 mg, 20 mg, oral, TID, Shabana Rodriguez MD    bumetanide (Bumex) tablet 2 mg, 2 mg, oral, BID, Shabana Rodriguez MD    carbidopa-levodopa (Sinemet)  mg per tablet 1.5 tablet, 1.5 tablet, oral, 3 times per day, Shabana Rodriguez MD    carvedilol (Coreg) tablet 25 mg, 25 mg, oral, BID, Shabana Rodriguez MD    clonazePAM (KlonoPIN) tablet 1 mg, 1 mg, oral, q AM, " Shabana Rodriguez MD    clonazePAM (KlonoPIN) tablet 2 mg, 2 mg, oral, q PM, Shabana Rodriguez MD    dextrose 50 % injection 12.5 g, 12.5 g, intravenous, q15 min PRN, Iris Miranda PA-C    dextrose 50 % injection 25 g, 25 g, intravenous, q15 min PRN, Iris Miranda PA-C    diclofenac sodium (Voltaren) 1 % gel 4 g, 4 g, Topical, TID PRN, Shabana Rodriguez MD    docusate sodium (Colace) capsule 100 mg, 100 mg, oral, BID, Shabana Rodriguez MD    enoxaparin (Lovenox) syringe 40 mg, 40 mg, subcutaneous, q24h, Shabana Rodriguez MD, 40 mg at 04/08/24 0350    tiotropium (Spiriva Respimat) 2.5 mcg/actuation inhaler 2 puff, 2 puff, inhalation, Daily **AND** fluticasone furoate-vilanteroL (Breo Ellipta) 200-25 mcg/dose inhaler 1 puff, 1 puff, inhalation, Daily, Shabana Rodriguez MD    gabapentin (Neurontin) capsule 900 mg, 900 mg, oral, TID, Shabana Rodriguez MD    glucagon (Glucagen) injection 1 mg, 1 mg, intramuscular, q15 min PRN, Iris Miranda PA-C    glucagon (Glucagen) injection 1 mg, 1 mg, intramuscular, q15 min PRN, Iris Miranda PA-C    heparin (porcine) injection 5,000 Units, 5,000 Units, subcutaneous, q8h KATHERINE, Shabana Rodriguez MD    insulin lispro (HumaLOG) injection 0-5 Units, 0-5 Units, subcutaneous, Before meals & nightly, Shabana Rodriguez MD    ipratropium-albuteroL (Duo-Neb) 0.5-2.5 mg/3 mL nebulizer solution 3 mL, 3 mL, nebulization, q4h PRN, Shabana Rodriguez MD    levothyroxine (Synthroid, Levoxyl) tablet 100 mcg, 100 mcg, oral, Once per day on Mon Tue Wed Thu Fri, Shabana Rodriguez MD    [START ON 4/13/2024] levothyroxine (Synthroid, Levoxyl) tablet 200 mcg, 200 mcg, oral, Once per day on Sun Sat, Shabana Rodriguez MD    lidocaine 4 % patch 1 patch, 1 patch, transdermal, Daily, Shabana Rodriguez MD    losartan (Cozaar) tablet 50 mg, 50 mg, oral, Daily, Shabana Rodriguez MD    metFORMIN (Glucophage) tablet 500 mg, 500 mg, oral, BID with meals, Shabana Rodriguez MD    " ondansetron (Zofran) injection 4 mg, 4 mg, intravenous, q6h PRN, Shabana Rodriguez MD    OXcarbazepine (Trileptal) tablet 1,200 mg, 1,200 mg, oral, Nightly, Shabana Rodriguez MD    OXcarbazepine (Trileptal) tablet 600 mg, 600 mg, oral, q AM, Shabana Rodriguez MD    pantoprazole (ProtoNix) EC tablet 40 mg, 40 mg, oral, Daily before breakfast, Shabana Rodriguez MD, 40 mg at 04/08/24 0651    pantoprazole (ProtoNix) EC tablet 40 mg, 40 mg, oral, Daily before breakfast, Shabana Rodriguez MD    polyethylene glycol (Glycolax, Miralax) powder 17 g, 17 g, oral, Daily, Shabana Rodriguez MD    potassium chloride CR (Klor-Con M20) ER tablet 40 mEq, 40 mEq, oral, BID, Shabana Rodriguez MD    prazosin (Minipress) capsule 5 mg, 5 mg, oral, Nightly, Shabana Rodriguez MD    QUEtiapine (SEROquel) tablet 800 mg, 800 mg, oral, Nightly, Shabana Rodriguez MD    sertraline (Zoloft) tablet 200 mg, 200 mg, oral, Daily, Shabana Rodriguez MD    tamsulosin (Flomax) 24 hr capsule 0.4 mg, 0.4 mg, oral, Daily, Shabana Rodriguez MD    topiramate (Topamax) tablet 200 mg, 200 mg, oral, BID, Shabana Rodriguez MD    DATA:   Diagnostic tests reviewed for today's visit:    Most recent labs  Results from last 7 days   Lab Units 04/08/24  0555 04/07/24  1746   WBC AUTO x10*3/uL 7.2 8.3   HEMOGLOBIN g/dL 10.6* 10.9*   HEMATOCRIT % 35.3* 35.0*   PLATELETS AUTO x10*3/uL 291 301     Results from last 7 days   Lab Units 04/08/24  0555 04/07/24  1746   SODIUM mmol/L 137 135*   POTASSIUM mmol/L 3.9 4.2   CHLORIDE mmol/L 101 99   CO2 mmol/L 26 26   BUN mg/dL 22 26*   CREATININE mg/dL 0.64 0.83   CALCIUM mg/dL 8.3* 8.7   PROTEIN TOTAL g/dL 5.7* 6.4   BILIRUBIN TOTAL mg/dL 0.4 0.5   ALK PHOS U/L 124* 135*   ALT U/L 13 6*   AST U/L 15 19   GLUCOSE mg/dL 84 100*         Results from last 7 days   Lab Units 04/07/24  1746   CK TOTAL U/L 108     No results found for: \"TROPONINT\"      Results from last 7 days   Lab Units 04/08/24  0348 " 04/07/24  1753   POCT GLUCOSE mg/dL 71* 92        XR chest 1 view   Final Result   No acute pulmonary abnormality.   Signed by Binu Anderson MD      XR femur right 2+ views   Final Result   No acute osseous abnormality.   Signed by Binu Anderson MD      XR knee right 1-2 views   Final Result   No acute osseous abnormality.   Signed by Binu Anderson MD      CT head wo IV contrast   Final Result   No acute intracranial hemorrhage or mass effect.        Nonspecific white matter changes and parenchymal volume loss.   Findings are grossly similar to prior imaging.             MACRO:   None.        Signed by: Arina Hodges 4/7/2024 8:18 PM   Dictation workstation:   [id number]        EKG: SR       SIGNATURE: Shabana Rodriguez MD  DATE: April 8, 2024  TIME: 7:51 AM

## 2024-04-09 ENCOUNTER — HOME CARE VISIT (OUTPATIENT)
Dept: HOME HEALTH SERVICES | Facility: HOME HEALTH | Age: 59
End: 2024-04-09
Payer: MEDICARE

## 2024-04-09 LAB
ALBUMIN SERPL BCP-MCNC: 3.1 G/DL (ref 3.4–5)
ALP SERPL-CCNC: 114 U/L (ref 33–110)
ALT SERPL W P-5'-P-CCNC: 12 U/L (ref 7–45)
ANION GAP SERPL CALC-SCNC: 11 MMOL/L (ref 10–20)
AST SERPL W P-5'-P-CCNC: 11 U/L (ref 9–39)
BILIRUB SERPL-MCNC: 0.4 MG/DL (ref 0–1.2)
BUN SERPL-MCNC: 15 MG/DL (ref 6–23)
CALCIUM SERPL-MCNC: 7.8 MG/DL (ref 8.6–10.3)
CHLORIDE SERPL-SCNC: 101 MMOL/L (ref 98–107)
CO2 SERPL-SCNC: 32 MMOL/L (ref 21–32)
CREAT SERPL-MCNC: 0.62 MG/DL (ref 0.5–1.05)
EGFRCR SERPLBLD CKD-EPI 2021: >90 ML/MIN/1.73M*2
ERYTHROCYTE [DISTWIDTH] IN BLOOD BY AUTOMATED COUNT: 17.2 % (ref 11.5–14.5)
GLUCOSE BLD MANUAL STRIP-MCNC: 102 MG/DL (ref 74–99)
GLUCOSE BLD MANUAL STRIP-MCNC: 102 MG/DL (ref 74–99)
GLUCOSE BLD MANUAL STRIP-MCNC: 117 MG/DL (ref 74–99)
GLUCOSE BLD MANUAL STRIP-MCNC: 86 MG/DL (ref 74–99)
GLUCOSE BLD MANUAL STRIP-MCNC: 90 MG/DL (ref 74–99)
GLUCOSE SERPL-MCNC: 87 MG/DL (ref 74–99)
HCT VFR BLD AUTO: 33 % (ref 36–46)
HGB BLD-MCNC: 10.1 G/DL (ref 12–16)
HOLD SPECIMEN: NORMAL
MCH RBC QN AUTO: 28.1 PG (ref 26–34)
MCHC RBC AUTO-ENTMCNC: 30.6 G/DL (ref 32–36)
MCV RBC AUTO: 92 FL (ref 80–100)
NRBC BLD-RTO: 0 /100 WBCS (ref 0–0)
PLATELET # BLD AUTO: 272 X10*3/UL (ref 150–450)
POTASSIUM SERPL-SCNC: 3.6 MMOL/L (ref 3.5–5.3)
PROT SERPL-MCNC: 5.3 G/DL (ref 6.4–8.2)
RBC # BLD AUTO: 3.59 X10*6/UL (ref 4–5.2)
SODIUM SERPL-SCNC: 140 MMOL/L (ref 136–145)
WBC # BLD AUTO: 5.9 X10*3/UL (ref 4.4–11.3)

## 2024-04-09 PROCEDURE — 2500000001 HC RX 250 WO HCPCS SELF ADMINISTERED DRUGS (ALT 637 FOR MEDICARE OP): Performed by: INTERNAL MEDICINE

## 2024-04-09 PROCEDURE — 94640 AIRWAY INHALATION TREATMENT: CPT

## 2024-04-09 PROCEDURE — 2500000002 HC RX 250 W HCPCS SELF ADMINISTERED DRUGS (ALT 637 FOR MEDICARE OP, ALT 636 FOR OP/ED): Performed by: PHARMACIST

## 2024-04-09 PROCEDURE — 1200000002 HC GENERAL ROOM WITH TELEMETRY DAILY

## 2024-04-09 PROCEDURE — 97530 THERAPEUTIC ACTIVITIES: CPT | Mod: GP

## 2024-04-09 PROCEDURE — 1090000001 HH PPS REVENUE CREDIT

## 2024-04-09 PROCEDURE — 2500000004 HC RX 250 GENERAL PHARMACY W/ HCPCS (ALT 636 FOR OP/ED): Performed by: INTERNAL MEDICINE

## 2024-04-09 PROCEDURE — 2500000002 HC RX 250 W HCPCS SELF ADMINISTERED DRUGS (ALT 637 FOR MEDICARE OP, ALT 636 FOR OP/ED): Performed by: INTERNAL MEDICINE

## 2024-04-09 PROCEDURE — 85027 COMPLETE CBC AUTOMATED: CPT | Performed by: INTERNAL MEDICINE

## 2024-04-09 PROCEDURE — 2500000005 HC RX 250 GENERAL PHARMACY W/O HCPCS: Performed by: INTERNAL MEDICINE

## 2024-04-09 PROCEDURE — 36415 COLL VENOUS BLD VENIPUNCTURE: CPT | Performed by: INTERNAL MEDICINE

## 2024-04-09 PROCEDURE — 2500000002 HC RX 250 W HCPCS SELF ADMINISTERED DRUGS (ALT 637 FOR MEDICARE OP, ALT 636 FOR OP/ED): Mod: MUE | Performed by: INTERNAL MEDICINE

## 2024-04-09 PROCEDURE — 97161 PT EVAL LOW COMPLEX 20 MIN: CPT | Mod: GP

## 2024-04-09 PROCEDURE — 97165 OT EVAL LOW COMPLEX 30 MIN: CPT | Mod: GO

## 2024-04-09 PROCEDURE — 82947 ASSAY GLUCOSE BLOOD QUANT: CPT

## 2024-04-09 PROCEDURE — 1090000002 HH PPS REVENUE DEBIT

## 2024-04-09 PROCEDURE — 80053 COMPREHEN METABOLIC PANEL: CPT | Performed by: INTERNAL MEDICINE

## 2024-04-09 PROCEDURE — 2500000002 HC RX 250 W HCPCS SELF ADMINISTERED DRUGS (ALT 637 FOR MEDICARE OP, ALT 636 FOR OP/ED): Mod: MUE | Performed by: PHARMACIST

## 2024-04-09 RX ORDER — CEPHALEXIN 250 MG/1
250 CAPSULE ORAL DAILY
Status: DISCONTINUED | OUTPATIENT
Start: 2024-04-09 | End: 2024-04-12 | Stop reason: HOSPADM

## 2024-04-09 RX ADMIN — BUMETANIDE 2 MG: 1 TABLET ORAL at 09:28

## 2024-04-09 RX ADMIN — BUDESONIDE 0.5 MG: 0.5 INHALANT ORAL at 19:35

## 2024-04-09 RX ADMIN — BUDESONIDE 0.5 MG: 0.5 INHALANT ORAL at 07:24

## 2024-04-09 RX ADMIN — CARVEDILOL 25 MG: 25 TABLET, FILM COATED ORAL at 20:42

## 2024-04-09 RX ADMIN — FORMOTEROL FUMARATE DIHYDRATE 20 MCG: 20 SOLUTION RESPIRATORY (INHALATION) at 19:35

## 2024-04-09 RX ADMIN — ASPIRIN 81 MG: 81 TABLET, COATED ORAL at 09:30

## 2024-04-09 RX ADMIN — TOPIRAMATE 200 MG: 100 TABLET, FILM COATED ORAL at 20:44

## 2024-04-09 RX ADMIN — POTASSIUM CHLORIDE 40 MEQ: 1500 TABLET, EXTENDED RELEASE ORAL at 17:44

## 2024-04-09 RX ADMIN — AMITRIPTYLINE HYDROCHLORIDE 10 MG: 10 TABLET, FILM COATED ORAL at 20:45

## 2024-04-09 RX ADMIN — PANTOPRAZOLE SODIUM 40 MG: 40 TABLET, DELAYED RELEASE ORAL at 09:29

## 2024-04-09 RX ADMIN — QUETIAPINE FUMARATE 800 MG: 100 TABLET ORAL at 20:43

## 2024-04-09 RX ADMIN — OXCARBAZEPINE 600 MG: 300 TABLET, FILM COATED ORAL at 09:28

## 2024-04-09 RX ADMIN — SERTRALINE HYDROCHLORIDE 200 MG: 50 TABLET ORAL at 09:31

## 2024-04-09 RX ADMIN — LEVOTHYROXINE SODIUM 200 MCG: 0.1 TABLET ORAL at 06:37

## 2024-04-09 RX ADMIN — GABAPENTIN 900 MG: 300 CAPSULE ORAL at 09:28

## 2024-04-09 RX ADMIN — ENOXAPARIN SODIUM 40 MG: 40 INJECTION SUBCUTANEOUS at 20:45

## 2024-04-09 RX ADMIN — GABAPENTIN 900 MG: 300 CAPSULE ORAL at 15:23

## 2024-04-09 RX ADMIN — PRAZOSIN HYDROCHLORIDE 5 MG: 1 CAPSULE ORAL at 20:49

## 2024-04-09 RX ADMIN — BACLOFEN 20 MG: 10 TABLET ORAL at 09:29

## 2024-04-09 RX ADMIN — BACLOFEN 20 MG: 10 TABLET ORAL at 15:23

## 2024-04-09 RX ADMIN — METFORMIN HYDROCHLORIDE 500 MG: 500 TABLET ORAL at 09:29

## 2024-04-09 RX ADMIN — POLYETHYLENE GLYCOL 3350 17 G: 17 POWDER, FOR SOLUTION ORAL at 09:28

## 2024-04-09 RX ADMIN — CLONAZEPAM 1 MG: 1 TABLET ORAL at 09:29

## 2024-04-09 RX ADMIN — METFORMIN HYDROCHLORIDE 500 MG: 500 TABLET ORAL at 17:45

## 2024-04-09 RX ADMIN — LIDOCAINE 4% 1 PATCH: 40 PATCH TOPICAL at 09:29

## 2024-04-09 RX ADMIN — CARBIDOPA AND LEVODOPA 1.5 TABLET: 25; 100 TABLET ORAL at 15:23

## 2024-04-09 RX ADMIN — DOCUSATE SODIUM 100 MG: 100 CAPSULE, LIQUID FILLED ORAL at 20:44

## 2024-04-09 RX ADMIN — ACETAMINOPHEN 650 MG: 325 TABLET ORAL at 06:37

## 2024-04-09 RX ADMIN — GABAPENTIN 900 MG: 300 CAPSULE ORAL at 20:43

## 2024-04-09 RX ADMIN — TOPIRAMATE 200 MG: 100 TABLET, FILM COATED ORAL at 09:28

## 2024-04-09 RX ADMIN — BUMETANIDE 2 MG: 1 TABLET ORAL at 17:44

## 2024-04-09 RX ADMIN — CARBIDOPA AND LEVODOPA 1.5 TABLET: 25; 100 TABLET ORAL at 12:29

## 2024-04-09 RX ADMIN — ENOXAPARIN SODIUM 40 MG: 40 INJECTION SUBCUTANEOUS at 03:11

## 2024-04-09 RX ADMIN — CEPHALEXIN 250 MG: 250 CAPSULE ORAL at 17:44

## 2024-04-09 RX ADMIN — FORMOTEROL FUMARATE DIHYDRATE 20 MCG: 20 SOLUTION RESPIRATORY (INHALATION) at 07:25

## 2024-04-09 RX ADMIN — DOCUSATE SODIUM 100 MG: 100 CAPSULE, LIQUID FILLED ORAL at 09:30

## 2024-04-09 RX ADMIN — CLONAZEPAM 2 MG: 1 TABLET ORAL at 20:41

## 2024-04-09 RX ADMIN — TAMSULOSIN HYDROCHLORIDE 0.4 MG: 0.4 CAPSULE ORAL at 09:28

## 2024-04-09 RX ADMIN — ASPIRIN 81 MG: 81 TABLET, COATED ORAL at 20:44

## 2024-04-09 RX ADMIN — OXCARBAZEPINE 1200 MG: 300 TABLET, FILM COATED ORAL at 20:44

## 2024-04-09 RX ADMIN — ATORVASTATIN CALCIUM 20 MG: 20 TABLET, FILM COATED ORAL at 20:44

## 2024-04-09 RX ADMIN — TIOTROPIUM BROMIDE INHALATION SPRAY 2 PUFF: 3.12 SPRAY, METERED RESPIRATORY (INHALATION) at 07:24

## 2024-04-09 RX ADMIN — POTASSIUM CHLORIDE 40 MEQ: 1500 TABLET, EXTENDED RELEASE ORAL at 09:29

## 2024-04-09 RX ADMIN — CARBIDOPA AND LEVODOPA 1.5 TABLET: 25; 100 TABLET ORAL at 09:28

## 2024-04-09 RX ADMIN — ACETAMINOPHEN 650 MG: 325 TABLET ORAL at 17:44

## 2024-04-09 RX ADMIN — BACLOFEN 20 MG: 10 TABLET ORAL at 20:42

## 2024-04-09 ASSESSMENT — ACTIVITIES OF DAILY LIVING (ADL)
ADL_ASSISTANCE: NEEDS ASSISTANCE
BATHING_ASSISTANCE: MODERATE
LACK_OF_TRANSPORTATION: NO
BATHING_ASSISTANCE: MODERATE
ADL_ASSISTANCE: INDEPENDENT

## 2024-04-09 ASSESSMENT — PAIN SCALES - GENERAL
PAINLEVEL_OUTOF10: 10 - WORST POSSIBLE PAIN

## 2024-04-09 ASSESSMENT — PAIN - FUNCTIONAL ASSESSMENT
PAIN_FUNCTIONAL_ASSESSMENT: 0-10
PAIN_FUNCTIONAL_ASSESSMENT: 0-10

## 2024-04-09 ASSESSMENT — COGNITIVE AND FUNCTIONAL STATUS - GENERAL
CLIMB 3 TO 5 STEPS WITH RAILING: TOTAL
TOILETING: A LOT
HELP NEEDED FOR BATHING: A LOT
MOVING FROM LYING ON BACK TO SITTING ON SIDE OF FLAT BED WITH BEDRAILS: A LITTLE
DRESSING REGULAR UPPER BODY CLOTHING: A LITTLE
TURNING FROM BACK TO SIDE WHILE IN FLAT BAD: A LITTLE
MOBILITY SCORE: 16
PERSONAL GROOMING: A LITTLE
MOVING TO AND FROM BED TO CHAIR: A LITTLE
DRESSING REGULAR LOWER BODY CLOTHING: A LOT
WALKING IN HOSPITAL ROOM: A LITTLE
STANDING UP FROM CHAIR USING ARMS: A LITTLE
DAILY ACTIVITIY SCORE: 16

## 2024-04-09 ASSESSMENT — PAIN DESCRIPTION - DESCRIPTORS: DESCRIPTORS: ACHING;SHARP

## 2024-04-09 NOTE — PROGRESS NOTES
Occupational Therapy    Evaluation    Patient Name: Prema Hair  MRN: [id number]  Today's Date: 4/9/2024  Time Calculation  Start Time: 1005  Stop Time: 1029  Time Calculation (min): 24 min    Assessment  IP OT Assessment  OT Assessment:  (OT Eval complete, patient at risk for falls and requires increased assist with all transitional movements. Patient requires increased assist with LB ADLS and would benefit from moderate intensity therapy.)  Prognosis: Good  Medical Staff Made Aware: Yes  End of Session Communication: Bedside nurse  End of Session Patient Position: Bed, 2 rail up, Alarm on  Plan:  Treatment Interventions: ADL retraining, Functional transfer training, Endurance training, Patient/family training, Neuromuscular reeducation  OT Frequency: 3 times per week  OT Discharge Recommendations: Moderate intensity level of continued care  Equipment Recommended upon Discharge:  (Hip Kit)  OT - OK to Discharge: Yes    Subjective   Current Problem:  1. Delirium        2. Acute cystitis without hematuria          General:  General  Reason for Referral: 58 y/o femal with Falls at home, closed head injury, delirium, recent R TKR 3/27  Past Medical History Relevant to Rehab:   Past Medical History:   Diagnosis Date    CATALINA positive     Arthritis     Asthma     Bell's palsy     x 2    Bilateral leg edema     Bipolar disorder (CMS/HCC)     CHF (congestive heart failure) (CMS/HCC)     Chronic allergic rhinitis     Chronic constipation     Diabetes mellitus (CMS/HCC)     Diabetic neuropathy (CMS/HCC)     Dysphonia 07/05/2022    Muscle tension dysphonia    Fibromyalgia     GERD (gastroesophageal reflux disease)     under control with medication    High pulmonary arterial pressure (CMS/HCC)     moderately elevated on ECHO 10.5.23    Hyperlipidemia, unspecified 01/03/2023    Dyslipidemia    Hypertension     Hypokalemia     1.1.24- K 3.8 - on oral supplements    Hypothyroidism     Low back pain, unspecified 02/22/2021    Low  back pain    Moderate tricuspid regurgitation     Obstructive sleep apnea (adult) (pediatric) 01/03/2023    ZEINA on CPAP    Paralysis of vocal cords and larynx, 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    Laryngeal paresis    Parkinsonism     Pseudoaneurysm of carotid artery (CMS/[facility]) 2020    s/p right pipeline and coil embolization of Right ICA pseudoaneurysm    PTSD (post-traumatic stress disorder)     Thyroid cancer (CMS/[facility])     s/p total thyroidectomy, residual tissue on left side - FNA benign 2020 and 2021    Torticollis     botox injections    Vitamin D deficiency        Prior to Session Communication: Bedside nurse  Patient Position Received: Bed, 3 rail up, Alarm on  General Comment:  (Patient is plesant and cooperative and motivated to regain prior level of independence. Patient with increased difficulty with transitional movements.)  Precautions:  LE Weight Bearing Status: Weight Bearing as Tolerated (R LE)  Medical Precautions: Fall precautions  Post-Surgical Precautions: Right total knee precautions    Pain:  Pain Assessment  Pain Assessment: 0-10  Pain Score: 10 - Worst possible pain  Pain Type: Neuropathic pain  Pain Location: Knee  Pain Orientation: Right    Objective   Cognition:  Overall Cognitive Status: Within Functional Limits  Orientation Level: Oriented X4     Home Living:  Type of Home:  (1st floor Apt, no steps, bed/bath on first floor, tub/shower combo, raised toilet seat.)  Lives With: Alone  Home Adaptive Equipment:  (Raised toilet seat, shower seat, grab bars, rollator)   Prior Function:  Level of [place]: Needs assistance with ADLs, Needs assistance with functional transfers  Receives Help From: Family  ADL Assistance: Needs assistance  Homemaking Assistance: Needs assistance  Ambulatory Assistance: Needs assistance  IADL History:  Homemaking Responsibilities:  (Family provided meals, patient may perform light home management tasks.)  ADL:  Equipment:  (Shower seat, grab bars)  Eating Assistance:  Stand by  Grooming Assistance: Minimal  Bathing Assistance: Moderate  UE Dressing Assistance: Minimal  LE Dressing Assistance: Maximal  Toileting Assistance with Device: Maximal  Functional Assistance: Moderate  Activity Tolerance:  Endurance: Tolerates 10 - 20 min exercise with multiple rests  Activity Tolerance Comments:  (Fair activity tolerance)  Bed Mobility/Transfers: Bed Mobility  Bed Mobility: Yes  Bed Mobility 1  Bed Mobility 1: Supine to sitting  Level of Assistance 1: Minimum assistance  Bed Mobility Comments 1:  (increased time and assist with LEs off bed)  Bed Mobility 2  Bed Mobility  2: Sitting to supine  Level of Assistance 2: Minimum assistance    Transfers  Transfer: Yes  Transfer 1  Transfer From 1: Sit to  Transfer to 1: Stand  Technique 1: Sit to stand  Transfer Device 1: Walker  Transfer Level of Assistance 1: Minimum assistance  Trials/Comments 1:  (Cues for hand placement)    Sitting Balance:  Static Sitting Balance  Static Sitting-Comment/Number of Minutes: CGA  Dynamic Sitting Balance  Dynamic Sitting-Comments: CGA  Standing Balance:  Static Standing Balance  Static Standing-Comment/Number of Minutes: Min A required  Dynamic Standing Balance  Dynamic Standing-Comments:  (Min A required)  Modalities:     IADL's:   Homemaking Responsibilities:  (Family provided meals, patient may perform light home management tasks.)    Strength:  Strength Comments:  (4/5  B UEs)    Hand Function:  Hand Function  Gross Grasp: Functional  Coordination: Functional    Outcome Measures: [facility] Daily Activity  Putting on and taking off regular lower body clothing: A lot  Bathing (including washing, rinsing, drying): A lot  Putting on and taking off regular upper body clothing: A little  Toileting, which includes using toilet, bedpan or urinal: A lot  Taking care of personal grooming such as brushing teeth: A little  Eating Meals: None  Daily Activity - Total Score: 16      Education Documentation  ADL Training,  taught by Nawaf Aguilar OT at 4/9/2024 10:46 AM.  Learner: Patient  Readiness: Acceptance  Method: Explanation  Response: Verbalizes Understanding    Education Comments  No comments found.      Goals:   Encounter Problems       Encounter Problems (Active)       ADLs       Patient will perform UB and LB bathing  with independent level of assistance.        Start:  04/09/24    Expected End:  04/23/24            Patient with complete upper body dressing with independent level of assistance donning and doffing all UE clothes with no adaptive equipment while edge of bed        Start:  04/09/24    Expected End:  04/23/24            Patient with complete lower body dressing with independent level of assistance donning and doffing all LE clothes  with adaptive equipment while edge of bed        Start:  04/09/24    Expected End:  04/23/24            Patient will complete daily grooming tasks brushing teeth and washing face/hair with independent level of assistance and PRN adaptive equipment while standing.       Start:  04/09/24    Expected End:  04/23/24            Patient will complete toileting including hygiene clothing management/hygiene with independent level of assistance and raised toilet seat.       Start:  04/09/24    Expected End:  04/23/24               TRANSFERS       Patient will perform bed mobility independent level of assistance and bed rails in order to improve safety and independence with mobility       Start:  04/09/24    Expected End:  04/23/24            Patient will complete functional transfer to all surfaces  with front wheeled walker with modified independent level of assistance.       Start:  04/09/24    Expected End:  04/23/24

## 2024-04-09 NOTE — PROGRESS NOTES
04/09/24 1552   Discharge Planning   Living Arrangements Alone   Support Systems Children   Assistance Needed PTA uses a rollator, asst from daughters   Type of Residence Private residence  (demo correct)   Number of Stairs to Enter Residence 0   Number of Stairs Within Residence 0   Who is requesting discharge planning? Provider   Home or Post Acute Services Post acute facilities (Rehab/SNF/etc)   Type of Post Acute Facility Services Rehab;Skilled nursing   Patient expects to be discharged to: auth pending [facility]-4/9/-if khang [facility]   Does the patient need discharge transport arranged? Yes   RoundTrip coordination needed? Yes   Has discharge transport been arranged? No   Financial Resource Strain   How hard is it for you to pay for the very basics like food, housing, medical care, and heating? Not very   Housing Stability   In the last 12 months, was there a time when you were not able to pay the mortgage or rent on time? N   In the last 12 months, how many places have you lived? 1   In the last 12 months, was there a time when you did not have a steady place to sleep or slept in a shelter (including now)? N   Transportation Needs   In the past 12 months, has lack of transportation kept you from medical appointments or from getting medications?   (daughter drives to appointments, PCP listed last seen 1.5 week ago)   In the past 12 months, has lack of transportation kept you from meetings, work, or from getting things needed for daily living? No   Patient Choice   Patient / Family choosing to utilize agency / facility established prior to hospitalization Yes

## 2024-04-09 NOTE — PROGRESS NOTES
Physical Therapy    Physical Therapy Evaluation & Treatment    Patient Name: Prema Hair  MRN: [id number]  Today's Date: 4/9/2024   Time Calculation  Start Time: 1339  Stop Time: 1359  Time Calculation (min): 20 min    Assessment/Plan   PT Assessment  PT Assessment Results: Decreased strength, Pain, Orthopedic restrictions, Decreased coordination, Decreased mobility, Impaired balance, Decreased endurance, Decreased range of motion  Rehab Prognosis: Fair  Barriers to Discharge: Decreased endurance, decreased balance, need for increased time for all mobility, likely need for increased supervision  Evaluation/Treatment Tolerance: Patient limited by fatigue  Medical Staff Made Aware: Yes  Strengths: Housing layout, Support of Caregivers  Barriers to Participation: Premorbid level of function, Comorbidities, Ability to acquire knowledge  End of Session Communication: Bedside nurse, Care Coordinator  Assessment Comment: PT eval complete. Pt limited in participation, limited in strength, and limited in endurance. Needing no true hands on assist for any OOB, but max increased time for completion of mobility at this time. Needing continued PT for addressing functional deficits.  End of Session Patient Position: Alarm on, Bed, 3 rail up   IP OR SWING BED PT PLAN  Inpatient or Swing Bed: Inpatient  PT Plan  Treatment/Interventions: Bed mobility, Transfer training, Gait training, Balance training, Neuromuscular re-education, Endurance training, Strengthening, Therapeutic exercise, Therapeutic activity, Home exercise program, Range of motion, Positioning, Postural re-education  PT Plan: Skilled PT  PT Frequency: 4 times per week  PT Discharge Recommendations: Moderate intensity level of continued care  Equipment Recommended upon Discharge: Wheeled walker (Hip Kit, pt has WW at home)  PT Recommended Transfer Status: Contact guard  PT - OK to Discharge: Yes (Per PT POC)      Subjective     General Visit  Information:  General  Reason for Referral: Impaired Mobility; 58 y/o female with Falls at home, closed head injury, delirium, recent R TKR 3/27  Referred By: Dr. Wolff  Past Medical History Relevant to Rehab:   Past Medical History:   Diagnosis Date    CATALINA positive     Arthritis     Asthma     Bell's palsy     x 2    Bilateral leg edema     Bipolar disorder (CMS/[facility])     CHF (congestive heart failure) (CMS/[facility])     Chronic allergic rhinitis     Chronic constipation     Diabetes mellitus (CMS/[facility])     Diabetic neuropathy (CMS/[facility])     Dysphonia 07/05/2022    Muscle tension dysphonia    Fibromyalgia     GERD (gastroesophageal reflux disease)     under control with medication    High pulmonary arterial pressure (CMS/[facility])     moderately elevated on ECHO 10.5.23    Hyperlipidemia, unspecified 01/03/2023    Dyslipidemia    Hypertension     Hypokalemia     1.1.24- K 3.8 - on oral supplements    Hypothyroidism     Low back pain, unspecified 02/22/2021    Low back pain    Moderate tricuspid regurgitation     Obstructive sleep apnea (adult) (pediatric) 01/03/2023    ZEINA on CPAP    Paralysis of vocal cords and larynx, 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    Laryngeal paresis    Parkinsonism     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    s/p right pipeline and coil embolization of Right ICA pseudoaneurysm    PTSD (post-traumatic stress disorder)     Thyroid cancer (CMS/[facility])     s/p total thyroidectomy, residual tissue on left side - FNA benign 2020 and 2021    Torticollis     botox injections    Vitamin D deficiency      Past Surgical History:   Procedure Laterality Date    COLONOSCOPY      KNEE ARTHROPLASTY      OTHER SURGICAL HISTORY Right 2020    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    TOTAL THYROIDECTOMY  2017    US GUIDED THYROID BIOPSY  11/19/2020    US GUIDED THYROID BIOPSY 11/19/2020 [facility]IB LEGAC[facility]    US GUIDED THYROID BIOPSY  11/19/2020    US GUIDED THYROID BIOPSY 11/19/2020 [facility]  "      Family/Caregiver Present: No  Prior to Session Communication: Bedside nurse  Patient Position Received: Bed, 3 rail up, Alarm on  General Comment: Patient is pleasant and cooperative and motivated to regain prior level of independence. Patient with increased difficulty with transitional movements. very slow moving, noted difficulty with RLE mobility, but no hands on assist needed.  Home Living:  Home Living  Type of Home:  (1st floor Apt, no steps, bed/bath on first floor, tub/shower combo, raised toilet seat.)  Lives With: Alone  Home Adaptive Equipment:  (Raised toilet seat, shower seat, grab bars, rollator, FWW)  Prior Level of Function:  Prior Function Per Pt/Caregiver Report  Level of [place]: Independent with ADLs and functional transfers, Needs assistance with homemaking  Receives Help From: Family  ADL Assistance: Independent (Reports only needs assist from family when \"shaky\" or \"too tired\")  Homemaking Assistance: Needs assistance (Reports familiy assists for meals only)  Ambulatory Assistance: Independent (Reports using FWW s/p sx, but generally walking with rollator, no hands on assist needed. Reports does not know why fell at home.)  Precautions:  Precautions  LE Weight Bearing Status: Weight Bearing as Tolerated (R LE)  Medical Precautions: Fall precautions  Post-Surgical Precautions: Right total knee precautions  Vital Signs:       Objective   Pain:  Pain Assessment  Pain Assessment: 0-10  Pain Score: 10 - Worst possible pain  Pain Type: Surgical pain, Chronic pain  Pain Location: Knee (Generalized from fibromyalgia)  Pain Orientation: Right  Pain Descriptors: Aching, Sharp  Pain Frequency: Intermittent (Increased with movement)  Pain Onset: Ongoing  Clinical Progression: Not changed  Pain Interventions: Repositioned, Ambulation/increased activity, Distraction  Response to Interventions: Able to participate as requested by PT  Cognition:  Cognition  Overall Cognitive Status: Within " Functional Limits  Orientation Level: Oriented X4  Processing Speed: Delayed    General Assessments:  General Observation  General Observation: Pt needing no hands on assist for mobility, but max increased time and encouragement and cuing.               Activity Tolerance  Endurance: Tolerates 10 - 20 min exercise with multiple rests    Sensation  Light Touch: No apparent deficits  Sensation Comment: Intact to light touch sensation on all extremities    Strength  Strength Comments: Needing no true hands on assist for any mobility, but max increased time  Strength  Strength Comments: Needing no true hands on assist for any mobility, but max increased time    Perception  Inattention/Neglect: Appears intact  Initiation: Cues to initiate tasks      Coordination  Movements are Fluid and Coordinated: No  Coordination Comment: Bradykinetic throughout entire session    Postural Control  Postural Control: Impaired  Head Control: Long standing torticollis, reports getting Botox for it, neck rotated to L side and downward  Trunk Control: slightly forward flexed at trunk  Posture Comment: Adjusting posture with cuing, but unable to achieve fully upright stance or forward facing with neck    Static Sitting Balance  Static Sitting-Balance Support: Feet supported, Bilateral upper extremity supported  Static Sitting-Level of Assistance: Close supervision  Dynamic Sitting Balance  Dynamic Sitting-Balance Support: Feet supported, No upper extremity supported  Dynamic Sitting-Balance: Forward lean, Trunk control activities    Static Standing Balance  Static Standing-Balance Support: Bilateral upper extremity supported  Static Standing-Level of Assistance: Close supervision  Dynamic Standing Balance  Dynamic Standing-Balance Support: Bilateral upper extremity supported  Dynamic Standing-Balance: Turning (Side stepping at EOB)  Functional Assessments:  Bed Mobility  Bed Mobility: Yes  Bed Mobility 1  Bed Mobility 1: Supine to sitting,  Sitting to supine  Level of Assistance 1: Close supervision  Bed Mobility Comments 1: No true hands on assist, but max increased time needs, HOB slightly elevated, no use of rail  Bed Mobility 2  Bed Mobility  2: Rolling right  Level of Assistance 2: Close supervision  Bed Mobility Comments 2: No hands on assist needed, pt using bed rail for placement of bed pan, notified nursing pt able to use bedside commode.    Transfers  Transfer: Yes  Transfer 1  Technique 1: Sit to stand, Stand to sit  Transfer Device 1: Walker  Transfer Level of Assistance 1: Close supervision  Trials/Comments 1: No hands on assist needed from PT, however, noted need for bed height elevation as well as cuing for hand placement. Max increased time for completion.    Ambulation/Gait Training  Ambulation/Gait Training Performed: Yes  Ambulation/Gait Training 1  Surface 1: Level tile  Device 1: Rolling walker  Assistance 1: Close supervision  Quality of Gait 1: Wide base of support, Diminished heel strike, Inconsistent stride length, Forward flexed posture (No overt instability, no hands on assist, very slow toshia, increased use of BUE on WW for WB assist.)  Comments/Distance (ft) 1: 2'    Stairs  Stairs: No  Extremity/Trunk Assessments:  RUE   RUE : Within Functional Limits  LUE   LUE: Within Functional Limits  RLE   RLE : Exceptions to WFL (ankle/hip WFL, knee still limited in AROM s/p sx, noted flexion at EOB to 80 degrees, short full extension by 10 degrees in supine. MMT: </= 3-/5)  LLE   LLE : Within Functional Limits  Treatments:  Other Activity  Other Activity Performed: Yes  Other Activity 1: Increased education about precautions and participation. Increased education about plan of care, progression with therapy. Increased time for ensuring participation throughout with increased encouragement.   Outcome Measures:  [facility] Basic Mobility  Turning from your back to your side while in a flat bed without using bedrails: A little  Moving  from lying on your back to sitting on the side of a flat bed without using bedrails: A little  Moving to and from bed to chair (including a wheelchair): A little  Standing up from a chair using your arms (e.g. wheelchair or bedside chair): A little  To walk in hospital room: A little  Climbing 3-5 steps with railing: Total  Basic Mobility - Total Score: 16    Encounter Problems       Encounter Problems (Active)       Balance       STG - Maintains dynamic standing balance with upper extremity support At Supervision level, safely, no LOB          Start:  04/09/24    Expected End:  04/23/24       INTERVENTIONS:  1. Practice standing with minimal support.  2. Educate patient about standing tolerance.  3. Educate patient about independence with gait, transfers, and ADL's.  4. Educate patient about use of assistive device.  5. Educate patient about self-directed care.            Mobility       STG - Patient will ambulate >/= 50', device PRN, supervision, no LOB         Start:  04/09/24    Expected End:  04/23/24            Endurance - Pt to tolerate >/= 20 minutes therex, theract, gait and/or NMR with </= 5 minutes of rest breaks        Start:  04/09/24    Expected End:  04/23/24            Pt demo's ability to perform AROM of R knee at EOB from 0-90 degrees         Start:  04/09/24    Expected End:  04/23/24               PT Transfers       STG - Patient will perform bed mobility At Supervision level, safely, no LOB          Start:  04/09/24    Expected End:  04/23/24            STG - Patient will transfer sit to and from stand At Supervision level, safely, no LOB          Start:  04/09/24    Expected End:  04/23/24               Pain - Adult          Safety       Pt will verbalize and apply safety awareness and precautions 100% of time throughout entire session         Start:  04/09/24    Expected End:  04/23/24                   Education Documentation  Precautions, taught by Moe Mckinley PT at 4/9/2024  2:19  PM.  Learner: Patient  Readiness: Acceptance  Method: Demonstration, Explanation  Response: Needs Reinforcement    Body Mechanics, taught by Moe Mckinley, PT at 4/9/2024  2:19 PM.  Learner: Patient  Readiness: Acceptance  Method: Demonstration, Explanation  Response: Needs Reinforcement    Mobility Training, taught by Moe Mckinley, PT at 4/9/2024  2:19 PM.  Learner: Patient  Readiness: Acceptance  Method: Demonstration, Explanation  Response: Needs Reinforcement    Education Comments  No comments found.

## 2024-04-09 NOTE — PROGRESS NOTES
PROGRESS NOTE - INTERNAL MEDICINE     PATIENT NAME:  Prema Hair    MRN:  [id number]  SERVICE DATE:  4/9/2024       ADMITTING PHYSICIAN:  Shabana Rodriguez MD    ASSESSMENT AND PLAN    DM2 fair control. A1c 5.9 3/24  HTN variable contr  Altered mental status - probable toxometabolic encephalopathy - multifactorial etiol. Opiates, head injury causing concussion.  Impaired mobility with falls at home. S/p R TKA 3/27/24  Hx of Ca thyroid on supplement Rx to suppress TSH  HPL  Drug induced Parkinson's  Obesity Class I.  Torticollis  OA knees        PLAN:  No Abx - low suspicion for acute infection  Caution with opiates.  Fall prec  PT OT recomm Mod Int therapy. Pt agrees with SNF  Continue levothyroxine - aim for suppressed TSH (0.6 3/26/24)  DVT proph  D/w pt. D/w dtr by phone (at work)      DISPOSITION PLAN:  SNF pending [facility]    INTERVAL HISTORY OF PRESENT ILLNESS:  pt reports feeling stronger and better today. No chest pain/sob. No n/v/d. Oral intake fair. No fever. acute events from last 24 hrs reviewed.    Pertinent ROS:  No abdominal pain / No Bleeding / No rashes    Discussed with nursing and case management team and the specialists involved in this patient's care. Reviewed the EMR and documentation from other care-givers.      OBJECTIVE  PHYSICAL EXAM:     GENERAL: sleeping but alert and fully awake on waking her up. Ox3, cooperative resting comfortably. Obese  SKIN: Skin turgor normal. No rashes  HEENT: no epistaxis, Moist mucosa.  BACK: No CVAT.  LUNGS: Vesicular breath sounds, with no wheeze, no crepitations.   CARDIAC: REGULAR. S1 and S2; no rubs, no murmur  ABDOMEN: Abdomen soft, non-tender. BS+  EXTREMITIES: +mild edema, Good capillary refill.   NEURO: Insight GOOD. +invol movements  MUSCULOSKELETAL: R TKA surg wound clean.            4/8/2024     1:15 AM 4/8/2024     2:15 AM 4/8/2024     3:08 AM 4/8/2024     8:41 AM 4/8/2024     1:23 PM 4/8/2024    10:43 PM 4/9/2024     8:07 AM   Vitals   Systolic 150  152 169 149  135 110   Diastolic 93 100 102 93  81 71   Heart Rate 62 63 67 78 75 79 63   Temp   36.4 °C (97.5 °F) 36.4 °C (97.5 °F) 36.6 °C (97.9 °F) 37.3 °C (99.1 °F) 36.7 °C (98.1 °F)   Resp 10 16 18 18 17 18 18     Body mass index is 34.61 kg/m².    Intake/Output Summary (Last 24 hours) at 4/9/2024 1111  Last data filed at 4/8/2024 1800  Gross per 24 hour   Intake --   Output 4300 ml   Net -4300 ml           Current Facility-Administered Medications:     acetaminophen (Tylenol) tablet 650 mg, 650 mg, oral, q6h PRN, Shabana Rodriguez MD, 650 mg at 04/09/24 0637    amitriptyline (Elavil) tablet 10 mg, 10 mg, oral, Nightly, Shabana Rodriguez MD, 10 mg at 04/08/24 2247    ammonium lactate (Lac-Hydrin) 12 % lotion 1 Application, 1 Application, Topical, PRN, Shabana Rodriguez MD    aspirin EC tablet 81 mg, 81 mg, oral, BID, Shabana Rodriguez MD, 81 mg at 04/09/24 0930    atorvastatin (Lipitor) tablet 20 mg, 20 mg, oral, Nightly, Shabana Rodriguez MD, 20 mg at 04/08/24 2247    baclofen (Lioresal) tablet 20 mg, 20 mg, oral, TID, Shabana Rodriguez MD, 20 mg at 04/09/24 0929    budesonide (Pulmicort) 0.5 mg/2 mL nebulizer solution 0.5 mg, 0.5 mg, nebulization, BID, Yuan Delacruz, PharmD, 0.5 mg at 04/09/24 0724    bumetanide (Bumex) tablet 2 mg, 2 mg, oral, BID, Shabana Rodriguez MD, 2 mg at 04/09/24 0928    carbidopa-levodopa (Sinemet)  mg per tablet 1.5 tablet, 1.5 tablet, oral, 3 times per day, Shabana Rodriguez MD, 1.5 tablet at 04/09/24 0928    carvedilol (Coreg) tablet 25 mg, 25 mg, oral, BID, Shabana Rodriguez MD, 25 mg at 04/08/24 2247    clonazePAM (KlonoPIN) tablet 1 mg, 1 mg, oral, q AM, Shabana Rodriguez MD, 1 mg at 04/09/24 0929    clonazePAM (KlonoPIN) tablet 2 mg, 2 mg, oral, q PM, Shabana Rodriguez MD, 2 mg at 04/08/24 2247    dextrose 50 % injection 12.5 g, 12.5 g, intravenous, q15 min PRN, Iris Miranda PA-C    dextrose 50 % injection 25 g, 25 g, intravenous, q15 min PRN,  Iris Miranda PA-C    diclofenac sodium (Voltaren) 1 % gel 4 g, 4 g, Topical, TID PRN, Shabana Rodriguez MD    docusate sodium (Colace) capsule 100 mg, 100 mg, oral, BID, Shabana Rodriguez MD, 100 mg at 04/09/24 0930    enoxaparin (Lovenox) syringe 40 mg, 40 mg, subcutaneous, q24h, Shabana Rodriguez MD, 40 mg at 04/09/24 0311    formoterol (Perforomist) 20 mcg/2 mL nebulizer solution 20 mcg, 20 mcg, nebulization, BID, Yuan Delacruz, PharmD, 20 mcg at 04/09/24 0725    gabapentin (Neurontin) capsule 900 mg, 900 mg, oral, TID, Shabana Rodriguez MD, 900 mg at 04/09/24 0928    glucagon (Glucagen) injection 1 mg, 1 mg, intramuscular, q15 min PRN, Iris Miranda PA-C    glucagon (Glucagen) injection 1 mg, 1 mg, intramuscular, q15 min PRN, Iris Miranda PA-C    insulin lispro (HumaLOG) injection 0-5 Units, 0-5 Units, subcutaneous, Before meals & nightly, Shabana Rodriguez MD    ipratropium-albuteroL (Duo-Neb) 0.5-2.5 mg/3 mL nebulizer solution 3 mL, 3 mL, nebulization, q4h PRN, Shabana Rodriguez MD    [START ON 4/10/2024] levothyroxine (Synthroid, Levoxyl) tablet 100 mcg, 100 mcg, oral, Once per day on Sun Mon Wed Fri, Shabana Rodriguez MD    levothyroxine (Synthroid, Levoxyl) tablet 200 mcg, 200 mcg, oral, Once per day on Tue Thu Sat, Shabana Rodriguez MD, 200 mcg at 04/09/24 0637    lidocaine 4 % patch 1 patch, 1 patch, transdermal, Daily, Shabana Rodriguez MD, 1 patch at 04/09/24 0929    losartan (Cozaar) tablet 50 mg, 50 mg, oral, Daily, Shabana Rodriguez MD, 50 mg at 04/08/24 0900    metFORMIN (Glucophage) tablet 500 mg, 500 mg, oral, BID with meals, Shabana Rodriguez MD, 500 mg at 04/09/24 0929    ondansetron (Zofran) injection 4 mg, 4 mg, intravenous, q6h PRN, Shabana Rodriguez MD, 4 mg at 04/08/24 1648    OXcarbazepine (Trileptal) tablet 1,200 mg, 1,200 mg, oral, Nightly, Shabana Rodriguez MD, 1,200 mg at 04/08/24 2247    OXcarbazepine (Trileptal) tablet 600 mg, 600 mg, oral, q AM,  Shabana Rodriguez MD, 600 mg at 04/09/24 0928    pantoprazole (ProtoNix) EC tablet 40 mg, 40 mg, oral, Daily before breakfast, Shabana Rodriguez MD, 40 mg at 04/09/24 0929    polyethylene glycol (Glycolax, Miralax) packet 17 g, 17 g, oral, Daily, Shabana Rodriguez MD, 17 g at 04/09/24 0928    potassium chloride CR (Klor-Con M20) ER tablet 40 mEq, 40 mEq, oral, BID with meals, Shabana Rodriguez MD, 40 mEq at 04/09/24 0929    prazosin (Minipress) capsule 5 mg, 5 mg, oral, Nightly, Shabana Rodriguez MD, 5 mg at 04/08/24 2246    QUEtiapine (SEROquel) tablet 800 mg, 800 mg, oral, Nightly, Shabana Rodriguez MD, 800 mg at 04/08/24 2247    sertraline (Zoloft) tablet 200 mg, 200 mg, oral, Daily, Shabana Rodriguez MD, 200 mg at 04/09/24 0931    tamsulosin (Flomax) 24 hr capsule 0.4 mg, 0.4 mg, oral, Daily, Shabana Rodriguez MD, 0.4 mg at 04/09/24 0928    tiotropium (Spiriva Respimat) 2.5 mcg/actuation inhaler 2 puff, 2 puff, inhalation, Daily, 2 puff at 04/09/24 0724 **AND** [DISCONTINUED] fluticasone furoate-vilanteroL (Breo Ellipta) 200-25 mcg/dose inhaler 1 puff, 1 puff, inhalation, Daily, Shabana Rodriguez MD    topiramate (Topamax) tablet 200 mg, 200 mg, oral, BID, Shabana Rodriguez MD, 200 mg at 04/09/24 0928    DATA:   Diagnostic tests reviewed for today's visit:    Most recent labs  Results from last 7 days   Lab Units 04/09/24  0545 04/08/24  0555 04/07/24  1746   WBC AUTO x10*3/uL 5.9 7.2 8.3   HEMOGLOBIN g/dL 10.1* 10.6* 10.9*   HEMATOCRIT % 33.0* 35.3* 35.0*   PLATELETS AUTO x10*3/uL 272 291 301     Results from last 7 days   Lab Units 04/09/24  0545 04/08/24  0555 04/07/24  1746   SODIUM mmol/L 140 137 135*   POTASSIUM mmol/L 3.6 3.9 4.2   CHLORIDE mmol/L 101 101 99   CO2 mmol/L 32 26 26   BUN mg/dL 15 22 26*   CREATININE mg/dL 0.62 0.64 0.83   CALCIUM mg/dL 7.8* 8.3* 8.7   PROTEIN TOTAL g/dL 5.3* 5.7* 6.4   BILIRUBIN TOTAL mg/dL 0.4 0.4 0.5   ALK PHOS U/L 114* 124* 135*   ALT U/L 12 13 6*  "  AST U/L 11 15 19   GLUCOSE mg/dL 87 84 100*         Results from last 7 days   Lab Units 04/07/24  1746   CK TOTAL U/L 108     No results found for: \"TROPONINT\"    Results from last 7 days   Lab Units 04/09/24  0822 04/09/24  0307 04/08/24  1600 04/08/24  1207 04/08/24  0348 04/07/24  1753   POCT GLUCOSE mg/dL 90 86 104* 98 71* 92      XR chest 1 view   Final Result   No acute pulmonary abnormality.   Signed by Binu Anderson MD      XR femur right 2+ views   Final Result   No acute osseous abnormality.   Signed by Binu Anderson MD      XR knee right 1-2 views   Final Result   No acute osseous abnormality.   Signed by Binu Anderson MD      CT head wo IV contrast   Final Result   No acute intracranial hemorrhage or mass effect.        Nonspecific white matter changes and parenchymal volume loss.   Findings are grossly similar to prior imaging.             MACRO:   None.        Signed by: Arina Hodges 4/7/2024 8:18 PM   Dictation workstation:   [id number]            SIGNATURE: Shabana Rodriguez MD PATIENT NAME: Prema Hair   DATE: 4/9/2024 MRN: [id number]   TIME: 11:11 AM        "

## 2024-04-10 ENCOUNTER — APPOINTMENT (OUTPATIENT)
Dept: RADIOLOGY | Facility: CLINIC | Age: 59
End: 2024-04-10
Payer: MEDICARE

## 2024-04-10 LAB
ALBUMIN SERPL BCP-MCNC: 3.1 G/DL (ref 3.4–5)
ALP SERPL-CCNC: 119 U/L (ref 33–110)
ALT SERPL W P-5'-P-CCNC: 15 U/L (ref 7–45)
ANION GAP SERPL CALC-SCNC: 11 MMOL/L (ref 10–20)
AST SERPL W P-5'-P-CCNC: 12 U/L (ref 9–39)
BILIRUB SERPL-MCNC: 0.4 MG/DL (ref 0–1.2)
BUN SERPL-MCNC: 14 MG/DL (ref 6–23)
CALCIUM SERPL-MCNC: 8.3 MG/DL (ref 8.6–10.3)
CHLORIDE SERPL-SCNC: 100 MMOL/L (ref 98–107)
CO2 SERPL-SCNC: 35 MMOL/L (ref 21–32)
CREAT SERPL-MCNC: 0.6 MG/DL (ref 0.5–1.05)
EGFRCR SERPLBLD CKD-EPI 2021: >90 ML/MIN/1.73M*2
ERYTHROCYTE [DISTWIDTH] IN BLOOD BY AUTOMATED COUNT: 17.6 % (ref 11.5–14.5)
GLUCOSE BLD MANUAL STRIP-MCNC: 101 MG/DL (ref 74–99)
GLUCOSE BLD MANUAL STRIP-MCNC: 103 MG/DL (ref 74–99)
GLUCOSE BLD MANUAL STRIP-MCNC: 107 MG/DL (ref 74–99)
GLUCOSE BLD MANUAL STRIP-MCNC: 97 MG/DL (ref 74–99)
GLUCOSE SERPL-MCNC: 103 MG/DL (ref 74–99)
HCT VFR BLD AUTO: 34.4 % (ref 36–46)
HGB BLD-MCNC: 10.1 G/DL (ref 12–16)
MCH RBC QN AUTO: 28.9 PG (ref 26–34)
MCHC RBC AUTO-ENTMCNC: 29.4 G/DL (ref 32–36)
MCV RBC AUTO: 99 FL (ref 80–100)
NRBC BLD-RTO: 0 /100 WBCS (ref 0–0)
PLATELET # BLD AUTO: 205 X10*3/UL (ref 150–450)
POTASSIUM SERPL-SCNC: 3.5 MMOL/L (ref 3.5–5.3)
PROT SERPL-MCNC: 5.3 G/DL (ref 6.4–8.2)
RBC # BLD AUTO: 3.49 X10*6/UL (ref 4–5.2)
SODIUM SERPL-SCNC: 142 MMOL/L (ref 136–145)
WBC # BLD AUTO: 6.5 X10*3/UL (ref 4.4–11.3)

## 2024-04-10 PROCEDURE — 94640 AIRWAY INHALATION TREATMENT: CPT

## 2024-04-10 PROCEDURE — 1200000002 HC GENERAL ROOM WITH TELEMETRY DAILY

## 2024-04-10 PROCEDURE — 2500000004 HC RX 250 GENERAL PHARMACY W/ HCPCS (ALT 636 FOR OP/ED): Performed by: INTERNAL MEDICINE

## 2024-04-10 PROCEDURE — 36415 COLL VENOUS BLD VENIPUNCTURE: CPT | Performed by: INTERNAL MEDICINE

## 2024-04-10 PROCEDURE — 1090000002 HH PPS REVENUE DEBIT

## 2024-04-10 PROCEDURE — 2500000002 HC RX 250 W HCPCS SELF ADMINISTERED DRUGS (ALT 637 FOR MEDICARE OP, ALT 636 FOR OP/ED): Performed by: PHARMACIST

## 2024-04-10 PROCEDURE — 85027 COMPLETE CBC AUTOMATED: CPT | Performed by: INTERNAL MEDICINE

## 2024-04-10 PROCEDURE — 82947 ASSAY GLUCOSE BLOOD QUANT: CPT

## 2024-04-10 PROCEDURE — 2500000002 HC RX 250 W HCPCS SELF ADMINISTERED DRUGS (ALT 637 FOR MEDICARE OP, ALT 636 FOR OP/ED): Mod: MUE | Performed by: PHARMACIST

## 2024-04-10 PROCEDURE — 2500000001 HC RX 250 WO HCPCS SELF ADMINISTERED DRUGS (ALT 637 FOR MEDICARE OP): Performed by: INTERNAL MEDICINE

## 2024-04-10 PROCEDURE — 80053 COMPREHEN METABOLIC PANEL: CPT | Performed by: INTERNAL MEDICINE

## 2024-04-10 PROCEDURE — 1090000001 HH PPS REVENUE CREDIT

## 2024-04-10 PROCEDURE — 2500000002 HC RX 250 W HCPCS SELF ADMINISTERED DRUGS (ALT 637 FOR MEDICARE OP, ALT 636 FOR OP/ED): Mod: MUE | Performed by: INTERNAL MEDICINE

## 2024-04-10 PROCEDURE — 2500000005 HC RX 250 GENERAL PHARMACY W/O HCPCS: Performed by: INTERNAL MEDICINE

## 2024-04-10 PROCEDURE — 2500000002 HC RX 250 W HCPCS SELF ADMINISTERED DRUGS (ALT 637 FOR MEDICARE OP, ALT 636 FOR OP/ED): Performed by: INTERNAL MEDICINE

## 2024-04-10 RX ADMIN — GABAPENTIN 900 MG: 300 CAPSULE ORAL at 09:58

## 2024-04-10 RX ADMIN — LOSARTAN POTASSIUM 50 MG: 50 TABLET, FILM COATED ORAL at 09:58

## 2024-04-10 RX ADMIN — GABAPENTIN 900 MG: 300 CAPSULE ORAL at 15:40

## 2024-04-10 RX ADMIN — AMITRIPTYLINE HYDROCHLORIDE 10 MG: 10 TABLET, FILM COATED ORAL at 21:41

## 2024-04-10 RX ADMIN — METFORMIN HYDROCHLORIDE 500 MG: 500 TABLET ORAL at 17:00

## 2024-04-10 RX ADMIN — BACLOFEN 20 MG: 10 TABLET ORAL at 21:39

## 2024-04-10 RX ADMIN — BUDESONIDE 0.5 MG: 0.5 INHALANT ORAL at 20:39

## 2024-04-10 RX ADMIN — GABAPENTIN 900 MG: 300 CAPSULE ORAL at 21:41

## 2024-04-10 RX ADMIN — BACLOFEN 20 MG: 10 TABLET ORAL at 09:58

## 2024-04-10 RX ADMIN — ASPIRIN 81 MG: 81 TABLET, COATED ORAL at 09:59

## 2024-04-10 RX ADMIN — TOPIRAMATE 200 MG: 100 TABLET, FILM COATED ORAL at 21:41

## 2024-04-10 RX ADMIN — ATORVASTATIN CALCIUM 20 MG: 20 TABLET, FILM COATED ORAL at 21:39

## 2024-04-10 RX ADMIN — POTASSIUM CHLORIDE 40 MEQ: 1500 TABLET, EXTENDED RELEASE ORAL at 09:58

## 2024-04-10 RX ADMIN — CLONAZEPAM 1 MG: 1 TABLET ORAL at 09:58

## 2024-04-10 RX ADMIN — CARBIDOPA AND LEVODOPA 1.5 TABLET: 25; 100 TABLET ORAL at 09:58

## 2024-04-10 RX ADMIN — FORMOTEROL FUMARATE DIHYDRATE 20 MCG: 20 SOLUTION RESPIRATORY (INHALATION) at 08:03

## 2024-04-10 RX ADMIN — BUMETANIDE 2 MG: 1 TABLET ORAL at 09:58

## 2024-04-10 RX ADMIN — CARBIDOPA AND LEVODOPA 1.5 TABLET: 25; 100 TABLET ORAL at 12:15

## 2024-04-10 RX ADMIN — FORMOTEROL FUMARATE DIHYDRATE 20 MCG: 20 SOLUTION RESPIRATORY (INHALATION) at 20:39

## 2024-04-10 RX ADMIN — BACLOFEN 20 MG: 10 TABLET ORAL at 15:41

## 2024-04-10 RX ADMIN — TOPIRAMATE 200 MG: 100 TABLET, FILM COATED ORAL at 09:58

## 2024-04-10 RX ADMIN — CLONAZEPAM 2 MG: 1 TABLET ORAL at 21:40

## 2024-04-10 RX ADMIN — CEPHALEXIN 250 MG: 250 CAPSULE ORAL at 10:04

## 2024-04-10 RX ADMIN — BUDESONIDE 0.5 MG: 0.5 INHALANT ORAL at 08:03

## 2024-04-10 RX ADMIN — SERTRALINE HYDROCHLORIDE 200 MG: 50 TABLET ORAL at 09:59

## 2024-04-10 RX ADMIN — POLYETHYLENE GLYCOL 3350 17 G: 17 POWDER, FOR SOLUTION ORAL at 09:58

## 2024-04-10 RX ADMIN — ASPIRIN 81 MG: 81 TABLET, COATED ORAL at 21:41

## 2024-04-10 RX ADMIN — OXCARBAZEPINE 1200 MG: 300 TABLET, FILM COATED ORAL at 21:40

## 2024-04-10 RX ADMIN — DOCUSATE SODIUM 100 MG: 100 CAPSULE, LIQUID FILLED ORAL at 21:39

## 2024-04-10 RX ADMIN — BUMETANIDE 2 MG: 1 TABLET ORAL at 17:09

## 2024-04-10 RX ADMIN — LIDOCAINE 4% 1 PATCH: 40 PATCH TOPICAL at 09:58

## 2024-04-10 RX ADMIN — CARBIDOPA AND LEVODOPA 1.5 TABLET: 25; 100 TABLET ORAL at 15:40

## 2024-04-10 RX ADMIN — POTASSIUM CHLORIDE 40 MEQ: 1500 TABLET, EXTENDED RELEASE ORAL at 17:09

## 2024-04-10 RX ADMIN — PRAZOSIN HYDROCHLORIDE 5 MG: 1 CAPSULE ORAL at 21:41

## 2024-04-10 RX ADMIN — TAMSULOSIN HYDROCHLORIDE 0.4 MG: 0.4 CAPSULE ORAL at 09:58

## 2024-04-10 RX ADMIN — LEVOTHYROXINE SODIUM 100 MCG: 0.1 TABLET ORAL at 05:58

## 2024-04-10 RX ADMIN — ENOXAPARIN SODIUM 40 MG: 40 INJECTION SUBCUTANEOUS at 21:47

## 2024-04-10 RX ADMIN — METFORMIN HYDROCHLORIDE 500 MG: 500 TABLET ORAL at 09:58

## 2024-04-10 RX ADMIN — CARVEDILOL 25 MG: 25 TABLET, FILM COATED ORAL at 09:58

## 2024-04-10 RX ADMIN — TIOTROPIUM BROMIDE INHALATION SPRAY 2 PUFF: 3.12 SPRAY, METERED RESPIRATORY (INHALATION) at 08:06

## 2024-04-10 RX ADMIN — ACETAMINOPHEN 650 MG: 325 TABLET ORAL at 17:15

## 2024-04-10 RX ADMIN — OXCARBAZEPINE 600 MG: 300 TABLET, FILM COATED ORAL at 09:58

## 2024-04-10 RX ADMIN — PANTOPRAZOLE SODIUM 40 MG: 40 TABLET, DELAYED RELEASE ORAL at 05:58

## 2024-04-10 RX ADMIN — QUETIAPINE FUMARATE 800 MG: 100 TABLET ORAL at 21:40

## 2024-04-10 RX ADMIN — DOCUSATE SODIUM 100 MG: 100 CAPSULE, LIQUID FILLED ORAL at 09:58

## 2024-04-10 RX ADMIN — ACETAMINOPHEN 650 MG: 325 TABLET ORAL at 04:22

## 2024-04-10 ASSESSMENT — PAIN SCALES - GENERAL: PAINLEVEL_OUTOF10: 10 - WORST POSSIBLE PAIN

## 2024-04-10 ASSESSMENT — PAIN - FUNCTIONAL ASSESSMENT: PAIN_FUNCTIONAL_ASSESSMENT: 0-10

## 2024-04-10 NOTE — PROGRESS NOTES
04/10/24 1630   Discharge Planning   Patient expects to be discharged to: auth requested [facility] 4/10/2024   Does the patient need discharge transport arranged? Yes   [employer] coordination needed? Yes   Has discharge transport been arranged? No

## 2024-04-10 NOTE — CARE PLAN
The patient's goals for the shift include      The clinical goals for the shift include Pt will remain HDS by end of shift    Problem: Pain - Adult  Goal: Verbalizes/displays adequate comfort level or baseline comfort level  4/9/2024 2329 by Kari Gonzales RN  Outcome: Progressing  4/9/2024 2329 by Kari Gonzales RN  Outcome: Progressing     Problem: Safety - Adult  Goal: Free from fall injury  4/9/2024 2329 by Kari Gonzales RN  Outcome: Progressing  4/9/2024 2329 by Kari Gonzales RN  Outcome: Progressing     Problem: Discharge Planning  Goal: Discharge to home or other facility with appropriate resources  4/9/2024 2329 by Kari Gonzales RN  Outcome: Progressing  4/9/2024 2329 by Kari Gonzales RN  Outcome: Progressing     Problem: Chronic Conditions and Co-morbidities  Goal: Patient's chronic conditions and co-morbidity symptoms are monitored and maintained or improved  4/9/2024 2329 by Kari Gonzales RN  Outcome: Progressing  4/9/2024 2329 by Kari Gonzales RN  Outcome: Progressing     Problem: Diabetes  Goal: Achieve decreasing blood glucose levels by end of shift  4/9/2024 2329 by Kari Gonzales RN  Outcome: Progressing  4/9/2024 2329 by Kari Gonzales RN  Outcome: Progressing  Goal: Increase stability of blood glucose readings by end of shift  4/9/2024 2329 by Kari Gonzales RN  Outcome: Progressing  4/9/2024 2329 by Kari Gonzales RN  Outcome: Progressing  Goal: Decrease in ketones present in urine by end of shift  4/9/2024 2329 by Kari Gonzales RN  Outcome: Progressing  4/9/2024 2329 by Kari Gonzales RN  Outcome: Progressing  Goal: Maintain electrolyte levels within acceptable range throughout shift  4/9/2024 2329 by Kari Gonzales RN  Outcome: Progressing  4/9/2024 2329 by Kari Gonzales RN  Outcome: Progressing  Goal: Maintain glucose levels >70mg/dl to <250mg/dl throughout shift  4/9/2024 2329 by Kari Gonzales RN  Outcome: Progressing  4/9/2024 2329 by Kari  Janet, RN  Outcome: Progressing  Goal: No changes in neurological exam by end of shift  4/9/2024 2329 by Kari Gonzales RN  Outcome: Progressing  4/9/2024 2329 by Kari Gonzales RN  Outcome: Progressing  Goal: Learn about and adhere to nutrition recommendations by end of shift  4/9/2024 2329 by Kari Gonzales RN  Outcome: Progressing  4/9/2024 2329 by Kari Gonzales RN  Outcome: Progressing  Goal: Vital signs within normal range for age by end of shift  4/9/2024 2329 by Kari Gonzales, RN  Outcome: Progressing  4/9/2024 2329 by Kari Gonzales RN  Outcome: Progressing  Goal: Increase self care and/or family involovement by end of shift  4/9/2024 2329 by Kari Gonzales RN  Outcome: Progressing  4/9/2024 2329 by Kari Gonzales RN  Outcome: Progressing  Goal: Receive DSME education by end of shift  4/9/2024 2329 by Kari Gonzales RN  Outcome: Progressing  4/9/2024 2329 by Kari Gonzales RN  Outcome: Progressing     Problem: Fall/Injury  Goal: Not fall by end of shift  4/9/2024 2329 by Kari Gonzales, RN  Outcome: Progressing  4/9/2024 2329 by Kari Gonzales RN  Outcome: Progressing  Goal: Be free from injury by end of the shift  4/9/2024 2329 by Kari Gonzales RN  Outcome: Progressing  4/9/2024 2329 by Kari Gonzales RN  Outcome: Progressing  Goal: Verbalize understanding of personal risk factors for fall in the hospital  4/9/2024 2329 by Kari Gonzales, RN  Outcome: Progressing  4/9/2024 2329 by Kari Gonzales RN  Outcome: Progressing  Goal: Verbalize understanding of risk factor reduction measures to prevent injury from fall in the home  4/9/2024 2329 by Kari Gonzales, RN  Outcome: Progressing  4/9/2024 2329 by Kari Gonzales RN  Outcome: Progressing  Goal: Use assistive devices by end of the shift  4/9/2024 2329 by Kari Gonzales, RN  Outcome: Progressing  4/9/2024 2329 by Kari Gonzales RN  Outcome: Progressing  Goal: Pace activities to prevent fatigue by end of the  shift  4/9/2024 2329 by Kari Gonzales RN  Outcome: Progressing  4/9/2024 2329 by Kari Gonzales RN  Outcome: Progressing     Problem: Pain  Goal: Takes deep breaths with improved pain control throughout the shift  4/9/2024 2329 by Kari Gonzales RN  Outcome: Progressing  4/9/2024 2329 by Kari Gonzales RN  Outcome: Progressing  Goal: Turns in bed with improved pain control throughout the shift  4/9/2024 2329 by Kari Gonzales RN  Outcome: Progressing  4/9/2024 2329 by Kari Gonzales RN  Outcome: Progressing  Goal: Walks with improved pain control throughout the shift  4/9/2024 2329 by Kari Gonzales RN  Outcome: Progressing  4/9/2024 2329 by Kari Gonzales RN  Outcome: Progressing  Goal: Performs ADL's with improved pain control throughout shift  4/9/2024 2329 by Kari Gonzales RN  Outcome: Progressing  4/9/2024 2329 by Kari Gonzales RN  Outcome: Progressing  Goal: Participates in PT with improved pain control throughout the shift  4/9/2024 2329 by Kari Gonzales RN  Outcome: Progressing  4/9/2024 2329 by Kari Gonzales RN  Outcome: Progressing  Goal: Free from opioid side effects throughout the shift  4/9/2024 2329 by Kari Gonzales RN  Outcome: Progressing  4/9/2024 2329 by Kari Gonzales RN  Outcome: Progressing  Goal: Free from acute confusion related to pain meds throughout the shift  4/9/2024 2329 by Kari Gonzales RN  Outcome: Progressing  4/9/2024 2329 by Kari Gonzales RN  Outcome: Progressing     Problem: Skin  Goal: Decreased wound size/increased tissue granulation at next dressing change  Outcome: Progressing  Flowsheets (Taken 4/9/2024 2329)  Decreased wound size/increased tissue granulation at next dressing change: Promote sleep for wound healing  Goal: Participates in plan/prevention/treatment measures  Outcome: Progressing  Goal: Prevent/manage excess moisture  Outcome: Progressing  Goal: Prevent/minimize sheer/friction injuries  Outcome: Progressing  Goal:  Promote/optimize nutrition  Outcome: Progressing  Goal: Promote skin healing  Outcome: Progressing     Problem: Pain - Adult  Goal: Verbalizes/displays adequate comfort level or baseline comfort level  4/9/2024 2329 by Kari Gonzales RN  Outcome: Progressing  4/9/2024 2329 by Kari Gonzales RN  Outcome: Progressing     Problem: Safety - Adult  Goal: Free from fall injury  4/9/2024 2329 by Kari Gonzales RN  Outcome: Progressing  4/9/2024 2329 by Kari Gonzales RN  Outcome: Progressing     Problem: Discharge Planning  Goal: Discharge to home or other facility with appropriate resources  4/9/2024 2329 by Kari Gonzales RN  Outcome: Progressing  4/9/2024 2329 by Kari Gonzales RN  Outcome: Progressing     Problem: Chronic Conditions and Co-morbidities  Goal: Patient's chronic conditions and co-morbidity symptoms are monitored and maintained or improved  4/9/2024 2329 by Kari Gonzales RN  Outcome: Progressing  4/9/2024 2329 by Kari Gonzales RN  Outcome: Progressing     Problem: Diabetes  Goal: Achieve decreasing blood glucose levels by end of shift  4/9/2024 2329 by Kari Gonzales RN  Outcome: Progressing  4/9/2024 2329 by Kari Gonzales RN  Outcome: Progressing  Goal: Increase stability of blood glucose readings by end of shift  4/9/2024 2329 by Kari Gonzales RN  Outcome: Progressing  4/9/2024 2329 by Kari Gonzales RN  Outcome: Progressing  Goal: Decrease in ketones present in urine by end of shift  4/9/2024 2329 by Kari Gonzales RN  Outcome: Progressing  4/9/2024 2329 by Kari Gonzales RN  Outcome: Progressing  Goal: Maintain electrolyte levels within acceptable range throughout shift  4/9/2024 2329 by Kari Gonzales RN  Outcome: Progressing  4/9/2024 2329 by Kari Gonzales RN  Outcome: Progressing  Goal: Maintain glucose levels >70mg/dl to <250mg/dl throughout shift  4/9/2024 2329 by Kari Gonzales RN  Outcome: Progressing  4/9/2024 2329 by Kari Gonzales RN  Outcome:  Progressing  Goal: No changes in neurological exam by end of shift  4/9/2024 2329 by Kari Gonzales RN  Outcome: Progressing  4/9/2024 2329 by Kari Gonzales RN  Outcome: Progressing  Goal: Learn about and adhere to nutrition recommendations by end of shift  4/9/2024 2329 by Kari Gonzales RN  Outcome: Progressing  4/9/2024 2329 by Kari Gonzales, RN  Outcome: Progressing  Goal: Vital signs within normal range for age by end of shift  4/9/2024 2329 by Kari Gonzales RN  Outcome: Progressing  4/9/2024 2329 by Kari Gonzales RN  Outcome: Progressing  Goal: Increase self care and/or family involovement by end of shift  4/9/2024 2329 by Kari Gonzales, RN  Outcome: Progressing  4/9/2024 2329 by Kari Gonzales RN  Outcome: Progressing  Goal: Receive DSME education by end of shift  4/9/2024 2329 by Kari Gonzales RN  Outcome: Progressing  4/9/2024 2329 by Kari Gonzales RN  Outcome: Progressing     Problem: Fall/Injury  Goal: Not fall by end of shift  4/9/2024 2329 by Kari Gonzales, RN  Outcome: Progressing  4/9/2024 2329 by Kari Gonzales RN  Outcome: Progressing  Goal: Be free from injury by end of the shift  4/9/2024 2329 by Kari Gonzales RN  Outcome: Progressing  4/9/2024 2329 by Kari Gonzales RN  Outcome: Progressing  Goal: Verbalize understanding of personal risk factors for fall in the hospital  4/9/2024 2329 by Kari Gonzales RN  Outcome: Progressing  4/9/2024 2329 by Kari Gonzales RN  Outcome: Progressing  Goal: Verbalize understanding of risk factor reduction measures to prevent injury from fall in the home  4/9/2024 2329 by Kari Gonzales, RN  Outcome: Progressing  4/9/2024 2329 by Kari Gonzales RN  Outcome: Progressing  Goal: Use assistive devices by end of the shift  4/9/2024 2329 by Kari Gonzales RN  Outcome: Progressing  4/9/2024 2329 by Kari Gonzales RN  Outcome: Progressing  Goal: Pace activities to prevent fatigue by end of the shift  4/9/2024 2329 by Kari  SARAH Gonzales  Outcome: Progressing  4/9/2024 2329 by Kari Gonzales RN  Outcome: Progressing     Problem: Pain  Goal: Takes deep breaths with improved pain control throughout the shift  4/9/2024 2329 by Kari Gonzales RN  Outcome: Progressing  4/9/2024 2329 by Kari Gonzales RN  Outcome: Progressing  Goal: Turns in bed with improved pain control throughout the shift  4/9/2024 2329 by Kari Gonzales RN  Outcome: Progressing  4/9/2024 2329 by Kari Gonzales RN  Outcome: Progressing  Goal: Walks with improved pain control throughout the shift  4/9/2024 2329 by Kari Gonzales RN  Outcome: Progressing  4/9/2024 2329 by Kari Gonzales RN  Outcome: Progressing  Goal: Performs ADL's with improved pain control throughout shift  4/9/2024 2329 by Kari Gonzales RN  Outcome: Progressing  4/9/2024 2329 by Kari Gonzales RN  Outcome: Progressing  Goal: Participates in PT with improved pain control throughout the shift  4/9/2024 2329 by Kari Gonzales RN  Outcome: Progressing  4/9/2024 2329 by Kari Gonzales RN  Outcome: Progressing  Goal: Free from opioid side effects throughout the shift  4/9/2024 2329 by Kari Gonzales RN  Outcome: Progressing  4/9/2024 2329 by Kari Gonzales RN  Outcome: Progressing  Goal: Free from acute confusion related to pain meds throughout the shift  4/9/2024 2329 by Kari Gonzales RN  Outcome: Progressing  4/9/2024 2329 by Kari Gonzales RN  Outcome: Progressing     Problem: Skin  Goal: Decreased wound size/increased tissue granulation at next dressing change  Outcome: Progressing  Flowsheets (Taken 4/9/2024 2329)  Decreased wound size/increased tissue granulation at next dressing change: Promote sleep for wound healing  Goal: Participates in plan/prevention/treatment measures  Outcome: Progressing  Goal: Prevent/manage excess moisture  Outcome: Progressing  Goal: Prevent/minimize sheer/friction injuries  Outcome: Progressing  Goal: Promote/optimize nutrition  Outcome:  Progressing  Goal: Promote skin healing  Outcome: Progressing

## 2024-04-11 LAB
ALBUMIN SERPL BCP-MCNC: 3.1 G/DL (ref 3.4–5)
ALP SERPL-CCNC: 131 U/L (ref 33–110)
ALT SERPL W P-5'-P-CCNC: 14 U/L (ref 7–45)
ANION GAP SERPL CALC-SCNC: 13 MMOL/L (ref 10–20)
AST SERPL W P-5'-P-CCNC: 13 U/L (ref 9–39)
BILIRUB SERPL-MCNC: 0.3 MG/DL (ref 0–1.2)
BUN SERPL-MCNC: 11 MG/DL (ref 6–23)
CALCIUM SERPL-MCNC: 8.5 MG/DL (ref 8.6–10.3)
CHLORIDE SERPL-SCNC: 98 MMOL/L (ref 98–107)
CO2 SERPL-SCNC: 34 MMOL/L (ref 21–32)
CREAT SERPL-MCNC: 0.55 MG/DL (ref 0.5–1.05)
EGFRCR SERPLBLD CKD-EPI 2021: >90 ML/MIN/1.73M*2
ERYTHROCYTE [DISTWIDTH] IN BLOOD BY AUTOMATED COUNT: 17.1 % (ref 11.5–14.5)
GLUCOSE BLD MANUAL STRIP-MCNC: 110 MG/DL (ref 74–99)
GLUCOSE BLD MANUAL STRIP-MCNC: 112 MG/DL (ref 74–99)
GLUCOSE BLD MANUAL STRIP-MCNC: 117 MG/DL (ref 74–99)
GLUCOSE BLD MANUAL STRIP-MCNC: 86 MG/DL (ref 74–99)
GLUCOSE SERPL-MCNC: 105 MG/DL (ref 74–99)
HCT VFR BLD AUTO: 33.7 % (ref 36–46)
HGB BLD-MCNC: 10.5 G/DL (ref 12–16)
MCH RBC QN AUTO: 28.5 PG (ref 26–34)
MCHC RBC AUTO-ENTMCNC: 31.2 G/DL (ref 32–36)
MCV RBC AUTO: 92 FL (ref 80–100)
NRBC BLD-RTO: 0 /100 WBCS (ref 0–0)
PLATELET # BLD AUTO: 285 X10*3/UL (ref 150–450)
POTASSIUM SERPL-SCNC: 3.7 MMOL/L (ref 3.5–5.3)
PROT SERPL-MCNC: 5.3 G/DL (ref 6.4–8.2)
RBC # BLD AUTO: 3.68 X10*6/UL (ref 4–5.2)
SODIUM SERPL-SCNC: 141 MMOL/L (ref 136–145)
TSH SERPL-ACNC: 1.3 MIU/L (ref 0.44–3.98)
WBC # BLD AUTO: 6.1 X10*3/UL (ref 4.4–11.3)

## 2024-04-11 PROCEDURE — 1090000001 HH PPS REVENUE CREDIT

## 2024-04-11 PROCEDURE — 2500000002 HC RX 250 W HCPCS SELF ADMINISTERED DRUGS (ALT 637 FOR MEDICARE OP, ALT 636 FOR OP/ED): Mod: MUE | Performed by: INTERNAL MEDICINE

## 2024-04-11 PROCEDURE — 2500000001 HC RX 250 WO HCPCS SELF ADMINISTERED DRUGS (ALT 637 FOR MEDICARE OP): Performed by: INTERNAL MEDICINE

## 2024-04-11 PROCEDURE — 94640 AIRWAY INHALATION TREATMENT: CPT

## 2024-04-11 PROCEDURE — 2500000004 HC RX 250 GENERAL PHARMACY W/ HCPCS (ALT 636 FOR OP/ED): Performed by: INTERNAL MEDICINE

## 2024-04-11 PROCEDURE — 85027 COMPLETE CBC AUTOMATED: CPT | Performed by: INTERNAL MEDICINE

## 2024-04-11 PROCEDURE — 2500000002 HC RX 250 W HCPCS SELF ADMINISTERED DRUGS (ALT 637 FOR MEDICARE OP, ALT 636 FOR OP/ED): Performed by: PHARMACIST

## 2024-04-11 PROCEDURE — 2500000002 HC RX 250 W HCPCS SELF ADMINISTERED DRUGS (ALT 637 FOR MEDICARE OP, ALT 636 FOR OP/ED): Performed by: INTERNAL MEDICINE

## 2024-04-11 PROCEDURE — 36415 COLL VENOUS BLD VENIPUNCTURE: CPT | Performed by: INTERNAL MEDICINE

## 2024-04-11 PROCEDURE — 2500000002 HC RX 250 W HCPCS SELF ADMINISTERED DRUGS (ALT 637 FOR MEDICARE OP, ALT 636 FOR OP/ED): Mod: MUE | Performed by: PHARMACIST

## 2024-04-11 PROCEDURE — 97116 GAIT TRAINING THERAPY: CPT | Mod: GP,CQ

## 2024-04-11 PROCEDURE — 97530 THERAPEUTIC ACTIVITIES: CPT | Mod: GO,CO

## 2024-04-11 PROCEDURE — 97110 THERAPEUTIC EXERCISES: CPT | Mod: GP,CQ

## 2024-04-11 PROCEDURE — 82947 ASSAY GLUCOSE BLOOD QUANT: CPT

## 2024-04-11 PROCEDURE — 80053 COMPREHEN METABOLIC PANEL: CPT | Performed by: INTERNAL MEDICINE

## 2024-04-11 PROCEDURE — 51702 INSERT TEMP BLADDER CATH: CPT

## 2024-04-11 PROCEDURE — 97535 SELF CARE MNGMENT TRAINING: CPT | Mod: GO,CO

## 2024-04-11 PROCEDURE — 84443 ASSAY THYROID STIM HORMONE: CPT | Performed by: INTERNAL MEDICINE

## 2024-04-11 PROCEDURE — 1090000002 HH PPS REVENUE DEBIT

## 2024-04-11 PROCEDURE — 2500000005 HC RX 250 GENERAL PHARMACY W/O HCPCS: Performed by: INTERNAL MEDICINE

## 2024-04-11 PROCEDURE — 1100000001 HC PRIVATE ROOM DAILY

## 2024-04-11 RX ORDER — MINOCYCLINE HYDROCHLORIDE 100 MG/1
100 CAPSULE ORAL NIGHTLY
Start: 2024-04-11

## 2024-04-11 RX ORDER — CARBIDOPA AND LEVODOPA 25; 100 MG/1; MG/1
1.5 TABLET ORAL 3 TIMES DAILY
Start: 2024-04-11

## 2024-04-11 RX ORDER — MINOCYCLINE HYDROCHLORIDE 100 MG/1
100 CAPSULE ORAL NIGHTLY
Status: DISCONTINUED | OUTPATIENT
Start: 2024-04-11 | End: 2024-04-12 | Stop reason: HOSPADM

## 2024-04-11 RX ORDER — OXYCODONE AND ACETAMINOPHEN 5; 325 MG/1; MG/1
0.5 TABLET ORAL EVERY 6 HOURS PRN
Qty: 10 TABLET | Refills: 0 | Status: SHIPPED | OUTPATIENT
Start: 2024-04-11

## 2024-04-11 RX ORDER — LEVOTHYROXINE SODIUM 100 UG/1
TABLET ORAL
Start: 2024-04-12

## 2024-04-11 RX ORDER — ENOXAPARIN SODIUM 100 MG/ML
40 INJECTION SUBCUTANEOUS EVERY 24 HOURS
Start: 2024-04-11 | End: 2024-05-16 | Stop reason: WASHOUT

## 2024-04-11 RX ORDER — LEVOTHYROXINE SODIUM 200 UG/1
TABLET ORAL
Start: 2024-04-13 | End: 2024-05-16 | Stop reason: WASHOUT

## 2024-04-11 RX ADMIN — BUMETANIDE 2 MG: 1 TABLET ORAL at 19:00

## 2024-04-11 RX ADMIN — TAMSULOSIN HYDROCHLORIDE 0.4 MG: 0.4 CAPSULE ORAL at 09:21

## 2024-04-11 RX ADMIN — CARBIDOPA AND LEVODOPA 1.5 TABLET: 25; 100 TABLET ORAL at 18:59

## 2024-04-11 RX ADMIN — POLYETHYLENE GLYCOL 3350 17 G: 17 POWDER, FOR SOLUTION ORAL at 09:20

## 2024-04-11 RX ADMIN — LOSARTAN POTASSIUM 50 MG: 50 TABLET, FILM COATED ORAL at 09:21

## 2024-04-11 RX ADMIN — FORMOTEROL FUMARATE DIHYDRATE 20 MCG: 20 SOLUTION RESPIRATORY (INHALATION) at 21:21

## 2024-04-11 RX ADMIN — ENOXAPARIN SODIUM 40 MG: 40 INJECTION SUBCUTANEOUS at 20:45

## 2024-04-11 RX ADMIN — BUDESONIDE 0.5 MG: 0.5 INHALANT ORAL at 21:21

## 2024-04-11 RX ADMIN — ASPIRIN 81 MG: 81 TABLET, COATED ORAL at 09:21

## 2024-04-11 RX ADMIN — ATORVASTATIN CALCIUM 20 MG: 20 TABLET, FILM COATED ORAL at 20:49

## 2024-04-11 RX ADMIN — BUDESONIDE 0.5 MG: 0.5 INHALANT ORAL at 07:54

## 2024-04-11 RX ADMIN — METFORMIN HYDROCHLORIDE 500 MG: 500 TABLET ORAL at 19:00

## 2024-04-11 RX ADMIN — CARBIDOPA AND LEVODOPA 1.5 TABLET: 25; 100 TABLET ORAL at 09:20

## 2024-04-11 RX ADMIN — SERTRALINE HYDROCHLORIDE 200 MG: 50 TABLET ORAL at 09:21

## 2024-04-11 RX ADMIN — GABAPENTIN 900 MG: 300 CAPSULE ORAL at 20:47

## 2024-04-11 RX ADMIN — BACLOFEN 20 MG: 10 TABLET ORAL at 09:21

## 2024-04-11 RX ADMIN — TIOTROPIUM BROMIDE INHALATION SPRAY 2 PUFF: 3.12 SPRAY, METERED RESPIRATORY (INHALATION) at 07:55

## 2024-04-11 RX ADMIN — GABAPENTIN 900 MG: 300 CAPSULE ORAL at 14:48

## 2024-04-11 RX ADMIN — TOPIRAMATE 200 MG: 100 TABLET, FILM COATED ORAL at 09:21

## 2024-04-11 RX ADMIN — CARBIDOPA AND LEVODOPA 1.5 TABLET: 25; 100 TABLET ORAL at 12:31

## 2024-04-11 RX ADMIN — CEPHALEXIN 250 MG: 250 CAPSULE ORAL at 09:00

## 2024-04-11 RX ADMIN — POTASSIUM CHLORIDE 40 MEQ: 1500 TABLET, EXTENDED RELEASE ORAL at 09:21

## 2024-04-11 RX ADMIN — BUMETANIDE 2 MG: 1 TABLET ORAL at 09:21

## 2024-04-11 RX ADMIN — DOCUSATE SODIUM 100 MG: 100 CAPSULE, LIQUID FILLED ORAL at 20:52

## 2024-04-11 RX ADMIN — BACLOFEN 20 MG: 10 TABLET ORAL at 20:47

## 2024-04-11 RX ADMIN — ASPIRIN 81 MG: 81 TABLET, COATED ORAL at 20:48

## 2024-04-11 RX ADMIN — OXCARBAZEPINE 600 MG: 300 TABLET, FILM COATED ORAL at 09:20

## 2024-04-11 RX ADMIN — CARVEDILOL 25 MG: 25 TABLET, FILM COATED ORAL at 20:50

## 2024-04-11 RX ADMIN — CARVEDILOL 25 MG: 25 TABLET, FILM COATED ORAL at 09:21

## 2024-04-11 RX ADMIN — MINOCYCLINE HYDROCHLORIDE 100 MG: 100 CAPSULE ORAL at 20:48

## 2024-04-11 RX ADMIN — BACLOFEN 20 MG: 10 TABLET ORAL at 14:48

## 2024-04-11 RX ADMIN — CLONAZEPAM 1 MG: 1 TABLET ORAL at 09:21

## 2024-04-11 RX ADMIN — PRAZOSIN HYDROCHLORIDE 5 MG: 1 CAPSULE ORAL at 20:55

## 2024-04-11 RX ADMIN — PANTOPRAZOLE SODIUM 40 MG: 40 TABLET, DELAYED RELEASE ORAL at 06:01

## 2024-04-11 RX ADMIN — TOPIRAMATE 200 MG: 100 TABLET, FILM COATED ORAL at 20:50

## 2024-04-11 RX ADMIN — OXCARBAZEPINE 1200 MG: 300 TABLET, FILM COATED ORAL at 20:46

## 2024-04-11 RX ADMIN — FORMOTEROL FUMARATE DIHYDRATE 20 MCG: 20 SOLUTION RESPIRATORY (INHALATION) at 07:54

## 2024-04-11 RX ADMIN — ACETAMINOPHEN 650 MG: 325 TABLET ORAL at 10:22

## 2024-04-11 RX ADMIN — POTASSIUM CHLORIDE 40 MEQ: 1500 TABLET, EXTENDED RELEASE ORAL at 19:00

## 2024-04-11 RX ADMIN — AMITRIPTYLINE HYDROCHLORIDE 10 MG: 10 TABLET, FILM COATED ORAL at 20:49

## 2024-04-11 RX ADMIN — LIDOCAINE 4% 1 PATCH: 40 PATCH TOPICAL at 09:20

## 2024-04-11 RX ADMIN — QUETIAPINE FUMARATE 800 MG: 100 TABLET ORAL at 20:46

## 2024-04-11 RX ADMIN — CLONAZEPAM 2 MG: 1 TABLET ORAL at 20:48

## 2024-04-11 RX ADMIN — GABAPENTIN 900 MG: 300 CAPSULE ORAL at 09:21

## 2024-04-11 RX ADMIN — DOCUSATE SODIUM 100 MG: 100 CAPSULE, LIQUID FILLED ORAL at 09:21

## 2024-04-11 RX ADMIN — LEVOTHYROXINE SODIUM 200 MCG: 0.1 TABLET ORAL at 06:01

## 2024-04-11 RX ADMIN — METFORMIN HYDROCHLORIDE 500 MG: 500 TABLET ORAL at 09:21

## 2024-04-11 ASSESSMENT — COGNITIVE AND FUNCTIONAL STATUS - GENERAL
MOVING FROM LYING ON BACK TO SITTING ON SIDE OF FLAT BED WITH BEDRAILS: A LITTLE
DAILY ACTIVITIY SCORE: 16
TOILETING: A LOT
STANDING UP FROM CHAIR USING ARMS: A LITTLE
EATING MEALS: A LITTLE
STANDING UP FROM CHAIR USING ARMS: A LITTLE
WALKING IN HOSPITAL ROOM: A LITTLE
MOBILITY SCORE: 14
HELP NEEDED FOR BATHING: A LITTLE
DRESSING REGULAR UPPER BODY CLOTHING: A LOT
EATING MEALS: A LITTLE
MOVING TO AND FROM BED TO CHAIR: A LITTLE
MOBILITY SCORE: 16
DRESSING REGULAR LOWER BODY CLOTHING: A LOT
HELP NEEDED FOR BATHING: A LOT
MOVING FROM LYING ON BACK TO SITTING ON SIDE OF FLAT BED WITH BEDRAILS: A LITTLE
TOILETING: A LOT
STANDING UP FROM CHAIR USING ARMS: A LOT
DAILY ACTIVITIY SCORE: 13
PERSONAL GROOMING: A LOT
TURNING FROM BACK TO SIDE WHILE IN FLAT BAD: A LITTLE
DRESSING REGULAR LOWER BODY CLOTHING: A LOT
TOILETING: A LOT
MOVING TO AND FROM BED TO CHAIR: A LOT
PERSONAL GROOMING: A LITTLE
MOVING TO AND FROM BED TO CHAIR: A LITTLE
DAILY ACTIVITIY SCORE: 16
DRESSING REGULAR UPPER BODY CLOTHING: A LITTLE
CLIMB 3 TO 5 STEPS WITH RAILING: TOTAL
TURNING FROM BACK TO SIDE WHILE IN FLAT BAD: A LITTLE
MOBILITY SCORE: 16
MOVING FROM LYING ON BACK TO SITTING ON SIDE OF FLAT BED WITH BEDRAILS: A LITTLE
EATING MEALS: A LITTLE
TURNING FROM BACK TO SIDE WHILE IN FLAT BAD: A LITTLE
PERSONAL GROOMING: A LITTLE
CLIMB 3 TO 5 STEPS WITH RAILING: TOTAL
WALKING IN HOSPITAL ROOM: A LITTLE
HELP NEEDED FOR BATHING: A LITTLE
DRESSING REGULAR LOWER BODY CLOTHING: A LOT
WALKING IN HOSPITAL ROOM: A LOT
DRESSING REGULAR UPPER BODY CLOTHING: A LITTLE
CLIMB 3 TO 5 STEPS WITH RAILING: A LOT

## 2024-04-11 ASSESSMENT — PAIN - FUNCTIONAL ASSESSMENT
PAIN_FUNCTIONAL_ASSESSMENT: 0-10

## 2024-04-11 ASSESSMENT — PAIN SCALES - GENERAL
PAINLEVEL_OUTOF10: 6
PAINLEVEL_OUTOF10: 3
PAINLEVEL_OUTOF10: 10 - WORST POSSIBLE PAIN
PAINLEVEL_OUTOF10: 0 - NO PAIN
PAINLEVEL_OUTOF10: 10 - WORST POSSIBLE PAIN

## 2024-04-11 ASSESSMENT — ACTIVITIES OF DAILY LIVING (ADL)
HOME_MANAGEMENT_TIME_ENTRY: 20
BATHING_WHERE_ASSESSED: OTHER (COMMENT)
BATHING_LEVEL_OF_ASSISTANCE: CLOSE SUPERVISION;MODERATE VERBAL CUES

## 2024-04-11 NOTE — NURSING NOTE
Patient c/o pressure lower abdomen. Patient still has been a no void since Ram discontinued earlier this am. Bladder scan now for 785 ML. Notified Dr. Rodriguez, awaiting for a response back.

## 2024-04-11 NOTE — PROGRESS NOTES
Occupational Therapy    Occupational Therapy Treatment    Name: Prema Hair  MRN: [id number]  : 1965  Date: 24  Time Calculation  Start Time: 1028  Stop Time: 1059  Time Calculation (min): 31 min    Assessment:  Medical Staff Made Aware: Yes  End of Session Communication: PCT/NA/CTA (PCNA notified)  End of Session Patient Position: Bed, 3 rail up, Alarm on  Plan:  Treatment Interventions:  (Student participated in performance of tx and documentation under the direct supervision of Augusto MERA SOTA.)  OT Frequency: 3 times per week  OT Discharge Recommendations: Moderate intensity level of continued care  Equipment Recommended upon Discharge:  (Hip Kit)  OT - OK to Discharge:  (Per POC)    Subjective   Previous Visit Info:  OT Last Visit  OT Received On: 24  General:  General  Reason for Referral: Impaired Mobility; 60 y/o female with Falls at home, closed head injury, delirium, recent R TKR 3/27  Prior to Session Communication: Bedside nurse  Patient Position Received: Bed, 3 rail up, Alarm on  Preferred Learning Style: auditory, verbal  General Comment: Patient is pleasant and cooperative to OT session. Increased time for all task d/t delayed response  Precautions:  LE Weight Bearing Status: Weight Bearing as Tolerated (R LE)  Medical Precautions: Fall precautions  Post-Surgical Precautions: Right total knee precautions  Vitals:  Vital Signs  Heart Rate: 92  Heart Rate Source: Monitor  Patient Position: Sitting  Pain Assessment:  Pain Assessment  Pain Assessment: 0-10  Pain Score: 10 - Worst possible pain  Pain Type: Acute pain  Pain Location: Knee  Pain Orientation: Right     Objective   Activities of Daily Living: Grooming  Grooming Level of Assistance: Close supervision, Minimal verbal cues  Grooming Where Assessed: Other (Comment) (BSC)  Grooming Comments: Pt completed tooth brushing, face washing and donning deodarant (VC for initiation of task)    UE Bathing  UE Bathing Level of  Assistance: Close supervision, Minimal verbal cues  UE Bathing Where Ass[place]: [place] (Comment) [place] [place]  UE Bathing Comments: Pt completed UB bathing (VC for initiation and sequencing of task)    LE Bathing  LE Bathing Level of Assistance: Close supervision, Moderate verbal cues  LE Bathing Where Assessed: Other (Comment) ([facility])  LE Bathing Comments: Pt completed LB bathing , and pericare hygiene (VC for initiation and sequencing of task)    UE Dressing  UE Dressing Level of Assistance: Minimal verbal cues, Minimum assistance  UE Dressing Where Assessed: Other (Comment) [place] [place]  UE Dressing Comments: Assist to don gown over Pt shoulder (VC for sequencing of task)    Bed Mobility/Transfers: Bed Mobility  Bed Mobility: Yes  Bed Mobility 1  Bed Mobility 1: Supine to sitting, Sitting to supine  Level of Assistance 1: Close supervision, Minimal verbal cues  Bed Mobility Comments 1: Close S for patient safety (HOB elevated VC for initiation of task.)    Transfers  Transfer: Yes  Transfer 1  Transfer From 1: Bed to, Commode-standard to  Transfer to 1: Commode-standard, Bed  Technique 1: Sit to stand, Stand to sit  Transfer Device 1: Walker, Gait belt  Transfer Level of Assistance 1: Minimum assistance, Moderate verbal cues  Trials/Comments 1: Pt tolorated taking ~8 steps from bed<> BSC (VC for hand placement and walker management)    Sitting Balance:  Static Sitting Balance  Static Sitting-Balance Support: Feet supported, Bilateral upper extremity supported  Static Sitting-Level of Assistance: Distant supervision  Static Sitting-Comment/Number of Minutes: Pt tolerated sitting EOB ~ 4 minutes      Outcome Measures:  [facility] Daily Activity  Putting on and taking off regular lower body clothing: A lot  Bathing (including washing, rinsing, drying): A little  Putting on and taking off regular upper body clothing: A little  Toileting, which includes using toilet, bedpan or urinal: A lot  Taking care of personal grooming such as brushing  teeth: A little  Eating Meals: A little  Daily Activity - Total Score: 16        Education Documentation  ADL Training, taught by LUIS FERNANDO Giordano at 4/11/2024 12:04 PM.  Learner: Patient  Readiness: Acceptance  Method: Explanation  Response: Needs Reinforcement, Demonstrated Understanding    Education Comments  No comments found.      Goals:  Encounter Problems       Encounter Problems (Active)       ADLs       Patient will perform UB and LB bathing  with independent level of assistance.  (Progressing)       Start:  04/09/24    Expected End:  04/23/24            Patient with complete upper body dressing with independent level of assistance donning and doffing all UE clothes with no adaptive equipment while edge of bed  (Progressing)       Start:  04/09/24    Expected End:  04/23/24            Patient with complete lower body dressing with independent level of assistance donning and doffing all LE clothes  with adaptive equipment while edge of bed  (Not Progressing)       Start:  04/09/24    Expected End:  04/23/24            Patient will complete daily grooming tasks brushing teeth and washing face/hair with independent level of assistance and PRN adaptive equipment while standing. (Progressing)       Start:  04/09/24    Expected End:  04/23/24            Patient will complete toileting including hygiene clothing management/hygiene with independent level of assistance and raised toilet seat. (Not Progressing)       Start:  04/09/24    Expected End:  04/23/24               TRANSFERS       Patient will perform bed mobility independent level of assistance and bed rails in order to improve safety and independence with mobility (Progressing)       Start:  04/09/24    Expected End:  04/23/24            Patient will complete functional transfer to all surfaces  with front wheeled walker with modified independent level of assistance. (Progressing)       Start:  04/09/24    Expected End:  04/23/24

## 2024-04-11 NOTE — PROGRESS NOTES
Physical Therapy    Physical Therapy Treatment    Patient Name: Prema Hair  MRN: [id number]  Today's Date: 4/11/2024  Time Calculation  Start Time: 1338  Stop Time: 1405  Time Calculation (min): 27 min       Assessment/Plan   PT Assessment  End of Session Communication: Bedside nurse  End of Session Patient Position: Alarm on, Bed, 3 rail up  PT Plan  Inpatient/Swing Bed or Outpatient: Inpatient  PT Plan  Treatment/Interventions: Bed mobility, Transfer training, Gait training  PT Plan: Skilled PT  PT Frequency: 4 times per week  PT Discharge Recommendations: Moderate intensity level of continued care  Equipment Recommended upon Discharge: Wheeled walker (Hip Kit, pt has WW at home)  PT Recommended Transfer Status: Assist x1  PT - OK to Discharge: Yes (per POC)      General Visit Information:   PT  Visit  PT Received On: 04/11/24  General  Reason for Referral: Impaired Mobility; 60 y/o female with Falls at home, closed head injury, delirium, recent R TKR 3/27  Referred By: Dr. Wolff  Family/Caregiver Present: No  Prior to Session Communication: Bedside nurse  Patient Position Received: Bed, 3 rail up, Alarm on  General Comment: increased time req to complete all activity, pt plesant throughout    Subjective   Precautions:  Precautions  LE Weight Bearing Status: Weight Bearing as Tolerated  Medical Precautions: Fall precautions  Post-Surgical Precautions: Right total knee precautions  Vital Signs:       Objective   Pain:  Pain Assessment  Pain Score: 10 - Worst possible pain  Pain Type: Acute pain  Pain Location: Knee  Pain Orientation: Right  Cognition:  Cognition  Overall Cognitive Status: Within Functional Limits  Postural Control:     Extremity/Trunk Assessments:    Activity Tolerance:     Treatments:  Therapeutic Exercise  Therapeutic Exercise Performed: Yes  Therapeutic Exercise Activity 1: pt performed supine ther ex x15-20 : AP, QS, GS, SAQ, SLR AA, seated Knee flexion. vc/tc req         Bed  Mobility  Bed Mobility: Yes  Bed Mobility 1  Bed Mobility 1: Supine to sitting, Sitting to supine  Level of Assistance 1: Close supervision  Bed Mobility Comments 1: HOB elevated. pt completed slowly    Ambulation/Gait Training  Ambulation/Gait Training Performed: Yes  Ambulation/Gait Training 1  Surface 1: Level tile  Device 1: Rolling walker  Gait Support Devices: Gait belt  Assistance 1: Contact guard, Close supervision  Comments/Distance (ft) 1: .18x2 (pt performed with step through gait pattern, min vc for turning 2/2 lines. min VC to stay closer to RW.  pt with fatigue unable to go further at this time)  Transfer 1  Technique 1: Sit to stand, Stand to sit  Transfer Device 1: Walker  Transfer Level of Assistance 1: Minimum assistance  Trials/Comments 1: vc/tc for hand placment on solid sitting surface and not RW.    Outcome Measures:  [facility] Basic Mobility  Turning from your back to your side while in a flat bed without using bedrails: A little  Moving from lying on your back to sitting on the side of a flat bed without using bedrails: A little  Moving to and from bed to chair (including a wheelchair): A little  Standing up from a chair using your arms (e.g. wheelchair or bedside chair): A little  To walk in hospital room: A little  Climbing 3-5 steps with railing: Total  Basic Mobility - Total Score: 16    Education Documentation  Mobility Training, taught by John Malone PTA at 4/11/2024  2:22 PM.  Learner: Patient  Readiness: Acceptance  Method: Explanation  Response: Verbalizes Understanding    Education Comments  No comments found.        OP EDUCATION:       Encounter Problems       Encounter Problems (Active)       Balance       STG - Maintains dynamic standing balance with upper extremity support At Supervision level, safely, no LOB          Start:  04/09/24    Expected End:  04/23/24       INTERVENTIONS:  1. Practice standing with minimal support.  2. Educate patient about standing tolerance.  3.  Educate patient about independence with gait, transfers, and ADL's.  4. Educate patient about use of assistive device.  5. Educate patient about self-directed care.            Mobility       STG - Patient will ambulate >/= 50', device PRN, supervision, no LOB   (Progressing)       Start:  04/09/24    Expected End:  04/23/24            Endurance - Pt to tolerate >/= 20 minutes therex, theract, gait and/or NMR with </= 5 minutes of rest breaks  (Progressing)       Start:  04/09/24    Expected End:  04/23/24            Pt demo's ability to perform AROM of R knee at EOB from 0-90 degrees         Start:  04/09/24    Expected End:  04/23/24               PT Transfers       STG - Patient will perform bed mobility At Supervision level, safely, no LOB    (Progressing)       Start:  04/09/24    Expected End:  04/23/24            STG - Patient will transfer sit to and from stand At Supervision level, safely, no LOB          Start:  04/09/24    Expected End:  04/23/24               Pain - Adult          Safety       Pt will verbalize and apply safety awareness and precautions 100% of time throughout entire session         Start:  04/09/24    Expected End:  04/23/24

## 2024-04-11 NOTE — PROGRESS NOTES
PROGRESS NOTE - INTERNAL MEDICINE     PATIENT NAME:  Prema Hair    MRN:  [id number]  SERVICE DATE:  4/11/2024       ADMITTING PHYSICIAN:  Shabana Rodriguez MD    ASSESSMENT AND PLAN    Principal Problem:    Delirium    DM2 fair control. A1c 5.9 3/24  HTN variable contr  probable toxometabolic encephalopathy - multifactorial etiol. Opiates, head injury causing concussion.  Impaired mobility with falls at home. S/p R TKA 3/27/24  Hx of Ca thyroid on supplement Rx to suppress TSH (0.6 3/24 and 1.3 now).  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  Torticollis  OA knees        PLAN:  No new Abx - low suspicion for acute infection. On keflex proph for recurrent UTI and minocycline proph for recurrent skin infections.  Caution with opiates.  Fall prec  PT OT recomm Mod Int therapy. Pt agrees with SNF  Continue levothyroxine - aim for suppressed TSH - dose increased from 900mcg/wk to 1000mcg/wk this adm.  DVT proph  D/w TCC.        DISPOSITION PLAN:  Rehab pending auth     INTERVAL HISTORY OF PRESENT ILLNESS:  pt reports feeling stronger. No chest pain/sob. No n/v/d. Oral intake good. No fever. acute events from last 24 hrs reviewed.     Pertinent ROS:  No abdominal pain / No Bleeding / No rashes     Discussed with nursing and case management team and the specialists involved in this patient's care. Reviewed the EMR and documentation from other care-givers.        OBJECTIVE  PHYSICAL EXAM:      GENERAL: AAOx3, cooperative resting comfortably. Obese  SKIN: Skin turgor normal. No rashes  HEENT: no epistaxis, Moist mucosa.  LUNGS: Vesicular breath sounds, with no wheeze, no crepitations.   CARDIAC: REGULAR. S1 and S2; no rubs, no murmur  ABDOMEN: Abdomen soft, non-tender. BS+  EXTREMITIES: +mild edema, Good capillary refill.   NEURO: Insight GOOD. +invol movements  MUSCULOSKELETAL: R TKA surg wound clean.                4/10/2024     4:19 AM 4/10/2024     8:11 AM 4/10/2024    11:08 AM 4/10/2024     4:14 PM  4/10/2024     7:00 PM 4/11/2024     4:14 AM 4/11/2024     7:59 AM   Vitals   Systolic 103 102 118 121 99 120 107   Diastolic 67 69 77 74 60 71 68   Heart Rate 79 77 81 77 81 79 83   Temp 36.5 °C (97.7 °F) 36.6 °C (97.9 °F) 36.6 °C (97.9 °F) 36.6 °C (97.9 °F) 35.9 °C (96.6 °F) 36.9 °C (98.4 °F) 36.6 °C (97.9 °F)   Resp 18 18 18 18  18 18     Body mass index is 34.61 kg/m².    Intake/Output Summary (Last 24 hours) at 4/11/2024 0825  Last data filed at 4/11/2024 0157  Gross per 24 hour   Intake --   Output 2000 ml   Net -2000 ml           Current Facility-Administered Medications:     acetaminophen (Tylenol) tablet 650 mg, 650 mg, oral, q6h PRN, Shabana Rodriguez MD, 650 mg at 04/10/24 1715    amitriptyline (Elavil) tablet 10 mg, 10 mg, oral, Nightly, Shabana Rodriguez MD, 10 mg at 04/10/24 2141    ammonium lactate (Lac-Hydrin) 12 % lotion 1 Application, 1 Application, Topical, PRN, Shabana Rodriguez MD    aspirin EC tablet 81 mg, 81 mg, oral, BID, Shabana Rodriguez MD, 81 mg at 04/10/24 2141    atorvastatin (Lipitor) tablet 20 mg, 20 mg, oral, Nightly, Shabana Rodriguez MD, 20 mg at 04/10/24 2139    baclofen (Lioresal) tablet 20 mg, 20 mg, oral, TID, Shabana Rodriguez MD, 20 mg at 04/10/24 2139    budesonide (Pulmicort) 0.5 mg/2 mL nebulizer solution 0.5 mg, 0.5 mg, nebulization, BID, Yuan Delacruz, PharmD, 0.5 mg at 04/11/24 0754    bumetanide (Bumex) tablet 2 mg, 2 mg, oral, BID, Shabana Rodriguez MD, 2 mg at 04/10/24 1709    carbidopa-levodopa (Sinemet)  mg per tablet 1.5 tablet, 1.5 tablet, oral, 3 times per day, Shabana Rodriguez MD, 1.5 tablet at 04/10/24 1540    carvedilol (Coreg) tablet 25 mg, 25 mg, oral, BID, Shabana Rodriguez MD, 25 mg at 04/10/24 0958    cephalexin (Keflex) capsule 250 mg, 250 mg, oral, Daily, Shabana Rodriguez MD, 250 mg at 04/10/24 1004    clonazePAM (KlonoPIN) tablet 1 mg, 1 mg, oral, q AM, Shabana Rodriguez MD, 1 mg at 04/10/24 0958    clonazePAM (KlonoPIN)  tablet 2 mg, 2 mg, oral, q PM, Shabana Rodriguez MD, 2 mg at 04/10/24 2140    dextrose 50 % injection 12.5 g, 12.5 g, intravenous, q15 min PRN, Iris Miranda PA-C    dextrose 50 % injection 25 g, 25 g, intravenous, q15 min PRN, Iris Miranda PA-C    diclofenac sodium (Voltaren) 1 % gel 4 g, 4 g, Topical, TID PRN, Shabana Rodriguez MD    docusate sodium (Colace) capsule 100 mg, 100 mg, oral, BID, Shabana Rodriguez MD, 100 mg at 04/10/24 2139    enoxaparin (Lovenox) syringe 40 mg, 40 mg, subcutaneous, q24h, Shabana Rodriguez MD, 40 mg at 04/10/24 2147    formoterol (Perforomist) 20 mcg/2 mL nebulizer solution 20 mcg, 20 mcg, nebulization, BID, Yuan Delacruz, PharmD, 20 mcg at 04/11/24 0754    gabapentin (Neurontin) capsule 900 mg, 900 mg, oral, TID, Shabana Rodriguez MD, 900 mg at 04/10/24 2141    glucagon (Glucagen) injection 1 mg, 1 mg, intramuscular, q15 min PRN, Iris Miranda PA-C    glucagon (Glucagen) injection 1 mg, 1 mg, intramuscular, q15 min PRN, Iris Miranda PA-C    insulin lispro (HumaLOG) injection 0-5 Units, 0-5 Units, subcutaneous, Before meals & nightly, Shabana Rodriguez MD    ipratropium-albuteroL (Duo-Neb) 0.5-2.5 mg/3 mL nebulizer solution 3 mL, 3 mL, nebulization, q4h PRN, Shabana Rodriguez MD    levothyroxine (Synthroid, Levoxyl) tablet 100 mcg, 100 mcg, oral, Once per day on Sun Mon Wed Fri, Shabana Rodirguez MD, 100 mcg at 04/10/24 0558    levothyroxine (Synthroid, Levoxyl) tablet 200 mcg, 200 mcg, oral, Once per day on Tue Thu Sat, Shabana Rodriguez MD, 200 mcg at 04/11/24 0601    lidocaine 4 % patch 1 patch, 1 patch, transdermal, Daily, Shabana Rodriguez MD, 1 patch at 04/10/24 0958    losartan (Cozaar) tablet 50 mg, 50 mg, oral, Daily, Shabana Rodriguez MD, 50 mg at 04/10/24 0958    metFORMIN (Glucophage) tablet 500 mg, 500 mg, oral, BID with meals, Shabana Rodriguez MD, 500 mg at 04/10/24 1700    ondansetron (Zofran) injection 4 mg, 4 mg, intravenous,  q6h PRN, Shabana Rodriguez MD, 4 mg at 04/08/24 1648    OXcarbazepine (Trileptal) tablet 1,200 mg, 1,200 mg, oral, Nightly, Shabana Rodriguez MD, 1,200 mg at 04/10/24 2140    OXcarbazepine (Trileptal) tablet 600 mg, 600 mg, oral, q AM, Shabana Rodriguez MD, 600 mg at 04/10/24 0958    pantoprazole (ProtoNix) EC tablet 40 mg, 40 mg, oral, Daily before breakfast, Shabana Rodriguez MD, 40 mg at 04/11/24 0601    polyethylene glycol (Glycolax, Miralax) packet 17 g, 17 g, oral, Daily, Shabana Rodriguez MD, 17 g at 04/10/24 0958    potassium chloride CR (Klor-Con M20) ER tablet 40 mEq, 40 mEq, oral, BID with meals, Shabana Rodriguez MD, 40 mEq at 04/10/24 1709    prazosin (Minipress) capsule 5 mg, 5 mg, oral, Nightly, Shabana Rodriguez MD, 5 mg at 04/10/24 2141    QUEtiapine (SEROquel) tablet 800 mg, 800 mg, oral, Nightly, Shabana Rodriguez MD, 800 mg at 04/10/24 2140    sertraline (Zoloft) tablet 200 mg, 200 mg, oral, Daily, Shabana Rodriguez MD, 200 mg at 04/10/24 0959    tamsulosin (Flomax) 24 hr capsule 0.4 mg, 0.4 mg, oral, Daily, Shabana Rodriguez MD, 0.4 mg at 04/10/24 0958    tiotropium (Spiriva Respimat) 2.5 mcg/actuation inhaler 2 puff, 2 puff, inhalation, Daily, 2 puff at 04/11/24 0755 **AND** [DISCONTINUED] fluticasone furoate-vilanteroL (Breo Ellipta) 200-25 mcg/dose inhaler 1 puff, 1 puff, inhalation, Daily, Shabana Rodriguez MD    topiramate (Topamax) tablet 200 mg, 200 mg, oral, BID, Shabana Rodriguez MD, 200 mg at 04/10/24 2141    DATA:   Diagnostic tests reviewed for today's visit:    Most recent labs  Results from last 7 days   Lab Units 04/11/24  0612 04/10/24  0719 04/09/24  0545   WBC AUTO x10*3/uL 6.1 6.5 5.9   HEMOGLOBIN g/dL 10.5* 10.1* 10.1*   HEMATOCRIT % 33.7* 34.4* 33.0*   PLATELETS AUTO x10*3/uL 285 205 272     Results from last 7 days   Lab Units 04/11/24  0612 04/10/24  0719 04/09/24  0545   SODIUM mmol/L 141 142 140   POTASSIUM mmol/L 3.7 3.5 3.6   CHLORIDE mmol/L 98  "100 101   CO2 mmol/L 34* 35* 32   BUN mg/dL 11 14 15   CREATININE mg/dL 0.55 0.60 0.62   CALCIUM mg/dL 8.5* 8.3* 7.8*   PROTEIN TOTAL g/dL 5.3* 5.3* 5.3*   BILIRUBIN TOTAL mg/dL 0.3 0.4 0.4   ALK PHOS U/L 131* 119* 114*   ALT U/L 14 15 12   AST U/L 13 12 11   GLUCOSE mg/dL 105* 103* 87         Results from last 7 days   Lab Units 04/07/24  1746   CK TOTAL U/L 108     No results found for: \"TROPONINT\"    Results from last 7 days   Lab Units 04/11/24  0816 04/10/24  2013 04/10/24  1558 04/10/24  1104 04/10/24  0753 04/09/24  2155 04/09/24  1710 04/09/24  1148 04/09/24  0822 04/09/24  0307 04/08/24  1600 04/08/24  1207   POCT GLUCOSE mg/dL 112* 103* 97 107* 101* 117* 102* 102* 90 86 104* 98      XR chest 1 view   Final Result   No acute pulmonary abnormality.   Signed by Binu Anderson MD      XR femur right 2+ views   Final Result   No acute osseous abnormality.   Signed by Binu Anderson MD      XR knee right 1-2 views   Final Result   No acute osseous abnormality.   Signed by Binu Anderson MD      CT head wo IV contrast   Final Result   No acute intracranial hemorrhage or mass effect.        Nonspecific white matter changes and parenchymal volume loss.   Findings are grossly similar to prior imaging.             MACRO:   None.        Signed by: Arina Hodges 4/7/2024 8:18 PM   Dictation workstation:   [id number]            SIGNATURE: Shabana Rodriguez MD PATIENT NAME: Prema Hair   DATE: 4/11/2024 MRN: [id number]   TIME: 8:25 AM        "

## 2024-04-12 VITALS
DIASTOLIC BLOOD PRESSURE: 69 MMHG | OXYGEN SATURATION: 95 % | TEMPERATURE: 98.8 F | BODY MASS INDEX: 34.69 KG/M2 | HEIGHT: 67 IN | SYSTOLIC BLOOD PRESSURE: 101 MMHG | RESPIRATION RATE: 18 BRPM | WEIGHT: 221 LBS | HEART RATE: 92 BPM

## 2024-04-12 LAB
ALBUMIN SERPL BCP-MCNC: 3.1 G/DL (ref 3.4–5)
ALP SERPL-CCNC: 128 U/L (ref 33–110)
ALT SERPL W P-5'-P-CCNC: 16 U/L (ref 7–45)
ANION GAP SERPL CALC-SCNC: 11 MMOL/L (ref 10–20)
AST SERPL W P-5'-P-CCNC: 22 U/L (ref 9–39)
BILIRUB SERPL-MCNC: 0.3 MG/DL (ref 0–1.2)
BUN SERPL-MCNC: 15 MG/DL (ref 6–23)
CALCIUM SERPL-MCNC: 8.3 MG/DL (ref 8.6–10.3)
CHLORIDE SERPL-SCNC: 99 MMOL/L (ref 98–107)
CO2 SERPL-SCNC: 32 MMOL/L (ref 21–32)
CREAT SERPL-MCNC: 0.72 MG/DL (ref 0.5–1.05)
EGFRCR SERPLBLD CKD-EPI 2021: >90 ML/MIN/1.73M*2
ERYTHROCYTE [DISTWIDTH] IN BLOOD BY AUTOMATED COUNT: 16.8 % (ref 11.5–14.5)
GLUCOSE BLD MANUAL STRIP-MCNC: 105 MG/DL (ref 74–99)
GLUCOSE BLD MANUAL STRIP-MCNC: 114 MG/DL (ref 74–99)
GLUCOSE SERPL-MCNC: 108 MG/DL (ref 74–99)
HCT VFR BLD AUTO: 33.2 % (ref 36–46)
HGB BLD-MCNC: 10.4 G/DL (ref 12–16)
MCH RBC QN AUTO: 28.7 PG (ref 26–34)
MCHC RBC AUTO-ENTMCNC: 31.3 G/DL (ref 32–36)
MCV RBC AUTO: 92 FL (ref 80–100)
NRBC BLD-RTO: 0 /100 WBCS (ref 0–0)
PLATELET # BLD AUTO: 293 X10*3/UL (ref 150–450)
POTASSIUM SERPL-SCNC: 3.4 MMOL/L (ref 3.5–5.3)
PROT SERPL-MCNC: 5.7 G/DL (ref 6.4–8.2)
RBC # BLD AUTO: 3.63 X10*6/UL (ref 4–5.2)
SODIUM SERPL-SCNC: 139 MMOL/L (ref 136–145)
WBC # BLD AUTO: 6.3 X10*3/UL (ref 4.4–11.3)

## 2024-04-12 PROCEDURE — 85027 COMPLETE CBC AUTOMATED: CPT | Performed by: INTERNAL MEDICINE

## 2024-04-12 PROCEDURE — 2500000004 HC RX 250 GENERAL PHARMACY W/ HCPCS (ALT 636 FOR OP/ED): Performed by: INTERNAL MEDICINE

## 2024-04-12 PROCEDURE — 2500000002 HC RX 250 W HCPCS SELF ADMINISTERED DRUGS (ALT 637 FOR MEDICARE OP, ALT 636 FOR OP/ED): Mod: MUE | Performed by: INTERNAL MEDICINE

## 2024-04-12 PROCEDURE — 2500000001 HC RX 250 WO HCPCS SELF ADMINISTERED DRUGS (ALT 637 FOR MEDICARE OP): Performed by: INTERNAL MEDICINE

## 2024-04-12 PROCEDURE — 2500000002 HC RX 250 W HCPCS SELF ADMINISTERED DRUGS (ALT 637 FOR MEDICARE OP, ALT 636 FOR OP/ED): Performed by: INTERNAL MEDICINE

## 2024-04-12 PROCEDURE — 2500000002 HC RX 250 W HCPCS SELF ADMINISTERED DRUGS (ALT 637 FOR MEDICARE OP, ALT 636 FOR OP/ED): Mod: MUE | Performed by: PHARMACIST

## 2024-04-12 PROCEDURE — 80053 COMPREHEN METABOLIC PANEL: CPT | Performed by: INTERNAL MEDICINE

## 2024-04-12 PROCEDURE — 94640 AIRWAY INHALATION TREATMENT: CPT

## 2024-04-12 PROCEDURE — 2500000005 HC RX 250 GENERAL PHARMACY W/O HCPCS: Performed by: INTERNAL MEDICINE

## 2024-04-12 PROCEDURE — 82947 ASSAY GLUCOSE BLOOD QUANT: CPT

## 2024-04-12 PROCEDURE — 1090000002 HH PPS REVENUE DEBIT

## 2024-04-12 PROCEDURE — 1090000001 HH PPS REVENUE CREDIT

## 2024-04-12 PROCEDURE — 2500000002 HC RX 250 W HCPCS SELF ADMINISTERED DRUGS (ALT 637 FOR MEDICARE OP, ALT 636 FOR OP/ED): Performed by: PHARMACIST

## 2024-04-12 PROCEDURE — 36415 COLL VENOUS BLD VENIPUNCTURE: CPT | Performed by: INTERNAL MEDICINE

## 2024-04-12 RX ADMIN — CARBIDOPA AND LEVODOPA 1.5 TABLET: 25; 100 TABLET ORAL at 09:15

## 2024-04-12 RX ADMIN — BUDESONIDE 0.5 MG: 0.5 INHALANT ORAL at 08:19

## 2024-04-12 RX ADMIN — LOSARTAN POTASSIUM 50 MG: 50 TABLET, FILM COATED ORAL at 09:17

## 2024-04-12 RX ADMIN — TIOTROPIUM BROMIDE INHALATION SPRAY 2 PUFF: 3.12 SPRAY, METERED RESPIRATORY (INHALATION) at 08:29

## 2024-04-12 RX ADMIN — CEPHALEXIN 250 MG: 250 CAPSULE ORAL at 09:17

## 2024-04-12 RX ADMIN — ACETAMINOPHEN 650 MG: 325 TABLET ORAL at 13:01

## 2024-04-12 RX ADMIN — TOPIRAMATE 200 MG: 100 TABLET, FILM COATED ORAL at 09:17

## 2024-04-12 RX ADMIN — TAMSULOSIN HYDROCHLORIDE 0.4 MG: 0.4 CAPSULE ORAL at 09:16

## 2024-04-12 RX ADMIN — BACLOFEN 20 MG: 10 TABLET ORAL at 14:50

## 2024-04-12 RX ADMIN — LIDOCAINE 4% 1 PATCH: 40 PATCH TOPICAL at 09:15

## 2024-04-12 RX ADMIN — OXCARBAZEPINE 600 MG: 300 TABLET, FILM COATED ORAL at 09:15

## 2024-04-12 RX ADMIN — LEVOTHYROXINE SODIUM 100 MCG: 0.1 TABLET ORAL at 06:07

## 2024-04-12 RX ADMIN — POLYETHYLENE GLYCOL 3350 17 G: 17 POWDER, FOR SOLUTION ORAL at 09:15

## 2024-04-12 RX ADMIN — ASPIRIN 81 MG: 81 TABLET, COATED ORAL at 09:15

## 2024-04-12 RX ADMIN — BACLOFEN 20 MG: 10 TABLET ORAL at 09:15

## 2024-04-12 RX ADMIN — FORMOTEROL FUMARATE DIHYDRATE 20 MCG: 20 SOLUTION RESPIRATORY (INHALATION) at 08:19

## 2024-04-12 RX ADMIN — DOCUSATE SODIUM 100 MG: 100 CAPSULE, LIQUID FILLED ORAL at 09:17

## 2024-04-12 RX ADMIN — SERTRALINE HYDROCHLORIDE 200 MG: 50 TABLET ORAL at 09:15

## 2024-04-12 RX ADMIN — BUMETANIDE 2 MG: 1 TABLET ORAL at 09:00

## 2024-04-12 RX ADMIN — POTASSIUM CHLORIDE 40 MEQ: 1500 TABLET, EXTENDED RELEASE ORAL at 09:17

## 2024-04-12 RX ADMIN — GABAPENTIN 900 MG: 300 CAPSULE ORAL at 14:50

## 2024-04-12 RX ADMIN — METFORMIN HYDROCHLORIDE 500 MG: 500 TABLET ORAL at 09:17

## 2024-04-12 RX ADMIN — CLONAZEPAM 1 MG: 1 TABLET ORAL at 09:15

## 2024-04-12 RX ADMIN — CARVEDILOL 25 MG: 25 TABLET, FILM COATED ORAL at 09:16

## 2024-04-12 RX ADMIN — PANTOPRAZOLE SODIUM 40 MG: 40 TABLET, DELAYED RELEASE ORAL at 06:06

## 2024-04-12 RX ADMIN — GABAPENTIN 900 MG: 300 CAPSULE ORAL at 09:17

## 2024-04-12 RX ADMIN — CARBIDOPA AND LEVODOPA 1.5 TABLET: 25; 100 TABLET ORAL at 13:01

## 2024-04-12 ASSESSMENT — COGNITIVE AND FUNCTIONAL STATUS - GENERAL
CLIMB 3 TO 5 STEPS WITH RAILING: A LOT
EATING MEALS: A LITTLE
TOILETING: A LOT
MOVING FROM LYING ON BACK TO SITTING ON SIDE OF FLAT BED WITH BEDRAILS: A LITTLE
DRESSING REGULAR UPPER BODY CLOTHING: A LOT
PERSONAL GROOMING: A LOT
WALKING IN HOSPITAL ROOM: A LOT
STANDING UP FROM CHAIR USING ARMS: A LOT
DAILY ACTIVITIY SCORE: 13
MOVING TO AND FROM BED TO CHAIR: A LOT
HELP NEEDED FOR BATHING: A LOT
TURNING FROM BACK TO SIDE WHILE IN FLAT BAD: A LITTLE
DRESSING REGULAR LOWER BODY CLOTHING: A LOT
MOBILITY SCORE: 14

## 2024-04-12 NOTE — PROGRESS NOTES
PROGRESS NOTE - INTERNAL MEDICINE     PATIENT NAME:  Prema Hair    MRN:  [id number]  SERVICE DATE:  4/12/2024       ADMITTING PHYSICIAN:  Shabana Rodriguez MD    ASSESSMENT AND PLAN    Principal Problem:    Delirium    DM2 fair control. A1c 5.9 3/24  HTN variable contr  probable toxometabolic encephalopathy - multifactorial etiol. Opiates, head injury causing concussion.  Impaired mobility with falls at home. S/p R TKA 3/27/24  Urinary retention - failed voiding trial in hosp  Hx of Ca thyroid on supplement Rx to suppress TSH (0.6 3/24 and 1.3 now).  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  Torticollis  OA knees        PLAN:  No new Abx - low suspicion for acute infection. On keflex proph for recurrent UTI and minocycline proph for recurrent skin infections.  Caution with opiates.  Continue arciniega to gravity. OP Urol follow up and rpt voiding trials when mobility is better.  Fall prec  PT OT recomm Mod Int therapy. Pt agrees with SNF  Continue levothyroxine - aim for suppressed TSH - dose increased from 900mcg/wk to 1000mcg/wk this adm.  DVT proph    Discharge is planned for today to  rehab. Hospital course , medication changes and Investigation's conducted were reviewed with the patient / Family. Activity, Diet and Medications after discharge were reviewed with the patient / Family. Medication reconciliation done - pls refer to medication reconciliation sheet.Follow up with Primary Care Physician were reviewed with the patient / Family. Discharge planning was discussed with staff. Refer to discharge orders sheet. Total time to coordinate disch process incl counseling family, coordinating with other care givers,  and pharmacist was >35 min.          INTERVAL HISTORY OF PRESENT ILLNESS:  pt reports feeling stronger. No chest pain/sob. No n/v/d. Oral intake good. No fever. acute events from last 24 hrs reviewed.     Pertinent ROS:  No abdominal pain / No Bleeding / No rashes      Discussed with nursing and case management team and the specialists involved in this patient's care. Reviewed the EMR and documentation from other care-givers.        OBJECTIVE  PHYSICAL EXAM:      GENERAL: AAOx3, cooperative resting comfortably. Obese  SKIN: Skin turgor normal. No rashes  HEENT: no epistaxis, Moist mucosa.  LUNGS: Vesicular breath sounds, with no wheeze, no crepitations.   CARDIAC: REGULAR. S1 and S2; no rubs, no murmur  ABDOMEN: Abdomen soft, non-tender. BS+  EXTREMITIES: +mild edema, Good capillary refill.   NEURO: Insight GOOD. +invol movements  MUSCULOSKELETAL: R TKA surg wound clean.          4/11/2024     7:59 AM 4/11/2024    10:28 AM 4/11/2024     3:09 PM 4/11/2024     7:21 PM 4/12/2024    12:15 AM 4/12/2024     5:30 AM 4/12/2024     8:33 AM   Vitals   Systolic 107  102 105 90 104 101   Diastolic 68  69 74 63 73 65   Heart Rate 83 92 66 83 80 73 78   Temp 36.6 °C (97.9 °F)  36.6 °C (97.9 °F) 35.7 °C (96.2 °F) 36.2 °C (97.2 °F) 36.2 °C (97.1 °F) 36.8 °C (98.2 °F)   Resp 18  18 18 18 18 16     Body mass index is 34.61 kg/m².    Intake/Output Summary (Last 24 hours) at 4/12/2024 0903  Last data filed at 4/12/2024 0600  Gross per 24 hour   Intake --   Output 3700 ml   Net -3700 ml           Current Facility-Administered Medications:     acetaminophen (Tylenol) tablet 650 mg, 650 mg, oral, q6h PRN, Shabana Rodriguez MD, 650 mg at 04/11/24 1022    amitriptyline (Elavil) tablet 10 mg, 10 mg, oral, Nightly, Shabana Rodriguez MD, 10 mg at 04/11/24 2049    ammonium lactate (Lac-Hydrin) 12 % lotion 1 Application, 1 Application, Topical, PRN, Shabana Rodriguez MD    aspirin EC tablet 81 mg, 81 mg, oral, BID, Shabana Rodriguez MD, 81 mg at 04/11/24 2048    atorvastatin (Lipitor) tablet 20 mg, 20 mg, oral, Nightly, Shabana Rodriguez MD, 20 mg at 04/11/24 2049    baclofen (Lioresal) tablet 20 mg, 20 mg, oral, TID, Shabana Rodriguez MD, 20 mg at 04/11/24 2047    budesonide (Pulmicort) 0.5 mg/2  mL nebulizer solution 0.5 mg, 0.5 mg, nebulization, BID, Malcom ForbesD, 0.5 mg at 04/12/24 0819    bumetanide (Bumex) tablet 2 mg, 2 mg, oral, BID, Shabana Rodriguez MD, 2 mg at 04/11/24 1900    carbidopa-levodopa (Sinemet)  mg per tablet 1.5 tablet, 1.5 tablet, oral, 3 times per day, Shabana Rodriguez MD, 1.5 tablet at 04/11/24 1859    carvedilol (Coreg) tablet 25 mg, 25 mg, oral, BID, Shabana Rodriguez MD, 25 mg at 04/11/24 2050    cephalexin (Keflex) capsule 250 mg, 250 mg, oral, Daily, Shabana Rodriguez MD, 250 mg at 04/11/24 0900    clonazePAM (KlonoPIN) tablet 1 mg, 1 mg, oral, q AM, Shabana Rodriguez MD, 1 mg at 04/11/24 0921    clonazePAM (KlonoPIN) tablet 2 mg, 2 mg, oral, q PM, Shabana Rodriguez MD, 2 mg at 04/11/24 2048    dextrose 50 % injection 12.5 g, 12.5 g, intravenous, q15 min PRN, Iris Miranda PA-C    dextrose 50 % injection 25 g, 25 g, intravenous, q15 min PRN, Iris Miranda PA-C    diclofenac sodium (Voltaren) 1 % gel 4 g, 4 g, Topical, TID PRN, Shabana Rodriguez MD    docusate sodium (Colace) capsule 100 mg, 100 mg, oral, BID, Shabana Rodriguez MD, 100 mg at 04/11/24 2052    enoxaparin (Lovenox) syringe 40 mg, 40 mg, subcutaneous, q24h, Shabana Rodriguez MD, 40 mg at 04/11/24 2045    formoterol (Perforomist) 20 mcg/2 mL nebulizer solution 20 mcg, 20 mcg, nebulization, BID, Malcom ForbesD, 20 mcg at 04/12/24 0819    gabapentin (Neurontin) capsule 900 mg, 900 mg, oral, TID, Shabana Rodriguez MD, 900 mg at 04/11/24 2047    glucagon (Glucagen) injection 1 mg, 1 mg, intramuscular, q15 min PRN, Iris Miranda PA-C    glucagon (Glucagen) injection 1 mg, 1 mg, intramuscular, q15 min PRN, Iris Miranda PA-C    insulin lispro (HumaLOG) injection 0-5 Units, 0-5 Units, subcutaneous, Before meals & nightly, Shabana Rodriguez MD    ipratropium-albuteroL (Duo-Neb) 0.5-2.5 mg/3 mL nebulizer solution 3 mL, 3 mL, nebulization, q4h PRN, Shabana Rodriguez,  MD    levothyroxine (Synthroid, Levoxyl) tablet 100 mcg, 100 mcg, oral, Once per day on Sun Mon Wed Fri, Shabana Rodriguez MD, 100 mcg at 04/12/24 0607    levothyroxine (Synthroid, Levoxyl) tablet 200 mcg, 200 mcg, oral, Once per day on Tue Thu Sat, Shabana Rodriguez MD, 200 mcg at 04/11/24 0601    lidocaine 4 % patch 1 patch, 1 patch, transdermal, Daily, Shabana Rodriguez MD, 1 patch at 04/11/24 0920    losartan (Cozaar) tablet 50 mg, 50 mg, oral, Daily, Shabana Rodriguez MD, 50 mg at 04/11/24 0921    metFORMIN (Glucophage) tablet 500 mg, 500 mg, oral, BID with meals, Shabana Rodriguez MD, 500 mg at 04/11/24 1900    minocycline capsule 100 mg, 100 mg, oral, Nightly, Shabana Rodriguez MD, 100 mg at 04/11/24 2048    ondansetron (Zofran) injection 4 mg, 4 mg, intravenous, q6h PRN, Shabana Rodriguez MD, 4 mg at 04/08/24 1648    OXcarbazepine (Trileptal) tablet 1,200 mg, 1,200 mg, oral, Nightly, Shabana Rodriguez MD, 1,200 mg at 04/11/24 2046    OXcarbazepine (Trileptal) tablet 600 mg, 600 mg, oral, q AM, Shabana Rodriguez MD, 600 mg at 04/11/24 0920    pantoprazole (ProtoNix) EC tablet 40 mg, 40 mg, oral, Daily before breakfast, Shabana Rodriguez MD, 40 mg at 04/12/24 0606    polyethylene glycol (Glycolax, Miralax) packet 17 g, 17 g, oral, Daily, Shabana Rodriguez MD, 17 g at 04/11/24 0920    potassium chloride CR (Klor-Con M20) ER tablet 40 mEq, 40 mEq, oral, BID with meals, Shabana Rodriguez MD, 40 mEq at 04/11/24 1900    prazosin (Minipress) capsule 5 mg, 5 mg, oral, Nightly, Shabana Rodriguez MD, 5 mg at 04/11/24 2055    QUEtiapine (SEROquel) tablet 800 mg, 800 mg, oral, Nightly, Shabana Rodriguez MD, 800 mg at 04/11/24 2046    sertraline (Zoloft) tablet 200 mg, 200 mg, oral, Daily, Shabana Rodriguez MD, 200 mg at 04/11/24 0921    tamsulosin (Flomax) 24 hr capsule 0.4 mg, 0.4 mg, oral, Daily, Shabana Rodriguez MD, 0.4 mg at 04/11/24 0921    tiotropium (Spiriva Respimat) 2.5  "mcg/actuation inhaler 2 puff, 2 puff, inhalation, Daily, 2 puff at 04/12/24 0829 **AND** [DISCONTINUED] fluticasone furoate-vilanteroL (Breo Ellipta) 200-25 mcg/dose inhaler 1 puff, 1 puff, inhalation, Daily, Shabana Rodriguez MD    topiramate (Topamax) tablet 200 mg, 200 mg, oral, BID, Shabana Rodriguez MD, 200 mg at 04/11/24 2050    DATA:   Diagnostic tests reviewed for today's visit:    Most recent labs  Results from last 7 days   Lab Units 04/12/24  0610 04/11/24  0612 04/10/24  0719   WBC AUTO x10*3/uL 6.3 6.1 6.5   HEMOGLOBIN g/dL 10.4* 10.5* 10.1*   HEMATOCRIT % 33.2* 33.7* 34.4*   PLATELETS AUTO x10*3/uL 293 285 205     Results from last 7 days   Lab Units 04/12/24  0610 04/11/24  0612 04/10/24  0719   SODIUM mmol/L 139 141 142   POTASSIUM mmol/L 3.4* 3.7 3.5   CHLORIDE mmol/L 99 98 100   CO2 mmol/L 32 34* 35*   BUN mg/dL 15 11 14   CREATININE mg/dL 0.72 0.55 0.60   CALCIUM mg/dL 8.3* 8.5* 8.3*   PROTEIN TOTAL g/dL 5.7* 5.3* 5.3*   BILIRUBIN TOTAL mg/dL 0.3 0.3 0.4   ALK PHOS U/L 128* 131* 119*   ALT U/L 16 14 15   AST U/L 22 13 12   GLUCOSE mg/dL 108* 105* 103*         Results from last 7 days   Lab Units 04/07/24  1746   CK TOTAL U/L 108     No results found for: \"TROPONINT\"    Results from last 7 days   Lab Units 04/11/24  2111 04/11/24  1511 04/11/24  1236 04/11/24  0816 04/10/24  2013 04/10/24  1558 04/10/24  1104 04/10/24  0753 04/09/24  2155 04/09/24  1710 04/09/24  1148 04/09/24  0822   POCT GLUCOSE mg/dL 110* 117* 86 112* 103* 97 107* 101* 117* 102* 102* 90      XR chest 1 view   Final Result   No acute pulmonary abnormality.   Signed by Binu Anderson MD      XR femur right 2+ views   Final Result   No acute osseous abnormality.   Signed by Binu Anderson MD      XR knee right 1-2 views   Final Result   No acute osseous abnormality.   Signed by Binu Anderson MD      CT head wo IV contrast   Final Result   No acute intracranial hemorrhage or mass effect.        Nonspecific white matter changes and " parenchymal volume loss.   Findings are grossly similar to prior imaging.             MACRO:   None.        Signed by: Arina Hodges 4/7/2024 8:18 PM   Dictation workstation:   [id number]            SIGNATURE: Shabana Rodriguez MD PATIENT NAME: Prema Hair   DATE: 4/12/2024 MRN: [id number]   TIME: 9:03 AM

## 2024-04-12 NOTE — PROGRESS NOTES
04/12/24 1239   Current Planned Discharge Disposition   Current Planned Discharge Disposition Short Term  ([facility])

## 2024-04-12 NOTE — DISCHARGE SUMMARY
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 Opiates, head injury causing concussion.  DM2 fair control. A1c 5.9 3/24  HTN variable contr  Impaired mobility with falls at home. S/p R TKA 3/27/24  Urinary retention - failed voiding trial in hosp  Hx of Ca thyroid on supplement Rx to suppress TSH (0.6 3/24 and 1.3 now).  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  Torticollis  OA knees    Issues Requiring Follow-Up  Urin retention  OP ortho follow up    Test Results Pending At Discharge  Pending Labs       No current pending labs.            Hospital Course   Adm for rec falls at home with AMS. Improved with supportive care at hosp. No new infections. PT OT recomm mod int rehab. DC to ac rehab. Levothyroxine dose increased this stay.    Pertinent Physical Exam At Time of Discharge  Physical Exam    Home Medications     Medication List      START taking these medications     enoxaparin 40 mg/0.4 mL syringe; Commonly known as: Lovenox; Inject 0.4   mL (40 mg) under the skin once every 24 hours.   * Glucagon Emergency Kit (human) 1 mg injection; Generic drug: glucagon;   Inject 1 mg into the muscle every 15 minutes if needed for low blood sugar   - see comments (For blood glucose less than or equal to 40 mg/dL and no IV   access).   * Glucagon Emergency Kit (human) 1 mg injection; Generic drug: glucagon;   Inject 1 mg into the muscle every 15 minutes if needed for low blood sugar   - see comments (For blood glucose less than or equal to 70 mg/dL and no IV   access).   minocycline 100 mg capsule; Take 1 capsule (100 mg) by mouth once daily   at bedtime. No stop date - proph for recurrent skin infections  * This list has 2 medication(s) that are the same as other medications   prescribed for you. Read the directions carefully, and ask your doctor or   other care provider to review them with you.      CHANGE how you take these medications     carbidopa-levodopa  mg tablet; Commonly known as: Sinemet; Take   1.5 tablets by mouth 3 times a day. 8am, 12 noon and 4 pm; What changed:   See the new instructions.   cephalexin 250 mg capsule; Commonly known as: Keflex; Take 1 capsule   (250 mg) by mouth once daily.; What changed: how much to take   * levothyroxine 100 mcg tablet; Commonly known as: Synthroid, Levoxyl;   Take 100mcg on Mon Wed Fri and Sun - 1 hour before breakfast Do not start   before April 12, 2024.; What changed: how much to take, how to take this,   when to take this, additional instructions   * levothyroxine 200 mcg tablet; Commonly known as: Synthroid, Levoxyl;   Take 200mcg tablet on Tue Thurs Sat - 1 hour before breakfast Do not start   before April 13, 2024.; Start taking on: April 13, 2024; What changed: how   much to take, how to take this, when to take this, additional instructions   oxyCODONE-acetaminophen 5-325 mg tablet; Commonly known as: Percocet;   Take 0.5 tablets by mouth every 6 hours if needed for severe pain (7 -   10).; What changed: how much to take, Another medication with the same   name was removed. Continue taking this medication, and follow the   directions you see here.  * This list has 2 medication(s) that are the same as other medications   prescribed for you. Read the directions carefully, and ask your doctor or   other care provider to review them with you.     CONTINUE taking these medications     acetaminophen 500 mg tablet; Commonly known as: Tylenol   albuterol 90 mcg/actuation inhaler   amitriptyline 10 mg tablet; Commonly known as: Elavil   ammonium lactate 12 % lotion; Commonly known as: Lac-Hydrin   ascorbic acid 1,000 mg tablet; Commonly known as: Vitamin C   aspirin 81 mg EC tablet; Take 1 tablet (81 mg) by mouth 2 times a day   for 28 days.   atorvastatin 20 mg tablet; Commonly known as: Lipitor; Take 1 tablet (20   mg) by mouth once daily at  bedtime.   azelastine 137 mcg (0.1 %) nasal spray; Commonly known as: Astelin   baclofen 20 mg tablet; Commonly known as: Lioresal; TAKE 1 TABLET BY   MOUTH THREE TIMES A DAY   budesonide-formoteroL 160-4.5 mcg/actuation inhaler; Commonly known as:   Symbicort   bumetanide 2 mg tablet; Commonly known as: Bumex   calcium carbonate 500 mg calcium (1,250 mg) tablet; Commonly known as:   Oscal   carvedilol 25 mg tablet; Commonly known as: Coreg; Take 1 tablet (25 mg)   by mouth 2 times a day.   cholecalciferol 50 MCG (2000 UT) tablet; Commonly known as: Vitamin D-3   * clonazePAM 1 mg tablet; Commonly known as: KlonoPIN   * clonazePAM 1 mg tablet; Commonly known as: KlonoPIN   diclofenac sodium 1 % gel; Commonly known as: Voltaren   docusate sodium 100 mg capsule; Commonly known as: Colace; Take 1   capsule (100 mg) by mouth 2 times a day.   estradiol 0.01 % (0.1 mg/gram) vaginal cream; Commonly known as: Estrace   formoterol 20 mcg/2 mL nebulizer solution; Commonly known as:   Perforomist   gabapentin 300 mg capsule; Commonly known as: Neurontin   insulin lispro 100 unit/mL injection; Commonly known as: HumaLOG   ipratropium-albuteroL 0.5-2.5 mg/3 mL nebulizer solution; Commonly known   as: Duo-Neb   Klor-Con M20 20 mEq ER tablet; Generic drug: potassium chloride CR; Take   2 tablets (40 mEq) by mouth 2 times a day.   lancets misc   lidocaine 5 % patch; Commonly known as: Lidoderm; APPLY 1 PATCH AND   LEAVE IN PLACE FOR 12 HOURS, THEN REMOVE AND LEAVE OFF FOR 12 HOURS   losartan 50 mg tablet; Commonly known as: Cozaar; Take 1 tablet (50 mg)   by mouth once daily.   metFORMIN 500 mg tablet; Commonly known as: Glucophage   omega-3 fatty acids-fish oil 360-1,200 mg capsule   omeprazole 40 mg DR capsule; Commonly known as: PriLOSEC   * OXcarbazepine 600 mg tablet; Commonly known as: Trileptal   * OXcarbazepine 600 mg tablet; Commonly known as: Trileptal   pantoprazole 40 mg EC tablet; Commonly known as: ProtoNix    polyethylene glycol 17 gram/dose powder; Commonly known as: Glycolax,   Miralax   prazosin 5 mg capsule; Commonly known as: Minipress   SeroqueL 400 mg tablet; Generic drug: QUEtiapine   sertraline 100 mg tablet; Commonly known as: Zoloft; Take 2 tablets (200   mg) by mouth once daily. Do not start before January 2, 2024.   tamsulosin 0.4 mg 24 hr capsule; Commonly known as: Flomax; Take 1   capsule (0.4 mg) by mouth once daily.   tiotropium 2.5 mcg/actuation inhaler; Commonly known as: Spiriva   Respimat   topiramate 200 mg tablet; Commonly known as: Topamax; Take 1 tablet (200   mg) by mouth 2 times a day.   Trelegy Ellipta 200-62.5-25 mcg blister with device; Generic drug:   fluticasone-umeclidin-vilanter  * This list has 4 medication(s) that are the same as other medications   prescribed for you. Read the directions carefully, and ask your doctor or   other care provider to review them with you.     STOP taking these medications     DULoxetine 30 mg DR capsule; Commonly known as: Cymbalta   ketorolac 10 mg tablet; Commonly known as: Toradol   meloxicam 15 mg tablet; Commonly known as: Mobic   metOLazone 2.5 mg tablet; Commonly known as: Zaroxolyn   tiZANidine 4 mg tablet; Commonly known as: Zanaflex       Outpatient Follow-Up  Future Appointments   Date Time Provider Department Center   5/2/2024  3:45 PM David Swanson MD [id number] [facility]   5/10/2024  2:30 PM [facility] G[place] 1 [facility]   5/10/2024  3:00 PM [facility] BREAST ULTRASOUND 1 [facility]   5/16/2024  9:45 AM Sanjiv GAYTAN MD [id number] None   5/21/2024  3:30 PM Jaycob Jurado MD [id number]   6/4/2024  3:30 PM Norbert Acevedo MD [id number] [facility]   7/25/2024  3:00 PM Angela Shah MD [id number] [facility]       Shabana Rodriguez MD

## 2024-04-12 NOTE — PROGRESS NOTES
04/12/24 0828   Discharge Planning   Assistance Needed updated by Attending P2P scheduled for 9:30-11;30-4/12/2024   Patient expects to be discharged to: [facility] pending-patient daughter Italia at bedside updated 4/11/2024   Does the patient need discharge transport arranged? Yes   RoundTrip coordination needed? Yes   Has discharge transport been arranged? No

## 2024-04-12 NOTE — PROGRESS NOTES
04/12/24 1233   Discharge Planning   Assistance Needed patient SNF and acute rehab auth both overturned -met with family and patient at bedside patient agreeable to [facility] for further reahabilitation   Patient expects to be discharged to: [facility]   Does the patient need discharge transport arranged? Yes   RoundTrip coordination needed? Yes   What day is the transport expected? 04/12/24

## 2024-04-12 NOTE — PROGRESS NOTES
04/12/24 1417   Discharge Planning   Patient expects to be discharged to: patient and daughter updated with transport  time-agreeable

## 2024-04-13 ENCOUNTER — LAB REQUISITION (OUTPATIENT)
Dept: LAB | Facility: LAB | Age: 59
End: 2024-04-13
Payer: MEDICARE

## 2024-04-13 DIAGNOSIS — Z51.89 ENCOUNTER FOR OTHER SPECIFIED AFTERCARE: ICD-10-CM

## 2024-04-13 LAB
ALBUMIN SERPL BCP-MCNC: 3.7 G/DL (ref 3.4–5)
ALP SERPL-CCNC: 140 U/L (ref 33–110)
ALT SERPL W P-5'-P-CCNC: 10 U/L (ref 7–45)
ANION GAP SERPL CALC-SCNC: 15 MMOL/L (ref 10–20)
AST SERPL W P-5'-P-CCNC: 19 U/L (ref 9–39)
BILIRUB SERPL-MCNC: 0.4 MG/DL (ref 0–1.2)
BUN SERPL-MCNC: 18 MG/DL (ref 6–23)
CALCIUM SERPL-MCNC: 8.8 MG/DL (ref 8.6–10.3)
CHLORIDE SERPL-SCNC: 97 MMOL/L (ref 98–107)
CO2 SERPL-SCNC: 31 MMOL/L (ref 21–32)
CREAT SERPL-MCNC: 0.64 MG/DL (ref 0.5–1.05)
EGFRCR SERPLBLD CKD-EPI 2021: >90 ML/MIN/1.73M*2
ERYTHROCYTE [DISTWIDTH] IN BLOOD BY AUTOMATED COUNT: 17.1 % (ref 11.5–14.5)
GLUCOSE SERPL-MCNC: 104 MG/DL (ref 74–99)
HCT VFR BLD AUTO: 36.5 % (ref 36–46)
HGB BLD-MCNC: 11.2 G/DL (ref 12–16)
MCH RBC QN AUTO: 27.5 PG (ref 26–34)
MCHC RBC AUTO-ENTMCNC: 30.7 G/DL (ref 32–36)
MCV RBC AUTO: 90 FL (ref 80–100)
NRBC BLD-RTO: 0 /100 WBCS (ref 0–0)
PLATELET # BLD AUTO: 344 X10*3/UL (ref 150–450)
POTASSIUM SERPL-SCNC: 3.4 MMOL/L (ref 3.5–5.3)
PROT SERPL-MCNC: 6 G/DL (ref 6.4–8.2)
RBC # BLD AUTO: 4.07 X10*6/UL (ref 4–5.2)
SODIUM SERPL-SCNC: 140 MMOL/L (ref 136–145)
WBC # BLD AUTO: 6.2 X10*3/UL (ref 4.4–11.3)

## 2024-04-13 PROCEDURE — 1090000001 HH PPS REVENUE CREDIT

## 2024-04-13 PROCEDURE — 1090000002 HH PPS REVENUE DEBIT

## 2024-04-13 PROCEDURE — 80053 COMPREHEN METABOLIC PANEL: CPT

## 2024-04-13 PROCEDURE — 85027 COMPLETE CBC AUTOMATED: CPT

## 2024-04-14 PROCEDURE — 1090000001 HH PPS REVENUE CREDIT

## 2024-04-14 PROCEDURE — 1090000002 HH PPS REVENUE DEBIT

## 2024-04-15 PROCEDURE — 1090000001 HH PPS REVENUE CREDIT

## 2024-04-15 PROCEDURE — 1090000002 HH PPS REVENUE DEBIT

## 2024-04-15 NOTE — ED PROVIDER NOTES
HPI   Chief Complaint   Patient presents with    Lethargy       HPI: 59-year-old female with a history of chronic Bell's palsy and CVA and status post right knee replacement presents with altered mental status and fall.  The patient's daughter is helping provide history.  She has been having difficulty getting around at home since her knee replacement.  She did sustain a fall earlier today and her daughter was unable to get her off the ground.  EMS felt that she was acting altered but her daughter is reporting that she is at her neurological baseline.  She has been complaining of right knee pain.    Limitations to history: None  Independent Historians: Patient's daughter, EMS  External Records Reviewed: HIE, outpatient notes, inpatient notes  ------------------------------------------------------------------------------------------------------------------------------------------  ROS: a ten point review of systems was performed and was negative except as per HPI.  ------------------------------------------------------------------------------------------------------------------------------------------  PMH / PSH: as per HPI, otherwise reviewed in EMR  MEDS: as per HPI, otherwise reviewed in EMR  ALLERGIES: as per HPI, otherwise reviewed in EMR  SocH:  as per HPI, otherwise reviewed in EMR  FH:  as per HPI, otherwise reviewed in EMR  ------------------------------------------------------------------------------------------------------------------------------------------  Physical Exam:  VS: As documented in the triage note and EMR flowsheet from this visit was reviewed  General: Well appearing. No acute distress.   Eyes:  Extraocular movements grossly intact. No scleral icterus. No discharge  HEENT:  Normocephalic.  Atraumatic  Neck: Moves neck freely. No gross masses  CV: Regular rhythm. No murmurs, rubs or gallops   Resp: Clear to auscultation bilaterally. No respiratory distress.    GI: Soft, no masses, nontender.  No rebound tenderness or guarding  MSK: Symmetric muscle bulk.  Status post right knee replacement.  Surgical incision has no surrounding erythema or discharge.    Skin: Warm, dry, intact.   Neuro: Left facial droop which is chronic from her chronic Bell's palsy.  Patient is exhibiting dysarthria which is chronic  Psych: Appropriate for situation  ------------------------------------------------------------------------------------------------------------------------------------------  Hospital Course / Medical Decision Making:  Independent Interpretations: N/A  EKG as interpreted by me: Normal sinus rhythm at 68 bpm with a normal axis, no bundle branch block and no signs of acute ischemia    MDM: 59-year-old female with a history of chronic Bell's palsy and CVA status post right knee replacement presents with altered mental status and fall.  Her daughter arrived to the ED and confirmed that she is in fact at her neurological baseline.  No major abnormalities on CBC or CMP.  The patient was signed out to Dr. Rich pending imaging results and ultimate disposition.  I do feel that the patient will be admitted for PT/OT evaluation.    Discussion of Management with Other Providers:   I discussed the patient/results with: Emergency medicine team    Final diagnosis and disposition as below.    Results for orders placed or performed during the hospital encounter of 04/07/24  -CBC and Auto Differential:        Result                      Value             Ref Range           WBC                         8.3               4.4 - 11.3 x*       nRBC                        0.0               0.0 - 0.0 /1*       RBC                         3.84 (L)          4.00 - 5.20 *       Hemoglobin                  10.9 (L)          12.0 - 16.0 *       Hematocrit                  35.0 (L)          36.0 - 46.0 %       MCV                         91                80 - 100 fL         MCH                         28.4              26.0 -  34.0 *       MCHC                        31.1 (L)          32.0 - 36.0 *       RDW                         16.8 (H)          11.5 - 14.5 %       Platelets                   301               150 - 450 x1*       Neutrophils %               69.8              40.0 - 80.0 %       Immature Granulocytes *     0.8               0.0 - 0.9 %         Lymphocytes %               16.6              13.0 - 44.0 %       Monocytes %                 9.6               2.0 - 10.0 %        Eosinophils %               2.4               0.0 - 6.0 %         Basophils %                 0.8               0.0 - 2.0 %         Neutrophils Absolute        5.80              1.20 - 7.70 *       Immature Granulocytes *     0.07              0.00 - 0.70 *       Lymphocytes Absolute        1.38              1.20 - 4.80 *       Monocytes Absolute          0.80              0.10 - 1.00 *       Eosinophils Absolute        0.20              0.00 - 0.70 *       Basophils Absolute          0.07              0.00 - 0.10 *  -Magnesium:        Result                      Value             Ref Range           Magnesium                   2.10              1.60 - 2.40 *  -Comprehensive metabolic panel:        Result                      Value             Ref Range           Glucose                     100 (H)           74 - 99 mg/dL       Sodium                      135 (L)           136 - 145 mm*       Potassium                   4.2               3.5 - 5.3 mm*       Chloride                    99                98 - 107 mmo*       Bicarbonate                 26                21 - 32 mmol*       Anion Gap                   14                10 - 20 mmol*       Urea Nitrogen               26 (H)            6 - 23 mg/dL        Creatinine                  0.83              0.50 - 1.05 *       eGFR                        81                >60 mL/min/1*       Calcium                     8.7               8.6 - 10.3 m*       Albumin                     3.7                3.4 - 5.0 g/*       Alkaline Phosphatase        135 (H)           33 - 110 U/L        Total Protein               6.4               6.4 - 8.2 g/*       AST                         19                9 - 39 U/L          Bilirubin, Total            0.5               0.0 - 1.2 mg*       ALT                         6 (L)             7 - 45 U/L     -Troponin I, High Sensitivity:        Result                      Value             Ref Range           Troponin I, High Sensi*     3                 0 - 13 ng/L    -Sars-CoV-2 and Influenza A/B PCR:        Result                      Value             Ref Range           Flu A Result                Not Detected      Not Detected        Flu B Result                Not Detected      Not Detected        Coronavirus 2019, PCR       Not Detected      Not Detected   -Urinalysis with Reflex Culture and Microscopic:        Result                      Value             Ref Range           Color, Urine                Light-Yellow      Light-Yellow*       Appearance, Urine           Clear             Clear               Specific Gravity, Urine     1.012             1.005 - 1.035       pH, Urine                   5.0               5.0, 5.5, 6.*       Protein, Urine              NEGATIVE          NEGATIVE, 10*       Glucose, Urine              Normal            Normal mg/dL        Blood, Urine                NEGATIVE          NEGATIVE            Ketones, Urine              NEGATIVE          NEGATIVE mg/*       Bilirubin, Urine            NEGATIVE          NEGATIVE            Urobilinogen, Urine         Normal            Normal mg/dL        Nitrite, Urine              NEGATIVE          NEGATIVE            Leukocyte Esterase, Ur*     NEGATIVE          NEGATIVE       -Extra Urine Gray Tube:        Result                      Value             Ref Range           Extra Tube                                                    Hold for add-ons.  -Acute Toxicology Panel, Blood:        Result                       Value             Ref Range           Acetaminophen               27.2              10.0 - 30.0 *       Salicylate                  <3                4 - 20 mg/dL        Alcohol                     <10               <=10 mg/dL     -Creatine Kinase:        Result                      Value             Ref Range           Creatine Kinase             108               0 - 215 U/L    -Comprehensive metabolic panel:        Result                      Value             Ref Range           Glucose                     84                74 - 99 mg/dL       Sodium                      137               136 - 145 mm*       Potassium                   3.9               3.5 - 5.3 mm*       Chloride                    101               98 - 107 mmo*       Bicarbonate                 26                21 - 32 mmol*       Anion Gap                   14                10 - 20 mmol*       Urea Nitrogen               22                6 - 23 mg/dL        Creatinine                  0.64              0.50 - 1.05 *       eGFR                        >90               >60 mL/min/1*       Calcium                     8.3 (L)           8.6 - 10.3 m*       Albumin                     3.4               3.4 - 5.0 g/*       Alkaline Phosphatase        124 (H)           33 - 110 U/L        Total Protein               5.7 (L)           6.4 - 8.2 g/*       AST                         15                9 - 39 U/L          Bilirubin, Total            0.4               0.0 - 1.2 mg*       ALT                         13                7 - 45 U/L     -CBC and Auto Differential:        Result                      Value             Ref Range           WBC                         7.2               4.4 - 11.3 x*       nRBC                        0.0               0.0 - 0.0 /1*       RBC                         3.76 (L)          4.00 - 5.20 *       Hemoglobin                  10.6 (L)          12.0 - 16.0 *       Hematocrit                   35.3 (L)          36.0 - 46.0 %       MCV                         94                80 - 100 fL         MCH                         28.2              26.0 - 34.0 *       MCHC                        30.0 (L)          32.0 - 36.0 *       RDW                         17.2 (H)          11.5 - 14.5 %       Platelets                   291               150 - 450 x1*       Neutrophils %               66.8              40.0 - 80.0 %       Immature Granulocytes *     0.3               0.0 - 0.9 %         Lymphocytes %               17.0              13.0 - 44.0 %       Monocytes %                 11.4              2.0 - 10.0 %        Eosinophils %               3.8               0.0 - 6.0 %         Basophils %                 0.7               0.0 - 2.0 %         Neutrophils Absolute        4.80              1.20 - 7.70 *       Immature Granulocytes *     0.02              0.00 - 0.70 *       Lymphocytes Absolute        1.22              1.20 - 4.80 *       Monocytes Absolute          0.82              0.10 - 1.00 *       Eosinophils Absolute        0.27              0.00 - 0.70 *       Basophils Absolute          0.05              0.00 - 0.10 *  -CBC:        Result                      Value             Ref Range           WBC                         5.9               4.4 - 11.3 x*       nRBC                        0.0               0.0 - 0.0 /1*       RBC                         3.59 (L)          4.00 - 5.20 *       Hemoglobin                  10.1 (L)          12.0 - 16.0 *       Hematocrit                  33.0 (L)          36.0 - 46.0 %       MCV                         92                80 - 100 fL         MCH                         28.1              26.0 - 34.0 *       MCHC                        30.6 (L)          32.0 - 36.0 *       RDW                         17.2 (H)          11.5 - 14.5 %       Platelets                   272               150 - 450 x1*  -Comprehensive metabolic panel:        Result                       Value             Ref Range           Glucose                     87                74 - 99 mg/dL       Sodium                      140               136 - 145 mm*       Potassium                   3.6               3.5 - 5.3 mm*       Chloride                    101               98 - 107 mmo*       Bicarbonate                 32                21 - 32 mmol*       Anion Gap                   11                10 - 20 mmol*       Urea Nitrogen               15                6 - 23 mg/dL        Creatinine                  0.62              0.50 - 1.05 *       eGFR                        >90               >60 mL/min/1*       Calcium                     7.8 (L)           8.6 - 10.3 m*       Albumin                     3.1 (L)           3.4 - 5.0 g/*       Alkaline Phosphatase        114 (H)           33 - 110 U/L        Total Protein               5.3 (L)           6.4 - 8.2 g/*       AST                         11                9 - 39 U/L          Bilirubin, Total            0.4               0.0 - 1.2 mg*       ALT                         12                7 - 45 U/L     -CBC:        Result                      Value             Ref Range           WBC                         6.5               4.4 - 11.3 x*       nRBC                        0.0               0.0 - 0.0 /1*       RBC                         3.49 (L)          4.00 - 5.20 *       Hemoglobin                  10.1 (L)          12.0 - 16.0 *       Hematocrit                  34.4 (L)          36.0 - 46.0 %       MCV                         99                80 - 100 fL         MCH                         28.9              26.0 - 34.0 *       MCHC                        29.4 (L)          32.0 - 36.0 *       RDW                         17.6 (H)          11.5 - 14.5 %       Platelets                   205               150 - 450 x1*  -Comprehensive metabolic panel:        Result                      Value             Ref Range           Glucose                      103 (H)           74 - 99 mg/dL       Sodium                      142               136 - 145 mm*       Potassium                   3.5               3.5 - 5.3 mm*       Chloride                    100               98 - 107 mmo*       Bicarbonate                 35 (H)            21 - 32 mmol*       Anion Gap                   11                10 - 20 mmol*       Urea Nitrogen               14                6 - 23 mg/dL        Creatinine                  0.60              0.50 - 1.05 *       eGFR                        >90               >60 mL/min/1*       Calcium                     8.3 (L)           8.6 - 10.3 m*       Albumin                     3.1 (L)           3.4 - 5.0 g/*       Alkaline Phosphatase        119 (H)           33 - 110 U/L        Total Protein               5.3 (L)           6.4 - 8.2 g/*       AST                         12                9 - 39 U/L          Bilirubin, Total            0.4               0.0 - 1.2 mg*       ALT                         15                7 - 45 U/L     -CBC:        Result                      Value             Ref Range           WBC                         6.1               4.4 - 11.3 x*       nRBC                        0.0               0.0 - 0.0 /1*       RBC                         3.68 (L)          4.00 - 5.20 *       Hemoglobin                  10.5 (L)          12.0 - 16.0 *       Hematocrit                  33.7 (L)          36.0 - 46.0 %       MCV                         92                80 - 100 fL         MCH                         28.5              26.0 - 34.0 *       MCHC                        31.2 (L)          32.0 - 36.0 *       RDW                         17.1 (H)          11.5 - 14.5 %       Platelets                   285               150 - 450 x1*  -Comprehensive metabolic panel:        Result                      Value             Ref Range           Glucose                     105 (H)           74 - 99 mg/dL       Sodium                       141               136 - 145 mm*       Potassium                   3.7               3.5 - 5.3 mm*       Chloride                    98                98 - 107 mmo*       Bicarbonate                 34 (H)            21 - 32 mmol*       Anion Gap                   13                10 - 20 mmol*       Urea Nitrogen               11                6 - 23 mg/dL        Creatinine                  0.55              0.50 - 1.05 *       eGFR                        >90               >60 mL/min/1*       Calcium                     8.5 (L)           8.6 - 10.3 m*       Albumin                     3.1 (L)           3.4 - 5.0 g/*       Alkaline Phosphatase        131 (H)           33 - 110 U/L        Total Protein               5.3 (L)           6.4 - 8.2 g/*       AST                         13                9 - 39 U/L          Bilirubin, Total            0.3               0.0 - 1.2 mg*       ALT                         14                7 - 45 U/L     -TSH:        Result                      Value             Ref Range           Thyroid Stimulating Ho*     1.30              0.44 - 3.98 *  -CBC:        Result                      Value             Ref Range           WBC                         6.3               4.4 - 11.3 x*       nRBC                        0.0               0.0 - 0.0 /1*       RBC                         3.63 (L)          4.00 - 5.20 *       Hemoglobin                  10.4 (L)          12.0 - 16.0 *       Hematocrit                  33.2 (L)          36.0 - 46.0 %       MCV                         92                80 - 100 fL         MCH                         28.7              26.0 - 34.0 *       MCHC                        31.3 (L)          32.0 - 36.0 *       RDW                         16.8 (H)          11.5 - 14.5 %       Platelets                   293               150 - 450 x1*  -Comprehensive metabolic panel:        Result                      Value             Ref Range            Glucose                     108 (H)           74 - 99 mg/dL       Sodium                      139               136 - 145 mm*       Potassium                   3.4 (L)           3.5 - 5.3 mm*       Chloride                    99                98 - 107 mmo*       Bicarbonate                 32                21 - 32 mmol*       Anion Gap                   11                10 - 20 mmol*       Urea Nitrogen               15                6 - 23 mg/dL        Creatinine                  0.72              0.50 - 1.05 *       eGFR                        >90               >60 mL/min/1*       Calcium                     8.3 (L)           8.6 - 10.3 m*       Albumin                     3.1 (L)           3.4 - 5.0 g/*       Alkaline Phosphatase        128 (H)           33 - 110 U/L        Total Protein               5.7 (L)           6.4 - 8.2 g/*       AST                         22                9 - 39 U/L          Bilirubin, Total            0.3               0.0 - 1.2 mg*       ALT                         16                7 - 45 U/L     -POCT GLUCOSE:        Result                      Value             Ref Range           POCT Glucose                92                74 - 99 mg/dL  -POCT GLUCOSE:        Result                      Value             Ref Range           POCT Glucose                71 (L)            74 - 99 mg/dL  -POCT GLUCOSE:        Result                      Value             Ref Range           POCT Glucose                98                74 - 99 mg/dL  -POCT GLUCOSE:        Result                      Value             Ref Range           POCT Glucose                104 (H)           74 - 99 mg/dL  -POCT GLUCOSE:        Result                      Value             Ref Range           POCT Glucose                86                74 - 99 mg/dL  -POCT GLUCOSE:        Result                      Value             Ref Range           POCT Glucose                90                74 - 99  mg/dL  -POCT GLUCOSE:        Result                      Value             Ref Range           POCT Glucose                102 (H)           74 - 99 mg/dL  -POCT GLUCOSE:        Result                      Value             Ref Range           POCT Glucose                102 (H)           74 - 99 mg/dL  -POCT GLUCOSE:        Result                      Value             Ref Range           POCT Glucose                117 (H)           74 - 99 mg/dL  -POCT GLUCOSE:        Result                      Value             Ref Range           POCT Glucose                101 (H)           74 - 99 mg/dL  -POCT GLUCOSE:        Result                      Value             Ref Range           POCT Glucose                107 (H)           74 - 99 mg/dL  -POCT GLUCOSE:        Result                      Value             Ref Range           POCT Glucose                97                74 - 99 mg/dL  -POCT GLUCOSE:        Result                      Value             Ref Range           POCT Glucose                103 (H)           74 - 99 mg/dL  -POCT GLUCOSE:        Result                      Value             Ref Range           POCT Glucose                112 (H)           74 - 99 mg/dL  -POCT GLUCOSE:        Result                      Value             Ref Range           POCT Glucose                86                74 - 99 mg/dL  -POCT GLUCOSE:        Result                      Value             Ref Range           POCT Glucose                117 (H)           74 - 99 mg/dL  -POCT GLUCOSE:        Result                      Value             Ref Range           POCT Glucose                110 (H)           74 - 99 mg/dL  -POCT GLUCOSE:        Result                      Value             Ref Range           POCT Glucose                105 (H)           74 - 99 mg/dL  -POCT GLUCOSE:        Result                      Value             Ref Range           POCT Glucose                114 (H)           74 - 99  mg/dL  -Electrocardiogram, 12-lead PRN ACS symptoms:        Result                      Value             Ref Range           Ventricular Rate            68                BPM                 Atrial Rate                 68                BPM                 AL Interval                 190               ms                  QRS Duration                90                ms                  QT Interval                 438               ms                  QTC Calculation(Bazett)     465               ms                  P Axis                      62                degrees             R Axis                      18                degrees             T Axis                      31                degrees             QRS Count                   11                beats               Q Onset                     217               ms                  P Onset                     122               ms                  P Offset                    183               ms                  T Offset                    436               ms                  QTC Fredericia              456               ms                                            No data recorded                   Patient History   Past Medical History:   Diagnosis Date    CATALINA positive     Arthritis     Asthma ([facility])     Bell's palsy     x 2    Bilateral leg edema     Bipolar disorder (Multi)     CHF (congestive heart failure) (Multi)     Chronic allergic rhinitis     Chronic constipation     Diabetes mellitus (Multi)     Diabetic neuropathy (Multi)     Dysphonia 07/05/2022    Muscle tension dysphonia    Fibromyalgia     GERD (gastroesophageal reflux disease)     under control with medication    High pulmonary arterial pressure (Multi)     moderately elevated on ECHO 10.5.23    Hyperlipidemia, unspecified 01/03/2023    Dyslipidemia    Hypertension     Hypokalemia     1.1.24- K 3.8 - on oral supplements    Hypothyroidism     Low back pain, unspecified 02/22/2021    Low  back pain    Moderate tricuspid regurgitation     Obstructive sleep apnea (adult) (pediatric) 01/03/2023    ZEINA on CPAP    Paralysis of vocal cords and larynx, 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    Laryngeal paresis    Parkinsonism (Multi)     Pseudoaneurysm of carotid artery (CMS-HCC) 2020    s/p right pipeline and coil embolization of Right ICA pseudoaneurysm    PTSD (post-traumatic stress disorder)     Thyroid cancer (Multi)     s/p total thyroidectomy, residual tissue on left side - FNA benign 2020 and 2021    Torticollis     botox injections    Vitamin D deficiency      Past Surgical History:   Procedure Laterality Date    COLONOSCOPY      KNEE ARTHROPLASTY      OTHER SURGICAL HISTORY Right 2020    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    TOTAL THYROIDECTOMY  2017    US GUIDED THYROID BIOPSY  11/19/2020    US GUIDED THYROID BIOPSY 11/19/2020 [facility]    US GUIDED THYROID BIOPSY  11/19/2020    US GUIDED THYROID BIOPSY 11/19/2020 [facility]     Family History   Problem Relation Name Age of Onset    Heart failure Mother      Pancreatic cancer Mother      Heart failure Father      Parkinsonism Father      Breast cancer Sister       Social History     Tobacco Use    Smoking status: Never    Smokeless tobacco: Never   Substance Use Topics    Alcohol use: Not Currently    Drug use: Not Currently     Comment: medical marijuana last used >1 year ago       Physical Exam   ED Triage Vitals   Temp Heart Rate Resp BP   04/07/24 1719 04/07/24 1719 04/07/24 1719 04/07/24 1719   36.4 °C (97.5 °F) 67 14 (!) 142/100      SpO2 Temp Source Heart Rate Source Patient Position   04/07/24 1719 04/07/24 1719 04/07/24 1900 04/07/24 1719   98 % Oral Monitor Sitting      BP Location FiO2 (%)     04/07/24 1719 --     Left arm        Physical Exam    ED Course & MDM   Diagnoses as of 04/15/24 1321   Delirium   Acute cystitis without hematuria       Medical Decision Making      Procedure  Procedures     Bernardino Leon,  DO  04/15/24 1327

## 2024-04-16 PROCEDURE — 1090000001 HH PPS REVENUE CREDIT

## 2024-04-16 PROCEDURE — G0180 MD CERTIFICATION HHA PATIENT: HCPCS | Performed by: ORTHOPAEDIC SURGERY

## 2024-04-16 PROCEDURE — 1090000002 HH PPS REVENUE DEBIT

## 2024-04-17 ENCOUNTER — DOCUMENTATION (OUTPATIENT)
Dept: HOME HEALTH SERVICES | Facility: HOME HEALTH | Age: 59
End: 2024-04-17
Payer: MEDICARE

## 2024-04-17 PROCEDURE — 1090000002 HH PPS REVENUE DEBIT

## 2024-04-17 PROCEDURE — 1090000001 HH PPS REVENUE CREDIT

## 2024-04-17 NOTE — HH CARE COORDINATION
Home Care received a Referral to Resume Care for Physical Therapy, Occupational Therapy, and Speech Language Pathology. We have processed the referral for a Resumption of Care on 4/21/24.     If you have any questions or concerns, please feel free to contact us at [phone]. Follow the prompts, enter your five digit zip code, and you will be directed to your care team on [facility].

## 2024-04-18 PROCEDURE — 1090000002 HH PPS REVENUE DEBIT

## 2024-04-18 PROCEDURE — 1090000001 HH PPS REVENUE CREDIT

## 2024-04-19 PROCEDURE — 1090000001 HH PPS REVENUE CREDIT

## 2024-04-19 PROCEDURE — 1090000002 HH PPS REVENUE DEBIT

## 2024-04-20 ENCOUNTER — HOME CARE VISIT (OUTPATIENT)
Dept: HOME HEALTH SERVICES | Facility: HOME HEALTH | Age: 59
End: 2024-04-20
Payer: MEDICARE

## 2024-04-20 PROCEDURE — 1090000002 HH PPS REVENUE DEBIT

## 2024-04-20 PROCEDURE — 1090000001 HH PPS REVENUE CREDIT

## 2024-04-21 ENCOUNTER — HOME CARE VISIT (OUTPATIENT)
Dept: HOME HEALTH SERVICES | Facility: HOME HEALTH | Age: 59
End: 2024-04-21
Payer: MEDICARE

## 2024-04-21 PROCEDURE — 1090000002 HH PPS REVENUE DEBIT

## 2024-04-21 PROCEDURE — 1090000001 HH PPS REVENUE CREDIT

## 2024-04-22 PROCEDURE — 1090000001 HH PPS REVENUE CREDIT

## 2024-04-22 PROCEDURE — 1090000002 HH PPS REVENUE DEBIT

## 2024-04-23 DIAGNOSIS — M54.12 CERVICAL RADICULOPATHY, CHRONIC: ICD-10-CM

## 2024-04-23 PROCEDURE — 1090000002 HH PPS REVENUE DEBIT

## 2024-04-23 PROCEDURE — 1090000001 HH PPS REVENUE CREDIT

## 2024-04-23 RX ORDER — BACLOFEN 20 MG/1
20 TABLET ORAL 3 TIMES DAILY
Qty: 270 TABLET | Refills: 3 | Status: SHIPPED | OUTPATIENT
Start: 2024-04-23 | End: 2025-04-23

## 2024-04-24 ENCOUNTER — TELEPHONE (OUTPATIENT)
Dept: UROLOGY | Facility: CLINIC | Age: 59
End: 2024-04-24

## 2024-04-24 ENCOUNTER — HOME CARE VISIT (OUTPATIENT)
Dept: HOME HEALTH SERVICES | Facility: HOME HEALTH | Age: 59
End: 2024-04-24
Payer: MEDICARE

## 2024-04-24 VITALS — OXYGEN SATURATION: 91 % | HEART RATE: 72 BPM

## 2024-04-24 PROCEDURE — 1090000001 HH PPS REVENUE CREDIT

## 2024-04-24 PROCEDURE — 1090000002 HH PPS REVENUE DEBIT

## 2024-04-24 PROCEDURE — G0151 HHCP-SERV OF PT,EA 15 MIN: HCPCS | Mod: HHH

## 2024-04-24 SDOH — HEALTH STABILITY: PHYSICAL HEALTH

## 2024-04-24 ASSESSMENT — ENCOUNTER SYMPTOMS
PAIN LOCATION - PAIN QUALITY: ACHE
DYSPNEA ON EXERTION: 1
HIGHEST PAIN SEVERITY IN PAST 24 HOURS: 10/10
SHORTNESS OF BREATH: T
SUBJECTIVE PAIN PROGRESSION: GRADUALLY IMPROVING
PAIN LOCATION - PAIN FREQUENCY: CONSTANT
PAIN: 1
PAIN LOCATION - EXACERBATING FACTORS: AMB , WB
LOWEST PAIN SEVERITY IN PAST 24 HOURS: 6/10
LOWER EXTREMITY EDEMA: 1
PAIN LOCATION: RIGHT KNEE
PAIN LOCATION - PAIN DURATION: -
PERSON REPORTING PAIN: PATIENT
PAIN SEVERITY GOAL: 0/10
FATIGUES EASILY: 1
HYPERTENSION: 1
PAIN LOCATION - PAIN SEVERITY: 6/10

## 2024-04-24 ASSESSMENT — ACTIVITIES OF DAILY LIVING (ADL)
AMBULATION ASSISTANCE ON FLAT SURFACES: 1
ENTERING_EXITING_HOME: MINIMUM ASSIST
AMBULATION_DISTANCE/DURATION_TOLERATED: 20

## 2024-04-24 NOTE — PROGRESS NOTES
Subjective   Patient ID: Prema Hair is a 59 y.o. female who presents for follow up.    HPI  58-year-old with symptomatic cystocele, pelvic floor weakness, urinary urgency and frequency, and vaginal atrophy with 3 inch ring with support pessary in place with worsening incomplete bladder emptying and urinary incontinence complaints. She has a history of osteoarthritis in her right knee, s/p right knee replacement.     The patient is accompanied by her daughter. She was admitted to [facility] on 04/07/2024 with concerns for toxic metabolic encephalopathy secondary to opiates and head injury. They put a catheter in place at that time that she reports has not been draining completely. She continues to experience episodic leakage around the catheter but it appears to be draining appropriately.  She is also noted episodic abdominal pain.     She still has a pessary in place.  She denies any vaginal complaints at this time.    She denies constipation, she has been utilizing colace and MiraLAX. No fecal or flatal incontinence.     From Previous note  58-year-old with symptomatic cystocele, pelvic floor weakness, urinary urgency and frequency, and vaginal atrophy with pessary in place with history of incomplete bladder emptying and worsening urinary incontinence complaints.    The patient presents with her daughter. She notes worsening urinary urgency and frequency with incontinence.  She is continuing her Oxybutynin without much relief. Her  ml by ultrasound. She denies any UTI like symptoms.     She thinks her pessary expulsed due to the constipation a little over 1 week ago.  She denies any vaginal complaints, no abnormal bleeding or discharge.  She is utilizing the vaginal estrogen cream.  She does feel her vaginal bulge with Valsalva.     She also complaints of constipation, she has been utilizing colace and MiraLAX. No fecal or flatal incontinence.     She has no other complaints.     From Previous note  This  visit was performed through telemedicine  58-year-old with symptomatic cystocele, pelvic floor weakness, urinary urgency and frequency, and vaginal atrophy with pessary in place with history of incomplete bladder emptying and worsening urinary incontinence complaints.     The patient has had improved control with her oxybutynin. She denies any dry mouth or constipation complaints. She denies any retention concerns. However she has noted terminal urge incontinence and stress incontinence complaints when she waits 4 to 5 hours between urinations. She denies any UTI symptoms.     She denies any bowel related complaints.      She has no other complaints.        From previous note  58-year-old with symptomatic cystocele, pelvic floor weakness, urinary urgency and frequency, and vaginal atrophy with pessary in place with history of incomplete bladder emptying and worsening stress urinary incontinence complaints.     The patient reports that she has been leaking urine more but had no problem emptying her bladder since the last visit. She states that she is wet during the night. She is compliant with tamsulosin. PVR today is 0. Urine appears negative.     She denies any vaginal complaints, no abnormal vaginal bleeding or discharge. She is satisfied from the pessary standpoint. She is utilizing her vaginal estrogen therapy.     She denies any bowel related complaints, no fecal or flatal incontinence.     She has no other complaints.     From previous note  58-year-old with symptomatic cystocele, pelvic floor weakness, urinary urgency and frequency, and vaginal atrophy with pessary in place with incomplete bladder emptying.     The patient appears to be emptying her bladder well, PVR today is 0. She is utilizing the tamsulosin with improvements in her bladder emptying but does need to double void. She continues to utilize the daily cephalexin therapy, cranberry extract tablets and denies any UTI like symptoms. he does note  "episodic leaking on laughing, coughing and sneezing.     She denies any vaginal complaints, no abnormal vaginal bleeding or discharge. She is satisfied from the pessary standpoint. She is utilizing her vaginal estrogen therapy.     She denies any bowel related complaints, no fecal or flatal incontinence.     She has no other complaints.     From previous note  58-year-old with symptomatic cystocele, pelvic floor weakness, urinary urgency and frequency, and vaginal atrophy with pessary in place with incomplete bladder emptying.     The patient appears to be emptying her bladder well, PVR today is 0. She states the past week was \"challenging\" and she has to double void and bear down to empty. She has stopped her oxybutynin and appears to be having benefits with her tamsulosin. She continues her daily low-dose cephalexin. She denies any UTI like symptoms. Urinalysis is negative today      She denies any vaginal complaints, no abnormal vaginal bleeding or discharge. She is satisfied from the pessary standpoint. She is utilizing her vaginal estrogen therapy.     She denies any bowel related complaints, no fecal or flatal incontinence.     She has no other complaints.     From previous note  58-year-old with symptomatic cystocele, pelvic floor weakness, urinary urgency and frequency, and vaginal atrophy with pessary in place presenting for follow-up regarding her pessary and acute urinary tract infection.     The patient presents today with a roughly 70 history of worsening lower urinary tract symptoms. PVR today is 300 cc with a negative urinalysis. She did undergo a cervical epidural C6/C7 injection 1 week ago. She continues her oxybutynin therapy and daily low-dose cephalexin. She is utilizing her vaginal estrogen therapy.     She denies any worsening constipation complaints. She was recently started on a new diuretic therapy.     She has no other complaints.     From previous note  57-year-old with symptomatic " cystocele, pelvic floor weakness, urinary urgency and frequency, and vaginal atrophy with pessary in place presenting for follow-up regarding her lower urinary tract symptoms and recent urinary retention.     The patient underwent pain management surgery on 02/03/23. She states at that time her physician told her that she had a urinary tract infection. She notes some sensation of this prior to this procedure. The symptoms are now bothersome for her as she is experiencing discomfort, burning micturition and urgency. She continues to utilize the Macrodantin, Oxybutynin and vaginal estrogen cream. She is satisfied from the pessary standpoint.      She denies any vaginal complaints, no abnormal vaginal bleeding or discharge.     She denies any bowel related complaints, no fecal or flatal incontinence.     She has no other complaints.        From previous note  57-year-old with symptomatic cystocele, pelvic floor weakness, urinary urgency and frequency, and vaginal atrophy with pessary in place presenting for follow-up regarding her lower urinary tract symptoms and recent urinary retention.     The patient states that she is not is not emptying well for the past week. She has been UTI free for 6 months.     Urinalysis today is grossly infected.     She denies any abnormal vaginal bleeding or discharge. However, she states that she is experiencing vaginal pain for the past week.      She does have a longstanding history of chronic constipation, she states that her bowel complaints have improved. She otherwise denies any fecal or flatal incontinence.        She has no new complaints.      From previous note   The following visit was performed to telemedicine  57-year-old presenting with history of symptomatic cystocele, urinary urgency, pelvic floor weakness, and vaginal atrophy with a #3 ring without support.     The patient is overall very satisfied with her present therapy. She is utilizing her vaginal estrogen therapy  "and her oxybutynin. She feels well controlled from a urinary urgency and frequency standpoint. She denies any abnormal vaginal bleeding or discharge. She feels that her pessary is \"working\".      She has no new complaints.     From previous note  55-year-old presenting as referral from Dr. Marino with complaints of \"bladder and uterus falling\".     The patient noted roughly 1 month ago a feeling of a vaginal bulge with acute onset vaginal pressure and pulling. She took a picture of this and was evaluated by Dr. Marino who noted an anterior wall descent. Since that time she has denied any abnormal vaginal bleeding or discharge. She is not sexually active. She denies any significant changes in her bowel related complaints. She does have a longer standing history of urinary urgency and stress urinary incontinence complaints predating the prolapse complaints. She presents today to discuss her prolapse complaints.     The patient notes a history of stress urinary incontinence both with a full and empty bladder noted over the last year. She also notes urinary urgency up to every 1-2 hours. She notes 0-1 episodes of nocturia. She denies enuresis. She denies a history of nephrolithiasis, chronic urinary tract infections, or any gross hematuria. She is never been evaluated for these complaints.     She does have a longstanding history of chronic constipation for which she takes daily fiber therapy and titrates MiraLAX for a soft bowel movement every day to 2 days. She otherwise denies any fecal or flatal incontinence.     As above, the patient is not sexually active. She denies any abnormal vaginal bleeding or discharge.     The patient does drink a large volume of fluid up to 6 to 816 ounce bottles of water daily. She also utilizes a CPAP machine at night.     She has no other complaints.   Review of Systems  Constitutional: No fever, No chills and No fatigue.   Eyes: No vision problems and No dryness of the eyes.   ENT: No " dry mouth, No hearing loss and No nosebleeds.   Cardiovascular: No chest pain, No palpitations and No orthopnea.   Respiratory: No shortness of breath, No cough and No wheezing.   Gastrointestinal: No abdominal pain, No constipation, No nausea, No diarrhea, No vomiting and No melena.   Genitourinary: As noted in HPI.   Musculoskeletal: No back pain, No myalgias, No muscle weakness, No joint swelling and No leg edema.   Integumentary: No rashes, No skin lesion and No itching.   Neurological: No headache, No numbness and No dizziness.   Psychiatric: No sleep disturbances, No anxiety and No depression.   Endocrine: No hot flashes, No loss of hair and No hirsutism.   Hematologic/Lymphatic: No swollen glands, No tendency for easy bleeding and No tendency for easy bruising.   All other systems have been reviewed and are negative for complaint.        Objective   Physical Exam    PHYSICAL EXAMINATION:  No LMP recorded. Patient is postmenopausal.  There is no height or weight on file to calculate BMI.  There were no vitals taken for this visit.  General Appearance: well appearing, significantly decreased hand mobility using a walker with the assistance of her daughter.  Answering questions appropriately.  Neuro: Alert and oriented   HEENT: mucous membranes moist, neck supple  Resp: No respiratory distress, normal work of breathing  MSK: normal range of motion, gait appropriate      Assessment/Plan   58-year-old with symptomatic cystocele, pelvic floor weakness, urinary urgency and frequency, and vaginal atrophy with 3 inch ring with support pessary in place with urinary retention worsening following recent knee replacement surgery and hospitalization 4/12/2024 due to toxic metabolic encephalopathy secondary to opiates and concussion from a fall.     1. We have previously discussed the patient's symptomatic vaginal bulge complaints. She was successfully fitted with a #3 ring with support 2/16/2021. She has noted worsening  stress urinary incontinence complaints. We discussed refitting her with a #3 ring with knob which was performed 6/27/2023.  This was expulsed a few weeks ago.  She was refitted with a 3 inch ring with support 2/5/2024.  She will require every 4 month pessary follow-up.      2.  We discussed at length today the patient's lower urinary tract concerns.  She has a Ram catheter in place that was placed during her recent hospitalization 4/8/2024.  The patient has known incomplete bladder emptying.  We discussed the importance of maintaining her Ram catheter at this time.  We discussed stopping her tamsulosin and restarting her oxybutynin due to irritative Ram catheter concerns.  Prior to her recent hospitalization and urinary retention concerns, she  had improvements with improved voiding techniques and tamsulosin.  We did discuss utilizing CIC but she is unable to do this due to fine motor skills. We have previously discussed the possibility of sacral neuromodulation moving forward.  Due to the complicated nature of her worsening disease, we discussed maintaining her Ram catheter and proceeding with urodynamic testing.  She will be scheduled at her earliest convenience.  We also discussed proceeding with upper tract imaging given her abdominal pain and persistent urinary retention concerns with CT scan     3. We discussed the safety, efficacy, proper utilization of vaginal estrogen therapy. She will continue her vaginal estrogen therapy 3 times weekly.     4. As above, we discussed the patient's weak pelvic floor as relates to her lower urinary tract symptoms and symptomatic pelvic organ prolapse. She is not interested in pelvic floor physical therapy at this time.     5. We again discussed her chronic urinary tract infections. She will continue her low-dose cephalexin therapy. She has previously utilized low-dose Macrobid but this has been changed due to recent sensitivities. She was previously provided a  standing order for urinalysis and urine culture. As above she will continue her vaginal estrogen therapy. She will also continue daily cranberry extract tablets and continue to push fluids.       6. The patient will follow-up in June 2024 for pessary maintenance.  She will proceed with CT scan at her earliest convenience.  She will be scheduled for urodynamic testing as soon as possible.  We will readdress her therapeutic options thereafter.     TYREL Acevedo MD       Scribe Attestation  By signing my name below, I, Mita Lee attest that this documentation has been prepared under the direction and in the presence of Norbert Acevedo MD. All medical record entries made by the Scribe were at my direction or personally dictated by me. I have reviewed the chart and agree that the record accurately reflects my personal performance of the history, physical exam, discussion and plan.

## 2024-04-24 NOTE — HOME HEALTH
feel on R knee a few weeks ago. L knee has OA to be Rxed with injections.   has neck and TLSO orthotic   botox injections for neck from Dr Swanson  sees bladder dr DR Curtis Herndon  htn, Datil Palsy  diabetes  leakage around catheter- diaper soaked, and uring in bag. Urine is orange to balaji color.    has a pessary/sling  - bladder hangs down. Uterus fell also.  keflex 500 mg in afternoon maint dose UTIs

## 2024-04-25 ENCOUNTER — HOME CARE VISIT (OUTPATIENT)
Dept: HOME HEALTH SERVICES | Facility: HOME HEALTH | Age: 59
End: 2024-04-25
Payer: MEDICARE

## 2024-04-25 DIAGNOSIS — R33.9 URINARY RETENTION: ICD-10-CM

## 2024-04-25 DIAGNOSIS — N39.0 CHRONIC UTI: ICD-10-CM

## 2024-04-25 PROCEDURE — G0152 HHCP-SERV OF OT,EA 15 MIN: HCPCS | Mod: HHH

## 2024-04-25 PROCEDURE — 1090000002 HH PPS REVENUE DEBIT

## 2024-04-25 PROCEDURE — 1090000001 HH PPS REVENUE CREDIT

## 2024-04-25 RX ORDER — TAMSULOSIN HYDROCHLORIDE 0.4 MG/1
0.4 CAPSULE ORAL DAILY
Qty: 30 CAPSULE | Refills: 11 | Status: SHIPPED | OUTPATIENT
Start: 2024-04-25 | End: 2024-04-30 | Stop reason: ALTCHOICE

## 2024-04-25 RX ORDER — CEPHALEXIN 250 MG/1
250 CAPSULE ORAL DAILY
Qty: 90 CAPSULE | Refills: 3 | Status: SHIPPED | OUTPATIENT
Start: 2024-04-25 | End: 2025-04-20

## 2024-04-25 ASSESSMENT — ENCOUNTER SYMPTOMS
PAIN LOCATION - PAIN FREQUENCY: CONSTANT
PAIN LOCATION - PAIN SEVERITY: 8/10
PAIN LOCATION - RELIEVING FACTORS: ICE, MEDICATION
PAIN LOCATION - PAIN DURATION: CONSTANT
PAIN SEVERITY GOAL: 0/10
PAIN: 1
PAIN LOCATION - PAIN QUALITY: ACHE
LOWEST PAIN SEVERITY IN PAST 24 HOURS: 4/10
PAIN LOCATION - EXACERBATING FACTORS: MOBILITY
HIGHEST PAIN SEVERITY IN PAST 24 HOURS: 8/10
PAIN LOCATION - PAIN QUALITY: ACHE
PAIN LOCATION - PAIN DURATION: CONSTANT
PAIN LOCATION: RIGHT KNEE
PERSON REPORTING PAIN: PATIENT
PAIN LOCATION - EXACERBATING FACTORS: MOBILITY
PAIN LOCATION - PAIN SEVERITY: 8/10
PAIN LOCATION - PAIN FREQUENCY: CONSTANT
PAIN LOCATION - RELIEVING FACTORS: REST, MEDICATION
SUBJECTIVE PAIN PROGRESSION: WAXING AND WANING
PAIN LOCATION: BACK

## 2024-04-25 ASSESSMENT — ACTIVITIES OF DAILY LIVING (ADL)
BATHING ASSESSED: 1
DRESSING_UB_CURRENT_FUNCTION: SUPERVISION
WASHING_UPB_CURRENT_FUNCTION: MINIMUM ASSIST
BATHING_CURRENT_FUNCTION: MINIMUM ASSIST
DRESSING_LB_CURRENT_FUNCTION: MINIMUM ASSIST
WASHING_LB_CURRENT_FUNCTION: MINIMUM ASSIST

## 2024-04-26 ENCOUNTER — HOME CARE VISIT (OUTPATIENT)
Dept: HOME HEALTH SERVICES | Facility: HOME HEALTH | Age: 59
End: 2024-04-26
Payer: MEDICARE

## 2024-04-26 VITALS
RESPIRATION RATE: 16 BRPM | DIASTOLIC BLOOD PRESSURE: 62 MMHG | TEMPERATURE: 98.2 F | WEIGHT: 215 LBS | SYSTOLIC BLOOD PRESSURE: 108 MMHG | BODY MASS INDEX: 33.74 KG/M2 | HEART RATE: 80 BPM | OXYGEN SATURATION: 97 % | HEIGHT: 67 IN

## 2024-04-26 PROCEDURE — 1090000002 HH PPS REVENUE DEBIT

## 2024-04-26 PROCEDURE — 1090000001 HH PPS REVENUE CREDIT

## 2024-04-26 PROCEDURE — G0157 HHC PT ASSISTANT EA 15: HCPCS | Mod: CQ,HHH

## 2024-04-26 PROCEDURE — G0299 HHS/HOSPICE OF RN EA 15 MIN: HCPCS | Mod: HHH

## 2024-04-27 PROCEDURE — 1090000002 HH PPS REVENUE DEBIT

## 2024-04-27 PROCEDURE — 1090000001 HH PPS REVENUE CREDIT

## 2024-04-27 ASSESSMENT — ENCOUNTER SYMPTOMS
APPETITE LEVEL: GOOD
LIMITED RANGE OF MOTION: 1
MUSCLE WEAKNESS: 1
CHANGE IN APPETITE: UNCHANGED

## 2024-04-27 ASSESSMENT — ACTIVITIES OF DAILY LIVING (ADL)
AMBULATION ASSISTANCE: STAND BY ASSIST
CURRENT_FUNCTION: STAND BY ASSIST

## 2024-04-28 PROCEDURE — 1090000002 HH PPS REVENUE DEBIT

## 2024-04-28 PROCEDURE — 1090000001 HH PPS REVENUE CREDIT

## 2024-04-28 ASSESSMENT — ENCOUNTER SYMPTOMS
LIMITED RANGE OF MOTION: 1
LOSS OF SENSATION IN FEET: 0
ARTHRALGIAS: 1
MUSCLE WEAKNESS: 1
OCCASIONAL FEELINGS OF UNSTEADINESS: 1
DEPRESSION: 1

## 2024-04-28 ASSESSMENT — ACTIVITIES OF DAILY LIVING (ADL)
AMBULATION ASSISTANCE: STAND BY ASSIST
PHYSICAL TRANSFERS ASSESSED: 1
AMBULATION ASSISTANCE: 1
CURRENT_FUNCTION: SUPERVISION
OASIS_M1830: 05

## 2024-04-29 ENCOUNTER — HOME CARE VISIT (OUTPATIENT)
Dept: HOME HEALTH SERVICES | Facility: HOME HEALTH | Age: 59
End: 2024-04-29
Payer: MEDICARE

## 2024-04-29 PROCEDURE — 1090000002 HH PPS REVENUE DEBIT

## 2024-04-29 PROCEDURE — 0023 HH SOC

## 2024-04-29 PROCEDURE — 1090000001 HH PPS REVENUE CREDIT

## 2024-04-29 PROCEDURE — G0157 HHC PT ASSISTANT EA 15: HCPCS | Mod: CQ,HHH

## 2024-04-30 ENCOUNTER — HOME CARE VISIT (OUTPATIENT)
Dept: HOME HEALTH SERVICES | Facility: HOME HEALTH | Age: 59
End: 2024-04-30
Payer: MEDICARE

## 2024-04-30 ENCOUNTER — OFFICE VISIT (OUTPATIENT)
Dept: UROLOGY | Facility: CLINIC | Age: 59
End: 2024-04-30
Payer: MEDICARE

## 2024-04-30 VITALS
WEIGHT: 221 LBS | SYSTOLIC BLOOD PRESSURE: 138 MMHG | DIASTOLIC BLOOD PRESSURE: 87 MMHG | BODY MASS INDEX: 34.61 KG/M2 | HEART RATE: 86 BPM

## 2024-04-30 DIAGNOSIS — N32.89 BLADDER SPASM: ICD-10-CM

## 2024-04-30 DIAGNOSIS — N81.11 MIDLINE CYSTOCELE: ICD-10-CM

## 2024-04-30 DIAGNOSIS — Z97.8 FOLEY CATHETER IN PLACE: ICD-10-CM

## 2024-04-30 DIAGNOSIS — N81.89 PELVIC FLOOR WEAKNESS: ICD-10-CM

## 2024-04-30 DIAGNOSIS — Z96.0 VAGINAL PESSARY PRESENT: ICD-10-CM

## 2024-04-30 DIAGNOSIS — N95.2 ATROPHIC VAGINITIS: ICD-10-CM

## 2024-04-30 DIAGNOSIS — N39.490 OVERFLOW INCONTINENCE: ICD-10-CM

## 2024-04-30 DIAGNOSIS — R33.9 URINARY RETENTION: Primary | ICD-10-CM

## 2024-04-30 PROCEDURE — 3044F HG A1C LEVEL LT 7.0%: CPT | Performed by: OBSTETRICS & GYNECOLOGY

## 2024-04-30 PROCEDURE — 1090000001 HH PPS REVENUE CREDIT

## 2024-04-30 PROCEDURE — 99214 OFFICE O/P EST MOD 30 MIN: CPT | Performed by: OBSTETRICS & GYNECOLOGY

## 2024-04-30 PROCEDURE — 3075F SYST BP GE 130 - 139MM HG: CPT | Performed by: OBSTETRICS & GYNECOLOGY

## 2024-04-30 PROCEDURE — 3062F POS MACROALBUMINURIA REV: CPT | Performed by: OBSTETRICS & GYNECOLOGY

## 2024-04-30 PROCEDURE — 3050F LDL-C >= 130 MG/DL: CPT | Performed by: OBSTETRICS & GYNECOLOGY

## 2024-04-30 PROCEDURE — 3079F DIAST BP 80-89 MM HG: CPT | Performed by: OBSTETRICS & GYNECOLOGY

## 2024-04-30 PROCEDURE — 4010F ACE/ARB THERAPY RXD/TAKEN: CPT | Performed by: OBSTETRICS & GYNECOLOGY

## 2024-04-30 PROCEDURE — 1090000002 HH PPS REVENUE DEBIT

## 2024-04-30 PROCEDURE — G0158 HHC OT ASSISTANT EA 15: HCPCS | Mod: CO,HHH

## 2024-04-30 PROCEDURE — 1036F TOBACCO NON-USER: CPT | Performed by: OBSTETRICS & GYNECOLOGY

## 2024-04-30 RX ORDER — OXYBUTYNIN CHLORIDE 10 MG/1
10 TABLET, EXTENDED RELEASE ORAL DAILY
Qty: 30 TABLET | Refills: 11 | Status: SHIPPED | OUTPATIENT
Start: 2024-04-30 | End: 2024-05-24 | Stop reason: ALTCHOICE

## 2024-04-30 NOTE — PATIENT INSTRUCTIONS
Please contact the radiology department for scheduling for your CT scan.  [phone].    Please stop your tamsulosin therapy and begin your oxybutynin therapy on an as-needed basis for bladder spasms.    Please work on Ram catheter position should you notice any leaking associated with the catheter.    You will be scheduled for urodynamics and we will follow-up 24 to 48 hours thereafter to discuss these results and therapy.    Please contact the clinic with any questions or concerns.    [phone]

## 2024-05-01 ENCOUNTER — APPOINTMENT (OUTPATIENT)
Dept: HOME HEALTH SERVICES | Facility: HOME HEALTH | Age: 59
End: 2024-05-01
Payer: MEDICARE

## 2024-05-01 ENCOUNTER — HOME CARE VISIT (OUTPATIENT)
Dept: HOME HEALTH SERVICES | Facility: HOME HEALTH | Age: 59
End: 2024-05-01
Payer: MEDICARE

## 2024-05-01 VITALS
SYSTOLIC BLOOD PRESSURE: 122 MMHG | TEMPERATURE: 97.5 F | HEART RATE: 69 BPM | DIASTOLIC BLOOD PRESSURE: 63 MMHG | RESPIRATION RATE: 17 BRPM | OXYGEN SATURATION: 97 %

## 2024-05-01 PROCEDURE — G0157 HHC PT ASSISTANT EA 15: HCPCS | Mod: CQ,HHH

## 2024-05-01 PROCEDURE — 1090000002 HH PPS REVENUE DEBIT

## 2024-05-01 PROCEDURE — 1090000001 HH PPS REVENUE CREDIT

## 2024-05-02 ENCOUNTER — HOME CARE VISIT (OUTPATIENT)
Dept: HOME HEALTH SERVICES | Facility: HOME HEALTH | Age: 59
End: 2024-05-02
Payer: MEDICARE

## 2024-05-02 ENCOUNTER — APPOINTMENT (OUTPATIENT)
Dept: NEUROLOGY | Facility: CLINIC | Age: 59
End: 2024-05-02
Payer: MEDICARE

## 2024-05-02 PROCEDURE — 1090000002 HH PPS REVENUE DEBIT

## 2024-05-02 PROCEDURE — G0158 HHC OT ASSISTANT EA 15: HCPCS | Mod: CO,HHH

## 2024-05-02 PROCEDURE — G0153 HHCP-SVS OF S/L PATH,EA 15MN: HCPCS | Mod: HHH

## 2024-05-02 PROCEDURE — 1090000001 HH PPS REVENUE CREDIT

## 2024-05-03 ENCOUNTER — OFFICE VISIT (OUTPATIENT)
Dept: NEUROLOGY | Facility: CLINIC | Age: 59
End: 2024-05-03
Payer: MEDICARE

## 2024-05-03 VITALS — SYSTOLIC BLOOD PRESSURE: 108 MMHG | TEMPERATURE: 96.8 F | HEART RATE: 74 BPM | DIASTOLIC BLOOD PRESSURE: 72 MMHG

## 2024-05-03 DIAGNOSIS — M43.6 TORTICOLLIS: Primary | ICD-10-CM

## 2024-05-03 PROCEDURE — 3062F POS MACROALBUMINURIA REV: CPT | Performed by: PSYCHIATRY & NEUROLOGY

## 2024-05-03 PROCEDURE — 3044F HG A1C LEVEL LT 7.0%: CPT | Performed by: PSYCHIATRY & NEUROLOGY

## 2024-05-03 PROCEDURE — 3074F SYST BP LT 130 MM HG: CPT | Performed by: PSYCHIATRY & NEUROLOGY

## 2024-05-03 PROCEDURE — 1090000001 HH PPS REVENUE CREDIT

## 2024-05-03 PROCEDURE — 4010F ACE/ARB THERAPY RXD/TAKEN: CPT | Performed by: PSYCHIATRY & NEUROLOGY

## 2024-05-03 PROCEDURE — 64616 CHEMODENERV MUSC NECK DYSTON: CPT | Performed by: PSYCHIATRY & NEUROLOGY

## 2024-05-03 PROCEDURE — 1090000002 HH PPS REVENUE DEBIT

## 2024-05-03 PROCEDURE — 3078F DIAST BP <80 MM HG: CPT | Performed by: PSYCHIATRY & NEUROLOGY

## 2024-05-03 PROCEDURE — 1036F TOBACCO NON-USER: CPT | Performed by: PSYCHIATRY & NEUROLOGY

## 2024-05-03 PROCEDURE — 3050F LDL-C >= 130 MG/DL: CPT | Performed by: PSYCHIATRY & NEUROLOGY

## 2024-05-03 RX ORDER — CYCLOBENZAPRINE HCL 10 MG
10 TABLET ORAL NIGHTLY PRN
Qty: 30 TABLET | Refills: 2 | Status: SHIPPED | OUTPATIENT
Start: 2024-05-03 | End: 2024-08-01

## 2024-05-03 NOTE — PROGRESS NOTES
Subjective     Prema Hair is a 59 y.o. year old female here for botulinum toxin injections.    Number of headache days per week/month prior to Botox:   Number of months with headache frequency prior to Botox:   Number of severe headache days after Botox:      Abortive medications tried for migraine:      Prophylactic medications tried for migraine:     Allergies   Allergen Reactions    Lisinopril Swelling       Objective     /72   Pulse 74   Temp 36 °C (96.8 °F)     Botox Injection - Neck Pain, Headaches   Procedure: Botox injection.   Indication: spasmodic torticollis.   Risk, benefits, alternatives, risk of worsening pain, risk of URI, risk of dysphagia, risk of focal weakness and risk of facial paresis were discussed with the patient. Verbal consent was obtained prior to the procedure. Alcohol was used to prep the area.  Procedure Note:   The patient was placed in the upright position. 200 units of Botulinum Toxin were injected 80 u right SCM in 4 sites, 20 u right trapezius in 2 sites, 80 u left splenius capitis in 4 sites, 20 u left trapezius in 2 sites.  Lot number: [id number] . Expiration date: 6/2026 .  Post-Procedure: the patient tolerated the procedure well. Complications: None.   Follow-up in the office in 12 weeks for repeat injection(s).    Assessment/Plan   Diagnoses and all orders for this visit:  Torticollis  -     onabotulinumtoxinA (Botox) injection 200 Units      David Swanson Jr., M.D., FAAN

## 2024-05-04 PROCEDURE — 1090000002 HH PPS REVENUE DEBIT

## 2024-05-04 PROCEDURE — 1090000001 HH PPS REVENUE CREDIT

## 2024-05-05 PROCEDURE — 1090000002 HH PPS REVENUE DEBIT

## 2024-05-05 PROCEDURE — 1090000001 HH PPS REVENUE CREDIT

## 2024-05-06 ENCOUNTER — HOME CARE VISIT (OUTPATIENT)
Dept: HOME HEALTH SERVICES | Facility: HOME HEALTH | Age: 59
End: 2024-05-06
Payer: MEDICARE

## 2024-05-06 VITALS
DIASTOLIC BLOOD PRESSURE: 75 MMHG | OXYGEN SATURATION: 98 % | SYSTOLIC BLOOD PRESSURE: 132 MMHG | RESPIRATION RATE: 17 BRPM | TEMPERATURE: 97.8 F | HEART RATE: 76 BPM

## 2024-05-06 PROCEDURE — 1090000002 HH PPS REVENUE DEBIT

## 2024-05-06 PROCEDURE — G0153 HHCP-SVS OF S/L PATH,EA 15MN: HCPCS | Mod: HHH

## 2024-05-06 PROCEDURE — G0157 HHC PT ASSISTANT EA 15: HCPCS | Mod: CQ,HHH

## 2024-05-06 PROCEDURE — 1090000001 HH PPS REVENUE CREDIT

## 2024-05-07 ENCOUNTER — HOME CARE VISIT (OUTPATIENT)
Dept: HOME HEALTH SERVICES | Facility: HOME HEALTH | Age: 59
End: 2024-05-07
Payer: MEDICARE

## 2024-05-07 ENCOUNTER — APPOINTMENT (OUTPATIENT)
Dept: UROLOGY | Facility: CLINIC | Age: 59
End: 2024-05-07
Payer: MEDICARE

## 2024-05-07 PROCEDURE — 1090000002 HH PPS REVENUE DEBIT

## 2024-05-07 PROCEDURE — 1090000001 HH PPS REVENUE CREDIT

## 2024-05-07 PROCEDURE — G0158 HHC OT ASSISTANT EA 15: HCPCS | Mod: CO,HHH

## 2024-05-08 ENCOUNTER — HOME CARE VISIT (OUTPATIENT)
Dept: HOME HEALTH SERVICES | Facility: HOME HEALTH | Age: 59
End: 2024-05-08
Payer: MEDICARE

## 2024-05-08 ENCOUNTER — APPOINTMENT (OUTPATIENT)
Dept: UROLOGY | Facility: CLINIC | Age: 59
End: 2024-05-08
Payer: MEDICARE

## 2024-05-08 ENCOUNTER — HOME HEALTH ADMISSION (OUTPATIENT)
Dept: HOME HEALTH SERVICES | Facility: HOME HEALTH | Age: 59
End: 2024-05-08

## 2024-05-08 PROCEDURE — G0153 HHCP-SVS OF S/L PATH,EA 15MN: HCPCS | Mod: HHH

## 2024-05-08 PROCEDURE — 1090000001 HH PPS REVENUE CREDIT

## 2024-05-08 PROCEDURE — 1090000002 HH PPS REVENUE DEBIT

## 2024-05-08 PROCEDURE — G0157 HHC PT ASSISTANT EA 15: HCPCS | Mod: CQ,HHH

## 2024-05-09 ENCOUNTER — HOSPITAL ENCOUNTER (OUTPATIENT)
Dept: RADIOLOGY | Facility: HOSPITAL | Age: 59
Discharge: HOME | End: 2024-05-09
Payer: MEDICARE

## 2024-05-09 ENCOUNTER — HOME CARE VISIT (OUTPATIENT)
Dept: HOME HEALTH SERVICES | Facility: HOME HEALTH | Age: 59
End: 2024-05-09
Payer: MEDICARE

## 2024-05-09 ENCOUNTER — APPOINTMENT (OUTPATIENT)
Dept: UROLOGY | Facility: CLINIC | Age: 59
End: 2024-05-09
Payer: MEDICARE

## 2024-05-09 DIAGNOSIS — G89.29 CHRONIC BILATERAL LOW BACK PAIN, UNSPECIFIED WHETHER SCIATICA PRESENT: ICD-10-CM

## 2024-05-09 DIAGNOSIS — R33.9 URINARY RETENTION: ICD-10-CM

## 2024-05-09 DIAGNOSIS — M54.50 CHRONIC BILATERAL LOW BACK PAIN, UNSPECIFIED WHETHER SCIATICA PRESENT: ICD-10-CM

## 2024-05-09 PROCEDURE — 1090000002 HH PPS REVENUE DEBIT

## 2024-05-09 PROCEDURE — 76770 US EXAM ABDO BACK WALL COMP: CPT | Performed by: STUDENT IN AN ORGANIZED HEALTH CARE EDUCATION/TRAINING PROGRAM

## 2024-05-09 PROCEDURE — 1090000001 HH PPS REVENUE CREDIT

## 2024-05-09 PROCEDURE — G0158 HHC OT ASSISTANT EA 15: HCPCS | Mod: CO,HHH

## 2024-05-09 PROCEDURE — 76770 US EXAM ABDO BACK WALL COMP: CPT

## 2024-05-09 RX ORDER — TOPIRAMATE 200 MG/1
200 TABLET ORAL 2 TIMES DAILY
Qty: 60 TABLET | Refills: 3 | Status: SHIPPED | OUTPATIENT
Start: 2024-05-09

## 2024-05-10 ENCOUNTER — APPOINTMENT (OUTPATIENT)
Dept: RADIOLOGY | Facility: CLINIC | Age: 59
End: 2024-05-10
Payer: MEDICARE

## 2024-05-10 PROCEDURE — 1090000001 HH PPS REVENUE CREDIT

## 2024-05-10 PROCEDURE — 1090000002 HH PPS REVENUE DEBIT

## 2024-05-11 PROCEDURE — 1090000001 HH PPS REVENUE CREDIT

## 2024-05-11 PROCEDURE — 1090000002 HH PPS REVENUE DEBIT

## 2024-05-12 VITALS
DIASTOLIC BLOOD PRESSURE: 67 MMHG | HEART RATE: 98 BPM | TEMPERATURE: 98.9 F | DIASTOLIC BLOOD PRESSURE: 78 MMHG | SYSTOLIC BLOOD PRESSURE: 123 MMHG | SYSTOLIC BLOOD PRESSURE: 127 MMHG | OXYGEN SATURATION: 98 % | RESPIRATION RATE: 18 BRPM | TEMPERATURE: 98 F | RESPIRATION RATE: 18 BRPM | OXYGEN SATURATION: 98 % | HEART RATE: 76 BPM

## 2024-05-12 PROCEDURE — 1090000002 HH PPS REVENUE DEBIT

## 2024-05-12 PROCEDURE — 1090000001 HH PPS REVENUE CREDIT

## 2024-05-13 ENCOUNTER — HOME CARE VISIT (OUTPATIENT)
Dept: HOME HEALTH SERVICES | Facility: HOME HEALTH | Age: 59
End: 2024-05-13
Payer: MEDICARE

## 2024-05-13 PROCEDURE — G0153 HHCP-SVS OF S/L PATH,EA 15MN: HCPCS | Mod: HHH

## 2024-05-13 PROCEDURE — 1090000001 HH PPS REVENUE CREDIT

## 2024-05-13 PROCEDURE — G0157 HHC PT ASSISTANT EA 15: HCPCS | Mod: CQ,HHH

## 2024-05-13 PROCEDURE — 1090000002 HH PPS REVENUE DEBIT

## 2024-05-14 ENCOUNTER — HOSPITAL ENCOUNTER (OUTPATIENT)
Dept: RADIOLOGY | Facility: CLINIC | Age: 59
Discharge: HOME | End: 2024-05-14
Payer: MEDICARE

## 2024-05-14 ENCOUNTER — HOME CARE VISIT (OUTPATIENT)
Dept: HOME HEALTH SERVICES | Facility: HOME HEALTH | Age: 59
End: 2024-05-14
Payer: MEDICARE

## 2024-05-14 VITALS
SYSTOLIC BLOOD PRESSURE: 123 MMHG | DIASTOLIC BLOOD PRESSURE: 64 MMHG | OXYGEN SATURATION: 96 % | TEMPERATURE: 98 F | RESPIRATION RATE: 16 BRPM | HEART RATE: 76 BPM

## 2024-05-14 DIAGNOSIS — R92.2 INCONCLUSIVE MAMMOGRAM: ICD-10-CM

## 2024-05-14 PROCEDURE — G0158 HHC OT ASSISTANT EA 15: HCPCS | Mod: CO,HHH

## 2024-05-14 PROCEDURE — 77065 DX MAMMO INCL CAD UNI: CPT | Mod: LEFT SIDE | Performed by: RADIOLOGY

## 2024-05-14 PROCEDURE — G0279 TOMOSYNTHESIS, MAMMO: HCPCS | Mod: LEFT SIDE | Performed by: RADIOLOGY

## 2024-05-14 PROCEDURE — 1090000001 HH PPS REVENUE CREDIT

## 2024-05-14 PROCEDURE — 1090000002 HH PPS REVENUE DEBIT

## 2024-05-14 PROCEDURE — 77061 BREAST TOMOSYNTHESIS UNI: CPT | Mod: LT

## 2024-05-14 NOTE — RESULT ENCOUNTER NOTE
Follow-up left breast mammogram negative.  Unchanged from previous follow-up 6 months ago.  Current recommendation is to go back to yearly mammograms

## 2024-05-15 ENCOUNTER — APPOINTMENT (OUTPATIENT)
Dept: UROLOGY | Facility: CLINIC | Age: 59
End: 2024-05-15
Payer: MEDICARE

## 2024-05-15 ENCOUNTER — HOME CARE VISIT (OUTPATIENT)
Dept: HOME HEALTH SERVICES | Facility: HOME HEALTH | Age: 59
End: 2024-05-15
Payer: MEDICARE

## 2024-05-15 ENCOUNTER — TELEPHONE (OUTPATIENT)
Dept: OBSTETRICS AND GYNECOLOGY | Facility: CLINIC | Age: 59
End: 2024-05-15

## 2024-05-15 PROCEDURE — 1090000002 HH PPS REVENUE DEBIT

## 2024-05-15 PROCEDURE — G0153 HHCP-SVS OF S/L PATH,EA 15MN: HCPCS | Mod: HHH

## 2024-05-15 PROCEDURE — 1090000001 HH PPS REVENUE CREDIT

## 2024-05-15 NOTE — TELEPHONE ENCOUNTER
RN called and verified Patient by name and   Patient notified that follow-up left breast mammogram was negative and unchanged from previous follow-up 6 months ago.    Dr Storm current recommendation is to go back to yearly mammograms  Patient verbalizes understanding and has no questions  Karen Merida RN

## 2024-05-16 ENCOUNTER — OFFICE VISIT (OUTPATIENT)
Dept: CARDIOLOGY | Facility: CLINIC | Age: 59
End: 2024-05-16
Payer: MEDICARE

## 2024-05-16 VITALS
HEIGHT: 67 IN | SYSTOLIC BLOOD PRESSURE: 136 MMHG | HEART RATE: 83 BPM | DIASTOLIC BLOOD PRESSURE: 70 MMHG | OXYGEN SATURATION: 99 % | RESPIRATION RATE: 20 BRPM | WEIGHT: 225 LBS | BODY MASS INDEX: 35.31 KG/M2

## 2024-05-16 DIAGNOSIS — I50.33 ACUTE ON CHRONIC DIASTOLIC HEART FAILURE (MULTI): Primary | ICD-10-CM

## 2024-05-16 DIAGNOSIS — I50.23 ACUTE ON CHRONIC SYSTOLIC HEART FAILURE (MULTI): ICD-10-CM

## 2024-05-16 DIAGNOSIS — I72.9 ANEURYSM (CMS-HCC): ICD-10-CM

## 2024-05-16 DIAGNOSIS — R06.09 DOE (DYSPNEA ON EXERTION): ICD-10-CM

## 2024-05-16 DIAGNOSIS — E78.2 DM TYPE 2 WITH DIABETIC MIXED HYPERLIPIDEMIA (MULTI): ICD-10-CM

## 2024-05-16 DIAGNOSIS — E78.5 DYSLIPIDEMIA: ICD-10-CM

## 2024-05-16 DIAGNOSIS — E78.1 PURE HYPERTRIGLYCERIDEMIA: ICD-10-CM

## 2024-05-16 DIAGNOSIS — I72.0 CAROTID PSEUDOANEURYSM (CMS-HCC): ICD-10-CM

## 2024-05-16 DIAGNOSIS — E11.69 DM TYPE 2 WITH DIABETIC MIXED HYPERLIPIDEMIA (MULTI): ICD-10-CM

## 2024-05-16 DIAGNOSIS — I11.0 BENIGN HYPERTENSIVE HEART DISEASE WITH HEART FAILURE (MULTI): ICD-10-CM

## 2024-05-16 PROCEDURE — 1036F TOBACCO NON-USER: CPT | Performed by: STUDENT IN AN ORGANIZED HEALTH CARE EDUCATION/TRAINING PROGRAM

## 2024-05-16 PROCEDURE — 3062F POS MACROALBUMINURIA REV: CPT | Performed by: STUDENT IN AN ORGANIZED HEALTH CARE EDUCATION/TRAINING PROGRAM

## 2024-05-16 PROCEDURE — 3050F LDL-C >= 130 MG/DL: CPT | Performed by: STUDENT IN AN ORGANIZED HEALTH CARE EDUCATION/TRAINING PROGRAM

## 2024-05-16 PROCEDURE — 99214 OFFICE O/P EST MOD 30 MIN: CPT | Performed by: STUDENT IN AN ORGANIZED HEALTH CARE EDUCATION/TRAINING PROGRAM

## 2024-05-16 PROCEDURE — 3044F HG A1C LEVEL LT 7.0%: CPT | Performed by: STUDENT IN AN ORGANIZED HEALTH CARE EDUCATION/TRAINING PROGRAM

## 2024-05-16 PROCEDURE — 1090000002 HH PPS REVENUE DEBIT

## 2024-05-16 PROCEDURE — 4010F ACE/ARB THERAPY RXD/TAKEN: CPT | Performed by: STUDENT IN AN ORGANIZED HEALTH CARE EDUCATION/TRAINING PROGRAM

## 2024-05-16 PROCEDURE — 1090000001 HH PPS REVENUE CREDIT

## 2024-05-16 PROCEDURE — 3075F SYST BP GE 130 - 139MM HG: CPT | Performed by: STUDENT IN AN ORGANIZED HEALTH CARE EDUCATION/TRAINING PROGRAM

## 2024-05-16 PROCEDURE — 3078F DIAST BP <80 MM HG: CPT | Performed by: STUDENT IN AN ORGANIZED HEALTH CARE EDUCATION/TRAINING PROGRAM

## 2024-05-16 RX ORDER — DOCUSATE SODIUM 100 MG/1
100 CAPSULE, LIQUID FILLED ORAL 3 TIMES DAILY
COMMUNITY

## 2024-05-16 ASSESSMENT — ENCOUNTER SYMPTOMS: DEPRESSION: 0

## 2024-05-16 NOTE — PATIENT INSTRUCTIONS
Please continue your current cardiac medication including bumetanide 2 mg tablet twice daily, aspirin 81 mg, atorvastatin 20 mg, carvedilol 25 mg twice daily, losartan 50 mg daily.    Please followup with me in Cardiology clinic within the next 5-6 months.  Please return to clinic sooner or seek emergent care if your symptoms reoccur or worsen.

## 2024-05-16 NOTE — PROGRESS NOTES
Follow-up and Heart Failure     HPI:    Prema Hair is a 59 y.o. 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    She is accompanied by her daughter who provides history and has questions.  She is recovering from knee surgery.  Recent hospital course was reviewed and discussed.  Dyspnea on exertion lower extremity edema is relatively stable.  No exacerbating or relieving factors.  Patient denies chest pain and angina.  Pt denies orthopnea, and paroxysmal nocturnal dyspnea.   Pt denies palpitations or syncope.  No recent falls.  No fever or chills.  No cough.  No change in bowel or bladder habits.  No sick contacts.  No recent travel.    12 point review of systems including (Constitutional, Eyes, ENMT, Respiratory, Cardiac, Gastrointestinal, Neurological, Psychiatric, and Hematologic) was performed and is otherwise negative.    Past medical history reviewed:   has a past medical history of CATALINA positive, Arthritis, Asthma (HHS-HCC), Bell's palsy, Bilateral leg edema, Bipolar disorder (Multi), CHF (congestive heart failure) (Multi), Chronic allergic rhinitis, Chronic constipation, Diabetes mellitus (Multi), Diabetic neuropathy  (Multi), Dysphonia (07/05/2022), Fibromyalgia, GERD (gastroesophageal reflux disease), High pulmonary arterial pressure (Multi), Hyperlipidemia, unspecified (01/03/2023), Hypertension, Hypokalemia, Hypothyroidism, Low back pain, unspecified (02/22/2021), Moderate tricuspid regurgitation, Obstructive sleep apnea (adult) (pediatric) (01/03/2023), Paralysis of vocal cords and larynx, unspecified (10/07/2022), Parkinsonism (Multi), Pseudoaneurysm of carotid artery (CMS-HCC) (2020), PTSD (post-traumatic stress disorder), Thyroid cancer (Multi), Torticollis, and Vitamin D deficiency.    Past surgical history reviewed:   has a past surgical history that includes Total thyroidectomy (2017); US guided thyroid biopsy (11/19/2020); US guided thyroid biopsy (11/19/2020); Other surgical history (Right, 2020); Colonoscopy; and Knee Arthroplasty.    Social history reviewed:   reports that she has never smoked. She has never used smokeless tobacco. She reports that she does not currently use alcohol. She reports that she does not use drugs.     Family history reviewed:    Family History   Problem Relation Name Age of Onset    Heart failure Mother      Pancreatic cancer Mother      Heart failure Father      Parkinsonism Father      Breast cancer Sister         Allergies reviewed: Lisinopril     Medications reviewed:   Current Outpatient Medications   Medication Instructions    acetaminophen (Tylenol) 500 mg tablet 1 tablet, oral, Every 4 hours PRN    albuterol 90 mcg/actuation inhaler 2 puffs, inhalation, Every 4 hours PRN    amitriptyline (Elavil) 10 mg tablet 1 tablet, oral, Nightly    ammonium lactate (Lac-Hydrin) 12 % lotion 1 Application, Topical, As needed    ascorbic acid (Vitamin C) 1,000 mg tablet 1 tablet, oral, Daily    atorvastatin (LIPITOR) 20 mg, oral, Nightly    azelastine (Astelin) 137 mcg (0.1 %) nasal spray 1 spray, Each Nostril, 2 times daily    baclofen (LIORESAL) 20 mg, oral, 3 times daily     budesonide-formoteroL (Symbicort) 160-4.5 mcg/actuation inhaler 2 puffs, inhalation, 2 times daily RT    bumetanide (BUMEX) 2 mg, oral, 2 times daily    calcium carbonate (Oscal) 500 mg calcium (1,250 mg) tablet 1 tablet, oral, 3 times daily    carbidopa-levodopa (Sinemet)  mg tablet 1.5 tablets, oral, 3 times daily, 8am, 12 noon and 4 pm    carvedilol (COREG) 25 mg, oral, 2 times daily    cephalexin (KEFLEX) 250 mg, oral, Daily    cholecalciferol (Vitamin D-3) 50 MCG (2000 UT) tablet 1 tablet, oral, Daily    clonazePAM (KlonoPIN) 1 mg tablet 1 tablet, oral, Every morning, 1/2 TABLET BY MOUTH EVERY EVENING AND AN ADDITIONAL 1/2 AS NEEDED<BR>    clonazePAM (KlonoPIN) 1 mg tablet 2 tablets, oral, Every evening    cyclobenzaprine (FLEXERIL) 10 mg, oral, Nightly PRN    diclofenac sodium (Voltaren) 1 % gel gel 4.5 inches, Topical, 3 times daily PRN    docusate sodium (COLACE) 100 mg, oral, 3 times daily    enoxaparin (LOVENOX) 40 mg, subcutaneous, Every 24 hours    estradiol (Estrace) 0.01 % (0.1 mg/gram) vaginal cream 1 Application, vaginal, 3 times weekly, Pea-sized amount    formoterol (Perforomist) 20 mcg/2 mL nebulizer solution 2 mL, inhalation, Every 12 hours    gabapentin (Neurontin) 300 mg capsule 3 capsules, oral, 3 times daily    Glucagon Emergency Kit (human) 1 mg, intramuscular, Every 15 min PRN    Glucagon Emergency Kit (human) 1 mg, intramuscular, Every 15 min PRN    ipratropium-albuteroL (Duo-Neb) 0.5-2.5 mg/3 mL nebulizer solution 3 mL, inhalation, Every 4 hours PRN    Klor-Con M20 20 mEq ER tablet 40 mEq, oral, 2 times daily    lancets misc 1 each, miscellaneous, 3 times daily    levothyroxine (Synthroid, Levoxyl) 100 mcg tablet Take 100mcg on Mon Wed Fri and Sun - 1 hour before breakfast    levothyroxine (Synthroid, Levoxyl) 200 mcg tablet Take 200mcg tablet on Tue Thurs Sat - 1 hour before breakfast    levothyroxine (SYNTHROID, LEVOXYL) 100 mcg, oral, Daily before breakfast, Monday thru Friday  <BR>Dr Schwartz    levothyroxine (SYNTHROID, LEVOXYL) 200 mcg, oral, Daily before breakfast, Saturdays and Sundays    lidocaine (Lidoderm) 5 % patch 1 patch, transdermal, Every 12 hours, Apply 1 patch and leave in place for 12 hours, then remove and leave off for 12 hours.    losartan (COZAAR) 50 mg, oral, Daily    metFORMIN (Glucophage) 500 mg tablet 1 tablet, oral, 2 times daily (morning and late afternoon)    minocycline 100 mg, oral, Nightly, No stop date - proph for recurrent skin infections    omega-3 fatty acids-fish oil 360-1,200 mg capsule 1 capsule, oral, Daily    omeprazole (PriLOSEC) 40 mg DR capsule 1 capsule, oral, Daily, With dinner<BR>    OXcarbazepine (Trileptal) 600 mg tablet 1 tablet, oral, Every morning    OXcarbazepine (Trileptal) 600 mg tablet 2 tablets, oral, Nightly    oxybutynin XL (DITROPAN-XL) 10 mg, oral, Daily, Do not crush, chew, or split.    oxyCODONE-acetaminophen (Percocet) 5-325 mg tablet 0.5 tablets, oral, Every 6 hours PRN    polyethylene glycol (GLYCOLAX, MIRALAX) 17 g, oral, Daily, In 8oz of water, juice, or tea and drink daily<BR>    prazosin (Minipress) 5 mg capsule 1 capsule, oral, Nightly    QUEtiapine (SEROQUEL) 800 mg, oral, Nightly    sertraline (Zoloft) 100 mg tablet Take 2 tablets (200 mg) by mouth once daily. Do not start before January 2, 2024.    tiotropium (Spiriva Respimat) 2.5 mcg/actuation inhaler 2 puffs, inhalation, Daily RT    topiramate (TOPAMAX) 200 mg, oral, 2 times daily    Trelegy Ellipta 200-62.5-25 mcg blister with device 1 puff, inhalation, Daily PRN        Vitals reviewed: Visit Vitals  /70 (BP Location: Left arm, Patient Position: Sitting, BP Cuff Size: Adult)   Pulse 83   Resp 20       Physical Exam:   General:  Patient is awake, alert, and oriented.  Patient is in no acute distress.  HEENT:  Pupils equal and reactive.  Normocephalic.  Moist mucosa.    Neck:  No thyromegaly.  10 cm Jugular Venous Pressure.  Cardiovascular:  Regular rate and  rhythm.  Normal S1 and S2.  1/6 LARY.  Pulmonary:  Clear to auscultation bilaterally.  Abdomen:  Soft. Non-tender.   Non-distended.  Positive bowel sounds.  Lower Extremities:  2+ pedal pulses. 1+ LE edema.  Neurologic:  Cranial nerves intact.  No focal deficit.   Skin: Skin warm and dry, normal skin turgor.   Psychiatric: Normal affect.    Last Labs:  CBC -      Lab Results   Component Value Date    WBC 6.2 04/13/2024    HGB 11.2 (L) 04/13/2024    HCT 36.5 04/13/2024     04/13/2024        CMP-  Lab Results   Component Value Date    GLUCOSE 104 (H) 04/13/2024     04/13/2024    K 3.4 (L) 04/13/2024    CL 97 (L) 04/13/2024    CO2 31 04/13/2024    ANIONGAP 15 04/13/2024    BUN 18 04/13/2024    CREATININE 0.64 04/13/2024    EGFR >90 04/13/2024    CALCIUM 8.8 04/13/2024    PHOS 4.0 09/14/2023    PROT 6.0 (L) 04/13/2024    ALBUMIN 3.7 04/13/2024    AST 19 04/13/2024    ALT 10 04/13/2024    ALKPHOS 140 (H) 04/13/2024    BILITOT 0.4 04/13/2024        LIPIDS-  Lab Results   Component Value Date    CHOL 267 (H) 03/26/2024    TRIG 158 (H) 03/26/2024    HDL 60.9 03/26/2024    CHHDL 4.4 03/26/2024    VLDL 32 03/26/2024        OTHERS-  Lab Results   Component Value Date    HGBA1C 5.9 (H) 03/26/2024    BNP 43 09/14/2023        I personally reviewed the patient's recent vitals, labs, medications, orders, EKGs, pertinent cardiac imaging/ echocardiography and ischemic evaluations including stress testing/ cardiac catheterization.    Assessment and Plan:    Prema Hair is a 59 y.o. 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  She is accompanied by her daughter who provides history and has questions.  She is recovering from knee surgery.  Recent hospital course was reviewed and discussed.  Dyspnea on exertion lower extremity edema is relatively stable.     Please continue your current cardiac medication including bumetanide 2 mg tablet twice daily, aspirin 81 mg, atorvastatin 20 mg, carvedilol 25 mg twice daily, losartan 50 mg daily.    Please followup with me in Cardiology clinic within the next 5-6 months.  Please return to clinic sooner or seek emergent care if your symptoms reoccur or worsen.    Thank you for allowing me to participate in their care.  Please feel free to call me with any further questions or concerns.      Sanjiv Madison MD, FACC, DADA IVERSON  Division of Cardiovascular Medicine  System Director, Nuclear Cardiology   Medical Director, [facility] [place], [facility]   Clinical , [employer]  [email or web address]   Office:  [phone]

## 2024-05-17 ENCOUNTER — HOME CARE VISIT (OUTPATIENT)
Dept: HOME HEALTH SERVICES | Facility: HOME HEALTH | Age: 59
End: 2024-05-17
Payer: MEDICARE

## 2024-05-17 ENCOUNTER — APPOINTMENT (OUTPATIENT)
Dept: UROLOGY | Facility: CLINIC | Age: 59
End: 2024-05-17
Payer: MEDICARE

## 2024-05-17 ENCOUNTER — TELEPHONE (OUTPATIENT)
Dept: NEUROLOGY | Facility: CLINIC | Age: 59
End: 2024-05-17
Payer: MEDICARE

## 2024-05-17 PROCEDURE — 1090000002 HH PPS REVENUE DEBIT

## 2024-05-17 PROCEDURE — G0151 HHCP-SERV OF PT,EA 15 MIN: HCPCS | Mod: HHH

## 2024-05-17 PROCEDURE — 1090000001 HH PPS REVENUE CREDIT

## 2024-05-18 PROCEDURE — 1090000001 HH PPS REVENUE CREDIT

## 2024-05-18 PROCEDURE — 1090000002 HH PPS REVENUE DEBIT

## 2024-05-18 SDOH — HEALTH STABILITY: PHYSICAL HEALTH: EXERCISE TYPE: TKR

## 2024-05-18 ASSESSMENT — ENCOUNTER SYMPTOMS
PAIN LOCATION: NECK
MUSCLE WEAKNESS: 1
SUBJECTIVE PAIN PROGRESSION: GRADUALLY IMPROVING
PERSON REPORTING PAIN: PATIENT
ARTHRALGIAS: 1
PAIN: 1
LIMITED RANGE OF MOTION: 1
PAIN LOCATION: RIGHT KNEE

## 2024-05-18 ASSESSMENT — ACTIVITIES OF DAILY LIVING (ADL)
CURRENT_FUNCTION: SUPERVISION
AMBULATION ASSISTANCE: SUPERVISION
AMBULATION ASSISTANCE ON FLAT SURFACES: 1
PHYSICAL TRANSFERS ASSESSED: 1
AMBULATION ASSISTANCE: STAND BY ASSIST
AMBULATION_DISTANCE/DURATION_TOLERATED: 50 FT
AMBULATION ASSISTANCE: 1
PHYSICAL_TRANSFERS_DEVICES: ROLLATOR

## 2024-05-19 PROCEDURE — 1090000002 HH PPS REVENUE DEBIT

## 2024-05-19 PROCEDURE — 1090000001 HH PPS REVENUE CREDIT

## 2024-05-19 NOTE — HOME HEALTH
fibromyalgia pmh  R knee TKR Mar 26, 2024  neck injections botox on the R and L has given some pain relief.  has a tub bench coming   getting a wide rudy qc   some pain in the left knee  taking 3000 mg ,the extra 300 is at night.

## 2024-05-20 ENCOUNTER — HOME CARE VISIT (OUTPATIENT)
Dept: HOME HEALTH SERVICES | Facility: HOME HEALTH | Age: 59
End: 2024-05-20
Payer: MEDICARE

## 2024-05-20 PROCEDURE — 1090000002 HH PPS REVENUE DEBIT

## 2024-05-20 PROCEDURE — 1090000001 HH PPS REVENUE CREDIT

## 2024-05-20 PROCEDURE — G0157 HHC PT ASSISTANT EA 15: HCPCS | Mod: CQ,HHH

## 2024-05-20 PROCEDURE — G0153 HHCP-SVS OF S/L PATH,EA 15MN: HCPCS | Mod: HHH

## 2024-05-20 NOTE — PROGRESS NOTES
Chief Complaint     Follow-up regarding a thyroid nodule and dysphonia.      History of Present IllnessThis lady was seen in September 2021 at the request of her endocrinologist. Approximately 5 years ago she had a total thyroidectomy for what was labeled a multinodular goiter. 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 She had significant voice issues after the procedure. She was left with a very hoarse voice. She was found to have a thyroid nodule on the left side which may need to be removed. On an ultrasound that was done in July 2021 she had a residual left side that measured 4.8 cm. She had a needle aspirate that was done in July 2021 that showed a follicular nodule. She had markers done that showed the likelihood of malignancy to be very low. I did send her for CT scanning that was done on October 8, 2021. I personally reviewed that scan. It does show this left-sided mass in the paratracheal area that is consistent with thyroid tissue. She had a repeat ultrasound on December 2, 2021 that showed a 1 cm node in the level 3 on the left side. She had a repeat ultrasound done in November 2022 that showed no changes. Her thyroglobulin level in April 2023 was 5.9 which was slightly lower than previously.  She had a repeat 1 in March 2024 which is at 3.4.  Unfortunately she has not had any recent ultrasound.  She does have some significant neurologic issues.     Physical Exam    Palpation of the parotid, neck, and thyroid field fails to show any worrisome masses or adenopathies. More specifically I cannot appreciate this thyroid nodule and I also cannot feel that adenopathy.         Provider Impressions     Status post total thyroidectomy. The patient has some residual or recurrent thyroid tumor tissue on the left side. Her last thyroglobulin was slightly lower than the previous 1.  The patient was urged to get a thyroid ultrasound that has been ordered by 2 separate physicians so far.    I  will see her after the ultrasound.

## 2024-05-21 ENCOUNTER — OFFICE VISIT (OUTPATIENT)
Dept: OTOLARYNGOLOGY | Facility: HOSPITAL | Age: 59
End: 2024-05-21
Payer: MEDICARE

## 2024-05-21 VITALS — WEIGHT: 220.9 LBS | BODY MASS INDEX: 34.67 KG/M2 | HEIGHT: 67 IN | TEMPERATURE: 97.3 F

## 2024-05-21 DIAGNOSIS — C73 MALIGNANT NEOPLASM OF THYROID GLAND (MULTI): Primary | ICD-10-CM

## 2024-05-21 PROCEDURE — 99213 OFFICE O/P EST LOW 20 MIN: CPT | Performed by: OTOLARYNGOLOGY

## 2024-05-21 PROCEDURE — 1036F TOBACCO NON-USER: CPT | Performed by: OTOLARYNGOLOGY

## 2024-05-21 PROCEDURE — 4010F ACE/ARB THERAPY RXD/TAKEN: CPT | Performed by: OTOLARYNGOLOGY

## 2024-05-21 PROCEDURE — 1090000002 HH PPS REVENUE DEBIT

## 2024-05-21 PROCEDURE — 3062F POS MACROALBUMINURIA REV: CPT | Performed by: OTOLARYNGOLOGY

## 2024-05-21 PROCEDURE — 1090000001 HH PPS REVENUE CREDIT

## 2024-05-21 PROCEDURE — 3050F LDL-C >= 130 MG/DL: CPT | Performed by: OTOLARYNGOLOGY

## 2024-05-21 PROCEDURE — 3044F HG A1C LEVEL LT 7.0%: CPT | Performed by: OTOLARYNGOLOGY

## 2024-05-21 ASSESSMENT — PATIENT HEALTH QUESTIONNAIRE - PHQ9
2. FEELING DOWN, DEPRESSED OR HOPELESS: NOT AT ALL
SUM OF ALL RESPONSES TO PHQ9 QUESTIONS 1 & 2: 0
1. LITTLE INTEREST OR PLEASURE IN DOING THINGS: NOT AT ALL

## 2024-05-22 ENCOUNTER — PROCEDURE VISIT (OUTPATIENT)
Dept: UROLOGY | Facility: CLINIC | Age: 59
End: 2024-05-22
Payer: MEDICARE

## 2024-05-22 ENCOUNTER — HOSPITAL ENCOUNTER (OUTPATIENT)
Dept: RADIOLOGY | Facility: CLINIC | Age: 59
Discharge: HOME | End: 2024-05-22
Payer: MEDICARE

## 2024-05-22 ENCOUNTER — HOME CARE VISIT (OUTPATIENT)
Dept: HOME HEALTH SERVICES | Facility: HOME HEALTH | Age: 59
End: 2024-05-22
Payer: MEDICARE

## 2024-05-22 DIAGNOSIS — Z96.651 STATUS POST TOTAL RIGHT KNEE REPLACEMENT: ICD-10-CM

## 2024-05-22 DIAGNOSIS — E89.0 POST-SURGICAL HYPOTHYROIDISM: ICD-10-CM

## 2024-05-22 DIAGNOSIS — R33.9 URINARY RETENTION: Primary | ICD-10-CM

## 2024-05-22 DIAGNOSIS — E04.1 THYROID NODULE: ICD-10-CM

## 2024-05-22 PROCEDURE — 1090000002 HH PPS REVENUE DEBIT

## 2024-05-22 PROCEDURE — 76536 US EXAM OF HEAD AND NECK: CPT

## 2024-05-22 PROCEDURE — 87086 URINE CULTURE/COLONY COUNT: CPT

## 2024-05-22 PROCEDURE — 51797 INTRAABDOMINAL PRESSURE TEST: CPT | Performed by: STUDENT IN AN ORGANIZED HEALTH CARE EDUCATION/TRAINING PROGRAM

## 2024-05-22 PROCEDURE — 51729 CYSTOMETROGRAM W/VP&UP: CPT | Performed by: STUDENT IN AN ORGANIZED HEALTH CARE EDUCATION/TRAINING PROGRAM

## 2024-05-22 PROCEDURE — 76536 US EXAM OF HEAD AND NECK: CPT | Performed by: RADIOLOGY

## 2024-05-22 PROCEDURE — 73562 X-RAY EXAM OF KNEE 3: CPT | Mod: RIGHT SIDE | Performed by: RADIOLOGY

## 2024-05-22 PROCEDURE — 51741 ELECTRO-UROFLOWMETRY FIRST: CPT | Performed by: STUDENT IN AN ORGANIZED HEALTH CARE EDUCATION/TRAINING PROGRAM

## 2024-05-22 PROCEDURE — G0153 HHCP-SVS OF S/L PATH,EA 15MN: HCPCS | Mod: HHH

## 2024-05-22 PROCEDURE — 51784 ANAL/URINARY MUSCLE STUDY: CPT | Performed by: STUDENT IN AN ORGANIZED HEALTH CARE EDUCATION/TRAINING PROGRAM

## 2024-05-22 PROCEDURE — 1090000001 HH PPS REVENUE CREDIT

## 2024-05-22 PROCEDURE — 73562 X-RAY EXAM OF KNEE 3: CPT | Mod: RT

## 2024-05-22 NOTE — PROGRESS NOTES
Prema Hair 58 y/o female here with daughter    Patient was referred by Dr. Acevedo to evaluate urinary retention.  Dr. Cook was present in the office at time of study.      Pre uroflow was not completed today patient presents with 16fr arciniega catheter.       Patient did leak on CLPP/VLPP.  Patient did  have DO with leak.  PVR after study was 0ml and 16fr indwelling arciniega was replaced.    Patient instructed to increase fluids if blood in the urine or burning due to cath insertion.  Patient understood and consented to Urodynamics.  Patient will follow up with Dr. Acevedo to review results.  05/22/24 CM

## 2024-05-23 ENCOUNTER — HOME CARE VISIT (OUTPATIENT)
Dept: HOME HEALTH SERVICES | Facility: HOME HEALTH | Age: 59
End: 2024-05-23
Payer: MEDICARE

## 2024-05-23 VITALS
DIASTOLIC BLOOD PRESSURE: 80 MMHG | TEMPERATURE: 98.6 F | HEART RATE: 73 BPM | SYSTOLIC BLOOD PRESSURE: 110 MMHG | OXYGEN SATURATION: 96 %

## 2024-05-23 LAB — BACTERIA UR CULT: ABNORMAL

## 2024-05-23 PROCEDURE — 1090000002 HH PPS REVENUE DEBIT

## 2024-05-23 PROCEDURE — 1090000001 HH PPS REVENUE CREDIT

## 2024-05-23 PROCEDURE — G0152 HHCP-SERV OF OT,EA 15 MIN: HCPCS | Mod: HHH

## 2024-05-23 ASSESSMENT — ENCOUNTER SYMPTOMS
PAIN LOCATION - PAIN SEVERITY: 4/10
SUBJECTIVE PAIN PROGRESSION: GRADUALLY IMPROVING
PAIN LOCATION - RELIEVING FACTORS: REST, MEDICATION
PAIN SEVERITY GOAL: 3/10
LOWEST PAIN SEVERITY IN PAST 24 HOURS: 4/10
PAIN LOCATION - PAIN DURATION: INTERMITTENT
PAIN LOCATION - PAIN QUALITY: ACHE
PAIN LOCATION - EXACERBATING FACTORS: MOBILITY
HIGHEST PAIN SEVERITY IN PAST 24 HOURS: 5/10
PAIN: 1
PAIN LOCATION - PAIN FREQUENCY: INTERMITTENT
PAIN LOCATION: RIGHT KNEE
PERSON REPORTING PAIN: PATIENT

## 2024-05-23 ASSESSMENT — ACTIVITIES OF DAILY LIVING (ADL)
WASHING_LB_CURRENT_FUNCTION: MINIMUM ASSIST
WASHING_UPB_CURRENT_FUNCTION: MINIMUM ASSIST
DRESSING_UB_CURRENT_FUNCTION: INDEPENDENT
DRESSING_LB_CURRENT_FUNCTION: INDEPENDENT

## 2024-05-24 ENCOUNTER — HOME CARE VISIT (OUTPATIENT)
Dept: HOME HEALTH SERVICES | Facility: HOME HEALTH | Age: 59
End: 2024-05-24
Payer: MEDICARE

## 2024-05-24 ENCOUNTER — TELEMEDICINE (OUTPATIENT)
Dept: UROLOGY | Facility: CLINIC | Age: 59
End: 2024-05-24
Payer: MEDICARE

## 2024-05-24 DIAGNOSIS — Z96.0 VAGINAL PESSARY PRESENT: ICD-10-CM

## 2024-05-24 DIAGNOSIS — N39.490 OVERFLOW INCONTINENCE: ICD-10-CM

## 2024-05-24 DIAGNOSIS — R33.9 URINARY RETENTION: Primary | ICD-10-CM

## 2024-05-24 DIAGNOSIS — N81.89 PELVIC FLOOR WEAKNESS: ICD-10-CM

## 2024-05-24 DIAGNOSIS — N81.11 MIDLINE CYSTOCELE: ICD-10-CM

## 2024-05-24 DIAGNOSIS — B37.41 YEAST CYSTITIS: ICD-10-CM

## 2024-05-24 DIAGNOSIS — N95.2 ATROPHIC VAGINITIS: ICD-10-CM

## 2024-05-24 DIAGNOSIS — Z97.8 FOLEY CATHETER IN PLACE: ICD-10-CM

## 2024-05-24 PROCEDURE — 51784 ANAL/URINARY MUSCLE STUDY: CPT | Performed by: OBSTETRICS & GYNECOLOGY

## 2024-05-24 PROCEDURE — G0152 HHCP-SERV OF OT,EA 15 MIN: HCPCS | Mod: HHH

## 2024-05-24 PROCEDURE — 51729 CYSTOMETROGRAM W/VP&UP: CPT | Performed by: OBSTETRICS & GYNECOLOGY

## 2024-05-24 PROCEDURE — 99442 PR PHYS/QHP TELEPHONE EVALUATION 11-20 MIN: CPT | Performed by: OBSTETRICS & GYNECOLOGY

## 2024-05-24 PROCEDURE — 51741 ELECTRO-UROFLOWMETRY FIRST: CPT | Performed by: OBSTETRICS & GYNECOLOGY

## 2024-05-24 PROCEDURE — 51797 INTRAABDOMINAL PRESSURE TEST: CPT | Performed by: OBSTETRICS & GYNECOLOGY

## 2024-05-24 PROCEDURE — 1036F TOBACCO NON-USER: CPT | Performed by: OBSTETRICS & GYNECOLOGY

## 2024-05-24 PROCEDURE — G0157 HHC PT ASSISTANT EA 15: HCPCS | Mod: CQ,HHH

## 2024-05-24 PROCEDURE — 3062F POS MACROALBUMINURIA REV: CPT | Performed by: OBSTETRICS & GYNECOLOGY

## 2024-05-24 PROCEDURE — 1090000001 HH PPS REVENUE CREDIT

## 2024-05-24 PROCEDURE — 4010F ACE/ARB THERAPY RXD/TAKEN: CPT | Performed by: OBSTETRICS & GYNECOLOGY

## 2024-05-24 PROCEDURE — 3044F HG A1C LEVEL LT 7.0%: CPT | Performed by: OBSTETRICS & GYNECOLOGY

## 2024-05-24 PROCEDURE — 1090000002 HH PPS REVENUE DEBIT

## 2024-05-24 PROCEDURE — 3050F LDL-C >= 130 MG/DL: CPT | Performed by: OBSTETRICS & GYNECOLOGY

## 2024-05-24 RX ORDER — FLUCONAZOLE 200 MG/1
200 TABLET ORAL DAILY
Qty: 14 TABLET | Refills: 0 | Status: SHIPPED | OUTPATIENT
Start: 2024-05-24 | End: 2024-06-07

## 2024-05-24 ASSESSMENT — ENCOUNTER SYMPTOMS
PAIN LOCATION - PAIN SEVERITY: 5/10
PAIN SEVERITY GOAL: 3/10
PAIN: 1
SUBJECTIVE PAIN PROGRESSION: WAXING AND WANING
HIGHEST PAIN SEVERITY IN PAST 24 HOURS: 5/10
PAIN LOCATION - PAIN FREQUENCY: CONSTANT
PAIN LOCATION - RELIEVING FACTORS: REST, MEDS
LOWEST PAIN SEVERITY IN PAST 24 HOURS: 3/10
PAIN LOCATION: LEFT KNEE
PERSON REPORTING PAIN: PATIENT
PAIN LOCATION - PAIN DURATION: CONSTANT
PAIN LOCATION - EXACERBATING FACTORS: MOBILITY
PAIN LOCATION - PAIN QUALITY: ACHE

## 2024-05-24 ASSESSMENT — ACTIVITIES OF DAILY LIVING (ADL)
WASHING_LB_CURRENT_FUNCTION: SUPERVISION
DRESSING_UB_CURRENT_FUNCTION: INDEPENDENT
DRESSING_LB_CURRENT_FUNCTION: INDEPENDENT
WASHING_UPB_CURRENT_FUNCTION: SUPERVISION

## 2024-05-24 NOTE — PROGRESS NOTES
Subjective   Patient ID: Prema Hair is a 59 y.o. female who presents for follow up.    HPI  This visit was performed through telemedicine   58-year-old with symptomatic cystocele, pelvic floor weakness, urinary urgency and frequency, and vaginal atrophy with 3 inch ring with support pessary in place with urinary retention worsening following recent knee replacement surgery and hospitalization 4/12/2024 due to toxic metabolic encephalopathy secondary to opiates and concussion from a fall.    The patient and daughter presents to discuss her UDS test results, which demonstrates that her bladder is emptying appropriately. There is no evidence of stress incontinence. Possibility of removing the cathter at home was discussed. She has been utilizing her 250 mg Keflex once a day. She is also continuing her Oxybutynin, possibility of stopped the medication due to risk of retention  was discussed.     She denies any vaginal complaints, no abnormal bleeding or discharge. She is utilizing the vaginal estrogen cream. She is also continuing her     She denies any bowel related complaints, no fecal or flatal incontinence.    She has no other complaints.      From Previous note  58-year-old with symptomatic cystocele, pelvic floor weakness, urinary urgency and frequency, and vaginal atrophy with 3 inch ring with support pessary in place with worsening incomplete bladder emptying and urinary incontinence complaints. She has a history of osteoarthritis in her right knee, s/p right knee replacement.     The patient is accompanied by her daughter. She was admitted to [facility] on 04/07/2024 with concerns for toxic metabolic encephalopathy secondary to opiates and head injury. They put a catheter in place at that time that she reports has not been draining completely. She continues to experience episodic leakage around the catheter but it appears to be draining appropriately.  She is also noted episodic abdominal pain.     She still  has a pessary in place.  She denies any vaginal complaints at this time.    She denies constipation, she has been utilizing colace and MiraLAX. No fecal or flatal incontinence.     From Previous note  58-year-old with symptomatic cystocele, pelvic floor weakness, urinary urgency and frequency, and vaginal atrophy with pessary in place with history of incomplete bladder emptying and worsening urinary incontinence complaints.    The patient presents with her daughter. She notes worsening urinary urgency and frequency with incontinence.  She is continuing her Oxybutynin without much relief. Her  ml by ultrasound. She denies any UTI like symptoms.     She thinks her pessary expulsed due to the constipation a little over 1 week ago.  She denies any vaginal complaints, no abnormal bleeding or discharge.  She is utilizing the vaginal estrogen cream.  She does feel her vaginal bulge with Valsalva.     She also complaints of constipation, she has been utilizing colace and MiraLAX. No fecal or flatal incontinence.     She has no other complaints.     From Previous note  This visit was performed through telemedicine  58-year-old with symptomatic cystocele, pelvic floor weakness, urinary urgency and frequency, and vaginal atrophy with pessary in place with history of incomplete bladder emptying and worsening urinary incontinence complaints.     The patient has had improved control with her oxybutynin. She denies any dry mouth or constipation complaints. She denies any retention concerns. However she has noted terminal urge incontinence and stress incontinence complaints when she waits 4 to 5 hours between urinations. She denies any UTI symptoms.     She denies any bowel related complaints.      She has no other complaints.        From previous note  58-year-old with symptomatic cystocele, pelvic floor weakness, urinary urgency and frequency, and vaginal atrophy with pessary in place with history of incomplete bladder  "emptying and worsening stress urinary incontinence complaints.     The patient reports that she has been leaking urine more but had no problem emptying her bladder since the last visit. She states that she is wet during the night. She is compliant with tamsulosin. PVR today is 0. Urine appears negative.     She denies any vaginal complaints, no abnormal vaginal bleeding or discharge. She is satisfied from the pessary standpoint. She is utilizing her vaginal estrogen therapy.     She denies any bowel related complaints, no fecal or flatal incontinence.     She has no other complaints.     From previous note  58-year-old with symptomatic cystocele, pelvic floor weakness, urinary urgency and frequency, and vaginal atrophy with pessary in place with incomplete bladder emptying.     The patient appears to be emptying her bladder well, PVR today is 0. She is utilizing the tamsulosin with improvements in her bladder emptying but does need to double void. She continues to utilize the daily cephalexin therapy, cranberry extract tablets and denies any UTI like symptoms. he does note episodic leaking on laughing, coughing and sneezing.     She denies any vaginal complaints, no abnormal vaginal bleeding or discharge. She is satisfied from the pessary standpoint. She is utilizing her vaginal estrogen therapy.     She denies any bowel related complaints, no fecal or flatal incontinence.     She has no other complaints.     From previous note  58-year-old with symptomatic cystocele, pelvic floor weakness, urinary urgency and frequency, and vaginal atrophy with pessary in place with incomplete bladder emptying.     The patient appears to be emptying her bladder well, PVR today is 0. She states the past week was \"challenging\" and she has to double void and bear down to empty. She has stopped her oxybutynin and appears to be having benefits with her tamsulosin. She continues her daily low-dose cephalexin. She denies any UTI like " symptoms. Urinalysis is negative today      She denies any vaginal complaints, no abnormal vaginal bleeding or discharge. She is satisfied from the pessary standpoint. She is utilizing her vaginal estrogen therapy.     She denies any bowel related complaints, no fecal or flatal incontinence.     She has no other complaints.     From previous note  58-year-old with symptomatic cystocele, pelvic floor weakness, urinary urgency and frequency, and vaginal atrophy with pessary in place presenting for follow-up regarding her pessary and acute urinary tract infection.     The patient presents today with a roughly 70 history of worsening lower urinary tract symptoms. PVR today is 300 cc with a negative urinalysis. She did undergo a cervical epidural C6/C7 injection 1 week ago. She continues her oxybutynin therapy and daily low-dose cephalexin. She is utilizing her vaginal estrogen therapy.     She denies any worsening constipation complaints. She was recently started on a new diuretic therapy.     She has no other complaints.     From previous note  57-year-old with symptomatic cystocele, pelvic floor weakness, urinary urgency and frequency, and vaginal atrophy with pessary in place presenting for follow-up regarding her lower urinary tract symptoms and recent urinary retention.     The patient underwent pain management surgery on 02/03/23. She states at that time her physician told her that she had a urinary tract infection. She notes some sensation of this prior to this procedure. The symptoms are now bothersome for her as she is experiencing discomfort, burning micturition and urgency. She continues to utilize the Macrodantin, Oxybutynin and vaginal estrogen cream. She is satisfied from the pessary standpoint.      She denies any vaginal complaints, no abnormal vaginal bleeding or discharge.     She denies any bowel related complaints, no fecal or flatal incontinence.     She has no other complaints.        From previous  "note  57-year-old with symptomatic cystocele, pelvic floor weakness, urinary urgency and frequency, and vaginal atrophy with pessary in place presenting for follow-up regarding her lower urinary tract symptoms and recent urinary retention.     The patient states that she is not is not emptying well for the past week. She has been UTI free for 6 months.     Urinalysis today is grossly infected.     She denies any abnormal vaginal bleeding or discharge. However, she states that she is experiencing vaginal pain for the past week.      She does have a longstanding history of chronic constipation, she states that her bowel complaints have improved. She otherwise denies any fecal or flatal incontinence.        She has no new complaints.      From previous note   The following visit was performed to telemedicine  57-year-old presenting with history of symptomatic cystocele, urinary urgency, pelvic floor weakness, and vaginal atrophy with a #3 ring without support.     The patient is overall very satisfied with her present therapy. She is utilizing her vaginal estrogen therapy and her oxybutynin. She feels well controlled from a urinary urgency and frequency standpoint. She denies any abnormal vaginal bleeding or discharge. She feels that her pessary is \"working\".      She has no new complaints.     From previous note  55-year-old presenting as referral from Dr. Marino with complaints of \"bladder and uterus falling\".     The patient noted roughly 1 month ago a feeling of a vaginal bulge with acute onset vaginal pressure and pulling. She took a picture of this and was evaluated by Dr. Marino who noted an anterior wall descent. Since that time she has denied any abnormal vaginal bleeding or discharge. She is not sexually active. She denies any significant changes in her bowel related complaints. She does have a longer standing history of urinary urgency and stress urinary incontinence complaints predating the prolapse " complaints. She presents today to discuss her prolapse complaints.     The patient notes a history of stress urinary incontinence both with a full and empty bladder noted over the last year. She also notes urinary urgency up to every 1-2 hours. She notes 0-1 episodes of nocturia. She denies enuresis. She denies a history of nephrolithiasis, chronic urinary tract infections, or any gross hematuria. She is never been evaluated for these complaints.     She does have a longstanding history of chronic constipation for which she takes daily fiber therapy and titrates MiraLAX for a soft bowel movement every day to 2 days. She otherwise denies any fecal or flatal incontinence.     As above, the patient is not sexually active. She denies any abnormal vaginal bleeding or discharge.     The patient does drink a large volume of fluid up to 6 to 816 ounce bottles of water daily. She also utilizes a CPAP machine at night.     She has no other complaints.   Review of Systems  Constitutional: No fever, No chills and No fatigue.   Eyes: No vision problems and No dryness of the eyes.   ENT: No dry mouth, No hearing loss and No nosebleeds.   Cardiovascular: No chest pain, No palpitations and No orthopnea.   Respiratory: No shortness of breath, No cough and No wheezing.   Gastrointestinal: No abdominal pain, No constipation, No nausea, No diarrhea, No vomiting and No melena.   Genitourinary: As noted in HPI.   Musculoskeletal: No back pain, No myalgias, No muscle weakness, No joint swelling and No leg edema.   Integumentary: No rashes, No skin lesion and No itching.   Neurological: No headache, No numbness and No dizziness.   Psychiatric: No sleep disturbances, No anxiety and No depression.   Endocrine: No hot flashes, No loss of hair and No hirsutism.   Hematologic/Lymphatic: No swollen glands, No tendency for easy bleeding and No tendency for easy bruising.   All other systems have been reviewed and are negative for complaint.         Objective   Physical Exam    Urodynamics was performed 5/22/2024    Ram catheter was removed and during the filling phase the patient was noted to have mild hypersensitivity with for sensation at 146 cc, first desire to 146 cc, strong desire to 190 cc and capacity 196.  She had terminal DO at a volume of 150 with what appears to be cough induced detrusor overactivity with a leak point pressure of 75 mm of water and 44 cm of water.  She generated a strong phasic contraction with appropriate EMG use.  During the voiding phase the patient voided to completion with a PVR of 0.    Assessment/Plan   58-year-old with symptomatic cystocele, pelvic floor weakness, urinary urgency and frequency, and vaginal atrophy with 3 inch ring with support pessary in place with urinary retention worsening following recent knee replacement surgery and hospitalization 4/12/2024 due to toxic metabolic encephalopathy secondary to opiates and concussion from a fall complicated with urinary retention.     1. We have previously discussed the patient's symptomatic vaginal bulge complaints. She was successfully fitted with a #3 ring with support 2/16/2021. She has noted worsening stress urinary incontinence complaints. We discussed refitting her with a #3 ring with knob which was performed 6/27/2023.  This was expulsed a few weeks ago.  She was refitted with a 3 inch ring with support 2/5/2024.  She will require every 4 month pessary follow-up.  She is following up in 2 weeks for pessary maintenance.    2.  We discussed the patient's urodynamics performed 5/22/2024.  She has a Ram catheter in place that was placed during her recent hospitalization 4/8/2024 and then replaced following her UDS.  The patient has a history of incomplete bladder emptying.  However urodynamics demonstrated terminal DO at a volume of 150 cc with a strong bladder contraction and a PVR of 0 at the completion of the study.  We discussed these findings and the  safety of removing her catheter now.  The patient is at home and the patient's daughter is in nursing school.  We discussed appropriate techniques to remove the catheter.  We discussed stopping her oxybutynin now.  Prior to her recent hospitalization and urinary retention concerns, she had improvements with improved voiding techniques and tamsulosin.  We did discuss utilizing CIC but she is unable to do this due to fine motor skills. We have previously discussed the possibility of sacral neuromodulation moving forward.  We will readdress this at her pessary follow-up appointment.     3. We have previously discussed the safety, efficacy, proper utilization of vaginal estrogen therapy. She will continue her vaginal estrogen therapy 3 times weekly.     4. As above, we discussed the patient's weak pelvic floor as relates to her lower urinary tract symptoms and symptomatic pelvic organ prolapse. She is not interested in pelvic floor physical therapy at this time.     5. We again discussed her chronic urinary tract infections. She will continue her low-dose cephalexin therapy. She has previously utilized low-dose Macrobid but this has been changed due to recent sensitivities. She was previously provided a standing order for urinalysis and urine culture. As above she will continue her vaginal estrogen therapy. She will also continue daily cranberry extract tablets and continue to push fluids.  We did discuss her recent diagnosis of Candida cystitis.  She does have excellent kidney and liver function and will proceed with fluconazole therapy now.     6. The patient will follow-up in June 2024 for pessary maintenance.  She will have her catheter removed by her daughter now.  She will stop her oxybutynin and proceed with fluconazole therapy for concerns for yeast cystitis.  We will readdress sacral neuromodulation at her pessary follow-up appointment.     TYREL Acevedo MD       Scribe Attestation  By signing my name  below, I, Mita Lee attest that this documentation has been prepared under the direction and in the presence of Norbert Acevedo MD. All medical record entries made by the Scribe were at my direction or personally dictated by me. I have reviewed the chart and agree that the record accurately reflects my personal performance of the history, physical exam, discussion and plan.

## 2024-05-25 PROCEDURE — 1090000001 HH PPS REVENUE CREDIT

## 2024-05-25 PROCEDURE — 1090000002 HH PPS REVENUE DEBIT

## 2024-05-26 ENCOUNTER — HOME CARE VISIT (OUTPATIENT)
Dept: HOME HEALTH SERVICES | Facility: HOME HEALTH | Age: 59
End: 2024-05-26
Payer: MEDICARE

## 2024-05-26 PROCEDURE — 1090000002 HH PPS REVENUE DEBIT

## 2024-05-26 PROCEDURE — 1090000001 HH PPS REVENUE CREDIT

## 2024-05-27 PROCEDURE — 1090000002 HH PPS REVENUE DEBIT

## 2024-05-27 PROCEDURE — 1090000001 HH PPS REVENUE CREDIT

## 2024-05-28 ENCOUNTER — HOME CARE VISIT (OUTPATIENT)
Dept: HOME HEALTH SERVICES | Facility: HOME HEALTH | Age: 59
End: 2024-05-28
Payer: MEDICARE

## 2024-05-28 PROCEDURE — 1090000001 HH PPS REVENUE CREDIT

## 2024-05-28 PROCEDURE — G0153 HHCP-SVS OF S/L PATH,EA 15MN: HCPCS | Mod: HHH

## 2024-05-28 PROCEDURE — 1090000002 HH PPS REVENUE DEBIT

## 2024-05-28 ASSESSMENT — ACTIVITIES OF DAILY LIVING (ADL)
ENTERING_EXITING_HOME: MINIMUM ASSIST
OASIS_M1830: 03

## 2024-05-29 PROCEDURE — 1090000002 HH PPS REVENUE DEBIT

## 2024-05-29 PROCEDURE — 1090000001 HH PPS REVENUE CREDIT

## 2024-05-29 NOTE — TELEPHONE ENCOUNTER
I first confirmed with the patient the way she is using medication.  She is using baclofen 20 mg bid (instead of tid prescribed) and cyclobenzaprine 10 mg daily (should be at bedtime.    I then confirmed with pharmacy that she is taking both, and they will fill.

## 2024-05-30 ENCOUNTER — APPOINTMENT (OUTPATIENT)
Dept: ORTHOPEDIC SURGERY | Facility: CLINIC | Age: 59
End: 2024-05-30
Payer: MEDICARE

## 2024-05-30 PROCEDURE — 1090000001 HH PPS REVENUE CREDIT

## 2024-05-30 PROCEDURE — 1090000002 HH PPS REVENUE DEBIT

## 2024-05-31 PROCEDURE — 1090000002 HH PPS REVENUE DEBIT

## 2024-05-31 PROCEDURE — 1090000001 HH PPS REVENUE CREDIT

## 2024-06-01 PROCEDURE — 1090000002 HH PPS REVENUE DEBIT

## 2024-06-01 PROCEDURE — 1090000001 HH PPS REVENUE CREDIT

## 2024-06-02 PROCEDURE — 1090000002 HH PPS REVENUE DEBIT

## 2024-06-02 PROCEDURE — 1090000001 HH PPS REVENUE CREDIT

## 2024-06-03 ENCOUNTER — HOME CARE VISIT (OUTPATIENT)
Dept: HOME HEALTH SERVICES | Facility: HOME HEALTH | Age: 59
End: 2024-06-03
Payer: MEDICARE

## 2024-06-03 VITALS — HEART RATE: 58 BPM | OXYGEN SATURATION: 97 %

## 2024-06-03 PROCEDURE — 1090000002 HH PPS REVENUE DEBIT

## 2024-06-03 PROCEDURE — 1090000001 HH PPS REVENUE CREDIT

## 2024-06-03 PROCEDURE — G0153 HHCP-SVS OF S/L PATH,EA 15MN: HCPCS | Mod: HHH

## 2024-06-03 PROCEDURE — 400014 HH F/U

## 2024-06-03 NOTE — PROGRESS NOTES
Subjective   Patient ID: Prema Hair is a 59 y.o. female who presents for follow up.    HPI  58-year-old with symptomatic cystocele, pelvic floor weakness, urinary urgency and frequency, and vaginal atrophy with 3 inch ring with support pessary in place with urinary retention worsening following recent knee replacement surgery and hospitalization 4/12/2024 due to toxic metabolic encephalopathy secondary to opiates and concussion from a fall complicated with urinary retention.    The patient PVR today is 815 ml.     She denies any vaginal complaints, no abnormal bleeding or discharge.     She denies any bowel related complaints, no fecal or flatal incontinence.    She has no other complaints.    From Previous note  This visit was performed through telemedicine   58-year-old with symptomatic cystocele, pelvic floor weakness, urinary urgency and frequency, and vaginal atrophy with 3 inch ring with support pessary in place with urinary retention worsening following recent knee replacement surgery and hospitalization 4/12/2024 due to toxic metabolic encephalopathy secondary to opiates and concussion from a fall.    The patient and daughter presents to discuss her UDS test results, which demonstrates that her bladder is emptying appropriately. There is no evidence of stress incontinence. Possibility of removing the cathter at home was discussed. She has been utilizing her 250 mg Keflex once a day. She is also continuing her Oxybutynin, possibility of stopped the medication due to risk of retention  was discussed.     She denies any vaginal complaints, no abnormal bleeding or discharge. She is utilizing the vaginal estrogen cream. She is also continuing her     She denies any bowel related complaints, no fecal or flatal incontinence.    She has no other complaints.      From Previous note  58-year-old with symptomatic cystocele, pelvic floor weakness, urinary urgency and frequency, and vaginal atrophy with 3 inch ring  with support pessary in place with worsening incomplete bladder emptying and urinary incontinence complaints. She has a history of osteoarthritis in her right knee, s/p right knee replacement.     The patient is accompanied by her daughter. She was admitted to [facility] on 04/07/2024 with concerns for toxic metabolic encephalopathy secondary to opiates and head injury. They put a catheter in place at that time that she reports has not been draining completely. She continues to experience episodic leakage around the catheter but it appears to be draining appropriately.  She is also noted episodic abdominal pain.     She still has a pessary in place.  She denies any vaginal complaints at this time.    She denies constipation, she has been utilizing colace and MiraLAX. No fecal or flatal incontinence.     From Previous note  58-year-old with symptomatic cystocele, pelvic floor weakness, urinary urgency and frequency, and vaginal atrophy with pessary in place with history of incomplete bladder emptying and worsening urinary incontinence complaints.    The patient presents with her daughter. She notes worsening urinary urgency and frequency with incontinence.  She is continuing her Oxybutynin without much relief. Her  ml by ultrasound. She denies any UTI like symptoms.     She thinks her pessary expulsed due to the constipation a little over 1 week ago.  She denies any vaginal complaints, no abnormal bleeding or discharge.  She is utilizing the vaginal estrogen cream.  She does feel her vaginal bulge with Valsalva.     She also complaints of constipation, she has been utilizing colace and MiraLAX. No fecal or flatal incontinence.     She has no other complaints.     From Previous note  This visit was performed through telemedicine  58-year-old with symptomatic cystocele, pelvic floor weakness, urinary urgency and frequency, and vaginal atrophy with pessary in place with history of incomplete bladder emptying and  worsening urinary incontinence complaints.     The patient has had improved control with her oxybutynin. She denies any dry mouth or constipation complaints. She denies any retention concerns. However she has noted terminal urge incontinence and stress incontinence complaints when she waits 4 to 5 hours between urinations. She denies any UTI symptoms.     She denies any bowel related complaints.      She has no other complaints.        From previous note  58-year-old with symptomatic cystocele, pelvic floor weakness, urinary urgency and frequency, and vaginal atrophy with pessary in place with history of incomplete bladder emptying and worsening stress urinary incontinence complaints.     The patient reports that she has been leaking urine more but had no problem emptying her bladder since the last visit. She states that she is wet during the night. She is compliant with tamsulosin. PVR today is 0. Urine appears negative.     She denies any vaginal complaints, no abnormal vaginal bleeding or discharge. She is satisfied from the pessary standpoint. She is utilizing her vaginal estrogen therapy.     She denies any bowel related complaints, no fecal or flatal incontinence.     She has no other complaints.     From previous note  58-year-old with symptomatic cystocele, pelvic floor weakness, urinary urgency and frequency, and vaginal atrophy with pessary in place with incomplete bladder emptying.     The patient appears to be emptying her bladder well, PVR today is 0. She is utilizing the tamsulosin with improvements in her bladder emptying but does need to double void. She continues to utilize the daily cephalexin therapy, cranberry extract tablets and denies any UTI like symptoms. he does note episodic leaking on laughing, coughing and sneezing.     She denies any vaginal complaints, no abnormal vaginal bleeding or discharge. She is satisfied from the pessary standpoint. She is utilizing her vaginal estrogen  "therapy.     She denies any bowel related complaints, no fecal or flatal incontinence.     She has no other complaints.     From previous note  58-year-old with symptomatic cystocele, pelvic floor weakness, urinary urgency and frequency, and vaginal atrophy with pessary in place with incomplete bladder emptying.     The patient appears to be emptying her bladder well, PVR today is 0. She states the past week was \"challenging\" and she has to double void and bear down to empty. She has stopped her oxybutynin and appears to be having benefits with her tamsulosin. She continues her daily low-dose cephalexin. She denies any UTI like symptoms. Urinalysis is negative today      She denies any vaginal complaints, no abnormal vaginal bleeding or discharge. She is satisfied from the pessary standpoint. She is utilizing her vaginal estrogen therapy.     She denies any bowel related complaints, no fecal or flatal incontinence.     She has no other complaints.     From previous note  58-year-old with symptomatic cystocele, pelvic floor weakness, urinary urgency and frequency, and vaginal atrophy with pessary in place presenting for follow-up regarding her pessary and acute urinary tract infection.     The patient presents today with a roughly 70 history of worsening lower urinary tract symptoms. PVR today is 300 cc with a negative urinalysis. She did undergo a cervical epidural C6/C7 injection 1 week ago. She continues her oxybutynin therapy and daily low-dose cephalexin. She is utilizing her vaginal estrogen therapy.     She denies any worsening constipation complaints. She was recently started on a new diuretic therapy.     She has no other complaints.     From previous note  57-year-old with symptomatic cystocele, pelvic floor weakness, urinary urgency and frequency, and vaginal atrophy with pessary in place presenting for follow-up regarding her lower urinary tract symptoms and recent urinary retention.     The patient " "underwent pain management surgery on 02/03/23. She states at that time her physician told her that she had a urinary tract infection. She notes some sensation of this prior to this procedure. The symptoms are now bothersome for her as she is experiencing discomfort, burning micturition and urgency. She continues to utilize the Macrodantin, Oxybutynin and vaginal estrogen cream. She is satisfied from the pessary standpoint.      She denies any vaginal complaints, no abnormal vaginal bleeding or discharge.     She denies any bowel related complaints, no fecal or flatal incontinence.     She has no other complaints.        From previous note  57-year-old with symptomatic cystocele, pelvic floor weakness, urinary urgency and frequency, and vaginal atrophy with pessary in place presenting for follow-up regarding her lower urinary tract symptoms and recent urinary retention.     The patient states that she is not is not emptying well for the past week. She has been UTI free for 6 months.     Urinalysis today is grossly infected.     She denies any abnormal vaginal bleeding or discharge. However, she states that she is experiencing vaginal pain for the past week.      She does have a longstanding history of chronic constipation, she states that her bowel complaints have improved. She otherwise denies any fecal or flatal incontinence.        She has no new complaints.      From previous note   The following visit was performed to telemedicine  57-year-old presenting with history of symptomatic cystocele, urinary urgency, pelvic floor weakness, and vaginal atrophy with a #3 ring without support.     The patient is overall very satisfied with her present therapy. She is utilizing her vaginal estrogen therapy and her oxybutynin. She feels well controlled from a urinary urgency and frequency standpoint. She denies any abnormal vaginal bleeding or discharge. She feels that her pessary is \"working\".      She has no new " "complaints.     From previous note  55-year-old presenting as referral from Dr. Marino with complaints of \"bladder and uterus falling\".     The patient noted roughly 1 month ago a feeling of a vaginal bulge with acute onset vaginal pressure and pulling. She took a picture of this and was evaluated by Dr. Marino who noted an anterior wall descent. Since that time she has denied any abnormal vaginal bleeding or discharge. She is not sexually active. She denies any significant changes in her bowel related complaints. She does have a longer standing history of urinary urgency and stress urinary incontinence complaints predating the prolapse complaints. She presents today to discuss her prolapse complaints.     The patient notes a history of stress urinary incontinence both with a full and empty bladder noted over the last year. She also notes urinary urgency up to every 1-2 hours. She notes 0-1 episodes of nocturia. She denies enuresis. She denies a history of nephrolithiasis, chronic urinary tract infections, or any gross hematuria. She is never been evaluated for these complaints.     She does have a longstanding history of chronic constipation for which she takes daily fiber therapy and titrates MiraLAX for a soft bowel movement every day to 2 days. She otherwise denies any fecal or flatal incontinence.     As above, the patient is not sexually active. She denies any abnormal vaginal bleeding or discharge.     The patient does drink a large volume of fluid up to 6 to 816 ounce bottles of water daily. She also utilizes a CPAP machine at night.     She has no other complaints.   Review of Systems  Constitutional: No fever, No chills and No fatigue.   Eyes: No vision problems and No dryness of the eyes.   ENT: No dry mouth, No hearing loss and No nosebleeds.   Cardiovascular: No chest pain, No palpitations and No orthopnea.   Respiratory: No shortness of breath, No cough and No wheezing.   Gastrointestinal: No abdominal " pain, No constipation, No nausea, No diarrhea, No vomiting and No melena.   Genitourinary: As noted in HPI.   Musculoskeletal: No back pain, No myalgias, No muscle weakness, No joint swelling and No leg edema.   Integumentary: No rashes, No skin lesion and No itching.   Neurological: No headache, No numbness and No dizziness.   Psychiatric: No sleep disturbances, No anxiety and No depression.   Endocrine: No hot flashes, No loss of hair and No hirsutism.   Hematologic/Lymphatic: No swollen glands, No tendency for easy bleeding and No tendency for easy bruising.   All other systems have been reviewed and are negative for complaint.        Objective   Physical Exam    PHYSICAL EXAMINATION:  No LMP recorded. Patient is postmenopausal.  Body mass index is 34.57 kg/m².  BP (!) 154/109   Pulse 86   Wt 100 kg (220 lb 11.2 oz)   BMI 34.57 kg/m²   General Appearance: well appearing  Neuro: Alert and oriented   HEENT: mucous membranes moist, neck supple  Resp: No respiratory distress, normal work of breathing  MSK: normal range of motion, gait appropriate    The patient's #3 ring with support pessary was removed, cleaned, and replaced.  There was no evidence of excoriations or other abnormalities.    The patient's daughter was taught CIC with a 14 Samoan catheter.  850 cc of clear urine was removed.  The patient was provided catheter supplies.      Assessment/Plan   58-year-old with symptomatic cystocele, pelvic floor weakness, urinary urgency and frequency, and vaginal atrophy with 3 inch ring with support pessary in place with urinary retention worsening following recent knee replacement surgery and hospitalization 4/12/2024 due to toxic metabolic encephalopathy secondary to opiates and concussion from a fall complicated with urinary retention.     1. We have previously discussed the patient's symptomatic vaginal bulge complaints. She was successfully fitted with a #3 ring with support 2/16/2021. She has noted worsening  stress urinary incontinence complaints. We discussed refitting her with a #3 ring with knob which was performed 6/27/2023.  This was expulsed..  She was refitted with a 3 inch ring with support 2/5/2024.  She will require every 4 month pessary follow-up.  Uncomplicated pessary maintenance today 6//24.    2.  The patient's daughter was successfully taught CIC today with the patient presenting with 850 cc of urinary retention 6/4/2024.  We have previously discussed the patient's urodynamics performed 5/22/2024.  The patient has a history of incomplete bladder emptying.  However urodynamics demonstrated terminal DO at a volume of 150 cc with a strong bladder contraction and a PVR of 0 at the completion of the study.  Unfortunately over the last week she has had worsening urinary retention concerns.  Prior to her recent hospitalization and urinary retention concerns, she had improvements with improved voiding techniques and tamsulosin.  We discussed not utilizing tamsulosin in the future given her recent falls.  We discussed proceeding with CIC twice daily moving forward in the morning and prior to bed.  The daughter will record postvoid residuals we discussed increasing her CIC use should her volumes be greater than 400 and decreasing this should they be less than 150 cc.      3. We have previously discussed the safety, efficacy, proper utilization of vaginal estrogen therapy. She will continue her vaginal estrogen therapy 3 times weekly.     4. As above, we discussed the patient's weak pelvic floor as relates to her lower urinary tract symptoms and symptomatic pelvic organ prolapse. She is not interested in pelvic floor physical therapy at this time.     5. We again discussed her chronic urinary tract infections. She will continue her low-dose cephalexin therapy after she completes her fluconazole for her yeast cystitis. She has previously utilized low-dose Macrobid but this has been changed due to recent  sensitivities. She was previously provided a standing order for urinalysis and urine culture. As above she will continue her vaginal estrogen therapy. She will also continue daily cranberry extract tablets and continue to push fluids.      6. The patient will follow-up in November 2024 for pessary maintenance.  She will follow-up in 1 to 2 weeks to discuss her CIC use.      TYREL Acevedo MD       Scribe Attestation  By signing my name below, ICharito Scribe attest that this documentation has been prepared under the direction and in the presence of Norbert Acevedo MD. All medical record entries made by the Scribe were at my direction or personally dictated by me. I have reviewed the chart and agree that the record accurately reflects my personal performance of the history, physical exam, discussion and plan.

## 2024-06-04 ENCOUNTER — OFFICE VISIT (OUTPATIENT)
Dept: UROLOGY | Facility: CLINIC | Age: 59
End: 2024-06-04
Payer: MEDICARE

## 2024-06-04 VITALS
DIASTOLIC BLOOD PRESSURE: 109 MMHG | BODY MASS INDEX: 34.57 KG/M2 | HEART RATE: 86 BPM | SYSTOLIC BLOOD PRESSURE: 154 MMHG | WEIGHT: 220.7 LBS

## 2024-06-04 DIAGNOSIS — N81.89 PELVIC FLOOR WEAKNESS: ICD-10-CM

## 2024-06-04 DIAGNOSIS — N81.11 MIDLINE CYSTOCELE: Primary | ICD-10-CM

## 2024-06-04 DIAGNOSIS — N39.490 OVERFLOW INCONTINENCE: ICD-10-CM

## 2024-06-04 DIAGNOSIS — Z96.0 VAGINAL PESSARY PRESENT: ICD-10-CM

## 2024-06-04 DIAGNOSIS — N95.2 ATROPHIC VAGINITIS: ICD-10-CM

## 2024-06-04 DIAGNOSIS — R33.9 URINARY RETENTION: ICD-10-CM

## 2024-06-04 PROCEDURE — 1090000002 HH PPS REVENUE DEBIT

## 2024-06-04 PROCEDURE — G0179 MD RECERTIFICATION HHA PT: HCPCS | Performed by: ORTHOPAEDIC SURGERY

## 2024-06-04 PROCEDURE — 3044F HG A1C LEVEL LT 7.0%: CPT | Performed by: OBSTETRICS & GYNECOLOGY

## 2024-06-04 PROCEDURE — 4010F ACE/ARB THERAPY RXD/TAKEN: CPT | Performed by: OBSTETRICS & GYNECOLOGY

## 2024-06-04 PROCEDURE — 3050F LDL-C >= 130 MG/DL: CPT | Performed by: OBSTETRICS & GYNECOLOGY

## 2024-06-04 PROCEDURE — 51701 INSERT BLADDER CATHETER: CPT | Performed by: OBSTETRICS & GYNECOLOGY

## 2024-06-04 PROCEDURE — 3077F SYST BP >= 140 MM HG: CPT | Performed by: OBSTETRICS & GYNECOLOGY

## 2024-06-04 PROCEDURE — 99214 OFFICE O/P EST MOD 30 MIN: CPT | Performed by: OBSTETRICS & GYNECOLOGY

## 2024-06-04 PROCEDURE — 3062F POS MACROALBUMINURIA REV: CPT | Performed by: OBSTETRICS & GYNECOLOGY

## 2024-06-04 PROCEDURE — 1090000001 HH PPS REVENUE CREDIT

## 2024-06-04 PROCEDURE — 3080F DIAST BP >= 90 MM HG: CPT | Performed by: OBSTETRICS & GYNECOLOGY

## 2024-06-04 NOTE — PATIENT INSTRUCTIONS
Please utilize your clean intermittent catheterization once in the morning and before bedtime.  After you urinate, please catheterize.  Please record these values.  Should these values be consistently below 150 cc, you may stop your catheterizations.    Please follow-up in October/November for pessary maintenance.    Please continue your vaginal estrogen therapy 3 times a week.

## 2024-06-05 ENCOUNTER — HOME CARE VISIT (OUTPATIENT)
Dept: HOME HEALTH SERVICES | Facility: HOME HEALTH | Age: 59
End: 2024-06-05
Payer: MEDICARE

## 2024-06-05 PROCEDURE — G0153 HHCP-SVS OF S/L PATH,EA 15MN: HCPCS | Mod: HHH

## 2024-06-05 PROCEDURE — 1090000001 HH PPS REVENUE CREDIT

## 2024-06-05 PROCEDURE — 1090000002 HH PPS REVENUE DEBIT

## 2024-06-05 NOTE — HOME HEALTH
This therapist arrived at pt’s apartment and knocked on her door. The pt called out and stated that she had fallen and that she can’t get up. Ambulance called. EMS strongly recommended going to the ED. Pt refused. This therapist waited with pt until D[facility] arrived.

## 2024-06-06 ENCOUNTER — OFFICE VISIT (OUTPATIENT)
Dept: ORTHOPEDIC SURGERY | Facility: CLINIC | Age: 59
End: 2024-06-06
Payer: MEDICARE

## 2024-06-06 VITALS — BODY MASS INDEX: 34.53 KG/M2 | HEIGHT: 67 IN | WEIGHT: 220 LBS

## 2024-06-06 DIAGNOSIS — M17.0 PRIMARY OSTEOARTHRITIS OF BOTH KNEES: Primary | ICD-10-CM

## 2024-06-06 PROCEDURE — 3050F LDL-C >= 130 MG/DL: CPT | Performed by: ORTHOPAEDIC SURGERY

## 2024-06-06 PROCEDURE — 4010F ACE/ARB THERAPY RXD/TAKEN: CPT | Performed by: ORTHOPAEDIC SURGERY

## 2024-06-06 PROCEDURE — 1090000001 HH PPS REVENUE CREDIT

## 2024-06-06 PROCEDURE — 1090000002 HH PPS REVENUE DEBIT

## 2024-06-06 PROCEDURE — 20610 DRAIN/INJ JOINT/BURSA W/O US: CPT | Performed by: ORTHOPAEDIC SURGERY

## 2024-06-06 PROCEDURE — 99024 POSTOP FOLLOW-UP VISIT: CPT | Performed by: ORTHOPAEDIC SURGERY

## 2024-06-06 PROCEDURE — 2500000004 HC RX 250 GENERAL PHARMACY W/ HCPCS (ALT 636 FOR OP/ED): Performed by: ORTHOPAEDIC SURGERY

## 2024-06-06 PROCEDURE — 3044F HG A1C LEVEL LT 7.0%: CPT | Performed by: ORTHOPAEDIC SURGERY

## 2024-06-06 PROCEDURE — 1036F TOBACCO NON-USER: CPT | Performed by: ORTHOPAEDIC SURGERY

## 2024-06-06 PROCEDURE — 2500000005 HC RX 250 GENERAL PHARMACY W/O HCPCS: Performed by: ORTHOPAEDIC SURGERY

## 2024-06-06 PROCEDURE — 3062F POS MACROALBUMINURIA REV: CPT | Performed by: ORTHOPAEDIC SURGERY

## 2024-06-06 RX ORDER — METHYLPREDNISOLONE ACETATE 40 MG/ML
40 INJECTION, SUSPENSION INTRA-ARTICULAR; INTRALESIONAL; INTRAMUSCULAR; SOFT TISSUE
Status: COMPLETED | OUTPATIENT
Start: 2024-06-06 | End: 2024-06-06

## 2024-06-06 RX ORDER — LIDOCAINE HYDROCHLORIDE 20 MG/ML
2 INJECTION, SOLUTION INFILTRATION; PERINEURAL
Status: COMPLETED | OUTPATIENT
Start: 2024-06-06 | End: 2024-06-06

## 2024-06-06 RX ADMIN — METHYLPREDNISOLONE ACETATE 40 MG: 40 INJECTION, SUSPENSION INTRALESIONAL; INTRAMUSCULAR; INTRASYNOVIAL; SOFT TISSUE at 10:35

## 2024-06-06 RX ADMIN — LIDOCAINE HYDROCHLORIDE 2 ML: 20 INJECTION, SOLUTION INFILTRATION; PERINEURAL at 10:35

## 2024-06-06 ASSESSMENT — PAIN DESCRIPTION - DESCRIPTORS: DESCRIPTORS: OTHER (COMMENT)

## 2024-06-06 ASSESSMENT — PAIN - FUNCTIONAL ASSESSMENT: PAIN_FUNCTIONAL_ASSESSMENT: 0-10

## 2024-06-06 ASSESSMENT — PAIN SCALES - GENERAL: PAINLEVEL_OUTOF10: 6

## 2024-06-06 NOTE — PROGRESS NOTES
Patient is 3 months status post right total knee arthroplasty generally doing very well she is about start outpatient therapy she has excellent mobility no effusion good stability she also has a painful left arthritic knee we injected that today  Follow-up annually  L Inj/Asp: L knee on 6/6/2024 10:35 AM  Indications: pain  Details: 21 G needle, lateral approach  Medications: 40 mg methylPREDNISolone acetate 40 mg/mL; 2 mL lidocaine 20 mg/mL (2 %)

## 2024-06-07 ENCOUNTER — TELEPHONE (OUTPATIENT)
Dept: OTOLARYNGOLOGY | Facility: CLINIC | Age: 59
End: 2024-06-07
Payer: MEDICARE

## 2024-06-07 PROCEDURE — 1090000001 HH PPS REVENUE CREDIT

## 2024-06-07 PROCEDURE — 1090000002 HH PPS REVENUE DEBIT

## 2024-06-07 NOTE — TELEPHONE ENCOUNTER
In doing clinic prep for Dr. Jurado, I noted that Ms. Hair has yet to complete the thyroid ultrasound that is needed prior to her apt with him.    I called and left a message reminding her of the apt and need to get the test done.  If not done the apt is a bit of a wasted trip.    I provided her the radiology scheduling phone number as well as our office number to help with scheduling and if she needed to cancel or reschedule her apt with Dr. Jurado.

## 2024-06-07 NOTE — RESULT ENCOUNTER NOTE
Ultrasound reviewed, 2 left sided nodules without any growth compared to ultrasound in 2022 and remains non suspicious in characterics. Would opt for surveillance at this time

## 2024-06-08 PROCEDURE — 1090000002 HH PPS REVENUE DEBIT

## 2024-06-08 PROCEDURE — 1090000001 HH PPS REVENUE CREDIT

## 2024-06-09 PROCEDURE — 1090000001 HH PPS REVENUE CREDIT

## 2024-06-09 PROCEDURE — 1090000002 HH PPS REVENUE DEBIT

## 2024-06-10 ENCOUNTER — HOME CARE VISIT (OUTPATIENT)
Dept: HOME HEALTH SERVICES | Facility: HOME HEALTH | Age: 59
End: 2024-06-10
Payer: MEDICARE

## 2024-06-10 ASSESSMENT — ACTIVITIES OF DAILY LIVING (ADL)
HOME_HEALTH_OASIS: 01
OASIS_M1830: 03

## 2024-06-14 ENCOUNTER — APPOINTMENT (OUTPATIENT)
Dept: OTOLARYNGOLOGY | Facility: HOSPITAL | Age: 59
End: 2024-06-14
Payer: MEDICARE

## 2024-06-17 ENCOUNTER — APPOINTMENT (OUTPATIENT)
Dept: UROLOGY | Facility: CLINIC | Age: 59
End: 2024-06-17
Payer: MEDICARE

## 2024-06-17 DIAGNOSIS — N39.490 OVERFLOW INCONTINENCE: ICD-10-CM

## 2024-06-17 DIAGNOSIS — N81.89 PELVIC FLOOR WEAKNESS: ICD-10-CM

## 2024-06-17 DIAGNOSIS — Z96.0 VAGINAL PESSARY PRESENT: ICD-10-CM

## 2024-06-17 DIAGNOSIS — R33.9 URINARY RETENTION: Primary | ICD-10-CM

## 2024-06-17 DIAGNOSIS — N95.2 ATROPHIC VAGINITIS: ICD-10-CM

## 2024-06-17 DIAGNOSIS — R35.0 URINARY FREQUENCY: ICD-10-CM

## 2024-06-17 DIAGNOSIS — N81.11 MIDLINE CYSTOCELE: ICD-10-CM

## 2024-06-17 PROCEDURE — 3062F POS MACROALBUMINURIA REV: CPT | Performed by: OBSTETRICS & GYNECOLOGY

## 2024-06-17 PROCEDURE — 4010F ACE/ARB THERAPY RXD/TAKEN: CPT | Performed by: OBSTETRICS & GYNECOLOGY

## 2024-06-17 PROCEDURE — 1036F TOBACCO NON-USER: CPT | Performed by: OBSTETRICS & GYNECOLOGY

## 2024-06-17 PROCEDURE — 3050F LDL-C >= 130 MG/DL: CPT | Performed by: OBSTETRICS & GYNECOLOGY

## 2024-06-17 PROCEDURE — 3044F HG A1C LEVEL LT 7.0%: CPT | Performed by: OBSTETRICS & GYNECOLOGY

## 2024-06-17 PROCEDURE — 99441 PR PHYS/QHP TELEPHONE EVALUATION 5-10 MIN: CPT | Performed by: OBSTETRICS & GYNECOLOGY

## 2024-06-17 NOTE — PROGRESS NOTES
Subjective   Patient ID: Prema Hair is a 59 y.o. female who presents for follow up.  Today's visit was done virtually after appropriate consent from the patient. Virtual or Telephone Consent     An interactive audio  telecommunication system which permits real time communications between the patient at home and provider (at the distant site) was utilized to provide this telehealth service. 10 minutes were spent discussing the patients concerns and coordinating care    HPI  This visit was performed through telemedicine  58-year-old with symptomatic cystocele, pelvic floor weakness, urinary urgency and frequency, and vaginal atrophy with 3 inch ring with support pessary in place with urinary retention worsening following recent knee replacement surgery and hospitalization 4/12/2024 due to toxic metabolic encephalopathy secondary to opiates and concussion from a fall complicated with urinary retention.    The daughter has had no difficulties with her CIC.  She is catheterizing 4 between 250 and 400 cc of urine.  She denies any significant lower urinary tract symptoms.  She has run out of supplies and has not been contacted by [facility] medical.    She continues her vaginal estrogen therapy.  She denies any vaginal complaints.    She has no other complaints.      From Previous note  58-year-old with symptomatic cystocele, pelvic floor weakness, urinary urgency and frequency, and vaginal atrophy with 3 inch ring with support pessary in place with urinary retention worsening following recent knee replacement surgery and hospitalization 4/12/2024 due to toxic metabolic encephalopathy secondary to opiates and concussion from a fall complicated with urinary retention.    The patient PVR today is 815 ml.     She denies any vaginal complaints, no abnormal bleeding or discharge.     She denies any bowel related complaints, no fecal or flatal incontinence.    She has no other complaints.    From Previous note  This visit was  performed through telemedicine   58-year-old with symptomatic cystocele, pelvic floor weakness, urinary urgency and frequency, and vaginal atrophy with 3 inch ring with support pessary in place with urinary retention worsening following recent knee replacement surgery and hospitalization 4/12/2024 due to toxic metabolic encephalopathy secondary to opiates and concussion from a fall.    The patient and daughter presents to discuss her UDS test results, which demonstrates that her bladder is emptying appropriately. There is no evidence of stress incontinence. Possibility of removing the cathter at home was discussed. She has been utilizing her 250 mg Keflex once a day. She is also continuing her Oxybutynin, possibility of stopped the medication due to risk of retention  was discussed.     She denies any vaginal complaints, no abnormal bleeding or discharge. She is utilizing the vaginal estrogen cream. She is also continuing her     She denies any bowel related complaints, no fecal or flatal incontinence.    She has no other complaints.      From Previous note  58-year-old with symptomatic cystocele, pelvic floor weakness, urinary urgency and frequency, and vaginal atrophy with 3 inch ring with support pessary in place with worsening incomplete bladder emptying and urinary incontinence complaints. She has a history of osteoarthritis in her right knee, s/p right knee replacement.     The patient is accompanied by her daughter. She was admitted to [facility] on [facility]/07/2024 with concerns for toxic metabolic encephalopathy secondary to opiates and head injury. They put a catheter in place at that time that she reports has not been draining completely. She continues to experience episodic leakage around the catheter but it appears to be draining appropriately.  She is also noted episodic abdominal pain.     She still has a pessary in place.  She denies any vaginal complaints at this time.    She denies constipation, she has been  utilizing colace and MiraLAX. No fecal or flatal incontinence.     From Previous note  58-year-old with symptomatic cystocele, pelvic floor weakness, urinary urgency and frequency, and vaginal atrophy with pessary in place with history of incomplete bladder emptying and worsening urinary incontinence complaints.    The patient presents with her daughter. She notes worsening urinary urgency and frequency with incontinence.  She is continuing her Oxybutynin without much relief. Her  ml by ultrasound. She denies any UTI like symptoms.     She thinks her pessary expulsed due to the constipation a little over 1 week ago.  She denies any vaginal complaints, no abnormal bleeding or discharge.  She is utilizing the vaginal estrogen cream.  She does feel her vaginal bulge with Valsalva.     She also complaints of constipation, she has been utilizing colace and MiraLAX. No fecal or flatal incontinence.     She has no other complaints.     From Previous note  This visit was performed through telemedicine  58-year-old with symptomatic cystocele, pelvic floor weakness, urinary urgency and frequency, and vaginal atrophy with pessary in place with history of incomplete bladder emptying and worsening urinary incontinence complaints.     The patient has had improved control with her oxybutynin. She denies any dry mouth or constipation complaints. She denies any retention concerns. However she has noted terminal urge incontinence and stress incontinence complaints when she waits 4 to 5 hours between urinations. She denies any UTI symptoms.     She denies any bowel related complaints.      She has no other complaints.        From previous note  58-year-old with symptomatic cystocele, pelvic floor weakness, urinary urgency and frequency, and vaginal atrophy with pessary in place with history of incomplete bladder emptying and worsening stress urinary incontinence complaints.     The patient reports that she has been leaking  "urine more but had no problem emptying her bladder since the last visit. She states that she is wet during the night. She is compliant with tamsulosin. PVR today is 0. Urine appears negative.     She denies any vaginal complaints, no abnormal vaginal bleeding or discharge. She is satisfied from the pessary standpoint. She is utilizing her vaginal estrogen therapy.     She denies any bowel related complaints, no fecal or flatal incontinence.     She has no other complaints.     From previous note  58-year-old with symptomatic cystocele, pelvic floor weakness, urinary urgency and frequency, and vaginal atrophy with pessary in place with incomplete bladder emptying.     The patient appears to be emptying her bladder well, PVR today is 0. She is utilizing the tamsulosin with improvements in her bladder emptying but does need to double void. She continues to utilize the daily cephalexin therapy, cranberry extract tablets and denies any UTI like symptoms. he does note episodic leaking on laughing, coughing and sneezing.     She denies any vaginal complaints, no abnormal vaginal bleeding or discharge. She is satisfied from the pessary standpoint. She is utilizing her vaginal estrogen therapy.     She denies any bowel related complaints, no fecal or flatal incontinence.     She has no other complaints.     From previous note  58-year-old with symptomatic cystocele, pelvic floor weakness, urinary urgency and frequency, and vaginal atrophy with pessary in place with incomplete bladder emptying.     The patient appears to be emptying her bladder well, PVR today is 0. She states the past week was \"challenging\" and she has to double void and bear down to empty. She has stopped her oxybutynin and appears to be having benefits with her tamsulosin. She continues her daily low-dose cephalexin. She denies any UTI like symptoms. Urinalysis is negative today      She denies any vaginal complaints, no abnormal vaginal bleeding or " discharge. She is satisfied from the pessary standpoint. She is utilizing her vaginal estrogen therapy.     She denies any bowel related complaints, no fecal or flatal incontinence.     She has no other complaints.     From previous note  58-year-old with symptomatic cystocele, pelvic floor weakness, urinary urgency and frequency, and vaginal atrophy with pessary in place presenting for follow-up regarding her pessary and acute urinary tract infection.     The patient presents today with a roughly 70 history of worsening lower urinary tract symptoms. PVR today is 300 cc with a negative urinalysis. She did undergo a cervical epidural C6/C7 injection 1 week ago. She continues her oxybutynin therapy and daily low-dose cephalexin. She is utilizing her vaginal estrogen therapy.     She denies any worsening constipation complaints. She was recently started on a new diuretic therapy.     She has no other complaints.     From previous note  57-year-old with symptomatic cystocele, pelvic floor weakness, urinary urgency and frequency, and vaginal atrophy with pessary in place presenting for follow-up regarding her lower urinary tract symptoms and recent urinary retention.     The patient underwent pain management surgery on 02/03/23. She states at that time her physician told her that she had a urinary tract infection. She notes some sensation of this prior to this procedure. The symptoms are now bothersome for her as she is experiencing discomfort, burning micturition and urgency. She continues to utilize the Macrodantin, Oxybutynin and vaginal estrogen cream. She is satisfied from the pessary standpoint.      She denies any vaginal complaints, no abnormal vaginal bleeding or discharge.     She denies any bowel related complaints, no fecal or flatal incontinence.     She has no other complaints.        From previous note  57-year-old with symptomatic cystocele, pelvic floor weakness, urinary urgency and frequency, and  "vaginal atrophy with pessary in place presenting for follow-up regarding her lower urinary tract symptoms and recent urinary retention.     The patient states that she is not is not emptying well for the past week. She has been UTI free for 6 months.     Urinalysis today is grossly infected.     She denies any abnormal vaginal bleeding or discharge. However, she states that she is experiencing vaginal pain for the past week.      She does have a longstanding history of chronic constipation, she states that her bowel complaints have improved. She otherwise denies any fecal or flatal incontinence.        She has no new complaints.      From previous note   The following visit was performed to telemedicine  57-year-old presenting with history of symptomatic cystocele, urinary urgency, pelvic floor weakness, and vaginal atrophy with a #3 ring without support.     The patient is overall very satisfied with her present therapy. She is utilizing her vaginal estrogen therapy and her oxybutynin. She feels well controlled from a urinary urgency and frequency standpoint. She denies any abnormal vaginal bleeding or discharge. She feels that her pessary is \"working\".      She has no new complaints.     From previous note  55-year-old presenting as referral from Dr. Marino with complaints of \"bladder and uterus falling\".     The patient noted roughly 1 month ago a feeling of a vaginal bulge with acute onset vaginal pressure and pulling. She took a picture of this and was evaluated by Dr. Marino who noted an anterior wall descent. Since that time she has denied any abnormal vaginal bleeding or discharge. She is not sexually active. She denies any significant changes in her bowel related complaints. She does have a longer standing history of urinary urgency and stress urinary incontinence complaints predating the prolapse complaints. She presents today to discuss her prolapse complaints.     The patient notes a history of stress " urinary incontinence both with a full and empty bladder noted over the last year. She also notes urinary urgency up to every 1-2 hours. She notes 0-1 episodes of nocturia. She denies enuresis. She denies a history of nephrolithiasis, chronic urinary tract infections, or any gross hematuria. She is never been evaluated for these complaints.     She does have a longstanding history of chronic constipation for which she takes daily fiber therapy and titrates MiraLAX for a soft bowel movement every day to 2 days. She otherwise denies any fecal or flatal incontinence.     As above, the patient is not sexually active. She denies any abnormal vaginal bleeding or discharge.     The patient does drink a large volume of fluid up to 6 to 816 ounce bottles of water daily. She also utilizes a CPAP machine at night.     She has no other complaints.   Review of Systems  Constitutional: No fever, No chills and No fatigue.   Eyes: No vision problems and No dryness of the eyes.   ENT: No dry mouth, No hearing loss and No nosebleeds.   Cardiovascular: No chest pain, No palpitations and No orthopnea.   Respiratory: No shortness of breath, No cough and No wheezing.   Gastrointestinal: No abdominal pain, No constipation, No nausea, No diarrhea, No vomiting and No melena.   Genitourinary: As noted in HPI.   Musculoskeletal: No back pain, No myalgias, No muscle weakness, No joint swelling and No leg edema.   Integumentary: No rashes, No skin lesion and No itching.   Neurological: No headache, No numbness and No dizziness.   Psychiatric: No sleep disturbances, No anxiety and No depression.   Endocrine: No hot flashes, No loss of hair and No hirsutism.   Hematologic/Lymphatic: No swollen glands, No tendency for easy bleeding and No tendency for easy bruising.   All other systems have been reviewed and are negative for complaint.        Objective   Physical Exam    PHYSICAL EXAMINATION:  No LMP recorded. Patient is postmenopausal.  There is  no height or weight on file to calculate BMI.  There were no vitals taken for this visit.  General Appearance: well appearing  Neuro: Alert and oriented   HEENT: mucous membranes moist, neck supple  Resp: No respiratory distress, normal work of breathing  MSK: normal range of motion, gait appropriate    The patient's #3 ring with support pessary was removed, cleaned, and replaced.  There was no evidence of excoriations or other abnormalities.    The patient's daughter was taught CIC with a 14 Spanish catheter.  850 cc of clear urine was removed.  The patient was provided catheter supplies.      Assessment/Plan   58-year-old with symptomatic cystocele, pelvic floor weakness, urinary urgency and frequency, and vaginal atrophy with 3 inch ring with support pessary in place with urinary retention worsening following recent knee replacement surgery and hospitalization 4/12/2024 due to toxic metabolic encephalopathy secondary to opiates and concussion from a fall complicated with urinary retention proceeding with CIC by her daughter.     1. We have previously discussed the patient's symptomatic vaginal bulge complaints. She was successfully fitted with a #3 ring with support 2/16/2021. She has noted worsening stress urinary incontinence complaints. We discussed refitting her with a #3 ring with knob which was performed 6/27/2023.  This was expulsed..  She was refitted with a 3 inch ring with support 2/5/2024.  She will require every 4 month pessary follow-up.  Uncomplicated pessary maintenance 6/4/24.    2.  The patient's daughter was successfully taught CIC with the patient presenting with 850 cc of urinary retention 6/4/2024.  She appears to be having excellent results with this.  She unfortunately has not had her supplies delivered and we will work with [address] [place] [address] for her to continue this at least twice daily moving forward.  We have previously discussed the patient's urodynamics performed 5/22/2024.  The patient has a  history of incomplete bladder emptying.  However urodynamics demonstrated terminal DO at a volume of 150 cc with a strong bladder contraction and a PVR of 0 at the completion of the study.  Unfortunately over the last week she has had worsening urinary retention concerns.  Prior to her recent hospitalization and urinary retention concerns, she had improvements with improved voiding techniques and tamsulosin.  We discussed not utilizing tamsulosin in the future given her recent falls.  She will continue her CIC in the morning and prior to bed. We discussed increasing her CIC use should her volumes be greater than 400 and decreasing this should they be less than 150 cc.      3. We have previously discussed the safety, efficacy, proper utilization of vaginal estrogen therapy. She will continue her vaginal estrogen therapy 3 times weekly.     4. As above, we discussed the patient's weak pelvic floor as relates to her lower urinary tract symptoms and symptomatic pelvic organ prolapse. She is not interested in pelvic floor physical therapy at this time.     5. We again discussed her chronic urinary tract infections. She will continue her low-dose cephalexin therapy after she completes her fluconazole for her yeast cystitis. She has previously utilized low-dose Macrobid but this has been changed due to recent sensitivities. She was previously provided a standing order for urinalysis and urine culture. As above she will continue her vaginal estrogen therapy. She will also continue daily cranberry extract tablets and continue to push fluids.      6. The patient will follow-up in November 2024 for pessary maintenance.  We will work on providing her catheters as soon as possible.  She will present to the clinic tomorrow to  supplies.    TYREL Acevedo MD       Scribe Attestation  By signing my name below, Charito PRAJAPATI Scribe attest that this documentation has been prepared under the direction and in the  presence of Norbert Acevedo MD. All medical record entries made by the Scribe were at my direction or personally dictated by me. I have reviewed the chart and agree that the record accurately reflects my personal performance of the history, physical exam, discussion and plan.

## 2024-06-18 ENCOUNTER — APPOINTMENT (OUTPATIENT)
Dept: PULMONOLOGY | Facility: CLINIC | Age: 59
End: 2024-06-18
Payer: MEDICARE

## 2024-06-20 ENCOUNTER — APPOINTMENT (OUTPATIENT)
Dept: ENDOCRINOLOGY | Facility: CLINIC | Age: 59
End: 2024-06-20
Payer: MEDICARE

## 2024-07-02 ENCOUNTER — APPOINTMENT (OUTPATIENT)
Dept: OTOLARYNGOLOGY | Facility: HOSPITAL | Age: 59
End: 2024-07-02
Payer: MEDICARE

## 2024-07-25 ENCOUNTER — APPOINTMENT (OUTPATIENT)
Dept: ENDOCRINOLOGY | Facility: CLINIC | Age: 59
End: 2024-07-25
Payer: MEDICARE

## 2024-07-26 ENCOUNTER — APPOINTMENT (OUTPATIENT)
Dept: PULMONOLOGY | Facility: CLINIC | Age: 59
End: 2024-07-26
Payer: MEDICARE

## 2024-08-05 ENCOUNTER — APPOINTMENT (OUTPATIENT)
Dept: NEUROLOGY | Facility: CLINIC | Age: 59
End: 2024-08-05
Payer: MEDICARE

## 2024-08-05 VITALS
DIASTOLIC BLOOD PRESSURE: 74 MMHG | HEART RATE: 80 BPM | BODY MASS INDEX: 35.4 KG/M2 | WEIGHT: 226 LBS | SYSTOLIC BLOOD PRESSURE: 101 MMHG

## 2024-08-05 DIAGNOSIS — M43.6 TORTICOLLIS: Primary | ICD-10-CM

## 2024-08-05 PROCEDURE — 64616 CHEMODENERV MUSC NECK DYSTON: CPT | Performed by: PSYCHIATRY & NEUROLOGY

## 2024-08-05 NOTE — PROGRESS NOTES
Subjective     Prema Hair is a 59 y.o. year old female here for botulinum toxin injections.      She reports that the botulinum toxin helps the torticollis, straightening her neck, for about two months before the torticollis returned.  She is now back fully in torticollis.     Allergies   Allergen Reactions    Lisinopril Swelling       Objective     /74   Pulse 80   Wt 103 kg (226 lb)   BMI 35.40 kg/m²     Botox Injection - Neck Pain, Headaches   Procedure: Botox injection.   Indication: spasmodic torticollis.   Risk, benefits, alternatives, risk of worsening pain, risk of URI, risk of dysphagia, risk of focal weakness and risk of facial paresis were discussed with the patient. Verbal consent was obtained prior to the procedure. Alcohol was used to prep the area.  Procedure Note:   The patient was placed in the upright position. 200 units of Botulinum Toxin were injected 80 u right SCM in 4 sites, 20 u right trapezius in 2 sites, 80 u left splenius capitis in 4 sites, 20 u left trapezius in 2 sites.  Lot number: [id number] . Expiration date: 11/2026 .  Post-Procedure: the patient tolerated the procedure well. Complications: None.   Follow-up in the office in 12 weeks for repeat injection(s).    Assessment/Plan   Diagnoses and all orders for this visit:  Torticollis  -     onabotulinumtoxinA (Botox) injection 200 Units      David Swanson Jr., M.D., FAAN

## 2024-08-06 DIAGNOSIS — M43.6 TORTICOLLIS: ICD-10-CM

## 2024-08-06 PROBLEM — G20.A1 PARKINSON'S DISEASE (MULTI): Status: ACTIVE | Noted: 2024-08-06

## 2024-08-06 RX ORDER — CYCLOBENZAPRINE HCL 10 MG
10 TABLET ORAL NIGHTLY PRN
Qty: 30 TABLET | Refills: 11 | Status: SHIPPED | OUTPATIENT
Start: 2024-08-06 | End: 2025-08-06

## 2024-08-12 DIAGNOSIS — M54.50 CHRONIC BILATERAL LOW BACK PAIN, UNSPECIFIED WHETHER SCIATICA PRESENT: ICD-10-CM

## 2024-08-12 DIAGNOSIS — G89.29 CHRONIC BILATERAL LOW BACK PAIN, UNSPECIFIED WHETHER SCIATICA PRESENT: ICD-10-CM

## 2024-08-12 RX ORDER — TOPIRAMATE 200 MG/1
TABLET ORAL
Qty: 60 TABLET | Refills: 3 | Status: SHIPPED | OUTPATIENT
Start: 2024-08-12

## 2024-08-13 ENCOUNTER — APPOINTMENT (OUTPATIENT)
Dept: PULMONOLOGY | Facility: CLINIC | Age: 59
End: 2024-08-13
Payer: MEDICARE

## 2024-08-14 ENCOUNTER — HOSPITAL ENCOUNTER (EMERGENCY)
Facility: HOSPITAL | Age: 59
Discharge: HOME | End: 2024-08-14
Attending: EMERGENCY MEDICINE
Payer: MEDICARE

## 2024-08-14 ENCOUNTER — APPOINTMENT (OUTPATIENT)
Dept: RADIOLOGY | Facility: HOSPITAL | Age: 59
End: 2024-08-14
Payer: MEDICARE

## 2024-08-14 VITALS
DIASTOLIC BLOOD PRESSURE: 96 MMHG | WEIGHT: 220 LBS | BODY MASS INDEX: 32.58 KG/M2 | RESPIRATION RATE: 14 BRPM | TEMPERATURE: 98.7 F | OXYGEN SATURATION: 98 % | HEART RATE: 90 BPM | SYSTOLIC BLOOD PRESSURE: 151 MMHG | HEIGHT: 69 IN

## 2024-08-14 DIAGNOSIS — I50.33 ACUTE ON CHRONIC DIASTOLIC HEART FAILURE (MULTI): ICD-10-CM

## 2024-08-14 DIAGNOSIS — I10 PRIMARY HYPERTENSION: ICD-10-CM

## 2024-08-14 DIAGNOSIS — R53.1 GENERALIZED WEAKNESS: Primary | ICD-10-CM

## 2024-08-14 LAB
ALBUMIN SERPL BCP-MCNC: 2.4 G/DL (ref 3.4–5)
ALP SERPL-CCNC: 96 U/L (ref 33–110)
ALT SERPL W P-5'-P-CCNC: 8 U/L (ref 7–45)
ANION GAP SERPL CALC-SCNC: 10 MMOL/L (ref 10–20)
APPEARANCE UR: CLEAR
AST SERPL W P-5'-P-CCNC: 11 U/L (ref 9–39)
BASOPHILS # BLD AUTO: 0.03 X10*3/UL (ref 0–0.1)
BASOPHILS NFR BLD AUTO: 0.6 %
BILIRUB SERPL-MCNC: 0.1 MG/DL (ref 0–1.2)
BILIRUB UR STRIP.AUTO-MCNC: NEGATIVE MG/DL
BUN SERPL-MCNC: 18 MG/DL (ref 6–23)
CALCIUM SERPL-MCNC: 5.7 MG/DL (ref 8.6–10.3)
CHLORIDE SERPL-SCNC: 111 MMOL/L (ref 98–107)
CO2 SERPL-SCNC: 22 MMOL/L (ref 21–32)
COLOR UR: NORMAL
CREAT SERPL-MCNC: 0.53 MG/DL (ref 0.5–1.05)
EGFRCR SERPLBLD CKD-EPI 2021: >90 ML/MIN/1.73M*2
EOSINOPHIL # BLD AUTO: 0.16 X10*3/UL (ref 0–0.7)
EOSINOPHIL NFR BLD AUTO: 3.4 %
ERYTHROCYTE [DISTWIDTH] IN BLOOD BY AUTOMATED COUNT: 16.6 % (ref 11.5–14.5)
GLUCOSE SERPL-MCNC: 73 MG/DL (ref 74–99)
GLUCOSE UR STRIP.AUTO-MCNC: NORMAL MG/DL
HCT VFR BLD AUTO: 29.4 % (ref 36–46)
HGB BLD-MCNC: 9 G/DL (ref 12–16)
IMM GRANULOCYTES # BLD AUTO: 0.01 X10*3/UL (ref 0–0.7)
IMM GRANULOCYTES NFR BLD AUTO: 0.2 % (ref 0–0.9)
KETONES UR STRIP.AUTO-MCNC: NEGATIVE MG/DL
LEUKOCYTE ESTERASE UR QL STRIP.AUTO: NEGATIVE
LYMPHOCYTES # BLD AUTO: 1.63 X10*3/UL (ref 1.2–4.8)
LYMPHOCYTES NFR BLD AUTO: 34.8 %
MCH RBC QN AUTO: 27.4 PG (ref 26–34)
MCHC RBC AUTO-ENTMCNC: 30.6 G/DL (ref 32–36)
MCV RBC AUTO: 89 FL (ref 80–100)
MONOCYTES # BLD AUTO: 0.52 X10*3/UL (ref 0.1–1)
MONOCYTES NFR BLD AUTO: 11.1 %
NEUTROPHILS # BLD AUTO: 2.33 X10*3/UL (ref 1.2–7.7)
NEUTROPHILS NFR BLD AUTO: 49.9 %
NITRITE UR QL STRIP.AUTO: NEGATIVE
NRBC BLD-RTO: 0 /100 WBCS (ref 0–0)
PH UR STRIP.AUTO: 5.5 [PH]
PLATELET # BLD AUTO: 157 X10*3/UL (ref 150–450)
POTASSIUM SERPL-SCNC: 3.5 MMOL/L (ref 3.5–5.3)
PROT SERPL-MCNC: 3.9 G/DL (ref 6.4–8.2)
PROT UR STRIP.AUTO-MCNC: NEGATIVE MG/DL
RBC # BLD AUTO: 3.29 X10*6/UL (ref 4–5.2)
RBC # UR STRIP.AUTO: NEGATIVE /UL
SARS-COV-2 RNA RESP QL NAA+PROBE: NOT DETECTED
SODIUM SERPL-SCNC: 139 MMOL/L (ref 136–145)
SP GR UR STRIP.AUTO: 1.01
UROBILINOGEN UR STRIP.AUTO-MCNC: NORMAL MG/DL
WBC # BLD AUTO: 4.7 X10*3/UL (ref 4.4–11.3)

## 2024-08-14 PROCEDURE — 71045 X-RAY EXAM CHEST 1 VIEW: CPT

## 2024-08-14 PROCEDURE — 85025 COMPLETE CBC W/AUTO DIFF WBC: CPT | Performed by: EMERGENCY MEDICINE

## 2024-08-14 PROCEDURE — 2500000004 HC RX 250 GENERAL PHARMACY W/ HCPCS (ALT 636 FOR OP/ED): Performed by: EMERGENCY MEDICINE

## 2024-08-14 PROCEDURE — 96360 HYDRATION IV INFUSION INIT: CPT | Performed by: EMERGENCY MEDICINE

## 2024-08-14 PROCEDURE — 71045 X-RAY EXAM CHEST 1 VIEW: CPT | Mod: FOREIGN READ | Performed by: RADIOLOGY

## 2024-08-14 PROCEDURE — 99283 EMERGENCY DEPT VISIT LOW MDM: CPT | Performed by: EMERGENCY MEDICINE

## 2024-08-14 PROCEDURE — 84520 ASSAY OF UREA NITROGEN: CPT | Performed by: EMERGENCY MEDICINE

## 2024-08-14 PROCEDURE — 96361 HYDRATE IV INFUSION ADD-ON: CPT | Performed by: EMERGENCY MEDICINE

## 2024-08-14 PROCEDURE — 87635 SARS-COV-2 COVID-19 AMP PRB: CPT | Performed by: EMERGENCY MEDICINE

## 2024-08-14 PROCEDURE — 81003 URINALYSIS AUTO W/O SCOPE: CPT | Performed by: EMERGENCY MEDICINE

## 2024-08-14 PROCEDURE — 36415 COLL VENOUS BLD VENIPUNCTURE: CPT | Performed by: EMERGENCY MEDICINE

## 2024-08-14 RX ORDER — POTASSIUM CHLORIDE 1500 MG/1
40 TABLET, EXTENDED RELEASE ORAL 2 TIMES DAILY
Qty: 360 TABLET | Refills: 0 | Status: SHIPPED | OUTPATIENT
Start: 2024-08-14 | End: 2024-11-12

## 2024-08-14 ASSESSMENT — COLUMBIA-SUICIDE SEVERITY RATING SCALE - C-SSRS
1. IN THE PAST MONTH, HAVE YOU WISHED YOU WERE DEAD OR WISHED YOU COULD GO TO SLEEP AND NOT WAKE UP?: NO
2. HAVE YOU ACTUALLY HAD ANY THOUGHTS OF KILLING YOURSELF?: NO
6. HAVE YOU EVER DONE ANYTHING, STARTED TO DO ANYTHING, OR PREPARED TO DO ANYTHING TO END YOUR LIFE?: NO

## 2024-08-14 NOTE — PROGRESS NOTES
Emergency Medicine Transition of Care Note.    I received Prema Hair in signout from Dr. Oviedo.  Please see the previous ED provider note for all HPI, PE and MDM up to the time of signout at 2 AM. This is in addition to the primary record.    In brief Prema Hair is an 59 y.o. female presenting for   Chief Complaint   Patient presents with    Weakness, Gen     At the time of signout we were awaiting: Reassessment    Diagnoses as of 08/14/24 0443   Generalized weakness       Medical Decision Making  Patient's lab work did not show any elevated white blood cell count for stomach infection.  Negative UA.  She did not have any acute kidney injury.  No metabolic and IV acidosis.  No clinically significant anemia.  Patient's potassium was noted at 3.5.  Patient can be safely discharged home with plan to continue her potassium at home and to follow-up with primary care physician.  Turn precautions were discussed    Final diagnoses:   [R53.1] Generalized weakness           Procedure  Procedures    Femi Rich MD

## 2024-08-14 NOTE — DISCHARGE INSTRUCTIONS
Please continue taking your potassium at home as previously prescribed.  Please turn ED for worsening chest pain, palpitation shortness of breath or any other concerning symptoms.

## 2024-08-14 NOTE — ED TRIAGE NOTES
The pt's gave her medication that changed colour she states she is hypersensitive to brand and colour changes. She then started to feel weak and shakie and she came to the ed.

## 2024-08-14 NOTE — ED PROVIDER NOTES
HPI   No chief complaint on file.      Chief complaint: Feeling unwell    History of present illness: Patient is a 59-year-old female with history of hypertension bipolar disorder PTSD diabetes presenting to the emergency department with complaints of feeling unwell.  According to the patient, she has a history of hypertension.  The patient states that her antihypertensive medication recently was changed.  The patient states that the color of the pill is different.  The patient states that since then, she has been feeling weak and shaky.  Given the symptoms, the patient presents to the emergency department for further evaluation.  The patient has no other complaints at this time.  She denies any fever with this.  She denies any tasha pain anywhere.      History provided by:  Patient   used: No            Patient History   Past Medical History:   Diagnosis Date    CATALINA positive     Arthritis     Asthma ([facility])     Bell's palsy     x 2    Bilateral leg edema     Bipolar disorder (Multi)     CHF (congestive heart failure) (Multi)     Chronic allergic rhinitis     Chronic constipation     Diabetes mellitus (Multi)     Diabetic neuropathy (Multi)     Dysphonia 07/05/2022    Muscle tension dysphonia    Fibromyalgia     GERD (gastroesophageal reflux disease)     under control with medication    High pulmonary arterial pressure (Multi)     moderately elevated on ECHO 10.5.23    Hyperlipidemia, unspecified 01/03/2023    Dyslipidemia    Hypertension     Hypokalemia     1.1.24- K 3.8 - on oral supplements    Hypothyroidism     Low back pain, unspecified 02/22/2021    Low back pain    Moderate tricuspid regurgitation     Obstructive sleep apnea (adult) (pediatric) 01/03/2023    ZEINA on CPAP    Paralysis of vocal cords and larynx, 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    Laryngeal paresis    Parkinsonism (Multi)     Pseudoaneurysm of carotid artery (CMS-HCC) 2020    s/p right pipeline and coil embolization of Right ICA  pseudoaneurysm    PTSD (post-traumatic stress disorder)     Thyroid cancer (Multi)     s/p total thyroidectomy, residual tissue on left side - FNA benign 2020 and 2021    Torticollis     botox injections    Vitamin D deficiency      Past Surgical History:   Procedure Laterality Date    COLONOSCOPY      KNEE ARTHROPLASTY      OTHER SURGICAL HISTORY Right 2020    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    TOTAL THYROIDECTOMY  2017    US GUIDED THYROID BIOPSY  11/19/2020    US GUIDED THYROID BIOPSY 11/19/2020 [facility] AIB LEGACY    US GUIDED THYROID BIOPSY  11/19/2020    US GUIDED THYROID BIOPSY 11/19/2020 [facility] [place] [facility]GAC[facility]     Family History   Problem Relation Name Age of Onset    Heart failure Mother      Pancreatic cancer Mother      Heart failure Father      Parkinsonism Father      Breast cancer Sister       Social History     Tobacco Use    Smoking status: Never    Smokeless tobacco: Never   Substance Use Topics    Alcohol use: Not Currently    Drug use: Never     Comment: medical marijuana last used >1 year ago       Physical Exam   ED Triage Vitals   Temp Pulse Resp BP   -- -- -- --      SpO2 Temp src Heart Rate Source Patient Position   -- -- -- --      BP Location FiO2 (%)     -- --       Physical Exam  Constitutional:       Appearance: Normal appearance.   HENT:      Head: Normocephalic and atraumatic.      Right Ear: External ear normal.      Left Ear: External ear normal.      Nose: Nose normal.      Mouth/Throat:      Mouth: Mucous membranes are moist.   Eyes:      General: Lids are normal.      Extraocular Movements: Extraocular movements intact.      Pupils: Pupils are equal, round, and reactive to light.   Cardiovascular:      Rate and Rhythm: Normal rate and regular rhythm.      Heart sounds: Normal heart sounds.   Pulmonary:      Effort: Pulmonary effort is normal.      Breath sounds: Normal breath sounds.   Abdominal:      General: Abdomen is flat.      Palpations: Abdomen is soft.       Tenderness: There is no abdominal tenderness. There is no guarding or rebound.   Musculoskeletal:         General: No deformity. Normal range of motion.      Cervical back: Normal range of motion and neck supple.   Skin:     General: Skin is warm.      Capillary Refill: Capillary refill takes less than 2 seconds.      Coloration: Skin is not jaundiced.   Neurological:      General: No focal deficit present.      Mental Status: She is alert and oriented to person, place, and time.   Psychiatric:         Mood and Affect: Mood normal.         Behavior: Behavior normal.           ED Course & MDM   Diagnoses as of 08/17/24 1943   Generalized weakness                 No data recorded                                 Medical Decision Making  Medical decision making: Patient remained stable throughout her time in the emergency department.  Patient's EKG demonstrates a normal sinus rhythm with a rate of 68 bpm isoelectric ST segments narrow QRS complexes and a QTc of 465.    I placed an order for a workup of the patient as well as IV hydration.  At this time, the patient's workup is currently pending.  Please see the oncoming physician's note for further evaluation and ultimate disposition from the emergency department.    Amount and/or Complexity of Data Reviewed  Labs: ordered.  Radiology: ordered.        Procedure  Procedures     Junior Oviedo MD  08/17/24 1946

## 2024-08-20 ENCOUNTER — APPOINTMENT (OUTPATIENT)
Dept: OTOLARYNGOLOGY | Facility: HOSPITAL | Age: 59
End: 2024-08-20
Payer: MEDICARE

## 2024-08-27 ENCOUNTER — LAB (OUTPATIENT)
Dept: LAB | Facility: LAB | Age: 59
End: 2024-08-27
Payer: MEDICARE

## 2024-08-27 DIAGNOSIS — I10 ESSENTIAL (PRIMARY) HYPERTENSION: ICD-10-CM

## 2024-08-27 DIAGNOSIS — E73.9 LACTOSE INTOLERANCE, UNSPECIFIED: Primary | ICD-10-CM

## 2024-08-27 DIAGNOSIS — E11.9 TYPE 2 DIABETES MELLITUS WITHOUT COMPLICATIONS (MULTI): ICD-10-CM

## 2024-08-27 LAB
ALBUMIN SERPL BCP-MCNC: 3.8 G/DL (ref 3.4–5)
ALP SERPL-CCNC: 117 U/L (ref 33–110)
ALT SERPL W P-5'-P-CCNC: 25 U/L (ref 7–45)
ANION GAP SERPL CALC-SCNC: 15 MMOL/L (ref 10–20)
AST SERPL W P-5'-P-CCNC: 17 U/L (ref 9–39)
BILIRUB SERPL-MCNC: 0.2 MG/DL (ref 0–1.2)
BUN SERPL-MCNC: 19 MG/DL (ref 6–23)
CALCIUM SERPL-MCNC: 8.6 MG/DL (ref 8.6–10.6)
CHLORIDE SERPL-SCNC: 102 MMOL/L (ref 98–107)
CO2 SERPL-SCNC: 30 MMOL/L (ref 21–32)
CREAT SERPL-MCNC: 0.72 MG/DL (ref 0.5–1.05)
EGFRCR SERPLBLD CKD-EPI 2021: >90 ML/MIN/1.73M*2
ERYTHROCYTE [DISTWIDTH] IN BLOOD BY AUTOMATED COUNT: 16.9 % (ref 11.5–14.5)
EST. AVERAGE GLUCOSE BLD GHB EST-MCNC: 123 MG/DL
GLUCOSE SERPL-MCNC: 80 MG/DL (ref 74–99)
HBA1C MFR BLD: 5.9 %
HCT VFR BLD AUTO: 36.9 % (ref 36–46)
HGB BLD-MCNC: 11.2 G/DL (ref 12–16)
MCH RBC QN AUTO: 26.9 PG (ref 26–34)
MCHC RBC AUTO-ENTMCNC: 30.4 G/DL (ref 32–36)
MCV RBC AUTO: 89 FL (ref 80–100)
NRBC BLD-RTO: 0 /100 WBCS (ref 0–0)
PLATELET # BLD AUTO: 213 X10*3/UL (ref 150–450)
POTASSIUM SERPL-SCNC: 3.9 MMOL/L (ref 3.5–5.3)
PROT SERPL-MCNC: 6.1 G/DL (ref 6.4–8.2)
RBC # BLD AUTO: 4.16 X10*6/UL (ref 4–5.2)
SODIUM SERPL-SCNC: 143 MMOL/L (ref 136–145)
TSH SERPL-ACNC: 4.7 MIU/L (ref 0.44–3.98)
WBC # BLD AUTO: 5 X10*3/UL (ref 4.4–11.3)

## 2024-08-27 PROCEDURE — 80053 COMPREHEN METABOLIC PANEL: CPT

## 2024-08-27 PROCEDURE — 85027 COMPLETE CBC AUTOMATED: CPT

## 2024-08-27 PROCEDURE — 82570 ASSAY OF URINE CREATININE: CPT

## 2024-08-27 PROCEDURE — 83036 HEMOGLOBIN GLYCOSYLATED A1C: CPT

## 2024-08-27 PROCEDURE — 84443 ASSAY THYROID STIM HORMONE: CPT

## 2024-08-27 PROCEDURE — 82043 UR ALBUMIN QUANTITATIVE: CPT

## 2024-08-27 PROCEDURE — 36415 COLL VENOUS BLD VENIPUNCTURE: CPT

## 2024-08-28 LAB
CREAT UR-MCNC: 54.1 MG/DL (ref 20–320)
MICROALBUMIN UR-MCNC: <7 MG/L
MICROALBUMIN/CREAT UR: NORMAL MG/G{CREAT}

## 2024-08-29 ENCOUNTER — HOSPITAL ENCOUNTER (OUTPATIENT)
Dept: RADIOLOGY | Facility: CLINIC | Age: 59
Discharge: HOME | End: 2024-08-29
Payer: MEDICARE

## 2024-08-29 DIAGNOSIS — Z96.0 VAGINAL PESSARY PRESENT: ICD-10-CM

## 2024-08-29 DIAGNOSIS — R33.9 URINARY RETENTION: ICD-10-CM

## 2024-08-29 PROCEDURE — 2550000001 HC RX 255 CONTRASTS: Performed by: OBSTETRICS & GYNECOLOGY

## 2024-08-29 PROCEDURE — 74177 CT ABD & PELVIS W/CONTRAST: CPT

## 2024-09-03 NOTE — PROGRESS NOTES
Chief Complaint     Follow-up regarding a thyroid nodule and dysphonia.      History of Present Illness    This lady was seen in September 2021 at the request of her endocrinologist. Approximately 5 years ago she had a total thyroidectomy for what was labeled a multinodular goiter. 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 She had significant voice issues after the procedure. She was left with a very hoarse voice. On an ultrasound that was done in July 2021 she had a residual left side that measured 4.8 cm. She had a needle aspirate that was done in July 2021 that showed a follicular nodule. She had markers done that showed the likelihood of malignancy to be very low. I did send her for CT scanning that was done on October 8, 2021. I personally reviewed that scan. It does show this left-sided mass in the paratracheal area that is consistent with thyroid tissue.  Her last thyroid ultrasound was done in May 2024.  It showed 2 nodules on the residual thyroid tissue that measure 1.8 and 1.6 cm.  They are both TR-3.  She had a TSH level in August 2024 which was slightly elevated at 4.70.  Her dosage has been increased.  She does have some significant neurologic issues.  She complains of some issues with her voice when she talks for a little bit.    Physical Exam    Palpation of the parotid, neck, and thyroid field fails to show any worrisome masses or adenopathies. More specifically I cannot appreciate this thyroid nodule and I also cannot feel that adenopathy.  The examination is somewhat difficult because of the patient's neck flexion.    A flexible laryngoscopy was carried out. Under topical Xylocaine and Enrique-Synephrine the scope was introduced through the nostril. The nasopharynx, base of tongue, hypopharynx, and larynx are visualized.  The vocal cords are normally mobile. There is no pooling of secretions in the piriform sinuses. There is no evidence of any mucosal lesions.  There is some  tremor.        Provider Impressions     Status post total thyroidectomy. The patient has some residual or recurrent thyroid tissue on the left side. Her last thyroglobulin was slightly lower than the previous 1.  There was really nothing worrisome on ultrasound.  There are 2 nodules that do not look suspicious.  I will continue to follow this clinically.    Dysphonia which seems to be related to her neurologic issues.  She does have tremor.    I will see her in a year.

## 2024-09-04 ENCOUNTER — APPOINTMENT (OUTPATIENT)
Dept: OTOLARYNGOLOGY | Facility: CLINIC | Age: 59
End: 2024-09-04
Payer: MEDICARE

## 2024-09-04 VITALS — HEIGHT: 68 IN | BODY MASS INDEX: 36.37 KG/M2 | TEMPERATURE: 97.8 F | WEIGHT: 240 LBS

## 2024-09-04 DIAGNOSIS — C73 MALIGNANT NEOPLASM OF THYROID GLAND (MULTI): Primary | ICD-10-CM

## 2024-09-04 DIAGNOSIS — E03.9 ACQUIRED HYPOTHYROIDISM: ICD-10-CM

## 2024-09-04 DIAGNOSIS — R49.0 DYSPHONIA: ICD-10-CM

## 2024-09-04 DIAGNOSIS — E04.1 THYROID NODULE: ICD-10-CM

## 2024-09-04 PROCEDURE — 4010F ACE/ARB THERAPY RXD/TAKEN: CPT | Performed by: OTOLARYNGOLOGY

## 2024-09-04 PROCEDURE — 31575 DIAGNOSTIC LARYNGOSCOPY: CPT | Performed by: OTOLARYNGOLOGY

## 2024-09-04 PROCEDURE — 99213 OFFICE O/P EST LOW 20 MIN: CPT | Performed by: OTOLARYNGOLOGY

## 2024-09-04 PROCEDURE — 1036F TOBACCO NON-USER: CPT | Performed by: OTOLARYNGOLOGY

## 2024-09-04 PROCEDURE — 3044F HG A1C LEVEL LT 7.0%: CPT | Performed by: OTOLARYNGOLOGY

## 2024-09-04 PROCEDURE — 3062F POS MACROALBUMINURIA REV: CPT | Performed by: OTOLARYNGOLOGY

## 2024-09-04 PROCEDURE — 3050F LDL-C >= 130 MG/DL: CPT | Performed by: OTOLARYNGOLOGY

## 2024-09-04 PROCEDURE — 3008F BODY MASS INDEX DOCD: CPT | Performed by: OTOLARYNGOLOGY

## 2024-09-04 ASSESSMENT — PATIENT HEALTH QUESTIONNAIRE - PHQ9
2. FEELING DOWN, DEPRESSED OR HOPELESS: NOT AT ALL
1. LITTLE INTEREST OR PLEASURE IN DOING THINGS: NOT AT ALL
SUM OF ALL RESPONSES TO PHQ9 QUESTIONS 1 AND 2: 0

## 2024-09-05 ENCOUNTER — APPOINTMENT (OUTPATIENT)
Dept: PAIN MEDICINE | Facility: CLINIC | Age: 59
End: 2024-09-05
Payer: MEDICARE

## 2024-09-19 ENCOUNTER — TELEPHONE (OUTPATIENT)
Dept: NEUROLOGY | Facility: CLINIC | Age: 59
End: 2024-09-19

## 2024-09-19 ENCOUNTER — APPOINTMENT (OUTPATIENT)
Dept: PAIN MEDICINE | Facility: CLINIC | Age: 59
End: 2024-09-19
Payer: MEDICARE

## 2024-09-19 DIAGNOSIS — M46.1 SACROILIITIS (CMS-HCC): ICD-10-CM

## 2024-09-19 DIAGNOSIS — M54.16 LUMBAR RADICULAR PAIN: ICD-10-CM

## 2024-09-19 PROCEDURE — 3050F LDL-C >= 130 MG/DL: CPT | Performed by: ANESTHESIOLOGY

## 2024-09-19 PROCEDURE — 3062F POS MACROALBUMINURIA REV: CPT | Performed by: ANESTHESIOLOGY

## 2024-09-19 PROCEDURE — 4010F ACE/ARB THERAPY RXD/TAKEN: CPT | Performed by: ANESTHESIOLOGY

## 2024-09-19 PROCEDURE — 99214 OFFICE O/P EST MOD 30 MIN: CPT | Performed by: ANESTHESIOLOGY

## 2024-09-19 PROCEDURE — 3044F HG A1C LEVEL LT 7.0%: CPT | Performed by: ANESTHESIOLOGY

## 2024-09-19 NOTE — TELEPHONE ENCOUNTER
Daughter came to [facility] for her mothers Carbidopa/ Levodopa 25/100 refills 1.5 TID. She currently sees Dr Swanson but is requesting refills from you for Carbidopa/ Levodopa 25/100.

## 2024-09-19 NOTE — PROGRESS NOTES
Subjective   Patient ID: Prema Hair is a 59 y.o. female with a past medical history of hypertension, hyperlipidemia, type 2 diabetes, acute on chronic diastolic heart failure, fibromyalgia who presents with low back pain    HPI:   59-year-old female who presents with chronic low back pain.  Pain is mainly located in the bilateral low back sometimes associated with left lower extremity radicular pain.  Patient describes pain as dull and burning, worse with prolonged sitting worse with transitional movements.  Does not know which movements specifically alleviate the pain.  Patient also reports intermittent radicular pain affecting the left lower extremity mainly in the anterior lateral aspect.  Patient states that the back pain is giving her more issues than the leg pain.  Reports chronic urinary incontinence for which she is following urology for.  Denies any worsening weakness of bilateral lower extremities.  Patient had a sacroiliac joint injection bilaterally in 2021 for which she had 90% relief for about 6 months.  Currently on gabapentin 300 mg 3 times a day and Topamax 200 mg daily and takes Tylenol as needed for pain.    Patient has been followed by Dr. Crow for knee and back pain in the past.  Most recently had a left genicular RFA in February 2024.  Patient currently states that the knees are not as painful as the back.     Physical Therapy: The patient has not done physical therapy within the past six months  Other Conservative Measures she has tried: Heating Pad, Ice, Massage/myofascial release, and Injections  Classes of medications tried in the past: Acetaminophen, NSAIDs, Gabapentenoids, and Opioids    Review of Systems   13-point ROS done and negative except for HPI.     Current Outpatient Medications   Medication Instructions    acetaminophen (Tylenol) 500 mg tablet 1 tablet, oral, Every 4 hours PRN    albuterol 90 mcg/actuation inhaler 2 puffs, inhalation, Every 4 hours PRN    amitriptyline  (Elavil) 10 mg tablet 1 tablet, oral, Nightly    ammonium lactate (Lac-Hydrin) 12 % lotion 1 Application, Topical, As needed    ascorbic acid (Vitamin C) 1,000 mg tablet 1 tablet, oral, Daily    atorvastatin (LIPITOR) 20 mg, oral, Nightly    azelastine (Astelin) 137 mcg (0.1 %) nasal spray 1 spray, Each Nostril, 2 times daily    baclofen (LIORESAL) 20 mg, oral, 3 times daily    budesonide-formoteroL (Symbicort) 160-4.5 mcg/actuation inhaler 2 puffs, inhalation, 2 times daily RT    bumetanide (BUMEX) 2 mg, oral, 2 times daily    calcium carbonate (Oscal) 500 mg calcium (1,250 mg) tablet 1 tablet, oral, 3 times daily    carbidopa-levodopa (Sinemet)  mg tablet 1.5 tablets, oral, 3 times daily, 8am, 12 noon and 4 pm    carvedilol (COREG) 25 mg, oral, 2 times daily    cephalexin (KEFLEX) 250 mg, oral, Daily    cholecalciferol (Vitamin D-3) 50 MCG (2000 UT) tablet 1 tablet, oral, Daily    clonazePAM (KlonoPIN) 1 mg tablet 1 tablet, oral, Every morning, 1/2 TABLET BY MOUTH EVERY EVENING AND AN ADDITIONAL 1/2 AS NEEDED<BR>    cyclobenzaprine (FLEXERIL) 10 mg, oral, Nightly PRN    diclofenac sodium (Voltaren) 1 % gel gel 4.5 inches, Topical, 3 times daily PRN    docusate sodium (COLACE) 100 mg, oral, 3 times daily    estradiol (Estrace) 0.01 % (0.1 mg/gram) vaginal cream 1 Application, vaginal, 3 times weekly, Pea-sized amount    formoterol (Perforomist) 20 mcg/2 mL nebulizer solution 2 mL, inhalation, Every 12 hours    gabapentin (NEURONTIN) 300 mg, oral, 3 times daily, and an extra 300 mg at night for a total of 1200 mg at night     Glucagon Emergency Kit (human) 1 mg, intramuscular, Every 15 min PRN    ipratropium-albuteroL (Duo-Neb) 0.5-2.5 mg/3 mL nebulizer solution 3 mL, inhalation, Every 4 hours PRN    Klor-Con M20 20 mEq ER tablet 40 mEq, oral, 2 times daily    lancets misc 1 each, miscellaneous, 3 times daily    levothyroxine (Synthroid, Levoxyl) 100 mcg tablet Take 100mcg on Mon Wed Fri and Sun - 1 hour  before breakfast    levothyroxine (SYNTHROID, LEVOXYL) 100 mcg, oral, Daily before breakfast, Monday thru Friday <BR>Dr Schwartz    lidocaine (Lidoderm) 5 % patch 1 patch, transdermal, Every 12 hours, Apply 1 patch and leave in place for 12 hours, then remove and leave off for 12 hours.    losartan (COZAAR) 50 mg, oral, Daily    metFORMIN (Glucophage) 500 mg tablet 1 tablet, oral, 2 times daily (morning and late afternoon)    minocycline 100 mg, oral, Nightly, No stop date - proph for recurrent skin infections    omega-3 fatty acids-fish oil 360-1,200 mg capsule 1 capsule, oral, Daily    omeprazole (PriLOSEC) 40 mg DR capsule 1 capsule, oral, Daily, With dinner<BR>    OXcarbazepine (Trileptal) 600 mg tablet 1 tablet, oral, Every morning    OXcarbazepine (Trileptal) 600 mg tablet 2 tablets, oral, Nightly    oxyCODONE-acetaminophen (Percocet) 5-325 mg tablet 0.5 tablets, oral, Every 6 hours PRN    polyethylene glycol (GLYCOLAX, MIRALAX) 17 g, oral, Daily, In 8oz of water, juice, or tea and drink daily<BR>    prazosin (Minipress) 5 mg capsule 1 capsule, oral, Nightly    QUEtiapine (SEROQUEL) 800 mg, oral, Nightly    sertraline (Zoloft) 100 mg tablet Take 2 tablets (200 mg) by mouth once daily. Do not start before January 2, 2024.    tiotropium (Spiriva Respimat) 2.5 mcg/actuation inhaler 2 puffs, inhalation, Daily RT    topiramate (Topamax) 200 mg tablet TAKE ONE TABLET (200 MG) BY MOUTH TWICE DAILY    Trelegy Ellipta 200-62.5-25 mcg blister with device 1 puff, inhalation, Daily PRN       Past Medical History:   Diagnosis Date    CATALINA positive     Arthritis     Asthma ([facility])     Bell's palsy     x 2    Bilateral leg edema     Bipolar disorder (Multi)     CHF (congestive heart failure) (Multi)     Chronic allergic rhinitis     Chronic constipation     Diabetes mellitus (Multi)     Diabetic neuropathy (Multi)     Dysphonia 07/05/2022    Muscle tension dysphonia    Fibromyalgia     GERD (gastroesophageal reflux disease)      under control with medication    High pulmonary arterial pressure (Multi)     moderately elevated on ECHO 10.5.23    Hyperlipidemia, unspecified 01/03/2023    Dyslipidemia    Hypertension     Hypokalemia     1.1.24- K 3.8 - on oral supplements    Hypothyroidism     Low back pain, unspecified 02/22/2021    Low back pain    Moderate tricuspid regurgitation     Obstructive sleep apnea (adult) (pediatric) 01/03/2023    ZEINA on CPAP    Paralysis of vocal cords and larynx, 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    Laryngeal paresis    Parkinsonism (Multi)     Pseudoaneurysm of carotid artery (CMS-HCC) 2020    s/p right pipeline and coil embolization of Right ICA pseudoaneurysm    PTSD (post-traumatic stress disorder)     Thyroid cancer (Multi)     s/p total thyroidectomy, residual tissue on left side - FNA benign 2020 and 2021    Torticollis     botox injections    Vitamin D deficiency         Past Surgical History:   Procedure Laterality Date    COLONOSCOPY      KNEE ARTHROPLASTY      OTHER SURGICAL HISTORY Right 2020    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    TOTAL THYROIDECTOMY  2017    US GUIDED THYROID BIOPSY  11/19/2020    US GUIDED THYROID BIOPSY 11/19/2020 [facility]IB LEGACY    US GUIDED THYROID BIOPSY  11/19/2020    US GUIDED THYROID BIOPSY 11/19/2020 [facility] [place] [place] [facility]        Family History   Problem Relation Name Age of Onset    Heart failure Mother      Pancreatic cancer Mother      Heart failure Father      Parkinsonism Father      Breast cancer Sister          Allergies   Allergen Reactions    Lisinopril Swelling        Objective     Physical Exam  General: NAD, well groomed, well nourished, head flexed and rotated to the left  Eyes: Non-icteric sclera, EOMI  Ears, Nose, Mouth, and Throat: External ears and nose appear to be without deformity or rash. No lesions or masses noted. Hearing is grossly intact.   Neck: Trachea midline  Respiratory: Nonlabored breathing   Cardiovascular: +1 peripheral edema   Skin: No  rashes or open lesions/ulcers identified on skin.    Back:   Palpation: tenderness to palpation over lumbar paraspinous muscles.   Straight leg raise: positive at 40 degrees on the left  DOMONIQUE Maneuver does reproduce pain bilaterally, positive SI compression L greater than R, positive thigh thrust on left    Neurologic:   Cranial nerves grossly intact.   Strength 5/5 and symmetric plantar/dorsiflexion   Sensation: Normal to light touch throughout, decreased pinprick bilateral feet  DTRs:normal and symmetric throughout  Nuñez: absent  Clonus: absent    Psychiatric: Alert, orientation to person, place, and time. Cooperative.    Imaging personally reviewed and independently interpreted:   MRI L spine 4/20/22  Mild degenerative changes of the lumbar spine without significant  spinal canal stenosis. Mild neural foraminal narrowing as detailed  above.    Assessment/Plan   59-year-old female who presents with chronic low back pain with radicular symptoms to the left lower extremity.  Pain is most severe on the inferior medial border of the posterior superior iliac spine.  Patient states states pain is worse with prolonged sitting and transitional movements.  On physical exam patient is positive for DOMONIQUE bilaterally and SI joint compression test left greater than right.  Patient had SI joint injections couple years ago with 90% relief for about 6 months or more in 2021.  Patient states this pain is similar to before.  Discussed plan to repeat bilateral SI joint injection under fluoroscopy.  If pain persist and/or leg symptoms get worse consider advanced imaging and lumbar epidural steroid injection.  Patient was also followed for chronic knee pain.  Most recently had genicular nerve block with good relief.  Patient states knee pain is well-controlled.     Plan:  -Bilateral SI joint injection under fluoroscopy  - Continue home exercise program  - Continue medication as prescribed      We discussed  the risks, benefits and  alternatives of the procedure including but not limited to: , Lack of efficacy , Transiently worsening pain , Bleeding, Infection , and Nerve Damage    Follow up: After procedure    The patient was invited to contact us back anytime with any questions or concerns and follow-up with us in the office as needed.     Diagnoses and all orders for this visit:  Lumbar radicular pain  Sacroiliitis (CMS-HCC)  -     FL pain management; Future  -     Sacroiliac Joint Injection; Future  Other orders  -     NPO Diet Except: Sips with meds; Effective now; Standing  -     Height and weight; Standing  -     Insert and maintain peripheral IV; Standing  -     Saline lock IV; Standing  -     POCT Glucose; Standing  -     Type And Screen; Standing  -     Adult diet Regular; Standing  -     Vital Signs; Standing  -     Notify physician - Standard Parameters; Standing  -     Prior to Discharge O2 Weaning; Standing  -     Pulse oximetry, continuous; Standing      This note was generated with the aid of dictation software, there may be typos despite my attempts at proofreading.     Patient seen and plan of care discussed with Dr. Tristin Ponce MD  Pain Fellow

## 2024-09-20 DIAGNOSIS — G20.C PARKINSONISM, UNSPECIFIED PARKINSONISM TYPE (MULTI): ICD-10-CM

## 2024-09-20 RX ORDER — CARBIDOPA AND LEVODOPA 25; 100 MG/1; MG/1
1.5 TABLET ORAL 3 TIMES DAILY
Qty: 135 TABLET | Refills: 11 | Status: SHIPPED | OUTPATIENT
Start: 2024-09-20

## 2024-09-27 ENCOUNTER — APPOINTMENT (OUTPATIENT)
Dept: PULMONOLOGY | Facility: CLINIC | Age: 59
End: 2024-09-27
Payer: MEDICARE

## 2024-10-01 ENCOUNTER — OFFICE VISIT (OUTPATIENT)
Dept: UROLOGY | Facility: CLINIC | Age: 59
End: 2024-10-01
Payer: MEDICARE

## 2024-10-01 VITALS
WEIGHT: 231 LBS | DIASTOLIC BLOOD PRESSURE: 68 MMHG | BODY MASS INDEX: 35.12 KG/M2 | SYSTOLIC BLOOD PRESSURE: 112 MMHG | HEART RATE: 77 BPM

## 2024-10-01 DIAGNOSIS — N39.490 OVERFLOW INCONTINENCE: ICD-10-CM

## 2024-10-01 DIAGNOSIS — Z96.0 VAGINAL PESSARY PRESENT: ICD-10-CM

## 2024-10-01 DIAGNOSIS — R33.9 URINARY RETENTION: Primary | ICD-10-CM

## 2024-10-01 DIAGNOSIS — N95.2 ATROPHIC VAGINITIS: ICD-10-CM

## 2024-10-01 DIAGNOSIS — N81.89 PELVIC FLOOR WEAKNESS: ICD-10-CM

## 2024-10-01 DIAGNOSIS — R35.0 URINARY FREQUENCY: ICD-10-CM

## 2024-10-01 DIAGNOSIS — N81.11 MIDLINE CYSTOCELE: ICD-10-CM

## 2024-10-01 PROCEDURE — 51701 INSERT BLADDER CATHETER: CPT | Performed by: OBSTETRICS & GYNECOLOGY

## 2024-10-01 PROCEDURE — 3044F HG A1C LEVEL LT 7.0%: CPT | Performed by: OBSTETRICS & GYNECOLOGY

## 2024-10-01 PROCEDURE — 3062F POS MACROALBUMINURIA REV: CPT | Performed by: OBSTETRICS & GYNECOLOGY

## 2024-10-01 PROCEDURE — 99214 OFFICE O/P EST MOD 30 MIN: CPT | Performed by: OBSTETRICS & GYNECOLOGY

## 2024-10-01 PROCEDURE — 3074F SYST BP LT 130 MM HG: CPT | Performed by: OBSTETRICS & GYNECOLOGY

## 2024-10-01 PROCEDURE — 3078F DIAST BP <80 MM HG: CPT | Performed by: OBSTETRICS & GYNECOLOGY

## 2024-10-01 PROCEDURE — 1036F TOBACCO NON-USER: CPT | Performed by: OBSTETRICS & GYNECOLOGY

## 2024-10-01 PROCEDURE — 3050F LDL-C >= 130 MG/DL: CPT | Performed by: OBSTETRICS & GYNECOLOGY

## 2024-10-01 PROCEDURE — G2211 COMPLEX E/M VISIT ADD ON: HCPCS | Performed by: OBSTETRICS & GYNECOLOGY

## 2024-10-01 PROCEDURE — 4010F ACE/ARB THERAPY RXD/TAKEN: CPT | Performed by: OBSTETRICS & GYNECOLOGY

## 2024-10-01 NOTE — PROGRESS NOTES
Subjective   Patient ID: Prema Hair is a 59 y.o. female who presents for follow up.    HPI  58-year-old with symptomatic cystocele, pelvic floor weakness, urinary urgency and frequency, and vaginal atrophy with 3 inch ring with support pessary in place with urinary retention worsening following recent knee replacement surgery and hospitalization 4/12/2024 due to toxic metabolic encephalopathy secondary to opiates and concussion from a fall complicated with urinary retention proceeding with CIC by her daughter.     The daughter has been catheterizing twice daily. Her PVR is 350mL by straight cath.  They did not use the CIC this morning.   She denies any significant lower urinary tract symptoms.      She denies anyy issues with the pessary. She continues her vaginal estrogen therapy.  She denies any vaginal complaints.    She denies any bowel related complaints, no fecal or flatal incontinence.      She has no other complaints.    From Previous note  This visit was performed through telemedicine  58-year-old with symptomatic cystocele, pelvic floor weakness, urinary urgency and frequency, and vaginal atrophy with 3 inch ring with support pessary in place with urinary retention worsening following recent knee replacement surgery and hospitalization 4/12/2024 due to toxic metabolic encephalopathy secondary to opiates and concussion from a fall complicated with urinary retention.    The daughter has had no difficulties with her CIC.  She is catheterizing 4 between 250 and 400 cc of urine.  She denies any significant lower urinary tract symptoms.  She has run out of supplies and has not been contacted by [facility] medical.    She continues her vaginal estrogen therapy.  She denies any vaginal complaints.    She has no other complaints.      From Previous note  58-year-old with symptomatic cystocele, pelvic floor weakness, urinary urgency and frequency, and vaginal atrophy with 3 inch ring with support pessary in place  with urinary retention worsening following recent knee replacement surgery and hospitalization 4/12/2024 due to toxic metabolic encephalopathy secondary to opiates and concussion from a fall complicated with urinary retention.    The patient PVR today is 815 ml     She denies any vaginal complaints, no abnormal bleeding or discharge.     She denies any bowel related complaints, no fecal or flatal incontinence.    She has no other complaints.    From Previous note  This visit was performed through telemedicine   58-year-old with symptomatic cystocele, pelvic floor weakness, urinary urgency and frequency, and vaginal atrophy with 3 inch ring with support pessary in place with urinary retention worsening following recent knee replacement surgery and hospitalization 4/12/2024 due to toxic metabolic encephalopathy secondary to opiates and concussion from a fall.    The patient and daughter presents to discuss her UDS test results, which demonstrates that her bladder is emptying appropriately. There is no evidence of stress incontinence. Possibility of removing the cathter at home was discussed. She has been utilizing her 250 mg Keflex once a day. She is also continuing her Oxybutynin, possibility of stopped the medication due to risk of retention  was discussed.     She denies any vaginal complaints, no abnormal bleeding or discharge. She is utilizing the vaginal estrogen cream. She is also continuing her     She denies any bowel related complaints, no fecal or flatal incontinence.    She has no other complaints.      From Previous note  58-year-old with symptomatic cystocele, pelvic floor weakness, urinary urgency and frequency, and vaginal atrophy with 3 inch ring with support pessary in place with worsening incomplete bladder emptying and urinary incontinence complaints. She has a history of osteoarthritis in her right knee, s/p right knee replacement.     The patient is accompanied by her daughter. She was admitted  to [facility] on 04/07/2024 with concerns for toxic metabolic encephalopathy secondary to opiates and head injury. They put a catheter in place at that time that she reports has not been draining completely. She continues to experience episodic leakage around the catheter but it appears to be draining appropriately.  She is also noted episodic abdominal pain.     She still has a pessary in place.  She denies any vaginal complaints at this time.    She denies constipation, she has been utilizing colace and MiraLAX. No fecal or flatal incontinence.     From Previous note  58-year-old with symptomatic cystocele, pelvic floor weakness, urinary urgency and frequency, and vaginal atrophy with pessary in place with history of incomplete bladder emptying and worsening urinary incontinence complaints.    The patient presents with her daughter. She notes worsening urinary urgency and frequency with incontinence.  She is continuing her Oxybutynin without much relief. Her  ml by ultrasound. She denies any UTI like symptoms.     She thinks her pessary expulsed due to the constipation a little over 1 week ago.  She denies any vaginal complaints, no abnormal bleeding or discharge.  She is utilizing the vaginal estrogen cream.  She does feel her vaginal bulge with Valsalva.     She also complaints of constipation, she has been utilizing colace and MiraLAX. No fecal or flatal incontinence.     She has no other complaints.     From Previous note  This visit was performed through telemedicine  58-year-old with symptomatic cystocele, pelvic floor weakness, urinary urgency and frequency, and vaginal atrophy with pessary in place with history of incomplete bladder emptying and worsening urinary incontinence complaints.     The patient has had improved control with her oxybutynin. She denies any dry mouth or constipation complaints. She denies any retention concerns. However she has noted terminal urge incontinence and stress  incontinence complaints when she waits 4 to 5 hours between urinations. She denies any UTI symptoms.     She denies any bowel related complaints.      She has no other complaints.        From previous note  58-year-old with symptomatic cystocele, pelvic floor weakness, urinary urgency and frequency, and vaginal atrophy with pessary in place with history of incomplete bladder emptying and worsening stress urinary incontinence complaints.     The patient reports that she has been leaking urine more but had no problem emptying her bladder since the last visit. She states that she is wet during the night. She is compliant with tamsulosin. PVR today is 0. Urine appears negative.     She denies any vaginal complaints, no abnormal vaginal bleeding or discharge. She is satisfied from the pessary standpoint. She is utilizing her vaginal estrogen therapy.     She denies any bowel related complaints, no fecal or flatal incontinence.     She has no other complaints.     From previous note  58-year-old with symptomatic cystocele, pelvic floor weakness, urinary urgency and frequency, and vaginal atrophy with pessary in place with incomplete bladder emptying.     The patient appears to be emptying her bladder well, PVR today is 0. She is utilizing the tamsulosin with improvements in her bladder emptying but does need to double void. She continues to utilize the daily cephalexin therapy, cranberry extract tablets and denies any UTI like symptoms. he does note episodic leaking on laughing, coughing and sneezing.     She denies any vaginal complaints, no abnormal vaginal bleeding or discharge. She is satisfied from the pessary standpoint. She is utilizing her vaginal estrogen therapy.     She denies any bowel related complaints, no fecal or flatal incontinence.     She has no other complaints.     From previous note  58-year-old with symptomatic cystocele, pelvic floor weakness, urinary urgency and frequency, and vaginal atrophy with  "pessary in place with incomplete bladder emptying.     The patient appears to be emptying her bladder well, PVR today is 0. She states the past week was \"challenging\" and she has to double void and bear down to empty. She has stopped her oxybutynin and appears to be having benefits with her tamsulosin. She continues her daily low-dose cephalexin. She denies any UTI like symptoms. Urinalysis is negative today      She denies any vaginal complaints, no abnormal vaginal bleeding or discharge. She is satisfied from the pessary standpoint. She is utilizing her vaginal estrogen therapy.     She denies any bowel related complaints, no fecal or flatal incontinence.     She has no other complaints.     From previous note  58-year-old with symptomatic cystocele, pelvic floor weakness, urinary urgency and frequency, and vaginal atrophy with pessary in place presenting for follow-up regarding her pessary and acute urinary tract infection.     The patient presents today with a roughly 70 history of worsening lower urinary tract symptoms. PVR today is 300 cc with a negative urinalysis. She did undergo a cervical epidural C6/C7 injection 1 week ago. She continues her oxybutynin therapy and daily low-dose cephalexin. She is utilizing her vaginal estrogen therapy.     She denies any worsening constipation complaints. She was recently started on a new diuretic therapy.     She has no other complaints.     From previous note  57-year-old with symptomatic cystocele, pelvic floor weakness, urinary urgency and frequency, and vaginal atrophy with pessary in place presenting for follow-up regarding her lower urinary tract symptoms and recent urinary retention.     The patient underwent pain management surgery on 02/03/23. She states at that time her physician told her that she had a urinary tract infection. She notes some sensation of this prior to this procedure. The symptoms are now bothersome for her as she is experiencing discomfort, " "burning micturition and urgency. She continues to utilize the Macrodantin, Oxybutynin and vaginal estrogen cream. She is satisfied from the pessary standpoint.      She denies any vaginal complaints, no abnormal vaginal bleeding or discharge.     She denies any bowel related complaints, no fecal or flatal incontinence.     She has no other complaints.        From previous note  57-year-old with symptomatic cystocele, pelvic floor weakness, urinary urgency and frequency, and vaginal atrophy with pessary in place presenting for follow-up regarding her lower urinary tract symptoms and recent urinary retention.     The patient states that she is not is not emptying well for the past week. She has been UTI free for 6 months.     Urinalysis today is grossly infected.     She denies any abnormal vaginal bleeding or discharge. However, she states that she is experiencing vaginal pain for the past week.      She does have a longstanding history of chronic constipation, she states that her bowel complaints have improved. She otherwise denies any fecal or flatal incontinence.        She has no new complaints.      From previous note   The following visit was performed to telemedicine  57-year-old presenting with history of symptomatic cystocele, urinary urgency, pelvic floor weakness, and vaginal atrophy with a #3 ring without support.     The patient is overall very satisfied with her present therapy. She is utilizing her vaginal estrogen therapy and her oxybutynin. She feels well controlled from a urinary urgency and frequency standpoint. She denies any abnormal vaginal bleeding or discharge. She feels that her pessary is \"working\".      She has no new complaints.     From previous note  55-year-old presenting as referral from Dr. Marino with complaints of \"bladder and uterus falling\".     The patient noted roughly 1 month ago a feeling of a vaginal bulge with acute onset vaginal pressure and pulling. She took a picture of " this and was evaluated by Dr. Marino who noted an anterior wall descent. Since that time she has denied any abnormal vaginal bleeding or discharge. She is not sexually active. She denies any significant changes in her bowel related complaints. She does have a longer standing history of urinary urgency and stress urinary incontinence complaints predating the prolapse complaints. She presents today to discuss her prolapse complaints.     The patient notes a history of stress urinary incontinence both with a full and empty bladder noted over the last year. She also notes urinary urgency up to every 1-2 hours. She notes 0-1 episodes of nocturia. She denies enuresis. She denies a history of nephrolithiasis, chronic urinary tract infections, or any gross hematuria. She is never been evaluated for these complaints.     She does have a longstanding history of chronic constipation for which she takes daily fiber therapy and titrates MiraLAX for a soft bowel movement every day to 2 days. She otherwise denies any fecal or flatal incontinence.     As above, the patient is not sexually active. She denies any abnormal vaginal bleeding or discharge.     The patient does drink a large volume of fluid up to 6 to 816 ounce bottles of water daily. She also utilizes a CPAP machine at night.     She has no other complaints.   Review of Systems  Constitutional: No fever, No chills and No fatigue.   Eyes: No vision problems and No dryness of the eyes.   ENT: No dry mouth, No hearing loss and No nosebleeds.   Cardiovascular: No chest pain, No palpitations and No orthopnea.   Respiratory: No shortness of breath, No cough and No wheezing.   Gastrointestinal: No abdominal pain, No constipation, No nausea, No diarrhea, No vomiting and No melena.   Genitourinary: As noted in HPI.   Musculoskeletal: No back pain, No myalgias, No muscle weakness, No joint swelling and No leg edema.   Integumentary: No rashes, No skin lesion and No itching.    Neurological: No headache, No numbness and No dizziness.   Psychiatric: No sleep disturbances, No anxiety and No depression.   Endocrine: No hot flashes, No loss of hair and No hirsutism.   Hematologic/Lymphatic: No swollen glands, No tendency for easy bleeding and No tendency for easy bruising.   All other systems have been reviewed and are negative for complaint.        Objective   Physical Exam    PHYSICAL EXAMINATION:  No LMP recorded. Patient is postmenopausal.  There is no height or weight on file to calculate BMI.  There were no vitals taken for this visit.  General Appearance: well appearing  Neuro: Alert and oriented   HEENT: mucous membranes moist, neck supple  Resp: No respiratory distress, normal work of breathing  MSK: normal range of motion, gait appropriate    The patient's #3 ring with support pessary was removed, cleaned, and replaced.  There was no evidence of excoriations or other abnormalities.    The patient was catheterized with a 14 Polish straight cath with return of 350 cc of clear urine.      Assessment/Plan   58-year-old with symptomatic cystocele, pelvic floor weakness, urinary urgency and frequency, and vaginal atrophy with 3 inch ring with support pessary in place with urinary retention worsening following recent knee replacement surgery and hospitalization 4/12/2024 due to toxic metabolic encephalopathy secondary to opiates and concussion from a fall complicated with urinary retention proceeding with CIC by her daughter.     1. We have previously discussed the patient's symptomatic vaginal bulge complaints. She was successfully fitted with a #3 ring with support 2/16/2021. She has noted worsening stress urinary incontinence complaints. We discussed refitting her with a #3 ring with knob which was performed 6/27/2023.  This was expulsed..  She was refitted with a 3 inch ring with support 2/5/2024.  She will require every 4 month pessary follow-up.  Uncomplicated pessary maintenance  6/4/24.    2.  The patient's daughter was successfully taught CIC with the patient presenting with 850 cc of urinary retention 6/4/2024.  She appears to be having excellent results with this.  She unfortunately has not had her supplies delivered and we will work with [address] [place] [address] for her to continue this at least twice daily moving forward.  We have previously discussed the patient's urodynamics performed 5/22/2024.  The patient has a history of incomplete bladder emptying.  However urodynamics demonstrated terminal DO at a volume of 150 cc with a strong bladder contraction and a PVR of 0 at the completion of the study.  Unfortunately over the last week she has had worsening urinary retention concerns.  Prior to her recent hospitalization and urinary retention concerns, she had improvements with improved voiding techniques and tamsulosin.  We discussed not utilizing tamsulosin in the future given her recent falls.  She will continue her CIC in the morning and prior to bed. We discussed increasing her CIC use should her volumes be greater than 400 and decreasing this should they be less than 150 cc.      3. We have previously discussed the safety, efficacy, proper utilization of vaginal estrogen therapy. She will continue her vaginal estrogen therapy 3 times weekly.     4. As above, we discussed the patient's weak pelvic floor as relates to her lower urinary tract symptoms and symptomatic pelvic organ prolapse. She is not interested in pelvic floor physical therapy at this time.     5. We again discussed her chronic urinary tract infections. She will continue her low-dose cephalexin therapy after she completes her fluconazole for her yeast cystitis. She has previously utilized low-dose Macrobid but this has been changed due to recent sensitivities. She was previously provided a standing order for urinalysis and urine culture. As above she will continue her vaginal estrogen therapy. She will also continue daily  cranberry extract tablets and continue to push fluids.      6. The patient will follow-up in January/February 2025 for pessary maintenance.  She will contact the clinic should she require any further CIC supplies.    TYREL Acevedo MD       Scribe Attestation  By signing my name below, ICharito Scribe attest that this documentation has been prepared under the direction and in the presence of Norbert Acevedo MD. All medical record entries made by the Scribe were at my direction or personally dictated by me. I have reviewed the chart and agree that the record accurately reflects my personal performance of the history, physical exam, discussion and plan.

## 2024-10-01 NOTE — PATIENT INSTRUCTIONS
Please utilize your clean intermittent catheterization once in the morning and before bedtime.  After you urinate, please catheterize.  Please record these values.  Should these values be consistently below 150 cc, you may stop your catheterizations.    Please follow-up in January/ February 2025 for pessary maintenance.    Please continue your vaginal estrogen therapy 3 times a week.    Call the clinic with questions or concerns.    [phone]

## 2024-10-08 DIAGNOSIS — I50.33 ACUTE ON CHRONIC DIASTOLIC HEART FAILURE: ICD-10-CM

## 2024-10-08 RX ORDER — CARVEDILOL 25 MG/1
25 TABLET ORAL
Qty: 60 TABLET | Refills: 0 | OUTPATIENT
Start: 2024-10-08

## 2024-10-08 RX ORDER — CHOLECALCIFEROL (VITAMIN D3) 25 MCG
TABLET ORAL DAILY
Qty: 60 TABLET | Refills: 0 | OUTPATIENT
Start: 2024-10-08

## 2024-10-09 ENCOUNTER — LAB (OUTPATIENT)
Dept: LAB | Facility: LAB | Age: 59
End: 2024-10-09
Payer: MEDICARE

## 2024-10-09 DIAGNOSIS — E78.5 HYPERLIPIDEMIA, UNSPECIFIED: Primary | ICD-10-CM

## 2024-10-09 PROCEDURE — 36415 COLL VENOUS BLD VENIPUNCTURE: CPT

## 2024-10-09 PROCEDURE — 84443 ASSAY THYROID STIM HORMONE: CPT

## 2024-10-10 LAB — TSH SERPL-ACNC: 0.69 MIU/L (ref 0.44–3.98)

## 2024-10-23 NOTE — H&P
HISTORY AND PHYSICAL    History Of Present Illness  Prema Hair is a 59 y.o. female presenting with hypertension, hyperlipidemia, type 2 diabetes, acute on chronic diastolic heart failure, fibromyalgia, bilateral sacroiliitis.  Here for Bilateral sacroiliac joint injection    she denies any recent antibiotic use or infections and she denies contrast or local anesthetic allergies     Pre-sedation evaluation:  ASA Classification (bolded):   ASA I: Healthy patient, non-smoking, no none or minimal alcohol use  ASA II: Patient with mild systemic disease, without substantiative functional limitations.  Current smoker, social alcohol drinker, pregnancy, obesity (BMI 30-40)DM/HTN,, well-controlled mild lung disease  ASA III: Patient with severe systemic disease; substantiative of functional limitation; One or more moderate to severe diseases: Poorly controlled DM/HTN, COPD, morbid obesity (BMI>40), active hepatitis, alcohol abuse/dependence, implanted pacemaker, moderate reduction of ejection fraction, ESRD on dialysis, history (>3months) of MI, CVA, TIA or CAD/stents  ASA IV: Patient with severe systemic disease that is a constant threat to life; recent (<3 months) MI, CVA, TIA or CAD/stents, ongoing cardiac ischemia or severe valvular dysfunction, severely reduced ejection fraction, shock, sepsis, DIC, ESRD not undergoing regular scheduled dialysis    Mallampati score (bolded):   Class I: Complete visualization of the soft palate  Class II: Complete visualization of the uvula  Class III: Visualization of only the base of the uvula  Class IV: Soft palate is not visible at all    Past Medical History  Past Medical History:   Diagnosis Date    CATALINA positive     Arthritis     Asthma     Bell's palsy     x 2    Bilateral leg edema     Bipolar disorder     CHF (congestive heart failure)     Chronic allergic rhinitis     Chronic constipation     Diabetes mellitus (Multi)     Diabetic neuropathy (Multi)     Dysphonia  07/05/2022    Muscle tension dysphonia    Fibromyalgia     GERD (gastroesophageal reflux disease)     under control with medication    High pulmonary arterial pressure (Multi)     moderately elevated on ECHO 10.5.23    Hyperlipidemia, unspecified 01/03/2023    Dyslipidemia    Hypertension     Hypokalemia     1.1.24- K 3.8 - on oral supplements    Hypothyroidism     Low back pain, unspecified 02/22/2021    Low back pain    Moderate tricuspid regurgitation     Obstructive sleep apnea (adult) (pediatric) 01/03/2023    ZEINA on CPAP    Paralysis of vocal cords and larynx, 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    Laryngeal paresis    Parkinsonism (Multi)     Pseudoaneurysm of carotid artery 2020    s/p right pipeline and coil embolization of Right ICA pseudoaneurysm    PTSD (post-traumatic stress disorder)     Thyroid cancer (Multi)     s/p total thyroidectomy, residual tissue on left side - FNA benign 2020 and 2021    Torticollis     botox injections    Vitamin D deficiency        Surgical History  Past Surgical History:   Procedure Laterality Date    COLONOSCOPY      KNEE ARTHROPLASTY      OTHER SURGICAL HISTORY Right 2020    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    TOTAL THYROIDECTOMY  2017    US GUIDED THYROID BIOPSY  11/19/2020    US GUIDED THYROID BIOPSY 11/19/2020 [facility]    US GUIDED THYROID BIOPSY  11/19/2020    US GUIDED THYROID BIOPSY 11/19/2020 [facility]        Social History  She reports that she has never smoked. She has never used smokeless tobacco. She reports that she does not currently use alcohol. She reports that she does not use drugs.    Family History  Family History   Problem Relation Name Age of Onset    Heart failure Mother      Pancreatic cancer Mother      Heart failure Father      Parkinsonism Father      Breast cancer Sister          Allergies  Lisinopril    Review of Systems   12 point ROS done and negative except for the above.   Physical Exam     General: NAD, well groomed,  well nourished  Eyes: Non-icteric sclera, EOMI  Ears, Nose, Mouth, and Throat: External ears and nose appear to be without deformity or rash. No lesions or masses noted. Hearing is grossly intact.   Neck: Trachea midline  Respiratory: Nonlabored breathing   Cardiovascular: No peripheral edema   Skin: No rashes or open lesions/ulcers identified on skin.    Last Recorded Vitals  There were no vitals taken for this visit.    Relevant Results  Current Outpatient Medications   Medication Instructions    acetaminophen (Tylenol) 500 mg tablet 1 tablet, oral, Every 4 hours PRN    albuterol 90 mcg/actuation inhaler 2 puffs, inhalation, Every 4 hours PRN    amitriptyline (Elavil) 10 mg tablet 1 tablet, oral, Nightly    ammonium lactate (Lac-Hydrin) 12 % lotion 1 Application, Topical, As needed    ascorbic acid (Vitamin C) 1,000 mg tablet 1 tablet, oral, Daily    atorvastatin (LIPITOR) 20 mg, oral, Nightly    azelastine (Astelin) 137 mcg (0.1 %) nasal spray 1 spray, Each Nostril, 2 times daily    baclofen (LIORESAL) 20 mg, oral, 3 times daily    budesonide-formoteroL (Symbicort) 160-4.5 mcg/actuation inhaler 2 puffs, inhalation, 2 times daily RT    bumetanide (BUMEX) 2 mg, oral, 2 times daily    calcium carbonate (Oscal) 500 mg calcium (1,250 mg) tablet 1 tablet, oral, 3 times daily    carbidopa-levodopa (Sinemet)  mg tablet 1.5 tablets, oral, 3 times daily, 8am, 12 noon and 4 pm    carvedilol (COREG) 25 mg, oral, 2 times daily    cephalexin (KEFLEX) 250 mg, oral, Daily    cholecalciferol (Vitamin D-3) 50 MCG (2000 UT) tablet 1 tablet, oral, Daily    clonazePAM (KlonoPIN) 1 mg tablet 1 tablet, oral, Every morning, 1/2 TABLET BY MOUTH EVERY EVENING AND AN ADDITIONAL 1/2 AS NEEDED      cyclobenzaprine (FLEXERIL) 10 mg, oral, Nightly PRN    diclofenac sodium (Voltaren) 1 % gel gel 4.5 inches, Topical, 3 times daily PRN    docusate sodium (COLACE) 100 mg, oral, 3 times daily    estradiol (Estrace) 0.01 % (0.1 mg/gram)  vaginal cream 1 Application, vaginal, 3 times weekly, Pea-sized amount    formoterol (Perforomist) 20 mcg/2 mL nebulizer solution 2 mL, inhalation, Every 12 hours    gabapentin (NEURONTIN) 300 mg, oral, 3 times daily, and an extra 300 mg at night for a total of 1200 mg at night     Glucagon Emergency Kit (human) 1 mg, intramuscular, Every 15 min PRN    ipratropium-albuteroL (Duo-Neb) 0.5-2.5 mg/3 mL nebulizer solution 3 mL, inhalation, Every 4 hours PRN    Klor-Con M20 20 mEq ER tablet 40 mEq, oral, 2 times daily    lancets misc 1 each, miscellaneous, 3 times daily    levothyroxine (Synthroid, Levoxyl) 100 mcg tablet Take 100mcg on Mon Wed Fri and Sun - 1 hour before breakfast    levothyroxine (SYNTHROID, LEVOXYL) 100 mcg, oral, Daily before breakfast, Monday thru Friday   Dr Schwartz    lidocaine (Lidoderm) 5 % patch 1 patch, transdermal, Every 12 hours, Apply 1 patch and leave in place for 12 hours, then remove and leave off for 12 hours.    losartan (COZAAR) 50 mg, oral, Daily    metFORMIN (Glucophage) 500 mg tablet 1 tablet, oral, 2 times daily (morning and late afternoon)    minocycline 100 mg, oral, Nightly, No stop date - proph for recurrent skin infections    omega-3 fatty acids-fish oil 360-1,200 mg capsule 1 capsule, oral, Daily    omeprazole (PriLOSEC) 40 mg DR capsule 1 capsule, oral, Daily, With dinner      OXcarbazepine (Trileptal) 600 mg tablet 1 tablet, oral, Every morning    OXcarbazepine (Trileptal) 600 mg tablet 2 tablets, oral, Nightly    oxyCODONE-acetaminophen (Percocet) 5-325 mg tablet 0.5 tablets, oral, Every 6 hours PRN    polyethylene glycol (GLYCOLAX, MIRALAX) 17 g, oral, Daily, In 8oz of water, juice, or tea and drink daily      prazosin (Minipress) 5 mg capsule 1 capsule, oral, Nightly    QUEtiapine (SEROQUEL) 800 mg, oral, Nightly    sertraline (Zoloft) 100 mg tablet Take 2 tablets (200 mg) by mouth once daily. Do not start before January 2, 2024.    tiotropium (Spiriva Respimat) 2.5  "mcg/actuation inhaler 2 puffs, inhalation, Daily RT    topiramate (Topamax) 200 mg tablet TAKE ONE TABLET (200 MG) BY MOUTH TWICE DAILY    Malgorzata Ellipta 200-62.5-25 mcg blister with device 1 puff, inhalation, Daily PRN      Lab Results   Component Value Date    WBC 5.0 08/27/2024    HGB 11.2 (L) 08/27/2024    HCT 36.9 08/27/2024    MCV 89 08/27/2024     08/27/2024      Lab Results   Component Value Date    INR 1.0 12/27/2023    INR 0.9 05/15/2023    INR 1.0 12/07/2020    PROTIME 11.7 12/27/2023    PROTIME 10.9 05/15/2023    PROTIME 11.7 12/07/2020     No results found for: \"PTT\"  Lab Results   Component Value Date    GLUCOSE 80 08/27/2024    CALCIUM 8.6 08/27/2024     08/27/2024    K 3.9 08/27/2024    CO2 30 08/27/2024     08/27/2024    BUN 19 08/27/2024    CREATININE 0.72 08/27/2024       === 09/22/23 ===    MR BRAIN W AND WO CONTRAST    - Impression -  Essentially unremarkable MRI of the brain, noting that the current  study is not tailored to assess the brainstem for subtle findings  which can be seen on imaging in patients with Parkinson's disease.    This study was interpreted at [facility].      MACRO:  None       Assessment/Plan   Prema Hair is a 59 y.o. F who presents for bilateral sacroiliac joint injection.     Risks, benefits, alternatives discussed. All questions answered to the best of my ability. Patient agrees to proceed. Consent signed and patient marked appropriately.    -We will proceed with planned procedure        Carlton Minor MD  Interventional Pain Fellow, PGY-5  [facility]    "

## 2024-10-24 ENCOUNTER — HOSPITAL ENCOUNTER (OUTPATIENT)
Facility: HOSPITAL | Age: 59
Discharge: HOME | End: 2024-10-24
Payer: MEDICARE

## 2024-10-24 VITALS
OXYGEN SATURATION: 97 % | HEIGHT: 68 IN | SYSTOLIC BLOOD PRESSURE: 129 MMHG | TEMPERATURE: 98.1 F | WEIGHT: 230 LBS | BODY MASS INDEX: 34.86 KG/M2 | DIASTOLIC BLOOD PRESSURE: 74 MMHG | HEART RATE: 72 BPM | RESPIRATION RATE: 16 BRPM

## 2024-10-24 DIAGNOSIS — M46.1 SACROILIITIS (CMS-HCC): ICD-10-CM

## 2024-10-24 PROCEDURE — 2500000004 HC RX 250 GENERAL PHARMACY W/ HCPCS (ALT 636 FOR OP/ED): Performed by: ANESTHESIOLOGY

## 2024-10-24 PROCEDURE — 2550000001 HC RX 255 CONTRASTS: Performed by: ANESTHESIOLOGY

## 2024-10-24 RX ORDER — ROPIVACAINE HYDROCHLORIDE 5 MG/ML
INJECTION, SOLUTION EPIDURAL; INFILTRATION; PERINEURAL
Status: DISCONTINUED
Start: 2024-10-24 | End: 2024-10-25 | Stop reason: HOSPADM

## 2024-10-24 RX ORDER — LIDOCAINE HYDROCHLORIDE 5 MG/ML
INJECTION, SOLUTION INFILTRATION; INTRAVENOUS
Status: DISCONTINUED
Start: 2024-10-24 | End: 2024-10-25 | Stop reason: HOSPADM

## 2024-10-24 RX ORDER — ROPIVACAINE HYDROCHLORIDE 5 MG/ML
INJECTION, SOLUTION EPIDURAL; INFILTRATION; PERINEURAL
Status: COMPLETED | OUTPATIENT
Start: 2024-10-24 | End: 2024-10-24

## 2024-10-24 RX ORDER — MIDAZOLAM HYDROCHLORIDE 1 MG/ML
INJECTION, SOLUTION INTRAMUSCULAR; INTRAVENOUS
Status: COMPLETED | OUTPATIENT
Start: 2024-10-24 | End: 2024-10-24

## 2024-10-24 RX ORDER — METHYLPREDNISOLONE ACETATE 40 MG/ML
INJECTION, SUSPENSION INTRA-ARTICULAR; INTRALESIONAL; INTRAMUSCULAR; SOFT TISSUE
Status: COMPLETED | OUTPATIENT
Start: 2024-10-24 | End: 2024-10-24

## 2024-10-24 RX ORDER — MIDAZOLAM HYDROCHLORIDE 1 MG/ML
INJECTION INTRAMUSCULAR; INTRAVENOUS
Status: DISCONTINUED
Start: 2024-10-24 | End: 2024-10-25 | Stop reason: HOSPADM

## 2024-10-24 RX ORDER — METHYLPREDNISOLONE ACETATE 40 MG/ML
INJECTION, SUSPENSION INTRA-ARTICULAR; INTRALESIONAL; INTRAMUSCULAR; SOFT TISSUE
Status: DISCONTINUED
Start: 2024-10-24 | End: 2024-10-25 | Stop reason: HOSPADM

## 2024-10-24 RX ORDER — LIDOCAINE HYDROCHLORIDE 5 MG/ML
INJECTION, SOLUTION INFILTRATION; INTRAVENOUS
Status: COMPLETED | OUTPATIENT
Start: 2024-10-24 | End: 2024-10-24

## 2024-10-24 ASSESSMENT — COLUMBIA-SUICIDE SEVERITY RATING SCALE - C-SSRS
2. HAVE YOU ACTUALLY HAD ANY THOUGHTS OF KILLING YOURSELF?: NO
1. IN THE PAST MONTH, HAVE YOU WISHED YOU WERE DEAD OR WISHED YOU COULD GO TO SLEEP AND NOT WAKE UP?: NO
6. HAVE YOU EVER DONE ANYTHING, STARTED TO DO ANYTHING, OR PREPARED TO DO ANYTHING TO END YOUR LIFE?: NO

## 2024-10-24 ASSESSMENT — PAIN - FUNCTIONAL ASSESSMENT
PAIN_FUNCTIONAL_ASSESSMENT: 0-10
PAIN_FUNCTIONAL_ASSESSMENT: CRIES (CRYING REQUIRES OXYGEN INCREASED VITAL SIGNS EXPRESSION SLEEP)
PAIN_FUNCTIONAL_ASSESSMENT: 0-10
PAIN_FUNCTIONAL_ASSESSMENT: CRIES (CRYING REQUIRES OXYGEN INCREASED VITAL SIGNS EXPRESSION SLEEP)
PAIN_FUNCTIONAL_ASSESSMENT: 0-10

## 2024-10-24 ASSESSMENT — ENCOUNTER SYMPTOMS
DEPRESSION: 0
LOSS OF SENSATION IN FEET: 1
OCCASIONAL FEELINGS OF UNSTEADINESS: 0

## 2024-10-24 ASSESSMENT — PAIN SCALES - GENERAL
PAINLEVEL_OUTOF10: 3
PAINLEVEL_OUTOF10: 0 - NO PAIN
PAINLEVEL_OUTOF10: 8
PAINLEVEL_OUTOF10: 4
PAINLEVEL_OUTOF10: 0 - NO PAIN

## 2024-10-24 NOTE — DISCHARGE INSTRUCTIONS
DISCHARGE INSTRUCTIONS FOR INJECTIONS     You underwent bilateral sacroiliac joint injection today    After most injections, it is recommended that you relax and limit your activity for the remainder of the day unless you have been told otherwise by your pain physician.  You should not drive a car, operate machinery, or make important legal decisions unless otherwise directed by your pain physician.  You may resume your normal activity, including exercise, tomorrow.      Keep a written pain diary of how much pain relief you experienced following the injection procedure and the length of time of pain relief you experienced pain relief. Following diagnostic injections like medial branch nerve blocks, sacroiliac joint blocks, stellate ganglion injections and other blocks, it is very important you record the specific amount of pain relief you experienced immediately after the injectionand how long it lasted. Your doctor will ask you for this information at your follow up visit.     For all injections, please keep the injection site dry and inspect the site for a couple of days. You may remove the Band-Aid the day of the injection at any time.     Some discomfort, bruising or slight swelling may occur at the injection site. This is not abnormal if it occurs.  If needed you may:    -Take over the counter medication such as Tylenol or Motrin.   -Apply an ice pack for 30 minutes, 2 to 3 times a day for the first 24 hours.     You may shower today; no soaking baths, hot tubs, whirlpools or swimming pools for two days.      If you are given steroids in your injection, it may take 3-5 days for the steroid medication to take effect. You may notice a worsening of your symptoms for 1-2 days after the injection. This is not abnormal.  You may use acetaminophen, ibuprofen, or prescription medication that your doctor may have prescribed for you if you need to do so.     A few common side effects of steroids include facial flushing,  sweating, restlessness, irritability,difficulty sleeping, increase in blood sugar, and increased blood pressure. If you have diabetes, please monitor your blood sugar at least once a day for at least 5 days. If you have poorly controlled high blood pressure, monitoryour blood pressure for at least 2 days and contact your primary care physician if these numbers are unusually high for you.      If you take aspirin or non-steroidal anti-inflammatory drugs (examples are Motrin, Advil, ibuprofen, Naprosyn, Voltaren, Relafen, etc.) you may restart these this evening, but stop taking it 3 days before your next appointment, unless instructed otherwiseby your physician.      You do not need to discontinue non-aspirin-containing pain medications prior to an injection (examples: Celebrex, tramadol, hydrocodone and acetaminophen).      If you take a blood thinning medication (Coumadin, Lovenox, Fragmin,Ticlid, Plavix, Pradaxa, etc.), please discuss this with your primary care physician/cardiologist and your pain physician. These medications MUST be discontinued before you can have an injection safely, without the risk of uncontrolled bleeding. If these medications are not discontinued for an appropriate period of time, you will not be able to receivean injection. Please adhere to instructions given to you about when to restart your blood thinning medication. If you have any questions please reach out to our team.    If you are taking Coumadin, please have your INR checked the morning of your procedure and bring the result to your appointment unless otherwise instructed. If your INR is over 1.2, your injection may need to be rescheduled to avoid uncontrolled bleeding from the needle placement.     Call [facility]  and ask for Pain Management at [phone] between 8am-4pm Monday - Friday if you are experiencing the following:    If you received an epidural or spinal injection:    -Headache that doesnot go away with medicine, is  worse when sitting or standing up, and is greatly relieved upon lying down.   -Severe pain worse than or different than your baseline pain.   -Chills or fever (101º F or greater).   -Drainage or signs of infection at the injection site     Go directly to the Emergency Department if you are experiencing the following and received an epidural or spinal injection:   -Abrupt weakness or progressive weakness in your legs that starts after you leave the clinic.   -Abrupt severe or worsening numbness in your legs.   -Inability to urinate after the injection or loss of bowel or bladder control without the urge to defecate or urinate.     If you have a clinical question that cannot wait until your next appointment, please call [phone] between 8am-4pm Monday - Friday or send a [facility] message. We do our best to return all non-emergency messages within 24 hours, Monday - Friday. A nurse or physician will return your message. You may also try calling and they will do their best to answer your question(s):  - Dr. Atul Ha's nurse ([phone])  - Dr. Veto Barr/Dr. Hearn's nurse ([phone])  - Dr. Bridget Thurston/Tristin's nurse ([phone])     If you need to cancel an appointment, please call the scheduling staff at [phone] during normal business hours or leave a message at least 24 hours in advance.     If you are going to be sedated for your next procedure, you MUST have responsible adult who can legally drive accompany you home. You cannot eat or drink for at least eight hours prior to the planned procedure if you are going to receive sedation. You may take your non-blood thinning medications with a small sip of water.

## 2024-10-29 ENCOUNTER — APPOINTMENT (OUTPATIENT)
Dept: PULMONOLOGY | Facility: CLINIC | Age: 59
End: 2024-10-29
Payer: MEDICARE

## 2024-11-06 ENCOUNTER — OFFICE VISIT (OUTPATIENT)
Dept: CARDIOLOGY | Facility: CLINIC | Age: 59
End: 2024-11-06
Payer: MEDICARE

## 2024-11-06 ENCOUNTER — LAB (OUTPATIENT)
Dept: LAB | Facility: LAB | Age: 59
End: 2024-11-06
Payer: MEDICARE

## 2024-11-06 VITALS
DIASTOLIC BLOOD PRESSURE: 68 MMHG | SYSTOLIC BLOOD PRESSURE: 114 MMHG | BODY MASS INDEX: 33.8 KG/M2 | HEIGHT: 68 IN | HEART RATE: 74 BPM | WEIGHT: 223 LBS | OXYGEN SATURATION: 95 %

## 2024-11-06 DIAGNOSIS — E78.5 DYSLIPIDEMIA: ICD-10-CM

## 2024-11-06 DIAGNOSIS — E78.1 PURE HYPERTRIGLYCERIDEMIA: ICD-10-CM

## 2024-11-06 DIAGNOSIS — I50.33 ACUTE ON CHRONIC DIASTOLIC HEART FAILURE: Primary | ICD-10-CM

## 2024-11-06 DIAGNOSIS — R06.09 DOE (DYSPNEA ON EXERTION): ICD-10-CM

## 2024-11-06 DIAGNOSIS — I72.0 CAROTID PSEUDOANEURYSM: ICD-10-CM

## 2024-11-06 DIAGNOSIS — I50.23 ACUTE ON CHRONIC SYSTOLIC HEART FAILURE: ICD-10-CM

## 2024-11-06 DIAGNOSIS — E11.9 TYPE 2 DIABETES MELLITUS WITHOUT COMPLICATIONS (MULTI): ICD-10-CM

## 2024-11-06 DIAGNOSIS — I72.9 ANEURYSM (CMS-HCC): ICD-10-CM

## 2024-11-06 DIAGNOSIS — I10 ESSENTIAL (PRIMARY) HYPERTENSION: Primary | ICD-10-CM

## 2024-11-06 DIAGNOSIS — E78.2 DM TYPE 2 WITH DIABETIC MIXED HYPERLIPIDEMIA (MULTI): ICD-10-CM

## 2024-11-06 DIAGNOSIS — C73 MALIGNANT NEOPLASM OF THYROID GLAND (MULTI): ICD-10-CM

## 2024-11-06 DIAGNOSIS — E11.69 DM TYPE 2 WITH DIABETIC MIXED HYPERLIPIDEMIA (MULTI): ICD-10-CM

## 2024-11-06 DIAGNOSIS — I11.0 BENIGN HYPERTENSIVE HEART DISEASE WITH HEART FAILURE: ICD-10-CM

## 2024-11-06 LAB
ALBUMIN SERPL BCP-MCNC: 4.6 G/DL (ref 3.4–5)
ALP SERPL-CCNC: 164 U/L (ref 33–110)
ALT SERPL W P-5'-P-CCNC: 23 U/L (ref 7–45)
ANION GAP SERPL CALC-SCNC: 15 MMOL/L (ref 10–20)
AST SERPL W P-5'-P-CCNC: 19 U/L (ref 9–39)
BILIRUB SERPL-MCNC: 0.3 MG/DL (ref 0–1.2)
BUN SERPL-MCNC: 22 MG/DL (ref 6–23)
CALCIUM SERPL-MCNC: 9.5 MG/DL (ref 8.6–10.6)
CHLORIDE SERPL-SCNC: 100 MMOL/L (ref 98–107)
CO2 SERPL-SCNC: 31 MMOL/L (ref 21–32)
CREAT SERPL-MCNC: 0.76 MG/DL (ref 0.5–1.05)
EGFRCR SERPLBLD CKD-EPI 2021: 90 ML/MIN/1.73M*2
EST. AVERAGE GLUCOSE BLD GHB EST-MCNC: 120 MG/DL
GLUCOSE SERPL-MCNC: 87 MG/DL (ref 74–99)
HBA1C MFR BLD: 5.8 %
POTASSIUM SERPL-SCNC: 4.3 MMOL/L (ref 3.5–5.3)
PROT SERPL-MCNC: 7.3 G/DL (ref 6.4–8.2)
SODIUM SERPL-SCNC: 142 MMOL/L (ref 136–145)
TSH SERPL-ACNC: 3.89 MIU/L (ref 0.44–3.98)

## 2024-11-06 PROCEDURE — 99214 OFFICE O/P EST MOD 30 MIN: CPT | Performed by: STUDENT IN AN ORGANIZED HEALTH CARE EDUCATION/TRAINING PROGRAM

## 2024-11-06 PROCEDURE — 1036F TOBACCO NON-USER: CPT | Performed by: STUDENT IN AN ORGANIZED HEALTH CARE EDUCATION/TRAINING PROGRAM

## 2024-11-06 PROCEDURE — 80053 COMPREHEN METABOLIC PANEL: CPT

## 2024-11-06 PROCEDURE — G2211 COMPLEX E/M VISIT ADD ON: HCPCS | Performed by: STUDENT IN AN ORGANIZED HEALTH CARE EDUCATION/TRAINING PROGRAM

## 2024-11-06 PROCEDURE — 3074F SYST BP LT 130 MM HG: CPT | Performed by: STUDENT IN AN ORGANIZED HEALTH CARE EDUCATION/TRAINING PROGRAM

## 2024-11-06 PROCEDURE — 3050F LDL-C >= 130 MG/DL: CPT | Performed by: STUDENT IN AN ORGANIZED HEALTH CARE EDUCATION/TRAINING PROGRAM

## 2024-11-06 PROCEDURE — 3062F POS MACROALBUMINURIA REV: CPT | Performed by: STUDENT IN AN ORGANIZED HEALTH CARE EDUCATION/TRAINING PROGRAM

## 2024-11-06 PROCEDURE — 3078F DIAST BP <80 MM HG: CPT | Performed by: STUDENT IN AN ORGANIZED HEALTH CARE EDUCATION/TRAINING PROGRAM

## 2024-11-06 PROCEDURE — 84443 ASSAY THYROID STIM HORMONE: CPT

## 2024-11-06 PROCEDURE — 4010F ACE/ARB THERAPY RXD/TAKEN: CPT | Performed by: STUDENT IN AN ORGANIZED HEALTH CARE EDUCATION/TRAINING PROGRAM

## 2024-11-06 PROCEDURE — 3044F HG A1C LEVEL LT 7.0%: CPT | Performed by: STUDENT IN AN ORGANIZED HEALTH CARE EDUCATION/TRAINING PROGRAM

## 2024-11-06 PROCEDURE — 83036 HEMOGLOBIN GLYCOSYLATED A1C: CPT

## 2024-11-06 PROCEDURE — 3008F BODY MASS INDEX DOCD: CPT | Performed by: STUDENT IN AN ORGANIZED HEALTH CARE EDUCATION/TRAINING PROGRAM

## 2024-11-06 ASSESSMENT — ENCOUNTER SYMPTOMS: OCCASIONAL FEELINGS OF UNSTEADINESS: 1

## 2024-11-06 NOTE — PATIENT INSTRUCTIONS
Please continue your current cardiac medication including bumetanide 2 mg tablet twice daily, aspirin 81 mg, atorvastatin 20 mg, carvedilol 25 mg twice daily, losartan 50 mg daily.    We will obtain a transthoracic echocardiogram for structural evaluation including ejection fraction, assessment of regional wall motion abnormalities or valvular disease, and further evaluation of hemodynamics prior to your follow-up visit.    Please followup with me in Cardiology clinic within the next 9 months.  Please return to clinic sooner or seek emergent care if your symptoms reoccur or worsen.

## 2024-11-07 ENCOUNTER — APPOINTMENT (OUTPATIENT)
Dept: NEUROLOGY | Facility: CLINIC | Age: 59
End: 2024-11-07
Payer: MEDICARE

## 2024-12-16 ENCOUNTER — OFFICE VISIT (OUTPATIENT)
Dept: PULMONOLOGY | Facility: CLINIC | Age: 59
End: 2024-12-16
Payer: MEDICARE

## 2024-12-16 VITALS
HEART RATE: 77 BPM | DIASTOLIC BLOOD PRESSURE: 75 MMHG | WEIGHT: 225 LBS | BODY MASS INDEX: 33.33 KG/M2 | TEMPERATURE: 98 F | SYSTOLIC BLOOD PRESSURE: 110 MMHG | RESPIRATION RATE: 18 BRPM | OXYGEN SATURATION: 96 % | HEIGHT: 69 IN

## 2024-12-16 DIAGNOSIS — J45.40 MODERATE PERSISTENT ASTHMA WITHOUT COMPLICATION (HHS-HCC): Primary | ICD-10-CM

## 2024-12-16 DIAGNOSIS — G47.33 OBSTRUCTIVE SLEEP APNEA (ADULT) (PEDIATRIC): ICD-10-CM

## 2024-12-16 PROCEDURE — 3062F POS MACROALBUMINURIA REV: CPT | Performed by: INTERNAL MEDICINE

## 2024-12-16 PROCEDURE — 4010F ACE/ARB THERAPY RXD/TAKEN: CPT | Performed by: INTERNAL MEDICINE

## 2024-12-16 PROCEDURE — 3078F DIAST BP <80 MM HG: CPT | Performed by: INTERNAL MEDICINE

## 2024-12-16 PROCEDURE — 99205 OFFICE O/P NEW HI 60 MIN: CPT | Performed by: INTERNAL MEDICINE

## 2024-12-16 PROCEDURE — 3044F HG A1C LEVEL LT 7.0%: CPT | Performed by: INTERNAL MEDICINE

## 2024-12-16 PROCEDURE — 3008F BODY MASS INDEX DOCD: CPT | Performed by: INTERNAL MEDICINE

## 2024-12-16 PROCEDURE — 3050F LDL-C >= 130 MG/DL: CPT | Performed by: INTERNAL MEDICINE

## 2024-12-16 PROCEDURE — 1036F TOBACCO NON-USER: CPT | Performed by: INTERNAL MEDICINE

## 2024-12-16 PROCEDURE — 99215 OFFICE O/P EST HI 40 MIN: CPT | Performed by: INTERNAL MEDICINE

## 2024-12-16 PROCEDURE — 3074F SYST BP LT 130 MM HG: CPT | Performed by: INTERNAL MEDICINE

## 2024-12-16 RX ORDER — ALBUTEROL SULFATE 90 UG/1
2 INHALANT RESPIRATORY (INHALATION) EVERY 4 HOURS PRN
Qty: 54 G | Refills: 3 | Status: SHIPPED | OUTPATIENT
Start: 2024-12-16

## 2024-12-16 RX ORDER — FLUTICASONE PROPIONATE 50 MCG
1 SPRAY, SUSPENSION (ML) NASAL DAILY
Qty: 48 G | Refills: 3 | Status: SHIPPED | OUTPATIENT
Start: 2024-12-16 | End: 2025-12-16

## 2024-12-16 RX ORDER — BUDESONIDE AND FORMOTEROL FUMARATE DIHYDRATE 80; 4.5 UG/1; UG/1
2 AEROSOL RESPIRATORY (INHALATION)
Qty: 30.6 G | Refills: 2 | Status: SHIPPED | OUTPATIENT
Start: 2024-12-16 | End: 2025-06-14

## 2024-12-16 ASSESSMENT — ENCOUNTER SYMPTOMS
APNEA: 1
COUGH: 0
WHEEZING: 0
SHORTNESS OF BREATH: 1
FATIGUE: 0

## 2024-12-16 ASSESSMENT — ASTHMA QUESTIONNAIRES
QUESTION_1 LAST FOUR WEEKS HOW MUCH OF THE TIME DID YOUR ASTHMA KEEP YOU FROM GETTING AS MUCH DONE AT WORK, SCHOOL OR AT HOME: SOME OF THE TIME
QUESTION_4 LAST FOUR WEEKS HOW OFTEN HAVE YOU USED YOUR RESCUE INHALER OR NEBULIZER MEDICATION (SUCH AS ALBUTEROL): 2 OR 3 TIMES PER WEEK
ACT_TOTALSCORE: 14
QUESTION_5 LAST FOUR WEEKS HOW WOULD YOU RATE YOUR ASTHMA CONTROL: SOMEWHAT CONTROLLED
QUESTION_3 LAST FOUR WEEKS HOW OFTEN DID YOUR ASTHMA SYMPTOMS (WHEEZING, COUGHING, SHORTNESS OF BREATH, CHEST TIGHTNESS OR PAIN) WAKE YOU UP AT NIGHT OR EARLIER THAN USUAL IN THE MORNING: ONCE A WEEK
QUESTION_2 LAST FOUR WEEKS HOW OFTEN HAVE YOU HAD SHORTNESS OF BREATH: ONCE A DAY

## 2024-12-16 NOTE — PROGRESS NOTES
Department of Medicine  Division of Pulmonary, Critical Care, and Sleep Medicine  Location  [facility], [address]    I was asked by No ref. provider found, to evaluate Prema Hair for wheezing and asthma. I have independently interviewed and examined the patient in the office and reviewed available records.     Physician HPI (2024):  59 y.o. female with thyroid cancer s/p thyroidectomy, vocal cord dysfunction, ZEINA, schizoaffective disorder, Parkinsonism. She has to follow-up with Dr. Segal who diagnosed her with asthma and had her on Trelegy Ellipta.  She herself states that she uses Symbicort and Trelegy both.  She uses albuterol nebulizations about 4-5 times per week and her albuterol inhaler 2-3 times per week for symptoms of wheezing.  Triggers are getting up and walking around.  She denies any seasonal allergies or any other triggers.  She states she had asthma as a child and was fine for most of her adult life up until a couple years ago when breathing issues develop.  She is minimally ambulatory.  She is also being seen for heart failure and chronic lower extremity edema by cardiology.  She also has been on CPAP for years and is looking to establish care with sleep medicine. She is also chronically congested, more so in the mornings after using her CPAP.  For this she has been using Astelin spray which was prescribed to her by Dr. Segal.  She states this helps to open up her nose.      Albuterol use: 4-5x/week  Previous exacerbations, hospitalizations, intubations: none  Triggers: walking  Previous pulmonary testin  Average Peak Flow: none  Previous therapies: Symbicort, Trelegy  Atopy: none  NSAID sensitivity: none  Nasal polyps: none      PMH:  Past Medical History:   Diagnosis Date    CATALINA positive     Arthritis     Asthma     Bell's palsy     x 2    Bilateral leg edema     Bipolar disorder     CHF (congestive heart failure)     Chronic allergic rhinitis     Chronic  constipation     Diabetes mellitus (Multi)     Diabetic neuropathy (Multi)     Dysphonia 07/05/2022    Muscle tension dysphonia    Fibromyalgia     GERD (gastroesophageal reflux disease)     under control with medication    High pulmonary arterial pressure (Multi)     moderately elevated on ECHO 10.5.23    Hyperlipidemia, unspecified 01/03/2023    Dyslipidemia    Hypertension     Hypokalemia     1.1.24- K 3.8 - on oral supplements    Hypothyroidism     Low back pain, unspecified 02/22/2021    Low back pain    Moderate tricuspid regurgitation     Obstructive sleep apnea (adult) (pediatric) 01/03/2023    ZEINA on CPAP    Paralysis of vocal cords and larynx, 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    Laryngeal paresis    Parkinsonism (Multi)     Pseudoaneurysm of carotid artery 2020    s/p right pipeline and coil embolization of Right ICA pseudoaneurysm    PTSD (post-traumatic stress disorder)     Thyroid cancer (Multi)     s/p total thyroidectomy, residual tissue on left side - FNA benign 2020 and 2021    Torticollis     botox injections    Vitamin D deficiency        PSH:  Past Surgical History:   Procedure Laterality Date    COLONOSCOPY      KNEE ARTHROPLASTY      OTHER SURGICAL HISTORY Right 2020    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    TOTAL THYROIDECTOMY  2017    US GUIDED THYROID BIOPSY  11/19/2020    US GUIDED THYROID BIOPSY 11/19/2020 [facility]    US GUIDED THYROID BIOPSY  11/19/2020    US GUIDED THYROID BIOPSY 11/19/2020 [facility]       FHx:  Family History   Problem Relation Name Age of Onset    Heart failure Mother      Pancreatic cancer Mother      Heart failure Father      Parkinsonism Father      Breast cancer Sister         Social Hx:  Social History     Socioeconomic History    Marital status: Single   Tobacco Use    Smoking status: Never    Smokeless tobacco: Never   Substance and Sexual Activity    Alcohol use: Not Currently    Drug use: Never     Comment: medical marijuana last used >1  year ago     Social Drivers of Health     Financial Resource Strain: Low Risk  (4/9/2024)    Overall Financial Resource Strain (CARDIA)     Difficulty of Paying Living Expenses: Not very hard   Food Insecurity: Not on File (9/26/2024)    Received from [employer]    Food Insecurity     Food: 0   Transportation Needs: No Transportation Needs (6/10/2024)    OASIS : Transportation     Lack of Transportation (Medical): No     Lack of Transportation (Non-Medical): No     Patient Unable or Declines to Respond: No   Physical Activity: Not on File (3/7/2022)    Received from [employer]    Physical Activity     Physical Activity: 0   Stress: Not on File (3/10/2022)    Received from [employer]    Stress     Stress: 0   Recent Concern: Stress - At Risk (3/10/2022)    Received from [employer]    Stress     Stress: 2   Social Connections: Unknown (6/10/2024)    OASIS : Social Isolation     Frequency of experiencing loneliness or isolation: Patient unable to respond   Recent Concern: Social Connections - Feeling Somewhat Isolated (4/24/2024)    OASIS : Social Isolation     Frequency of experiencing loneliness or isolation: Sometimes   Housing Stability: Low Risk  (4/9/2024)    Housing Stability Vital Sign     Unable to Pay for Housing in the Last Year: No     Number of Places Lived in the Last Year: 1     Unstable Housing in the Last Year: No       Immunization History:  Immunization History   Administered Date(s) Administered    Flu vaccine (IIV4), preservative free *Check age/dose* 11/11/2017, 12/13/2018, 09/18/2020    Flu vaccine, quadrivalent, no egg protein, age 6 month or greater (FLUCELVAX) 09/26/2019    Influenza, injectable, MDCK, quadrivalent 09/07/2021, 09/20/2022    Pfizer COVID-19 vaccine, bivalent, age 12 years and older (30 mcg/0.3 mL) 01/19/2023    Pfizer Purple Cap SARS-CoV-2 03/20/2021, 04/10/2021, 12/17/2021    Pneumococcal conjugate vaccine, 20-valent (PREVNAR 20) 01/19/2023    Td vaccine, age 7 years and  older (TENIVAC) 11/01/2005    Tdap vaccine, age 7 year and older (BOOSTRIX, ADACEL) 04/30/2018    Zoster vaccine, recombinant, adult (SHINGRIX) 04/30/2018, 07/17/2018       Current Medications:  Current Outpatient Medications   Medication Instructions    acetaminophen (Tylenol) 500 mg tablet 1 tablet, Every 4 hours PRN    albuterol 90 mcg/actuation inhaler 2 puffs, Every 4 hours PRN    amitriptyline (Elavil) 10 mg tablet 1 tablet, Nightly    ammonium lactate (Lac-Hydrin) 12 % lotion 1 Application, As needed    ascorbic acid (Vitamin C) 1,000 mg tablet 1 tablet, Daily    atorvastatin (LIPITOR) 20 mg, oral, Nightly    azelastine (Astelin) 137 mcg (0.1 %) nasal spray 1 spray, 2 times daily    baclofen (LIORESAL) 20 mg, oral, 3 times daily    budesonide-formoteroL (Symbicort) 160-4.5 mcg/actuation inhaler 2 puffs, 2 times daily RT    bumetanide (BUMEX) 2 mg, 2 times daily    calcium carbonate (Oscal) 500 mg calcium (1,250 mg) tablet 1 tablet, 3 times daily    carbidopa-levodopa (Sinemet)  mg tablet 1.5 tablets, oral, 3 times daily, 8am, 12 noon and 4 pm    carvedilol (COREG) 25 mg, oral, 2 times daily    cephalexin (KEFLEX) 250 mg, oral, Daily    cholecalciferol (Vitamin D-3) 50 MCG (2000 UT) tablet 1 tablet, Daily    clonazePAM (KlonoPIN) 1 mg tablet 1 tablet, Every morning    cyclobenzaprine (FLEXERIL) 10 mg, oral, Nightly PRN    diclofenac sodium (Voltaren) 1 % gel gel 4.5 inches, 3 times daily PRN    docusate sodium (COLACE) 100 mg, 3 times daily    estradiol (Estrace) 0.01 % (0.1 mg/gram) vaginal cream 1 Application, 3 times weekly    formoterol (Perforomist) 20 mcg/2 mL nebulizer solution 2 mL, Every 12 hours    gabapentin (NEURONTIN) 300 mg, 3 times daily    Glucagon Emergency Kit (human) 1 mg, intramuscular, Every 15 min PRN    ipratropium-albuteroL (Duo-Neb) 0.5-2.5 mg/3 mL nebulizer solution 3 mL, Every 4 hours PRN    lancets misc 1 each, 3 times daily    levothyroxine (Synthroid, Levoxyl) 100 mcg tablet  Take 100mcg on Mon Wed Fri and Sun - 1 hour before breakfast    levothyroxine (SYNTHROID, LEVOXYL) 100 mcg, Daily before breakfast    lidocaine (Lidoderm) 5 % patch 1 patch, transdermal, Every 12 hours, Apply 1 patch and leave in place for 12 hours, then remove and leave off for 12 hours.    losartan (COZAAR) 50 mg, oral, Daily    metFORMIN (Glucophage) 500 mg tablet 1 tablet, 2 times daily (morning and late afternoon)    minocycline 100 mg, oral, Nightly, No stop date - proph for recurrent skin infections    omega-3 fatty acids-fish oil 360-1,200 mg capsule 1 capsule, Daily    omeprazole (PriLOSEC) 40 mg DR capsule 1 capsule, Daily    OXcarbazepine (Trileptal) 600 mg tablet 1 tablet, Every morning    OXcarbazepine (Trileptal) 600 mg tablet 2 tablets, Nightly    oxyCODONE-acetaminophen (Percocet) 5-325 mg tablet 0.5 tablets, oral, Every 6 hours PRN    polyethylene glycol (GLYCOLAX, MIRALAX) 17 g, Daily    prazosin (Minipress) 5 mg capsule 1 capsule, Nightly    QUEtiapine (SEROQUEL) 800 mg, Nightly    sertraline (Zoloft) 100 mg tablet Take 2 tablets (200 mg) by mouth once daily. Do not start before January 2, 2024.    tiotropium (Spiriva Respimat) 2.5 mcg/actuation inhaler 2 puffs, Daily RT    topiramate (Topamax) 200 mg tablet TAKE ONE TABLET (200 MG) BY MOUTH TWICE DAILY    Trelegy Ellipta 200-62.5-25 mcg blister with device 1 puff, Daily PRN        Drug Allergies/Intolerances:  Allergies   Allergen Reactions    Lisinopril Swelling        Review of Systems:  Review of Systems   Constitutional:  Negative for fatigue.   HENT:  Positive for congestion.    Respiratory:  Positive for apnea and shortness of breath. Negative for cough and wheezing.    Cardiovascular:  Positive for leg swelling. Negative for chest pain.        All other review of systems are negative and/or non-contributory.    Physical Examination:  Vitals:    12/16/24 1619   BP: 110/75   BP Location: Right arm   Patient Position: Sitting   Pulse: 77  "  Resp: 18   Temp: 36.7 °C (98 °F)   TempSrc: Temporal   SpO2: 96%   Weight: 102 kg (225 lb)   Height: 1.753 m (5' 9\")        GEN: obese woman with torticollis  EYES: JUAN F, EOMI  ENT: Mallampati III,   CV: RRR, no m/g/r  LUNGS: good effort, clear bilaterally, no w/r/r  EXT: bilateral lower extremity edema        Exacerbation History      None    Pulmonary Function Test Results     2021:  FVC: 3.30 L (103%)  FEV1: 2.67 L (105%)  FEV1/FVC: 81  T.42 L (87%)  DLCO: 81%    Sleep Study     Previously done outside     CAT and mMRC     mMRC (Modified [facility] [employer] [place]) Dyspnea Scale  Stops for breath after walking 100 yards (91m) or after a few minutes: +3    Reference: Jony DA, Mac CK. Evaluation of clinical methods for rating dyspnea. CHEST. 1988;93(3):580-6.    Peak Flow and ACT     2024: 14    Chest Radiograph     XR chest 1 view 2024: scoliosis is present      Chest CT Scan     CT CHEST PULMONARY EMBOLISM W IV CONTRAST 2022  Impression  CHEST:  1.  No evidence of central or segmental pulmonary emboli at least to  the level of 1st segmental level. Evaluation of distal subsegmental  pulmonary arteries is limited due to motion related artifacts.  2. Scattered ground-glass changes in bilateral lungs are likely  favored to be secondary to expiratory phase of the study.       Bronchoscopy     None    Labs     Lab Results   Component Value Date    WBC 5.0 2024    HGB 11.2 (L) 2024    HCT 36.9 2024    MCV 89 2024     2024     Lab Results   Component Value Date    BNP 43 2023     Lab Results   Component Value Date    EOSABS 0.16 2024    EOSABS 0.27 2024    EOSABS 0.20 2024    EOSABS 0.12 2024    EOSABS 0.01 2023         Echocardiogram     10/5/2023:   1. Left ventricular systolic function is normal with a 55-60% estimated ejection fraction.   2. Spectral Doppler shows an impaired relaxation pattern of left " ventricular diastolic filling.   3. Moderate tricuspid regurgitation visualized.   4. Moderately elevated pulmonary artery pressure.      ASSESSMENT & PLAN     Problem List Items Addressed This Visit       Moderate persistent asthma without complication ([facility]) - Primary    Relevant Medications    budesonide-formoteroL (Symbicort) 80-4.5 mcg/actuation inhaler    fluticasone (Flonase) 50 mcg/actuation nasal spray    albuterol 90 mcg/actuation inhaler     Other Visit Diagnoses       Obstructive sleep apnea (adult) (pediatric)        Relevant Orders    Referral to Adult Sleep Medicine             Summary:  59 y.o. female here to establish care with pulmonary medicine. Unclear if she truly has asthma or not.  Her last PFT was normal other than decreased lung volumes.  I reviewed CT scan with patient and her daughter, and she does have scoliosis with decreased lung volumes.  This coupled with the fact that she may have some low-grade pulmonary edema is likely the cause of her wheezing.  She does not have any classic symptoms or signs of asthma, and cannot identify any triggers.    Plan:  -Stop Trelegy Ellipta  -Cut dose of Symbicort to 80/4.5  -Advised to use Flonase for her chronic sinus congestion as well as saline nasal spray to wash out any phl mucus at night in the morning   -Continue compliance with CPAP nightly.  Will refer to sleep medicine for further follow-up.    Follow-up: 6 months    Time Spent  Prep time on day of patient encounter: 20 minutes  Time spent directly with patient, family or caregiver: 30 minutes  Additional Time Spent on Patient Care Activities: 0 minutes  Documentation Time: 20 minutes  Other Time Spent: 0 minutes  Total: 70 minutes          Geovani Crawford DO  Staff Physician - Pulmonary & Critical Care  12/16/24 4:22 PM  Office number: [phone]   Fax number:  [phone]

## 2025-01-02 ENCOUNTER — HOSPITAL ENCOUNTER (OUTPATIENT)
Facility: HOSPITAL | Age: 60
Setting detail: OBSERVATION
Discharge: HOME HEALTH | End: 2025-01-04
Attending: EMERGENCY MEDICINE | Admitting: INTERNAL MEDICINE
Payer: MEDICARE

## 2025-01-02 ENCOUNTER — APPOINTMENT (OUTPATIENT)
Dept: RADIOLOGY | Facility: HOSPITAL | Age: 60
End: 2025-01-02
Payer: MEDICARE

## 2025-01-02 DIAGNOSIS — M25.561 RECURRENT PAIN OF RIGHT KNEE: ICD-10-CM

## 2025-01-02 DIAGNOSIS — R52 CHRONIC PAIN OF MULTIPLE SITES: ICD-10-CM

## 2025-01-02 DIAGNOSIS — G20.C PARKINSONISM, UNSPECIFIED PARKINSONISM TYPE (MULTI): ICD-10-CM

## 2025-01-02 DIAGNOSIS — G89.29 CHRONIC PAIN OF MULTIPLE SITES: ICD-10-CM

## 2025-01-02 DIAGNOSIS — N39.0 CHRONIC UTI: ICD-10-CM

## 2025-01-02 DIAGNOSIS — E78.2 DM TYPE 2 WITH DIABETIC MIXED HYPERLIPIDEMIA (MULTI): ICD-10-CM
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
## 2025-01-02 DIAGNOSIS — E11.69 DM TYPE 2 WITH DIABETIC MIXED HYPERLIPIDEMIA (MULTI): ICD-10-CM

## 2025-01-02 PROCEDURE — 71045 X-RAY EXAM CHEST 1 VIEW: CPT

## 2025-01-02 PROCEDURE — 99285 EMERGENCY DEPT VISIT HI MDM: CPT | Mod: 25 | Performed by: EMERGENCY MEDICINE

## 2025-01-02 PROCEDURE — 71045 X-RAY EXAM CHEST 1 VIEW: CPT | Mod: FOREIGN READ | Performed by: RADIOLOGY

## 2025-01-02 PROCEDURE — 73590 X-RAY EXAM OF LOWER LEG: CPT | Mod: RT

## 2025-01-02 PROCEDURE — 73630 X-RAY EXAM OF FOOT: CPT | Mod: LT

## 2025-01-02 PROCEDURE — 72170 X-RAY EXAM OF PELVIS: CPT

## 2025-01-02 PROCEDURE — 70450 CT HEAD/BRAIN W/O DYE: CPT

## 2025-01-02 PROCEDURE — 73564 X-RAY EXAM KNEE 4 OR MORE: CPT | Mod: 50

## 2025-01-02 PROCEDURE — 72125 CT NECK SPINE W/O DYE: CPT

## 2025-01-02 PROCEDURE — 2500000001 HC RX 250 WO HCPCS SELF ADMINISTERED DRUGS (ALT 637 FOR MEDICARE OP): Performed by: EMERGENCY MEDICINE

## 2025-01-02 PROCEDURE — 73552 X-RAY EXAM OF FEMUR 2/>: CPT | Mod: RT

## 2025-01-02 PROCEDURE — 73700 CT LOWER EXTREMITY W/O DYE: CPT | Mod: RT

## 2025-01-02 RX ORDER — HYDROCODONE BITARTRATE AND ACETAMINOPHEN 5; 325 MG/1; MG/1
1 TABLET ORAL ONCE
Status: COMPLETED | OUTPATIENT
Start: 2025-01-02 | End: 2025-01-02

## 2025-01-02 RX ADMIN — HYDROCODONE BITARTRATE AND ACETAMINOPHEN 1 TABLET: 5; 325 TABLET ORAL at 21:21

## 2025-01-02 ASSESSMENT — PAIN DESCRIPTION - PAIN TYPE
TYPE: ACUTE PAIN
TYPE: CHRONIC PAIN

## 2025-01-02 ASSESSMENT — PAIN SCALES - GENERAL
PAINLEVEL_OUTOF10: 6
PAINLEVEL_OUTOF10: 8
PAINLEVEL_OUTOF10: 5 - MODERATE PAIN

## 2025-01-02 ASSESSMENT — COLUMBIA-SUICIDE SEVERITY RATING SCALE - C-SSRS
2. HAVE YOU ACTUALLY HAD ANY THOUGHTS OF KILLING YOURSELF?: NO
6. HAVE YOU EVER DONE ANYTHING, STARTED TO DO ANYTHING, OR PREPARED TO DO ANYTHING TO END YOUR LIFE?: NO
1. IN THE PAST MONTH, HAVE YOU WISHED YOU WERE DEAD OR WISHED YOU COULD GO TO SLEEP AND NOT WAKE UP?: NO

## 2025-01-02 ASSESSMENT — PAIN - FUNCTIONAL ASSESSMENT
PAIN_FUNCTIONAL_ASSESSMENT: 0-10
PAIN_FUNCTIONAL_ASSESSMENT: 0-10

## 2025-01-02 NOTE — ED TRIAGE NOTES
To ed from home for eval of multiple falls at home. Per EMS uses rollator at home and has been falling with the rollator d/t knee popping
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
## 2025-01-03 LAB
ALBUMIN SERPL BCP-MCNC: 3.2 G/DL (ref 3.4–5)
ALP SERPL-CCNC: 131 U/L (ref 33–110)
ALT SERPL W P-5'-P-CCNC: 19 U/L (ref 7–45)
ANION GAP SERPL CALC-SCNC: 8 MMOL/L (ref 10–20)
AST SERPL W P-5'-P-CCNC: 19 U/L (ref 9–39)
BASOPHILS # BLD AUTO: 0.03 X10*3/UL (ref 0–0.1)
BASOPHILS NFR BLD AUTO: 0.5 %
BILIRUB SERPL-MCNC: 0.3 MG/DL (ref 0–1.2)
BUN SERPL-MCNC: 15 MG/DL (ref 6–23)
CALCIUM SERPL-MCNC: 8.3 MG/DL (ref 8.6–10.3)
CHLORIDE SERPL-SCNC: 107 MMOL/L (ref 98–107)
CK SERPL-CCNC: 371 U/L (ref 0–215)
CO2 SERPL-SCNC: 30 MMOL/L (ref 21–32)
CREAT SERPL-MCNC: 0.76 MG/DL (ref 0.5–1.05)
EGFRCR SERPLBLD CKD-EPI 2021: 90 ML/MIN/1.73M*2
EOSINOPHIL # BLD AUTO: 0.12 X10*3/UL (ref 0–0.7)
EOSINOPHIL NFR BLD AUTO: 2.1 %
ERYTHROCYTE [DISTWIDTH] IN BLOOD BY AUTOMATED COUNT: 16.9 % (ref 11.5–14.5)
GLUCOSE BLD MANUAL STRIP-MCNC: 253 MG/DL (ref 74–99)
GLUCOSE BLD MANUAL STRIP-MCNC: 97 MG/DL (ref 74–99)
GLUCOSE BLD MANUAL STRIP-MCNC: 99 MG/DL (ref 74–99)
GLUCOSE SERPL-MCNC: 114 MG/DL (ref 74–99)
HCT VFR BLD AUTO: 37.9 % (ref 36–46)
HGB BLD-MCNC: 11.5 G/DL (ref 12–16)
IMM GRANULOCYTES # BLD AUTO: 0.01 X10*3/UL (ref 0–0.7)
IMM GRANULOCYTES NFR BLD AUTO: 0.2 % (ref 0–0.9)
LYMPHOCYTES # BLD AUTO: 1.44 X10*3/UL (ref 1.2–4.8)
LYMPHOCYTES NFR BLD AUTO: 25.1 %
MCH RBC QN AUTO: 26.4 PG (ref 26–34)
MCHC RBC AUTO-ENTMCNC: 30.3 G/DL (ref 32–36)
MCV RBC AUTO: 87 FL (ref 80–100)
MONOCYTES # BLD AUTO: 0.63 X10*3/UL (ref 0.1–1)
MONOCYTES NFR BLD AUTO: 11 %
NEUTROPHILS # BLD AUTO: 3.51 X10*3/UL (ref 1.2–7.7)
NEUTROPHILS NFR BLD AUTO: 61.1 %
NRBC BLD-RTO: 0 /100 WBCS (ref 0–0)
PLATELET # BLD AUTO: 183 X10*3/UL (ref 150–450)
POTASSIUM SERPL-SCNC: 3.3 MMOL/L (ref 3.5–5.3)
PROT SERPL-MCNC: 5.5 G/DL (ref 6.4–8.2)
RBC # BLD AUTO: 4.36 X10*6/UL (ref 4–5.2)
SODIUM SERPL-SCNC: 142 MMOL/L (ref 136–145)
WBC # BLD AUTO: 5.7 X10*3/UL (ref 4.4–11.3)

## 2025-01-03 PROCEDURE — G0378 HOSPITAL OBSERVATION PER HR: HCPCS

## 2025-01-03 PROCEDURE — 82947 ASSAY GLUCOSE BLOOD QUANT: CPT

## 2025-01-03 PROCEDURE — 2500000001 HC RX 250 WO HCPCS SELF ADMINISTERED DRUGS (ALT 637 FOR MEDICARE OP): Performed by: INTERNAL MEDICINE

## 2025-01-03 PROCEDURE — 2500000004 HC RX 250 GENERAL PHARMACY W/ HCPCS (ALT 636 FOR OP/ED): Performed by: INTERNAL MEDICINE

## 2025-01-03 PROCEDURE — 80053 COMPREHEN METABOLIC PANEL: CPT | Performed by: EMERGENCY MEDICINE

## 2025-01-03 PROCEDURE — 36415 COLL VENOUS BLD VENIPUNCTURE: CPT | Performed by: EMERGENCY MEDICINE

## 2025-01-03 PROCEDURE — 2500000002 HC RX 250 W HCPCS SELF ADMINISTERED DRUGS (ALT 637 FOR MEDICARE OP, ALT 636 FOR OP/ED)

## 2025-01-03 PROCEDURE — 2500000002 HC RX 250 W HCPCS SELF ADMINISTERED DRUGS (ALT 637 FOR MEDICARE OP, ALT 636 FOR OP/ED): Performed by: INTERNAL MEDICINE

## 2025-01-03 PROCEDURE — 82550 ASSAY OF CK (CPK): CPT | Performed by: EMERGENCY MEDICINE

## 2025-01-03 PROCEDURE — 85025 COMPLETE CBC W/AUTO DIFF WBC: CPT | Performed by: EMERGENCY MEDICINE

## 2025-01-03 PROCEDURE — 2500000005 HC RX 250 GENERAL PHARMACY W/O HCPCS: Performed by: INTERNAL MEDICINE

## 2025-01-03 PROCEDURE — 72125 CT NECK SPINE W/O DYE: CPT | Performed by: RADIOLOGY

## 2025-01-03 PROCEDURE — 70450 CT HEAD/BRAIN W/O DYE: CPT | Performed by: RADIOLOGY

## 2025-01-03 PROCEDURE — 97165 OT EVAL LOW COMPLEX 30 MIN: CPT | Mod: GO

## 2025-01-03 PROCEDURE — 96372 THER/PROPH/DIAG INJ SC/IM: CPT | Performed by: INTERNAL MEDICINE

## 2025-01-03 PROCEDURE — 94640 AIRWAY INHALATION TREATMENT: CPT

## 2025-01-03 PROCEDURE — 97161 PT EVAL LOW COMPLEX 20 MIN: CPT | Mod: GP

## 2025-01-03 RX ORDER — ONDANSETRON HYDROCHLORIDE 2 MG/ML
4 INJECTION, SOLUTION INTRAVENOUS EVERY 8 HOURS PRN
Status: DISCONTINUED | OUTPATIENT
Start: 2025-01-03 | End: 2025-01-04 | Stop reason: HOSPADM

## 2025-01-03 RX ORDER — IPRATROPIUM BROMIDE AND ALBUTEROL SULFATE 2.5; .5 MG/3ML; MG/3ML
3 SOLUTION RESPIRATORY (INHALATION) EVERY 4 HOURS PRN
Status: DISCONTINUED | OUTPATIENT
Start: 2025-01-03 | End: 2025-01-03

## 2025-01-03 RX ORDER — SERTRALINE HYDROCHLORIDE 50 MG/1
250 TABLET, FILM COATED ORAL DAILY
Status: DISCONTINUED | OUTPATIENT
Start: 2025-01-03 | End: 2025-01-04 | Stop reason: HOSPADM

## 2025-01-03 RX ORDER — AMITRIPTYLINE HYDROCHLORIDE 10 MG/1
10 TABLET, FILM COATED ORAL NIGHTLY
Status: DISCONTINUED | OUTPATIENT
Start: 2025-01-03 | End: 2025-01-03 | Stop reason: ALTCHOICE

## 2025-01-03 RX ORDER — ASPIRIN 81 MG/1
1 TABLET ORAL DAILY
COMMUNITY

## 2025-01-03 RX ORDER — OXCARBAZEPINE 300 MG/1
1200 TABLET, FILM COATED ORAL NIGHTLY
Status: DISCONTINUED | OUTPATIENT
Start: 2025-01-03 | End: 2025-01-04 | Stop reason: HOSPADM

## 2025-01-03 RX ORDER — ACETAMINOPHEN 325 MG/1
650 TABLET ORAL EVERY 4 HOURS PRN
Status: DISCONTINUED | OUTPATIENT
Start: 2025-01-03 | End: 2025-01-04 | Stop reason: HOSPADM

## 2025-01-03 RX ORDER — ALBUTEROL SULFATE 90 UG/1
2 INHALANT RESPIRATORY (INHALATION) EVERY 4 HOURS PRN
Status: DISCONTINUED | OUTPATIENT
Start: 2025-01-03 | End: 2025-01-03

## 2025-01-03 RX ORDER — PRAZOSIN HYDROCHLORIDE 1 MG/1
5 CAPSULE ORAL NIGHTLY
Status: DISCONTINUED | OUTPATIENT
Start: 2025-01-03 | End: 2025-01-04 | Stop reason: HOSPADM

## 2025-01-03 RX ORDER — ASCORBIC ACID 500 MG
1000 TABLET ORAL DAILY
Status: DISCONTINUED | OUTPATIENT
Start: 2025-01-03 | End: 2025-01-04 | Stop reason: HOSPADM

## 2025-01-03 RX ORDER — DOXYCYCLINE 100 MG/1
100 TABLET ORAL NIGHTLY
COMMUNITY
Start: 2024-12-10 | End: 2025-01-04 | Stop reason: HOSPADM

## 2025-01-03 RX ORDER — ASPIRIN 81 MG/1
81 TABLET ORAL DAILY
Status: DISCONTINUED | OUTPATIENT
Start: 2025-01-03 | End: 2025-01-04 | Stop reason: HOSPADM

## 2025-01-03 RX ORDER — DICLOFENAC SODIUM 10 MG/G
4 GEL TOPICAL 3 TIMES DAILY PRN
Status: DISCONTINUED | OUTPATIENT
Start: 2025-01-03 | End: 2025-01-04 | Stop reason: HOSPADM

## 2025-01-03 RX ORDER — GABAPENTIN 400 MG/1
1200 CAPSULE ORAL 3 TIMES DAILY
Status: DISCONTINUED | OUTPATIENT
Start: 2025-01-03 | End: 2025-01-04 | Stop reason: HOSPADM

## 2025-01-03 RX ORDER — ONDANSETRON 4 MG/1
4 TABLET, FILM COATED ORAL EVERY 8 HOURS PRN
Status: DISCONTINUED | OUTPATIENT
Start: 2025-01-03 | End: 2025-01-04 | Stop reason: HOSPADM

## 2025-01-03 RX ORDER — OXCARBAZEPINE 300 MG/1
600 TABLET, FILM COATED ORAL EVERY MORNING
Status: DISCONTINUED | OUTPATIENT
Start: 2025-01-04 | End: 2025-01-04 | Stop reason: HOSPADM

## 2025-01-03 RX ORDER — FLUTICASONE FUROATE AND VILANTEROL 100; 25 UG/1; UG/1
1 POWDER RESPIRATORY (INHALATION)
Status: DISCONTINUED | OUTPATIENT
Start: 2025-01-03 | End: 2025-01-03 | Stop reason: CLARIF

## 2025-01-03 RX ORDER — ATORVASTATIN CALCIUM 20 MG/1
20 TABLET, FILM COATED ORAL NIGHTLY
Status: DISCONTINUED | OUTPATIENT
Start: 2025-01-03 | End: 2025-01-04 | Stop reason: HOSPADM

## 2025-01-03 RX ORDER — CYCLOBENZAPRINE HCL 10 MG
10 TABLET ORAL NIGHTLY PRN
Status: DISCONTINUED | OUTPATIENT
Start: 2025-01-03 | End: 2025-01-04 | Stop reason: HOSPADM

## 2025-01-03 RX ORDER — DEXTROSE 50 % IN WATER (D50W) INTRAVENOUS SYRINGE
25
Status: DISCONTINUED | OUTPATIENT
Start: 2025-01-03 | End: 2025-01-04 | Stop reason: HOSPADM

## 2025-01-03 RX ORDER — LOSARTAN POTASSIUM 50 MG/1
50 TABLET ORAL DAILY
Status: DISCONTINUED | OUTPATIENT
Start: 2025-01-03 | End: 2025-01-04 | Stop reason: HOSPADM

## 2025-01-03 RX ORDER — LIDOCAINE 560 MG/1
1 PATCH PERCUTANEOUS; TOPICAL; TRANSDERMAL DAILY
Status: DISCONTINUED | OUTPATIENT
Start: 2025-01-03 | End: 2025-01-04 | Stop reason: HOSPADM

## 2025-01-03 RX ORDER — ALBUTEROL SULFATE 0.83 MG/ML
2.5 SOLUTION RESPIRATORY (INHALATION) EVERY 4 HOURS PRN
Status: DISCONTINUED | OUTPATIENT
Start: 2025-01-03 | End: 2025-01-03

## 2025-01-03 RX ORDER — FORMOTEROL FUMARATE DIHYDRATE 20 UG/2ML
2 SOLUTION RESPIRATORY (INHALATION)
Status: DISCONTINUED | OUTPATIENT
Start: 2025-01-03 | End: 2025-01-04 | Stop reason: HOSPADM

## 2025-01-03 RX ORDER — GABAPENTIN 600 MG/1
2 TABLET ORAL 3 TIMES DAILY
COMMUNITY

## 2025-01-03 RX ORDER — BUDESONIDE 0.25 MG/2ML
0.25 INHALANT ORAL
Status: DISCONTINUED | OUTPATIENT
Start: 2025-01-03 | End: 2025-01-04 | Stop reason: HOSPADM

## 2025-01-03 RX ORDER — CARBIDOPA AND LEVODOPA 25; 100 MG/1; MG/1
1.5 TABLET ORAL 3 TIMES DAILY
Status: DISCONTINUED | OUTPATIENT
Start: 2025-01-03 | End: 2025-01-04 | Stop reason: HOSPADM

## 2025-01-03 RX ORDER — ACETAMINOPHEN 160 MG/5ML
650 SOLUTION ORAL EVERY 4 HOURS PRN
Status: DISCONTINUED | OUTPATIENT
Start: 2025-01-03 | End: 2025-01-04 | Stop reason: HOSPADM

## 2025-01-03 RX ORDER — DEXTROSE 50 % IN WATER (D50W) INTRAVENOUS SYRINGE
12.5
Status: DISCONTINUED | OUTPATIENT
Start: 2025-01-03 | End: 2025-01-04 | Stop reason: HOSPADM

## 2025-01-03 RX ORDER — DOCUSATE SODIUM 100 MG/1
100 CAPSULE, LIQUID FILLED ORAL 3 TIMES DAILY
Status: DISCONTINUED | OUTPATIENT
Start: 2025-01-03 | End: 2025-01-04 | Stop reason: HOSPADM

## 2025-01-03 RX ORDER — CLONAZEPAM 1 MG/1
1 TABLET ORAL EVERY MORNING
Status: DISCONTINUED | OUTPATIENT
Start: 2025-01-04 | End: 2025-01-04 | Stop reason: HOSPADM

## 2025-01-03 RX ORDER — PANTOPRAZOLE SODIUM 40 MG/1
40 TABLET, DELAYED RELEASE ORAL
Status: DISCONTINUED | OUTPATIENT
Start: 2025-01-04 | End: 2025-01-04 | Stop reason: HOSPADM

## 2025-01-03 RX ORDER — LEVOTHYROXINE SODIUM 100 UG/1
100 TABLET ORAL
Status: DISCONTINUED | OUTPATIENT
Start: 2025-01-07 | End: 2025-01-04 | Stop reason: HOSPADM

## 2025-01-03 RX ORDER — ENOXAPARIN SODIUM 100 MG/ML
40 INJECTION SUBCUTANEOUS EVERY 24 HOURS
Status: DISCONTINUED | OUTPATIENT
Start: 2025-01-03 | End: 2025-01-04 | Stop reason: HOSPADM

## 2025-01-03 RX ORDER — METFORMIN HYDROCHLORIDE 500 MG/1
500 TABLET ORAL
Status: DISCONTINUED | OUTPATIENT
Start: 2025-01-03 | End: 2025-01-04 | Stop reason: HOSPADM

## 2025-01-03 RX ORDER — BUMETANIDE 1 MG/1
2 TABLET ORAL 2 TIMES DAILY
Status: DISCONTINUED | OUTPATIENT
Start: 2025-01-03 | End: 2025-01-04 | Stop reason: HOSPADM

## 2025-01-03 RX ORDER — ACETAMINOPHEN 650 MG/1
650 SUPPOSITORY RECTAL EVERY 4 HOURS PRN
Status: DISCONTINUED | OUTPATIENT
Start: 2025-01-03 | End: 2025-01-04 | Stop reason: HOSPADM

## 2025-01-03 RX ORDER — POLYETHYLENE GLYCOL 3350 17 G/17G
17 POWDER, FOR SOLUTION ORAL DAILY
Status: DISCONTINUED | OUTPATIENT
Start: 2025-01-03 | End: 2025-01-04 | Stop reason: HOSPADM

## 2025-01-03 RX ORDER — IPRATROPIUM BROMIDE AND ALBUTEROL SULFATE 2.5; .5 MG/3ML; MG/3ML
3 SOLUTION RESPIRATORY (INHALATION) EVERY 2 HOUR PRN
Status: DISCONTINUED | OUTPATIENT
Start: 2025-01-03 | End: 2025-01-04 | Stop reason: HOSPADM

## 2025-01-03 RX ORDER — TOPIRAMATE 100 MG/1
200 TABLET, FILM COATED ORAL 2 TIMES DAILY
Status: DISCONTINUED | OUTPATIENT
Start: 2025-01-03 | End: 2025-01-04 | Stop reason: HOSPADM

## 2025-01-03 RX ORDER — CARVEDILOL 25 MG/1
25 TABLET ORAL 2 TIMES DAILY
Status: DISCONTINUED | OUTPATIENT
Start: 2025-01-03 | End: 2025-01-04 | Stop reason: HOSPADM

## 2025-01-03 RX ORDER — NYSTATIN 100000 U/G
1 CREAM TOPICAL 2 TIMES DAILY
COMMUNITY

## 2025-01-03 RX ORDER — INSULIN LISPRO 100 [IU]/ML
0-5 INJECTION, SOLUTION INTRAVENOUS; SUBCUTANEOUS
Status: DISCONTINUED | OUTPATIENT
Start: 2025-01-03 | End: 2025-01-04 | Stop reason: HOSPADM

## 2025-01-03 RX ORDER — BACLOFEN 10 MG/1
20 TABLET ORAL 3 TIMES DAILY
Status: DISCONTINUED | OUTPATIENT
Start: 2025-01-03 | End: 2025-01-04 | Stop reason: HOSPADM

## 2025-01-03 RX ORDER — LEVOTHYROXINE SODIUM 100 UG/1
200 TABLET ORAL
Status: DISCONTINUED | OUTPATIENT
Start: 2025-01-04 | End: 2025-01-04 | Stop reason: HOSPADM

## 2025-01-03 RX ORDER — ESTRADIOL 0.1 MG/G
2 CREAM VAGINAL 3 TIMES WEEKLY
Status: DISCONTINUED | OUTPATIENT
Start: 2025-01-06 | End: 2025-01-04 | Stop reason: HOSPADM

## 2025-01-03 RX ORDER — ALBUTEROL SULFATE 0.83 MG/ML
2.5 SOLUTION RESPIRATORY (INHALATION) EVERY 2 HOUR PRN
Status: DISCONTINUED | OUTPATIENT
Start: 2025-01-03 | End: 2025-01-04 | Stop reason: HOSPADM

## 2025-01-03 RX ORDER — IPRATROPIUM BROMIDE AND ALBUTEROL SULFATE 2.5; .5 MG/3ML; MG/3ML
3 SOLUTION RESPIRATORY (INHALATION)
Status: DISCONTINUED | OUTPATIENT
Start: 2025-01-03 | End: 2025-01-03

## 2025-01-03 RX ADMIN — DOCUSATE SODIUM 100 MG: 100 CAPSULE, LIQUID FILLED ORAL at 15:20

## 2025-01-03 RX ADMIN — INSULIN LISPRO 3 UNITS: 100 INJECTION, SOLUTION INTRAVENOUS; SUBCUTANEOUS at 16:35

## 2025-01-03 RX ADMIN — OXCARBAZEPINE 1200 MG: 300 TABLET, FILM COATED ORAL at 20:59

## 2025-01-03 RX ADMIN — SERTRALINE 250 MG: 50 TABLET, FILM COATED ORAL at 15:20

## 2025-01-03 RX ADMIN — GABAPENTIN 1200 MG: 400 CAPSULE ORAL at 20:59

## 2025-01-03 RX ADMIN — DOCUSATE SODIUM 100 MG: 100 CAPSULE, LIQUID FILLED ORAL at 21:00

## 2025-01-03 RX ADMIN — BACLOFEN 20 MG: 10 TABLET ORAL at 15:20

## 2025-01-03 RX ADMIN — BACLOFEN 20 MG: 10 TABLET ORAL at 21:00

## 2025-01-03 RX ADMIN — BUDESONIDE 0.25 MG: 0.25 INHALANT RESPIRATORY (INHALATION) at 19:15

## 2025-01-03 RX ADMIN — TOPIRAMATE 200 MG: 100 TABLET, FILM COATED ORAL at 15:21

## 2025-01-03 RX ADMIN — METFORMIN HYDROCHLORIDE 500 MG: 500 TABLET ORAL at 16:35

## 2025-01-03 RX ADMIN — ENOXAPARIN SODIUM 40 MG: 40 INJECTION SUBCUTANEOUS at 21:00

## 2025-01-03 RX ADMIN — ASPIRIN 81 MG: 81 TABLET, COATED ORAL at 12:02

## 2025-01-03 RX ADMIN — BUMETANIDE 2 MG: 1 TABLET ORAL at 20:59

## 2025-01-03 RX ADMIN — FORMOTEROL FUMARATE DIHYDRATE 20 MCG: 20 SOLUTION RESPIRATORY (INHALATION) at 19:15

## 2025-01-03 RX ADMIN — POLYETHYLENE GLYCOL 3350 17 G: 17 POWDER, FOR SOLUTION ORAL at 15:20

## 2025-01-03 RX ADMIN — LOSARTAN POTASSIUM 50 MG: 50 TABLET, FILM COATED ORAL at 15:20

## 2025-01-03 RX ADMIN — CARBIDOPA AND LEVODOPA 1.5 TABLET: 25; 100 TABLET ORAL at 15:21

## 2025-01-03 RX ADMIN — QUETIAPINE FUMARATE 800 MG: 300 TABLET ORAL at 20:59

## 2025-01-03 RX ADMIN — GABAPENTIN 1200 MG: 400 CAPSULE ORAL at 12:58

## 2025-01-03 RX ADMIN — LIDOCAINE 4% 1 PATCH: 40 PATCH TOPICAL at 15:20

## 2025-01-03 RX ADMIN — CARVEDILOL 25 MG: 25 TABLET, FILM COATED ORAL at 20:59

## 2025-01-03 RX ADMIN — PRAZOSIN HYDROCHLORIDE 5 MG: 1 CAPSULE ORAL at 20:59

## 2025-01-03 RX ADMIN — OXYCODONE HYDROCHLORIDE AND ACETAMINOPHEN 1000 MG: 500 TABLET ORAL at 12:02

## 2025-01-03 RX ADMIN — ATORVASTATIN CALCIUM 20 MG: 20 TABLET, FILM COATED ORAL at 21:00

## 2025-01-03 RX ADMIN — Medication 2 L/MIN: at 22:39

## 2025-01-03 RX ADMIN — TOPIRAMATE 200 MG: 100 TABLET, FILM COATED ORAL at 21:00

## 2025-01-03 RX ADMIN — ACETAMINOPHEN 650 MG: 325 TABLET, FILM COATED ORAL at 12:02

## 2025-01-03 SDOH — HEALTH STABILITY: MENTAL HEALTH: HOW OFTEN DO YOU HAVE A DRINK CONTAINING ALCOHOL?: NEVER

## 2025-01-03 SDOH — SOCIAL STABILITY: SOCIAL INSECURITY: HAVE YOU HAD THOUGHTS OF HARMING ANYONE ELSE?: NO

## 2025-01-03 SDOH — ECONOMIC STABILITY: FOOD INSECURITY: WITHIN THE PAST 12 MONTHS, THE FOOD YOU BOUGHT JUST DIDN'T LAST AND YOU DIDN'T HAVE MONEY TO GET MORE.: NEVER TRUE

## 2025-01-03 SDOH — HEALTH STABILITY: MENTAL HEALTH: HOW OFTEN DO YOU HAVE SIX OR MORE DRINKS ON ONE OCCASION?: NEVER

## 2025-01-03 SDOH — HEALTH STABILITY: MENTAL HEALTH: HOW MANY DRINKS CONTAINING ALCOHOL DO YOU HAVE ON A TYPICAL DAY WHEN YOU ARE DRINKING?: PATIENT DOES NOT DRINK

## 2025-01-03 SDOH — ECONOMIC STABILITY: INCOME INSECURITY: IN THE PAST 12 MONTHS HAS THE ELECTRIC, GAS, OIL, OR WATER COMPANY THREATENED TO SHUT OFF SERVICES IN YOUR HOME?: NO

## 2025-01-03 SDOH — SOCIAL STABILITY: SOCIAL INSECURITY
WITHIN THE LAST YEAR, HAVE YOU BEEN RAPED OR FORCED TO HAVE ANY KIND OF SEXUAL ACTIVITY BY YOUR PARTNER OR EX-PARTNER?: NO

## 2025-01-03 SDOH — SOCIAL STABILITY: SOCIAL INSECURITY
WITHIN THE LAST YEAR, HAVE YOU BEEN KICKED, HIT, SLAPPED, OR OTHERWISE PHYSICALLY HURT BY YOUR PARTNER OR EX-PARTNER?: NO

## 2025-01-03 SDOH — SOCIAL STABILITY: SOCIAL INSECURITY: WITHIN THE LAST YEAR, HAVE YOU BEEN HUMILIATED OR EMOTIONALLY ABUSED IN OTHER WAYS BY YOUR PARTNER OR EX-PARTNER?: NO

## 2025-01-03 SDOH — SOCIAL STABILITY: SOCIAL INSECURITY: WERE YOU ABLE TO COMPLETE ALL THE BEHAVIORAL HEALTH SCREENINGS?: YES

## 2025-01-03 SDOH — ECONOMIC STABILITY: FOOD INSECURITY: WITHIN THE PAST 12 MONTHS, YOU WORRIED THAT YOUR FOOD WOULD RUN OUT BEFORE YOU GOT THE MONEY TO BUY MORE.: NEVER TRUE

## 2025-01-03 SDOH — SOCIAL STABILITY: SOCIAL INSECURITY: WITHIN THE LAST YEAR, HAVE YOU BEEN AFRAID OF YOUR PARTNER OR EX-PARTNER?: NO

## 2025-01-03 ASSESSMENT — COGNITIVE AND FUNCTIONAL STATUS - GENERAL
WALKING IN HOSPITAL ROOM: A LITTLE
CLIMB 3 TO 5 STEPS WITH RAILING: A LITTLE
WALKING IN HOSPITAL ROOM: A LITTLE
STANDING UP FROM CHAIR USING ARMS: A LITTLE
HELP NEEDED FOR BATHING: A LITTLE
HELP NEEDED FOR BATHING: A LITTLE
DRESSING REGULAR LOWER BODY CLOTHING: A LOT
PATIENT BASELINE BEDBOUND: NO
DAILY ACTIVITIY SCORE: 21
DAILY ACTIVITIY SCORE: 21
PERSONAL GROOMING: A LITTLE
HELP NEEDED FOR BATHING: A LITTLE
DRESSING REGULAR UPPER BODY CLOTHING: A LITTLE
EATING MEALS: A LITTLE
DRESSING REGULAR LOWER BODY CLOTHING: A LITTLE
TOILETING: A LITTLE
STANDING UP FROM CHAIR USING ARMS: A LITTLE
MOBILITY SCORE: 17
WALKING IN HOSPITAL ROOM: A LITTLE
MOVING TO AND FROM BED TO CHAIR: A LITTLE
TOILETING: A LITTLE
TURNING FROM BACK TO SIDE WHILE IN FLAT BAD: A LITTLE
DAILY ACTIVITIY SCORE: 17
MOBILITY SCORE: 21
MOVING FROM LYING ON BACK TO SITTING ON SIDE OF FLAT BED WITH BEDRAILS: A LITTLE
TOILETING: A LITTLE
STANDING UP FROM CHAIR USING ARMS: A LITTLE
CLIMB 3 TO 5 STEPS WITH RAILING: A LOT
MOBILITY SCORE: 21
DRESSING REGULAR LOWER BODY CLOTHING: A LITTLE
CLIMB 3 TO 5 STEPS WITH RAILING: A LITTLE

## 2025-01-03 ASSESSMENT — LIFESTYLE VARIABLES
AUDIT-C TOTAL SCORE: 0
SKIP TO QUESTIONS 9-10: 1

## 2025-01-03 ASSESSMENT — PAIN SCALES - GENERAL
PAINLEVEL_OUTOF10: 10 - WORST POSSIBLE PAIN
PAINLEVEL_OUTOF10: 9
PAINLEVEL_OUTOF10: 5 - MODERATE PAIN
PAINLEVEL_OUTOF10: 9
PAINLEVEL_OUTOF10: 9
PAINLEVEL_OUTOF10: 7
PAINLEVEL_OUTOF10: 7
PAINLEVEL_OUTOF10: 5 - MODERATE PAIN
PAINLEVEL_OUTOF10: 9

## 2025-01-03 ASSESSMENT — ACTIVITIES OF DAILY LIVING (ADL)
LACK_OF_TRANSPORTATION: NO
LACK_OF_TRANSPORTATION: NO
JUDGMENT_ADEQUATE_SAFELY_COMPLETE_DAILY_ACTIVITIES: YES
FEEDING YOURSELF: INDEPENDENT
HEARING - RIGHT EAR: FUNCTIONAL
ADEQUATE_TO_COMPLETE_ADL: YES
TOILETING: NEEDS ASSISTANCE
ASSISTIVE_DEVICE: WALKER;EYEGLASSES;CANE
ADL_ASSISTANCE: INDEPENDENT
HEARING - LEFT EAR: FUNCTIONAL
DRESSING YOURSELF: INDEPENDENT
WALKS IN HOME: NEEDS ASSISTANCE
BATHING: NEEDS ASSISTANCE
ADL_ASSISTANCE: INDEPENDENT
GROOMING: INDEPENDENT
PATIENT'S MEMORY ADEQUATE TO SAFELY COMPLETE DAILY ACTIVITIES?: YES

## 2025-01-03 ASSESSMENT — PAIN - FUNCTIONAL ASSESSMENT
PAIN_FUNCTIONAL_ASSESSMENT: 0-10

## 2025-01-03 ASSESSMENT — PATIENT HEALTH QUESTIONNAIRE - PHQ9
2. FEELING DOWN, DEPRESSED OR HOPELESS: NOT AT ALL
1. LITTLE INTEREST OR PLEASURE IN DOING THINGS: NOT AT ALL
SUM OF ALL RESPONSES TO PHQ9 QUESTIONS 1 & 2: 0

## 2025-01-03 NOTE — PROGRESS NOTES
Pharmacy Medication History     Source of Information: Per Patient daughter     Additional concerns with the patient's PTA list.   N/A  The following updates were made to the Prior to Admission medication list:     Medications ADDED:   Gabapentin 600 mg 2 tablets 3 times a day   Nystatin cream   Medications CHANGED:  Oscal 500 mg 1 daily  Levothyroxine 100 mcg 1 tablet  one hour before food intake   Medications REMOVED:   Elavil 10 mg   Percocet 5 - 325  Gabapentin 300 mg  Medications NOT TAKING:   Minocycline 100 mg      Allergy reviewed : Yes    Meds 2 Beds : Yes    Outpatient pharmacy confirmed and updated in chart : Yes    Pharmacy name: [facility]     The list below reflectives the updated PTA list. Please review each medication in order reconciliation for additional clarification and justification.    Prior to Admission Medications   Prescriptions Last Dose Informant   OXcarbazepine (Trileptal) 600 mg tablet 1/2/2025    Sig: Take 1 tablet (600 mg) by mouth once daily in the morning.   OXcarbazepine (Trileptal) 600 mg tablet Past Week    Sig: Take 2 tablets (1,200 mg) by mouth once daily at bedtime.   QUEtiapine (SeroqueL) 400 mg tablet Past Week    Sig: Take 2 tablets (800 mg) by mouth once daily at bedtime.   acetaminophen (Tylenol) 500 mg tablet     Sig: Take 1 tablet (500 mg) by mouth every 4 hours if needed.   albuterol 90 mcg/actuation inhaler Past Month    Sig: Inhale 2 puffs every 4 hours if needed for shortness of breath.   ammonium lactate (Lac-Hydrin) 12 % lotion     Sig: Apply 1 Application topically if needed.   ascorbic acid (Vitamin C) 1,000 mg tablet 1/2/2025    Sig: Take 1 tablet (1,000 mg) by mouth once daily.   aspirin 81 mg EC tablet Past Week    Sig: Take 1 tablet (81 mg) by mouth once daily.   atorvastatin (Lipitor) 20 mg tablet Past Week    Sig: Take 1 tablet (20 mg) by mouth once daily at bedtime.   azelastine (Astelin) 137 mcg (0.1 %) nasal spray Past Week    Sig: Administer 1  spray into each nostril 2 times a day.   baclofen (Lioresal) 20 mg tablet Past Week    Sig: Take 1 tablet (20 mg) by mouth 3 times a day.   budesonide-formoteroL (Symbicort) 80-4.5 mcg/actuation inhaler 1/2/2025    Sig: Inhale 2 puffs 2 times a day. Rinse mouth with water after use to reduce aftertaste and incidence of candidiasis. Do not swallow.   bumetanide (Bumex) 2 mg tablet 1/2/2025    Sig: Take 1 tablet (2 mg) by mouth 2 times a day.   calcium carbonate (Oscal) 500 mg calcium (1,250 mg) tablet 1/2/2025    Sig: Take 1 tablet (1,250 mg) by mouth 3 times a day.   carbidopa-levodopa (Sinemet)  mg tablet 1/2/2025    Sig: Take 1.5 tablets by mouth 3 times a day. 8am, 12 noon and 4 pm   carvedilol (Coreg) 25 mg tablet 1/2/2025    Sig: Take 1 tablet (25 mg) by mouth 2 times a day.   cephalexin (Keflex) 250 mg capsule 1/2/2025    Sig: Take 1 capsule (250 mg) by mouth once daily.   Patient taking differently: Take 2 capsules (500 mg) by mouth once daily.   cholecalciferol (Vitamin D-3) 50 MCG (2000 UT) tablet 1/2/2025    Sig: Take 1 tablet (2,000 Units) by mouth once daily.   clonazePAM (KlonoPIN) 1 mg tablet 1/2/2025    Sig: Take 1-2 tablets (1-2 mg) by mouth 2 times a day. Take 1 tablet in the morning and 2 tablets at night as needed   cyclobenzaprine (Flexeril) 10 mg tablet More than a month    Sig: Take 1 tablet (10 mg) by mouth as needed at bedtime for muscle spasms.   Patient not taking: Reported on 1/3/2025   diclofenac sodium (Voltaren) 1 % gel gel     Sig: Apply 4.5 inches topically 3 times a day as needed.   docusate sodium (Colace) 100 mg capsule 1/2/2025    Sig: Take 1 capsule (100 mg) by mouth 3 times a day.   doxycycline (Adoxa) 100 mg tablet 1/2/2025    Sig: Take 1 tablet (100 mg) by mouth once daily at bedtime.   estradiol (Estrace) 0.01 % (0.1 mg/gram) vaginal cream 1/2/2025    Sig: Insert 1 Application into the vagina 3 (three) times a week. Pea-sized amount   fluticasone (Flonase) 50  mcg/actuation nasal spray 2025    Sig: Administer 1 spray into each nostril once daily. Shake gently. Before first use, prime pump. After use, clean tip and replace cap.   formoterol (Perforomist) 20 mcg/2 mL nebulizer solution 2025    Sig: Inhale 2 mL (20 mcg) every 12 hours.   gabapentin (Neurontin) 600 mg tablet     Sig: Take 2 tablets (1,200 mg) by mouth 3 times a day.   glucagon (Glucagen) 1 mg injection Unknown    Sig: Inject 1 mg into the muscle every 15 minutes if needed for low blood sugar - see comments (For blood glucose less than or equal to 40 mg/dL and no IV access).   ipratropium-albuteroL (Duo-Neb) 0.5-2.5 mg/3 mL nebulizer solution Past Month    Sig: Inhale 3 mL every 4 hours if needed.   lancets misc     Si each 3 times a day.   levothyroxine (Synthroid, Levoxyl) 100 mcg tablet 1/3/2025    Sig: Take 100mcg on  and Sun - 1 hour before breakfast Do not start before 2024.   Patient taking differently: Take 1 tablet (100 mcg) by mouth. Take 100mcg on Tue, Wed, Thur - 1 hour before breakfast           lidocaine (Lidoderm) 5 % patch Past Month    Sig: APPLY 1 PATCH AND LEAVE IN PLACE FOR 12 HOURS, THEN REMOVE AND LEAVE OFF FOR 12 HOURS   losartan (Cozaar) 50 mg tablet 2025    Sig: Take 1 tablet (50 mg) by mouth once daily.   metFORMIN (Glucophage) 500 mg tablet 2025    Sig: Take 1 tablet (500 mg) by mouth 2 times daily (morning and late afternoon).           Patient not taking: Reported on 1/3/2025   omega-3 fatty acids-fish oil 360-1,200 mg capsule 2025    Sig: Take 1 capsule (1,200 mg) by mouth once daily.   omeprazole (PriLOSEC) 40 mg DR capsule 2025    Sig: Take 1 capsule (40 mg) by mouth once daily. With dinner   oxyCODONE-acetaminophen (Percocet) 5-325 mg tablet     Sig: Take 0.5 tablets by mouth every 6 hours if needed for severe pain (7 - 10).   Patient not taking: Reported on 2024   polyethylene glycol (Glycolax, Miralax) 17 gram/dose powder      Sig: Mix 17 g of powder and drink once daily. In 8oz of water, juice, or tea and drink daily   potassium chloride (KCL-40 ORAL) Past Week    Sig: Take 80 mEq by mouth 2 times a day.   prazosin (Minipress) 5 mg capsule Past Week    Sig: Take 1 capsule (5 mg) by mouth once daily at bedtime.   sertraline (Zoloft) 100 mg tablet 1/2/2025    Sig: Take 2 tablets (200 mg) by mouth once daily. Do not start before January 2, 2024.   Patient taking differently: Take 2.5 tablets (250 mg) by mouth once daily.   topiramate (Topamax) 200 mg tablet 1/2/2025    Sig: TAKE ONE TABLET (200 MG) BY MOUTH TWICE DAILY      Facility-Administered Medications: None       The list below reflectives the updated allergy list. Please review each documented allergy for additional clarification and justification.    Allergies   Allergen Reactions    Lisinopril Swelling          01/03/25 at 12:33 PM - Radha Bal

## 2025-01-03 NOTE — PROGRESS NOTES
01/03/25 1211   Discharge Planning   Living Arrangements Alone   Support Systems Children   Assistance Needed cane   Type of Residence Private residence   Number of Stairs to Enter Residence 0   Number of Stairs Within Residence 0   Home or Post Acute Services In home services   Type of Home Care Services Home PT;Home OT   Expected Discharge Disposition Home H   Does the patient need discharge transport arranged? Yes   RoundTrip coordination needed? Yes   Financial Resource Strain   How hard is it for you to pay for the very basics like food, housing, medical care, and heating? Not very   Housing Stability   In the last 12 months, was there a time when you were not able to pay the mortgage or rent on time? N   At any time in the past 12 months, were you homeless or living in a shelter (including now)? N   Transportation Needs   In the past 12 months, has lack of transportation kept you from medical appointments or from getting medications? no   In the past 12 months, has lack of transportation kept you from meetings, work, or from getting things needed for daily living? No   Patient Choice   Provider Choice list and CMS website (https://medicare.gov/care-compare#search) for post-acute Quality and Resource Measure Data were provided and reviewed with: Patient     Met with patient at bedside to discuss discharge plans.  Patient is recommended to have a quad cane with a wider base--she was vended a cane recently so she will need to purchase this one on her own.  Patient understands and will obtain the cane.  Patient has had HHC before with  [facility]--she would like to have them again.  Notified Dr Rodriguez via secure chat that patient will need new internal HHC orders for pt/ot.  Will continue to follow for discharge planning needs.

## 2025-01-03 NOTE — PROGRESS NOTES
Occupational Therapy    Evaluation    Patient Name: Prema Hair  MRN: [id number]  Department: [facility] 1  Room: [address] [place] [address]  Today's Date: 1/3/2025  Time Calculation  Start Time: 0847  Stop Time: 0902  Time Calculation (min): 15 min        Assessment:  OT Assessment: Pt presents with reduced balance, strength, endurance, ROM with an increase in pain, impeding ADL performance and functional mobility. Dtr reports pt will be with family member at all times once discharged. Pt would benefit from skilled OT services to address these deficits and facilitate highest level of function.  Prognosis: Good  Barriers to Discharge Home: Caregiver assistance  Caregiver Assistance: Patient lives alone and/or does not have reliable caregiver assistance  Physical Needs: Intermittent mobility assistance needed, Intermittent ADL assistance needed  Evaluation/Treatment Tolerance: Patient limited by pain, Patient limited by fatigue  Medical Staff Made Aware: Yes  End of Session Communication: Bedside nurse  End of Session Patient Position: Bed, 3 rail up, Alarm on  OT Assessment Results: Decreased ADL status, Decreased upper extremity range of motion, Decreased upper extremity strength, Decreased safe judgment during ADL, Decreased endurance, Decreased functional mobility, Decreased IADLs  Prognosis: Good  Evaluation/Treatment Tolerance: Patient limited by pain, Patient limited by fatigue  Medical Staff Made Aware: Yes  Strengths: Ability to acquire knowledge, Premorbid level of function, Support of extended family/friends  Barriers to Participation: Comorbidities  Plan:  Treatment Interventions: ADL retraining, Functional transfer training, UE strengthening/ROM, Endurance training, Patient/family training, Equipment evaluation/education, Compensatory technique education  OT Frequency: 2 times per week  OT Discharge Recommendations: Low intensity level of continued care  OT Recommended Transfer Status: Assist of 1  OT - OK to Discharge:  Yes (Per POC)  Treatment Interventions: ADL retraining, Functional transfer training, UE strengthening/ROM, Endurance training, Patient/family training, Equipment evaluation/education, Compensatory technique education    Subjective     General:  General  Reason for Referral: 59 y.o. female presenting with multiple falls and knee pain.  Referred By: Ayleen Robles RN  Past Medical History Relevant to Rehab:   Past Medical History:   Diagnosis Date    CATALINA positive     Arthritis     Asthma     Bell's palsy     x 2    Bilateral leg edema     Bipolar disorder     CHF (congestive heart failure)     Chronic allergic rhinitis     Chronic constipation     Diabetes mellitus (Multi)     Diabetic neuropathy (Multi)     Dysphonia 07/05/2022    Muscle tension dysphonia    Fibromyalgia     GERD (gastroesophageal reflux disease)     under control with medication    High pulmonary arterial pressure (Multi)     moderately elevated on ECHO 10.5.23    Hyperlipidemia, unspecified 01/03/2023    Dyslipidemia    Hypertension     Hypokalemia     1.1.24- K 3.8 - on oral supplements    Hypothyroidism     Low back pain, unspecified 02/22/2021    Low back pain    Moderate tricuspid regurgitation     Obstructive sleep apnea (adult) (pediatric) 01/03/2023    ZEINA on CPAP    Paralysis of vocal cords and larynx, 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    Laryngeal paresis    Parkinsonism (Multi)     Pseudoaneurysm of carotid artery 2020    s/p right pipeline and coil embolization of Right ICA pseudoaneurysm    PTSD (post-traumatic stress disorder)     Thyroid cancer (Multi)     s/p total thyroidectomy, residual tissue on left side - FNA benign 2020 and 2021    Torticollis     botox injections    Vitamin D deficiency      Family/Caregiver Present: Yes  Caregiver Feedback: Dtr present  Co-Treatment: PT  Co-Treatment Reason: To maximize pt safety, mobility and performance.  Prior to Session Communication: Bedside nurse  Patient Position Received:  (On commode w/  dtr present)  Preferred Learning Style: auditory, verbal, visual  General Comment: Pt pleasant and agreeable to OT/PT eval.  Precautions:  Medical Precautions: Fall precautions    Vital Sign (Past 2hrs)        Date/Time Vitals Session Patient Position Pulse Resp SpO2 BP MAP (mmHg)    01/03/25 0843 --  --  79  18  96 %  141/91  103                         Pain:  Pain Assessment  Pain Assessment: 0-10  0-10 (Numeric) Pain Score: 9  Pain Type: Chronic pain, Acute pain  Pain Location: Knee  Pain Orientation: Right, Left  Pain Interventions: Repositioned, Ambulation/increased activity  Pain 2  Pain Score 2: 9  Pain Type 2: Chronic pain  Pain Location 2: Neck    Objective   Cognition:  Orientation Level: Oriented X4  Attention: Within Functional Limits  Memory: Within Funtional Limits           Home Living:  Type of Home: Apartment  Lives With: Alone  Home Adaptive Equipment: Walker rolling or standard, Cane, Reacher (Rollator, transport chair, SPQC)  Home Layout: One level  Home Access: No concerns  Bathroom Shower/Tub: Tub/shower unit  Bathroom Toilet: Adaptive toilet seating  Bathroom Equipment: Grab bars in shower, Shower chair with back, Raised toilet seat with rails  Prior Function:  Level of [place]: Independent with ADLs and functional transfers, Needs assistance with homemaking  Receives Help From: Family  ADL Assistance: Independent (Supervision for showering/bathing)  Homemaking Assistance: Needs assistance (Family assists with meals/laundry)  Ambulatory Assistance: Independent (With rollator, occasionally with SPQC.)  Prior Function Comments: Dtr reports ~5 falls since April     ADL:  Grooming Assistance: Stand by  Grooming Deficit: Setup, Supervision/safety, Increased time to complete, Teeth care  Activity Tolerance:  Endurance: Tolerates 10 - 20 min exercise with multiple rests  Bed Mobility/Transfers: Bed Mobility  Bed Mobility: Yes  Bed Mobility 1  Bed Mobility 1: Sitting to supine  Level of  Assistance 1: Close supervision  Bed Mobility Comments 1: Able to manage/progress BLE and trunk into bed, HOB flat. Slow to perform and effortful.    Transfers  Transfer: Yes  Transfer 1  Technique 1: Sit to stand, Stand to sit  Transfer Device 1: Walker, Gait belt  Transfer Level of Assistance 1: Contact guard  Trials/Comments 1: Cues for safe transfer      Functional Mobility:  Functional Mobility  Functional Mobility Performed: Yes  Functional Mobility 1  Surface 1: Level tile  Device 1: Rolling walker  Functional Mobility Support Devices: Gait belt  Assistance 1: Contact guard  Comments 1: Pt completed x mod household distance functional mobility with FWW, CGA. Slow but steady gait.  Sitting Balance:  Static Sitting Balance  Static Sitting-Balance Support: Feet supported  Static Sitting-Level of Assistance:  (SBA)  Standing Balance:  Static Standing Balance  Static Standing-Balance Support: Bilateral upper extremity supported  Static Standing-Level of Assistance: Close supervision  Dynamic Standing Balance  Dynamic Standing-Balance Support: Bilateral upper extremity supported  Dynamic Standing-Level of Assistance: Contact guard  Dynamic Standing-Balance: Turning      Vision:Vision - Basic Assessment  Current Vision: Wears glasses all the time  Sensation:  Light Touch: No apparent deficits  Strength:  Strength Comments: Limited bilateral shoulder flexion L<R  Perception:  Inattention/Neglect: Appears intact  Coordination:  Movements are Fluid and Coordinated: Yes   Hand Function:  Gross Grasp: Functional  Coordination: Functional  Extremities: RUE   RUE : Within Functional Limits and LUE   LUE: Within Functional Limits    Outcome Measures:[facility] Daily Activity  Putting on and taking off regular lower body clothing: A lot  Bathing (including washing, rinsing, drying): A little  Putting on and taking off regular upper body clothing: A little  Toileting, which includes using toilet, bedpan or urinal: A little  Taking  care of personal grooming such as brushing teeth: A little  Eating Meals: A little  Daily Activity - Total Score: 17        Education Documentation  Body Mechanics, taught by Iesha Slaughter OT at 1/3/2025  9:55 AM.  Learner: Patient  Readiness: Acceptance  Method: Explanation  Response: Verbalizes Understanding    Precautions, taught by Iesha Slaughter OT at 1/3/2025  9:55 AM.  Learner: Patient  Readiness: Acceptance  Method: Explanation  Response: Verbalizes Understanding    ADL Training, taught by Iesha Slaughter OT at 1/3/2025  9:55 AM.  Learner: Patient  Readiness: Acceptance  Method: Explanation  Response: Verbalizes Understanding    Education Comments  No comments found.        OP EDUCATION:       Goals:  Encounter Problems       Encounter Problems (Active)       ADLs       Patient will perform sponge UB and LB bathing with supervision level of assistance.       Start:  01/03/25    Expected End:  01/17/25            Patient with complete upper body dressing with set-up level of assistance donning and doffing all UE clothes with PRN adaptive equipment while supported sitting and edge of bed.       Start:  01/03/25    Expected End:  01/17/25            Patient with complete lower body dressing with stand by assist level of assistance donning and doffing all LE clothes  with PRN adaptive equipment while supported sitting and edge of bed.       Start:  01/03/25    Expected End:  01/17/25            Patient will complete daily grooming tasks with set-up level of assistance and PRN adaptive equipment while standing.       Start:  01/03/25    Expected End:  01/17/25            Patient will complete toileting including hygiene clothing management/hygiene with stand by assist level of assistance and raised toilet seat and grab bars.       Start:  01/03/25    Expected End:  01/17/25               BALANCE       Pt will maintain static/dynamic standing balance >=7 minutes during ADL task with modified independent  level of assistance in order to demonstrate decreased risk of falling and improved postural control.       Start:  01/03/25    Expected End:  01/17/25               MOBILITY       Patient will perform Functional mobility x Household distances/Community Distances with supervision level of assistance and front wheeled walker in order to improve safety and functional mobility.       Start:  01/03/25    Expected End:  01/17/25               TRANSFERS       Patient will complete sit to stand transfer with modified independent level of assistance and front wheeled walker in order to improve safety and prepare for out of bed mobility.       Start:  01/03/25    Expected End:  01/17/25

## 2025-01-03 NOTE — NURSING NOTE
Pt arrived around 0330 am, messaged Dr. Lowery, overnight hospitalist about potential orders in for the pt, but she doesn't cover Dr. Rodriguez. Messaged Dr. Rodriguez about orders via secure chat but no response after waiting an hour. Decided to then call his office number's and no answer from Dr. Rodriguez except his  asking for the pt's name and . [place] told me she will let Dr. Rodriguez know that I called about putting in orders for the pt. Still waiting for response after 20 minutes. Still awaiting orders.

## 2025-01-03 NOTE — PROGRESS NOTES
Physical Therapy    Physical Therapy Evaluation    Patient Name: Prema Hair  MRN: [id number]  Department: [place] [id number]  Room: [address] [place] [address]  Today's Date: 1/3/2025   Time Calculation  Start Time: 0848  Stop Time: 0903  Time Calculation (min): 15 min    Assessment/Plan   PT Assessment  PT Assessment Results: Decreased strength, Decreased endurance, Impaired balance, Decreased mobility  Rehab Prognosis: Good  Barriers to Discharge Home: Caregiver assistance, Cognition needs, Physical needs  Caregiver Assistance: Other (Comment) (recommending 24/7 supervision. Sisters live nearby and provide PRN supervision at baseline)  Cognition Needs: 24hr supervision for safety awareness needed  Physical Needs: 24hr mobility assistance needed, 24hr ADL assistance needed, In-home setup navigation limited by function/safety  Evaluation/Treatment Tolerance: Patient tolerated treatment well  Medical Staff Made Aware: Yes  Strengths: Ability to acquire knowledge, Premorbid level of function, Support of extended family/friends  Barriers to Participation: Comorbidities  End of Session Communication: Bedside nurse  Assessment Comment: Pt demonstrating deficits in generalized strength, endurance and balance as well as increased pain resulting in impaired functional mobility, gait and self care. Due to the impairments listed above, pt would benefit from skilled physical therapy services in acute care setting as well as additional therapy in LOW intensity therapy setting with 24/7 supervision and assistance  End of Session Patient Position: Bed, 3 rail up, Alarm on  IP OR SWING BED PT PLAN  Inpatient or Swing Bed: Inpatient  PT Plan  Treatment/Interventions: Bed mobility, Transfer training, Gait training, Stair training, Balance training, Neuromuscular re-education, Strengthening, Endurance training, Therapeutic exercise, Therapeutic activity, Home exercise program  PT Plan: Ongoing PT  PT Frequency: 3 times per week  PT Discharge Recommendations:  Low intensity level of continued care  Equipment Recommended upon Discharge:  (recommend use of owned FWW. Pt requesting WBQC for narrow or diffiult to access locations such as bathroom)  PT Recommended Transfer Status: Assist x1  PT - OK to Discharge: Yes (PT POC initiated this date)    Subjective   General Visit Information:  General  Reason for Referral: 59 y.o. female presenting with multiple falls and knee pain.  Referred By: Ayleen Robles RN  Past Medical History Relevant to Rehab:   Past Medical History:   Diagnosis Date    CATALINA positive     Arthritis     Asthma     Bell's palsy     x 2    Bilateral leg edema     Bipolar disorder     CHF (congestive heart failure)     Chronic allergic rhinitis     Chronic constipation     Diabetes mellitus (Multi)     Diabetic neuropathy (Multi)     Dysphonia 07/05/2022    Muscle tension dysphonia    Fibromyalgia     GERD (gastroesophageal reflux disease)     under control with medication    High pulmonary arterial pressure (Multi)     moderately elevated on ECHO 10.5.23    Hyperlipidemia, unspecified 01/03/2023    Dyslipidemia    Hypertension     Hypokalemia     1.1.24- K 3.8 - on oral supplements    Hypothyroidism     Low back pain, unspecified 02/22/2021    Low back pain    Moderate tricuspid regurgitation     Obstructive sleep apnea (adult) (pediatric) 01/03/2023    ZEINA on CPAP    Paralysis of vocal cords and larynx, 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    Laryngeal paresis    Parkinsonism (Multi)     Pseudoaneurysm of carotid artery 2020    s/p right pipeline and coil embolization of Right ICA pseudoaneurysm    PTSD (post-traumatic stress disorder)     Thyroid cancer (Multi)     s/p total thyroidectomy, residual tissue on left side - FNA benign 2020 and 2021    Torticollis     botox injections    Vitamin D deficiency        Family/Caregiver Present: Yes  Caregiver Feedback: Dtr present  Co-Treatment: OT  Co-Treatment Reason: To maximize pt safety, mobility and performance.  Prior  to Session Communication: Bedside nurse  Patient Position Received:  (On commode w/ dtr present)  Preferred Learning Style: auditory, verbal, visual  General Comment: Pt pleasant and agreeable to OT/PT eval.  Home Living:  Home Living  Type of Home: Apartment  Lives With: Alone  Home Adaptive Equipment: Walker rolling or standard, Cane, Reacher (Rollator, transport chair, SPQC)  Home Layout: One level  Home Access: No concerns  Bathroom Shower/Tub: Tub/shower unit  Bathroom Toilet: Adaptive toilet seating  Bathroom Equipment: Grab bars in shower, Shower chair with back, Raised toilet seat with rails  Prior Level of Function:  Prior Function Per Pt/Caregiver Report  Level of [place]: Independent with ADLs and functional transfers, Needs assistance with homemaking  Receives Help From: Family  ADL Assistance: Independent (Supervision for showering/bathing)  Homemaking Assistance: Needs assistance (Family assists with meals/laundry)  Ambulatory Assistance: Independent (With rollator, occasionally with SPQC.)  Prior Function Comments: Dtr reports ~5 falls since April  Precautions:  Precautions  Medical Precautions: Fall precautions     Vital Signs (Past 2hrs)        Date/Time Vitals Session Patient Position Pulse Resp SpO2 BP MAP (mmHg)    01/03/25 0843 --  --  79  18  96 %  141/91  103                         Objective   Pain:  Pain Assessment  Pain Assessment: 0-10  0-10 (Numeric) Pain Score: 9  Pain 2  Pain Score 2: 9  Pain Type 2: Chronic pain  Pain Location 2: Neck  Cognition:  Cognition  Orientation Level: Oriented X4  Attention: Within Functional Limits  Memory: Within Funtional Limits    General Assessments:  Activity Tolerance  Endurance: Tolerates 10 - 20 min exercise with multiple rests    Sensation  Light Touch: No apparent deficits    Coordination  Movements are Fluid and Coordinated: Yes    Static Sitting Balance  Static Sitting-Balance Support: No upper extremity supported, Feet supported  Static  Sitting-Level of Assistance: Distant supervision, Close supervision  Static Sitting-Comment/Number of Minutes: 1 min    Static Standing Balance  Static Standing-Balance Support: Bilateral upper extremity supported (FWW)  Static Standing-Level of Assistance: Contact guard, Close supervision  Static Standing-Comment/Number of Minutes: 2 min  Functional Assessments:  Bed Mobility  Bed Mobility: Yes  Bed Mobility 1  Bed Mobility 1: Sitting to supine  Level of Assistance 1: Close supervision  Bed Mobility Comments 1: Able to manage/progress BLE and trunk into bed, HOB flat. Slow to perform and effortful.    Transfers  Transfer: Yes  Transfer 1  Technique 1: Sit to stand, Stand to sit  Transfer Device 1: Walker, Gait belt  Transfer Level of Assistance 1: Contact guard  Trials/Comments 1: Cues for safe transfer    Ambulation/Gait Training  Ambulation/Gait Training Performed: Yes  Ambulation/Gait Training 1  Surface 1: Level tile  Device 1: Rolling walker  Gait Support Devices: Gait belt  Assistance 1: Contact guard  Quality of Gait 1: Wide base of support, Decreased step length, Forward flexed posture  Comments/Distance (ft) 1: 100ft  Extremity/Trunk Assessments:  RLE   RLE : Within Functional Limits  LLE   LLE : Within Functional Limits  Outcome Measures:  [facility] Basic Mobility  Turning from your back to your side while in a flat bed without using bedrails: A little  Moving from lying on your back to sitting on the side of a flat bed without using bedrails: A little  Moving to and from bed to chair (including a wheelchair): A little  Standing up from a chair using your arms (e.g. wheelchair or bedside chair): A little  To walk in hospital room: A little  Climbing 3-5 steps with railing: A lot  Basic Mobility - Total Score: 17    Encounter Problems       Encounter Problems (Active)       Mobility       STG - Patient will ambulate       Start:  01/03/25    Expected End:  01/17/25       >150ft using LRAD Mod Ind            PT  Transfers       STG - Patient will perform bed mobility       Start:  01/03/25    Expected End:  01/17/25       Mod Ind         STG - Patient will transfer sit to and from stand       Start:  01/03/25    Expected End:  01/17/25       Mod Ind            Pain - Adult              Education Documentation  Handouts, taught by Noel Russo, PT at 1/3/2025 10:26 AM.  Learner: Family, Patient  Readiness: Acceptance  Method: Explanation  Response: Verbalizes Understanding    Precautions, taught by Noel Russo, PT at 1/3/2025 10:26 AM.  Learner: Family, Patient  Readiness: Acceptance  Method: Explanation  Response: Verbalizes Understanding    Mobility Training, taught by Noel Russo, PT at 1/3/2025 10:26 AM.  Learner: Family, Patient  Readiness: Acceptance  Method: Explanation  Response: Verbalizes Understanding    Education Comments  No comments found.

## 2025-01-03 NOTE — ED PROVIDER NOTES
History/Exam limitations: none.   Additional history was obtained from patient, relative(s), and past medical records.          HPI:    Prema Hair is a 59 y.o. female PMH right TKA, hypertension, bipolar disorder, PTSD, diabetes presenting for evaluation of right knee pain after fall.  Patient states that she was walking with her rollator and she states that she felt her knee locked up and she fell onto her right knee.  She states that she was able to get up.  States it happened 2 additional times and on the third fall today she was unable to get up.  She states that she always fell onto her right knee.  She did fall onto both knees with one of the falls and had abrasion to the medial aspect of her right big toe.  She denies falling onto her back or striking her head.  She denies any blood thinner use.  No headache, acute neck or back pain.  Has some mild pain in her right hip, right distal thigh, right knee, left great toe.    Ambulates with a rollator at baseline.       Physical Exam:  ED Triage Vitals [01/02/25 1852]   Temperature Heart Rate Respirations BP   36.6 °C (97.9 °F) 68 16 (!) 177/108      Pulse Ox Temp src Heart Rate Source Patient Position   100 % -- -- --      BP Location FiO2 (%)     -- --        GEN:      Alert, NAD  Eyes:       PERRL, EOMI  HENT:      NC/AT, OP clear, airway patent, MM  CV:      RRR, no MRG, no LE pitting edema, 2+ radial and pedal pulses  PULM:     CTAB, no w/r/r, easy WOB, symmetric chest rise  ABD:      Soft, NT, ND, no masses, BS +  :       No CVA TTP  NEURO:   A&Ox3, no focal deficits    MSK:      FROM, no joint deformities or swelling, very superficial small abrasion to medial aspect of the left great toe, mild tenderness to the right hip and right distal femur no deformity, no obvious joint laxity to the right knee, diffuse tenderness to the right knee, mild tenderness to the left knee with no joint laxity, pelvis stable, no midline CTL spine tenderness or  step-offs, no thoracic tenderness   SKIN:       Warm and dry  PSYCH:    Appropriate mood and affect         MDM/ED Course:   Prema Hair is a 59 y.o. female PMH right TKA, hypertension, bipolar disorder, PTSD, diabetes presenting for evaluation of right knee pain after fall.  Vitals remarkable for hypertension.  Exam as documented above.  Patient reports 2 falls at today that she was able to get up from onto the right knee specifically.  Reports with the final fall she also began having pain in her left great toe from an abrasion, right hip area.  Is unable to get up off the floor due to pain and weakness after the third fall.  Patient denies any head strike, anticoagulation use.  Denies any midline CTL spine tenderness or step-offs.  Denies falling backward.  Initially x-ray of chest, pelvis obtained as part of trauma workup.  Differential clues right knee fracture, dislocation/relocation, soft tissue injury.  Differential includes hip fracture or contusion.  Differential includes right great toe fracture versus contusion.  X-rays obtained and chest x-ray, pelvic x-ray, bilateral knee x-ray, right femur tib-fib x-ray overall reassuring. X-ray displays possible left fifth metatarsal fracture, patient has no point tenderness over this area lower suspicion.  She is unable to bear weight or ambulate on the right knee at baseline, concern for possible occult fracture.  Patient's family arrived at bedside and they report that she is actually been down on the ground 6 AM, had threes fall subsequently around that time and then was on the ground since then.  Given his CK, CBC, CMP ordered.  Obtaining CT right knee, CT head and C-spine given somewhat inconsistent history.  Patient is given a dose of p.o. Norco with improvement in symptoms.  Patient care signed out to my oncoming colleague at shift change pending CT imaging, labs, disposition.     Diagnoses as of 01/05/25 1300   Fall, initial encounter   Recurrent pain  of right knee         Chronic medical conditions affecting care listed in MDM. I independently reviewed imaging studies and agreed with radiology reads. I reviewed recent and relevant outside records including PCP notes, Prior discharge summaries, and prior radiology reports.    Procedure  Procedures    Diagnosis:   1.  Right knee pain  2.  Fall    Dispo: Handoff in stable condition      Disclaimer: Portions of this note were dictated by speech recognition. An attempt at proof reading was made to minimize errors. Minor errors in transcription may be present.  Please call if questions.     Sai Huynh MD  01/05/25 3756

## 2025-01-03 NOTE — CARE PLAN
The patient's goals for the shift include  free from falls and injury    The clinical goals for the shift include Pt will remain free from falls.      Problem: Pain - Adult  Goal: Verbalizes/displays adequate comfort level or baseline comfort level  Outcome: Progressing     Problem: Safety - Adult  Goal: Free from fall injury  Outcome: Progressing     Problem: Discharge Planning  Goal: Discharge to home or other facility with appropriate resources  Outcome: Progressing     Problem: Diabetes  Goal: Maintain glucose levels >70mg/dl to <250mg/dl throughout shift  Outcome: Progressing     Problem: Pain  Goal: Walks with improved pain control throughout the shift  Outcome: Progressing  Goal: Performs ADL's with improved pain control throughout shift  Outcome: Progressing  Goal: Free from opioid side effects throughout the shift  Outcome: Progressing

## 2025-01-03 NOTE — H&P
HISTORY AND PHYSICAL EXAMINATION    PATIENT NAME: Prema Hair    MRN: [id number]  SERVICE DATE: 1/3/2025       PRIMARY CARE PHYSICIAN: Shabana Rodriguez MD          ASSESSMENT AND PLAN     Impaired mobility, from deconditioning, causing recurrent falls at home.  Improved mobility today with PT/OT.  DM2, controlled  HTN, fair control  Drug-induced parkinsonism G21.19  Hyperlipidemia  Hx CA thyroid on supplemental therapy  Obesity, class I  Hx torticollis  Osteoarthrosis of the knees  Hx urinary retention    Plan:  Continue fall precautions.  Increase mobility.  Accu-Cheks AC/at bedtime, SSI, hypoglycemia protocol.  Continue home medications.  Hold for low BP.  Continue levothyroxine supplement.  Aim for suppressed TSH  D/W patient, daughter.  Updated patient's status and plan.      Discussed with nurses/case management team and the specialists involved in this patient's care. Reviewed the EMR and documentation from other care-givers.    SUBJECTIVE  CHIEF COMPLAINT:  falls at home    HPI: This is a 59 y.o. female who was brought to ER after patient had nearly 3 falls in her legs buckled.  No injuries from the episodes.  No syncope.  Patient is known to have impaired mobility from drug-induced parkinsonian syndrome and has had prior falls.  No new fever or chills.  No chest pain or palpitations during the episodes.    In the ER, workup showed no significant metabolic derangements or cardiac arrhythmias are evidence of injury.  Patient was admitted for observation for consideration of possible SNF placement.  Today, she was feeling better and had significantly improved mobility with PT/OT team.  Patient feels comfortable and confident about going back home.    PAST MEDICAL HISTORY:   Past Medical History:   Diagnosis Date    CATALINA positive     Arthritis     Asthma     Bell's palsy     x 2    Bilateral leg edema     Bipolar disorder     CHF (congestive heart failure)     Chronic allergic rhinitis     Chronic  constipation     Diabetes mellitus (Multi)     Diabetic neuropathy (Multi)     Dysphonia 07/05/2022    Muscle tension dysphonia    Fibromyalgia     GERD (gastroesophageal reflux disease)     under control with medication    High pulmonary arterial pressure (Multi)     moderately elevated on ECHO 10.5.23    Hyperlipidemia, unspecified 01/03/2023    Dyslipidemia    Hypertension     Hypokalemia     1.1.24- K 3.8 - on oral supplements    Hypothyroidism     Low back pain, unspecified 02/22/2021    Low back pain    Moderate tricuspid regurgitation     Obstructive sleep apnea (adult) (pediatric) 01/03/2023    ZEINA on CPAP    Paralysis of vocal cords and larynx, 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    Laryngeal paresis    Parkinsonism (Multi)     Pseudoaneurysm of carotid artery 2020    s/p right pipeline and coil embolization of Right ICA pseudoaneurysm    PTSD (post-traumatic stress disorder)     Thyroid cancer (Multi)     s/p total thyroidectomy, residual tissue on left side - FNA benign 2020 and 2021    Torticollis     botox injections    Vitamin D deficiency      PAST SURGICAL HISTORY:   Past Surgical History:   Procedure Laterality Date    COLONOSCOPY      KNEE ARTHROPLASTY      OTHER SURGICAL HISTORY Right 2020    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    TOTAL THYROIDECTOMY  2017    US GUIDED THYROID BIOPSY  11/19/2020    US GUIDED THYROID BIOPSY 11/19/2020 [facility]    US GUIDED THYROID BIOPSY  11/19/2020    US GUIDED THYROID BIOPSY 11/19/2020 [facility] [place] [facility]     FAMILY HISTORY:   Family History   Problem Relation Name Age of Onset    Heart failure Mother      Pancreatic cancer Mother      Heart failure Father      Parkinsonism Father      Breast cancer Sister       SOCIAL HISTORY:   Social History     Tobacco Use    Smoking status: Never    Smokeless tobacco: Never   Substance Use Topics    Alcohol use: Not Currently    Drug use: Never     Comment: medical marijuana last used >1 year ago       MEDICATIONS:  Prior to Admission Medications  Medications Prior to Admission   Medication Sig Dispense Refill Last Dose/Taking    albuterol 90 mcg/actuation inhaler Inhale 2 puffs every 4 hours if needed for shortness of breath. 54 g 3 Past Month    ascorbic acid (Vitamin C) 1,000 mg tablet Take 1 tablet (1,000 mg) by mouth once daily.   1/2/2025    aspirin 81 mg EC tablet Take 1 tablet (81 mg) by mouth once daily.   Past Week    atorvastatin (Lipitor) 20 mg tablet Take 1 tablet (20 mg) by mouth once daily at bedtime. 90 tablet 3 Past Week    azelastine (Astelin) 137 mcg (0.1 %) nasal spray Administer 1 spray into each nostril 2 times a day.   Past Week    baclofen (Lioresal) 20 mg tablet Take 1 tablet (20 mg) by mouth 3 times a day. 270 tablet 3 Past Week    budesonide-formoteroL (Symbicort) 80-4.5 mcg/actuation inhaler Inhale 2 puffs 2 times a day. Rinse mouth with water after use to reduce aftertaste and incidence of candidiasis. Do not swallow. 30.6 g 2 1/2/2025    bumetanide (Bumex) 2 mg tablet Take 1 tablet (2 mg) by mouth 2 times a day.   1/2/2025    calcium carbonate (Oscal) 500 mg calcium (1,250 mg) tablet Take 1 tablet (1,250 mg) by mouth 3 times a day.   1/2/2025    carbidopa-levodopa (Sinemet)  mg tablet Take 1.5 tablets by mouth 3 times a day. 8am, 12 noon and 4 pm 135 tablet 11 1/2/2025    carvedilol (Coreg) 25 mg tablet Take 1 tablet (25 mg) by mouth 2 times a day. 180 tablet 1 1/2/2025    cephalexin (Keflex) 250 mg capsule Take 1 capsule (250 mg) by mouth once daily. (Patient taking differently: Take 2 capsules (500 mg) by mouth once daily.) 90 capsule 3 1/2/2025    cholecalciferol (Vitamin D-3) 50 MCG (2000 UT) tablet Take 1 tablet (2,000 Units) by mouth once daily.   1/2/2025    clonazePAM (KlonoPIN) 1 mg tablet Take 1 tablet (1 mg) by mouth once daily in the morning. 1/2 TABLET BY MOUTH EVERY EVENING AND AN ADDITIONAL 1/2 AS NEEDED   1/2/2025    doxycycline (Adoxa) 100 mg tablet Take 1 tablet (100 mg) by  mouth once daily at bedtime.   Taking    estradiol (Estrace) 0.01 % (0.1 mg/gram) vaginal cream Insert 1 Application into the vagina 3 (three) times a week. Pea-sized amount   1/2/2025    fluticasone (Flonase) 50 mcg/actuation nasal spray Administer 1 spray into each nostril once daily. Shake gently. Before first use, prime pump. After use, clean tip and replace cap. 48 g 3 1/2/2025    formoterol (Perforomist) 20 mcg/2 mL nebulizer solution Inhale 2 mL (20 mcg) every 12 hours.   1/2/2025    gabapentin (Neurontin) 300 mg capsule Take 4 capsules (1,200 mg) by mouth 3 times a day.   1/2/2025    ipratropium-albuteroL (Duo-Neb) 0.5-2.5 mg/3 mL nebulizer solution Inhale 3 mL every 4 hours if needed.   Past Month    levothyroxine (Synthroid, Levoxyl) 100 mcg tablet Take 100mcg on Mon Wed Fri and Sun - 1 hour before breakfast Do not start before April 12, 2024. (Patient taking differently: Take 1 tablet (100 mcg) by mouth. Take 100mcg on Tue, Wed, Thur - 1 hour before breakfast)   Past Week    levothyroxine (Synthroid, Levoxyl) 100 mcg tablet Take 2 tablets (200 mcg) by mouth once daily in the morning. Take before meals. Friday, Saturday, Sunday, and Monday (see other order for alternating dose)   Past Week    losartan (Cozaar) 50 mg tablet Take 1 tablet (50 mg) by mouth once daily. 90 tablet 3 1/2/2025    metFORMIN (Glucophage) 500 mg tablet Take 1 tablet (500 mg) by mouth 2 times daily (morning and late afternoon).   1/2/2025    omega-3 fatty acids-fish oil 360-1,200 mg capsule Take 1 capsule (1,200 mg) by mouth once daily.   1/2/2025    omeprazole (PriLOSEC) 40 mg DR capsule Take 1 capsule (40 mg) by mouth once daily. With dinner   1/2/2025    OXcarbazepine (Trileptal) 600 mg tablet Take 1 tablet (600 mg) by mouth once daily in the morning.   1/2/2025    OXcarbazepine (Trileptal) 600 mg tablet Take 2 tablets (1,200 mg) by mouth once daily at bedtime.   Past Week    potassium chloride (KCL-40 ORAL) Take 80 mEq by mouth 2  times a day.   Past Week    prazosin (Minipress) 5 mg capsule Take 1 capsule (5 mg) by mouth once daily at bedtime.   Past Week    QUEtiapine (SeroqueL) 400 mg tablet Take 2 tablets (800 mg) by mouth once daily at bedtime.   Past Week    sertraline (Zoloft) 100 mg tablet Take 2 tablets (200 mg) by mouth once daily. Do not start before January 2, 2024. (Patient taking differently: Take 2.5 tablets (250 mg) by mouth once daily.) 30 tablet 0 1/2/2025    topiramate (Topamax) 200 mg tablet TAKE ONE TABLET (200 MG) BY MOUTH TWICE DAILY 60 tablet 3 1/2/2025    acetaminophen (Tylenol) 500 mg tablet Take 1 tablet (500 mg) by mouth every 4 hours if needed.       amitriptyline (Elavil) 10 mg tablet Take 1 tablet (10 mg) by mouth once daily at bedtime. (Patient not taking: Reported on 12/16/2024)       ammonium lactate (Lac-Hydrin) 12 % lotion Apply 1 Application topically if needed.       cyclobenzaprine (Flexeril) 10 mg tablet Take 1 tablet (10 mg) by mouth as needed at bedtime for muscle spasms. (Patient not taking: Reported on 1/3/2025) 30 tablet 11 More than a month    diclofenac sodium (Voltaren) 1 % gel gel Apply 4.5 inches topically 3 times a day as needed.       docusate sodium (Colace) 100 mg capsule Take 1 capsule (100 mg) by mouth 3 times a day.       glucagon (Glucagen) 1 mg injection Inject 1 mg into the muscle every 15 minutes if needed for low blood sugar - see comments (For blood glucose less than or equal to 40 mg/dL and no IV access). 1 each 12     lancets misc 1 each 3 times a day.       lidocaine (Lidoderm) 5 % patch APPLY 1 PATCH AND LEAVE IN PLACE FOR 12 HOURS, THEN REMOVE AND LEAVE OFF FOR 12 HOURS 30 patch 11     minocycline 100 mg capsule Take 1 capsule (100 mg) by mouth once daily at bedtime. No stop date - proph for recurrent skin infections (Patient not taking: Reported on 1/3/2025)   More than a month    oxyCODONE-acetaminophen (Percocet) 5-325 mg tablet Take 0.5 tablets by mouth every 6 hours if  "needed for severe pain (7 - 10). (Patient not taking: Reported on 12/16/2024) 10 tablet 0     polyethylene glycol (Glycolax, Miralax) 17 gram/dose powder Mix 17 g of powder and drink once daily. In 8oz of water, juice, or tea and drink daily         CURRENT ALLERGIES:   Allergies   Allergen Reactions    Lisinopril Swelling       COMPLETE REVIEW OF SYSTEMS:      GENERAL: No fever, appetite stable.  HEENT: No epistaxis, no mouth ulcers  NECK: no neck pain  RESPIRATORY: No new resp symptoms.  CARDIOVASCULAR: No cp, no leg edema, No orthopnea  GI: No NVD, no GI Bleed  : No hematuria, no dysuria  MUSCULOSKELETAL: No new jt pains or swelling. See HPI  SKIN: No rashes, no ulcers  PSYCH: Denies feeling anxious or depressed.   HEMATOLOGY/LYMPHOLOGY: No bruising, no hx of VTE  ENDOCRINE: No hx of DM  NEURO: No hx of seizures, No hx of CVA      OBJECTIVE  PHYSICAL EXAM:       11/6/2024     9:11 AM 12/16/2024     4:19 PM 1/2/2025     6:52 PM 1/3/2025     2:53 AM 1/3/2025     3:26 AM 1/3/2025     3:49 AM 1/3/2025     8:43 AM   Vitals   Systolic 114 110 177 132 141  141   Diastolic 68 75 108 85 84  91   BP Location  Right arm   Right arm  Right arm   Heart Rate 74 77 68 62 78  79   Temp  36.7 °C (98 °F) 36.6 °C (97.9 °F)  36.7 °C (98.1 °F)  36.9 °C (98.4 °F)   Resp  18 16 16 18  18   Height 1.727 m (5' 8\") 1.753 m (5' 9\")    1.753 m (5' 9\")    Weight (lb) 223 225    224.87    BMI 33.91 kg/m2 33.23 kg/m2    33.21 kg/m2    BSA (m2) 2.2 m2 2.23 m2    2.23 m2    Visit Report Report Report          Body mass index is 33.21 kg/m².    GENERAL: awake, alert, Ox3, cooperative resting comfortably. obese  SKIN: Skin turgor normal. No rashes  HEENT: EOMI, no epistaxis, Moist mucosa.  LUNGS: Bilat breath sounds, with no added sounds  CARDIAC: REGULAR. no rubs, no murmur  ABDOMEN: soft, non-tender. +BS.  EXTREMITIES: +chr bilat LE edema, Good capillary refill.   NEURO: Insight GOOD. No invol movements. Gait not assessed  MUSCULOSKELETAL: No " acute inflammation       .  Current Facility-Administered Medications:     acetaminophen (Tylenol) tablet 650 mg, 650 mg, oral, q4h PRN **OR** acetaminophen (Tylenol) oral liquid 650 mg, 650 mg, oral, q4h PRN **OR** acetaminophen (Tylenol) suppository 650 mg, 650 mg, rectal, q4h PRN, Shabana Rodriguez MD    albuterol 90 mcg/actuation inhaler 2 puff, 2 puff, inhalation, q4h PRN, Shabana Rodriguez MD    amitriptyline (Elavil) tablet 10 mg, 10 mg, oral, Nightly, Shabana Rodriguez MD    ascorbic acid (Vitamin C) tablet 1,000 mg, 1,000 mg, oral, Daily, Shabana Rodriguez MD    aspirin EC tablet 81 mg, 81 mg, oral, Daily, Shabana Rodriguez MD    atorvastatin (Lipitor) tablet 20 mg, 20 mg, oral, Nightly, Shabana Rodriguez MD    baclofen (Lioresal) tablet 20 mg, 20 mg, oral, TID, Shabana Rodriguez MD    bumetanide (Bumex) tablet 2 mg, 2 mg, oral, BID, Shabana Rodriguez MD    carbidopa-levodopa (Sinemet)  mg per tablet 1.5 tablet, 1.5 tablet, oral, TID, Shabana Rodriguez MD    carvedilol (Coreg) tablet 25 mg, 25 mg, oral, BID, Shabana Rodriguez MD    clonazePAM (KlonoPIN) tablet 1 mg, 1 mg, oral, q AM, Shabana Rodriguez MD    cyclobenzaprine (Flexeril) tablet 10 mg, 10 mg, oral, Nightly PRN, Shabana Rodriguez MD    dextrose 50 % injection 12.5 g, 12.5 g, intravenous, q15 min PRN, Shabana Rodriguez MD    dextrose 50 % injection 25 g, 25 g, intravenous, q15 min PRN, Shabana Rodriguez MD    diclofenac sodium (Voltaren) 1 % gel 4 g, 4 g, Topical, TID PRN, Shabana Rodriguez MD    docusate sodium (Colace) capsule 100 mg, 100 mg, oral, TID, Shabana Rodriguez MD    enoxaparin (Lovenox) syringe 40 mg, 40 mg, subcutaneous, q24h, Shabana Rodriguez MD    estradiol (Estrace) 0.01 % (0.1 mg/gram) vaginal cream 2 g, 2 g, vaginal, Once per day on Monday Wednesday Friday, Shabana Rodriguez MD    fluticasone furoate-vilanteroL (Breo Ellipta) 100-25 mcg/dose inhaler 1 puff, 1 puff, inhalation, Daily,  Shabana Rodriguez MD    formoterol (Perforomist) 20 mcg/2 mL nebulizer solution 20 mcg, 2 mL, nebulization, q12h, Shabana Rodriguez MD    gabapentin (Neurontin) capsule 1,200 mg, 1,200 mg, oral, TID, Shabana Rodriguez MD    glucagon (Glucagen) injection 1 mg, 1 mg, intramuscular, q15 min PRN, Shabana Rodriguez MD    glucagon (Glucagen) injection 1 mg, 1 mg, intramuscular, q15 min PRN, Shabana Rodriguez MD    insulin lispro injection 0-5 Units, 0-5 Units, subcutaneous, TID AC, Shabana Rodriguez MD    ipratropium-albuteroL (Duo-Neb) 0.5-2.5 mg/3 mL nebulizer solution 3 mL, 3 mL, nebulization, q4h PRN, Shabana Rodriguez MD    [START ON 1/6/2025] levothyroxine (Synthroid, Levoxyl) tablet 100 mcg, 100 mcg, oral, Once per day on Monday Wednesday Friday, Shabana Rodriguez MD    [START ON 1/4/2025] levothyroxine (Synthroid, Levoxyl) tablet 200 mcg, 200 mcg, oral, Once per day on Sunday Tuesday Thursday Saturday, Shabana Rodriguez MD    lidocaine 4 % patch 1 patch, 1 patch, transdermal, Daily, Shabana Rodriguez MD    losartan (Cozaar) tablet 50 mg, 50 mg, oral, Daily, Shabana Rodriguez MD    metFORMIN (Glucophage) tablet 500 mg, 500 mg, oral, BID, Shabana Rodriguez MD    ondansetron (Zofran) tablet 4 mg, 4 mg, oral, q8h PRN **OR** ondansetron (Zofran) injection 4 mg, 4 mg, intravenous, q8h PRN, Shabana Rodriguez MD    OXcarbazepine (Trileptal) tablet 1,200 mg, 1,200 mg, oral, Nightly, Shabana Rodriguez MD    OXcarbazepine (Trileptal) tablet 600 mg, 600 mg, oral, q AM, Shabana Rodriguez MD    [START ON 1/4/2025] pantoprazole (ProtoNix) EC tablet 40 mg, 40 mg, oral, Daily before breakfast, Shaabna Rodriguez MD    polyethylene glycol (Glycolax, Miralax) powder 17 g, 17 g, oral, Daily, Shabana Rodriguez MD    prazosin (Minipress) capsule 5 mg, 5 mg, oral, Nightly, Shabana Rodriguez MD    QUEtiapine (SEROquel) tablet 800 mg, 800 mg, oral, Nightly, Shabana Rodriguez MD    sertraline (Zoloft)  "tablet 250 mg, 250 mg, oral, Daily, Shabana Rodriguez MD    topiramate (Topamax) tablet 200 mg, 200 mg, oral, BID, Shabana Rodriguez MD    DATA:   Diagnostic tests reviewed for today's visit:    Most recent labs  Results from last 7 days   Lab Units 01/03/25  0143   WBC AUTO x10*3/uL 5.7   HEMOGLOBIN g/dL 11.5*   HEMATOCRIT % 37.9   PLATELETS AUTO x10*3/uL 183     Results from last 7 days   Lab Units 01/03/25  0143   SODIUM mmol/L 142   POTASSIUM mmol/L 3.3*   CHLORIDE mmol/L 107   CO2 mmol/L 30   BUN mg/dL 15   CREATININE mg/dL 0.76   CALCIUM mg/dL 8.3*   PROTEIN TOTAL g/dL 5.5*   BILIRUBIN TOTAL mg/dL 0.3   ALK PHOS U/L 131*   ALT U/L 19   AST U/L 19   GLUCOSE mg/dL 114*         Results from last 7 days   Lab Units 01/03/25  0143   CK TOTAL U/L 371*     No results found for: \"TROPONINT\"             CT head wo IV contrast   Final Result   1. No acute intracranial hemorrhage or depressed calvarial fracture.   2. No acute fracture at the cervical spine. Degenerative changes.   3. Soft tissue stranding with mild skin thickening at the left   supraclavicular region, may be secondary to contusion, in the setting   of trauma, or soft tissue edema with cellulitis. Please correlate   with clinical exam.                  MACRO:   None.        Signed by: Angeles Nguyen 1/3/2025 1:02 AM   Dictation workstation:   [id number]      CT cervical spine wo IV contrast   Final Result   1. No acute intracranial hemorrhage or depressed calvarial fracture.   2. No acute fracture at the cervical spine. Degenerative changes.   3. Soft tissue stranding with mild skin thickening at the left   supraclavicular region, may be secondary to contusion, in the setting   of trauma, or soft tissue edema with cellulitis. Please correlate   with clinical exam.                  MACRO:   None.        Signed by: Angeles Nguyen 1/3/2025 1:02 AM   Dictation workstation:   [id number]      CT knee right wo IV contrast   Final Result   No acute " fracture or dislocation identified.  No evidence of hardware   failure or complication.   Mild superficial soft tissue edema along the ventral aspect of the   knee with a small suprapatellar effusion.   Signed by Dank Sommers      XR foot left 3+ views   Final Result   Questionable nondisplaced fracture at the base of the   fifth metatarsal. Correlate with point tenderness.   Signed by Will Cardoso MD      XR femur right 2+ views   Final Result   No acute findings, bony deformity or fracture.   Right knee prosthesis appears intact..   Signed by Ayaan Galvez MD      XR tibia fibula right 2 views   Final Result   Mild bimalleolar soft tissue swelling.   Mild degenerative joint disease of the right ankle.   No acute findings, bony deformity or fracture. Right knee prosthesis   intact.   .   Signed by Ayaan Galvez MD      XR knee 4+ views bilateral   Final Result   Satisfactory postsurgical alignment and position of right knee   prosthesis.   No acute findings.   Mild suprapatellar soft tissue swelling likely small effusion.   Mild degenerative joint disease of the left knee..   Signed by Ayaan Galvez MD      XR pelvis 1-2 views   Final Result   Mild degenerative joint disease of both hips slightly greater on the   left.   No acute fracture or bony deformity.   Incidental pessary.   Constipation..   Signed by Ayaan Galvez MD      XR chest 1 view   Final Result   No acute cardiopulmonary disease.   Signed by Norbert Gómez MD            SIGNATURE: Shabana Rodriguez MD  DATE: January 3, 2025  TIME: 11:17 AM

## 2025-01-03 NOTE — CARE PLAN
The patient's goals for the shift include      The clinical goals for the shift include Pt to remain free from falls this shift.    Problem: Pain - Adult  Goal: Verbalizes/displays adequate comfort level or baseline comfort level  Outcome: Progressing     Problem: Safety - Adult  Goal: Free from fall injury  Outcome: Progressing     Problem: Discharge Planning  Goal: Discharge to home or other facility with appropriate resources  Outcome: Progressing     Problem: Diabetes  Goal: Maintain glucose levels >70mg/dl to <250mg/dl throughout shift  Outcome: Progressing     Problem: Pain  Goal: Walks with improved pain control throughout the shift  Outcome: Progressing  Goal: Performs ADL's with improved pain control throughout shift  Outcome: Progressing  Goal: Free from opioid side effects throughout the shift  Outcome: Progressing

## 2025-01-04 ENCOUNTER — HOME HEALTH ADMISSION (OUTPATIENT)
Dept: HOME HEALTH SERVICES | Facility: HOME HEALTH | Age: 60
End: 2025-01-04
Payer: MEDICARE

## 2025-01-04 ENCOUNTER — DOCUMENTATION (OUTPATIENT)
Dept: HOME HEALTH SERVICES | Facility: HOME HEALTH | Age: 60
End: 2025-01-04
Payer: MEDICARE

## 2025-01-04 VITALS
WEIGHT: 224.87 LBS | OXYGEN SATURATION: 94 % | TEMPERATURE: 97.7 F | HEIGHT: 69 IN | BODY MASS INDEX: 33.31 KG/M2 | DIASTOLIC BLOOD PRESSURE: 85 MMHG | HEART RATE: 67 BPM | SYSTOLIC BLOOD PRESSURE: 132 MMHG | RESPIRATION RATE: 18 BRPM

## 2025-01-04 LAB
ALBUMIN SERPL BCP-MCNC: 3.1 G/DL (ref 3.4–5)
ALP SERPL-CCNC: 116 U/L (ref 33–110)
ALT SERPL W P-5'-P-CCNC: 15 U/L (ref 7–45)
ANION GAP SERPL CALC-SCNC: 8 MMOL/L (ref 10–20)
AST SERPL W P-5'-P-CCNC: 16 U/L (ref 9–39)
BILIRUB SERPL-MCNC: 0.3 MG/DL (ref 0–1.2)
BUN SERPL-MCNC: 12 MG/DL (ref 6–23)
CALCIUM SERPL-MCNC: 8 MG/DL (ref 8.6–10.3)
CHLORIDE SERPL-SCNC: 108 MMOL/L (ref 98–107)
CO2 SERPL-SCNC: 29 MMOL/L (ref 21–32)
CREAT SERPL-MCNC: 0.81 MG/DL (ref 0.5–1.05)
EGFRCR SERPLBLD CKD-EPI 2021: 84 ML/MIN/1.73M*2
ERYTHROCYTE [DISTWIDTH] IN BLOOD BY AUTOMATED COUNT: 17.2 % (ref 11.5–14.5)
GLUCOSE BLD MANUAL STRIP-MCNC: 103 MG/DL (ref 74–99)
GLUCOSE BLD MANUAL STRIP-MCNC: 109 MG/DL (ref 74–99)
GLUCOSE BLD MANUAL STRIP-MCNC: 110 MG/DL (ref 74–99)
GLUCOSE SERPL-MCNC: 104 MG/DL (ref 74–99)
HCT VFR BLD AUTO: 36.3 % (ref 36–46)
HGB BLD-MCNC: 11.4 G/DL (ref 12–16)
MCH RBC QN AUTO: 27 PG (ref 26–34)
MCHC RBC AUTO-ENTMCNC: 31.4 G/DL (ref 32–36)
MCV RBC AUTO: 86 FL (ref 80–100)
NRBC BLD-RTO: 0 /100 WBCS (ref 0–0)
PLATELET # BLD AUTO: 178 X10*3/UL (ref 150–450)
POTASSIUM SERPL-SCNC: 3.4 MMOL/L (ref 3.5–5.3)
PROT SERPL-MCNC: 5.3 G/DL (ref 6.4–8.2)
RBC # BLD AUTO: 4.22 X10*6/UL (ref 4–5.2)
SODIUM SERPL-SCNC: 142 MMOL/L (ref 136–145)
WBC # BLD AUTO: 3.8 X10*3/UL (ref 4.4–11.3)

## 2025-01-04 PROCEDURE — 2500000001 HC RX 250 WO HCPCS SELF ADMINISTERED DRUGS (ALT 637 FOR MEDICARE OP): Performed by: INTERNAL MEDICINE

## 2025-01-04 PROCEDURE — 82947 ASSAY GLUCOSE BLOOD QUANT: CPT

## 2025-01-04 PROCEDURE — 2500000002 HC RX 250 W HCPCS SELF ADMINISTERED DRUGS (ALT 637 FOR MEDICARE OP, ALT 636 FOR OP/ED): Mod: MUE | Performed by: INTERNAL MEDICINE

## 2025-01-04 PROCEDURE — G0378 HOSPITAL OBSERVATION PER HR: HCPCS

## 2025-01-04 PROCEDURE — 84075 ASSAY ALKALINE PHOSPHATASE: CPT | Performed by: INTERNAL MEDICINE

## 2025-01-04 PROCEDURE — 36415 COLL VENOUS BLD VENIPUNCTURE: CPT | Performed by: INTERNAL MEDICINE

## 2025-01-04 PROCEDURE — 85027 COMPLETE CBC AUTOMATED: CPT | Performed by: INTERNAL MEDICINE

## 2025-01-04 PROCEDURE — 2500000002 HC RX 250 W HCPCS SELF ADMINISTERED DRUGS (ALT 637 FOR MEDICARE OP, ALT 636 FOR OP/ED)

## 2025-01-04 PROCEDURE — 94640 AIRWAY INHALATION TREATMENT: CPT

## 2025-01-04 PROCEDURE — 2500000004 HC RX 250 GENERAL PHARMACY W/ HCPCS (ALT 636 FOR OP/ED): Performed by: INTERNAL MEDICINE

## 2025-01-04 PROCEDURE — 2500000005 HC RX 250 GENERAL PHARMACY W/O HCPCS: Performed by: INTERNAL MEDICINE

## 2025-01-04 RX ORDER — METFORMIN HYDROCHLORIDE 500 MG/1
500 TABLET ORAL
Qty: 60 TABLET | Refills: 1 | Status: SHIPPED | OUTPATIENT
Start: 2025-01-04 | End: 2025-03-05

## 2025-01-04 RX ORDER — POTASSIUM CHLORIDE 1.5 G/1.58G
40 POWDER, FOR SOLUTION ORAL ONCE
Status: COMPLETED | OUTPATIENT
Start: 2025-01-04 | End: 2025-01-04

## 2025-01-04 RX ORDER — CEPHALEXIN 250 MG/1
500 CAPSULE ORAL DAILY
Start: 2025-01-04

## 2025-01-04 RX ADMIN — PANTOPRAZOLE SODIUM 40 MG: 40 TABLET, DELAYED RELEASE ORAL at 06:12

## 2025-01-04 RX ADMIN — ASPIRIN 81 MG: 81 TABLET, COATED ORAL at 09:15

## 2025-01-04 RX ADMIN — GABAPENTIN 1200 MG: 400 CAPSULE ORAL at 09:13

## 2025-01-04 RX ADMIN — BUDESONIDE 0.25 MG: 0.25 INHALANT RESPIRATORY (INHALATION) at 07:29

## 2025-01-04 RX ADMIN — OXCARBAZEPINE 600 MG: 300 TABLET, FILM COATED ORAL at 09:15

## 2025-01-04 RX ADMIN — ACETAMINOPHEN 650 MG: 325 TABLET, FILM COATED ORAL at 09:12

## 2025-01-04 RX ADMIN — METFORMIN HYDROCHLORIDE 500 MG: 500 TABLET ORAL at 09:15

## 2025-01-04 RX ADMIN — CARBIDOPA AND LEVODOPA 1.5 TABLET: 25; 100 TABLET ORAL at 09:13

## 2025-01-04 RX ADMIN — FORMOTEROL FUMARATE DIHYDRATE 20 MCG: 20 SOLUTION RESPIRATORY (INHALATION) at 07:29

## 2025-01-04 RX ADMIN — POTASSIUM CHLORIDE 40 MEQ: 1.5 POWDER, FOR SOLUTION ORAL at 12:35

## 2025-01-04 RX ADMIN — SERTRALINE 250 MG: 50 TABLET, FILM COATED ORAL at 09:15

## 2025-01-04 RX ADMIN — GABAPENTIN 1200 MG: 400 CAPSULE ORAL at 14:57

## 2025-01-04 RX ADMIN — OXYCODONE HYDROCHLORIDE AND ACETAMINOPHEN 1000 MG: 500 TABLET ORAL at 09:13

## 2025-01-04 RX ADMIN — POLYETHYLENE GLYCOL 3350 17 G: 17 POWDER, FOR SOLUTION ORAL at 09:13

## 2025-01-04 RX ADMIN — CLONAZEPAM 1 MG: 1 TABLET ORAL at 09:16

## 2025-01-04 RX ADMIN — LOSARTAN POTASSIUM 50 MG: 50 TABLET, FILM COATED ORAL at 09:15

## 2025-01-04 RX ADMIN — TOPIRAMATE 200 MG: 100 TABLET, FILM COATED ORAL at 09:15

## 2025-01-04 RX ADMIN — DOCUSATE SODIUM 100 MG: 100 CAPSULE, LIQUID FILLED ORAL at 14:56

## 2025-01-04 RX ADMIN — LIDOCAINE 4% 1 PATCH: 40 PATCH TOPICAL at 09:13

## 2025-01-04 RX ADMIN — BACLOFEN 20 MG: 10 TABLET ORAL at 09:15

## 2025-01-04 RX ADMIN — BACLOFEN 20 MG: 10 TABLET ORAL at 14:56

## 2025-01-04 RX ADMIN — DOCUSATE SODIUM 100 MG: 100 CAPSULE, LIQUID FILLED ORAL at 09:16

## 2025-01-04 RX ADMIN — BUMETANIDE 2 MG: 1 TABLET ORAL at 09:15

## 2025-01-04 RX ADMIN — CARVEDILOL 25 MG: 25 TABLET, FILM COATED ORAL at 09:15

## 2025-01-04 RX ADMIN — LEVOTHYROXINE SODIUM 200 MCG: 0.1 TABLET ORAL at 06:12

## 2025-01-04 RX ADMIN — CARBIDOPA AND LEVODOPA 1.5 TABLET: 25; 100 TABLET ORAL at 12:35

## 2025-01-04 ASSESSMENT — COGNITIVE AND FUNCTIONAL STATUS - GENERAL
MOVING TO AND FROM BED TO CHAIR: A LITTLE
DRESSING REGULAR UPPER BODY CLOTHING: A LITTLE
DRESSING REGULAR LOWER BODY CLOTHING: A LITTLE
CLIMB 3 TO 5 STEPS WITH RAILING: A LITTLE
WALKING IN HOSPITAL ROOM: A LITTLE
DAILY ACTIVITIY SCORE: 21
TOILETING: A LITTLE
STANDING UP FROM CHAIR USING ARMS: A LITTLE
MOBILITY SCORE: 20

## 2025-01-04 ASSESSMENT — PAIN DESCRIPTION - LOCATION: LOCATION: KNEE

## 2025-01-04 ASSESSMENT — PAIN DESCRIPTION - ORIENTATION: ORIENTATION: RIGHT

## 2025-01-04 ASSESSMENT — PAIN SCALES - GENERAL
PAINLEVEL_OUTOF10: 8
PAINLEVEL_OUTOF10: 3

## 2025-01-04 ASSESSMENT — PAIN - FUNCTIONAL ASSESSMENT: PAIN_FUNCTIONAL_ASSESSMENT: 0-10

## 2025-01-04 NOTE — PROGRESS NOTES
PROGRESS NOTE - INTERNAL MEDICINE     PATIENT NAME:  Prema Hair    MRN:  [id number]  SERVICE DATE:  1/4/2025       ADMITTING PHYSICIAN:  Shabana Rodriguez MD    ASSESSMENT AND PLAN    Impaired mobility, from deconditioning, causing recurrent falls at home.  Improved mobility today with PT/OT.  Hypokalemia.  DM2, controlled  HTN, fair control  Drug-induced parkinsonism G21.19  Hyperlipidemia  Hx CA thyroid on supplemental therapy  Obesity, class I  Hx torticollis  Osteoarthrosis of the knees  Hx urinary retention     Plan:  Replete K.  Continue fall precautions.  Increase mobility.  Accu-Cheks AC/at bedtime, SSI, hypoglycemia protocol.  Continue home medications.  Continue levothyroxine supplement.  Aim for suppressed TSH    Discharge is planned for today to home with [facility]. Hospital course , medication changes and Investigation's conducted were reviewed with the patient & Dtr. Activity, Diet and Medications after discharge were reviewed with the patient & Dtr. Medication reconciliation done - pls refer to medication reconciliation sheet.Follow up with Primary Care Physician were reviewed with the patient & Dtr. Discharge planning was discussed with staff. Refer to discharge orders sheet. Total time to coordinate disch process incl counseling family, coordinating with other care givers,  and pharmacist was >35 min.     INTERVAL HISTORY OF PRESENT ILLNESS:  No chest pain/sob. No n/v/d. Oral intake good. Feeling better. No fever/chills. acute events from last 24 hrs reviewed.    Pertinent ROS:  No abdominal pain / No Bleeding / No rashes    Discussed with nursing and case management team and the specialists involved in this patient's care. Reviewed the EMR and documentation from other care-givers.      OBJECTIVE  PHYSICAL EXAM:     GENERAL: AAOx3, cooperative resting comfortably. obese  SKIN: Skin turgor normal. No rashes  HEENT: no epistaxis, Moist mucosa.  LUNGS: Bilat breath sounds, with no added  sounds  CARDIAC: REGULAR. no rubs, no murmur  ABDOMEN: soft, non-tender. +BS.  EXTREMITIES: +chr bilat LE edema, Good capillary refill.   NEURO: Insight GOOD. No invol movements.  MUSCULOSKELETAL: No acute inflammation            1/3/2025     8:43 AM 1/3/2025    12:21 PM 1/3/2025     4:18 PM 1/3/2025     8:04 PM 1/4/2025    12:17 AM 1/4/2025     3:39 AM 1/4/2025     8:37 AM   Vitals   Systolic 141 145 156 166 111 112 129   Diastolic 91 91 97 113 70 72 89   BP Location Right arm Right arm Right arm Right arm Right arm Right arm Right arm   Heart Rate 79 75 72 69 63 60 63   Temp 36.9 °C (98.4 °F) 36.9 °C (98.4 °F) 36.6 °C (97.9 °F) 36.3 °C (97.3 °F) 36.2 °C (97.2 °F) 36.6 °C (97.9 °F) 36.3 °C (97.3 °F)   Resp 18 18 18 17 17 18 18     Body mass index is 33.21 kg/m².  No intake or output data in the 24 hours ending 01/04/25 1045        Current Facility-Administered Medications:     acetaminophen (Tylenol) tablet 650 mg, 650 mg, oral, q4h PRN, 650 mg at 01/04/25 0912 **OR** acetaminophen (Tylenol) oral liquid 650 mg, 650 mg, oral, q4h PRN **OR** acetaminophen (Tylenol) suppository 650 mg, 650 mg, rectal, q4h PRN, Shabana Rodriguez MD    albuterol 2.5 mg /3 mL (0.083 %) nebulizer solution 2.5 mg, 2.5 mg, nebulization, q2h PRN, Shabana Rodriguez MD    ascorbic acid (Vitamin C) tablet 1,000 mg, 1,000 mg, oral, Daily, Shabana Rodriguez MD, 1,000 mg at 01/04/25 0913    aspirin EC tablet 81 mg, 81 mg, oral, Daily, Shabana Rodriguez MD, 81 mg at 01/04/25 0915    atorvastatin (Lipitor) tablet 20 mg, 20 mg, oral, Nightly, Shabana Rodriguez MD, 20 mg at 01/03/25 2100    baclofen (Lioresal) tablet 20 mg, 20 mg, oral, TID, Shabana Rodriguez MD, 20 mg at 01/04/25 0915    budesonide (Pulmicort) 0.25 mg/2 mL nebulizer solution 0.25 mg, 0.25 mg, nebulization, BID, Lin Gracia PharmD, 0.25 mg at 01/04/25 0729    bumetanide (Bumex) tablet 2 mg, 2 mg, oral, BID, Shabana Rodriguez MD, 2 mg at 01/04/25 0915     carbidopa-levodopa (Sinemet)  mg per tablet 1.5 tablet, 1.5 tablet, oral, TID, Shabana Rodriguez MD, 1.5 tablet at 01/04/25 0913    carvedilol (Coreg) tablet 25 mg, 25 mg, oral, BID, Shabana Rodriguez MD, 25 mg at 01/04/25 0915    clonazePAM (KlonoPIN) tablet 1 mg, 1 mg, oral, q AM, Shabana Rodriguez MD, 1 mg at 01/04/25 0916    cyclobenzaprine (Flexeril) tablet 10 mg, 10 mg, oral, Nightly PRN, Shabana Rodriguez MD    dextrose 50 % injection 12.5 g, 12.5 g, intravenous, q15 min PRN, Shabana Rodriguez MD    dextrose 50 % injection 25 g, 25 g, intravenous, q15 min PRN, Shabana Rodriguez MD    diclofenac sodium (Voltaren) 1 % gel 4 g, 4 g, Topical, TID PRN, Shabana Rodriguez MD    docusate sodium (Colace) capsule 100 mg, 100 mg, oral, TID, Shabana Rodriguez MD, 100 mg at 01/04/25 0916    enoxaparin (Lovenox) syringe 40 mg, 40 mg, subcutaneous, q24h, Shabana Rodriguez MD, 40 mg at 01/03/25 2100    [START ON 1/6/2025] estradiol (Estrace) 0.01 % (0.1 mg/gram) vaginal cream 2 g, 2 g, vaginal, Once per day on Monday Wednesday Friday, Shabana Rodriguez MD    formoterol (Perforomist) 20 mcg/2 mL nebulizer solution 20 mcg, 2 mL, nebulization, q12h, Shabana Rodriguez MD, 20 mcg at 01/04/25 0729    gabapentin (Neurontin) capsule 1,200 mg, 1,200 mg, oral, TID, Shabana Rodriguez MD, 1,200 mg at 01/04/25 0913    glucagon (Glucagen) injection 1 mg, 1 mg, intramuscular, q15 min PRN, Shabana Rodriguez MD    glucagon (Glucagen) injection 1 mg, 1 mg, intramuscular, q15 min PRN, Shabana Rodriguez MD    insulin lispro injection 0-5 Units, 0-5 Units, subcutaneous, TID AC, Shabana Rodriguez MD, 3 Units at 01/03/25 1635    ipratropium-albuteroL (Duo-Neb) 0.5-2.5 mg/3 mL nebulizer solution 3 mL, 3 mL, nebulization, q2h PRN, Shabana Rodriguez MD    [START ON 1/7/2025] levothyroxine (Synthroid, Levoxyl) tablet 100 mcg, 100 mcg, oral, Once per day on Tuesday Wednesday Thursday, Shabana Rodriguez MD     levothyroxine (Synthroid, Levoxyl) tablet 200 mcg, 200 mcg, oral, Once per day on Sunday Monday Friday Saturday, Shabana Rodriguez MD, 200 mcg at 01/04/25 0612    lidocaine 4 % patch 1 patch, 1 patch, transdermal, Daily, Shabana Rodriguez MD, 1 patch at 01/04/25 0913    losartan (Cozaar) tablet 50 mg, 50 mg, oral, Daily, Shabana Rodriguez MD, 50 mg at 01/04/25 0915    metFORMIN (Glucophage) tablet 500 mg, 500 mg, oral, BID, Shabana Rodriguez MD, 500 mg at 01/04/25 0915    ondansetron (Zofran) tablet 4 mg, 4 mg, oral, q8h PRN **OR** ondansetron (Zofran) injection 4 mg, 4 mg, intravenous, q8h PRN, Shabana Rodriguez MD    OXcarbazepine (Trileptal) tablet 1,200 mg, 1,200 mg, oral, Nightly, Shabana Rodriguez MD, 1,200 mg at 01/03/25 2059    OXcarbazepine (Trileptal) tablet 600 mg, 600 mg, oral, q AM, Shabana Rodriguez MD, 600 mg at 01/04/25 0915    oxygen (O2) therapy, , inhalation, Continuous PRN - O2/gases, Shabana Rodriguez MD, 2 L/min at 01/03/25 2239    pantoprazole (ProtoNix) EC tablet 40 mg, 40 mg, oral, Daily before breakfast, Shabana Rodriguez MD, 40 mg at 01/04/25 0612    polyethylene glycol (Glycolax, Miralax) packet 17 g, 17 g, oral, Daily, Shabana Rodriguez MD, 17 g at 01/04/25 0913    prazosin (Minipress) capsule 5 mg, 5 mg, oral, Nightly, Shabana Rodriguez MD, 5 mg at 01/03/25 2059    QUEtiapine (SEROquel) tablet 800 mg, 800 mg, oral, Nightly, Shabana Rodriguez MD, 800 mg at 01/03/25 2059    sertraline (Zoloft) tablet 250 mg, 250 mg, oral, Daily, Shabana Rodriguez MD, 250 mg at 01/04/25 0915    topiramate (Topamax) tablet 200 mg, 200 mg, oral, BID, Shabana Rodriguez MD, 200 mg at 01/04/25 0915    DATA:   Diagnostic tests reviewed for today's visit:    Most recent labs  Results from last 7 days   Lab Units 01/04/25  0410 01/03/25  0143   WBC AUTO x10*3/uL 3.8* 5.7   HEMOGLOBIN g/dL 11.4* 11.5*   HEMATOCRIT % 36.3 37.9   PLATELETS AUTO x10*3/uL 178 183     Results from last 7 days    Lab Units 01/04/25  0410 01/03/25  0143   SODIUM mmol/L 142 142   POTASSIUM mmol/L 3.4* 3.3*   CHLORIDE mmol/L 108* 107   CO2 mmol/L 29 30   BUN mg/dL 12 15   CREATININE mg/dL 0.81 0.76   CALCIUM mg/dL 8.0* 8.3*   PROTEIN TOTAL g/dL 5.3* 5.5*   BILIRUBIN TOTAL mg/dL 0.3 0.3   ALK PHOS U/L 116* 131*   ALT U/L 15 19   AST U/L 16 19   GLUCOSE mg/dL 104* 114*         Results from last 7 days   Lab Units 01/03/25  0143   CK TOTAL U/L 371*       Results from last 7 days   Lab Units 01/04/25  0756 01/04/25  0336 01/03/25 2002 01/03/25  1552 01/03/25  1152   POCT GLUCOSE mg/dL 110* 103* 99 253* 97      CT head wo IV contrast   Final Result   1. No acute intracranial hemorrhage or depressed calvarial fracture.   2. No acute fracture at the cervical spine. Degenerative changes.   3. Soft tissue stranding with mild skin thickening at the left   supraclavicular region, may be secondary to contusion, in the setting   of trauma, or soft tissue edema with cellulitis. Please correlate   with clinical exam.                  MACRO:   None.        Signed by: Angeles Nguyen 1/3/2025 1:02 AM   Dictation workstation:   [id number]      CT cervical spine wo IV contrast   Final Result   1. No acute intracranial hemorrhage or depressed calvarial fracture.   2. No acute fracture at the cervical spine. Degenerative changes.   3. Soft tissue stranding with mild skin thickening at the left   supraclavicular region, may be secondary to contusion, in the setting   of trauma, or soft tissue edema with cellulitis. Please correlate   with clinical exam.                  MACRO:   None.        Signed by: Angeles Nguyen 1/3/2025 1:02 AM   Dictation workstation:   [id number]      CT knee right wo IV contrast   Final Result   No acute fracture or dislocation identified.  No evidence of hardware   failure or complication.   Mild superficial soft tissue edema along the ventral aspect of the   knee with a small suprapatellar effusion.   Signed by  Dank Sommers      XR foot left 3+ views   Final Result   Questionable nondisplaced fracture at the base of the   fifth metatarsal. Correlate with point tenderness.   Signed by Will Cardoso MD      XR femur right 2+ views   Final Result   No acute findings, bony deformity or fracture.   Right knee prosthesis appears intact..   Signed by Ayaan Galvez MD      XR tibia fibula right 2 views   Final Result   Mild bimalleolar soft tissue swelling.   Mild degenerative joint disease of the right ankle.   No acute findings, bony deformity or fracture. Right knee prosthesis   intact.   .   Signed by Ayaan Galvez MD      XR knee 4+ views bilateral   Final Result   Satisfactory postsurgical alignment and position of right knee   prosthesis.   No acute findings.   Mild suprapatellar soft tissue swelling likely small effusion.   Mild degenerative joint disease of the left knee..   Signed by Ayaan Glavez MD      XR pelvis 1-2 views   Final Result   Mild degenerative joint disease of both hips slightly greater on the   left.   No acute fracture or bony deformity.   Incidental pessary.   Constipation..   Signed by Ayaan Galvez MD      XR chest 1 view   Final Result   No acute cardiopulmonary disease.   Signed by Norbert Gómez MD            SIGNATURE: Shabana Rodriguez MD PATIENT NAME: Prema Hair   DATE: 1/4/2025 MRN: [id number]   TIME: 10:45 AM

## 2025-01-04 NOTE — SIGNIFICANT EVENT
Pt refusing to wear our cpap for tonight. Pt states she wont be able to tolerate our masks. Pt satting 90% on room air. Placed on 2 L NC. Instructed pt to have family member bring in home cpap unit tomorrow.

## 2025-01-04 NOTE — CARE PLAN
The patient's goals for the shift include maintaining safety     The clinical goals for the shift include pt will remain free from falls and injury    Problem: Safety - Adult  Goal: Free from fall injury  Outcome: Progressing

## 2025-01-04 NOTE — CARE PLAN
The patient's goals for the shift include      The clinical goals for the shift include pt will remain free from falls and injury      Problem: Pain - Adult  Goal: Verbalizes/displays adequate comfort level or baseline comfort level  Outcome: Progressing     Problem: Safety - Adult  Goal: Free from fall injury  Outcome: Progressing     Problem: Discharge Planning  Goal: Discharge to home or other facility with appropriate resources  Outcome: Progressing     Problem: Diabetes  Goal: Maintain glucose levels >70mg/dl to <250mg/dl throughout shift  Outcome: Progressing     Problem: Pain  Goal: Walks with improved pain control throughout the shift  Outcome: Progressing  Goal: Performs ADL's with improved pain control throughout shift  Outcome: Progressing  Goal: Free from opioid side effects throughout the shift  Outcome: Progressing

## 2025-01-04 NOTE — CARE PLAN
The patient's goals for the shift include keeping skin intact    The clinical goals for the shift include pt will remain free of any skin tears       Problem: Skin  Goal: Decreased wound size/increased tissue granulation at next dressing change  1/4/2025 1141 by Jayme Flores LPN  Flowsheets (Taken 1/4/2025 1141)  Decreased wound size/increased tissue granulation at next dressing change: Promote sleep for wound healing  1/4/2025 1140 by Jayme Flores LPN  Outcome: Progressing  Goal: Participates in plan/prevention/treatment measures  1/4/2025 1141 by Jayme Flores LPN  Flowsheets (Taken 1/4/2025 1141)  Participates in plan/prevention/treatment measures:   Elevate heels   Discuss with provider PT/OT consult  1/4/2025 1140 by Jayme Flores LPN  Outcome: Progressing  Goal: Prevent/manage excess moisture  1/4/2025 1141 by Jayme Flores LPN  Flowscandace (Taken 1/4/2025 1141)  Prevent/manage excess moisture: Moisturize dry skin  1/4/2025 1140 by Jayme Flores LPN  Outcome: Progressing  Goal: Prevent/minimize sheer/friction injuries  1/4/2025 1141 by Jayme Flores LPN  Flowscandace (Taken 1/4/2025 1141)  Prevent/minimize sheer/friction injuries: Use pull sheet  1/4/2025 1140 by Jayem Flores LPN  Outcome: Progressing  Goal: Promote/optimize nutrition  1/4/2025 1141 by Jayme Flores LPN  Flowsheets (Taken 1/4/2025 1141)  Promote/optimize nutrition: Consume > 50% meals/supplements  1/4/2025 1140 by Jayme Flores LPN  Outcome: Progressing  Goal: Promote skin healing  1/4/2025 1141 by Jayme Flores LPN  Flowsheets (Taken 1/4/2025 1141)  Promote skin healing:   Protective dressings over bony prominences   Assess skin/pad under line(s)/device(s)  1/4/2025 1140 by Jayme Flores LPN  Outcome: Progressing

## 2025-01-04 NOTE — HH CARE COORDINATION
Home Care received a Referral for Nursing, Physical Therapy, Occupational Therapy, and Home Health Aide. We have processed the referral for a Start of Care on 24-48 HOURS .     If you have any questions or concerns, please feel free to contact us at [phone]. Follow the prompts, enter your five digit zip code, and you will be directed to your care team on [facility] 2.

## 2025-01-04 NOTE — NURSING NOTE
Patient discharged home with no new needs. IV removed upon discharge. Discharge instructions review with patient. Patient retained appropriate information.

## 2025-01-04 NOTE — PROGRESS NOTES
01/04/25 1101   Discharge Planning   Assistance Needed TCC met with patient to follow up with her discharge plan; TCC reviewed the need for a wide base quad cane;patient verbalized understanding   Home or Post Acute Services In home services   Type of Home Care Services Home OT;Home PT   Expected Discharge Disposition Home H  ([facility])   Does the patient need discharge transport arranged? No  (patient's daughter will provide transport home)

## 2025-01-05 NOTE — DISCHARGE SUMMARY
Discharge Summary    Admit Date: 1/2/2025  Discharge Date: 1/4/2025      Discharge Diagnosis  Impaired mobility, from deconditioning, causing recurrent falls at home.  Improved mobility today with PT/OT.  Hypokalemia.  DM2, controlled  HTN, fair control  Drug-induced parkinsonism G21.19  Hyperlipidemia  Hx CA thyroid on supplemental therapy  Obesity, class I  Hx torticollis  Osteoarthrosis of the knees  Hx urinary retention    Issues Requiring Follow-Up      Test Results Pending At Discharge  Pending Labs       No current pending labs.            Hospital Course   Adm follg rec falls at home. Did well with PT OT eval. DC home with [facility]. No injuries from the falls.    Pertinent Physical Exam At Time of Discharge  Physical Exam    Home Medications     Medication List      CHANGE how you take these medications     levothyroxine 100 mcg tablet; Commonly known as: Synthroid, Levoxyl;   Take 100mcg on Mon Wed Fri and Sun - 1 hour before breakfast Do not start   before April 12, 2024.; What changed: how much to take, how to take this,   additional instructions   sertraline 100 mg tablet; Commonly known as: Zoloft; Take 2 tablets (200   mg) by mouth once daily. Do not start before January 2, 2024.; What   changed: how much to take     CONTINUE taking these medications     acetaminophen 500 mg tablet; Commonly known as: Tylenol   albuterol 90 mcg/actuation inhaler; Inhale 2 puffs every 4 hours if   needed for shortness of breath.   ammonium lactate 12 % lotion; Commonly known as: Lac-Hydrin   ascorbic acid 1,000 mg tablet; Commonly known as: Vitamin C   aspirin 81 mg EC tablet   atorvastatin 20 mg tablet; Commonly known as: Lipitor; Take 1 tablet (20   mg) by mouth once daily at bedtime.   azelastine 137 mcg (0.1 %) nasal spray; Commonly known as: Astelin   baclofen 20 mg tablet; Commonly known as: Lioresal; Take 1 tablet (20   mg) by mouth 3 times a day.   budesonide-formoteroL 80-4.5 mcg/actuation inhaler; Commonly known  as:   Symbicort; Inhale 2 puffs 2 times a day. Rinse mouth with water after use   to reduce aftertaste and incidence of candidiasis. Do not swallow.   bumetanide 2 mg tablet; Commonly known as: Bumex   calcium carbonate 500 mg calcium (1,250 mg) tablet; Commonly known as:   Oscal   carbidopa-levodopa  mg tablet; Commonly known as: Sinemet; Take   1.5 tablets by mouth 3 times a day. 8am, 12 noon and 4 pm   carvedilol 25 mg tablet; Commonly known as: Coreg; Take 1 tablet (25 mg)   by mouth 2 times a day.   cephalexin 250 mg capsule; Commonly known as: Keflex; Take 2 capsules   (500 mg) by mouth once daily.   cholecalciferol 50 MCG (2000 UT) tablet; Commonly known as: Vitamin D-3   clonazePAM 1 mg tablet; Commonly known as: KlonoPIN   diclofenac sodium 1 % gel; Commonly known as: Voltaren   docusate sodium 100 mg capsule; Commonly known as: Colace   estradiol 0.01 % (0.1 mg/gram) vaginal cream; Commonly known as: Estrace   fluticasone 50 mcg/actuation nasal spray; Commonly known as: Flonase;   Administer 1 spray into each nostril once daily. Shake gently. Before   first use, prime pump. After use, clean tip and replace cap.   formoterol 20 mcg/2 mL nebulizer solution; Commonly known as:   Perforomist   gabapentin 600 mg tablet; Commonly known as: Neurontin   Glucagon Emergency Kit (human) 1 mg injection; Generic drug: glucagon;   Inject 1 mg into the muscle every 15 minutes if needed for low blood sugar   - see comments (For blood glucose less than or equal to 40 mg/dL and no IV   access).   ipratropium-albuteroL 0.5-2.5 mg/3 mL nebulizer solution; Commonly known   as: Duo-Neb   KCL-40 ORAL   lancets misc   lidocaine 5 % patch; Commonly known as: Lidoderm; APPLY 1 PATCH AND   LEAVE IN PLACE FOR 12 HOURS, THEN REMOVE AND LEAVE OFF FOR 12 HOURS   losartan 50 mg tablet; Commonly known as: Cozaar; Take 1 tablet (50 mg)   by mouth once daily.   metFORMIN 500 mg tablet; Commonly known as: Glucophage; Take 1 tablet    (500 mg) by mouth 2 times daily (morning and late afternoon).   nystatin cream; Commonly known as: Mycostatin   omega-3 fatty acids-fish oil 360-1,200 mg capsule   omeprazole 40 mg DR capsule; Commonly known as: PriLOSEC   * OXcarbazepine 600 mg tablet; Commonly known as: Trileptal   * OXcarbazepine 600 mg tablet; Commonly known as: Trileptal   polyethylene glycol 17 gram/dose powder; Commonly known as: Glycolax,   Miralax   prazosin 5 mg capsule; Commonly known as: Minipress   SeroqueL 400 mg tablet; Generic drug: QUEtiapine   topiramate 200 mg tablet; Commonly known as: Topamax; TAKE ONE TABLET   (200 MG) BY MOUTH TWICE DAILY  * This list has 2 medication(s) that are the same as other medications   prescribed for you. Read the directions carefully, and ask your doctor or   other care provider to review them with you.     STOP taking these medications     cyclobenzaprine 10 mg tablet; Commonly known as: Flexeril   doxycycline 100 mg tablet; Commonly known as: Adoxa   minocycline 100 mg capsule   oxyCODONE-acetaminophen 5-325 mg tablet; Commonly known as: Percocet       Outpatient Follow-Up  Future Appointments   Date Time Provider Department Center   2/6/2025  3:45 PM David Swanson MD [id number] [facility]   2/25/2025 10:00 AM Karyn Brooke PA-C [id number] [facility]   3/4/2025 11:00 AM Deshawn Adorno MD [id number] [facility]   6/17/2025  9:00 AM Geovani Crawford DO [id number] [facility]   7/8/2025 10:00 AM [address] [facility]   8/6/2025  9:30 AM Sanjiv GAYTAN MD [id number] [facility]       Shabana Rodriguez MD

## 2025-01-09 ENCOUNTER — HOME CARE VISIT (OUTPATIENT)
Dept: HOME HEALTH SERVICES | Facility: HOME HEALTH | Age: 60
End: 2025-01-09

## 2025-01-10 ENCOUNTER — HOME CARE VISIT (OUTPATIENT)
Dept: HOME HEALTH SERVICES | Facility: HOME HEALTH | Age: 60
End: 2025-01-10
Payer: MEDICARE

## 2025-01-10 VITALS
RESPIRATION RATE: 19 BRPM | OXYGEN SATURATION: 92 % | DIASTOLIC BLOOD PRESSURE: 70 MMHG | HEART RATE: 70 BPM | SYSTOLIC BLOOD PRESSURE: 110 MMHG | TEMPERATURE: 98.5 F

## 2025-01-10 PROCEDURE — G0299 HHS/HOSPICE OF RN EA 15 MIN: HCPCS | Mod: HHH

## 2025-01-10 PROCEDURE — G0152 HHCP-SERV OF OT,EA 15 MIN: HCPCS | Mod: HHH

## 2025-01-10 PROCEDURE — 169592 NO-PAY CLAIM PROCEDURE

## 2025-01-10 ASSESSMENT — ENCOUNTER SYMPTOMS
LOWEST PAIN SEVERITY IN PAST 24 HOURS: 7/10
PAIN LOCATION: LEFT KNEE
PERSON REPORTING PAIN: PATIENT
HIGHEST PAIN SEVERITY IN PAST 24 HOURS: 9/10
PAIN LOCATION - EXACERBATING FACTORS: MOBILITY
PAIN LOCATION: GENERALIZED
LOWEST PAIN SEVERITY IN PAST 24 HOURS: 0/10
PAIN SEVERITY GOAL: 4/10
PAIN LOCATION - PAIN FREQUENCY: CONSTANT
PAIN LOCATION - PAIN QUALITY: ACHE
PAIN: 1
PAIN LOCATION - RELIEVING FACTORS: REST
PERSON REPORTING PAIN: PATIENT
HIGHEST PAIN SEVERITY IN PAST 24 HOURS: 0/10
PAIN: 1
PAIN LOCATION - PAIN DURATION: CONSTANT
BOWEL INCONTINENCE: 1
SUBJECTIVE PAIN PROGRESSION: UNCHANGED
PAIN SEVERITY GOAL: 0/10
PAIN LOCATION - PAIN SEVERITY: 7/10

## 2025-01-10 ASSESSMENT — ACTIVITIES OF DAILY LIVING (ADL)
ENTERING_EXITING_HOME: MODERATE ASSIST
CURRENT_FUNCTION: STAND BY ASSIST
AMBULATION ASSISTANCE: STAND BY ASSIST
OASIS_M1830: 03

## 2025-01-11 SDOH — ECONOMIC STABILITY: FOOD INSECURITY: MEALS PER DAY: 3

## 2025-01-11 ASSESSMENT — ACTIVITIES OF DAILY LIVING (ADL)
BATHING_CURRENT_FUNCTION: MINIMUM ASSIST
WASHING_LB_CURRENT_FUNCTION: MINIMUM ASSIST
DRESSING_UB_CURRENT_FUNCTION: SUPERVISION
BATHING ASSESSED: 1
DRESSING_LB_CURRENT_FUNCTION: MINIMUM ASSIST
WASHING_UPB_CURRENT_FUNCTION: MINIMUM ASSIST

## 2025-01-11 ASSESSMENT — ENCOUNTER SYMPTOMS
CHANGE IN APPETITE: UNCHANGED
PAIN LOCATION - PAIN SEVERITY: 0/10
PAIN LOCATION - PAIN QUALITY: ACHY
MUSCLE WEAKNESS: 1
PAIN LOCATION - PAIN FREQUENCY: INTERMITTENT
APPETITE LEVEL: FAIR

## 2025-01-13 ENCOUNTER — HOME CARE VISIT (OUTPATIENT)
Dept: HOME HEALTH SERVICES | Facility: HOME HEALTH | Age: 60
End: 2025-01-13
Payer: MEDICARE

## 2025-01-13 PROCEDURE — G0151 HHCP-SERV OF PT,EA 15 MIN: HCPCS | Mod: HHH

## 2025-01-13 ASSESSMENT — ENCOUNTER SYMPTOMS
PAIN: 1
OCCASIONAL FEELINGS OF UNSTEADINESS: 0
PERSON REPORTING PAIN: PATIENT
MUSCLE WEAKNESS: 1
LIMITED RANGE OF MOTION: 1

## 2025-01-15 ENCOUNTER — HOME CARE VISIT (OUTPATIENT)
Dept: HOME HEALTH SERVICES | Facility: HOME HEALTH | Age: 60
End: 2025-01-15
Payer: MEDICARE

## 2025-01-15 VITALS
HEART RATE: 84 BPM | TEMPERATURE: 97.8 F | RESPIRATION RATE: 17 BRPM | DIASTOLIC BLOOD PRESSURE: 65 MMHG | OXYGEN SATURATION: 98 % | SYSTOLIC BLOOD PRESSURE: 97 MMHG

## 2025-01-15 PROCEDURE — G0158 HHC OT ASSISTANT EA 15: HCPCS | Mod: CO,HHH

## 2025-01-16 ENCOUNTER — HOME CARE VISIT (OUTPATIENT)
Dept: HOME HEALTH SERVICES | Facility: HOME HEALTH | Age: 60
End: 2025-01-16
Payer: MEDICARE

## 2025-01-16 VITALS
RESPIRATION RATE: 16 BRPM | DIASTOLIC BLOOD PRESSURE: 70 MMHG | HEART RATE: 52 BPM | SYSTOLIC BLOOD PRESSURE: 120 MMHG | TEMPERATURE: 97.7 F | OXYGEN SATURATION: 91 %

## 2025-01-16 PROCEDURE — G0151 HHCP-SERV OF PT,EA 15 MIN: HCPCS | Mod: HHH

## 2025-01-16 SDOH — HEALTH STABILITY: PHYSICAL HEALTH
EXERCISE COMMENTS: RECLINED ANKLE PUMPS, QUAD SETS, GLUTEAL SETS, HIP ABDUCTION WITH ASSIST, HEEL SLIDE WITH ASSIST 10 REPS  SITTING LAQ 10 REPS, HIP FLEXION 10 REPS WITH CUES ON TECHNIQUE

## 2025-01-16 ASSESSMENT — ACTIVITIES OF DAILY LIVING (ADL)
AMBULATION ASSISTANCE: 1
AMBULATION_DISTANCE/DURATION_TOLERATED: 30
PHYSICAL TRANSFERS ASSESSED: 1
AMBULATION ASSISTANCE ON FLAT SURFACES: 1
AMBULATION ASSISTANCE: STAND BY ASSIST
CURRENT_FUNCTION: STAND BY ASSIST

## 2025-01-16 ASSESSMENT — ENCOUNTER SYMPTOMS
LOWEST PAIN SEVERITY IN PAST 24 HOURS: 7/10
MUSCLE WEAKNESS: 1
HIGHEST PAIN SEVERITY IN PAST 24 HOURS: 10/10
PERSON REPORTING PAIN: PATIENT
PAIN: 1

## 2025-01-17 ENCOUNTER — HOME CARE VISIT (OUTPATIENT)
Dept: HOME HEALTH SERVICES | Facility: HOME HEALTH | Age: 60
End: 2025-01-17
Payer: MEDICARE

## 2025-01-17 VITALS
HEART RATE: 68 BPM | TEMPERATURE: 97.3 F | DIASTOLIC BLOOD PRESSURE: 67 MMHG | SYSTOLIC BLOOD PRESSURE: 132 MMHG | OXYGEN SATURATION: 96 % | RESPIRATION RATE: 17 BRPM

## 2025-01-17 PROCEDURE — G0158 HHC OT ASSISTANT EA 15: HCPCS | Mod: CO,HHH

## 2025-01-19 ASSESSMENT — ENCOUNTER SYMPTOMS
PAIN LOCATION: LEFT SHOULDER
PAIN LOCATION: LEFT SHOULDER
HIGHEST PAIN SEVERITY IN PAST 24 HOURS: 3/10
PAIN: 1
PAIN LOCATION - RELIEVING FACTORS: REST
PAIN LOCATION - PAIN QUALITY: ACHING, THROBBING
PAIN LOCATION - PAIN QUALITY: ACHING, THROBBING
PAIN LOCATION - PAIN SEVERITY: 1/10
PAIN LOCATION: RIGHT KNEE
PAIN LOCATION - PAIN QUALITY: ACHING, THROBBING
PAIN LOCATION - PAIN SEVERITY: 1/10
PAIN LOCATION: RIGHT SHOULDER
PAIN LOCATION - EXACERBATING FACTORS: MOVING
HIGHEST PAIN SEVERITY IN PAST 24 HOURS: 3/10
PAIN LOCATION - PAIN SEVERITY: 1/10
PAIN LOCATION: RIGHT KNEE
PAIN LOCATION: NECK
PAIN LOCATION - EXACERBATING FACTORS: MOVING
LOWEST PAIN SEVERITY IN PAST 24 HOURS: 1/10
PAIN LOCATION - PAIN SEVERITY: 1/10
PAIN LOCATION - PAIN QUALITY: ACHING, THROBBING
PAIN LOCATION - PAIN QUALITY: ACHING, THROBBING
SUBJECTIVE PAIN PROGRESSION: UNCHANGED
SUBJECTIVE PAIN PROGRESSION: UNCHANGED
LOWEST PAIN SEVERITY IN PAST 24 HOURS: 1/10
PAIN LOCATION - PAIN QUALITY: ACHING, THROBBING
PAIN LOCATION - PAIN SEVERITY: 1/10
PAIN LOCATION: RIGHT SHOULDER
PAIN LOCATION - PAIN QUALITY: ACHING, THROBBING
PAIN LOCATION - PAIN SEVERITY: 1/10
PAIN LOCATION - PAIN SEVERITY: 1/10
PAIN SEVERITY GOAL: 0/10
PAIN LOCATION - PAIN SEVERITY: 1/10
PAIN LOCATION - RELIEVING FACTORS: REST
PAIN LOCATION - PAIN SEVERITY: 1/10
PAIN: 1
DEPRESSION: 1
PAIN SEVERITY GOAL: 0/10
PAIN LOCATION: BACK
PAIN LOCATION: BACK
PAIN LOCATION - PAIN SEVERITY: 1/10
PAIN LOCATION: NECK

## 2025-01-22 ENCOUNTER — HOME CARE VISIT (OUTPATIENT)
Dept: HOME HEALTH SERVICES | Facility: HOME HEALTH | Age: 60
End: 2025-01-22
Payer: MEDICARE

## 2025-01-22 VITALS
DIASTOLIC BLOOD PRESSURE: 70 MMHG | SYSTOLIC BLOOD PRESSURE: 116 MMHG | TEMPERATURE: 97.8 F | RESPIRATION RATE: 16 BRPM | HEART RATE: 65 BPM | OXYGEN SATURATION: 99 %

## 2025-01-22 PROCEDURE — G0158 HHC OT ASSISTANT EA 15: HCPCS | Mod: CO,HHH

## 2025-01-23 ENCOUNTER — HOME CARE VISIT (OUTPATIENT)
Dept: HOME HEALTH SERVICES | Facility: HOME HEALTH | Age: 60
End: 2025-01-23
Payer: MEDICARE

## 2025-01-23 PROCEDURE — G0299 HHS/HOSPICE OF RN EA 15 MIN: HCPCS | Mod: HHH

## 2025-01-23 ASSESSMENT — ENCOUNTER SYMPTOMS
PAIN SEVERITY GOAL: 0/10
PAIN LOCATION - PAIN QUALITY: ACHING, THROBBING
PAIN LOCATION - PAIN SEVERITY: 1/10
PAIN LOCATION - PAIN QUALITY: ACHING, THROBBING
PAIN LOCATION - PAIN QUALITY: ACHING, THROBBING
SUBJECTIVE PAIN PROGRESSION: GRADUALLY IMPROVING
PAIN LOCATION - EXACERBATING FACTORS: MOVING
PAIN LOCATION: RIGHT KNEE
PAIN: 1
PAIN LOCATION - PAIN QUALITY: ACHING, THROBBING
LOWEST PAIN SEVERITY IN PAST 24 HOURS: 1/10
PAIN LOCATION - PAIN SEVERITY: 1/10
PAIN LOCATION - PAIN QUALITY: ACHING, THROBBING
PAIN LOCATION: NECK
PAIN LOCATION: RIGHT SHOULDER
PAIN LOCATION - PAIN SEVERITY: 4/10
PAIN LOCATION: BACK
HIGHEST PAIN SEVERITY IN PAST 24 HOURS: 4/10
PAIN LOCATION: LEFT SHOULDER
PAIN LOCATION - PAIN SEVERITY: 2/10
PAIN LOCATION - PAIN SEVERITY: 2/10
PAIN LOCATION - RELIEVING FACTORS: REST

## 2025-01-24 ENCOUNTER — HOME CARE VISIT (OUTPATIENT)
Dept: HOME HEALTH SERVICES | Facility: HOME HEALTH | Age: 60
End: 2025-01-24
Payer: MEDICARE

## 2025-01-24 VITALS
SYSTOLIC BLOOD PRESSURE: 112 MMHG | HEART RATE: 76 BPM | DIASTOLIC BLOOD PRESSURE: 60 MMHG | OXYGEN SATURATION: 100 % | RESPIRATION RATE: 20 BRPM | TEMPERATURE: 97.8 F

## 2025-01-24 VITALS
DIASTOLIC BLOOD PRESSURE: 84 MMHG | HEART RATE: 78 BPM | TEMPERATURE: 97.8 F | OXYGEN SATURATION: 98 % | SYSTOLIC BLOOD PRESSURE: 116 MMHG | RESPIRATION RATE: 16 BRPM

## 2025-01-24 PROCEDURE — G0158 HHC OT ASSISTANT EA 15: HCPCS | Mod: CO,HHH

## 2025-01-24 PROCEDURE — G0157 HHC PT ASSISTANT EA 15: HCPCS | Mod: CQ,HHH

## 2025-01-24 SDOH — ECONOMIC STABILITY: FOOD INSECURITY: MEALS PER DAY: 3

## 2025-01-24 SDOH — ECONOMIC STABILITY: GENERAL

## 2025-01-24 ASSESSMENT — ENCOUNTER SYMPTOMS
PAIN LOCATION - PAIN QUALITY: ACHING, THROBBING
PAIN LOCATION - PAIN QUALITY: ACHING, THROBBING
PAIN LOCATION - PAIN FREQUENCY: INTERMITTENT
PAIN LOCATION: BACK
PAIN LOCATION: RIGHT SHOULDER
LOWEST PAIN SEVERITY IN PAST 24 HOURS: 0/10
PAIN SEVERITY GOAL: 0/10
PAIN LOCATION: LEFT SHOULDER
PERSON REPORTING PAIN: PATIENT
SUBJECTIVE PAIN PROGRESSION: WAXING AND WANING
PAIN LOCATION - PAIN SEVERITY: 1/10
PAIN LOCATION - RELIEVING FACTORS: REST
PAIN LOCATION - PAIN QUALITY: ACHING, THROBBING
PAIN LOCATION - PAIN SEVERITY: 2/10
PAIN LOCATION - PAIN SEVERITY: 3/10
APPETITE LEVEL: FAIR
LOWEST PAIN SEVERITY IN PAST 24 HOURS: 1/10
PAIN LOCATION - PAIN QUALITY: ACHY
PAIN: 1
SUBJECTIVE PAIN PROGRESSION: UNCHANGED
PAIN SEVERITY GOAL: 0/10
HIGHEST PAIN SEVERITY IN PAST 24 HOURS: 0/10
HIGHEST PAIN SEVERITY IN PAST 24 HOURS: 5/10
PAIN LOCATION - PAIN QUALITY: ACHING, THROBBING
PAIN LOCATION: GENERALIZED
CHANGE IN APPETITE: UNCHANGED
PAIN LOCATION - PAIN SEVERITY: 0/10
PAIN LOCATION: RIGHT KNEE
SUBJECTIVE PAIN PROGRESSION: UNCHANGED
HIGHEST PAIN SEVERITY IN PAST 24 HOURS: 8/10
PAIN LOCATION - PAIN SEVERITY: 5/10
PAIN LOCATION - PAIN SEVERITY: 2/10
PERSON REPORTING PAIN: PATIENT
PAIN: 1
PAIN LOCATION: NECK
TREMORS: 1
PAIN LOCATION - EXACERBATING FACTORS: MOVING
PAIN: 1
MUSCLE WEAKNESS: 1
PAIN SEVERITY GOAL: 2/10
PAIN LOCATION - PAIN QUALITY: ACHING, THROBBING

## 2025-01-24 ASSESSMENT — ACTIVITIES OF DAILY LIVING (ADL)
CURRENT_FUNCTION: STAND BY ASSIST
AMBULATION ASSISTANCE: STAND BY ASSIST
MONEY MANAGEMENT (EXPENSES/BILLS): INDEPENDENT

## 2025-01-28 ENCOUNTER — HOME CARE VISIT (OUTPATIENT)
Dept: HOME HEALTH SERVICES | Facility: HOME HEALTH | Age: 60
End: 2025-01-28
Payer: MEDICARE

## 2025-01-28 VITALS
RESPIRATION RATE: 17 BRPM | SYSTOLIC BLOOD PRESSURE: 120 MMHG | DIASTOLIC BLOOD PRESSURE: 63 MMHG | OXYGEN SATURATION: 96 % | HEART RATE: 74 BPM | TEMPERATURE: 97.5 F

## 2025-01-28 PROCEDURE — G0158 HHC OT ASSISTANT EA 15: HCPCS | Mod: CO,HHH

## 2025-01-29 ENCOUNTER — HOME CARE VISIT (OUTPATIENT)
Dept: HOME HEALTH SERVICES | Facility: HOME HEALTH | Age: 60
End: 2025-01-29
Payer: MEDICARE

## 2025-01-30 ENCOUNTER — HOME CARE VISIT (OUTPATIENT)
Dept: HOME HEALTH SERVICES | Facility: HOME HEALTH | Age: 60
End: 2025-01-30
Payer: MEDICARE

## 2025-01-30 VITALS
OXYGEN SATURATION: 98 % | SYSTOLIC BLOOD PRESSURE: 134 MMHG | DIASTOLIC BLOOD PRESSURE: 82 MMHG | RESPIRATION RATE: 16 BRPM | HEART RATE: 75 BPM | TEMPERATURE: 97.8 F

## 2025-01-30 PROCEDURE — G0299 HHS/HOSPICE OF RN EA 15 MIN: HCPCS | Mod: HHH

## 2025-01-30 PROCEDURE — G0158 HHC OT ASSISTANT EA 15: HCPCS | Mod: CO,HHH

## 2025-02-01 VITALS
HEART RATE: 78 BPM | DIASTOLIC BLOOD PRESSURE: 70 MMHG | TEMPERATURE: 97.7 F | OXYGEN SATURATION: 97 % | RESPIRATION RATE: 19 BRPM | SYSTOLIC BLOOD PRESSURE: 114 MMHG

## 2025-02-01 SDOH — ECONOMIC STABILITY: FOOD INSECURITY: MEALS PER DAY: 3

## 2025-02-01 ASSESSMENT — ENCOUNTER SYMPTOMS
HIGHEST PAIN SEVERITY IN PAST 24 HOURS: 0/10
TREMORS: 1
PAIN LOCATION - PAIN SEVERITY: 0/10
SUBJECTIVE PAIN PROGRESSION: UNCHANGED
PAIN LOCATION - PAIN FREQUENCY: INTERMITTENT
PAIN SEVERITY GOAL: 0/10
APPETITE LEVEL: FAIR
MUSCLE WEAKNESS: 1
CHANGE IN APPETITE: UNCHANGED
PAIN LOCATION: GENERALIZED
LOWEST PAIN SEVERITY IN PAST 24 HOURS: 0/10
PERSON REPORTING PAIN: PATIENT
PAIN LOCATION - PAIN QUALITY: ACHY
PAIN: 1

## 2025-02-01 ASSESSMENT — ACTIVITIES OF DAILY LIVING (ADL)
AMBULATION ASSISTANCE: STAND BY ASSIST
CURRENT_FUNCTION: STAND BY ASSIST

## 2025-02-03 ENCOUNTER — HOME CARE VISIT (OUTPATIENT)
Dept: HOME HEALTH SERVICES | Facility: HOME HEALTH | Age: 60
End: 2025-02-03
Payer: MEDICARE

## 2025-02-03 PROCEDURE — G0157 HHC PT ASSISTANT EA 15: HCPCS | Mod: CQ,HHH

## 2025-02-03 ASSESSMENT — ENCOUNTER SYMPTOMS
LOWEST PAIN SEVERITY IN PAST 24 HOURS: 1/10
PAIN LOCATION: RIGHT KNEE
PAIN LOCATION - PAIN SEVERITY: 1/10
PAIN: 1
HIGHEST PAIN SEVERITY IN PAST 24 HOURS: 3/10
PAIN LOCATION - PAIN FREQUENCY: INTERMITTENT
PAIN LOCATION - PAIN QUALITY: ACHING
PAIN LOCATION: RIGHT SHOULDER
PAIN: 1
PAIN LOCATION - EXACERBATING FACTORS: MOVING
PAIN LOCATION: LEFT KNEE
PAIN LOCATION - PAIN FREQUENCY: INTERMITTENT
PAIN LOCATION - PAIN SEVERITY: 2/10
PAIN LOCATION: BACK
LOWEST PAIN SEVERITY IN PAST 24 HOURS: 1/10
HIGHEST PAIN SEVERITY IN PAST 24 HOURS: 3/10
PAIN LOCATION: LEFT KNEE
PAIN LOCATION - PAIN SEVERITY: 2/10
PAIN LOCATION - PAIN SEVERITY: 2/10
PAIN LOCATION - PAIN SEVERITY: 1/10
PAIN LOCATION - PAIN SEVERITY: 2/10
PAIN SEVERITY GOAL: 0/10
PAIN LOCATION: NECK
PAIN LOCATION - RELIEVING FACTORS: REST
PAIN LOCATION: NECK
PAIN LOCATION - PAIN SEVERITY: 3/10
PAIN LOCATION: BACK
PAIN SEVERITY GOAL: 0/10
PAIN LOCATION - RELIEVING FACTORS: REST
PAIN LOCATION - EXACERBATING FACTORS: MOVING
PAIN LOCATION - PAIN QUALITY: ACHING
SUBJECTIVE PAIN PROGRESSION: UNCHANGED
PERSON REPORTING PAIN: PATIENT
PAIN LOCATION - PAIN SEVERITY: 2/10
PAIN LOCATION: RIGHT SHOULDER
SUBJECTIVE PAIN PROGRESSION: UNCHANGED
PAIN LOCATION - PAIN SEVERITY: 1/10
PAIN LOCATION - PAIN SEVERITY: 2/10
PAIN LOCATION: RIGHT KNEE

## 2025-02-04 ENCOUNTER — HOME CARE VISIT (OUTPATIENT)
Dept: HOME HEALTH SERVICES | Facility: HOME HEALTH | Age: 60
End: 2025-02-04
Payer: MEDICARE

## 2025-02-05 ENCOUNTER — HOME CARE VISIT (OUTPATIENT)
Dept: HOME HEALTH SERVICES | Facility: HOME HEALTH | Age: 60
End: 2025-02-05
Payer: MEDICARE

## 2025-02-05 PROCEDURE — G0152 HHCP-SERV OF OT,EA 15 MIN: HCPCS | Mod: HHH

## 2025-02-05 ASSESSMENT — ENCOUNTER SYMPTOMS
PAIN: 1
PAIN SEVERITY GOAL: 3/10
PAIN LOCATION - RELIEVING FACTORS: REST
PAIN LOCATION - PAIN FREQUENCY: CONSTANT
PAIN LOCATION - PAIN FREQUENCY: CONSTANT
PAIN LOCATION - PAIN QUALITY: ACHE
PAIN LOCATION - RELIEVING FACTORS: REST
PAIN LOCATION - PAIN SEVERITY: 8/10
PAIN LOCATION - EXACERBATING FACTORS: MOBILITY
PAIN LOCATION: BACK
LOWEST PAIN SEVERITY IN PAST 24 HOURS: 5/10
PERSON REPORTING PAIN: PATIENT
PAIN LOCATION - PAIN SEVERITY: 8/10
PAIN LOCATION - EXACERBATING FACTORS: MOBILITY
SUBJECTIVE PAIN PROGRESSION: WAXING AND WANING
PAIN LOCATION - PAIN QUALITY: ACHE
HIGHEST PAIN SEVERITY IN PAST 24 HOURS: 8/10
PAIN LOCATION: NECK

## 2025-02-05 ASSESSMENT — ACTIVITIES OF DAILY LIVING (ADL)
DRESSING_LB_CURRENT_FUNCTION: SUPERVISION
DRESSING_UB_CURRENT_FUNCTION: SUPERVISION
BATHING ASSESSED: 1
BATHING_CURRENT_FUNCTION: MINIMUM ASSIST

## 2025-02-06 ENCOUNTER — APPOINTMENT (OUTPATIENT)
Dept: NEUROLOGY | Facility: CLINIC | Age: 60
End: 2025-02-06
Payer: MEDICARE

## 2025-02-11 ENCOUNTER — HOME CARE VISIT (OUTPATIENT)
Dept: HOME HEALTH SERVICES | Facility: HOME HEALTH | Age: 60
End: 2025-02-11
Payer: MEDICARE

## 2025-02-11 VITALS
HEART RATE: 81 BPM | DIASTOLIC BLOOD PRESSURE: 68 MMHG | OXYGEN SATURATION: 98 % | TEMPERATURE: 97.8 F | RESPIRATION RATE: 18 BRPM | SYSTOLIC BLOOD PRESSURE: 124 MMHG

## 2025-02-11 PROCEDURE — G0158 HHC OT ASSISTANT EA 15: HCPCS | Mod: CO,HHH

## 2025-02-13 ENCOUNTER — OFFICE VISIT (OUTPATIENT)
Dept: ORTHOPEDIC SURGERY | Facility: CLINIC | Age: 60
End: 2025-02-13
Payer: MEDICARE

## 2025-02-13 DIAGNOSIS — Z96.651 STATUS POST RIGHT KNEE REPLACEMENT: Primary | ICD-10-CM

## 2025-02-13 DIAGNOSIS — M17.0 PRIMARY OSTEOARTHRITIS OF BOTH KNEES: ICD-10-CM

## 2025-02-13 PROCEDURE — 99214 OFFICE O/P EST MOD 30 MIN: CPT | Performed by: ORTHOPAEDIC SURGERY
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
## 2025-02-14 PROCEDURE — 20610 DRAIN/INJ JOINT/BURSA W/O US: CPT | Performed by: ORTHOPAEDIC SURGERY

## 2025-02-14 RX ORDER — LIDOCAINE HYDROCHLORIDE 20 MG/ML
2 INJECTION, SOLUTION INFILTRATION; PERINEURAL
Status: COMPLETED | OUTPATIENT
Start: 2025-02-14 | End: 2025-02-14

## 2025-02-14 RX ORDER — METHYLPREDNISOLONE ACETATE 40 MG/ML
40 INJECTION, SUSPENSION INTRA-ARTICULAR; INTRALESIONAL; INTRAMUSCULAR; SOFT TISSUE
Status: COMPLETED | OUTPATIENT
Start: 2025-02-14 | End: 2025-02-14

## 2025-02-14 RX ADMIN — LIDOCAINE HYDROCHLORIDE 2 ML: 20 INJECTION, SOLUTION INFILTRATION; PERINEURAL at 08:18

## 2025-02-14 RX ADMIN — METHYLPREDNISOLONE ACETATE 40 MG: 40 INJECTION, SUSPENSION INTRA-ARTICULAR; INTRALESIONAL; INTRAMUSCULAR; SOFT TISSUE at 08:18

## 2025-02-15 ASSESSMENT — ENCOUNTER SYMPTOMS
PAIN LOCATION - RELIEVING FACTORS: REST
PAIN LOCATION - PAIN SEVERITY: 3/10
PAIN LOCATION: RIGHT SHOULDER
PAIN LOCATION - PAIN SEVERITY: 3/10
PAIN LOCATION - PAIN QUALITY: ACHING, THROBBING
PAIN LOCATION - PAIN SEVERITY: 4/10
PAIN LOCATION - PAIN SEVERITY: 1/10
PAIN LOCATION - PAIN QUALITY: ACHING, THROBBING
PAIN LOCATION: RIGHT KNEE
PAIN SEVERITY GOAL: 0/10
PAIN LOCATION - PAIN FREQUENCY: INTERMITTENT
PAIN LOCATION: LEFT SHOULDER
PAIN LOCATION: NECK
PAIN: 1
PERSON REPORTING PAIN: PATIENT
PAIN LOCATION - EXACERBATING FACTORS: MOVING
LOWEST PAIN SEVERITY IN PAST 24 HOURS: 1/10
PAIN LOCATION - PAIN QUALITY: ACHING, THROBBING
HIGHEST PAIN SEVERITY IN PAST 24 HOURS: 4/10
PAIN LOCATION - PAIN QUALITY: ACHING, THROBBING

## 2025-02-18 ENCOUNTER — HOME CARE VISIT (OUTPATIENT)
Dept: HOME HEALTH SERVICES | Facility: HOME HEALTH | Age: 60
End: 2025-02-18
Payer: MEDICARE

## 2025-02-18 PROCEDURE — G0158 HHC OT ASSISTANT EA 15: HCPCS | Mod: CO,HHH

## 2025-02-22 ASSESSMENT — ENCOUNTER SYMPTOMS
PAIN SEVERITY GOAL: 0/10
PAIN LOCATION - PAIN SEVERITY: 3/10
PAIN LOCATION - EXACERBATING FACTORS: MOVING
LOWEST PAIN SEVERITY IN PAST 24 HOURS: 1/10
PAIN LOCATION - PAIN FREQUENCY: INTERMITTENT
PAIN LOCATION - RELIEVING FACTORS: REST
PAIN LOCATION - PAIN FREQUENCY: INTERMITTENT
PAIN LOCATION - PAIN QUALITY: ACHING, THROBBING
PAIN: 1
HIGHEST PAIN SEVERITY IN PAST 24 HOURS: 3/10
PERSON REPORTING PAIN: PATIENT
PAIN LOCATION - PAIN QUALITY: ACHING, THROBBING
PAIN LOCATION - PAIN SEVERITY: 2/10
PAIN LOCATION - PAIN SEVERITY: 1/10
PAIN LOCATION - PAIN FREQUENCY: INTERMITTENT
PAIN LOCATION: NECK
SUBJECTIVE PAIN PROGRESSION: GRADUALLY IMPROVING
PAIN LOCATION: RIGHT KNEE
PAIN LOCATION: BACK
PAIN LOCATION - PAIN QUALITY: ACHING, THROBBING

## 2025-02-25 ENCOUNTER — HOME CARE VISIT (OUTPATIENT)
Dept: HOME HEALTH SERVICES | Facility: HOME HEALTH | Age: 60
End: 2025-02-25
Payer: MEDICARE

## 2025-02-25 ENCOUNTER — APPOINTMENT (OUTPATIENT)
Dept: UROLOGY | Facility: CLINIC | Age: 60
End: 2025-02-25
Payer: MEDICARE

## 2025-02-25 VITALS
TEMPERATURE: 97.8 F | DIASTOLIC BLOOD PRESSURE: 69 MMHG | RESPIRATION RATE: 16 BRPM | OXYGEN SATURATION: 98 % | SYSTOLIC BLOOD PRESSURE: 118 MMHG | HEART RATE: 75 BPM

## 2025-02-25 PROCEDURE — G0158 HHC OT ASSISTANT EA 15: HCPCS | Mod: CO,HHH

## 2025-03-03 ASSESSMENT — ENCOUNTER SYMPTOMS
PAIN: MULTIPLE JOINTS
SUBJECTIVE PAIN PROGRESSION: GRADUALLY IMPROVING
PERSON REPORTING PAIN: PATIENT
PAIN SEVERITY GOAL: 0/10
LOWEST PAIN SEVERITY IN PAST 24 HOURS: 0/10
PAIN: 1
HIGHEST PAIN SEVERITY IN PAST 24 HOURS: 2/10

## 2025-03-04 ENCOUNTER — OFFICE VISIT (OUTPATIENT)
Dept: NEUROLOGY | Facility: HOSPITAL | Age: 60
End: 2025-03-04
Payer: MEDICARE

## 2025-03-04 VITALS
DIASTOLIC BLOOD PRESSURE: 82 MMHG | BODY MASS INDEX: 33.18 KG/M2 | HEART RATE: 73 BPM | HEIGHT: 69 IN | WEIGHT: 224 LBS | RESPIRATION RATE: 18 BRPM | TEMPERATURE: 97.1 F | SYSTOLIC BLOOD PRESSURE: 114 MMHG

## 2025-03-04 DIAGNOSIS — G20.C PARKINSONISM, UNSPECIFIED PARKINSONISM TYPE (MULTI): ICD-10-CM

## 2025-03-04 PROCEDURE — 99213 OFFICE O/P EST LOW 20 MIN: CPT | Performed by: PSYCHIATRY & NEUROLOGY

## 2025-03-04 PROCEDURE — 3079F DIAST BP 80-89 MM HG: CPT | Performed by: PSYCHIATRY & NEUROLOGY

## 2025-03-04 PROCEDURE — 3074F SYST BP LT 130 MM HG: CPT | Performed by: PSYCHIATRY & NEUROLOGY

## 2025-03-04 PROCEDURE — 3008F BODY MASS INDEX DOCD: CPT | Performed by: PSYCHIATRY & NEUROLOGY

## 2025-03-04 PROCEDURE — 1036F TOBACCO NON-USER: CPT | Performed by: PSYCHIATRY & NEUROLOGY

## 2025-03-04 RX ORDER — CARBIDOPA AND LEVODOPA 25; 100 MG/1; MG/1
1.5 TABLET ORAL 3 TIMES DAILY
Qty: 135 TABLET | Refills: 11 | Status: SHIPPED | OUTPATIENT
Start: 2025-03-04

## 2025-03-04 ASSESSMENT — PAIN SCALES - GENERAL: PAINLEVEL_OUTOF10: 9

## 2025-03-04 NOTE — PATIENT INSTRUCTIONS
Sorry about the recent falls. Please continue to work with your orthopedic doctor for better knee stabilization.     I prefer not to increase your carbidopa/levodopa until you get better stability of the knee.     I suggest using liftware utensils to reduce tremor while eating or weighted utensils.     Please try to do fast paced exercise as much as you can.     Follow up after three to four months.     Thank you for visiting the clinic today    Deshawn Adorno MD

## 2025-03-04 NOTE — PROGRESS NOTES
Diagnoses/Problems     · Parkinsonism (332.0) (G20)   · Laterocollis (723.8) (G24.3)   · Neck mass (784.2) (R22.1)       Chief Complaint  parkinsonism.      History of Present Illness  This is a 60 y.o.  right handed AAF with hx of HTN, DM, HPL, ZEINA, obesity, OA, carotid pseudoaneurysm, thyroid cancer s/p thyroidectomy with residual paratracheal mass, PTSD, depression, and schizoaffective disorder with exposure to multiple antipsychotics who is here for follow up.     Interval hx:  Had a right knee replacement surgery in March and has since experienced more frequent falling from knee buckling leading to a recent hospitalization. She is working with her orthopedic surgeon for a better knee stability. She is sick of her tremor because it makes her embarassed to go eat out with family and was hoping to increase her sinement dose. She occasionally takes sinemet doses close to one another and that makes her occasionally disoriented and jerky. She is also on topiramate 200 mg twice daily, klonopin 1 mg twice daily, and gabapentin. Her current psychiatric regimen includes tegretol, seroquel, and zoloft.       MANJU ISAAC is here for an initial evaluation. She is a right-handed 58 year-old woman   She is currently experiencing the following symptoms: tremor at rest, pill-rolling hand tremor, loss of postural reflexes, frequent falls, postural instability, depression, anxiety and bradykinesia.   She experiences no noticeable benefit from the medications. Her response is complicated by not dyskinesia.  By report, there is good compliance with treatment, poor tolerance of treatment and poor symptom control.   Previous medications included: ropinerole and amantadine.    She reports balance problems and falls. She reports 7 falls per year.   She reports speech problems.   She reports constipation.   She reports she has symptoms of depression and anxiety.   She reports no acting out of dreams.   She reports no hallucinations  and no delusions.      Review of Systems  All systems reviewed and are negative except as mentioned in the HPI.        Active Problems  Problems    · Abnormal thyroid function test (794.5) (R94.6)   · Acute on chronic diastolic heart failure (428.33) (I50.33)   · Acute vaginitis (616.10) (N76.0)   · CATALINA positive (795.79) (R76.8)   · Aneurysm (442.9) (I72.9)   · Ankle pain (719.47) (M25.579)   · Arthritis of ankle, left (716.97) (M19.072)   · Arthritis of ankle, right (716.97) (M19.071)   · Arthritis of knee, left (716.96) (M17.12)   · Arthritis of knee, right (716.96) (M17.11)   · Atrophic vaginitis (627.3) (N95.2)   · Atypical chest pain (786.59) (R07.89)   · Bell's palsy (351.0) (G51.0)   · 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)   · Cervical radiculopathy, chronic (723.4) (M54.12)   · Cervicalgia (723.1) (M54.2)   · Chronic constipation (564.00) (K59.09)   · Chronic pain of both knees (719.46,338.29) (M25.561,M25.562,G89.29)   · Chronic UTI (599.0) (N39.0)   · Controlled type 2 diabetes mellitus with neuropathy (250.60,357.2) (E11.40)   · Diabetes mellitus type 2 in obese (250.00,278.00) (E11.69,E66.9)   · DM type 2 with diabetic mixed hyperlipidemia (250.80,272.2) (E11.69,E78.2)   · LUONG (dyspnea on exertion) (786.09) (R06.09)   · Dyslipidemia (272.4) (E78.5)   · Encounter for annual routine gynecological examination (V72.31) (Z01.419)   · Encounter for screening mammogram for malignant neoplasm of breast (V76.12)  (Z12.31)   · Female stress incontinence (625.6) (N39.3)   · Fibromyalgia (729.1) (M79.7)   · Fitting and adjustment of pessary (V53.99) (Z46.89)   · Goiter (240.9) (E04.9)   · Hypokalemia (276.8) (E87.6)   · Hyponatremia (276.1) (E87.1)   · Incomplete bladder emptying (788.21) (R33.9)   · Intervertebral disc disorder with radiculopathy of lumbosacral region (724.4) (M51.17)   · 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)   · Low back pain (724.2) (M54.50)   · Lumbar radicular pain (724.4) (M54.16)   ·  Malignant neoplasm of thyroid gland (193) (C73)   · Midline cystocele (618.01) (N81.11)   · Multinodular thyroid (241.1) (E04.2)   · Muscle cramps (729.82) (R25.2)   · Muscle tension dysphonia (784.42) (R49.0)   · Neck mass (784.2) (R22.1)   · Neck pain (723.1) (M54.2)   · ZEINA on CPAP (327.23) (G47.33)   · Osteopenia of both hips (733.90) (M85.851,M85.852)   · Pap smear, as part of routine gynecological examination (V76.2) (Z01.419)   · Paresthesia (782.0) (R20.2)   · Pelvic floor weakness (618.89) (N81.89)   · Pessary maintenance (V53.99) (Z46.89)   · Postoperative hypothyroidism (244.0) (E89.0)   · Primary osteoarthritis of both knees (715.16) (M17.0)   · Prolapse of urethra (599.5) (N36.8)   · Pseudoaneurysm (442.9) (I72.9)   · Sacroiliitis (720.2) (M46.1)   · Spasmodic torticollis (333.83) (G24.3)   · Strain of neck muscle, initial encounter (847.0) (S16.1XXA)   · Symptoms of urinary tract infection (788.99) (R39.9)   · Tear of meniscus, medial (836.0) (S83.249A)   · Tremor (781.0) (R25.1)   · Urge incontinence (788.31) (N39.41)   · Urinary frequency (788.41) (R35.0)   · Urinary urgency (788.63) (R39.15)   · Uterine prolapse (618.1) (N81.4)   · UTI (urinary tract infection) (599.0) (N39.0)   · Vaginal pessary present (V45.89) (Z96.0)   · Vitamin D insufficiency (268.9) (E55.9)   · Weakness (780.79) (R53.1)     Past Medical History  Problems    · Controlled type 2 diabetes mellitus with neuropathy (250.60,357.2) (E11.40)   · Diabetes mellitus type 2 in obese (250.00,278.00) (E11.69,E66.9)   · DM type 2 with diabetic mixed hyperlipidemia (250.80,272.2) (E11.69,E78.2)   · Dyslipidemia (272.4) (E78.5)   · Hypokalemia (276.8) (E87.6)   · 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)   · Low back pain (724.2) (M54.50)   · Muscle tension dysphonia (784.42) (R49.0)   · Neck pain (723.1) (M54.2)   · ZEINA on CPAP (327.23) (G47.33)     Surgical History  Problems    · History of Hemithyroidectomy     Family History  Mother    · Family  history of cardiac disorder (V17.49) (Z82.49)   · Family history of congestive heart failure (V17.49) (Z82.49)  Father    · Family history of heart failure (V17.49) (Z82.49)     Social History  Problems    · Current non-drinker of alcohol (V49.89) (Z78.9)   · Medical marijuana daily   · Never a smoker   · Never smoked any substance (V49.89) (Z78.9)     Allergies  Medication    · Lisinopril TABS  Recorded By: Casandra Abraham; 9/1/2020 4:13:42 PM          Physical Exam  Constitutional: General appearance: no acute distress   Mental status: The patient was in no distress, alert, interactive and cooperative. Affect is appropriate.   Orientation: oriented to person, oriented to place and oriented to time.   Memory: recent memory intact and remote memory intact.   Attention: normal attention span and normal concentrating ability.   Language: normal comprehension and no difficulty naming common objects.   Fund of knowledge: Patient displays adequate knowledge of current events, adequate fund of knowledge regarding past history and adequate fund of knowledge regarding vocabulary. voice tremor noted   Cranial nerve II: Visual fields full to confrontation.   Cranial nerves III, IV, and VI: Pupils round, equally reactive to light; no ptosis. EOMs intact. No nystagmus.   Cranial Nerve V: Facial sensation intact bilaterally.   Cranial nerve VII:. hypomimia and positive glabellar.   Cranial nerve VIII: Hearing is intact bilaterally to finger rub / whisper.   Cranial nerves IX and X: Palate elevates symmetrically.   Cranial nerve XI: Shoulder shrug and neck rotation strength are intact . right shoulder elevation and left laterocollis noted.   Cranial nerve XII: Tongue midline with normal strength.   Motor: Muscle bulk was normal in both upper and lower extremities. slight rigidity in the LUE. Muscle strength was 5/5 throughout. moderately severe rest tremor of the LUE and LLE and to a lesser extent the right side.   Deep Tendon  Reflexes: left biceps 0 , right biceps 0, left triceps 0, right triceps 0, left brachioradialis 0, right brachioradialis 0, left patella 2+, right patella 2+, left ankle jerk 0, right ankle jerk 0   Sensory Exam:. no hemihyposthesia.   Coordination: There is no limb dystaxia and rapid alternating movements are intact. diffuse bradykinesia more on the left.   Gait:. uses a walker with short steps but not shuffling, freezing, or festination.     Provider Impressions  This is a 59 yo right handed AAF with hx of HTN, DM, HPL, ZEINA, obesity, OA, carotid pseudoaneurysm, thyroid cancer s/p thyroidectomy with residual paratracheal mass, PTSD, depression, and schizoaffective disorder with exposure to multiple antipsychotics (currently fanapt, previously haldol, zyprexa, risperidone, ruxelti) who presens with at least one-year-hx of rest tremor of all limbs more on the left, poor balance with frequent falls, and six months of neck dystonia. After being diagnosed with schizoaffective disorder in her twenties, she did well mentally off antipsychotics for several years befor she was put back on fanapt and seroquel in 2019 after she had a mental breakdown and started thinking about past traumatic experiences and having delusions. she did not respond to and could not tolerate austedo, ingrezza, requip, and amantadine. She has since been cutting down on her fanapt dose with some subsequent improvement in tremor. Dr. Swanson started her on botox for her cervical dystonia with some short lived benefit. She denies dream enactment but admits to chronic constipation and a constant sense of bad smell. she had an episode of decreased responsiveness and right facial droop in 2022 and was diagnosed with Bell's palsy at that time (CT unremarkable). Her exam is positive for L>R tremor-predominant parkinsonism and left laterocollis with right shoulder elevation. 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 However, underlying  Parkinson's disease is possible given asymmetric findings. We also need to rule out other secondary causes of parkinsonism given her hx of cancer and prior neurological events. Will start with brain MRI and neuronal antibodies.     1/22/2024: Experienced noticeable improvement in her tremor and other symptoms after she stopped fanapt and started sinemet, and without side effects. Her brain MRI and ENS1 were negative. Had an episode of garbled speech last month and was found to have GASTON on admission. CT/CTA brain at that time were negative. Still very symptomatic on exam. Will increase sinemet to 1.5 TID and will consider adding a fourth dose in the next visit.      3/4/2025: Had a right knee replacement surgery in March and has since experienced more frequent falls secondary to knee buckling leading to a recent hospitalization. She is working with her orthopedic surgeon for a better knee stability. She is sick of her tremor because it makes her embarassed to go eat out with family and was hoping to increase her sinemet dose. She occasionally takes sinemet doses close to one another and that makes her occasionally disoriented and jerky per daughter. Family is checking on her more frequently to mitigate this problem. She is also on topiramate 200 mg twice daily, klonopin 1 mg twice daily, and gabapentin. Her current psychiatric regimen includes tegretol, seroquel, and zoloft.  I prefer not to increase sinemet dose until her knee stability is improved to avoid increased fall risk from sinemet-induced dyskinesia or postural hypotension. For now, I suggested liftware utensils and a follow up after 3-4 months to decided on sinemet increase.      PLAN:  Sorry about the recent falls. Please continue to work with your orthopedic doctor for better knee stabilization.     I prefer not to increase your carbidopa/levodopa until you get better stability of the knee.     I suggest using liftware utensils to reduce tremor while  eating or weighted utensils.     Please try to do fast paced exercise as much as you can.     Follow up after three to four months to discuss potentially increasing sinemet dose.     The impression and plan were discussed with the patient and their family (if present) in detail and all questions answered. They agreed to the plan as outlined above.     Will communicate my recommendations to the referring physician via e-chart or written report.     I spent 20 minutes with this patient. Greater than 50% of this time was spent in counseling and/or coordination of care.        Deshawn Adorno MD, PhD   of Neurology  [facility] [employer]  Director of Neuroimmunology and staff neurologist at the [facility]  [facility]

## 2025-03-05 ENCOUNTER — HOME CARE VISIT (OUTPATIENT)
Dept: HOME HEALTH SERVICES | Facility: HOME HEALTH | Age: 60
End: 2025-03-05
Payer: MEDICARE

## 2025-03-05 PROCEDURE — G0152 HHCP-SERV OF OT,EA 15 MIN: HCPCS | Mod: HHH

## 2025-03-05 ASSESSMENT — ACTIVITIES OF DAILY LIVING (ADL)
DRESSING_LB_CURRENT_FUNCTION: INDEPENDENT
BATHING_CURRENT_FUNCTION: SUPERVISION
OASIS_M1830: 02
BATHING ASSESSED: 1
DRESSING_UB_CURRENT_FUNCTION: INDEPENDENT
HOME_HEALTH_OASIS: 01

## 2025-03-05 ASSESSMENT — ENCOUNTER SYMPTOMS
PAIN LOCATION - PAIN QUALITY: ACHE
SUBJECTIVE PAIN PROGRESSION: WAXING AND WANING
PAIN LOCATION - RELIEVING FACTORS: REST, MEDS
PAIN: 1
PAIN LOCATION - EXACERBATING FACTORS: MOBILITY
PAIN LOCATION - PAIN SEVERITY: 5/10
HIGHEST PAIN SEVERITY IN PAST 24 HOURS: 5/10
PAIN LOCATION - RELIEVING FACTORS: REST, MEDS
PAIN LOCATION: RIGHT SHOULDER
PAIN LOCATION: LEFT SHOULDER
PAIN LOCATION - PAIN SEVERITY: 5/10
LOWEST PAIN SEVERITY IN PAST 24 HOURS: 3/10
PAIN LOCATION - EXACERBATING FACTORS: FLEXION
PAIN SEVERITY GOAL: 2/10
PAIN LOCATION - PAIN QUALITY: ACHE
PAIN LOCATION: BACK
PERSON REPORTING PAIN: PATIENT
PAIN LOCATION - PAIN SEVERITY: 5/10
PAIN LOCATION - EXACERBATING FACTORS: FLEXION
PAIN LOCATION - PAIN FREQUENCY: CONSTANT
PAIN LOCATION - PAIN QUALITY: ACHE
PAIN LOCATION - RELIEVING FACTORS: REST, MEDS

## 2025-03-14 ENCOUNTER — APPOINTMENT (OUTPATIENT)
Dept: RADIOLOGY | Facility: HOSPITAL | Age: 60
DRG: 563 | End: 2025-03-14
Payer: MEDICARE

## 2025-03-14 ENCOUNTER — HOSPITAL ENCOUNTER (INPATIENT)
Facility: HOSPITAL | Age: 60
LOS: 3 days | Discharge: HOME HEALTH | DRG: 563 | End: 2025-03-17
Attending: EMERGENCY MEDICINE | Admitting: INTERNAL MEDICINE
Payer: MEDICARE

## 2025-03-14 ENCOUNTER — APPOINTMENT (OUTPATIENT)
Dept: CARDIOLOGY | Facility: HOSPITAL | Age: 60
DRG: 563 | End: 2025-03-14
Payer: MEDICARE
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
## 2025-03-14 DIAGNOSIS — C73 CANCER OF THYROID (MULTI): ICD-10-CM

## 2025-03-14 DIAGNOSIS — R29.6 FREQUENT FALLS: ICD-10-CM

## 2025-03-14 DIAGNOSIS — M54.12 CERVICAL RADICULOPATHY, CHRONIC: ICD-10-CM

## 2025-03-14 DIAGNOSIS — S82.831A CLOSED FRACTURE OF DISTAL END OF RIGHT FIBULA, UNSPECIFIED FRACTURE MORPHOLOGY, INITIAL ENCOUNTER: Primary | ICD-10-CM

## 2025-03-14 LAB
ALBUMIN SERPL BCP-MCNC: 3.8 G/DL (ref 3.4–5)
ALP SERPL-CCNC: 153 U/L (ref 33–136)
ALT SERPL W P-5'-P-CCNC: 14 U/L (ref 7–45)
ANION GAP SERPL CALC-SCNC: 13 MMOL/L (ref 10–20)
AST SERPL W P-5'-P-CCNC: 12 U/L (ref 9–39)
ATRIAL RATE: 63 BPM
BASOPHILS # BLD AUTO: 0.04 X10*3/UL (ref 0–0.1)
BASOPHILS NFR BLD AUTO: 0.7 %
BILIRUB SERPL-MCNC: 0.3 MG/DL (ref 0–1.2)
BUN SERPL-MCNC: 17 MG/DL (ref 6–23)
CALCIUM SERPL-MCNC: 8.4 MG/DL (ref 8.6–10.3)
CHLORIDE SERPL-SCNC: 105 MMOL/L (ref 98–107)
CO2 SERPL-SCNC: 29 MMOL/L (ref 21–32)
CREAT SERPL-MCNC: 0.87 MG/DL (ref 0.5–1.05)
EGFRCR SERPLBLD CKD-EPI 2021: 76 ML/MIN/1.73M*2
EOSINOPHIL # BLD AUTO: 0.09 X10*3/UL (ref 0–0.7)
EOSINOPHIL NFR BLD AUTO: 1.5 %
ERYTHROCYTE [DISTWIDTH] IN BLOOD BY AUTOMATED COUNT: 17.9 % (ref 11.5–14.5)
GLUCOSE BLD MANUAL STRIP-MCNC: 92 MG/DL (ref 74–99)
GLUCOSE SERPL-MCNC: 90 MG/DL (ref 74–99)
HCT VFR BLD AUTO: 39.4 % (ref 36–46)
HGB BLD-MCNC: 11.7 G/DL (ref 12–16)
IMM GRANULOCYTES # BLD AUTO: 0.02 X10*3/UL (ref 0–0.7)
IMM GRANULOCYTES NFR BLD AUTO: 0.3 % (ref 0–0.9)
LYMPHOCYTES # BLD AUTO: 1.63 X10*3/UL (ref 1.2–4.8)
LYMPHOCYTES NFR BLD AUTO: 26.6 %
MAGNESIUM SERPL-MCNC: 2.02 MG/DL (ref 1.6–2.4)
MCH RBC QN AUTO: 26.2 PG (ref 26–34)
MCHC RBC AUTO-ENTMCNC: 29.7 G/DL (ref 32–36)
MCV RBC AUTO: 88 FL (ref 80–100)
MONOCYTES # BLD AUTO: 0.54 X10*3/UL (ref 0.1–1)
MONOCYTES NFR BLD AUTO: 8.8 %
NEUTROPHILS # BLD AUTO: 3.81 X10*3/UL (ref 1.2–7.7)
NEUTROPHILS NFR BLD AUTO: 62.1 %
NRBC BLD-RTO: 0 /100 WBCS (ref 0–0)
P AXIS: 45 DEGREES
P OFFSET: 178 MS
P ONSET: 116 MS
PLATELET # BLD AUTO: 220 X10*3/UL (ref 150–450)
POTASSIUM SERPL-SCNC: 3.6 MMOL/L (ref 3.5–5.3)
PR INTERVAL: 204 MS
PROT SERPL-MCNC: 6.4 G/DL (ref 6.4–8.2)
Q ONSET: 218 MS
QRS COUNT: 11 BEATS
QRS DURATION: 88 MS
QT INTERVAL: 452 MS
QTC CALCULATION(BAZETT): 462 MS
QTC FREDERICIA: 459 MS
R AXIS: 58 DEGREES
RBC # BLD AUTO: 4.47 X10*6/UL (ref 4–5.2)
SODIUM SERPL-SCNC: 143 MMOL/L (ref 136–145)
T AXIS: -15 DEGREES
T OFFSET: 444 MS
TSH SERPL-ACNC: 1.94 MIU/L (ref 0.44–3.98)
VENTRICULAR RATE: 63 BPM
WBC # BLD AUTO: 6.1 X10*3/UL (ref 4.4–11.3)

## 2025-03-14 PROCEDURE — 29515 APPLICATION SHORT LEG SPLINT: CPT | Mod: RT

## 2025-03-14 PROCEDURE — 73610 X-RAY EXAM OF ANKLE: CPT | Mod: RIGHT SIDE | Performed by: RADIOLOGY

## 2025-03-14 PROCEDURE — 97165 OT EVAL LOW COMPLEX 30 MIN: CPT | Mod: GO | Performed by: OCCUPATIONAL THERAPIST

## 2025-03-14 PROCEDURE — 84443 ASSAY THYROID STIM HORMONE: CPT | Performed by: INTERNAL MEDICINE

## 2025-03-14 PROCEDURE — 96374 THER/PROPH/DIAG INJ IV PUSH: CPT

## 2025-03-14 PROCEDURE — 73610 X-RAY EXAM OF ANKLE: CPT | Mod: RT

## 2025-03-14 PROCEDURE — 80053 COMPREHEN METABOLIC PANEL: CPT

## 2025-03-14 PROCEDURE — 83735 ASSAY OF MAGNESIUM: CPT

## 2025-03-14 PROCEDURE — 2500000002 HC RX 250 W HCPCS SELF ADMINISTERED DRUGS (ALT 637 FOR MEDICARE OP, ALT 636 FOR OP/ED): Performed by: INTERNAL MEDICINE

## 2025-03-14 PROCEDURE — 94640 AIRWAY INHALATION TREATMENT: CPT

## 2025-03-14 PROCEDURE — 93005 ELECTROCARDIOGRAM TRACING: CPT

## 2025-03-14 PROCEDURE — 82947 ASSAY GLUCOSE BLOOD QUANT: CPT

## 2025-03-14 PROCEDURE — 1100000001 HC PRIVATE ROOM DAILY

## 2025-03-14 PROCEDURE — 70450 CT HEAD/BRAIN W/O DYE: CPT | Performed by: RADIOLOGY

## 2025-03-14 PROCEDURE — 72125 CT NECK SPINE W/O DYE: CPT | Performed by: RADIOLOGY

## 2025-03-14 PROCEDURE — 85025 COMPLETE CBC W/AUTO DIFF WBC: CPT

## 2025-03-14 PROCEDURE — 99285 EMERGENCY DEPT VISIT HI MDM: CPT | Mod: 25 | Performed by: EMERGENCY MEDICINE

## 2025-03-14 PROCEDURE — 2500000001 HC RX 250 WO HCPCS SELF ADMINISTERED DRUGS (ALT 637 FOR MEDICARE OP): Performed by: INTERNAL MEDICINE

## 2025-03-14 PROCEDURE — 73590 X-RAY EXAM OF LOWER LEG: CPT | Mod: RIGHT SIDE | Performed by: RADIOLOGY

## 2025-03-14 PROCEDURE — 71045 X-RAY EXAM CHEST 1 VIEW: CPT | Mod: FOREIGN READ | Performed by: RADIOLOGY

## 2025-03-14 PROCEDURE — 73564 X-RAY EXAM KNEE 4 OR MORE: CPT | Mod: RIGHT SIDE | Performed by: RADIOLOGY

## 2025-03-14 PROCEDURE — G0378 HOSPITAL OBSERVATION PER HR: HCPCS

## 2025-03-14 PROCEDURE — 73552 X-RAY EXAM OF FEMUR 2/>: CPT | Mod: RIGHT SIDE | Performed by: RADIOLOGY

## 2025-03-14 PROCEDURE — 2500000002 HC RX 250 W HCPCS SELF ADMINISTERED DRUGS (ALT 637 FOR MEDICARE OP, ALT 636 FOR OP/ED)

## 2025-03-14 PROCEDURE — 73552 X-RAY EXAM OF FEMUR 2/>: CPT | Mod: RT

## 2025-03-14 PROCEDURE — 73564 X-RAY EXAM KNEE 4 OR MORE: CPT | Mod: RT

## 2025-03-14 PROCEDURE — 73590 X-RAY EXAM OF LOWER LEG: CPT | Mod: RT

## 2025-03-14 PROCEDURE — 2500000004 HC RX 250 GENERAL PHARMACY W/ HCPCS (ALT 636 FOR OP/ED)

## 2025-03-14 PROCEDURE — 97162 PT EVAL MOD COMPLEX 30 MIN: CPT | Mod: GP

## 2025-03-14 PROCEDURE — 36415 COLL VENOUS BLD VENIPUNCTURE: CPT

## 2025-03-14 PROCEDURE — 72125 CT NECK SPINE W/O DYE: CPT

## 2025-03-14 PROCEDURE — 2500000004 HC RX 250 GENERAL PHARMACY W/ HCPCS (ALT 636 FOR OP/ED): Performed by: INTERNAL MEDICINE

## 2025-03-14 PROCEDURE — 2500000005 HC RX 250 GENERAL PHARMACY W/O HCPCS: Performed by: INTERNAL MEDICINE

## 2025-03-14 PROCEDURE — 70450 CT HEAD/BRAIN W/O DYE: CPT

## 2025-03-14 PROCEDURE — 71045 X-RAY EXAM CHEST 1 VIEW: CPT

## 2025-03-14 RX ORDER — DEXTROSE 50 % IN WATER (D50W) INTRAVENOUS SYRINGE
25
Status: DISCONTINUED | OUTPATIENT
Start: 2025-03-14 | End: 2025-03-17 | Stop reason: HOSPADM

## 2025-03-14 RX ORDER — OXCARBAZEPINE 300 MG/1
600 TABLET, FILM COATED ORAL EVERY MORNING
Status: DISCONTINUED | OUTPATIENT
Start: 2025-03-15 | End: 2025-03-17 | Stop reason: HOSPADM

## 2025-03-14 RX ORDER — OXYCODONE AND ACETAMINOPHEN 5; 325 MG/1; MG/1
1 TABLET ORAL EVERY 6 HOURS PRN
Status: DISCONTINUED | OUTPATIENT
Start: 2025-03-14 | End: 2025-03-17 | Stop reason: HOSPADM

## 2025-03-14 RX ORDER — DOCUSATE SODIUM 100 MG/1
100 CAPSULE, LIQUID FILLED ORAL 2 TIMES DAILY
Status: DISCONTINUED | OUTPATIENT
Start: 2025-03-14 | End: 2025-03-17 | Stop reason: HOSPADM

## 2025-03-14 RX ORDER — CARVEDILOL 25 MG/1
25 TABLET ORAL 2 TIMES DAILY
Status: DISCONTINUED | OUTPATIENT
Start: 2025-03-14 | End: 2025-03-17

## 2025-03-14 RX ORDER — ATORVASTATIN CALCIUM 20 MG/1
20 TABLET, FILM COATED ORAL NIGHTLY
Status: DISCONTINUED | OUTPATIENT
Start: 2025-03-14 | End: 2025-03-17 | Stop reason: HOSPADM

## 2025-03-14 RX ORDER — LEVOTHYROXINE SODIUM 100 UG/1
100 TABLET ORAL
Status: DISCONTINUED | OUTPATIENT
Start: 2025-03-15 | End: 2025-03-17 | Stop reason: HOSPADM

## 2025-03-14 RX ORDER — IPRATROPIUM BROMIDE AND ALBUTEROL SULFATE 2.5; .5 MG/3ML; MG/3ML
3 SOLUTION RESPIRATORY (INHALATION) EVERY 2 HOUR PRN
Status: DISCONTINUED | OUTPATIENT
Start: 2025-03-14 | End: 2025-03-17 | Stop reason: HOSPADM

## 2025-03-14 RX ORDER — PRAZOSIN HYDROCHLORIDE 1 MG/1
5 CAPSULE ORAL NIGHTLY
Status: DISCONTINUED | OUTPATIENT
Start: 2025-03-14 | End: 2025-03-17

## 2025-03-14 RX ORDER — DEXTROSE 50 % IN WATER (D50W) INTRAVENOUS SYRINGE
12.5
Status: DISCONTINUED | OUTPATIENT
Start: 2025-03-14 | End: 2025-03-17 | Stop reason: HOSPADM

## 2025-03-14 RX ORDER — BUMETANIDE 1 MG/1
2 TABLET ORAL 2 TIMES DAILY
Status: DISCONTINUED | OUTPATIENT
Start: 2025-03-14 | End: 2025-03-17 | Stop reason: HOSPADM

## 2025-03-14 RX ORDER — TOPIRAMATE 100 MG/1
200 TABLET, FILM COATED ORAL 2 TIMES DAILY
Status: DISCONTINUED | OUTPATIENT
Start: 2025-03-14 | End: 2025-03-17 | Stop reason: HOSPADM

## 2025-03-14 RX ORDER — FLUTICASONE FUROATE AND VILANTEROL 100; 25 UG/1; UG/1
1 POWDER RESPIRATORY (INHALATION)
Status: DISCONTINUED | OUTPATIENT
Start: 2025-03-15 | End: 2025-03-14

## 2025-03-14 RX ORDER — OXCARBAZEPINE 300 MG/1
1200 TABLET, FILM COATED ORAL NIGHTLY
Status: DISCONTINUED | OUTPATIENT
Start: 2025-03-14 | End: 2025-03-17 | Stop reason: HOSPADM

## 2025-03-14 RX ORDER — LOSARTAN POTASSIUM 50 MG/1
50 TABLET ORAL DAILY
Status: DISCONTINUED | OUTPATIENT
Start: 2025-03-14 | End: 2025-03-17

## 2025-03-14 RX ORDER — ENOXAPARIN SODIUM 100 MG/ML
40 INJECTION SUBCUTANEOUS EVERY 24 HOURS
Status: DISCONTINUED | OUTPATIENT
Start: 2025-03-14 | End: 2025-03-17 | Stop reason: HOSPADM

## 2025-03-14 RX ORDER — AMPICILLIN TRIHYDRATE 500 MG
1 CAPSULE ORAL
COMMUNITY
Start: 2025-03-12

## 2025-03-14 RX ORDER — DICLOFENAC SODIUM 10 MG/G
4 GEL TOPICAL 3 TIMES DAILY PRN
Status: DISCONTINUED | OUTPATIENT
Start: 2025-03-14 | End: 2025-03-17 | Stop reason: HOSPADM

## 2025-03-14 RX ORDER — ESTRADIOL 0.1 MG/G
2 CREAM VAGINAL 3 TIMES WEEKLY
Status: DISCONTINUED | OUTPATIENT
Start: 2025-03-17 | End: 2025-03-17 | Stop reason: HOSPADM

## 2025-03-14 RX ORDER — POTASSIUM CHLORIDE 20 MEQ/1
40 TABLET, EXTENDED RELEASE ORAL
Status: DISCONTINUED | OUTPATIENT
Start: 2025-03-15 | End: 2025-03-17 | Stop reason: HOSPADM

## 2025-03-14 RX ORDER — ACETAMINOPHEN 160 MG/5ML
650 SOLUTION ORAL EVERY 4 HOURS PRN
Status: DISCONTINUED | OUTPATIENT
Start: 2025-03-14 | End: 2025-03-17 | Stop reason: HOSPADM

## 2025-03-14 RX ORDER — SERTRALINE HYDROCHLORIDE 50 MG/1
250 TABLET, FILM COATED ORAL DAILY
Status: DISCONTINUED | OUTPATIENT
Start: 2025-03-15 | End: 2025-03-17 | Stop reason: HOSPADM

## 2025-03-14 RX ORDER — CLONAZEPAM 1 MG/1
1 TABLET ORAL 2 TIMES DAILY
Status: DISCONTINUED | OUTPATIENT
Start: 2025-03-14 | End: 2025-03-17 | Stop reason: HOSPADM

## 2025-03-14 RX ORDER — IPRATROPIUM BROMIDE AND ALBUTEROL SULFATE 2.5; .5 MG/3ML; MG/3ML
3 SOLUTION RESPIRATORY (INHALATION) EVERY 4 HOURS PRN
Status: DISCONTINUED | OUTPATIENT
Start: 2025-03-14 | End: 2025-03-14

## 2025-03-14 RX ORDER — CARBIDOPA AND LEVODOPA 25; 100 MG/1; MG/1
1.5 TABLET ORAL 3 TIMES DAILY
Status: DISCONTINUED | OUTPATIENT
Start: 2025-03-14 | End: 2025-03-17 | Stop reason: HOSPADM

## 2025-03-14 RX ORDER — ASCORBIC ACID 500 MG
1 TABLET ORAL
COMMUNITY
Start: 2025-03-01

## 2025-03-14 RX ORDER — ACETAMINOPHEN 650 MG/1
650 SUPPOSITORY RECTAL EVERY 4 HOURS PRN
Status: DISCONTINUED | OUTPATIENT
Start: 2025-03-14 | End: 2025-03-17 | Stop reason: HOSPADM

## 2025-03-14 RX ORDER — BUDESONIDE 0.5 MG/2ML
0.5 INHALANT ORAL
Status: DISCONTINUED | OUTPATIENT
Start: 2025-03-14 | End: 2025-03-17 | Stop reason: HOSPADM

## 2025-03-14 RX ORDER — BACLOFEN 10 MG/1
20 TABLET ORAL 3 TIMES DAILY
Status: DISCONTINUED | OUTPATIENT
Start: 2025-03-14 | End: 2025-03-16

## 2025-03-14 RX ORDER — AMMONIUM LACTATE 12 G/100G
1 LOTION TOPICAL AS NEEDED
Status: DISCONTINUED | OUTPATIENT
Start: 2025-03-14 | End: 2025-03-17 | Stop reason: HOSPADM

## 2025-03-14 RX ORDER — ACETAMINOPHEN 325 MG/1
650 TABLET ORAL EVERY 4 HOURS PRN
Status: DISCONTINUED | OUTPATIENT
Start: 2025-03-14 | End: 2025-03-17 | Stop reason: HOSPADM

## 2025-03-14 RX ORDER — CYCLOBENZAPRINE HCL 10 MG
10 TABLET ORAL NIGHTLY PRN
Status: DISCONTINUED | OUTPATIENT
Start: 2025-03-14 | End: 2025-03-14

## 2025-03-14 RX ORDER — IPRATROPIUM BROMIDE AND ALBUTEROL SULFATE 2.5; .5 MG/3ML; MG/3ML
3 SOLUTION RESPIRATORY (INHALATION)
Status: DISCONTINUED | OUTPATIENT
Start: 2025-03-14 | End: 2025-03-17 | Stop reason: HOSPADM

## 2025-03-14 RX ORDER — ASPIRIN 81 MG/1
81 TABLET ORAL DAILY
Status: DISCONTINUED | OUTPATIENT
Start: 2025-03-14 | End: 2025-03-17 | Stop reason: HOSPADM

## 2025-03-14 RX ORDER — CYCLOBENZAPRINE HCL 10 MG
10 TABLET ORAL NIGHTLY PRN
COMMUNITY
Start: 2025-03-12

## 2025-03-14 RX ORDER — IPRATROPIUM BROMIDE AND ALBUTEROL SULFATE 2.5; .5 MG/3ML; MG/3ML
3 SOLUTION RESPIRATORY (INHALATION) ONCE
Status: COMPLETED | OUTPATIENT
Start: 2025-03-14 | End: 2025-03-14

## 2025-03-14 RX ORDER — FLUTICASONE PROPIONATE 50 MCG
1 SPRAY, SUSPENSION (ML) NASAL DAILY PRN
Status: DISCONTINUED | OUTPATIENT
Start: 2025-03-14 | End: 2025-03-17 | Stop reason: HOSPADM

## 2025-03-14 RX ORDER — BUSPIRONE HYDROCHLORIDE 10 MG/1
1 TABLET ORAL
Status: ON HOLD | COMMUNITY
Start: 2025-03-12 | End: 2025-03-17

## 2025-03-14 RX ORDER — LEVOTHYROXINE SODIUM 100 UG/1
200 TABLET ORAL
Status: DISCONTINUED | OUTPATIENT
Start: 2025-03-17 | End: 2025-03-17 | Stop reason: HOSPADM

## 2025-03-14 RX ORDER — METFORMIN HYDROCHLORIDE 500 MG/1
500 TABLET ORAL
Status: DISCONTINUED | OUTPATIENT
Start: 2025-03-15 | End: 2025-03-17 | Stop reason: HOSPADM

## 2025-03-14 RX ORDER — INSULIN LISPRO 100 [IU]/ML
0-5 INJECTION, SOLUTION INTRAVENOUS; SUBCUTANEOUS
Status: DISCONTINUED | OUTPATIENT
Start: 2025-03-15 | End: 2025-03-17 | Stop reason: HOSPADM

## 2025-03-14 RX ORDER — GABAPENTIN 400 MG/1
1200 CAPSULE ORAL 3 TIMES DAILY
Status: DISCONTINUED | OUTPATIENT
Start: 2025-03-14 | End: 2025-03-17 | Stop reason: HOSPADM

## 2025-03-14 RX ORDER — MORPHINE SULFATE 4 MG/ML
4 INJECTION, SOLUTION INTRAMUSCULAR; INTRAVENOUS ONCE
Status: COMPLETED | OUTPATIENT
Start: 2025-03-14 | End: 2025-03-14

## 2025-03-14 RX ORDER — SERTRALINE HYDROCHLORIDE 50 MG/1
200 TABLET, FILM COATED ORAL DAILY
Status: DISCONTINUED | OUTPATIENT
Start: 2025-03-14 | End: 2025-03-14

## 2025-03-14 RX ORDER — LIDOCAINE 560 MG/1
1 PATCH PERCUTANEOUS; TOPICAL; TRANSDERMAL DAILY
Status: DISCONTINUED | OUTPATIENT
Start: 2025-03-14 | End: 2025-03-17 | Stop reason: HOSPADM

## 2025-03-14 RX ORDER — POTASSIUM CHLORIDE 1500 MG/1
40 TABLET, EXTENDED RELEASE ORAL
COMMUNITY
Start: 2025-03-01 | End: 2025-03-17 | Stop reason: HOSPADM

## 2025-03-14 RX ORDER — PANTOPRAZOLE SODIUM 40 MG/1
40 TABLET, DELAYED RELEASE ORAL
Status: DISCONTINUED | OUTPATIENT
Start: 2025-03-15 | End: 2025-03-17 | Stop reason: HOSPADM

## 2025-03-14 RX ORDER — ONDANSETRON HYDROCHLORIDE 2 MG/ML
4 INJECTION, SOLUTION INTRAVENOUS EVERY 8 HOURS PRN
Status: DISCONTINUED | OUTPATIENT
Start: 2025-03-14 | End: 2025-03-17 | Stop reason: HOSPADM

## 2025-03-14 RX ORDER — BUSPIRONE HYDROCHLORIDE 10 MG/1
10 TABLET ORAL
Status: DISCONTINUED | OUTPATIENT
Start: 2025-03-14 | End: 2025-03-15

## 2025-03-14 RX ORDER — POLYETHYLENE GLYCOL 3350 17 G/17G
17 POWDER, FOR SOLUTION ORAL DAILY
Status: DISCONTINUED | OUTPATIENT
Start: 2025-03-14 | End: 2025-03-17 | Stop reason: HOSPADM

## 2025-03-14 RX ORDER — ONDANSETRON 4 MG/1
4 TABLET, FILM COATED ORAL EVERY 8 HOURS PRN
Status: DISCONTINUED | OUTPATIENT
Start: 2025-03-14 | End: 2025-03-17 | Stop reason: HOSPADM

## 2025-03-14 RX ADMIN — SERTRALINE 200 MG: 50 TABLET, FILM COATED ORAL at 20:06

## 2025-03-14 RX ADMIN — PRAZOSIN HYDROCHLORIDE 5 MG: 1 CAPSULE ORAL at 20:06

## 2025-03-14 RX ADMIN — QUETIAPINE FUMARATE 800 MG: 300 TABLET ORAL at 20:06

## 2025-03-14 RX ADMIN — BUSPIRONE HYDROCHLORIDE 10 MG: 10 TABLET ORAL at 20:06

## 2025-03-14 RX ADMIN — BUDESONIDE 0.5 MG: 0.5 INHALANT RESPIRATORY (INHALATION) at 19:44

## 2025-03-14 RX ADMIN — ATORVASTATIN CALCIUM 20 MG: 20 TABLET, FILM COATED ORAL at 20:06

## 2025-03-14 RX ADMIN — DOCUSATE SODIUM 100 MG: 100 CAPSULE, LIQUID FILLED ORAL at 20:08

## 2025-03-14 RX ADMIN — ENOXAPARIN SODIUM 40 MG: 100 INJECTION SUBCUTANEOUS at 20:06

## 2025-03-14 RX ADMIN — BACLOFEN 20 MG: 10 TABLET ORAL at 20:06

## 2025-03-14 RX ADMIN — BUMETANIDE 2 MG: 1 TABLET ORAL at 20:06

## 2025-03-14 RX ADMIN — IPRATROPIUM BROMIDE AND ALBUTEROL SULFATE 3 ML: 2.5; .5 SOLUTION RESPIRATORY (INHALATION) at 19:44

## 2025-03-14 RX ADMIN — CARVEDILOL 25 MG: 25 TABLET, FILM COATED ORAL at 20:06

## 2025-03-14 RX ADMIN — GABAPENTIN 1200 MG: 400 CAPSULE ORAL at 20:07

## 2025-03-14 RX ADMIN — IPRATROPIUM BROMIDE AND ALBUTEROL SULFATE 3 ML: 2.5; .5 SOLUTION RESPIRATORY (INHALATION) at 11:32

## 2025-03-14 RX ADMIN — CARBIDOPA AND LEVODOPA 1.5 TABLET: 25; 100 TABLET ORAL at 20:07

## 2025-03-14 RX ADMIN — OXCARBAZEPINE 1200 MG: 300 TABLET, FILM COATED ORAL at 20:07

## 2025-03-14 RX ADMIN — POLYETHYLENE GLYCOL 3350 17 G: 17 POWDER, FOR SOLUTION ORAL at 20:07

## 2025-03-14 RX ADMIN — LOSARTAN POTASSIUM 50 MG: 50 TABLET, FILM COATED ORAL at 20:06

## 2025-03-14 RX ADMIN — ASPIRIN 81 MG: 81 TABLET, COATED ORAL at 20:07

## 2025-03-14 RX ADMIN — MORPHINE SULFATE 4 MG: 4 INJECTION, SOLUTION INTRAMUSCULAR; INTRAVENOUS at 11:31

## 2025-03-14 RX ADMIN — CLONAZEPAM 1 MG: 1 TABLET ORAL at 20:08

## 2025-03-14 RX ADMIN — LIDOCAINE 4% 1 PATCH: 40 PATCH TOPICAL at 20:07

## 2025-03-14 RX ADMIN — TOPIRAMATE 200 MG: 100 TABLET, FILM COATED ORAL at 20:07

## 2025-03-14 SDOH — SOCIAL STABILITY: SOCIAL INSECURITY: HAVE YOU HAD THOUGHTS OF HARMING ANYONE ELSE?: NO

## 2025-03-14 SDOH — SOCIAL STABILITY: SOCIAL INSECURITY: HAVE YOU HAD ANY THOUGHTS OF HARMING ANYONE ELSE?: NO

## 2025-03-14 SDOH — SOCIAL STABILITY: SOCIAL INSECURITY: ARE YOU OR HAVE YOU BEEN THREATENED OR ABUSED PHYSICALLY, EMOTIONALLY, OR SEXUALLY BY ANYONE?: NO

## 2025-03-14 SDOH — SOCIAL STABILITY: SOCIAL INSECURITY: ARE THERE ANY APPARENT SIGNS OF INJURIES/BEHAVIORS THAT COULD BE RELATED TO ABUSE/NEGLECT?: NO

## 2025-03-14 SDOH — SOCIAL STABILITY: SOCIAL INSECURITY: DO YOU FEEL ANYONE HAS EXPLOITED OR TAKEN ADVANTAGE OF YOU FINANCIALLY OR OF YOUR PERSONAL PROPERTY?: NO

## 2025-03-14 SDOH — SOCIAL STABILITY: SOCIAL INSECURITY: ABUSE: ADULT

## 2025-03-14 SDOH — SOCIAL STABILITY: SOCIAL INSECURITY: HAS ANYONE EVER THREATENED TO HURT YOUR FAMILY OR YOUR PETS?: NO

## 2025-03-14 SDOH — SOCIAL STABILITY: SOCIAL INSECURITY: DOES ANYONE TRY TO KEEP YOU FROM HAVING/CONTACTING OTHER FRIENDS OR DOING THINGS OUTSIDE YOUR HOME?: NO

## 2025-03-14 SDOH — SOCIAL STABILITY: SOCIAL INSECURITY: DO YOU FEEL UNSAFE GOING BACK TO THE PLACE WHERE YOU ARE LIVING?: NO

## 2025-03-14 ASSESSMENT — ACTIVITIES OF DAILY LIVING (ADL)
BATHING: INDEPENDENT
GROOMING: NEEDS ASSISTANCE
ASSISTIVE_DEVICE: WALKER
FEEDING YOURSELF: NEEDS ASSISTANCE
TOILETING: INDEPENDENT
PATIENT'S MEMORY ADEQUATE TO SAFELY COMPLETE DAILY ACTIVITIES?: YES
ADL_ASSISTANCE: NEEDS ASSISTANCE
LACK_OF_TRANSPORTATION: NO
PATIENT'S MEMORY ADEQUATE TO SAFELY COMPLETE DAILY ACTIVITIES?: YES
FEEDING YOURSELF: NEEDS ASSISTANCE
DRESSING YOURSELF: NEEDS ASSISTANCE
WALKS IN HOME: NEEDS ASSISTANCE
ADEQUATE_TO_COMPLETE_ADL: YES
LACK_OF_TRANSPORTATION: NO
JUDGMENT_ADEQUATE_SAFELY_COMPLETE_DAILY_ACTIVITIES: YES
HEARING - RIGHT EAR: FUNCTIONAL
GROOMING: INDEPENDENT
DRESSING YOURSELF: INDEPENDENT
LACK_OF_TRANSPORTATION: NO
BATHING: NEEDS ASSISTANCE
HEARING - LEFT EAR: FUNCTIONAL
HEARING - RIGHT EAR: FUNCTIONAL
JUDGMENT_ADEQUATE_SAFELY_COMPLETE_DAILY_ACTIVITIES: YES
HEARING - LEFT EAR: FUNCTIONAL
TOILETING: NEEDS ASSISTANCE
ADL_ASSISTANCE: NEEDS ASSISTANCE

## 2025-03-14 ASSESSMENT — LIFESTYLE VARIABLES
HOW OFTEN DO YOU HAVE 6 OR MORE DRINKS ON ONE OCCASION: NEVER
HOW OFTEN DO YOU HAVE A DRINK CONTAINING ALCOHOL: NEVER
AUDIT-C TOTAL SCORE: 0
AUDIT-C TOTAL SCORE: 0
HOW MANY STANDARD DRINKS CONTAINING ALCOHOL DO YOU HAVE ON A TYPICAL DAY: PATIENT DOES NOT DRINK
SKIP TO QUESTIONS 9-10: 1

## 2025-03-14 ASSESSMENT — COGNITIVE AND FUNCTIONAL STATUS - GENERAL
TURNING FROM BACK TO SIDE WHILE IN FLAT BAD: A LOT
DRESSING REGULAR UPPER BODY CLOTHING: A LOT
DRESSING REGULAR LOWER BODY CLOTHING: TOTAL
TOILETING: TOTAL
DRESSING REGULAR UPPER BODY CLOTHING: A LITTLE
PATIENT BASELINE BEDBOUND: NO
MOVING FROM LYING ON BACK TO SITTING ON SIDE OF FLAT BED WITH BEDRAILS: A LOT
STANDING UP FROM CHAIR USING ARMS: A LOT
DAILY ACTIVITIY SCORE: 18
MOVING TO AND FROM BED TO CHAIR: A LITTLE
CLIMB 3 TO 5 STEPS WITH RAILING: TOTAL
WALKING IN HOSPITAL ROOM: A LITTLE
WALKING IN HOSPITAL ROOM: TOTAL
MOBILITY SCORE: 18
DAILY ACTIVITIY SCORE: 11
STANDING UP FROM CHAIR USING ARMS: A LITTLE
HELP NEEDED FOR BATHING: A LITTLE
MOBILITY SCORE: 10
PERSONAL GROOMING: A LITTLE
HELP NEEDED FOR BATHING: A LOT
CLIMB 3 TO 5 STEPS WITH RAILING: A LITTLE
DRESSING REGULAR LOWER BODY CLOTHING: A LITTLE
TURNING FROM BACK TO SIDE WHILE IN FLAT BAD: A LITTLE
PERSONAL GROOMING: A LOT
EATING MEALS: A LITTLE
MOVING FROM LYING ON BACK TO SITTING ON SIDE OF FLAT BED WITH BEDRAILS: A LITTLE
TOILETING: A LITTLE
MOVING TO AND FROM BED TO CHAIR: A LOT
EATING MEALS: A LITTLE

## 2025-03-14 ASSESSMENT — PAIN SCALES - GENERAL
PAINLEVEL_OUTOF10: 7
PAINLEVEL_OUTOF10: 10 - WORST POSSIBLE PAIN
PAINLEVEL_OUTOF10: 7

## 2025-03-14 ASSESSMENT — PAIN - FUNCTIONAL ASSESSMENT
PAIN_FUNCTIONAL_ASSESSMENT: 0-10

## 2025-03-14 ASSESSMENT — PAIN DESCRIPTION - ORIENTATION: ORIENTATION: RIGHT

## 2025-03-14 ASSESSMENT — PAIN DESCRIPTION - LOCATION: LOCATION: LEG

## 2025-03-14 NOTE — PROGRESS NOTES
Occupational Therapy    Evaluation    Patient Name: Prema Hair  MRN: [id number]  Department: [place] [address]  Room: [address] [place] [address]  Today's Date: 3/14/2025  Time Calculation  Start Time: 1358  Stop Time: 1419  Time Calculation (min): 21 min        Assessment:  OT Assessment: Pt presents to OT this date with new R LE NWB restriction and demos decreased balance, coordination, strength, endurance and cognition/safety awareness resulting in decreased safety and independence with ADLs and functional transfers/mobility. Pt requires skilled OT services to address above deficits to safely return to OF.  Prognosis: Good  Barriers to Discharge Home: Caregiver assistance, Cognition needs, Physical needs  Caregiver Assistance: Patient lives alone and/or does not have reliable caregiver assistance  Cognition Needs: 24hr supervision for safety awareness needed, Recollection or understanding of precautions/restrictions limited, Insight of patient limited regarding functional ability/needs, Cognition-related high falls risk  Physical Needs: 24hr mobility assistance needed, 24hr ADL assistance needed, Ambulating household distances limited by function/safety, High falls risk due to function or environment  Evaluation/Treatment Tolerance: Patient limited by fatigue, Patient limited by pain  Medical Staff Made Aware: Yes  End of Session Communication: Bedside nurse, Care Coordinator  End of Session Patient Position: On cart (in ED, x2 rails up, daughters present)  OT Assessment Results: Decreased ADL status, Decreased upper extremity range of motion, Decreased upper extremity strength, Decreased safe judgment during ADL, Decreased cognition, Decreased endurance, Decreased functional mobility, Decreased sensation, Decreased trunk control for functional activities, Decreased gross motor control  Prognosis: Good  Evaluation/Treatment Tolerance: Patient limited by fatigue, Patient limited by pain  Medical Staff Made Aware: Yes  Strengths: Rehab  experience, Support of Caregivers, Housing layout  Barriers to Participation: Comorbidities, Insight into problems  Plan:  Treatment Interventions: ADL retraining, Functional transfer training, UE strengthening/ROM, Endurance training, Cognitive reorientation, Patient/family training, Equipment evaluation/education, Compensatory technique education, Neuromuscular reeducation  OT Frequency: 3 times per week  OT Discharge Recommendations: Moderate intensity level of continued care  Equipment Recommended upon Discharge:  (TBD)  OT Recommended Transfer Status: Assist of 2  OT - OK to Discharge: Yes (OT POC established this date)  Treatment Interventions: ADL retraining, Functional transfer training, UE strengthening/ROM, Endurance training, Cognitive reorientation, Patient/family training, Equipment evaluation/education, Compensatory technique education, Neuromuscular reeducation    Subjective     General:  General  Reason for Referral: Pt is a 59 yo female s/p fall and found to have new spiral proximal fibula fracture and a minimally displaced oblique distal fibula fracture. RLE now in short leg splint and NWB precautions. Pt with recent history of R TKA in 2024. Relevant PMHx of Parkinson's Disease and fibromyalgia.  Referred By: Vania Klein MD  Past Medical History Relevant to Rehab:   Past Medical History:   Diagnosis Date    CATALINA positive     Arthritis     Asthma     Bell's palsy     x 2    Bilateral leg edema     Bipolar disorder     CHF (congestive heart failure)     Chronic allergic rhinitis     Chronic constipation     Diabetes mellitus (Multi)     Diabetic neuropathy (Multi)     Dysphonia 07/05/2022    Muscle tension dysphonia    Fibromyalgia     GERD (gastroesophageal reflux disease)     under control with medication    High pulmonary arterial pressure (Multi)     moderately elevated on ECHO 10.5.23    Hyperlipidemia, unspecified 01/03/2023    Dyslipidemia    Hypertension     Hypokalemia     1.1.24- K  3.8 - on oral supplements    Hypothyroidism     Low back pain, unspecified 02/22/2021    Low back pain    Moderate tricuspid regurgitation     Obstructive sleep apnea (adult) (pediatric) 01/03/2023    ZEINA on CPAP    Paralysis of vocal cords and larynx, 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    Laryngeal paresis    Parkinsonism (Multi)     Pseudoaneurysm of carotid artery 2020    s/p right pipeline and coil embolization of Right ICA pseudoaneurysm    PTSD (post-traumatic stress disorder)     Thyroid cancer (Multi)     s/p total thyroidectomy, residual tissue on left side - FNA benign 2020 and 2021    Torticollis     botox injections    Vitamin D deficiency       Family/Caregiver Present: Yes  Caregiver Feedback: pt's x2 daughters present and receptive  Co-Treatment: PT  Co-Treatment Reason: to maximize safety and participation with skilled intervention  Prior to Session Communication: Bedside nurse  Patient Position Received: Bed, 3 rail up, Alarm on  Preferred Learning Style: auditory, verbal, visual  General Comment: Pt supine in bed upon arrival and agreeable to OT eval. Pt lethargic, decreased alertness, however agreeable to participate.  Precautions:  LE Weight Bearing Status: Right Non-Weight Bearing  Medical Precautions: Fall precautions  Splinting: R LE in SLS            Pain:  Pain Assessment  Pain Assessment: 0-10  0-10 (Numeric) Pain Score: 10 - Worst possible pain  Pain Type: Acute pain  Pain Location: Leg  Pain Orientation: Right  Pain Interventions: Repositioned, Elevated  Response to Interventions: Resting quietly    Objective   Cognition:  Overall Cognitive Status: Impaired (mild confusion noted, pt very lethargic)  Orientation Level: Oriented X4  Attention:  (lethargic)  Memory: Within Funtional Limits  Insight: Mild  Impulsive: Mildly  Processing Speed: Delayed           Home Living:  Type of Home: Apartment  Lives With: Alone (daughters work together to provide daily assist)  Home Adaptive Equipment: Walker  rolling or standard, Reacher, Sock aid, Long-handled shoehorn, Long-handled sponge (rollator, transport wc, SBQC)  Home Layout: One level  Home Access: Level entry, No concerns  Bathroom Shower/Tub: Tub/shower unit  Bathroom Toilet: Handicapped height  Bathroom Equipment: Grab bars in shower, Shower chair with back, Grab bars around toilet  Prior Function:  Level of [place]: Needs assistance with ADLs, Needs assistance with homemaking  Receives Help From: Family (dtrs assist daily)  ADL Assistance: Needs assistance (pt reports assist with dressing and bathing. Pt reports can do own toileting however often has SBA for safety)  Homemaking Assistance: Needs assistance (Daughters do all cooking, cleaning and laundry)  Ambulatory Assistance: Independent (using rollator primarily)  Prior Function Comments: (-) driving. Daughters report pt has fallen between 5-10x in last 3 months (primarily when attempting to stand from bed)       ADL:  UE Dressing Assistance: Maximal  UE Dressing Deficit:  (to don hospital gown)  LE Dressing Assistance: Total  LE Dressing Deficit: Don/doff L sock  Toileting Assistance with Device: Total  Toileting Deficit:  (total A for dee dee hygiene and brief management in standing)  Activity Tolerance:  Endurance: Tolerates 10 - 20 min exercise with multiple rests  Bed Mobility/Transfers: Bed Mobility  Bed Mobility: Yes  Bed Mobility 1  Bed Mobility 1: Supine to sitting  Level of Assistance 1: Maximum assistance, Maximum verbal cues  Bed Mobility Comments 1: assist at trunk and R LE, cues for sequencing and body mechanics, HOB elevated  Bed Mobility 2  Bed Mobility  2: Sitting to supine  Level of Assistance 2: Moderate assistance, +2, Moderate verbal cues  Bed Mobility Comments 2: assist at trunk and B LEs    Transfers  Transfer: Yes  Transfer 1  Technique 1: Sit to stand, Stand to sit  Transfer Device 1: Gait belt, Walker  Transfer Level of Assistance 1: Moderate assistance, +2, Moderate verbal  cues, Minimal tactile cues  Trials/Comments 1: from elevated EOB, cues for sequencing, safe hand placement, R LE Position to adhere to NWB and walker safety, PT's foot positioned under R LE to ensure NWB maintained during transfer         Sitting Balance:  Static Sitting Balance  Static Sitting-Balance Support: Bilateral upper extremity supported  Static Sitting-Level of Assistance: Close supervision, Contact guard  Static Sitting-Comment/Number of Minutes: at EOB  Dynamic Sitting Balance  Dynamic Sitting-Comments: CGA-Min A at EOB  Standing Balance:  Static Standing Balance  Static Standing-Balance Support: Bilateral upper extremity supported  Static Standing-Level of Assistance: Minimum assistance (x1-2 assist)  Static Standing-Comment/Number of Minutes: using SW, cues to adhere to R LW NWB, pt stands for </=1.5 minute duration in order for toileting tasks to be completed     Sensation:  Light Touch: Partial deficits in the RLE (Pt reports mild numbness in RLE in area of fracture)  Strength:  Strength Comments: B shoulders grossly 3-/5, distally 3/5 based on function       Coordination:  Movements are Fluid and Coordinated: No  Coordination Comment: Bradykinesia noted with all movements        Extremities: RUE   RUE : Exceptions to WFL (R shoulder flexion ~90 degrees AROM, distally WFL) and LUE   LUE: Exceptions to WFL (R shoulder flexion ~90 degrees AROM, distally WFL)        Outcome Measures:[facility] Daily Activity  Putting on and taking off regular lower body clothing: Total  Bathing (including washing, rinsing, drying): A lot  Putting on and taking off regular upper body clothing: A lot  Toileting, which includes using toilet, bedpan or urinal: Total  Taking care of personal grooming such as brushing teeth: A lot  Eating Meals: A little  Daily Activity - Total Score: 11        Education Documentation  Body Mechanics, taught by Emperatriz Khan OT at 3/14/2025  4:17 PM.  Learner: Patient  Readiness:  Acceptance  Method: Explanation  Response: Needs Reinforcement    Precautions, taught by Emperatriz Khan OT at 3/14/2025  4:17 PM.  Learner: Patient  Readiness: Acceptance  Method: Explanation  Response: Needs Reinforcement    ADL Training, taught by Emperatriz Khan OT at 3/14/2025  4:17 PM.  Learner: Patient  Readiness: Acceptance  Method: Explanation  Response: Needs Reinforcement    Education Comments  No comments found.               Goals:  Encounter Problems       Encounter Problems (Active)       ADLs       Patient will perform UB and LB sponge bathing with minimal assist  level of assistance and long-handled sponge.       Start:  03/14/25    Expected End:  03/28/25            Patient with complete upper body dressing with stand by assist level of assistance donning and doffing all UE clothes with PRN adaptive equipment.       Start:  03/14/25    Expected End:  03/28/25            Patient with complete lower body dressing with minimal assist  level of assistance donning and doffing all LE clothes  with PRN adaptive equipment.       Start:  03/14/25    Expected End:  03/28/25            Patient will complete daily grooming tasks with set-up level of assistance and PRN adaptive equipment.       Start:  03/14/25    Expected End:  03/28/25            Patient will complete toileting including hygiene clothing management/hygiene with minimal assist  level of assistance and bedside commode.       Start:  03/14/25    Expected End:  03/28/25               BALANCE       Patient will tolerate standing for >/= 4 minutes to stand by assist level of assistance while adhering to R LE NWB restriction with least restrictive device in order to improve functional activity tolerance for ADL tasks.       Start:  03/14/25    Expected End:  03/28/25               TRANSFERS       Patient will perform bed mobility minimal assist  level of assistance in order to improve safety and independence with mobility       Start:  03/14/25     Expected End:  03/28/25            Patient will complete functional transfers with least restrictive device with minimal assist  level of assistance while adhering to R LE NWB restriction.       Start:  03/14/25    Expected End:  03/28/25

## 2025-03-14 NOTE — PROGRESS NOTES
Has canes, walkers, rollator & WC @ home.      03/14/25 1234   [facility] Disability Status   Are you deaf or do you have serious difficulty hearing? N   Are you blind or do you have serious difficulty seeing, even when wearing glasses? N   Because of a physical, mental, or emotional condition, do you have serious difficulty concentrating, remembering, or making decisions? (5 years old or older) N   Do you have serious difficulty walking or climbing stairs? Y   Do you have serious difficulty dressing or bathing? Y   Because of a physical, mental, or emotional condition, do you have serious difficulty doing errands alone such as visiting the doctor? Y

## 2025-03-14 NOTE — PROGRESS NOTES
Transitional Care Coordination Progress Note:  Plan per Medical/Surgical team: treatment of fall with fibula fracture   Status: Observation  Payor source: Katie godfrey  Discharge disposition: Home alone, ?new HHC vs SNF   Family requested referral sent to  Acute rehab, referral submitted but likelihood of acceptance & insurance approval low.  Discussed purpose & benefits of SNF. Patient provided choice list for new snf.   Patient states prefers [facility] with [facility] if therapy eval goes well.   Potential Barriers: awaiting PT/OT evals  ADOD: 3/15/2025   LALIT Knight RN, BSN Transitional Care Coordinator ED# [phone]     @ 1420 PT/OT rec MOD. Approached patient & 2 daughters @ bedside regarding SNF placement. Daughters still reviewing list options.     @ 1520 Awaiting completion of therapy notes.   acute rehab reviewing.   SNF referrals sent to #1 [facility], #2 [facility], #3 [facility] & #4 [facility].      03/14/25 1234   Discharge Planning   Living Arrangements Alone   Support Systems Children   Assistance Needed PT/OT evals pending, ?new SNF, will need insurance precert   Type of Residence Private residence   Number of Stairs to Enter Residence 0   Number of Stairs Within Residence 0   Home or Post Acute Services In home services   Type of Post Acute Facility Services  --    Type of Home Care Services Home nursing visits;Home OT;Home PT   Expected Discharge Disposition Home H   Does the patient need discharge transport arranged? Yes   RoundTrip coordination needed? Yes   Has discharge transport been arranged? No   Financial Resource Strain   How hard is it for you to pay for the very basics like food, housing, medical care, and heating? Not hard   Housing Stability   In the last 12 months, was there a time when you were not able to pay the mortgage or rent on time? N   In the past 12 months, how many times have you moved where you were living? 1   At any time in the past 12 months, were you  homeless or living in a shelter (including now)? N   Transportation Needs   In the past 12 months, has lack of transportation kept you from medical appointments or from getting medications? no   In the past 12 months, has lack of transportation kept you from meetings, work, or from getting things needed for daily living? No   Stroke Family Assessment   Stroke Family Assessment Needed No   Intensity of Service   Intensity of Service 0-30 min

## 2025-03-14 NOTE — ED TRIAGE NOTES
"Patient to ED for c/o Fall. Patient reports her knee \"locking\" up while getting out of bed this morning, which caused her to fall. Patient reports hitting head but unsure if LOC. Patient denies being on blood thinners. Patient reporting right knee and neck pain. Patient in C- collar upon arrival.  "

## 2025-03-14 NOTE — H&P
HISTORY AND PHYSICAL EXAMINATION    PATIENT NAME: Prema Hair    MRN: [id number]  SERVICE DATE: 3/14/2025       PRIMARY CARE PHYSICIAN: Shabana Rodriguez MD          ASSESSMENT AND PLAN     Closed fracture of distal end of right fibula, Following accidental fall at home ground-level.  Impaired mobility, causing recurrent falls at home  DM2, controlled  HTN, fair control  Drug-induced parkinsonism G21.19  Hyperlipidemia  Hx CA thyroid on supplemental therapy  Obesity, class I  Hx torticollis  Osteoarthrosis of the knees  Hx urinary retention     Plan:  fall precautions. PT OT eval / rx.  Accu-Cheks AC/at bedtime, SSI, hypoglycemia protocol.  Continue home medications.  Hold for low BP.  Continue levothyroxine supplement.  Aim for suppressed TSH  NWB RLE and OP follow up with Dr Hammer in 1 wk  DVT proph        Discussed with nurses/case management team and the specialists involved in this patient's care. Reviewed the EMR and documentation from other care-givers.    SUBJECTIVE  CHIEF COMPLAINT:  fall at home    HPI: This is a 60 y.o. female who was brought to ER by EMS after she had a fall early this morning.  She got out of the bed around 4 AM, and her right knee gave way.  She was on the floor for nearly 4 hours before she could get help.  She had pain in the right knee area.  In the ER, imaging confirmed right proximal fibular fracture.  Patient was placed on a splint per orthopedic recommendations and admitted for safe disposition.  Patient agreed to go to SNF if needed.      PAST MEDICAL HISTORY:   Past Medical History:   Diagnosis Date    CATALINA positive     Arthritis     Asthma     Bell's palsy     x 2    Bilateral leg edema     Bipolar disorder     CHF (congestive heart failure)     Chronic allergic rhinitis     Chronic constipation     Diabetes mellitus (Multi)     Diabetic neuropathy (Multi)     Dysphonia 07/05/2022    Muscle tension dysphonia    Fibromyalgia     GERD (gastroesophageal reflux disease)      under control with medication    High pulmonary arterial pressure (Multi)     moderately elevated on ECHO 10.5.23    Hyperlipidemia, unspecified 01/03/2023    Dyslipidemia    Hypertension     Hypokalemia     1.1.24- K 3.8 - on oral supplements    Hypothyroidism     Low back pain, unspecified 02/22/2021    Low back pain    Moderate tricuspid regurgitation     Obstructive sleep apnea (adult) (pediatric) 01/03/2023    ZEINA on CPAP    Paralysis of vocal cords and larynx, 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    Laryngeal paresis    Parkinsonism (Multi)     Pseudoaneurysm of carotid artery 2020    s/p right pipeline and coil embolization of Right ICA pseudoaneurysm    PTSD (post-traumatic stress disorder)     Thyroid cancer (Multi)     s/p total thyroidectomy, residual tissue on left side - FNA benign 2020 and 2021    Torticollis     botox injections    Vitamin D deficiency      PAST SURGICAL HISTORY:   Past Surgical History:   Procedure Laterality Date    COLONOSCOPY      KNEE ARTHROPLASTY      OTHER SURGICAL HISTORY Right 2020    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    TOTAL THYROIDECTOMY  2017    US GUIDED THYROID BIOPSY  11/19/2020    US GUIDED THYROID BIOPSY 11/19/2020 [facility]    US GUIDED THYROID BIOPSY  11/19/2020    US GUIDED THYROID BIOPSY 11/19/2020 [facility] [place] [facility]     FAMILY HISTORY:   Family History   Problem Relation Name Age of Onset    Heart failure Mother      Pancreatic cancer Mother      Heart failure Father      Parkinsonism Father      Breast cancer Sister       SOCIAL HISTORY:   Social History     Tobacco Use    Smoking status: Never    Smokeless tobacco: Never   Substance Use Topics    Alcohol use: Not Currently    Drug use: Never     Comment: medical marijuana last used >1 year ago       MEDICATIONS: Prior to Admission Medications  Medications Prior to Admission   Medication Sig Dispense Refill Last Dose/Taking    ascorbic acid (Vitamin C) 500 mg tablet Take 1 tablet (500 mg) by mouth early  in the morning..   Taking    busPIRone (Buspar) 10 mg tablet Take 1 tablet (10 mg) by mouth every 12 hours.   Taking    cyclobenzaprine (Flexeril) 10 mg tablet Take 1 tablet (10 mg) by mouth as needed at bedtime for muscle spasms.   Taking As Needed    potassium chloride CR 20 mEq ER tablet Take 2 tablets (40 mEq) by mouth 2 times daily (morning and late afternoon).   Taking    Vitamin D3 25 mcg (1,000 unit) capsule Take 1 capsule (25 mcg) by mouth early in the morning..   Taking    acetaminophen (Tylenol) 500 mg tablet Take 1 tablet (500 mg) by mouth every 4 hours if needed.       albuterol 90 mcg/actuation inhaler Inhale 2 puffs every 4 hours if needed for shortness of breath. 54 g 3     ammonium lactate (Lac-Hydrin) 12 % lotion Apply 1 Application topically if needed.       ascorbic acid (Vitamin C) 1,000 mg tablet Take 1 tablet (1,000 mg) by mouth once daily.       aspirin 81 mg EC tablet Take 1 tablet by mouth once daily.       atorvastatin (Lipitor) 20 mg tablet Take 1 tablet (20 mg) by mouth once daily at bedtime. 90 tablet 3     azelastine (Astelin) 137 mcg (0.1 %) nasal spray Administer 1 spray into each nostril 2 times a day.       baclofen (Lioresal) 20 mg tablet Take 1 tablet (20 mg) by mouth 3 times a day. 270 tablet 3     budesonide-formoteroL (Symbicort) 80-4.5 mcg/actuation inhaler Inhale 2 puffs 2 times a day. Rinse mouth with water after use to reduce aftertaste and incidence of candidiasis. Do not swallow. 30.6 g 2     bumetanide (Bumex) 2 mg tablet Take 1 tablet by mouth 2 times a day.       calcium carbonate (Oscal) 500 mg calcium (1,250 mg) tablet Take 1 tablet (1,250 mg) by mouth once daily.       carbidopa-levodopa (Sinemet)  mg tablet Take 1.5 tablets by mouth 3 times a day. 8am, 12 noon and 4 pm 135 tablet 11     carvedilol (Coreg) 25 mg tablet Take 1 tablet (25 mg) by mouth 2 times a day. 180 tablet 1     cephalexin (Keflex) 250 mg capsule Take 2 capsules (500 mg) by mouth once  daily.       cholecalciferol (Vitamin D-3) 50 MCG (2000 UT) tablet Take 1 tablet (2,000 Units) by mouth once daily.       clonazePAM (KlonoPIN) 1 mg tablet Take 1-2 tablets (1-2 mg) by mouth 2 times a day. Take 1 tablet in the morning and 2 tablets at night as needed          diclofenac sodium (Voltaren) 1 % gel gel Apply 4.5 inches topically 3 times a day as needed.       docusate sodium (Colace) 100 mg capsule Take 1 capsule by mouth 3 times a day.       estradiol (Estrace) 0.01 % (0.1 mg/gram) vaginal cream Insert 1 Application into the vagina 3 (three) times a week. Pea-sized amount       fluticasone (Flonase) 50 mcg/actuation nasal spray Administer 1 spray into each nostril once daily. Shake gently. Before first use, prime pump. After use, clean tip and replace cap. 48 g 3     formoterol (Perforomist) 20 mcg/2 mL nebulizer solution Inhale 2 mL (20 mcg) every 12 hours.       gabapentin (Neurontin) 600 mg tablet Take 2 tablets (1,200 mg) by mouth 3 times a day.       glucagon (Glucagen) 1 mg injection Inject 1 mg into the muscle every 15 minutes if needed for low blood sugar - see comments (For blood glucose less than or equal to 40 mg/dL and no IV access). 1 each 12     ipratropium-albuteroL (Duo-Neb) 0.5-2.5 mg/3 mL nebulizer solution Inhale 3 mL every 4 hours if needed.       lancets misc 1 each 3 times a day.       levothyroxine (Synthroid, Levoxyl) 100 mcg tablet Take 100mcg on Mon Wed Fri and Sun - 1 hour before breakfast Do not start before April 12, 2024. (Patient taking differently: Take 1 tablet (100 mcg) by mouth. Take 100mcg on Tue, Wed, Thur and 200 mcg AOD)       lidocaine (Lidoderm) 5 % patch APPLY 1 PATCH AND LEAVE IN PLACE FOR 12 HOURS, THEN REMOVE AND LEAVE OFF FOR 12 HOURS 30 patch 11     losartan (Cozaar) 50 mg tablet Take 1 tablet (50 mg) by mouth once daily. 90 tablet 3     metFORMIN (Glucophage) 500 mg tablet Take 1 tablet (500 mg) by mouth 2 times daily (morning and late afternoon). 60  tablet 1     nystatin (Mycostatin) cream Apply 1 Application topically 2 times a day. 1 Application externally two times a day        omega-3 fatty acids-fish oil 360-1,200 mg capsule Take 1 capsule (1,200 mg) by mouth once daily.       omeprazole (PriLOSEC) 40 mg DR capsule Take 1 capsule (40 mg) by mouth once daily. With dinner         OXcarbazepine (Trileptal) 600 mg tablet Take 1 tablet (600 mg) by mouth once daily in the morning.       OXcarbazepine (Trileptal) 600 mg tablet Take 2 tablets (1,200 mg) by mouth once daily at bedtime.       polyethylene glycol (Glycolax, Miralax) 17 gram/dose powder Take 17 g by mouth once daily. In 8oz of water, juice, or tea and drink daily         potassium chloride (KCL-40 ORAL) Take 80 mEq by mouth 2 times a day.       prazosin (Minipress) 5 mg capsule Take 1 capsule (5 mg) by mouth once daily at bedtime.       QUEtiapine (SeroqueL) 400 mg tablet Take 2 tablets by mouth once daily at bedtime. Indications: repeated episodes of anxiety       sertraline (Zoloft) 100 mg tablet Take 2 tablets (200 mg) by mouth once daily. Do not start before January 2, 2024. 30 tablet 0     topiramate (Topamax) 200 mg tablet TAKE ONE TABLET (200 MG) BY MOUTH TWICE DAILY 60 tablet 3       CURRENT ALLERGIES:   Allergies   Allergen Reactions    Lisinopril Swelling       COMPLETE REVIEW OF SYSTEMS:      GENERAL: No fever, appetite stable.  HEENT: No epistaxis, no mouth ulcers  NECK: no neck pain  RESPIRATORY: No new resp symptoms.  CARDIOVASCULAR: No cp, no leg edema, No orthopnea  GI: No NVD, no GI Bleed  : No hematuria, no dysuria  MUSCULOSKELETAL: see HPI  SKIN: No rashes, no ulcers  PSYCH: +anxious / depressed.   HEMATOLOGY/LYMPHOLOGY: No hx of VTE  ENDOCRINE: +hx of DM  NEURO: No hx of seizures, No hx of CVA      OBJECTIVE  PHYSICAL EXAM:       3/14/2025     9:31 AM 3/14/2025     9:45 AM 3/14/2025    10:00 AM 3/14/2025    10:15 AM 3/14/2025    10:30 AM 3/14/2025     1:45 PM 3/14/2025     2:00  PM   Vitals   Systolic 146 136 142 151 148 157 164   Diastolic 84 83 94 94 86 91 98   Heart Rate 74         Temp 36.3 °C (97.3 °F)         Resp 18           Body mass index is 33.1 kg/m².    GENERAL: awake, alert, Ox3, cooperative resting comfortably. Obese  SKIN: Skin turgor normal. No rashes  HEENT: EOMI, no epistaxis, Moist mucosa.  LUNGS: Bilat breath sounds, with no added sounds  CARDIAC: REGULAR. no rubs, no murmur  ABDOMEN: soft, non-tender. +BS.  EXTREMITIES: No edema, Good capillary refill. RLE in splint.  NEURO: Insight fair to good. No invol movements. Gait not assessed  MUSCULOSKELETAL: No acute inflammation in other joints.  Paucity of movements.       .  Current Facility-Administered Medications:     acetaminophen (Tylenol) tablet 650 mg, 650 mg, oral, q4h PRN **OR** acetaminophen (Tylenol) oral liquid 650 mg, 650 mg, oral, q4h PRN **OR** acetaminophen (Tylenol) suppository 650 mg, 650 mg, rectal, q4h PRN, Shabana Rodriguez MD    ammonium lactate (Lac-Hydrin) 12 % lotion 1 Application, 1 Application, Topical, PRN, Shabana Rodriguez MD    aspirin EC tablet 81 mg, 81 mg, oral, Daily, Shabana Rodriguez MD, 81 mg at 03/14/25 2007    atorvastatin (Lipitor) tablet 20 mg, 20 mg, oral, Nightly, Shabana Rodriguez MD, 20 mg at 03/14/25 2006    baclofen (Lioresal) tablet 20 mg, 20 mg, oral, TID, Shabana Rodriguez MD, 20 mg at 03/14/25 2006    budesonide (Pulmicort) 0.5 mg/2 mL nebulizer solution 0.5 mg, 0.5 mg, nebulization, BID, Shabana Rodriguez MD, 0.5 mg at 03/14/25 1944    bumetanide (Bumex) tablet 2 mg, 2 mg, oral, BID, Shabana Rodriguez MD, 2 mg at 03/14/25 2006    busPIRone (Buspar) tablet 10 mg, 10 mg, oral, q12h KATHERINE, Shabana Rodriguez MD, 10 mg at 03/14/25 2006    carbidopa-levodopa (Sinemet)  mg per tablet 1.5 tablet, 1.5 tablet, oral, TID, Shabana Rodriguez MD, 1.5 tablet at 03/14/25 2007    carvedilol (Coreg) tablet 25 mg, 25 mg, oral, BID, Shabana Rodriguez MD, 25 mg at  03/14/25 2006    clonazePAM (KlonoPIN) tablet 1 mg, 1 mg, oral, BID, Shabana Rodriguez MD, 1 mg at 03/14/25 2008    dextrose 50 % injection 12.5 g, 12.5 g, intravenous, q15 min PRN, Shabana Rodriguez MD    dextrose 50 % injection 25 g, 25 g, intravenous, q15 min PRN, Shabana Rodriguez MD    diclofenac sodium (Voltaren) 1 % gel 4 g, 4 g, Topical, TID PRN, Shabana Rodriguez MD    docusate sodium (Colace) capsule 100 mg, 100 mg, oral, BID, Shabana Rodriguez MD, 100 mg at 03/14/25 2008    enoxaparin (Lovenox) syringe 40 mg, 40 mg, subcutaneous, q24h, Shabana Rodriguez MD, 40 mg at 03/14/25 2006    [START ON 3/17/2025] estradiol (Estrace) 0.01 % (0.1 mg/gram) vaginal cream 2 g, 2 g, vaginal, Once per day on Monday Wednesday Friday, Shabana Rodriguez MD    fluticasone (Flonase) nasal spray 1 spray, 1 spray, Each Nostril, Daily PRN, Shabana Rodriguez MD    gabapentin (Neurontin) capsule 1,200 mg, 1,200 mg, oral, TID, Shabana Rodriguez MD, 1,200 mg at 03/14/25 2007    glucagon (Glucagen) injection 1 mg, 1 mg, intramuscular, q15 min PRN, Shabana Rodriguez MD    glucagon (Glucagen) injection 1 mg, 1 mg, intramuscular, q15 min PRN, Shabana Rodriguez MD    [START ON 3/15/2025] insulin lispro injection 0-5 Units, 0-5 Units, subcutaneous, TID AC, Shabana Rodriguez MD    ipratropium-albuteroL (Duo-Neb) 0.5-2.5 mg/3 mL nebulizer solution 3 mL, 3 mL, nebulization, 4x daily, Shabana Rodrgiuez MD, 3 mL at 03/14/25 1944    ipratropium-albuteroL (Duo-Neb) 0.5-2.5 mg/3 mL nebulizer solution 3 mL, 3 mL, nebulization, q2h PRN, Shabana Rodriguez MD    [START ON 3/15/2025] levothyroxine (Synthroid, Levoxyl) tablet 100 mcg, 100 mcg, oral, Once per day on Sunday Saturday, Shabana Rodriguez MD    [START ON 3/17/2025] levothyroxine (Synthroid, Levoxyl) tablet 200 mcg, 200 mcg, oral, Once per day on Monday Tuesday Wednesday Thursday Friday, Shabana Rodriguez MD    lidocaine 4 % patch 1 patch, 1 patch, transdermal,  Daily, Shabana Rodriguez MD, 1 patch at 03/14/25 2007    losartan (Cozaar) tablet 50 mg, 50 mg, oral, Daily, Shabana Rodriguez MD, 50 mg at 03/14/25 2006    [START ON 3/15/2025] metFORMIN (Glucophage) tablet 500 mg, 500 mg, oral, BID, Shabana Rodriguez MD    ondansetron (Zofran) tablet 4 mg, 4 mg, oral, q8h PRN **OR** ondansetron (Zofran) injection 4 mg, 4 mg, intravenous, q8h PRN, Shabana Rodriguez MD    OXcarbazepine (Trileptal) tablet 1,200 mg, 1,200 mg, oral, Nightly, Shabana Rodriguez MD, 1,200 mg at 03/14/25 2007    [START ON 3/15/2025] OXcarbazepine (Trileptal) tablet 600 mg, 600 mg, oral, q AM, Shabana Rodriguez MD    oxyCODONE-acetaminophen (Percocet) 5-325 mg per tablet 1 tablet, 1 tablet, oral, q6h PRN, Shabana Rodriguez MD    [START ON 3/15/2025] pantoprazole (ProtoNix) EC tablet 40 mg, 40 mg, oral, Daily before breakfast, Shabana Rodriguez MD    polyethylene glycol (Glycolax, Miralax) packet 17 g, 17 g, oral, Daily, Shabana Rodriguez MD, 17 g at 03/14/25 2007    [START ON 3/15/2025] potassium chloride CR (Klor-Con M20) ER tablet 40 mEq, 40 mEq, oral, BID, Shabana Rodriguez MD    prazosin (Minipress) capsule 5 mg, 5 mg, oral, Nightly, Shabana Rodriguez MD, 5 mg at 03/14/25 2006    QUEtiapine (SEROquel) tablet 800 mg, 800 mg, oral, Nightly, Shabana Rodriguez MD, 800 mg at 03/14/25 2006    [START ON 3/15/2025] sertraline (Zoloft) tablet 250 mg, 250 mg, oral, Daily, Shabana Rodriguez MD    topiramate (Topamax) tablet 200 mg, 200 mg, oral, BID, Shabana Rodriguez MD, 200 mg at 03/14/25 2007    DATA:   Diagnostic tests reviewed for today's visit:    Most recent labs  Results from last 7 days   Lab Units 03/14/25  1134   WBC AUTO x10*3/uL 6.1   HEMOGLOBIN g/dL 11.7*   HEMATOCRIT % 39.4   PLATELETS AUTO x10*3/uL 220     Results from last 7 days   Lab Units 03/14/25  1134   SODIUM mmol/L 143   POTASSIUM mmol/L 3.6   CHLORIDE mmol/L 105   CO2 mmol/L 29   BUN mg/dL 17   CREATININE mg/dL  "0.87   CALCIUM mg/dL 8.4*   PROTEIN TOTAL g/dL 6.4   BILIRUBIN TOTAL mg/dL 0.3   ALK PHOS U/L 153*   ALT U/L 14   AST U/L 12   GLUCOSE mg/dL 90             No results found for: \"TROPONINT\"      Results from last 7 days   Lab Units 03/14/25  1521   POCT GLUCOSE mg/dL 92        CT head wo IV contrast   Final Result   No evidence of acute cortical infarct or intracranial hemorrhage.        No evidence of intracranial hemorrhage or displaced skull fracture.        MACRO:   None        Signed by: Sherie Rivero 3/14/2025 11:44 AM   Dictation workstation:   [id number]      CT cervical spine wo IV contrast   Final Result   No evidence for an acute fracture or subluxation of the cervical   spine. Reversal of normal cervical lordosis with multilevel listhesis   and discogenic degenerative changes as described.        MACRO:   None        Signed by: Sherie Rivero 3/14/2025 11:49 AM   Dictation workstation:   [id number]      XR ankle right 3+ views   Final Result   1. Acute, mildly displaced oblique fracture of the proximal fibular   shaft.   2. Soft tissue swelling of the calf most pronounced at the ankle.   3. There is apparent cortical thickening/irregularity of the distal   fibula which is likely chronic in nature. However, if there is focal   acute pain to the lateral malleolus, recommend further evaluation with   CT.   Signed by Greg Chambers MD      XR knee right 4+ views   Final Result   Proximal right fibular fracture with separation of the fracture   fragments as described. No dislocation.        Total right knee arthroplasty.             Signed by: Sherie Rivero 3/14/2025 11:51 AM   Dictation workstation:   [id number]      XR femur right 2+ views   Final Result   1. Acute, mildly displaced oblique fracture of the proximal fibular   shaft.   2. Soft tissue swelling of the calf most pronounced at the ankle.   3. There is apparent cortical thickening/irregularity of the distal   fibula which is likely chronic in nature. " However, if there is focal   acute pain to the lateral malleolus, recommend further evaluation with   CT.   Signed by Greg Chambers MD      XR tibia fibula right 2 views   Final Result   1. Acute, mildly displaced oblique fracture of the proximal fibular   shaft.   2. Soft tissue swelling of the calf most pronounced at the ankle.   3. There is apparent cortical thickening/irregularity of the distal   fibula which is likely chronic in nature. However, if there is focal   acute pain to the lateral malleolus, recommend further evaluation with   CT.   Signed by Greg Chambers MD      XR chest 1 view   Final Result   No acute process.   Signed by Sanjiv Hernández MD              SIGNATURE: Shabana Rodriguez MD  DATE: March 14, 2025  TIME: 6:33 PM

## 2025-03-14 NOTE — ED PROVIDER NOTES
History of Present Illness     History provided by: Patient  Limitations to History: None  External Records Reviewed with Brief Summary: None    HPI:  Prema Hair is a 60 y.o. female with history of hyperlipidemia,type 2 diabetes, obesity, torticollis, osteoarthrosis, drug-induced parkinsonism, right TKA, hypertension, bipolar disorder, PTSD, fibromyalgia presents emergency room for neck pain and right knee pain.  States that she was getting out of bed this morning and fell after her knee locked up.  Patient states that she hit her head however unsure if she lost consciousness.  Patient states that her right knee has been locking up and is scheduled to get a right knee immobilizer however has not gotten this yet.  Patient is complaining of right knee pain along with neck pain.  Patient does have a history of fibromyalgia therefore patient is having some full body muscle pain that is similar to her previous symptoms.  She does state that she feels slightly shortness of breath when the initial fall happened and slight shortness of breath here.      Physical Exam   Triage vitals:  T 36.3 °C (97.3 °F)  HR 74  /84  RR 18  O2 96 % None (Room air)    General: Awake, alert, in no acute distress, obese  Eyes: Gaze conjugate.  No scleral icterus or injection  HENT: Normo-cephalic, atraumatic. No stridor, C collar in place  CV: Regular rate, regular rhythm. Radial pulses 2+ bilaterally  Resp: Breathing non-labored, speaking in full sentences.  Clear to auscultation bilaterally  GI: Soft, distended, non-tender. No rebound or guarding.  : Deferred  MSK/Extremities: No gross bony deformities. Moving all extremities with exception for right knee significant pain with passive range of motion, able to do passive range of motion of the left knee without significant pain., 2+ DP bilaterally, no T, L-spine tenderness, tenderness palpation of the C-spine.  Skin: Warm. Appropriate color  Neuro: A&Ox3. Slight slurred  speech at baseline.  No facial droop.  Symmetric smile.  Equal cheek puffing.  Normal sensation to light touch in V1-3.  5/5 strength shoulder shrug.  5/5 strength in bilateral UE and LE.  Normal sensation to light touch in bilateral UE and LE.   Psych: Appropriate mood and affect    Medical Decision Making & ED Course   Medical Decision Makin y.o. female ith history of hyperlipidemia,type 2 diabetes, obesity, torticollis, osteoarthrosis, drug-induced parkinsonism, right TKA, hypertension, bipolar disorder, PTSD presents emergency room for neck pain and right knee pain.  States that she was getting out of bed this morning and fell after her knee locked up.  Presents vitally stable, no acute distress with exception for a blood pressure slightly elevated 151/94.  Patient is alert and oriented x 3, able to answer questions and follow commands.  Given patient's neck pain with age, will obtain CT of the head and C-spine.  At this time I have low suspicion for a intracranial hemorrhage given her neuroexam is completely unremarkable.  Cervical spinal fractures are also on the differential.  We will obtain x-rays of the right lower extremity.  For shortness of breath will obtain chest x-ray along with 1 DuoNeb.  I have low concern for any pneumothorax, mediastinal widening.  COPD exacerbation may also be on the differential however patient does not have increased work of breathing, not tripoding, no accessory muscle use.  Will obtain CBC, CMP.     Updates can be seen in ED course  ----  Scoring Tools Utilized: none     Social Determinants of Health which Significantly Impact Care: None identified The following actions were taken to address these social determinants: none    EKG Independent Interpretation: EKG interpreted by myself. Please see ED Course for full interpretation.    Independent Result Review and Interpretation: Relevant laboratory and radiographic results were reviewed and independently interpreted by  myself.  As necessary, they are commented on in the ED Course.    Chronic conditions affecting the patient's care: As documented above in [facility]    The patient was discussed with the following consultants/services: None    Care Considerations: As documented above in [facility]    ED Course:  ED Course as of 03/14/25 1453   Fri Mar 14, 2025   1154 CT head and C-spine shows no evidence of acute cortical infarct or intracranial hemorrhage.      No evidence of intracranial hemorrhage or displaced skull fracture.   [YG]   1154 X-ray of the right knee shows proximal right fibular fracture with separation of the fracture fragments as described. No dislocation.      Total right knee arthroplasty.   [YG]   1154 We spoke to orthopedic surgery given that patient does have a fibular fracture.   They recommended putting on a posterior short splint and follow-up with orthopedics in 1 week. [YG]   1235 Basic labs were unremarkable with exception for a slightly elevated alk phos however has been elevated in the past.  Additionally patient is not complaining of any abdominal pain. [YG]   1451 X-ray of the right ankle, tibia, femur shows 1. Acute, mildly displaced oblique fracture of the proximal fibular  shaft.  2. Soft tissue swelling of the calf most pronounced at the ankle.  3. There is apparent cortical thickening/irregularity of the distal  fibula which is likely chronic in nature. However, if there is focal  acute pain to the lateral malleolus, recommend further evaluation with  CT.   [YG]   1452 Chest x-ray shows no acute cardiopulmonary process. [YG]   1452 Given the patient has multiple comorbidities and has been having difficulty with ambulation even before the fall and the fracture, we like to admit this patient for inpatient medicine for SNF placement.  Patient was cooperative and states that she would like to be placed for SNF. [YG]      ED Course User Index  [YG] Herminia Alfaro MD         Diagnoses as of 03/14/25 1453   Closed  fracture of distal end of right fibula, unspecified fracture morphology, initial encounter     Disposition   As a result of their workup, the patient will require admission to the hospital.  The patient was informed of her diagnosis.  The patient was given the opportunity to ask questions and I answered them. The patient agreed to be admitted to the hospital.    Procedures   Procedures    Patient seen and discussed with ED attending physician.    Herminia Alfaro MD  Emergency Medicine     Herminia Alfaro MD  Resident  03/14/25 [id number]

## 2025-03-14 NOTE — PROGRESS NOTES
Pharmacy Medication History     Source of Information: PHARMACY    Additional concerns with the patient's PTA list.     The following updates were made to the Prior to Admission medication list:     Medications ADDED:   N/A  Medications CHANGED:  N/A  Medications REMOVED:   N/A  Medications NOT TAKING:   N/A    Allergy reviewed : Yes    Meds 2 Beds : N/A    Outpatient pharmacy confirmed and updated in chart : Yes    Pharmacy name: [facility]    The list below reflectives the updated PTA list. Please review each medication in order reconciliation for additional clarification and justification.    Prior to Admission Medications   Prescriptions Last Dose Informant     OXcarbazepine (Trileptal) 600 mg tablet       Sig: Take 1 tablet (600 mg) by mouth once daily in the morning.   OXcarbazepine (Trileptal) 600 mg tablet       Sig: Take 2 tablets (1,200 mg) by mouth once daily at bedtime.   QUEtiapine (SeroqueL) 400 mg tablet       Sig: Take 2 tablets by mouth once daily at bedtime. Indications: repeated episodes of anxiety   Vitamin D3 25 mcg (1,000 unit) capsule   Yes Yes   Sig: Take 1 capsule (25 mcg) by mouth early in the morning..   acetaminophen (Tylenol) 500 mg tablet       Sig: Take 1 tablet (500 mg) by mouth every 4 hours if needed.   albuterol 90 mcg/actuation inhaler       Sig: Inhale 2 puffs every 4 hours if needed for shortness of breath.   ammonium lactate (Lac-Hydrin) 12 % lotion       Sig: Apply 1 Application topically if needed.   ascorbic acid (Vitamin C) 1,000 mg tablet       Sig: Take 1 tablet (1,000 mg) by mouth once daily.   ascorbic acid (Vitamin C) 500 mg tablet       Sig: Take 1 tablet (500 mg) by mouth early in the morning..   aspirin 81 mg EC tablet       Sig: Take 1 tablet by mouth once daily.   atorvastatin (Lipitor) 20 mg tablet       Sig: Take 1 tablet (20 mg) by mouth once daily at bedtime.   azelastine (Astelin) 137 mcg (0.1 %) nasal spray       Sig: Administer 1 spray into each nostril 2  times a day.   baclofen (Lioresal) 20 mg tablet       Sig: Take 1 tablet (20 mg) by mouth 3 times a day.   budesonide-formoteroL (Symbicort) 80-4.5 mcg/actuation inhaler       Sig: Inhale 2 puffs 2 times a day. Rinse mouth with water after use to reduce aftertaste and incidence of candidiasis. Do not swallow.   bumetanide (Bumex) 2 mg tablet       Sig: Take 1 tablet by mouth 2 times a day.   busPIRone (Buspar) 10 mg tablet       Sig: Take 1 tablet (10 mg) by mouth every 12 hours.   calcium carbonate (Oscal) 500 mg calcium (1,250 mg) tablet       Sig: Take 1 tablet (1,250 mg) by mouth once daily.   carbidopa-levodopa (Sinemet)  mg tablet       Sig: Take 1.5 tablets by mouth 3 times a day. 8am, 12 noon and 4 pm   carvedilol (Coreg) 25 mg tablet       Sig: Take 1 tablet (25 mg) by mouth 2 times a day.   cephalexin (Keflex) 250 mg capsule       Sig: Take 2 capsules (500 mg) by mouth once daily.   cholecalciferol (Vitamin D-3) 50 MCG (2000 UT) tablet       Sig: Take 1 tablet (2,000 Units) by mouth once daily.   clonazePAM (KlonoPIN) 1 mg tablet       Sig: Take 1-2 tablets (1-2 mg) by mouth 2 times a day. Take 1 tablet in the morning and 2 tablets at night as needed      cyclobenzaprine (Flexeril) 10 mg tablet       Sig: Take 1 tablet (10 mg) by mouth as needed at bedtime for muscle spasms.   diclofenac sodium (Voltaren) 1 % gel gel       Sig: Apply 4.5 inches topically 3 times a day as needed.   docusate sodium (Colace) 100 mg capsule       Sig: Take 1 capsule by mouth 3 times a day.   estradiol (Estrace) 0.01 % (0.1 mg/gram) vaginal cream       Sig: Insert 1 Application into the vagina 3 (three) times a week. Pea-sized amount   fluticasone (Flonase) 50 mcg/actuation nasal spray       Sig: Administer 1 spray into each nostril once daily. Shake gently. Before first use, prime pump. After use, clean tip and replace cap.   formoterol (Perforomist) 20 mcg/2 mL nebulizer solution       Sig: Inhale 2 mL (20 mcg) every 12  hours.   gabapentin (Neurontin) 600 mg tablet       Sig: Take 2 tablets (1,200 mg) by mouth 3 times a day.   glucagon (Glucagen) 1 mg injection       Sig: Inject 1 mg into the muscle every 15 minutes if needed for low blood sugar - see comments (For blood glucose less than or equal to 40 mg/dL and no IV access).   ipratropium-albuteroL (Duo-Neb) 0.5-2.5 mg/3 mL nebulizer solution       Sig: Inhale 3 mL every 4 hours if needed.   lancets misc       Si each 3 times a day.   levothyroxine (Synthroid, Levoxyl) 100 mcg tablet       Sig: Take 100mcg on  and Sun - 1 hour before breakfast Do not start before 2024.   Patient taking differently: Take 1 tablet (100 mcg) by mouth. Take 100mcg on Tue, Wed, Thur and 200 mcg AOD   lidocaine (Lidoderm) 5 % patch       Sig: APPLY 1 PATCH AND LEAVE IN PLACE FOR 12 HOURS, THEN REMOVE AND LEAVE OFF FOR 12 HOURS   losartan (Cozaar) 50 mg tablet       Sig: Take 1 tablet (50 mg) by mouth once daily.   metFORMIN (Glucophage) 500 mg tablet       Sig: Take 1 tablet (500 mg) by mouth 2 times daily (morning and late afternoon).   nystatin (Mycostatin) cream       Sig: Apply 1 Application topically 2 times a day. 1 Application externally two times a day    omega-3 fatty acids-fish oil 360-1,200 mg capsule       Sig: Take 1 capsule (1,200 mg) by mouth once daily.   omeprazole (PriLOSEC) 40 mg DR capsule       Sig: Take 1 capsule (40 mg) by mouth once daily. With dinner     polyethylene glycol (Glycolax, Miralax) 17 gram/dose powder       Sig: Take 17 g by mouth once daily. In 8oz of water, juice, or tea and drink daily     potassium chloride (KCL-40 ORAL)       Sig: Take 80 mEq by mouth 2 times a day.   potassium chloride CR 20 mEq ER tablet       Sig: Take 2 tablets (40 mEq) by mouth 2 times daily (morning and late afternoon).   prazosin (Minipress) 5 mg capsule       Sig: Take 1 capsule (5 mg) by mouth once daily at bedtime.   sertraline (Zoloft) 100 mg tablet        Sig: Take 2 tablets (200 mg) by mouth once daily. Do not start before January 2, 2024.   topiramate (Topamax) 200 mg tablet       Sig: TAKE ONE TABLET (200 MG) BY MOUTH TWICE DAILY      Facility-Administered Medications: None       The list below reflectives the updated allergy list. Please review each documented allergy for additional clarification and justification.    Allergies   Allergen Reactions    Lisinopril Swelling          03/14/25 at 3:22 PM - Keren Broussard

## 2025-03-14 NOTE — PROGRESS NOTES
Physical Therapy    Physical Therapy Evaluation    Patient Name: Prmea Hair  MRN: [id number]  Department: [place] [id number]  Room: [address] [place] [address]  Today's Date: 3/14/2025   Time Calculation  Start Time: 1347  Stop Time: 1420  Time Calculation (min): 33 min    Assessment/Plan   PT Assessment  PT Assessment Results: Decreased strength, Decreased endurance, Impaired balance, Decreased mobility  Rehab Prognosis: Good  Barriers to Discharge Home: Caregiver assistance, Cognition needs, Physical needs  Caregiver Assistance: Patient lives alone and/or does not have reliable caregiver assistance  Cognition Needs: 24hr supervision for safety awareness needed, Cognition-related high falls risk, Insight of patient limited regarding functional ability/needs  Physical Needs: Stair navigation into home limited by function/safety, In-home setup navigation limited by function/safety, Ambulating household distances limited by function/safety, 24hr mobility assistance needed, 24hr ADL assistance needed  Evaluation/Treatment Tolerance: Patient limited by fatigue, Patient tolerated treatment well  Medical Staff Made Aware: Yes  Strengths: Ability to acquire knowledge, Rehab experience, Support of Caregivers, Housing layout  Barriers to Participation: Insight into problems, Comorbidities  End of Session Communication: Bedside nurse  Assessment Comment: Pt demonstrating deficits in generalized strength, coordination, endurance and balance as well as increased pain and new RLE NWB precautions resulting in impaired functional mobility, gait and self care. Due to the impairments listed above, pt would benefit from skilled physical therapy services in acute care setting as well as additional therapy in MOD intensity therapy setting with 24/7 supervision and assistance  End of Session Patient Position: Up in chair, Alarm on  IP OR SWING BED PT PLAN  Inpatient or Swing Bed: Inpatient  PT Plan  Treatment/Interventions: Bed mobility, Transfer training, Gait  training, Stair training, Balance training, Neuromuscular re-education, Strengthening, Endurance training, Therapeutic exercise, Therapeutic activity, Home exercise program  PT Plan: Ongoing PT  PT Frequency: 3 times per week  PT Discharge Recommendations: Moderate intensity level of continued care  Equipment Recommended upon Discharge:  (TBD)  PT Recommended Transfer Status: Assist x2  PT - OK to Discharge: Yes (PT POC initiated this date)    Subjective   General Visit Information:  General  Reason for Referral: Pt is a 61 yo female s/p fall and found to have new spiral proximal fibula fracture and a minimally displaced oblique distal fibula fracture. RLE now in short leg splint and NWB precautions. Pt with recent history of R TKA in 2024. Relevant PMHx of Parkinson's Disease and fibromyalgia.  Referred By: Vania Klein MD  Past Medical History Relevant to Rehab:   Past Medical History:   Diagnosis Date    CATALINA positive     Arthritis     Asthma     Bell's palsy     x 2    Bilateral leg edema     Bipolar disorder     CHF (congestive heart failure)     Chronic allergic rhinitis     Chronic constipation     Diabetes mellitus (Multi)     Diabetic neuropathy (Multi)     Dysphonia 07/05/2022    Muscle tension dysphonia    Fibromyalgia     GERD (gastroesophageal reflux disease)     under control with medication    High pulmonary arterial pressure (Multi)     moderately elevated on ECHO 10.5.23    Hyperlipidemia, unspecified 01/03/2023    Dyslipidemia    Hypertension     Hypokalemia     1.1.24- K 3.8 - on oral supplements    Hypothyroidism     Low back pain, unspecified 02/22/2021    Low back pain    Moderate tricuspid regurgitation     Obstructive sleep apnea (adult) (pediatric) 01/03/2023    ZEINA on CPAP    Paralysis of vocal cords and larynx, 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    Laryngeal paresis    Parkinsonism (Multi)     Pseudoaneurysm of carotid artery 2020    s/p right pipeline and coil embolization of Right ICA  pseudoaneurysm    PTSD (post-traumatic stress disorder)     Thyroid cancer (Multi)     s/p total thyroidectomy, residual tissue on left side - FNA benign 2020 and 2021    Torticollis     botox injections    Vitamin D deficiency        Family/Caregiver Present: Yes  Caregiver Feedback: pt's daughters present and receptive  Co-Treatment: OT  Co-Treatment Reason: for maximal pt safety and participation  Prior to Session Communication: Bedside nurse  Patient Position Received: Bed, 3 rail up, Alarm on  General Comment: Pt lethargic and with reduced alertness throughout session however agreeable to PT/OT evaluations. Pt's daughters (also her caregivers) present throughout session  Home Living:  Home Living  Type of Home: Apartment  Lives With: Alone (daughters work together to provide daily assist)  Home Adaptive Equipment: Walker rolling or standard, Reacher (rollator, transport wc, SBQC)  Home Layout: One level  Home Access: Level entry, No concerns  Bathroom Shower/Tub: Tub/shower unit  Bathroom Toilet: Handicapped height  Bathroom Equipment: Grab bars in shower, Shower chair with back, Grab bars around toilet  Prior Level of Function:  Prior Function Per Pt/Caregiver Report  Level of [place]: Needs assistance with ADLs, Needs assistance with homemaking  Receives Help From: Family (daughters assist daily)  ADL Assistance: Needs assistance (pt reports assist with dressing and bathing. Pt reports can do own toileting however often has SBA for safety)  Homemaking Assistance: Needs assistance (Daughters do all cooking, cleaning and laundry)  Ambulatory Assistance: Independent (using rollator primarily)  Prior Function Comments: (-) driving. Daughters report pt has fallen between 5-10x in last 3 months (primarily when attempting to stand from bed)  Precautions:  Precautions  LE Weight Bearing Status: Right Non-Weight Bearing  Medical Precautions: Fall precautions      Date/Time Vitals Session Patient Position Pulse  Resp SpO2 BP MAP (mmHg)    03/14/25 1345 --  --  --  --  97 %  157/91  112     03/14/25 1400 --  --  --  --  97 %  164/98  116                 Objective   Pain:  Pain Assessment  Pain Assessment: 0-10  0-10 (Numeric) Pain Score: 10 - Worst possible pain  Pain Type: Acute pain  Pain Location: Leg  Pain Orientation: Right  Pain Interventions: Repositioned, Elevated  Cognition:  Cognition  Overall Cognitive Status: Impaired (mild cognitive impairment noted - primarily due to current lethargy)  Orientation Level: Oriented X4  Attention: Exceptions to WFL (lethargic)  Memory: Within Funtional Limits  Insight: Mild  Impulsive: Mildly  Processing Speed: Delayed    General Assessments:  Activity Tolerance  Endurance: Tolerates 10 - 20 min exercise with multiple rests    Sensation  Light Touch: Partial deficits in the RLE (Pt reports mild numbness in RLE in area of fracture)    Coordination  Movements are Fluid and Coordinated: No  Coordination Comment: Bradykinesia noted with all movements    Static Sitting Balance  Static Sitting-Balance Support: No upper extremity supported, Feet supported  Static Sitting-Level of Assistance: Contact guard  Static Sitting-Comment/Number of Minutes: 3 min    Static Standing Balance  Static Standing-Balance Support: Bilateral upper extremity supported (FWW)  Static Standing-Level of Assistance: Minimum assistance (x1-2)  Static Standing-Comment/Number of Minutes: 1-2 min (VCs for RLE NWB sequencing. Therapist foot placed under RLE to ensure NWB. Assisted with pericare during standing trial)  Functional Assessments:  Bed Mobility  Bed Mobility: Yes  Bed Mobility 1  Bed Mobility 1: Supine to sitting  Level of Assistance 1: Maximum assistance  Bed Mobility Comments 1: Assist at RLE and trunk as well as to scoot hips anteriorly at EOB. VCs for sequencing. HOB elevated  Bed Mobility 2  Bed Mobility  2: Sitting to supine  Level of Assistance 2: Moderate assistance, +2  Bed Mobility Comments 2:  Assist at trunk and BLEs    Transfers  Transfer: Yes  Transfer 1  Transfer From 1: Sit to  Transfer to 1: Stand  Technique 1: Sit to stand, Stand to sit  Transfer Device 1: Walker, Gait belt  Transfer Level of Assistance 1: Moderate assistance, +2  Trials/Comments 1: VCs for sequencing and RLE positioning to maintain RLE NWB (therapist foot placed under RLE to ensure maintained during transition). Assist to elevate into standing position from elevated bed surface    Ambulation/Gait Training  Ambulation/Gait Training Performed: No (Unsafe to attempt due to impaired standing balance and RLE NWB precautions)  Extremity/Trunk Assessments:  RLE   RLE : Exceptions to WFL (grossly 3-/5 at R hip and knee. Not assessed at ankle due to SLS)  LLE   LLE : Within Functional Limits  Outcome Measures:  [facility] Basic Mobility  Turning from your back to your side while in a flat bed without using bedrails: A lot  Moving from lying on your back to sitting on the side of a flat bed without using bedrails: A lot  Moving to and from bed to chair (including a wheelchair): A lot  Standing up from a chair using your arms (e.g. wheelchair or bedside chair): A lot  To walk in hospital room: Total  Climbing 3-5 steps with railing: Total  Basic Mobility - Total Score: 10    Encounter Problems       Encounter Problems (Active)       Mobility       STG - Patient will ambulate       Start:  03/14/25    Expected End:  03/28/25       Min A using FWW >3ft (enabling distance between bed and chair) - maintaining RLE NWB            PT Transfers       STG - Patient will perform bed mobility       Start:  03/14/25    Expected End:  03/28/25       Min A         STG - Patient will transfer sit to and from stand       Start:  03/14/25    Expected End:  03/28/25       Min A - maintaining RLE NWB                Education Documentation  Precautions, taught by Noel Russo, PT at 3/14/2025  3:36 PM.  Learner: Caregiver, Family, Patient  Readiness:  Acceptance  Method: Explanation  Response: Verbalizes Understanding, Needs Reinforcement    Body Mechanics, taught by Noel Russo, PT at 3/14/2025  3:36 PM.  Learner: Caregiver, Family, Patient  Readiness: Acceptance  Method: Explanation  Response: Verbalizes Understanding, Needs Reinforcement    Mobility Training, taught by Noel Russo, PT at 3/14/2025  3:36 PM.  Learner: Caregiver, Family, Patient  Readiness: Acceptance  Method: Explanation  Response: Verbalizes Understanding, Needs Reinforcement    Education Comments  No comments found.

## 2025-03-14 NOTE — PROGRESS NOTES
Transitional Care Coordination Progress Note:  Plan per Medical/Surgical team: treatment of fall with fibula fracture   Status: ED  Payor source: Katie godfrey  Discharge disposition: Home alone, ?new SNF   PT rec MOD. Discussed purpose & benefits of SNF. Patient provided choice list for new snf.   Potential Barriers: insurance precert   ADOD: 3/16/2025   LALIT Knight RN, BSN Transitional Care Coordinator ED# [phone]      03/14/25 1234   Discharge Planning   Living Arrangements Alone   Support Systems Children   Assistance Needed PT/OT evals pending, ?new SNF, will need insurance precert   Type of Residence Private residence   Number of Stairs to Enter Residence 14   Number of Stairs Within Residence 0   Home or Post Acute Services Post acute facilities (Rehab/SNF/etc)   Type of Post Acute Facility Services Skilled nursing   Expected Discharge Disposition SNF   Does the patient need discharge transport arranged? Yes   RoundTrip coordination needed? Yes   Has discharge transport been arranged? No   Financial Resource Strain   How hard is it for you to pay for the very basics like food, housing, medical care, and heating? Not hard   Housing Stability   In the last 12 months, was there a time when you were not able to pay the mortgage or rent on time? N   In the past 12 months, how many times have you moved where you were living? 1   At any time in the past 12 months, were you homeless or living in a shelter (including now)? N   Transportation Needs   In the past 12 months, has lack of transportation kept you from medical appointments or from getting medications? no   In the past 12 months, has lack of transportation kept you from meetings, work, or from getting things needed for daily living? No   Stroke Family Assessment   Stroke Family Assessment Needed No   Intensity of Service   Intensity of Service 0-30 min

## 2025-03-14 NOTE — SIGNIFICANT EVENT
Call regarding this patient by the ED. She suffered a fall and I reviewed her xrays. She has a history of R TKA with Dr Hammer. He actually saw her about a month ago and xrays and CT were obtained. The tibial and femoral TKA components are in stable alignment from prior films. She has a small amount of lateral tibial subsidence, but this is stable. She has a new spiral proximal fibula fracture and a minimally displaced oblique distal fibula fracture. She can be placed in a short leg splint. She should be non weightbearing on the right leg. She should see Dr Hammer in 1 week for follow up.     Shoaib Rose MD  Orthopaedic Surgery PGY-3

## 2025-03-15 LAB
ANION GAP SERPL CALC-SCNC: 12 MMOL/L (ref 10–20)
BUN SERPL-MCNC: 19 MG/DL (ref 6–23)
CALCIUM SERPL-MCNC: 7.9 MG/DL (ref 8.6–10.3)
CHLORIDE SERPL-SCNC: 108 MMOL/L (ref 98–107)
CO2 SERPL-SCNC: 26 MMOL/L (ref 21–32)
CREAT SERPL-MCNC: 0.71 MG/DL (ref 0.5–1.05)
EGFRCR SERPLBLD CKD-EPI 2021: >90 ML/MIN/1.73M*2
ERYTHROCYTE [DISTWIDTH] IN BLOOD BY AUTOMATED COUNT: 17.4 % (ref 11.5–14.5)
GLUCOSE BLD MANUAL STRIP-MCNC: 101 MG/DL (ref 74–99)
GLUCOSE BLD MANUAL STRIP-MCNC: 84 MG/DL (ref 74–99)
GLUCOSE BLD MANUAL STRIP-MCNC: 94 MG/DL (ref 74–99)
GLUCOSE BLD MANUAL STRIP-MCNC: 96 MG/DL (ref 74–99)
GLUCOSE SERPL-MCNC: 97 MG/DL (ref 74–99)
HCT VFR BLD AUTO: 32.2 % (ref 36–46)
HGB BLD-MCNC: 10.5 G/DL (ref 12–16)
MCH RBC QN AUTO: 27.2 PG (ref 26–34)
MCHC RBC AUTO-ENTMCNC: 32.6 G/DL (ref 32–36)
MCV RBC AUTO: 83 FL (ref 80–100)
NRBC BLD-RTO: 0 /100 WBCS (ref 0–0)
PLATELET # BLD AUTO: 199 X10*3/UL (ref 150–450)
POTASSIUM SERPL-SCNC: 3.3 MMOL/L (ref 3.5–5.3)
RBC # BLD AUTO: 3.86 X10*6/UL (ref 4–5.2)
SODIUM SERPL-SCNC: 143 MMOL/L (ref 136–145)
WBC # BLD AUTO: 3.9 X10*3/UL (ref 4.4–11.3)

## 2025-03-15 PROCEDURE — 2500000005 HC RX 250 GENERAL PHARMACY W/O HCPCS: Performed by: INTERNAL MEDICINE

## 2025-03-15 PROCEDURE — 2500000001 HC RX 250 WO HCPCS SELF ADMINISTERED DRUGS (ALT 637 FOR MEDICARE OP): Performed by: INTERNAL MEDICINE

## 2025-03-15 PROCEDURE — 2500000004 HC RX 250 GENERAL PHARMACY W/ HCPCS (ALT 636 FOR OP/ED): Performed by: INTERNAL MEDICINE

## 2025-03-15 PROCEDURE — 94640 AIRWAY INHALATION TREATMENT: CPT

## 2025-03-15 PROCEDURE — 1100000001 HC PRIVATE ROOM DAILY

## 2025-03-15 PROCEDURE — G0378 HOSPITAL OBSERVATION PER HR: HCPCS

## 2025-03-15 PROCEDURE — 80048 BASIC METABOLIC PNL TOTAL CA: CPT | Performed by: INTERNAL MEDICINE

## 2025-03-15 PROCEDURE — 82947 ASSAY GLUCOSE BLOOD QUANT: CPT

## 2025-03-15 PROCEDURE — 36415 COLL VENOUS BLD VENIPUNCTURE: CPT | Performed by: INTERNAL MEDICINE

## 2025-03-15 PROCEDURE — 2500000002 HC RX 250 W HCPCS SELF ADMINISTERED DRUGS (ALT 637 FOR MEDICARE OP, ALT 636 FOR OP/ED): Performed by: INTERNAL MEDICINE

## 2025-03-15 PROCEDURE — 85027 COMPLETE CBC AUTOMATED: CPT | Performed by: INTERNAL MEDICINE

## 2025-03-15 RX ORDER — CEPHALEXIN 500 MG/1
500 CAPSULE ORAL DAILY
Status: DISCONTINUED | OUTPATIENT
Start: 2025-03-15 | End: 2025-03-17 | Stop reason: HOSPADM

## 2025-03-15 RX ORDER — POTASSIUM CHLORIDE 1.5 G/1.58G
40 POWDER, FOR SOLUTION ORAL ONCE
Status: COMPLETED | OUTPATIENT
Start: 2025-03-15 | End: 2025-03-15

## 2025-03-15 RX ORDER — CARBIDOPA AND LEVODOPA 25; 100 MG/1; MG/1
1.5 TABLET ORAL ONCE
Status: COMPLETED | OUTPATIENT
Start: 2025-03-15 | End: 2025-03-15

## 2025-03-15 RX ADMIN — CEPHALEXIN 500 MG: 500 CAPSULE ORAL at 21:20

## 2025-03-15 RX ADMIN — IPRATROPIUM BROMIDE AND ALBUTEROL SULFATE 3 ML: 2.5; .5 SOLUTION RESPIRATORY (INHALATION) at 19:36

## 2025-03-15 RX ADMIN — TOPIRAMATE 200 MG: 100 TABLET, FILM COATED ORAL at 21:20

## 2025-03-15 RX ADMIN — ATORVASTATIN CALCIUM 20 MG: 20 TABLET, FILM COATED ORAL at 21:20

## 2025-03-15 RX ADMIN — METFORMIN HYDROCHLORIDE 500 MG: 500 TABLET ORAL at 16:10

## 2025-03-15 RX ADMIN — METFORMIN HYDROCHLORIDE 500 MG: 500 TABLET ORAL at 11:41

## 2025-03-15 RX ADMIN — POLYETHYLENE GLYCOL 3350 17 G: 17 POWDER, FOR SOLUTION ORAL at 11:38

## 2025-03-15 RX ADMIN — OXYCODONE HYDROCHLORIDE AND ACETAMINOPHEN 1 TABLET: 5; 325 TABLET ORAL at 20:10

## 2025-03-15 RX ADMIN — TOPIRAMATE 200 MG: 100 TABLET, FILM COATED ORAL at 11:41

## 2025-03-15 RX ADMIN — BUMETANIDE 2 MG: 1 TABLET ORAL at 11:40

## 2025-03-15 RX ADMIN — CARBIDOPA AND LEVODOPA 1.5 TABLET: 25; 100 TABLET ORAL at 16:10

## 2025-03-15 RX ADMIN — CARVEDILOL 25 MG: 25 TABLET, FILM COATED ORAL at 11:41

## 2025-03-15 RX ADMIN — OXCARBAZEPINE 600 MG: 300 TABLET, FILM COATED ORAL at 11:41

## 2025-03-15 RX ADMIN — ASPIRIN 81 MG: 81 TABLET, COATED ORAL at 11:41

## 2025-03-15 RX ADMIN — GABAPENTIN 1200 MG: 400 CAPSULE ORAL at 11:41

## 2025-03-15 RX ADMIN — OXYCODONE HYDROCHLORIDE AND ACETAMINOPHEN 1 TABLET: 5; 325 TABLET ORAL at 09:34

## 2025-03-15 RX ADMIN — POTASSIUM CHLORIDE 40 MEQ: 1.5 POWDER, FOR SOLUTION ORAL at 11:39

## 2025-03-15 RX ADMIN — BACLOFEN 20 MG: 10 TABLET ORAL at 21:20

## 2025-03-15 RX ADMIN — CLONAZEPAM 1 MG: 1 TABLET ORAL at 11:41

## 2025-03-15 RX ADMIN — BUMETANIDE 2 MG: 1 TABLET ORAL at 16:10

## 2025-03-15 RX ADMIN — SERTRALINE 250 MG: 50 TABLET, FILM COATED ORAL at 11:41

## 2025-03-15 RX ADMIN — GABAPENTIN 1200 MG: 400 CAPSULE ORAL at 21:20

## 2025-03-15 RX ADMIN — LIDOCAINE 4% 1 PATCH: 40 PATCH TOPICAL at 11:47

## 2025-03-15 RX ADMIN — BUSPIRONE HYDROCHLORIDE 10 MG: 10 TABLET ORAL at 06:53

## 2025-03-15 RX ADMIN — CARBIDOPA AND LEVODOPA 1.5 TABLET: 25; 100 TABLET ORAL at 11:40

## 2025-03-15 RX ADMIN — ENOXAPARIN SODIUM 40 MG: 100 INJECTION SUBCUTANEOUS at 21:19

## 2025-03-15 RX ADMIN — LEVOTHYROXINE SODIUM 100 MCG: 0.1 TABLET ORAL at 06:53

## 2025-03-15 RX ADMIN — BACLOFEN 20 MG: 10 TABLET ORAL at 11:41

## 2025-03-15 RX ADMIN — IPRATROPIUM BROMIDE AND ALBUTEROL SULFATE 3 ML: 2.5; .5 SOLUTION RESPIRATORY (INHALATION) at 11:31

## 2025-03-15 RX ADMIN — IPRATROPIUM BROMIDE AND ALBUTEROL SULFATE 3 ML: 2.5; .5 SOLUTION RESPIRATORY (INHALATION) at 07:24

## 2025-03-15 RX ADMIN — QUETIAPINE FUMARATE 800 MG: 300 TABLET ORAL at 21:19

## 2025-03-15 RX ADMIN — CARBIDOPA AND LEVODOPA 1.5 TABLET: 25; 100 TABLET ORAL at 20:11

## 2025-03-15 RX ADMIN — BUDESONIDE 0.5 MG: 0.5 INHALANT RESPIRATORY (INHALATION) at 07:24

## 2025-03-15 RX ADMIN — LOSARTAN POTASSIUM 50 MG: 50 TABLET, FILM COATED ORAL at 11:41

## 2025-03-15 RX ADMIN — BACLOFEN 20 MG: 10 TABLET ORAL at 16:10

## 2025-03-15 RX ADMIN — PANTOPRAZOLE SODIUM 40 MG: 40 TABLET, DELAYED RELEASE ORAL at 06:53

## 2025-03-15 RX ADMIN — GABAPENTIN 1200 MG: 400 CAPSULE ORAL at 16:10

## 2025-03-15 RX ADMIN — BUDESONIDE 0.5 MG: 0.5 INHALANT RESPIRATORY (INHALATION) at 19:36

## 2025-03-15 RX ADMIN — POTASSIUM CHLORIDE 40 MEQ: 1500 TABLET, EXTENDED RELEASE ORAL at 16:10

## 2025-03-15 RX ADMIN — CLONAZEPAM 1 MG: 1 TABLET ORAL at 21:20

## 2025-03-15 RX ADMIN — CARVEDILOL 25 MG: 25 TABLET, FILM COATED ORAL at 21:20

## 2025-03-15 RX ADMIN — PRAZOSIN HYDROCHLORIDE 5 MG: 1 CAPSULE ORAL at 21:20

## 2025-03-15 RX ADMIN — DOCUSATE SODIUM 100 MG: 100 CAPSULE, LIQUID FILLED ORAL at 21:20

## 2025-03-15 RX ADMIN — OXCARBAZEPINE 1200 MG: 300 TABLET, FILM COATED ORAL at 21:20

## 2025-03-15 RX ADMIN — POTASSIUM CHLORIDE 40 MEQ: 1500 TABLET, EXTENDED RELEASE ORAL at 11:41

## 2025-03-15 RX ADMIN — DOCUSATE SODIUM 100 MG: 100 CAPSULE, LIQUID FILLED ORAL at 11:40

## 2025-03-15 ASSESSMENT — PAIN - FUNCTIONAL ASSESSMENT
PAIN_FUNCTIONAL_ASSESSMENT: 0-10

## 2025-03-15 ASSESSMENT — COGNITIVE AND FUNCTIONAL STATUS - GENERAL
PERSONAL GROOMING: A LITTLE
TURNING FROM BACK TO SIDE WHILE IN FLAT BAD: A LITTLE
DAILY ACTIVITIY SCORE: 18
DRESSING REGULAR UPPER BODY CLOTHING: A LITTLE
DRESSING REGULAR LOWER BODY CLOTHING: A LITTLE
EATING MEALS: A LITTLE
TOILETING: A LITTLE
MOVING FROM LYING ON BACK TO SITTING ON SIDE OF FLAT BED WITH BEDRAILS: A LITTLE
STANDING UP FROM CHAIR USING ARMS: A LITTLE
MOVING TO AND FROM BED TO CHAIR: A LITTLE
MOVING FROM LYING ON BACK TO SITTING ON SIDE OF FLAT BED WITH BEDRAILS: A LITTLE
DAILY ACTIVITIY SCORE: 18
MOBILITY SCORE: 17
TOILETING: A LITTLE
WALKING IN HOSPITAL ROOM: A LITTLE
PERSONAL GROOMING: A LITTLE
EATING MEALS: A LITTLE
WALKING IN HOSPITAL ROOM: A LITTLE
STANDING UP FROM CHAIR USING ARMS: A LITTLE
DRESSING REGULAR UPPER BODY CLOTHING: A LITTLE
MOVING TO AND FROM BED TO CHAIR: A LITTLE
CLIMB 3 TO 5 STEPS WITH RAILING: A LOT
DRESSING REGULAR LOWER BODY CLOTHING: A LITTLE
TURNING FROM BACK TO SIDE WHILE IN FLAT BAD: A LITTLE
HELP NEEDED FOR BATHING: A LITTLE
CLIMB 3 TO 5 STEPS WITH RAILING: A LITTLE
HELP NEEDED FOR BATHING: A LITTLE
MOBILITY SCORE: 18

## 2025-03-15 ASSESSMENT — PAIN SCALES - WONG BAKER: WONGBAKER_NUMERICALRESPONSE: HURTS LITTLE MORE

## 2025-03-15 ASSESSMENT — PAIN SCALES - GENERAL
PAINLEVEL_OUTOF10: 0 - NO PAIN
PAINLEVEL_OUTOF10: 5 - MODERATE PAIN
PAINLEVEL_OUTOF10: 10 - WORST POSSIBLE PAIN
PAINLEVEL_OUTOF10: 3
PAINLEVEL_OUTOF10: 5 - MODERATE PAIN
PAINLEVEL_OUTOF10: 4
PAINLEVEL_OUTOF10: 9

## 2025-03-15 ASSESSMENT — PAIN DESCRIPTION - LOCATION
LOCATION: KNEE
LOCATION: KNEE
LOCATION: HIP

## 2025-03-15 ASSESSMENT — PAIN DESCRIPTION - ORIENTATION
ORIENTATION: RIGHT

## 2025-03-15 ASSESSMENT — PAIN DESCRIPTION - DESCRIPTORS: DESCRIPTORS: ACHING

## 2025-03-15 NOTE — CARE PLAN
The patient's goals for the shift include  to not fall,    The clinical goals for the shift include Maintain safety. And pain level <4/10      Problem: Pain - Adult  Goal: Verbalizes/displays adequate comfort level or baseline comfort level  Outcome: Progressing     Problem: Discharge Planning  Goal: Discharge to home or other facility with appropriate resources  Outcome: Progressing     Problem: Chronic Conditions and Co-morbidities  Goal: Patient's chronic conditions and co-morbidity symptoms are monitored and maintained or improved  Outcome: Progressing     Problem: Nutrition  Goal: Nutrient intake appropriate for maintaining nutritional needs  Outcome: Progressing     Problem: Skin  Goal: Decreased wound size/increased tissue granulation at next dressing change  Outcome: Progressing  Goal: Participates in plan/prevention/treatment measures  Outcome: Progressing     Problem: Fall/Injury  Goal: Not fall by end of shift  Outcome: Progressing  Goal: Be free from injury by end of the shift  Outcome: Progressing     Problem: Diabetes  Goal: Maintain glucose levels >70mg/dl to <250mg/dl throughout shift  Outcome: Progressing     Problem: Pain  Goal: Walks with improved pain control throughout the shift  Outcome: Progressing  Goal: Performs ADL's with improved pain control throughout shift  Outcome: Progressing  Goal: Free from opioid side effects throughout the shift  Outcome: Progressing

## 2025-03-15 NOTE — PROGRESS NOTES
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  Impaired mobility, causing recurrent falls at home  Hyperlipidemia  Hx CA thyroid on supplemental therapy  Obesity, class I  Hx torticollis  Osteoarthrosis of the knees  Hx urinary retention     Plan:  fall precautions. PT OT eval / rx.  Accu-Cheks AC/at bedtime, SSI, hypoglycemia protocol.  Continue home medications.  Hold for low BP.  Continue levothyroxine supplement.  Aim for suppressed TSH  NWB RLE and OP follow up with Dr Hammer in 1 wk  DVT proph        DISPOSITION PLAN:  ?SNF    INTERVAL HISTORY OF PRESENT ILLNESS:  RLE pain is tolerable. No chest pain/sob. No n/v/d. Oral intake good. Feeling better. No fever/chills. acute events from last 24 hrs reviewed.    Pertinent ROS:  No abdominal pain / No Bleeding / No rashes    Discussed with nursing and case management team and the specialists involved in this patient's care. Reviewed the EMR and documentation from other care-givers.      OBJECTIVE  PHYSICAL EXAM:     GENERAL: AAOx3, cooperative resting comfortably. Obese  SKIN: Skin turgor normal. No rashes  HEENT: no epistaxis, Moist mucosa.  LUNGS: Bilat breath sounds, with no added sounds  CARDIAC: REGULAR. no rubs, no murmur  ABDOMEN: soft, non-tender. +BS.  EXTREMITIES: No edema, Good capillary refill. RLE in splint.  NEURO: Insight fair to good. +Invol tremors of Parkinson's. Nonambulatory and NWB RLE.  MUSCULOSKELETAL: No acute inflammation in other joints.  Paucity of movements.            3/14/2025     1:45 PM 3/14/2025     2:00 PM 3/14/2025     6:42 PM 3/14/2025     8:42 PM 3/15/2025    12:40 AM 3/15/2025     5:01 AM 3/15/2025     7:51  "AM   Vitals   Systolic 157 164  149 132 129 94   Diastolic 91 98  84 63 70 65   BP Location    Right arm Right arm Right arm Right arm   Heart Rate    64 71 53 61   Temp    36.3 °C (97.3 °F) 36.3 °C (97.3 °F) 36.2 °C (97.2 °F) 35.9 °C (96.6 °F)   Resp    18 18 18 17   Height   1.752 m (5' 8.98\")       Weight (lb)   224       BMI   33.1 kg/m2       BSA (m2)   2.23 m2         Body mass index is 33.1 kg/m².  No intake or output data in the 24 hours ending 03/15/25 0951        Current Facility-Administered Medications:     acetaminophen (Tylenol) tablet 650 mg, 650 mg, oral, q4h PRN **OR** acetaminophen (Tylenol) oral liquid 650 mg, 650 mg, oral, q4h PRN **OR** acetaminophen (Tylenol) suppository 650 mg, 650 mg, rectal, q4h PRN, Shabana Rodriguez MD    ammonium lactate (Lac-Hydrin) 12 % lotion 1 Application, 1 Application, Topical, PRN, Shabana Rodriguez MD    aspirin EC tablet 81 mg, 81 mg, oral, Daily, Shabana Rodriguez MD, 81 mg at 03/14/25 2007    atorvastatin (Lipitor) tablet 20 mg, 20 mg, oral, Nightly, Shabana Rodriguez MD, 20 mg at 03/14/25 2006    baclofen (Lioresal) tablet 20 mg, 20 mg, oral, TID, Shabana Rodriguez MD, 20 mg at 03/14/25 2006    budesonide (Pulmicort) 0.5 mg/2 mL nebulizer solution 0.5 mg, 0.5 mg, nebulization, BID, Shabana Rodriguez MD, 0.5 mg at 03/15/25 0724    bumetanide (Bumex) tablet 2 mg, 2 mg, oral, BID, Shabana Rodriguez MD, 2 mg at 03/14/25 2006    busPIRone (Buspar) tablet 10 mg, 10 mg, oral, q12h KATHERINE, Shabana Rodriguez MD, 10 mg at 03/15/25 0653    carbidopa-levodopa (Sinemet)  mg per tablet 1.5 tablet, 1.5 tablet, oral, TID, Shabana Rodriguez MD, 1.5 tablet at 03/14/25 2007    carvedilol (Coreg) tablet 25 mg, 25 mg, oral, BID, Shabana Rodriguez MD, 25 mg at 03/14/25 2006    clonazePAM (KlonoPIN) tablet 1 mg, 1 mg, oral, BID, Shabana Rodriguez MD, 1 mg at 03/14/25 2008    dextrose 50 % injection 12.5 g, 12.5 g, intravenous, q15 min PRN, Shabana BUSTOS" MD Michael    dextrose 50 % injection 25 g, 25 g, intravenous, q15 min PRN, Shabana Rodriguez MD    diclofenac sodium (Voltaren) 1 % gel 4 g, 4 g, Topical, TID PRN, Shabana Rodriguez MD    docusate sodium (Colace) capsule 100 mg, 100 mg, oral, BID, Shabana Rodriguez MD, 100 mg at 03/14/25 2008    enoxaparin (Lovenox) syringe 40 mg, 40 mg, subcutaneous, q24h, Shabana Rodriguez MD, 40 mg at 03/14/25 2006    [START ON 3/17/2025] estradiol (Estrace) 0.01 % (0.1 mg/gram) vaginal cream 2 g, 2 g, vaginal, Once per day on Monday Wednesday Friday, Shabana Rodriguez MD    fluticasone (Flonase) nasal spray 1 spray, 1 spray, Each Nostril, Daily PRN, Shabana Rodriguez MD    gabapentin (Neurontin) capsule 1,200 mg, 1,200 mg, oral, TID, Shabana Rodriguez MD, 1,200 mg at 03/14/25 2007    glucagon (Glucagen) injection 1 mg, 1 mg, intramuscular, q15 min PRN, Shabana Rodriguez MD    glucagon (Glucagen) injection 1 mg, 1 mg, intramuscular, q15 min PRN, Shabana Rodriguez MD    insulin lispro injection 0-5 Units, 0-5 Units, subcutaneous, TID AC, Shabana Rodriguez MD    ipratropium-albuteroL (Duo-Neb) 0.5-2.5 mg/3 mL nebulizer solution 3 mL, 3 mL, nebulization, 4x daily, Shabana Rodriguez MD, 3 mL at 03/15/25 0724    ipratropium-albuteroL (Duo-Neb) 0.5-2.5 mg/3 mL nebulizer solution 3 mL, 3 mL, nebulization, q2h PRN, Shabana Rodriguez MD    levothyroxine (Synthroid, Levoxyl) tablet 100 mcg, 100 mcg, oral, Once per day on Sunday Saturday, Shabana Rodriguez MD, 100 mcg at 03/15/25 0653    [START ON 3/17/2025] levothyroxine (Synthroid, Levoxyl) tablet 200 mcg, 200 mcg, oral, Once per day on Monday Tuesday Wednesday Thursday Friday, Shabana Rodriguez MD    lidocaine 4 % patch 1 patch, 1 patch, transdermal, Daily, Shabana Rodriguez MD, 1 patch at 03/14/25 2007    losartan (Cozaar) tablet 50 mg, 50 mg, oral, Daily, Shabana Rodriguez MD, 50 mg at 03/14/25 2006    metFORMIN (Glucophage) tablet 500 mg, 500 mg,  oral, BID, Shabana Rodriguez MD    ondansetron (Zofran) tablet 4 mg, 4 mg, oral, q8h PRN **OR** ondansetron (Zofran) injection 4 mg, 4 mg, intravenous, q8h PRN, Shabana Rodriguez MD    OXcarbazepine (Trileptal) tablet 1,200 mg, 1,200 mg, oral, Nightly, Shabana Rodriguez MD, 1,200 mg at 03/14/25 2007    OXcarbazepine (Trileptal) tablet 600 mg, 600 mg, oral, q AM, Shabana Rodriguez MD    oxyCODONE-acetaminophen (Percocet) 5-325 mg per tablet 1 tablet, 1 tablet, oral, q6h PRN, Shabana Rodriguez MD, 1 tablet at 03/15/25 0934    pantoprazole (ProtoNix) EC tablet 40 mg, 40 mg, oral, Daily before breakfast, Shabana Rodriguez MD, 40 mg at 03/15/25 0653    polyethylene glycol (Glycolax, Miralax) packet 17 g, 17 g, oral, Daily, Shabana Rodriguez MD, 17 g at 03/14/25 2007    potassium chloride CR (Klor-Con M20) ER tablet 40 mEq, 40 mEq, oral, BID, Shabana Rodriguez MD    prazosin (Minipress) capsule 5 mg, 5 mg, oral, Nightly, Shabana Rodriguez MD, 5 mg at 03/14/25 2006    QUEtiapine (SEROquel) tablet 800 mg, 800 mg, oral, Nightly, Shabana Rodriguez MD, 800 mg at 03/14/25 2006    sertraline (Zoloft) tablet 250 mg, 250 mg, oral, Daily, Shabana Rodriguez MD    topiramate (Topamax) tablet 200 mg, 200 mg, oral, BID, Shabana Rodriguez MD, 200 mg at 03/14/25 2007    DATA:   Diagnostic tests reviewed for today's visit:    Most recent labs  Results from last 7 days   Lab Units 03/15/25  0518 03/14/25  1134   WBC AUTO x10*3/uL 3.9* 6.1   HEMOGLOBIN g/dL 10.5* 11.7*   HEMATOCRIT % 32.2* 39.4   PLATELETS AUTO x10*3/uL 199 220     Results from last 7 days   Lab Units 03/15/25  0518 03/14/25  1134   SODIUM mmol/L 143 143   POTASSIUM mmol/L 3.3* 3.6   CHLORIDE mmol/L 108* 105   CO2 mmol/L 26 29   BUN mg/dL 19 17   CREATININE mg/dL 0.71 0.87   CALCIUM mg/dL 7.9* 8.4*   PROTEIN TOTAL g/dL  --  6.4   BILIRUBIN TOTAL mg/dL  --  0.3   ALK PHOS U/L  --  153*   ALT U/L  --  14   AST U/L  --  12   GLUCOSE mg/dL 97 90              Results from last 7 days   Lab Units 03/15/25  0930 03/14/25  1521   POCT GLUCOSE mg/dL 94 92        CT head wo IV contrast   Final Result   No evidence of acute cortical infarct or intracranial hemorrhage.        No evidence of intracranial hemorrhage or displaced skull fracture.        MACRO:   None        Signed by: Sherie Rivero 3/14/2025 11:44 AM   Dictation workstation:   [id number]      CT cervical spine wo IV contrast   Final Result   No evidence for an acute fracture or subluxation of the cervical   spine. Reversal of normal cervical lordosis with multilevel listhesis   and discogenic degenerative changes as described.        MACRO:   None        Signed by: Sherie Rivero 3/14/2025 11:49 AM   Dictation workstation:   [id number]      XR ankle right 3+ views   Final Result   1. Acute, mildly displaced oblique fracture of the proximal fibular   shaft.   2. Soft tissue swelling of the calf most pronounced at the ankle.   3. There is apparent cortical thickening/irregularity of the distal   fibula which is likely chronic in nature. However, if there is focal   acute pain to the lateral malleolus, recommend further evaluation with   CT.   Signed by Greg Chambers MD      XR knee right 4+ views   Final Result   Proximal right fibular fracture with separation of the fracture   fragments as described. No dislocation.        Total right knee arthroplasty.             Signed by: Sherie Rivero 3/14/2025 11:51 AM   Dictation workstation:   [id number]      XR femur right 2+ views   Final Result   1. Acute, mildly displaced oblique fracture of the proximal fibular   shaft.   2. Soft tissue swelling of the calf most pronounced at the ankle.   3. There is apparent cortical thickening/irregularity of the distal   fibula which is likely chronic in nature. However, if there is focal   acute pain to the lateral malleolus, recommend further evaluation with   CT.   Signed by Greg Chambers MD      XR tibia fibula right 2 views   Final  Result   1. Acute, mildly displaced oblique fracture of the proximal fibular   shaft.   2. Soft tissue swelling of the calf most pronounced at the ankle.   3. There is apparent cortical thickening/irregularity of the distal   fibula which is likely chronic in nature. However, if there is focal   acute pain to the lateral malleolus, recommend further evaluation with   CT.   Signed by Greg Chambers MD      XR chest 1 view   Final Result   No acute process.   Signed by Sanjiv Hernández MD            SIGNATURE: Shabana Rodriguez MD PATIENT NAME: Prema Hair   DATE: 3/15/2025 MRN: [id number]   TIME: 9:51 AM

## 2025-03-15 NOTE — CARE PLAN
The clinical goals for the shift include Maintain safety.    Problem: Pain - Adult  Goal: Verbalizes/displays adequate comfort level or baseline comfort level  Outcome: Progressing     Problem: Safety - Adult  Goal: Free from fall injury  Outcome: Met     Problem: Discharge Planning  Goal: Discharge to home or other facility with appropriate resources  Outcome: Progressing     Problem: Nutrition  Goal: Nutrient intake appropriate for maintaining nutritional needs  Outcome: Progressing

## 2025-03-16 LAB
ANION GAP SERPL CALC-SCNC: 12 MMOL/L (ref 10–20)
BUN SERPL-MCNC: 22 MG/DL (ref 6–23)
CALCIUM SERPL-MCNC: 8.3 MG/DL (ref 8.6–10.3)
CHLORIDE SERPL-SCNC: 106 MMOL/L (ref 98–107)
CO2 SERPL-SCNC: 27 MMOL/L (ref 21–32)
CREAT SERPL-MCNC: 0.85 MG/DL (ref 0.5–1.05)
EGFRCR SERPLBLD CKD-EPI 2021: 79 ML/MIN/1.73M*2
ERYTHROCYTE [DISTWIDTH] IN BLOOD BY AUTOMATED COUNT: 17.9 % (ref 11.5–14.5)
EST. AVERAGE GLUCOSE BLD GHB EST-MCNC: 117 MG/DL
GLUCOSE BLD MANUAL STRIP-MCNC: 105 MG/DL (ref 74–99)
GLUCOSE BLD MANUAL STRIP-MCNC: 81 MG/DL (ref 74–99)
GLUCOSE BLD MANUAL STRIP-MCNC: 86 MG/DL (ref 74–99)
GLUCOSE BLD MANUAL STRIP-MCNC: 88 MG/DL (ref 74–99)
GLUCOSE BLD MANUAL STRIP-MCNC: 95 MG/DL (ref 74–99)
GLUCOSE SERPL-MCNC: 96 MG/DL (ref 74–99)
HBA1C MFR BLD: 5.7 %
HCT VFR BLD AUTO: 39 % (ref 36–46)
HGB BLD-MCNC: 11.8 G/DL (ref 12–16)
MCH RBC QN AUTO: 26.9 PG (ref 26–34)
MCHC RBC AUTO-ENTMCNC: 30.3 G/DL (ref 32–36)
MCV RBC AUTO: 89 FL (ref 80–100)
NRBC BLD-RTO: 0 /100 WBCS (ref 0–0)
PLATELET # BLD AUTO: 212 X10*3/UL (ref 150–450)
POTASSIUM SERPL-SCNC: 3.8 MMOL/L (ref 3.5–5.3)
RBC # BLD AUTO: 4.39 X10*6/UL (ref 4–5.2)
SODIUM SERPL-SCNC: 141 MMOL/L (ref 136–145)
WBC # BLD AUTO: 4.4 X10*3/UL (ref 4.4–11.3)

## 2025-03-16 PROCEDURE — 2500000005 HC RX 250 GENERAL PHARMACY W/O HCPCS: Performed by: INTERNAL MEDICINE

## 2025-03-16 PROCEDURE — 94640 AIRWAY INHALATION TREATMENT: CPT

## 2025-03-16 PROCEDURE — 80048 BASIC METABOLIC PNL TOTAL CA: CPT | Performed by: INTERNAL MEDICINE

## 2025-03-16 PROCEDURE — 1100000001 HC PRIVATE ROOM DAILY

## 2025-03-16 PROCEDURE — 2500000004 HC RX 250 GENERAL PHARMACY W/ HCPCS (ALT 636 FOR OP/ED): Performed by: INTERNAL MEDICINE

## 2025-03-16 PROCEDURE — 85027 COMPLETE CBC AUTOMATED: CPT | Performed by: INTERNAL MEDICINE

## 2025-03-16 PROCEDURE — 2500000002 HC RX 250 W HCPCS SELF ADMINISTERED DRUGS (ALT 637 FOR MEDICARE OP, ALT 636 FOR OP/ED): Performed by: INTERNAL MEDICINE

## 2025-03-16 PROCEDURE — G0378 HOSPITAL OBSERVATION PER HR: HCPCS

## 2025-03-16 PROCEDURE — 82947 ASSAY GLUCOSE BLOOD QUANT: CPT

## 2025-03-16 PROCEDURE — 83036 HEMOGLOBIN GLYCOSYLATED A1C: CPT | Mod: AHULAB | Performed by: INTERNAL MEDICINE

## 2025-03-16 PROCEDURE — 2500000001 HC RX 250 WO HCPCS SELF ADMINISTERED DRUGS (ALT 637 FOR MEDICARE OP): Performed by: INTERNAL MEDICINE

## 2025-03-16 PROCEDURE — 36415 COLL VENOUS BLD VENIPUNCTURE: CPT | Performed by: INTERNAL MEDICINE

## 2025-03-16 RX ORDER — BACLOFEN 10 MG/1
10 TABLET ORAL 3 TIMES DAILY
Status: DISCONTINUED | OUTPATIENT
Start: 2025-03-16 | End: 2025-03-17 | Stop reason: HOSPADM

## 2025-03-16 RX ADMIN — DOCUSATE SODIUM 100 MG: 100 CAPSULE, LIQUID FILLED ORAL at 21:01

## 2025-03-16 RX ADMIN — CLONAZEPAM 1 MG: 1 TABLET ORAL at 10:32

## 2025-03-16 RX ADMIN — POTASSIUM CHLORIDE 40 MEQ: 1500 TABLET, EXTENDED RELEASE ORAL at 10:32

## 2025-03-16 RX ADMIN — METFORMIN HYDROCHLORIDE 500 MG: 500 TABLET ORAL at 10:32

## 2025-03-16 RX ADMIN — BUMETANIDE 2 MG: 1 TABLET ORAL at 10:31

## 2025-03-16 RX ADMIN — CARBIDOPA AND LEVODOPA 1.5 TABLET: 25; 100 TABLET ORAL at 13:44

## 2025-03-16 RX ADMIN — CARBIDOPA AND LEVODOPA 1.5 TABLET: 25; 100 TABLET ORAL at 18:08

## 2025-03-16 RX ADMIN — GABAPENTIN 1200 MG: 400 CAPSULE ORAL at 21:00

## 2025-03-16 RX ADMIN — BUDESONIDE 0.5 MG: 0.5 INHALANT RESPIRATORY (INHALATION) at 07:32

## 2025-03-16 RX ADMIN — SERTRALINE 250 MG: 50 TABLET, FILM COATED ORAL at 10:32

## 2025-03-16 RX ADMIN — TOPIRAMATE 200 MG: 100 TABLET, FILM COATED ORAL at 21:01

## 2025-03-16 RX ADMIN — BACLOFEN 10 MG: 10 TABLET ORAL at 21:00

## 2025-03-16 RX ADMIN — LOSARTAN POTASSIUM 50 MG: 50 TABLET, FILM COATED ORAL at 10:31

## 2025-03-16 RX ADMIN — ATORVASTATIN CALCIUM 20 MG: 20 TABLET, FILM COATED ORAL at 21:00

## 2025-03-16 RX ADMIN — DOCUSATE SODIUM 100 MG: 100 CAPSULE, LIQUID FILLED ORAL at 10:32

## 2025-03-16 RX ADMIN — QUETIAPINE FUMARATE 800 MG: 300 TABLET ORAL at 21:00

## 2025-03-16 RX ADMIN — OXYCODONE HYDROCHLORIDE AND ACETAMINOPHEN 1 TABLET: 5; 325 TABLET ORAL at 18:15

## 2025-03-16 RX ADMIN — POTASSIUM CHLORIDE 40 MEQ: 1500 TABLET, EXTENDED RELEASE ORAL at 18:08

## 2025-03-16 RX ADMIN — IPRATROPIUM BROMIDE AND ALBUTEROL SULFATE 3 ML: 2.5; .5 SOLUTION RESPIRATORY (INHALATION) at 19:32

## 2025-03-16 RX ADMIN — GABAPENTIN 1200 MG: 400 CAPSULE ORAL at 10:31

## 2025-03-16 RX ADMIN — BUDESONIDE 0.5 MG: 0.5 INHALANT RESPIRATORY (INHALATION) at 19:32

## 2025-03-16 RX ADMIN — METFORMIN HYDROCHLORIDE 500 MG: 500 TABLET ORAL at 18:08

## 2025-03-16 RX ADMIN — GABAPENTIN 1200 MG: 400 CAPSULE ORAL at 15:31

## 2025-03-16 RX ADMIN — CLONAZEPAM 1 MG: 1 TABLET ORAL at 21:00

## 2025-03-16 RX ADMIN — CARBIDOPA AND LEVODOPA 1.5 TABLET: 25; 100 TABLET ORAL at 10:32

## 2025-03-16 RX ADMIN — OXCARBAZEPINE 1200 MG: 300 TABLET, FILM COATED ORAL at 21:01

## 2025-03-16 RX ADMIN — TOPIRAMATE 200 MG: 100 TABLET, FILM COATED ORAL at 10:31

## 2025-03-16 RX ADMIN — POLYETHYLENE GLYCOL 3350 17 G: 17 POWDER, FOR SOLUTION ORAL at 10:39

## 2025-03-16 RX ADMIN — LEVOTHYROXINE SODIUM 100 MCG: 0.1 TABLET ORAL at 06:04

## 2025-03-16 RX ADMIN — BUMETANIDE 2 MG: 1 TABLET ORAL at 18:09

## 2025-03-16 RX ADMIN — LIDOCAINE 4% 1 PATCH: 40 PATCH TOPICAL at 10:32

## 2025-03-16 RX ADMIN — BACLOFEN 20 MG: 10 TABLET ORAL at 10:31

## 2025-03-16 RX ADMIN — ENOXAPARIN SODIUM 40 MG: 100 INJECTION SUBCUTANEOUS at 21:05

## 2025-03-16 RX ADMIN — CARVEDILOL 25 MG: 25 TABLET, FILM COATED ORAL at 10:31

## 2025-03-16 RX ADMIN — IPRATROPIUM BROMIDE AND ALBUTEROL SULFATE 3 ML: 2.5; .5 SOLUTION RESPIRATORY (INHALATION) at 07:31

## 2025-03-16 RX ADMIN — PANTOPRAZOLE SODIUM 40 MG: 40 TABLET, DELAYED RELEASE ORAL at 06:04

## 2025-03-16 RX ADMIN — ASPIRIN 81 MG: 81 TABLET, COATED ORAL at 10:31

## 2025-03-16 RX ADMIN — OXCARBAZEPINE 600 MG: 300 TABLET, FILM COATED ORAL at 11:25

## 2025-03-16 ASSESSMENT — COGNITIVE AND FUNCTIONAL STATUS - GENERAL
PERSONAL GROOMING: A LITTLE
HELP NEEDED FOR BATHING: A LITTLE
MOVING FROM LYING ON BACK TO SITTING ON SIDE OF FLAT BED WITH BEDRAILS: A LITTLE
STANDING UP FROM CHAIR USING ARMS: A LITTLE
DAILY ACTIVITIY SCORE: 18
CLIMB 3 TO 5 STEPS WITH RAILING: A LOT
MOVING TO AND FROM BED TO CHAIR: A LITTLE
TURNING FROM BACK TO SIDE WHILE IN FLAT BAD: A LITTLE
DRESSING REGULAR UPPER BODY CLOTHING: A LITTLE
HELP NEEDED FOR BATHING: A LITTLE
TOILETING: A LITTLE
DRESSING REGULAR LOWER BODY CLOTHING: A LITTLE
TURNING FROM BACK TO SIDE WHILE IN FLAT BAD: A LITTLE
TOILETING: A LITTLE
STANDING UP FROM CHAIR USING ARMS: A LITTLE
DAILY ACTIVITIY SCORE: 18
EATING MEALS: A LITTLE
MOVING FROM LYING ON BACK TO SITTING ON SIDE OF FLAT BED WITH BEDRAILS: A LITTLE
PERSONAL GROOMING: A LITTLE
WALKING IN HOSPITAL ROOM: A LOT
EATING MEALS: A LITTLE
WALKING IN HOSPITAL ROOM: A LOT
MOBILITY SCORE: 16
DRESSING REGULAR UPPER BODY CLOTHING: A LITTLE
DRESSING REGULAR LOWER BODY CLOTHING: A LITTLE
CLIMB 3 TO 5 STEPS WITH RAILING: A LOT
MOBILITY SCORE: 16
MOVING TO AND FROM BED TO CHAIR: A LITTLE

## 2025-03-16 ASSESSMENT — PAIN DESCRIPTION - ORIENTATION: ORIENTATION: RIGHT

## 2025-03-16 ASSESSMENT — PAIN SCALES - GENERAL
PAINLEVEL_OUTOF10: 5 - MODERATE PAIN
PAINLEVEL_OUTOF10: 9

## 2025-03-16 ASSESSMENT — PAIN DESCRIPTION - LOCATION: LOCATION: LEG

## 2025-03-16 NOTE — CARE PLAN
The patient's goals for the shift include  be able to walk again    The clinical goals for the shift include pt to remain hds and free from injury

## 2025-03-16 NOTE — PROGRESS NOTES
PROGRESS NOTE - INTERNAL MEDICINE     PATIENT NAME:  Prema Hair    MRN:  [id number]  SERVICE DATE:  3/16/2025       ADMITTING PHYSICIAN:  Shabana Rodriguez MD    ASSESSMENT AND PLAN    Hypokalemia  DM2, controlled. A1c 5.8 11/24  HTN, variable control  Drug-induced parkinsonism G21.19  Closed fracture of distal end of right fibula, Following accidental fall at home ground-level.  Impaired mobility, causing recurrent falls at home  Hyperlipidemia  Hx CA thyroid on supplemental therapy  Obesity, class I  Hx torticollis  Osteoarthrosis of the knees  Hx urinary retention     Plan:  fall precautions. PT OT eval / rx.  Accu-Cheks AC/at bedtime, SSI, hypoglycemia protocol. On metformin  Continue home medications.  Hold meds for low BP.  Keflex daily for UTI proph.  Continue levothyroxine supplement.  Aim for suppressed TSH  Decrease baclofen to 10mg TID to improve muscle strength.  NWB RLE and OP follow up with Dr Hammer in 1 wk  DVT proph        DISPOSITION PLAN:  ?SNF vs [facility]     INTERVAL HISTORY OF PRESENT ILLNESS:  RLE pain is tolerable. No chest pain/sob. No n/v/d. Oral intake good. Feeling better. No fever/chills. acute events from last 24 hrs reviewed.     Pertinent ROS:  No abdominal pain / No Bleeding / No rashes     Discussed with nursing and case management team and the specialists involved in this patient's care. Reviewed the EMR and documentation from other care-givers.        OBJECTIVE  PHYSICAL EXAM:      GENERAL: AAOx3, cooperative resting comfortably. Obese  SKIN: Skin turgor normal. No rashes  HEENT: no epistaxis, Moist mucosa.  LUNGS: Bilat breath sounds, with no added sounds  CARDIAC: REGULAR. no rubs, no murmur  ABDOMEN: soft, non-tender. +BS.  EXTREMITIES: No edema, Good capillary refill. RLE in splint.  NEURO: Insight fair to good. +Invol tremors of Parkinson's. Nonambulatory and NWB RLE.  MUSCULOSKELETAL: No acute inflammation in other joints.  Paucity of movements.            3/15/2025      7:51 AM 3/15/2025    11:29 AM 3/15/2025     3:26 PM 3/15/2025     8:10 PM 3/16/2025    12:11 AM 3/16/2025     4:17 AM 3/16/2025     7:28 AM   Vitals   Systolic 94 114 131 105 110 109 102   Diastolic 65 70 85 68 62 58 74   BP Location Right arm Right arm Right arm Left arm Left arm Left arm Right arm   Heart Rate 61 61 68 73 74 60 59   Temp 35.9 °C (96.6 °F) 36 °C (96.8 °F) 36.4 °C (97.5 °F) 36 °C (96.8 °F) 36 °C (96.8 °F) 36 °C (96.8 °F) 35.7 °C (96.3 °F)   Resp 17 17 16 16 16 16 16     Body mass index is 33.1 kg/m².    Intake/Output Summary (Last 24 hours) at 3/16/2025 1046  Last data filed at 3/16/2025 0734  Gross per 24 hour   Intake 300 ml   Output 2900 ml   Net -2600 ml           Current Facility-Administered Medications:     acetaminophen (Tylenol) tablet 650 mg, 650 mg, oral, q4h PRN **OR** acetaminophen (Tylenol) oral liquid 650 mg, 650 mg, oral, q4h PRN **OR** acetaminophen (Tylenol) suppository 650 mg, 650 mg, rectal, q4h PRN, Shabana Rodriguez MD    ammonium lactate (Lac-Hydrin) 12 % lotion 1 Application, 1 Application, Topical, PRN, Shabana Rodriguez MD    aspirin EC tablet 81 mg, 81 mg, oral, Daily, Shabana Rodriguez MD, 81 mg at 03/16/25 1031    atorvastatin (Lipitor) tablet 20 mg, 20 mg, oral, Nightly, Shabana Rodriguez MD, 20 mg at 03/15/25 2120    baclofen (Lioresal) tablet 20 mg, 20 mg, oral, TID, Shabana Rodriguez MD, 20 mg at 03/16/25 1031    budesonide (Pulmicort) 0.5 mg/2 mL nebulizer solution 0.5 mg, 0.5 mg, nebulization, BID, Shabana Rodriguez MD, 0.5 mg at 03/16/25 0732    bumetanide (Bumex) tablet 2 mg, 2 mg, oral, BID, Shabana Rodriguez MD, 2 mg at 03/16/25 1031    carbidopa-levodopa (Sinemet)  mg per tablet 1.5 tablet, 1.5 tablet, oral, TID, Shabana Rodriguez MD, 1.5 tablet at 03/16/25 1032    carvedilol (Coreg) tablet 25 mg, 25 mg, oral, BID, Shabana Rodriguez MD, 25 mg at 03/16/25 1031    cephalexin (Keflex) capsule 500 mg, 500 mg, oral, Daily, Shabana BUSTOS  MD Michael, 500 mg at 03/15/25 2120    clonazePAM (KlonoPIN) tablet 1 mg, 1 mg, oral, BID, Shabana Rodriguez MD, 1 mg at 03/16/25 1032    dextrose 50 % injection 12.5 g, 12.5 g, intravenous, q15 min PRN, Shabana Rodriguez MD    dextrose 50 % injection 25 g, 25 g, intravenous, q15 min PRN, Shabana Rodriguez MD    diclofenac sodium (Voltaren) 1 % gel 4 g, 4 g, Topical, TID PRN, Shabana Rodriguez MD    docusate sodium (Colace) capsule 100 mg, 100 mg, oral, BID, Shabaan Rodriguez MD, 100 mg at 03/16/25 1032    enoxaparin (Lovenox) syringe 40 mg, 40 mg, subcutaneous, q24h, Shabana Rodriguez MD, 40 mg at 03/15/25 2119    [START ON 3/17/2025] estradiol (Estrace) 0.01 % (0.1 mg/gram) vaginal cream 2 g, 2 g, vaginal, Once per day on Monday Wednesday Friday, Shabana Rodriguez MD    fluticasone (Flonase) nasal spray 1 spray, 1 spray, Each Nostril, Daily PRN, Shabana Rodriguez MD    gabapentin (Neurontin) capsule 1,200 mg, 1,200 mg, oral, TID, Shabana Rodriguez MD, 1,200 mg at 03/16/25 1031    glucagon (Glucagen) injection 1 mg, 1 mg, intramuscular, q15 min PRN, Shabana Rodriguez MD    glucagon (Glucagen) injection 1 mg, 1 mg, intramuscular, q15 min PRN, Shabana Rodriguez MD    insulin lispro injection 0-5 Units, 0-5 Units, subcutaneous, TID AC, Shabana Rodriguez MD    ipratropium-albuteroL (Duo-Neb) 0.5-2.5 mg/3 mL nebulizer solution 3 mL, 3 mL, nebulization, 4x daily, Shabana Rodriguez MD, 3 mL at 03/16/25 0731    ipratropium-albuteroL (Duo-Neb) 0.5-2.5 mg/3 mL nebulizer solution 3 mL, 3 mL, nebulization, q2h PRN, Shabana Rodriguez MD    levothyroxine (Synthroid, Levoxyl) tablet 100 mcg, 100 mcg, oral, Once per day on Sunday Saturday, Shabana Rodriguez MD, 100 mcg at 03/16/25 0604    [START ON 3/17/2025] levothyroxine (Synthroid, Levoxyl) tablet 200 mcg, 200 mcg, oral, Once per day on Monday Tuesday Wednesday Thursday Friday, Shabana Rodriguez MD    lidocaine 4 % patch 1 patch, 1 patch,  transdermal, Daily, Shabana Rodriguez MD, 1 patch at 03/16/25 1032    losartan (Cozaar) tablet 50 mg, 50 mg, oral, Daily, Shabana Rodriguez MD, 50 mg at 03/16/25 1031    metFORMIN (Glucophage) tablet 500 mg, 500 mg, oral, BID, Shabana Rodriguez MD, 500 mg at 03/16/25 1032    ondansetron (Zofran) tablet 4 mg, 4 mg, oral, q8h PRN **OR** ondansetron (Zofran) injection 4 mg, 4 mg, intravenous, q8h PRN, Shabana Rodriguez MD    OXcarbazepine (Trileptal) tablet 1,200 mg, 1,200 mg, oral, Nightly, Shabana Rodriguez MD, 1,200 mg at 03/15/25 2120    OXcarbazepine (Trileptal) tablet 600 mg, 600 mg, oral, q AM, Shabana Rodriguez MD, 600 mg at 03/15/25 1141    oxyCODONE-acetaminophen (Percocet) 5-325 mg per tablet 1 tablet, 1 tablet, oral, q6h PRN, Shabana Rodriguez MD, 1 tablet at 03/15/25 2010    pantoprazole (ProtoNix) EC tablet 40 mg, 40 mg, oral, Daily before breakfast, Shabana Rodriguez MD, 40 mg at 03/16/25 0604    polyethylene glycol (Glycolax, Miralax) packet 17 g, 17 g, oral, Daily, Shabana Rodriguez MD, 17 g at 03/16/25 1039    potassium chloride CR (Klor-Con M20) ER tablet 40 mEq, 40 mEq, oral, BID, Shabana Rodriguez MD, 40 mEq at 03/16/25 1032    prazosin (Minipress) capsule 5 mg, 5 mg, oral, Nightly, Shabana Rodriguez MD, 5 mg at 03/15/25 2120    QUEtiapine (SEROquel) tablet 800 mg, 800 mg, oral, Nightly, Shabana Rodriguez MD, 800 mg at 03/15/25 2119    sertraline (Zoloft) tablet 250 mg, 250 mg, oral, Daily, Shabana Rodriguez MD, 250 mg at 03/16/25 1032    topiramate (Topamax) tablet 200 mg, 200 mg, oral, BID, Shabana Rodriguez MD, 200 mg at 03/16/25 1031    DATA:   Diagnostic tests reviewed for today's visit:    Most recent labs  Results from last 7 days   Lab Units 03/16/25  0610 03/15/25  0518 03/14/25  1134   WBC AUTO x10*3/uL 4.4 3.9* 6.1   HEMOGLOBIN g/dL 11.8* 10.5* 11.7*   HEMATOCRIT % 39.0 32.2* 39.4   PLATELETS AUTO x10*3/uL 212 199 220     Results from last 7 days   Lab Units  03/16/25  0610 03/15/25  0518 03/14/25  1134   SODIUM mmol/L 141 143 143   POTASSIUM mmol/L 3.8 3.3* 3.6   CHLORIDE mmol/L 106 108* 105   CO2 mmol/L 27 26 29   BUN mg/dL 22 19 17   CREATININE mg/dL 0.85 0.71 0.87   CALCIUM mg/dL 8.3* 7.9* 8.4*   PROTEIN TOTAL g/dL  --   --  6.4   BILIRUBIN TOTAL mg/dL  --   --  0.3   ALK PHOS U/L  --   --  153*   ALT U/L  --   --  14   AST U/L  --   --  12   GLUCOSE mg/dL 96 97 90             Results from last 7 days   Lab Units 03/16/25  0729 03/15/25  2241 03/15/25  1528 03/15/25  1129 03/15/25  0930 03/14/25  1521   POCT GLUCOSE mg/dL 81 101* 84 96 94 92        CT head wo IV contrast   Final Result   No evidence of acute cortical infarct or intracranial hemorrhage.        No evidence of intracranial hemorrhage or displaced skull fracture.        MACRO:   None        Signed by: Sherie Rivero 3/14/2025 11:44 AM   Dictation workstation:   [id number]      CT cervical spine wo IV contrast   Final Result   No evidence for an acute fracture or subluxation of the cervical   spine. Reversal of normal cervical lordosis with multilevel listhesis   and discogenic degenerative changes as described.        MACRO:   None        Signed by: Sherie Rivero 3/14/2025 11:49 AM   Dictation workstation:   [id number]      XR ankle right 3+ views   Final Result   1. Acute, mildly displaced oblique fracture of the proximal fibular   shaft.   2. Soft tissue swelling of the calf most pronounced at the ankle.   3. There is apparent cortical thickening/irregularity of the distal   fibula which is likely chronic in nature. However, if there is focal   acute pain to the lateral malleolus, recommend further evaluation with   CT.   Signed by Greg Chambers MD      XR knee right 4+ views   Final Result   Proximal right fibular fracture with separation of the fracture   fragments as described. No dislocation.        Total right knee arthroplasty.             Signed by: Sherie Rivero 3/14/2025 11:51 AM   Dictation  workstation:   [id number]      XR femur right 2+ views   Final Result   1. Acute, mildly displaced oblique fracture of the proximal fibular   shaft.   2. Soft tissue swelling of the calf most pronounced at the ankle.   3. There is apparent cortical thickening/irregularity of the distal   fibula which is likely chronic in nature. However, if there is focal   acute pain to the lateral malleolus, recommend further evaluation with   CT.   Signed by Greg Chambers MD      XR tibia fibula right 2 views   Final Result   1. Acute, mildly displaced oblique fracture of the proximal fibular   shaft.   2. Soft tissue swelling of the calf most pronounced at the ankle.   3. There is apparent cortical thickening/irregularity of the distal   fibula which is likely chronic in nature. However, if there is focal   acute pain to the lateral malleolus, recommend further evaluation with   CT.   Signed by Greg Chambers MD      XR chest 1 view   Final Result   No acute process.   Signed by Sanjiv Hernández MD            SIGNATURE: Shabana Rodriguez MD PATIENT NAME: Prema Hair   DATE: 3/16/2025 MRN: [id number]   TIME: 10:46 AM

## 2025-03-16 NOTE — PROGRESS NOTES
03/16/25 1235   Discharge Planning   Home or Post Acute Services In home services   Type of Home Care Services Home PT;Home OT   Expected Discharge Disposition Home H     Met with patient at bedside to discuss discharge plans.  I let patient know that [facility] is the only accepting facility.  Patient said she would rather return home with [facility].  Patient said her daughters are home to help her.  [facility] and would like it to be set up through them.  Dr Rodriguez is PCP.  Will need new internal [facility] orders.

## 2025-03-17 ENCOUNTER — PHARMACY VISIT (OUTPATIENT)
Dept: PHARMACY | Facility: CLINIC | Age: 60
End: 2025-03-17
Payer: MEDICARE

## 2025-03-17 ENCOUNTER — HOME HEALTH ADMISSION (OUTPATIENT)
Dept: HOME HEALTH SERVICES | Facility: HOME HEALTH | Age: 60
End: 2025-03-17
Payer: MEDICARE

## 2025-03-17 ENCOUNTER — DOCUMENTATION (OUTPATIENT)
Dept: HOME HEALTH SERVICES | Facility: HOME HEALTH | Age: 60
End: 2025-03-17
Payer: MEDICARE

## 2025-03-17 VITALS
BODY MASS INDEX: 33.18 KG/M2 | RESPIRATION RATE: 16 BRPM | HEART RATE: 65 BPM | WEIGHT: 224 LBS | OXYGEN SATURATION: 93 % | TEMPERATURE: 97.2 F | DIASTOLIC BLOOD PRESSURE: 75 MMHG | HEIGHT: 69 IN | SYSTOLIC BLOOD PRESSURE: 96 MMHG

## 2025-03-17 LAB
ANION GAP SERPL CALC-SCNC: 13 MMOL/L (ref 10–20)
BUN SERPL-MCNC: 25 MG/DL (ref 6–23)
CALCIUM SERPL-MCNC: 8.3 MG/DL (ref 8.6–10.3)
CHLORIDE SERPL-SCNC: 105 MMOL/L (ref 98–107)
CO2 SERPL-SCNC: 28 MMOL/L (ref 21–32)
CREAT SERPL-MCNC: 0.87 MG/DL (ref 0.5–1.05)
EGFRCR SERPLBLD CKD-EPI 2021: 76 ML/MIN/1.73M*2
ERYTHROCYTE [DISTWIDTH] IN BLOOD BY AUTOMATED COUNT: 17.6 % (ref 11.5–14.5)
GLUCOSE BLD MANUAL STRIP-MCNC: 103 MG/DL (ref 74–99)
GLUCOSE BLD MANUAL STRIP-MCNC: 115 MG/DL (ref 74–99)
GLUCOSE SERPL-MCNC: 108 MG/DL (ref 74–99)
HCT VFR BLD AUTO: 38.2 % (ref 36–46)
HGB BLD-MCNC: 11.8 G/DL (ref 12–16)
MCH RBC QN AUTO: 27.1 PG (ref 26–34)
MCHC RBC AUTO-ENTMCNC: 30.9 G/DL (ref 32–36)
MCV RBC AUTO: 88 FL (ref 80–100)
NRBC BLD-RTO: 0 /100 WBCS (ref 0–0)
PLATELET # BLD AUTO: 203 X10*3/UL (ref 150–450)
POTASSIUM SERPL-SCNC: 3.6 MMOL/L (ref 3.5–5.3)
RBC # BLD AUTO: 4.36 X10*6/UL (ref 4–5.2)
SODIUM SERPL-SCNC: 142 MMOL/L (ref 136–145)
WBC # BLD AUTO: 4.6 X10*3/UL (ref 4.4–11.3)

## 2025-03-17 PROCEDURE — 2500000004 HC RX 250 GENERAL PHARMACY W/ HCPCS (ALT 636 FOR OP/ED): Performed by: INTERNAL MEDICINE

## 2025-03-17 PROCEDURE — 82947 ASSAY GLUCOSE BLOOD QUANT: CPT

## 2025-03-17 PROCEDURE — RXMED WILLOW AMBULATORY MEDICATION CHARGE

## 2025-03-17 PROCEDURE — 2500000002 HC RX 250 W HCPCS SELF ADMINISTERED DRUGS (ALT 637 FOR MEDICARE OP, ALT 636 FOR OP/ED): Performed by: INTERNAL MEDICINE

## 2025-03-17 PROCEDURE — 94640 AIRWAY INHALATION TREATMENT: CPT

## 2025-03-17 PROCEDURE — 36415 COLL VENOUS BLD VENIPUNCTURE: CPT | Performed by: INTERNAL MEDICINE

## 2025-03-17 PROCEDURE — 80048 BASIC METABOLIC PNL TOTAL CA: CPT | Performed by: INTERNAL MEDICINE

## 2025-03-17 PROCEDURE — 85027 COMPLETE CBC AUTOMATED: CPT | Performed by: INTERNAL MEDICINE

## 2025-03-17 PROCEDURE — 2500000005 HC RX 250 GENERAL PHARMACY W/O HCPCS: Performed by: INTERNAL MEDICINE

## 2025-03-17 PROCEDURE — 2500000001 HC RX 250 WO HCPCS SELF ADMINISTERED DRUGS (ALT 637 FOR MEDICARE OP): Performed by: INTERNAL MEDICINE

## 2025-03-17 PROCEDURE — G0378 HOSPITAL OBSERVATION PER HR: HCPCS

## 2025-03-17 RX ORDER — BACLOFEN 20 MG/1
10 TABLET ORAL 3 TIMES DAILY
Start: 2025-03-17

## 2025-03-17 RX ORDER — OXYCODONE AND ACETAMINOPHEN 5; 325 MG/1; MG/1
0.5 TABLET ORAL EVERY 6 HOURS PRN
Qty: 5 TABLET | Refills: 0 | Status: SHIPPED | OUTPATIENT
Start: 2025-03-17 | End: 2025-03-22

## 2025-03-17 RX ORDER — BUSPIRONE HYDROCHLORIDE 10 MG/1
5 TABLET ORAL
Start: 2025-03-17

## 2025-03-17 RX ORDER — CARVEDILOL 6.25 MG/1
6.25 TABLET ORAL
Status: DISCONTINUED | OUTPATIENT
Start: 2025-03-17 | End: 2025-03-17 | Stop reason: HOSPADM

## 2025-03-17 RX ORDER — LOSARTAN POTASSIUM 25 MG/1
12.5 TABLET ORAL DAILY
Qty: 15 TABLET | Refills: 1 | Status: SHIPPED | OUTPATIENT
Start: 2025-03-18 | End: 2025-05-17

## 2025-03-17 RX ORDER — LEVOTHYROXINE SODIUM 100 UG/1
TABLET ORAL
Start: 2025-03-17

## 2025-03-17 RX ORDER — CARVEDILOL 6.25 MG/1
6.25 TABLET ORAL
Qty: 60 TABLET | Refills: 1 | Status: SHIPPED | OUTPATIENT
Start: 2025-03-17 | End: 2025-05-16

## 2025-03-17 RX ORDER — LOSARTAN POTASSIUM 25 MG/1
12.5 TABLET ORAL DAILY
Status: DISCONTINUED | OUTPATIENT
Start: 2025-03-18 | End: 2025-03-17 | Stop reason: HOSPADM

## 2025-03-17 RX ADMIN — ASPIRIN 81 MG: 81 TABLET, COATED ORAL at 09:10

## 2025-03-17 RX ADMIN — ESTRADIOL 2 G: 0.1 CREAM VAGINAL at 09:25

## 2025-03-17 RX ADMIN — ACETAMINOPHEN 650 MG: 325 TABLET, FILM COATED ORAL at 09:30

## 2025-03-17 RX ADMIN — TOPIRAMATE 200 MG: 100 TABLET, FILM COATED ORAL at 09:10

## 2025-03-17 RX ADMIN — SERTRALINE 250 MG: 50 TABLET, FILM COATED ORAL at 09:08

## 2025-03-17 RX ADMIN — LIDOCAINE 4% 1 PATCH: 40 PATCH TOPICAL at 09:13

## 2025-03-17 RX ADMIN — METFORMIN HYDROCHLORIDE 500 MG: 500 TABLET ORAL at 09:10

## 2025-03-17 RX ADMIN — CEPHALEXIN 500 MG: 500 CAPSULE ORAL at 09:10

## 2025-03-17 RX ADMIN — BACLOFEN 10 MG: 10 TABLET ORAL at 09:10

## 2025-03-17 RX ADMIN — CARBIDOPA AND LEVODOPA 1.5 TABLET: 25; 100 TABLET ORAL at 09:09

## 2025-03-17 RX ADMIN — IPRATROPIUM BROMIDE AND ALBUTEROL SULFATE 3 ML: 2.5; .5 SOLUTION RESPIRATORY (INHALATION) at 07:35

## 2025-03-17 RX ADMIN — POLYETHYLENE GLYCOL 3350 17 G: 17 POWDER, FOR SOLUTION ORAL at 09:25

## 2025-03-17 RX ADMIN — OXCARBAZEPINE 600 MG: 300 TABLET, FILM COATED ORAL at 09:24

## 2025-03-17 RX ADMIN — CLONAZEPAM 1 MG: 1 TABLET ORAL at 09:10

## 2025-03-17 RX ADMIN — BUMETANIDE 2 MG: 1 TABLET ORAL at 09:11

## 2025-03-17 RX ADMIN — POTASSIUM CHLORIDE 40 MEQ: 1500 TABLET, EXTENDED RELEASE ORAL at 09:10

## 2025-03-17 RX ADMIN — DOCUSATE SODIUM 100 MG: 100 CAPSULE, LIQUID FILLED ORAL at 09:08

## 2025-03-17 RX ADMIN — GABAPENTIN 1200 MG: 400 CAPSULE ORAL at 09:08

## 2025-03-17 RX ADMIN — LEVOTHYROXINE SODIUM 200 MCG: 0.1 TABLET ORAL at 06:44

## 2025-03-17 RX ADMIN — BUDESONIDE 0.5 MG: 0.5 INHALANT RESPIRATORY (INHALATION) at 07:35

## 2025-03-17 RX ADMIN — PANTOPRAZOLE SODIUM 40 MG: 40 TABLET, DELAYED RELEASE ORAL at 06:44

## 2025-03-17 ASSESSMENT — COGNITIVE AND FUNCTIONAL STATUS - GENERAL
CLIMB 3 TO 5 STEPS WITH RAILING: A LOT
TOILETING: A LITTLE
MOVING TO AND FROM BED TO CHAIR: A LITTLE
STANDING UP FROM CHAIR USING ARMS: A LITTLE
DAILY ACTIVITIY SCORE: 19
WALKING IN HOSPITAL ROOM: A LOT
DRESSING REGULAR UPPER BODY CLOTHING: A LITTLE
HELP NEEDED FOR BATHING: A LITTLE
PERSONAL GROOMING: A LITTLE
DRESSING REGULAR LOWER BODY CLOTHING: A LITTLE
TURNING FROM BACK TO SIDE WHILE IN FLAT BAD: A LITTLE
MOBILITY SCORE: 17

## 2025-03-17 ASSESSMENT — PAIN DESCRIPTION - LOCATION
LOCATION: GENERALIZED
LOCATION: NECK

## 2025-03-17 ASSESSMENT — PAIN SCALES - GENERAL
PAINLEVEL_OUTOF10: 9

## 2025-03-17 ASSESSMENT — PAIN DESCRIPTION - ORIENTATION: ORIENTATION: POSTERIOR

## 2025-03-17 ASSESSMENT — PAIN - FUNCTIONAL ASSESSMENT
PAIN_FUNCTIONAL_ASSESSMENT: 0-10

## 2025-03-17 ASSESSMENT — PAIN SCALES - PAIN ASSESSMENT IN ADVANCED DEMENTIA (PAINAD)
TOTALSCORE: MEDICATION (SEE MAR)
TOTALSCORE: MEDICATION (SEE MAR)

## 2025-03-17 NOTE — CARE PLAN
The patient's goals for the shift include      The clinical goals for the shift include maintain safety      Problem: Pain - Adult  Goal: Verbalizes/displays adequate comfort level or baseline comfort level  Outcome: Progressing     Problem: Discharge Planning  Goal: Discharge to home or other facility with appropriate resources  Outcome: Progressing     Problem: Chronic Conditions and Co-morbidities  Goal: Patient's chronic conditions and co-morbidity symptoms are monitored and maintained or improved  Outcome: Progressing     Problem: Nutrition  Goal: Nutrient intake appropriate for maintaining nutritional needs  Outcome: Progressing     Problem: Skin  Goal: Decreased wound size/increased tissue granulation at next dressing change  Outcome: Progressing  Goal: Participates in plan/prevention/treatment measures  Outcome: Progressing     Problem: Fall/Injury  Goal: Not fall by end of shift  Outcome: Progressing  Goal: Be free from injury by end of the shift  Outcome: Progressing     Problem: Diabetes  Goal: Maintain glucose levels >70mg/dl to <250mg/dl throughout shift  Outcome: Progressing     Problem: Pain  Goal: Walks with improved pain control throughout the shift  Outcome: Progressing  Goal: Performs ADL's with improved pain control throughout shift  Outcome: Progressing  Goal: Free from opioid side effects throughout the shift  Outcome: Progressing

## 2025-03-17 NOTE — CARE PLAN
The patient's goals for the shift include      The clinical goals for the shift include maintain safety    Problem: Pain - Adult  Goal: Verbalizes/displays adequate comfort level or baseline comfort level  3/17/2025 0244 by Aurelia Bishop LPN  Outcome: Progressing  3/17/2025 0149 by Aurelia Bishop LPN  Outcome: Progressing     Problem: Discharge Planning  Goal: Discharge to home or other facility with appropriate resources  3/17/2025 0244 by Aurelia Bishop LPN  Outcome: Progressing  3/17/2025 0149 by Aurelia Bishop LPN  Outcome: Progressing     Problem: Chronic Conditions and Co-morbidities  Goal: Patient's chronic conditions and co-morbidity symptoms are monitored and maintained or improved  3/17/2025 0244 by Aurelia Bishop LPN  Outcome: Progressing  3/17/2025 0149 by Aurelia Bishop LPN  Outcome: Progressing     Problem: Nutrition  Goal: Nutrient intake appropriate for maintaining nutritional needs  3/17/2025 0244 by Aurelia Bishop LPN  Outcome: Progressing  3/17/2025 0149 by Aurelia Bishop LPN  Outcome: Progressing     Problem: Skin  Goal: Decreased wound size/increased tissue granulation at next dressing change  3/17/2025 0244 by Aurelia Bishop LPN  Outcome: Progressing  3/17/2025 0149 by Aurelia Bishop LPN  Outcome: Progressing  Goal: Participates in plan/prevention/treatment measures  3/17/2025 0244 by Aurelia Bishop LPN  Outcome: Progressing  3/17/2025 0149 by Aurelia Bishop LPN  Outcome: Progressing     Problem: Fall/Injury  Goal: Not fall by end of shift  3/17/2025 0244 by Aurelia Bishop LPN  Outcome: Progressing  3/17/2025 0149 by Aurelia Bishop LPN  Outcome: Progressing  Goal: Be free from injury by end of the shift  3/17/2025 0244 by Aurelia Bishop LPN  Outcome: Progressing  3/17/2025 0149 by Aurelia Bishop LPN  Outcome: Progressing     Problem: Diabetes  Goal: Maintain glucose levels >70mg/dl to <250mg/dl throughout  shift  3/17/2025 0244 by Aurelia Bishop LPN  Outcome: Progressing  3/17/2025 0149 by Aurelia Bishop LPN  Outcome: Progressing     Problem: Pain  Goal: Walks with improved pain control throughout the shift  3/17/2025 0244 by Aurelia Bishop LPN  Outcome: Progressing  3/17/2025 0149 by Aurelia Bishop LPN  Outcome: Progressing  Goal: Performs ADL's with improved pain control throughout shift  3/17/2025 0244 by Aurelia Bishop LPN  Outcome: Progressing  3/17/2025 0149 by Aurelia Bishop LPN  Outcome: Progressing  Goal: Free from opioid side effects throughout the shift  3/17/2025 0244 by Aurelia Bishop LPN  Outcome: Progressing  3/17/2025 0149 by Aurelia Bishop LPN  Outcome: Progressing

## 2025-03-17 NOTE — HH CARE COORDINATION
Home Care received a Referral for Nursing, Physical Therapy, Occupational Therapy, and Home Health Aide. We have processed the referral for a Start of Care on 3/18/2025.     If you have any questions or concerns, please feel free to contact us at [phone]. Follow the prompts, enter your five digit zip code, and you will be directed to your care team on [facility].

## 2025-03-17 NOTE — CARE PLAN
Problem: Pain - Adult  Goal: Verbalizes/displays adequate comfort level or baseline comfort level  3/17/2025 0248 by Aurelia Bishop LPN  Outcome: Progressing  3/17/2025 0244 by Aurelia Bishop LPN  Outcome: Progressing  3/17/2025 0149 by Aurelia Bishop LPN  Outcome: Progressing     Problem: Discharge Planning  Goal: Discharge to home or other facility with appropriate resources  3/17/2025 0248 by Aurelia Bishop LPN  Outcome: Progressing  3/17/2025 0244 by Aurelia Bishop LPN  Outcome: Progressing  3/17/2025 0149 by Aurelia Bishop LPN  Outcome: Progressing     Problem: Chronic Conditions and Co-morbidities  Goal: Patient's chronic conditions and co-morbidity symptoms are monitored and maintained or improved  3/17/2025 0248 by Aurelia Bishop, LPN  Outcome: Progressing  3/17/2025 0244 by Aurelia Bishop LPN  Outcome: Progressing  3/17/2025 0149 by Aurelia Bishop LPN  Outcome: Progressing     Problem: Nutrition  Goal: Nutrient intake appropriate for maintaining nutritional needs  3/17/2025 0248 by Aurelia Bishop LPN  Outcome: Progressing  3/17/2025 0244 by Aurelia Bishop LPN  Outcome: Progressing  3/17/2025 0149 by Aurelia Bishop, LPN  Outcome: Progressing     Problem: Skin  Goal: Decreased wound size/increased tissue granulation at next dressing change  3/17/2025 0248 by Aurelia Bishop LPN  Outcome: Progressing  3/17/2025 0244 by Aurelia Bishop LPN  Outcome: Progressing  3/17/2025 0149 by Aurelia Bishop LPN  Outcome: Progressing  Goal: Participates in plan/prevention/treatment measures  3/17/2025 0248 by Aurelia Bishop, LPN  Outcome: Progressing  3/17/2025 0244 by Aurelia Bishop, LPN  Outcome: Progressing  3/17/2025 0149 by Aurelia Bishop LPN  Outcome: Progressing     Problem: Fall/Injury  Goal: Not fall by end of shift  3/17/2025 0248 by Aurelia Bishop LPN  Outcome: Progressing  3/17/2025 0244 by Aurelia Bishop LPN  Outcome:  Progressing  3/17/2025 0149 by Aurelia Bishop, LPN  Outcome: Progressing  Goal: Be free from injury by end of the shift  3/17/2025 0248 by Aurelia Bishop, LPN  Outcome: Progressing  3/17/2025 0244 by Aurelia Bishop, LPN  Outcome: Progressing  3/17/2025 0149 by Aurelia Bishop, LPN  Outcome: Progressing     Problem: Diabetes  Goal: Maintain glucose levels >70mg/dl to <250mg/dl throughout shift  3/17/2025 0248 by Aurelia Bishop, LPN  Outcome: Progressing  3/17/2025 0244 by Auerlia Bishop, LPN  Outcome: Progressing  3/17/2025 0149 by Aurelia Bishop, LPN  Outcome: Progressing     Problem: Pain  Goal: Walks with improved pain control throughout the shift  3/17/2025 0248 by Aurelia Bishop, LPN  Outcome: Progressing  3/17/2025 0244 by Aurelia Bishop, LPN  Outcome: Progressing  3/17/2025 0149 by Aurelia Bishop, LPN  Outcome: Progressing  Goal: Performs ADL's with improved pain control throughout shift  3/17/2025 0248 by Aurelia Bishop, LPN  Outcome: Progressing  3/17/2025 0244 by Aurelia Bishop, LPN  Outcome: Progressing  3/17/2025 0149 by Aurelia Bishop, LPN  Outcome: Progressing  Goal: Free from opioid side effects throughout the shift  3/17/2025 0248 by Aurelia Bishop, LPN  Outcome: Progressing  3/17/2025 0244 by Aurelia Bishop, LPN  Outcome: Progressing  3/17/2025 0149 by Aurelia Bishop, LPN  Outcome: Progressing   The patient's goals for the shift include      The clinical goals for the shift include maintain safety

## 2025-03-17 NOTE — PROGRESS NOTES
PROGRESS NOTE - INTERNAL MEDICINE     PATIENT NAME:  Prema Hair    MRN:  [id number]  SERVICE DATE:  3/17/2025       ADMITTING PHYSICIAN:  Shabana Rodriguez MD    ASSESSMENT AND PLAN    Drug-induced parkinsonism G21.19 causing impaired mobility. Probably exacerbated by multiple medications.   Hypokalemia - better.  DM2, controlled. A1c 5.8 11/24  HTN, variable control  Closed fracture of distal end of right fibula, Following accidental fall at home ground-level.  Impaired mobility, causing recurrent falls at home  Hyperlipidemia  Hx CA thyroid on supplemental therapy (Z85.850)  Obesity, class I  Hx torticollis  Osteoarthrosis of the knees  Hx urinary retention     Plan:  Pt willing to down titrate some of her meds that could precipitate falls, as against in the past.  fall precautions. PT OT eval / rx.  Accu-Cheks AC/at bedtime, SSI, hypoglycemia protocol. On metformin  Continue home medications.  Hold meds for low BP.  Keflex daily for UTI proph.  Continue levothyroxine supplement.  Aim for suppressed TSH  Decrease baclofen to 10mg TID to improve muscle strength.  NWB RLE and OP follow up with Dr Hammer in 1 wk  Request for OP Psych appt (pt's current psychiatrist closing the practice)  DVT proph        DISPOSITION PLAN:  ?SNF vs C     INTERVAL HISTORY OF PRESENT ILLNESS:  RLE pain is tolerable. No chest pain/sob. No n/v/d. Oral intake good. Feeling better. No fever/chills. acute events from last 24 hrs reviewed.     Pertinent ROS:  No abdominal pain / No Bleeding / No rashes     Discussed with nursing and case management team and the specialists involved in this patient's care. Reviewed the EMR and documentation from other care-givers.        OBJECTIVE  PHYSICAL EXAM:      GENERAL: AAOx3, cooperative resting comfortably. Obese  SKIN: Skin turgor normal. No rashes  HEENT: no epistaxis, Moist mucosa.  LUNGS: Bilat breath sounds, with no added sounds  CARDIAC: REGULAR. no rubs, no murmur  ABDOMEN: soft,  non-tender. +BS.  EXTREMITIES: No edema, Good capillary refill. RLE in splint.  NEURO: Insight fair to good. +Invol tremors of Parkinson's. Nonambulatory and NWB RLE.  MUSCULOSKELETAL: No acute inflammation in other joints.  Paucity of movements.              3/16/2025     7:28 AM 3/16/2025    11:48 AM 3/16/2025     3:50 PM 3/16/2025     7:58 PM 3/16/2025    11:58 PM 3/17/2025     4:10 AM 3/17/2025     7:37 AM   Vitals   Systolic 102 125 122 101 94 95 96   Diastolic 74 80 86 65 64 58 75   BP Location Right arm Right arm Right arm Left arm Left arm Left arm Left arm   Heart Rate 59 64 71 67 66 63 65   Temp 35.7 °C (96.3 °F) 35.9 °C (96.6 °F) 35.8 °C (96.4 °F) 36.2 °C (97.2 °F) 36 °C (96.8 °F) 35.9 °C (96.6 °F) 36.2 °C (97.2 °F)   Resp 16 17 16 18 16 16 16     Body mass index is 33.1 kg/m².    Intake/Output Summary (Last 24 hours) at 3/17/2025 0900  Last data filed at 3/17/2025 0737  Gross per 24 hour   Intake --   Output 2000 ml   Net -2000 ml           Current Facility-Administered Medications:     acetaminophen (Tylenol) tablet 650 mg, 650 mg, oral, q4h PRN **OR** acetaminophen (Tylenol) oral liquid 650 mg, 650 mg, oral, q4h PRN **OR** acetaminophen (Tylenol) suppository 650 mg, 650 mg, rectal, q4h PRN, Shabana Rodriguez MD    ammonium lactate (Lac-Hydrin) 12 % lotion 1 Application, 1 Application, Topical, PRN, Shabana Rodriguez MD    aspirin EC tablet 81 mg, 81 mg, oral, Daily, Shabana Rodriguez MD, 81 mg at 03/16/25 1031    atorvastatin (Lipitor) tablet 20 mg, 20 mg, oral, Nightly, Shabana Rodriguez MD, 20 mg at 03/16/25 2100    baclofen (Lioresal) tablet 10 mg, 10 mg, oral, TID, Shabana Rodriguez MD, 10 mg at 03/16/25 2100    budesonide (Pulmicort) 0.5 mg/2 mL nebulizer solution 0.5 mg, 0.5 mg, nebulization, BID, Shabana Rodriguez MD, 0.5 mg at 03/17/25 0735    bumetanide (Bumex) tablet 2 mg, 2 mg, oral, BID, Shabana Rodriguez MD, 2 mg at 03/16/25 1809    carbidopa-levodopa (Sinemet)  mg per  tablet 1.5 tablet, 1.5 tablet, oral, TID, Shabana Rodriguez MD, 1.5 tablet at 03/16/25 1808    carvedilol (Coreg) tablet 25 mg, 25 mg, oral, BID, Shabana Rodriguez MD, 25 mg at 03/16/25 1031    cephalexin (Keflex) capsule 500 mg, 500 mg, oral, Daily, Shabana Rodriguez MD, 500 mg at 03/15/25 2120    clonazePAM (KlonoPIN) tablet 1 mg, 1 mg, oral, BID, Shabana Rodriguez MD, 1 mg at 03/16/25 2100    dextrose 50 % injection 12.5 g, 12.5 g, intravenous, q15 min PRN, Shabana Rodriguez MD    dextrose 50 % injection 25 g, 25 g, intravenous, q15 min PRN, Shabana Rodriguez MD    diclofenac sodium (Voltaren) 1 % gel 4 g, 4 g, Topical, TID PRN, Shabana Rodriguez MD    docusate sodium (Colace) capsule 100 mg, 100 mg, oral, BID, Shabana Rodriguez MD, 100 mg at 03/16/25 2101    enoxaparin (Lovenox) syringe 40 mg, 40 mg, subcutaneous, q24h, Shabana Rodriguez MD, 40 mg at 03/16/25 2105    estradiol (Estrace) 0.01 % (0.1 mg/gram) vaginal cream 2 g, 2 g, vaginal, Once per day on Monday Wednesday Friday, Shabana Rodriguez MD    fluticasone (Flonase) nasal spray 1 spray, 1 spray, Each Nostril, Daily PRN, Shabana Rodriguez MD    gabapentin (Neurontin) capsule 1,200 mg, 1,200 mg, oral, TID, Shabana Rodriguez MD, 1,200 mg at 03/16/25 2100    glucagon (Glucagen) injection 1 mg, 1 mg, intramuscular, q15 min PRN, Shabana Rodriguez MD    glucagon (Glucagen) injection 1 mg, 1 mg, intramuscular, q15 min PRN, Shabana Rodriguez MD    insulin lispro injection 0-5 Units, 0-5 Units, subcutaneous, TID AC, Shabana Rodriguez MD    ipratropium-albuteroL (Duo-Neb) 0.5-2.5 mg/3 mL nebulizer solution 3 mL, 3 mL, nebulization, 4x daily, Shabana Rodriguez MD, 3 mL at 03/17/25 0735    ipratropium-albuteroL (Duo-Neb) 0.5-2.5 mg/3 mL nebulizer solution 3 mL, 3 mL, nebulization, q2h PRN, Shabana Rodriguez MD    levothyroxine (Synthroid, Levoxyl) tablet 100 mcg, 100 mcg, oral, Once per day on Sunday Saturday, Shabana Rodriguez,  MD, 100 mcg at 03/16/25 0604    levothyroxine (Synthroid, Levoxyl) tablet 200 mcg, 200 mcg, oral, Once per day on Monday Tuesday Wednesday Thursday Friday, Shabana Rodriguez MD, 200 mcg at 03/17/25 0644    lidocaine 4 % patch 1 patch, 1 patch, transdermal, Daily, Shabana Rodriguez MD, 1 patch at 03/16/25 1032    losartan (Cozaar) tablet 50 mg, 50 mg, oral, Daily, Shabana Rodriguez MD, 50 mg at 03/16/25 1031    metFORMIN (Glucophage) tablet 500 mg, 500 mg, oral, BID, Shabana Rodriguez MD, 500 mg at 03/16/25 1808    ondansetron (Zofran) tablet 4 mg, 4 mg, oral, q8h PRN **OR** ondansetron (Zofran) injection 4 mg, 4 mg, intravenous, q8h PRN, Shabana Rodriguez MD    OXcarbazepine (Trileptal) tablet 1,200 mg, 1,200 mg, oral, Nightly, Shabana Rordiguez MD, 1,200 mg at 03/16/25 2101    OXcarbazepine (Trileptal) tablet 600 mg, 600 mg, oral, q AM, Shabana Rodriguez MD, 600 mg at 03/16/25 1125    oxyCODONE-acetaminophen (Percocet) 5-325 mg per tablet 1 tablet, 1 tablet, oral, q6h PRN, Shabana Rodriguez MD, 1 tablet at 03/16/25 1815    pantoprazole (ProtoNix) EC tablet 40 mg, 40 mg, oral, Daily before breakfast, Shabana Rodriguez MD, 40 mg at 03/17/25 0644    polyethylene glycol (Glycolax, Miralax) packet 17 g, 17 g, oral, Daily, Shabana Rodriguez MD, 17 g at 03/16/25 1039    potassium chloride CR (Klor-Con M20) ER tablet 40 mEq, 40 mEq, oral, BID, Shabana Rodriguez MD, 40 mEq at 03/16/25 1808    prazosin (Minipress) capsule 5 mg, 5 mg, oral, Nightly, Shabana Rodriguez MD, 5 mg at 03/15/25 2120    QUEtiapine (SEROquel) tablet 800 mg, 800 mg, oral, Nightly, Shabana Rodriguez MD, 800 mg at 03/16/25 2100    sertraline (Zoloft) tablet 250 mg, 250 mg, oral, Daily, Shabana Rodriguez MD, 250 mg at 03/16/25 1032    topiramate (Topamax) tablet 200 mg, 200 mg, oral, BID, Shabana Rodriguez MD, 200 mg at 03/16/25 2101    DATA:   Diagnostic tests reviewed for today's visit:    Most recent labs  Results from  last 7 days   Lab Units 03/17/25  0516 03/16/25  0610 03/15/25  0518   WBC AUTO x10*3/uL 4.6 4.4 3.9*   HEMOGLOBIN g/dL 11.8* 11.8* 10.5*   HEMATOCRIT % 38.2 39.0 32.2*   PLATELETS AUTO x10*3/uL 203 212 199     Results from last 7 days   Lab Units 03/17/25  0516 03/16/25  0610 03/15/25  0518 03/14/25  1134   SODIUM mmol/L 142 141 143 143   POTASSIUM mmol/L 3.6 3.8 3.3* 3.6   CHLORIDE mmol/L 105 106 108* 105   CO2 mmol/L 28 27 26 29   BUN mg/dL 25* 22 19 17   CREATININE mg/dL 0.87 0.85 0.71 0.87   CALCIUM mg/dL 8.3* 8.3* 7.9* 8.4*   PROTEIN TOTAL g/dL  --   --   --  6.4   BILIRUBIN TOTAL mg/dL  --   --   --  0.3   ALK PHOS U/L  --   --   --  153*   ALT U/L  --   --   --  14   AST U/L  --   --   --  12   GLUCOSE mg/dL 108* 96 97 90             Results from last 7 days   Lab Units 03/17/25  0801 03/17/25  0735 03/16/25  2342 03/16/25  2224 03/16/25  1548 03/16/25  1146 03/16/25  0729 03/15/25  2241 03/15/25  1528 03/15/25  1129 03/15/25  0930 03/14/25  1521   POCT GLUCOSE mg/dL 103* 115* 105* 95 86 88 81 101* 84 96 94 92        CT head wo IV contrast   Final Result   No evidence of acute cortical infarct or intracranial hemorrhage.        No evidence of intracranial hemorrhage or displaced skull fracture.        MACRO:   None        Signed by: Sherie Rivero 3/14/2025 11:44 AM   Dictation workstation:   [id number]      CT cervical spine wo IV contrast   Final Result   No evidence for an acute fracture or subluxation of the cervical   spine. Reversal of normal cervical lordosis with multilevel listhesis   and discogenic degenerative changes as described.        MACRO:   None        Signed by: Sherie Rivero 3/14/2025 11:49 AM   Dictation workstation:   [id number]      XR ankle right 3+ views   Final Result   1. Acute, mildly displaced oblique fracture of the proximal fibular   shaft.   2. Soft tissue swelling of the calf most pronounced at the ankle.   3. There is apparent cortical thickening/irregularity of the distal    fibula which is likely chronic in nature. However, if there is focal   acute pain to the lateral malleolus, recommend further evaluation with   CT.   Signed by Greg Chambers MD      XR knee right 4+ views   Final Result   Proximal right fibular fracture with separation of the fracture   fragments as described. No dislocation.        Total right knee arthroplasty.             Signed by: Sherie Rivero 3/14/2025 11:51 AM   Dictation workstation:   [id number]      XR femur right 2+ views   Final Result   1. Acute, mildly displaced oblique fracture of the proximal fibular   shaft.   2. Soft tissue swelling of the calf most pronounced at the ankle.   3. There is apparent cortical thickening/irregularity of the distal   fibula which is likely chronic in nature. However, if there is focal   acute pain to the lateral malleolus, recommend further evaluation with   CT.   Signed by Greg Chambers MD      XR tibia fibula right 2 views   Final Result   1. Acute, mildly displaced oblique fracture of the proximal fibular   shaft.   2. Soft tissue swelling of the calf most pronounced at the ankle.   3. There is apparent cortical thickening/irregularity of the distal   fibula which is likely chronic in nature. However, if there is focal   acute pain to the lateral malleolus, recommend further evaluation with   CT.   Signed by Greg Chambers MD      XR chest 1 view   Final Result   No acute process.   Signed by Sanjiv Hernández MD            SIGNATURE: Shabana Rodriguez MD PATIENT NAME: Prema Hair   DATE: 3/17/2025 MRN: [id number]   TIME: 9:00 AM

## 2025-03-17 NOTE — DISCHARGE INSTRUCTIONS
[facility]   [phone]  They will call you to schedule visits.  IF you have not heard from them in 2-3 days please feel free to call them to schedule.

## 2025-03-17 NOTE — NURSING NOTE
AVS discussed with pt and daughters. IV removed. Meds to beds delivered ordered meds. Pt belongings secured with pt prior to discharge. Pt discharged.

## 2025-03-17 NOTE — PROGRESS NOTES
03/17/25 0956   Discharge Planning   Type of Home Care Services Home OT;Home PT   Expected Discharge Disposition Home H   Does the patient need discharge transport arranged? Yes   RoundTrip coordination needed? Yes     Patient to return home with new [facility],  Spoke with Dr Rodriguez, he will put in new INTER[facility] orders for pt/ot.  Added the name and phone number of HHC agency to AVS.  Patient to discharge home today per Dr Rodriguez.

## 2025-03-18 ENCOUNTER — HOME CARE VISIT (OUTPATIENT)
Dept: HOME HEALTH SERVICES | Facility: HOME HEALTH | Age: 60
End: 2025-03-18
Payer: MEDICARE

## 2025-03-18 ENCOUNTER — OFFICE VISIT (OUTPATIENT)
Dept: ORTHOPEDIC SURGERY | Facility: CLINIC | Age: 60
End: 2025-03-18
Payer: MEDICARE

## 2025-03-18 DIAGNOSIS — S82.421S: Primary | ICD-10-CM

## 2025-03-18 PROCEDURE — 4010F ACE/ARB THERAPY RXD/TAKEN: CPT | Performed by: ORTHOPAEDIC SURGERY

## 2025-03-18 PROCEDURE — 99214 OFFICE O/P EST MOD 30 MIN: CPT | Performed by: ORTHOPAEDIC SURGERY

## 2025-03-18 PROCEDURE — G0299 HHS/HOSPICE OF RN EA 15 MIN: HCPCS | Mod: HHH

## 2025-03-18 PROCEDURE — 3044F HG A1C LEVEL LT 7.0%: CPT | Performed by: ORTHOPAEDIC SURGERY

## 2025-03-18 PROCEDURE — 169592 NO-PAY CLAIM PROCEDURE

## 2025-03-18 PROCEDURE — 1036F TOBACCO NON-USER: CPT | Performed by: ORTHOPAEDIC SURGERY

## 2025-03-18 SDOH — ECONOMIC STABILITY: FOOD INSECURITY: MEALS PER DAY: 3

## 2025-03-18 ASSESSMENT — ENCOUNTER SYMPTOMS
APPETITE LEVEL: FAIR
CHANGE IN APPETITE: UNCHANGED
BOWEL INCONTINENCE: 1
LIMITED RANGE OF MOTION: 1
MUSCLE WEAKNESS: 1

## 2025-03-18 ASSESSMENT — ACTIVITIES OF DAILY LIVING (ADL)
ENTERING_EXITING_HOME: MAXIMUM ASSIST
AMBULATION ASSISTANCE: ONE PERSON
CURRENT_FUNCTION: ONE PERSON

## 2025-03-18 NOTE — DISCHARGE SUMMARY
Discharge Summary    Admit Date: 3/14/2025  Discharge Date: 3/17/2025      Discharge Diagnosis  Closed fracture of distal end of right fibula, unspecified fracture morphology, initial encounter; Following accidental fall at home ground-level.  Drug-induced parkinsonism G21.19 causing impaired mobility. Probably exacerbated by multiple medications.   Hypokalemia - better.  DM2, controlled. A1c 5.8 11/24  HTN, variable control  Impaired mobility, causing recurrent falls at home  Hyperlipidemia  Hx CA thyroid on supplemental therapy (Z85.850)  Obesity, class I  Hx torticollis  Osteoarthrosis of the knees  Hx urinary retention    Issues Requiring Follow-Up      Test Results Pending At Discharge  Pending Labs       No current pending labs.            Hospital Course   Admitted following fall, while trying to get of the bed when her legs gave way.  Diagnosed with fracture as above.  Nonweightbearing, per Ortho, until follow-up.  Admitted to coordinate safe discharge plan.  Medically stable during the hospital stay.  Only 1 SNF was available through her insurance network, which the patient declined and opted to go home with home health care.    Pertinent Physical Exam At Time of Discharge  Physical Exam    Home Medications     Medication List      START taking these medications     oxyCODONE-acetaminophen 5-325 mg tablet; Commonly known as: Percocet;   Take 0.5 tablets by mouth every 6 hours if needed for moderate pain (4 -   6) or severe pain (7 - 10) for up to 5 days.     CHANGE how you take these medications     baclofen 20 mg tablet; Commonly known as: Lioresal; Take 0.5 tablets (10   mg) by mouth 3 times a day.; What changed: how much to take   busPIRone 10 mg tablet; Commonly known as: Buspar; Take 0.5 tablets (5   mg) by mouth every 12 hours.; What changed: how much to take   carvedilol 6.25 mg tablet; Commonly known as: Coreg; Take 1 tablet (6.25   mg) by mouth 2 times daily (morning and late afternoon).; What  changed:   medication strength, how much to take, when to take this   KCL-40 ORAL; What changed: Another medication with the same name was   removed. Continue taking this medication, and follow the directions you   see here.   levothyroxine 100 mcg tablet; Commonly known as: Synthroid, Levoxyl;   Take 100mcg on Sat Sun and 200 mcg on Mon to Fri - 1 hour before   breakfast; What changed: additional instructions   losartan 25 mg tablet; Commonly known as: Cozaar; Take 0.5 tablets (12.5   mg) by mouth once daily. Do not fill before March 18, 2025.; Start taking   on: March 18, 2025; What changed: medication strength, how much to take     CONTINUE taking these medications     acetaminophen 500 mg tablet; Commonly known as: Tylenol   albuterol 90 mcg/actuation inhaler; Inhale 2 puffs every 4 hours if   needed for shortness of breath.   ammonium lactate 12 % lotion; Commonly known as: Lac-Hydrin   * ascorbic acid 1,000 mg tablet; Commonly known as: Vitamin C   * ascorbic acid 500 mg tablet; Commonly known as: Vitamin C   aspirin 81 mg EC tablet   atorvastatin 20 mg tablet; Commonly known as: Lipitor; Take 1 tablet (20   mg) by mouth once daily at bedtime.   azelastine 137 mcg (0.1 %) nasal spray; Commonly known as: Astelin   budesonide-formoteroL 80-4.5 mcg/actuation inhaler; Commonly known as:   Symbicort; Inhale 2 puffs 2 times a day. Rinse mouth with water after use   to reduce aftertaste and incidence of candidiasis. Do not swallow.   bumetanide 2 mg tablet; Commonly known as: Bumex   calcium carbonate 500 mg calcium (1,250 mg) tablet; Commonly known as:   Oscal   carbidopa-levodopa  mg tablet; Commonly known as: Sinemet; Take   1.5 tablets by mouth 3 times a day. 8am, 12 noon and 4 pm   cephalexin 250 mg capsule; Commonly known as: Keflex; Take 2 capsules   (500 mg) by mouth once daily.   * cholecalciferol 50 MCG (2000 UT) tablet; Commonly known as: Vitamin   D-3   * Vitamin D3 25 mcg (1,000 unit) capsule;  Generic drug: cholecalciferol   clonazePAM 1 mg tablet; Commonly known as: KlonoPIN   cyclobenzaprine 10 mg tablet; Commonly known as: Flexeril   diclofenac sodium 1 % gel; Commonly known as: Voltaren   docusate sodium 100 mg capsule; Commonly known as: Colace   estradiol 0.01 % (0.1 mg/gram) vaginal cream; Commonly known as: Estrace   fluticasone 50 mcg/actuation nasal spray; Commonly known as: Flonase;   Administer 1 spray into each nostril once daily. Shake gently. Before   first use, prime pump. After use, clean tip and replace cap.   formoterol 20 mcg/2 mL nebulizer solution; Commonly known as:   Perforomist   gabapentin 600 mg tablet; Commonly known as: Neurontin   Glucagon Emergency Kit (human) 1 mg injection; Generic drug: glucagon;   Inject 1 mg into the muscle every 15 minutes if needed for low blood sugar   - see comments (For blood glucose less than or equal to 40 mg/dL and no IV   access).   ipratropium-albuteroL 0.5-2.5 mg/3 mL nebulizer solution; Commonly known   as: Duo-Neb   lancets misc   lidocaine 5 % patch; Commonly known as: Lidoderm; APPLY 1 PATCH AND   LEAVE IN PLACE FOR 12 HOURS, THEN REMOVE AND LEAVE OFF FOR 12 HOURS   metFORMIN 500 mg tablet; Commonly known as: Glucophage; Take 1 tablet   (500 mg) by mouth 2 times daily (morning and late afternoon).   nystatin cream; Commonly known as: Mycostatin   omega-3 fatty acids-fish oil 360-1,200 mg capsule   omeprazole 40 mg DR capsule; Commonly known as: PriLOSEC   * OXcarbazepine 600 mg tablet; Commonly known as: Trileptal   * OXcarbazepine 600 mg tablet; Commonly known as: Trileptal   polyethylene glycol 17 gram/dose powder; Commonly known as: Glycolax,   Miralax   SeroqueL 400 mg tablet; Generic drug: QUEtiapine   sertraline 100 mg tablet; Commonly known as: Zoloft; Take 2 tablets (200   mg) by mouth once daily. Do not start before January 2, 2024.   topiramate 200 mg tablet; Commonly known as: Topamax; TAKE ONE TABLET   (200 MG) BY MOUTH TWICE  DAILY  * This list has 6 medication(s) that are the same as other medications   prescribed for you. Read the directions carefully, and ask your doctor or   other care provider to review them with you.     STOP taking these medications     prazosin 5 mg capsule; Commonly known as: Minipress       Outpatient Follow-Up  Future Appointments   Date Time Provider Department [place]   3/18/2025  9:45 AM Jasiel Hammer MD [id number] [facility]   3/18/2025 10:30 AM Yuliana Silva RN [facility]   3/19/2025 To Be Determined Mini Sadler, PT [facility]   3/19/2025 To Be Determined Katty Chavarria, OT [facility]   7/8/2025  9:00 AM [place] [id number] ECHO [id number] [facility]   7/8/2025 10:30 AM Deshawn Adorno MD [id number] [facility]   8/6/2025  9:30 AM Sanjiv GAYTAN MD [id number] [facility]       Shabana Rodriguez MD

## 2025-03-18 NOTE — PROGRESS NOTES
Prema Hair is a 60 year-old female with PMHx fibromyalgia, T2DM, hypothyroidism, prior RTKA who presents with right knee pain following a fall on 3/14. She was seen in the ED and diagnosed with proximal fibula fracture. She was wrapped with ace wrap and told to be nonweightbearing until today's follow up. She has no ankle pain. No NTW.       Knee Musculoskeletal Exam    Inspection    Right      Erythema: none        Previous incision: anterior      Incision: well-healed    Palpation    Right      Increased warmth: none      Tenderness comment: diffuse TTP, maximal TTP over fibular head and proximal fibula     Instability    Right      Instability signs: none - stable    Neurovascular    Right      Right knee neurovascular exam is normal.          Foot/Ankle Musculoskeletal Exam    Inspection    Right      Erythema: none      Edema: none      Palpation    Right      Increased warmth: none        Tenderness: none        Tenderness comment: no tenderness of deltoid ligament or lateral malleoulus    Range of Motion    Right      Right foot/ankle range of motion is normal.      Neurovascular    Right      Right foot/ankle neurovascular exam is normal.          Assessment   -right proximal fibula fracture     Plan   -reviewed XR  -WBAT  -recommend ambulating with walker for stability  -follow up in 3-4 weeks    Autumn Roberts DO, PGY1

## 2025-03-19 ENCOUNTER — HOME CARE VISIT (OUTPATIENT)
Dept: HOME HEALTH SERVICES | Facility: HOME HEALTH | Age: 60
End: 2025-03-19
Payer: MEDICARE

## 2025-03-19 PROCEDURE — G0151 HHCP-SERV OF PT,EA 15 MIN: HCPCS | Mod: HHH

## 2025-03-19 PROCEDURE — G0155 HHCP-SVS OF CSW,EA 15 MIN: HCPCS | Mod: HHH

## 2025-03-19 PROCEDURE — G0152 HHCP-SERV OF OT,EA 15 MIN: HCPCS | Mod: HHH

## 2025-03-19 SDOH — ECONOMIC STABILITY: HOUSING INSECURITY: HOME SAFETY: NARROW WALKWAYS

## 2025-03-19 SDOH — HEALTH STABILITY: MENTAL HEALTH: STRESS FACTORS COMMENTS: RECENT FALL

## 2025-03-19 SDOH — HEALTH STABILITY: MENTAL HEALTH: SOCIAL ISOLATION: 1

## 2025-03-19 ASSESSMENT — ACTIVITIES OF DAILY LIVING (ADL)
PHYSICAL_TRANSFER_REQUIRES_ASSISTANCE: 1
DRESSING_REQUIRES_ASSISTANCE: 1
DRESSING_UB_CURRENT_FUNCTION: STAND BY ASSIST
WASHING_UPB_CURRENT_FUNCTION: MINIMUM ASSIST
TOILETING: CONTACT GUARD ASSIST
BATHING_REQUIRES_ASSISTANCE: 1
LAUNDRY_REQUIRES_ASSISTANCE: 1
TOILETING: 1
WASHING_LB_CURRENT_FUNCTION: MINIMUM ASSIST
WASHING_LB_CURRENT_FUNCTION: MODERATE ASSIST
AMBULATION_REQUIRES_ASSISTANCE: 1
DRESSING_LB_CURRENT_FUNCTION: CONTACT GUARD ASSIST
SHOPPING_REQUIRES_ASSISTANCE: 1

## 2025-03-19 ASSESSMENT — ENCOUNTER SYMPTOMS
PAIN LOCATION - PAIN QUALITY: ACHE
AGITATION: 1
PERSON REPORTING PAIN: PATIENT
SUBJECTIVE PAIN PROGRESSION: WAXING AND WANING
PERSON REPORTING PAIN: PATIENT
PAIN LOCATION: RIGHT KNEE
PAIN LOCATION - EXACERBATING FACTORS: AMBULATION
PAIN LOCATION - PAIN DURATION: CONSTANT
PAIN LOCATION - PAIN SEVERITY: 4/10
PAIN SEVERITY GOAL: 2/10
PAIN: 1
LIMITED RANGE OF MOTION: 1
PAIN LOCATION: RIGHT KNEE
MUSCLE WEAKNESS: 1
LOWEST PAIN SEVERITY IN PAST 24 HOURS: 3/10
HIGHEST PAIN SEVERITY IN PAST 24 HOURS: 7/10
PAIN LOCATION - RELIEVING FACTORS: REST, MEDICATION
PAIN: 1
OCCASIONAL FEELINGS OF UNSTEADINESS: 1

## 2025-03-21 ENCOUNTER — HOME CARE VISIT (OUTPATIENT)
Dept: HOME HEALTH SERVICES | Facility: HOME HEALTH | Age: 60
End: 2025-03-21
Payer: MEDICARE

## 2025-03-22 VITALS
DIASTOLIC BLOOD PRESSURE: 60 MMHG | OXYGEN SATURATION: 98 % | SYSTOLIC BLOOD PRESSURE: 110 MMHG | TEMPERATURE: 97.7 F | RESPIRATION RATE: 20 BRPM | HEART RATE: 87 BPM

## 2025-03-22 ASSESSMENT — ACTIVITIES OF DAILY LIVING (ADL): OASIS_M1830: 06

## 2025-03-22 ASSESSMENT — ENCOUNTER SYMPTOMS
PAIN SEVERITY GOAL: 0/10
PERSON REPORTING PAIN: PATIENT
PAIN LOCATION: GENERALIZED
PAIN: 1
PAIN LOCATION - PAIN SEVERITY: 0/10
HIGHEST PAIN SEVERITY IN PAST 24 HOURS: 0/10
SUBJECTIVE PAIN PROGRESSION: UNCHANGED
PAIN LOCATION - PAIN QUALITY: ACHY
LOWEST PAIN SEVERITY IN PAST 24 HOURS: 0/10
PAIN LOCATION - PAIN FREQUENCY: INTERMITTENT

## 2025-03-24 ENCOUNTER — HOME CARE VISIT (OUTPATIENT)
Dept: HOME HEALTH SERVICES | Facility: HOME HEALTH | Age: 60
End: 2025-03-24
Payer: MEDICARE

## 2025-03-24 VITALS
HEART RATE: 84 BPM | RESPIRATION RATE: 18 BRPM | OXYGEN SATURATION: 98 % | DIASTOLIC BLOOD PRESSURE: 70 MMHG | SYSTOLIC BLOOD PRESSURE: 110 MMHG

## 2025-03-24 PROCEDURE — G0157 HHC PT ASSISTANT EA 15: HCPCS | Mod: CQ,HHH

## 2025-03-24 SDOH — HEALTH STABILITY: PHYSICAL HEALTH: EXERCISE TYPE: SEATED

## 2025-03-24 SDOH — HEALTH STABILITY: PHYSICAL HEALTH: EXERCISE COMMENTS: PT COMPLETED BLE SEATED THER EX 1 X15 REPS:  MARCHES  LAQS  APS  HIP ABD/ADD  HAMSTRING CURLS  STS X1

## 2025-03-24 ASSESSMENT — ENCOUNTER SYMPTOMS
PERSON REPORTING PAIN: PATIENT
SUBJECTIVE PAIN PROGRESSION: WAXING AND WANING
LOWEST PAIN SEVERITY IN PAST 24 HOURS: 7/10
PAIN LOCATION - PAIN FREQUENCY: CONSTANT
MUSCLE WEAKNESS: 1
ARTHRALGIAS: 1
PAIN LOCATION - PAIN SEVERITY: 9/10
PAIN SEVERITY GOAL: 0/10
PAIN: 1
PAIN LOCATION - RELIEVING FACTORS: REST
PAIN LOCATION - PAIN QUALITY: INTENSE ACHE
PAIN LOCATION - PAIN DURATION: MOST OF DAY
PAIN LOCATION: RIGHT KNEE
HIGHEST PAIN SEVERITY IN PAST 24 HOURS: 9/10

## 2025-03-24 ASSESSMENT — ACTIVITIES OF DAILY LIVING (ADL)
AMBULATION ASSISTANCE: STAND BY ASSIST
CURRENT_FUNCTION: STAND BY ASSIST

## 2025-03-25 ENCOUNTER — HOME CARE VISIT (OUTPATIENT)
Dept: HOME HEALTH SERVICES | Facility: HOME HEALTH | Age: 60
End: 2025-03-25
Payer: MEDICARE

## 2025-03-25 PROCEDURE — G0156 HHCP-SVS OF AIDE,EA 15 MIN: HCPCS | Mod: HHH

## 2025-03-26 ENCOUNTER — HOME CARE VISIT (OUTPATIENT)
Dept: HOME HEALTH SERVICES | Facility: HOME HEALTH | Age: 60
End: 2025-03-26
Payer: MEDICARE

## 2025-03-26 VITALS
OXYGEN SATURATION: 98 % | RESPIRATION RATE: 16 BRPM | SYSTOLIC BLOOD PRESSURE: 126 MMHG | HEART RATE: 73 BPM | DIASTOLIC BLOOD PRESSURE: 92 MMHG | TEMPERATURE: 97.8 F

## 2025-03-26 VITALS
RESPIRATION RATE: 18 BRPM | SYSTOLIC BLOOD PRESSURE: 120 MMHG | HEART RATE: 79 BPM | OXYGEN SATURATION: 99 % | DIASTOLIC BLOOD PRESSURE: 85 MMHG

## 2025-03-26 PROCEDURE — G0158 HHC OT ASSISTANT EA 15: HCPCS | Mod: CO,HHH

## 2025-03-26 PROCEDURE — G0157 HHC PT ASSISTANT EA 15: HCPCS | Mod: CQ,HHH

## 2025-03-26 ASSESSMENT — ACTIVITIES OF DAILY LIVING (ADL)
AMBULATION ASSISTANCE ON FLAT SURFACES: 1
CURRENT_FUNCTION: STAND BY ASSIST
AMBULATION ASSISTANCE: STAND BY ASSIST

## 2025-03-26 ASSESSMENT — ENCOUNTER SYMPTOMS
PERSON REPORTING PAIN: PATIENT
MUSCLE WEAKNESS: 1
ARTHRALGIAS: 1
PAIN LOCATION: GENERALIZED
LIMITED RANGE OF MOTION: 1
PAIN: 1
PAIN LOCATION - PAIN SEVERITY: 7/10

## 2025-03-27 ENCOUNTER — HOME CARE VISIT (OUTPATIENT)
Dept: HOME HEALTH SERVICES | Facility: HOME HEALTH | Age: 60
End: 2025-03-27
Payer: MEDICARE

## 2025-03-27 PROCEDURE — G0299 HHS/HOSPICE OF RN EA 15 MIN: HCPCS | Mod: HHH

## 2025-03-28 ENCOUNTER — HOME CARE VISIT (OUTPATIENT)
Dept: HOME HEALTH SERVICES | Facility: HOME HEALTH | Age: 60
End: 2025-03-28
Payer: MEDICARE

## 2025-03-28 PROCEDURE — G0156 HHCP-SVS OF AIDE,EA 15 MIN: HCPCS | Mod: HHH

## 2025-03-30 VITALS
OXYGEN SATURATION: 98 % | HEART RATE: 78 BPM | DIASTOLIC BLOOD PRESSURE: 60 MMHG | SYSTOLIC BLOOD PRESSURE: 115 MMHG | RESPIRATION RATE: 20 BRPM | TEMPERATURE: 97.6 F

## 2025-03-30 SDOH — ECONOMIC STABILITY: FOOD INSECURITY: MEALS PER DAY: 3

## 2025-03-30 SDOH — ECONOMIC STABILITY: GENERAL

## 2025-03-30 ASSESSMENT — ENCOUNTER SYMPTOMS
PERSON REPORTING PAIN: PATIENT
HIGHEST PAIN SEVERITY IN PAST 24 HOURS: 4/10
PAIN LOCATION: BACK
PAIN LOCATION: RIGHT SHOULDER
HIGHEST PAIN SEVERITY IN PAST 24 HOURS: 0/10
PAIN SEVERITY GOAL: 0/10
PAIN LOCATION - RELIEVING FACTORS: REST
PAIN LOCATION - PAIN SEVERITY: 1/10
SUBJECTIVE PAIN PROGRESSION: UNCHANGED
LAST BOWEL MOVEMENT: 67291
PAIN: 1
BOWEL INCONTINENCE: 1
PAIN LOCATION - PAIN SEVERITY: 2/10
LOWEST PAIN SEVERITY IN PAST 24 HOURS: 0/10
PAIN LOCATION - PAIN FREQUENCY: INTERMITTENT
SUBJECTIVE PAIN PROGRESSION: GRADUALLY IMPROVING
PAIN SEVERITY GOAL: 0/10
PAIN LOCATION - PAIN FREQUENCY: INTERMITTENT
PAIN: 1
CHANGE IN APPETITE: UNCHANGED
PAIN LOCATION - PAIN SEVERITY: 1/10
PAIN LOCATION - PAIN QUALITY: ACHING
PAIN LOCATION: RIGHT KNEE
PAIN LOCATION - PAIN SEVERITY: 4/10
PAIN LOCATION: GENERALIZED
PAIN LOCATION: NECK
MUSCLE WEAKNESS: 1
PERSON REPORTING PAIN: PATIENT
PAIN LOCATION - PAIN SEVERITY: 0/10
PAIN LOCATION - PAIN QUALITY: ACHY
LOWEST PAIN SEVERITY IN PAST 24 HOURS: 1/10
PAIN LOCATION - EXACERBATING FACTORS: MOVING
APPETITE LEVEL: FAIR

## 2025-03-30 ASSESSMENT — ACTIVITIES OF DAILY LIVING (ADL)
CURRENT_FUNCTION: ONE PERSON
AMBULATION ASSISTANCE: ONE PERSON
MONEY MANAGEMENT (EXPENSES/BILLS): NEEDS ASSISTANCE

## 2025-03-31 ENCOUNTER — HOME CARE VISIT (OUTPATIENT)
Dept: HOME HEALTH SERVICES | Facility: HOME HEALTH | Age: 60
End: 2025-03-31
Payer: MEDICARE

## 2025-03-31 VITALS
OXYGEN SATURATION: 96 % | HEART RATE: 68 BPM | RESPIRATION RATE: 18 BRPM | DIASTOLIC BLOOD PRESSURE: 65 MMHG | SYSTOLIC BLOOD PRESSURE: 102 MMHG

## 2025-03-31 PROCEDURE — G0157 HHC PT ASSISTANT EA 15: HCPCS | Mod: CQ,HHH

## 2025-03-31 SDOH — HEALTH STABILITY: PHYSICAL HEALTH: EXERCISE TYPE: SEATED/RECLINED

## 2025-03-31 SDOH — HEALTH STABILITY: PHYSICAL HEALTH: EXERCISE COMMENTS: PT COMPLETED BLE SEATED THER EX 2 X10 REPS:  MARCHES  LAQS  APS  HIP ABD/ADD  HAMSTRING CURLS

## 2025-03-31 ASSESSMENT — ENCOUNTER SYMPTOMS
MUSCLE WEAKNESS: 1
HIGHEST PAIN SEVERITY IN PAST 24 HOURS: 9/10
PAIN: 1
PERSON REPORTING PAIN: PATIENT
LOWEST PAIN SEVERITY IN PAST 24 HOURS: 7/10
LIMITED RANGE OF MOTION: 1
PAIN LOCATION - PAIN SEVERITY: 9/10
PAIN LOCATION - PAIN QUALITY: INTENSE ACHE
PAIN LOCATION - PAIN FREQUENCY: CONSTANT
ARTHRALGIAS: 1
PAIN LOCATION: BACK
PAIN SEVERITY GOAL: 0/10
SUBJECTIVE PAIN PROGRESSION: WAXING AND WANING

## 2025-03-31 ASSESSMENT — ACTIVITIES OF DAILY LIVING (ADL)
AMBULATION ASSISTANCE: STAND BY ASSIST
CURRENT_FUNCTION: STAND BY ASSIST

## 2025-04-02 ENCOUNTER — HOME CARE VISIT (OUTPATIENT)
Dept: HOME HEALTH SERVICES | Facility: HOME HEALTH | Age: 60
End: 2025-04-02
Payer: MEDICARE

## 2025-04-02 VITALS
DIASTOLIC BLOOD PRESSURE: 85 MMHG | HEART RATE: 74 BPM | RESPIRATION RATE: 18 BRPM | SYSTOLIC BLOOD PRESSURE: 122 MMHG | OXYGEN SATURATION: 97 %

## 2025-04-02 PROCEDURE — G0157 HHC PT ASSISTANT EA 15: HCPCS | Mod: CQ,HHH

## 2025-04-02 PROCEDURE — G0299 HHS/HOSPICE OF RN EA 15 MIN: HCPCS | Mod: HHH

## 2025-04-02 SDOH — HEALTH STABILITY: PHYSICAL HEALTH
EXERCISE COMMENTS: PT COMPLETED BLE STANDING: MODIFIED W/ MINIMAL FOOT CLEARANCE (MOSTLEY WEIGHT SHIFTING IN DIFFERENT PLANES) 2 X5 REPS   MARCHES  HIP ABD  HIP EXT  HEEL RAISES

## 2025-04-02 SDOH — HEALTH STABILITY: PHYSICAL HEALTH: EXERCISE TYPE: STANDING

## 2025-04-02 ASSESSMENT — ENCOUNTER SYMPTOMS
LOWEST PAIN SEVERITY IN PAST 24 HOURS: 6/10
PAIN LOCATION - PAIN SEVERITY: 7/10
SUBJECTIVE PAIN PROGRESSION: WAXING AND WANING
PAIN LOCATION: BACK
ARTHRALGIAS: 1
PAIN: 1
HIGHEST PAIN SEVERITY IN PAST 24 HOURS: 9/10
LIMITED RANGE OF MOTION: 1
MUSCLE WEAKNESS: 1
PERSON REPORTING PAIN: PATIENT

## 2025-04-02 ASSESSMENT — ACTIVITIES OF DAILY LIVING (ADL)
CURRENT_FUNCTION: STAND BY ASSIST
AMBULATION ASSISTANCE: STAND BY ASSIST

## 2025-04-03 ENCOUNTER — HOME CARE VISIT (OUTPATIENT)
Dept: HOME HEALTH SERVICES | Facility: HOME HEALTH | Age: 60
End: 2025-04-03
Payer: MEDICARE

## 2025-04-03 VITALS
OXYGEN SATURATION: 98 % | SYSTOLIC BLOOD PRESSURE: 123 MMHG | DIASTOLIC BLOOD PRESSURE: 65 MMHG | HEART RATE: 64 BPM | TEMPERATURE: 97.2 F

## 2025-04-03 PROCEDURE — G0158 HHC OT ASSISTANT EA 15: HCPCS | Mod: CO,HHH

## 2025-04-04 ASSESSMENT — ENCOUNTER SYMPTOMS
PAIN LOCATION - EXACERBATING FACTORS: MOVING
PAIN: 1
PAIN LOCATION - PAIN QUALITY: ACHING
PAIN LOCATION - PAIN SEVERITY: 2/10
PAIN LOCATION - PAIN FREQUENCY: INTERMITTENT
PAIN LOCATION - PAIN FREQUENCY: INTERMITTENT
PERSON REPORTING PAIN: PATIENT
PAIN LOCATION - EXACERBATING FACTORS: MOVING
SUBJECTIVE PAIN PROGRESSION: GRADUALLY IMPROVING
PAIN LOCATION: BACK
LOWEST PAIN SEVERITY IN PAST 24 HOURS: 1/10
PAIN LOCATION - PAIN QUALITY: ACHING
HIGHEST PAIN SEVERITY IN PAST 24 HOURS: 2/10
PAIN LOCATION: RIGHT KNEE
PAIN SEVERITY GOAL: 0/10
PAIN LOCATION - PAIN SEVERITY: 3/10
PAIN LOCATION - RELIEVING FACTORS: REST
PAIN LOCATION - RELIEVING FACTORS: REST
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
## 2025-04-05 VITALS
RESPIRATION RATE: 20 BRPM | DIASTOLIC BLOOD PRESSURE: 60 MMHG | TEMPERATURE: 98.8 F | HEART RATE: 99 BPM | OXYGEN SATURATION: 98 % | SYSTOLIC BLOOD PRESSURE: 114 MMHG

## 2025-04-05 SDOH — ECONOMIC STABILITY: GENERAL

## 2025-04-05 SDOH — ECONOMIC STABILITY: FOOD INSECURITY: MEALS PER DAY: 3

## 2025-04-05 ASSESSMENT — ACTIVITIES OF DAILY LIVING (ADL)
AMBULATION ASSISTANCE: STAND BY ASSIST
MONEY MANAGEMENT (EXPENSES/BILLS): INDEPENDENT
CURRENT_FUNCTION: STAND BY ASSIST

## 2025-04-05 ASSESSMENT — ENCOUNTER SYMPTOMS
PAIN LOCATION - PAIN SEVERITY: 0/10
LAST BOWEL MOVEMENT: 67296
PAIN: 1
PAIN LOCATION: GENERALIZED
PAIN SEVERITY GOAL: 0/10
PAIN LOCATION - PAIN QUALITY: ACHY
LOWEST PAIN SEVERITY IN PAST 24 HOURS: 0/10
APPETITE LEVEL: FAIR
PAIN LOCATION - PAIN FREQUENCY: INTERMITTENT
SUBJECTIVE PAIN PROGRESSION: UNCHANGED
PERSON REPORTING PAIN: PATIENT
CHANGE IN APPETITE: UNCHANGED
BOWEL INCONTINENCE: 1
HIGHEST PAIN SEVERITY IN PAST 24 HOURS: 0/10
TREMORS: 1

## 2025-04-07 ENCOUNTER — HOME CARE VISIT (OUTPATIENT)
Dept: HOME HEALTH SERVICES | Facility: HOME HEALTH | Age: 60
End: 2025-04-07
Payer: MEDICARE

## 2025-04-07 VITALS
DIASTOLIC BLOOD PRESSURE: 80 MMHG | HEART RATE: 78 BPM | RESPIRATION RATE: 18 BRPM | OXYGEN SATURATION: 99 % | SYSTOLIC BLOOD PRESSURE: 118 MMHG

## 2025-04-07 PROCEDURE — G0157 HHC PT ASSISTANT EA 15: HCPCS | Mod: CQ,HHH

## 2025-04-07 ASSESSMENT — ENCOUNTER SYMPTOMS
PAIN: 1
MUSCLE WEAKNESS: 1
LIMITED RANGE OF MOTION: 1
ARTHRALGIAS: 1
PERSON REPORTING PAIN: PATIENT
PAIN LOCATION: BACK
PAIN LOCATION - PAIN SEVERITY: 5/10

## 2025-04-07 ASSESSMENT — ACTIVITIES OF DAILY LIVING (ADL)
AMBULATION ASSISTANCE ON FLAT SURFACES: 1
AMBULATION ASSISTANCE: STAND BY ASSIST
CURRENT_FUNCTION: STAND BY ASSIST

## 2025-04-08 ENCOUNTER — APPOINTMENT (OUTPATIENT)
Dept: ORTHOPEDIC SURGERY | Facility: CLINIC | Age: 60
End: 2025-04-08
Payer: MEDICARE

## 2025-04-08 ENCOUNTER — HOME CARE VISIT (OUTPATIENT)
Dept: HOME HEALTH SERVICES | Facility: HOME HEALTH | Age: 60
End: 2025-04-08
Payer: MEDICARE

## 2025-04-08 VITALS
DIASTOLIC BLOOD PRESSURE: 70 MMHG | SYSTOLIC BLOOD PRESSURE: 124 MMHG | OXYGEN SATURATION: 99 % | HEART RATE: 78 BPM | TEMPERATURE: 98.7 F | RESPIRATION RATE: 19 BRPM

## 2025-04-08 PROCEDURE — G0299 HHS/HOSPICE OF RN EA 15 MIN: HCPCS | Mod: HHH

## 2025-04-08 SDOH — ECONOMIC STABILITY: GENERAL

## 2025-04-08 SDOH — ECONOMIC STABILITY: FOOD INSECURITY: MEALS PER DAY: 3

## 2025-04-08 ASSESSMENT — ENCOUNTER SYMPTOMS
LAST BOWEL MOVEMENT: 67302
SUBJECTIVE PAIN PROGRESSION: UNCHANGED
BOWEL INCONTINENCE: 1
PAIN LOCATION - PAIN FREQUENCY: INTERMITTENT
APPETITE LEVEL: FAIR
PAIN: 1
PAIN LOCATION - PAIN QUALITY: ACHY
MUSCLE WEAKNESS: 1
PERSON REPORTING PAIN: PATIENT
HIGHEST PAIN SEVERITY IN PAST 24 HOURS: 0/10
CHANGE IN APPETITE: UNCHANGED
PAIN SEVERITY GOAL: 0/10
PAIN LOCATION: GENERALIZED
LOWEST PAIN SEVERITY IN PAST 24 HOURS: 0/10
PAIN LOCATION - PAIN SEVERITY: 0/10

## 2025-04-08 ASSESSMENT — ACTIVITIES OF DAILY LIVING (ADL)
CURRENT_FUNCTION: STAND BY ASSIST
AMBULATION ASSISTANCE: STAND BY ASSIST
MONEY MANAGEMENT (EXPENSES/BILLS): NEEDS ASSISTANCE

## 2025-04-09 ENCOUNTER — HOME CARE VISIT (OUTPATIENT)
Dept: HOME HEALTH SERVICES | Facility: HOME HEALTH | Age: 60
End: 2025-04-09
Payer: MEDICARE

## 2025-04-09 VITALS
RESPIRATION RATE: 16 BRPM | DIASTOLIC BLOOD PRESSURE: 65 MMHG | SYSTOLIC BLOOD PRESSURE: 119 MMHG | HEART RATE: 75 BPM | OXYGEN SATURATION: 98 % | TEMPERATURE: 97.1 F

## 2025-04-09 PROCEDURE — G0158 HHC OT ASSISTANT EA 15: HCPCS | Mod: CO,HHH

## 2025-04-10 ENCOUNTER — HOME CARE VISIT (OUTPATIENT)
Dept: HOME HEALTH SERVICES | Facility: HOME HEALTH | Age: 60
End: 2025-04-10
Payer: MEDICARE

## 2025-04-10 PROCEDURE — G0151 HHCP-SERV OF PT,EA 15 MIN: HCPCS | Mod: HHH

## 2025-04-10 ASSESSMENT — ENCOUNTER SYMPTOMS
PAIN LOCATION - PAIN FREQUENCY: INTERMITTENT
PERSON REPORTING PAIN: PATIENT
PAIN LOCATION - RELIEVING FACTORS: REST
PAIN SEVERITY GOAL: 1/10
PERSON REPORTING PAIN: PATIENT
PAIN LOCATION: RIGHT KNEE
PAIN: 1
PAIN LOCATION - PAIN SEVERITY: 3/10
PAIN LOCATION - EXACERBATING FACTORS: MOVING
HIGHEST PAIN SEVERITY IN PAST 24 HOURS: 3/10
LOWEST PAIN SEVERITY IN PAST 24 HOURS: 2/10
PAIN: 1
SUBJECTIVE PAIN PROGRESSION: GRADUALLY IMPROVING

## 2025-04-11 ASSESSMENT — ENCOUNTER SYMPTOMS: OCCASIONAL FEELINGS OF UNSTEADINESS: 1

## 2025-04-15 ENCOUNTER — HOME CARE VISIT (OUTPATIENT)
Dept: HOME HEALTH SERVICES | Facility: HOME HEALTH | Age: 60
End: 2025-04-15
Payer: MEDICARE

## 2025-04-15 VITALS
SYSTOLIC BLOOD PRESSURE: 124 MMHG | RESPIRATION RATE: 19 BRPM | TEMPERATURE: 98.7 F | DIASTOLIC BLOOD PRESSURE: 70 MMHG | HEART RATE: 76 BPM | OXYGEN SATURATION: 97 %

## 2025-04-15 PROCEDURE — G0299 HHS/HOSPICE OF RN EA 15 MIN: HCPCS | Mod: HHH

## 2025-04-15 SDOH — ECONOMIC STABILITY: FOOD INSECURITY: MEALS PER DAY: 3

## 2025-04-15 ASSESSMENT — ENCOUNTER SYMPTOMS
CHANGE IN APPETITE: UNCHANGED
LAST BOWEL MOVEMENT: 67310
SUBJECTIVE PAIN PROGRESSION: UNCHANGED
PAIN LOCATION - PAIN FREQUENCY: INTERMITTENT
APPETITE LEVEL: FAIR
LOWEST PAIN SEVERITY IN PAST 24 HOURS: 0/10
PAIN SEVERITY GOAL: 0/10
PAIN LOCATION: GENERALIZED
PAIN LOCATION - PAIN SEVERITY: 0/10
PAIN LOCATION - PAIN QUALITY: ACHY
BOWEL INCONTINENCE: 1
PERSON REPORTING PAIN: PATIENT
PAIN: 1
HIGHEST PAIN SEVERITY IN PAST 24 HOURS: 0/10
TREMORS: 1

## 2025-04-15 ASSESSMENT — ACTIVITIES OF DAILY LIVING (ADL)
CURRENT_FUNCTION: STAND BY ASSIST
AMBULATION ASSISTANCE: STAND BY ASSIST

## 2025-04-16 ENCOUNTER — HOME CARE VISIT (OUTPATIENT)
Dept: HOME HEALTH SERVICES | Facility: HOME HEALTH | Age: 60
End: 2025-04-16
Payer: MEDICARE

## 2025-04-16 ENCOUNTER — TELEPHONE (OUTPATIENT)
Dept: NEUROLOGY | Facility: CLINIC | Age: 60
End: 2025-04-16
Payer: MEDICARE

## 2025-04-16 VITALS
DIASTOLIC BLOOD PRESSURE: 78 MMHG | SYSTOLIC BLOOD PRESSURE: 110 MMHG | TEMPERATURE: 98.5 F | OXYGEN SATURATION: 97 % | HEART RATE: 71 BPM

## 2025-04-16 DIAGNOSIS — G20.C PARKINSONISM, UNSPECIFIED PARKINSONISM TYPE (MULTI): ICD-10-CM

## 2025-04-16 PROCEDURE — G0157 HHC PT ASSISTANT EA 15: HCPCS | Mod: CQ,HHH

## 2025-04-16 PROCEDURE — G0152 HHCP-SERV OF OT,EA 15 MIN: HCPCS | Mod: HHH

## 2025-04-16 RX ORDER — CARBIDOPA AND LEVODOPA 25; 100 MG/1; MG/1
1.5 TABLET ORAL 3 TIMES DAILY
Qty: 405 TABLET | Refills: 3 | Status: SHIPPED | OUTPATIENT
Start: 2025-04-16

## 2025-04-16 SDOH — HEALTH STABILITY: PHYSICAL HEALTH: EXERCISE TYPE: SEATED

## 2025-04-16 SDOH — HEALTH STABILITY: PHYSICAL HEALTH: EXERCISE COMMENTS: PT COMPLETED BLE SEATED THER EX 1 X10 REPS:  MARCHES  LAQS  APS  HIP ABD/ADD  GS

## 2025-04-16 ASSESSMENT — ENCOUNTER SYMPTOMS
PAIN: 1
PAIN LOCATION - PAIN FREQUENCY: INTERMITTENT
PAIN LOCATION - PAIN SEVERITY: 8/10
PAIN LOCATION - RELIEVING FACTORS: REST
PERSON REPORTING PAIN: PATIENT
PAIN LOCATION - RELIEVING FACTORS: REST
MUSCLE WEAKNESS: 1
PAIN LOCATION: BACK
PAIN: 1
PAIN LOCATION - EXACERBATING FACTORS: MOBILITY
PAIN SEVERITY GOAL: 6/10
HIGHEST PAIN SEVERITY IN PAST 24 HOURS: 8/10
PAIN LOCATION - PAIN QUALITY: ACHE
PAIN LOCATION - PAIN SEVERITY: 8/10
LIMITED RANGE OF MOTION: 1
PERSON REPORTING PAIN: PATIENT
PAIN LOCATION: NECK
PAIN LOCATION: RIGHT KNEE
PAIN LOCATION - PAIN QUALITY: BURNING
PAIN LOCATION - PAIN SEVERITY: 8/10
ARTHRALGIAS: 1
PAIN LOCATION - PAIN QUALITY: ACHE
SUBJECTIVE PAIN PROGRESSION: WAXING AND WANING
PAIN LOCATION - EXACERBATING FACTORS: MOBILITY
LOWEST PAIN SEVERITY IN PAST 24 HOURS: 7/10
PAIN LOCATION - PAIN FREQUENCY: INTERMITTENT

## 2025-04-16 ASSESSMENT — ACTIVITIES OF DAILY LIVING (ADL)
CURRENT_FUNCTION: STAND BY ASSIST
WASHING_LB_CURRENT_FUNCTION: MINIMUM ASSIST
AMBULATION ASSISTANCE: STAND BY ASSIST
WASHING_UPB_CURRENT_FUNCTION: MINIMUM ASSIST

## 2025-04-16 NOTE — TELEPHONE ENCOUNTER
[facility] (daughter) called for a refill.  Prema needs a refill of   Carbidopa/ Levodopa 25/100 1.5 tabs TID  #405 with refills to [facility]/ [place] on file

## 2025-04-18 ENCOUNTER — HOME CARE VISIT (OUTPATIENT)
Dept: HOME HEALTH SERVICES | Facility: HOME HEALTH | Age: 60
End: 2025-04-18
Payer: MEDICARE

## 2025-04-18 VITALS
SYSTOLIC BLOOD PRESSURE: 115 MMHG | DIASTOLIC BLOOD PRESSURE: 80 MMHG | RESPIRATION RATE: 18 BRPM | HEART RATE: 72 BPM | OXYGEN SATURATION: 98 %

## 2025-04-18 PROCEDURE — G0157 HHC PT ASSISTANT EA 15: HCPCS | Mod: CQ,HHH

## 2025-04-18 ASSESSMENT — ENCOUNTER SYMPTOMS
HIGHEST PAIN SEVERITY IN PAST 24 HOURS: 9/10
PERSON REPORTING PAIN: PATIENT
MUSCLE WEAKNESS: 1
PAIN LOCATION - PAIN SEVERITY: 8/10
LIMITED RANGE OF MOTION: 1
PAIN: 1
PAIN LOCATION: RIGHT LEG
PAIN SEVERITY GOAL: 0/10
SUBJECTIVE PAIN PROGRESSION: WAXING AND WANING
ARTHRALGIAS: 1
LOWEST PAIN SEVERITY IN PAST 24 HOURS: 6/10

## 2025-04-18 ASSESSMENT — ACTIVITIES OF DAILY LIVING (ADL)
AMBULATION ASSISTANCE ON FLAT SURFACES: 1
AMBULATION_DISTANCE/DURATION_TOLERATED: 25 FT X2
CURRENT_FUNCTION: STAND BY ASSIST
AMBULATION ASSISTANCE: STAND BY ASSIST

## 2025-04-21 ENCOUNTER — HOME CARE VISIT (OUTPATIENT)
Dept: HOME HEALTH SERVICES | Facility: HOME HEALTH | Age: 60
End: 2025-04-21
Payer: MEDICARE

## 2025-04-21 VITALS
OXYGEN SATURATION: 96 % | HEART RATE: 74 BPM | RESPIRATION RATE: 18 BRPM | SYSTOLIC BLOOD PRESSURE: 140 MMHG | DIASTOLIC BLOOD PRESSURE: 82 MMHG

## 2025-04-21 PROCEDURE — G0157 HHC PT ASSISTANT EA 15: HCPCS | Mod: CQ,HHH

## 2025-04-21 ASSESSMENT — ACTIVITIES OF DAILY LIVING (ADL)
AMBULATION ASSISTANCE: STAND BY ASSIST
CURRENT_FUNCTION: STAND BY ASSIST
AMBULATION ASSISTANCE ON FLAT SURFACES: 1

## 2025-04-21 ASSESSMENT — ENCOUNTER SYMPTOMS
SUBJECTIVE PAIN PROGRESSION: WAXING AND WANING
PAIN LOCATION - PAIN SEVERITY: 7/10
PERSON REPORTING PAIN: PATIENT
PAIN LOCATION: BACK
HIGHEST PAIN SEVERITY IN PAST 24 HOURS: 9/10
PAIN: 1
LIMITED RANGE OF MOTION: 1
LOWEST PAIN SEVERITY IN PAST 24 HOURS: 6/10
MUSCLE WEAKNESS: 1
ARTHRALGIAS: 1

## 2025-04-22 ENCOUNTER — HOSPITAL ENCOUNTER (OUTPATIENT)
Dept: RADIOLOGY | Facility: CLINIC | Age: 60
Discharge: HOME | End: 2025-04-22
Payer: MEDICARE

## 2025-04-22 ENCOUNTER — APPOINTMENT (OUTPATIENT)
Dept: ORTHOPEDIC SURGERY | Facility: CLINIC | Age: 60
End: 2025-04-22
Payer: MEDICARE

## 2025-04-22 DIAGNOSIS — S82.421S: ICD-10-CM

## 2025-04-22 DIAGNOSIS — S82.421S: Primary | ICD-10-CM

## 2025-04-22 PROCEDURE — 73562 X-RAY EXAM OF KNEE 3: CPT | Mod: RIGHT SIDE | Performed by: RADIOLOGY

## 2025-04-22 PROCEDURE — 73562 X-RAY EXAM OF KNEE 3: CPT | Mod: RT
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
## 2025-04-23 ENCOUNTER — HOME CARE VISIT (OUTPATIENT)
Dept: HOME HEALTH SERVICES | Facility: HOME HEALTH | Age: 60
End: 2025-04-23
Payer: MEDICARE

## 2025-04-23 PROCEDURE — G0157 HHC PT ASSISTANT EA 15: HCPCS | Mod: CQ,HHH

## 2025-04-23 ASSESSMENT — ENCOUNTER SYMPTOMS
MUSCLE WEAKNESS: 1
PAIN LOCATION: RIGHT LEG
PAIN LOCATION - PAIN SEVERITY: 7/10
PAIN: 1
ARTHRALGIAS: 1
HIGHEST PAIN SEVERITY IN PAST 24 HOURS: 9/10
LOWEST PAIN SEVERITY IN PAST 24 HOURS: 6/10
LIMITED RANGE OF MOTION: 1
PERSON REPORTING PAIN: PATIENT

## 2025-04-23 ASSESSMENT — ACTIVITIES OF DAILY LIVING (ADL)
CURRENT_FUNCTION: STAND BY ASSIST
AMBULATION ASSISTANCE ON FLAT SURFACES: 1
AMBULATION ASSISTANCE: STAND BY ASSIST

## 2025-04-24 ENCOUNTER — TELEPHONE (OUTPATIENT)
Dept: ORTHOPEDIC SURGERY | Facility: CLINIC | Age: 60
End: 2025-04-24
Payer: MEDICARE

## 2025-04-24 DIAGNOSIS — S82.421S: Primary | ICD-10-CM

## 2025-04-24 RX ORDER — TRAMADOL HYDROCHLORIDE 50 MG/1
50 TABLET ORAL EVERY 6 HOURS PRN
Qty: 28 TABLET | Refills: 0 | Status: SHIPPED | OUTPATIENT
Start: 2025-04-24 | End: 2025-05-01

## 2025-04-24 NOTE — TELEPHONE ENCOUNTER
Patient called in and stated that she was supposed to be prescribed Tramadol  at her last OV, which was on 04/22/2025.

## 2025-04-25 ENCOUNTER — APPOINTMENT (OUTPATIENT)
Dept: NEUROLOGY | Facility: CLINIC | Age: 60
End: 2025-04-25
Payer: MEDICARE

## 2025-04-29 ENCOUNTER — HOME CARE VISIT (OUTPATIENT)
Dept: HOME HEALTH SERVICES | Facility: HOME HEALTH | Age: 60
End: 2025-04-29
Payer: MEDICARE

## 2025-04-29 VITALS
DIASTOLIC BLOOD PRESSURE: 75 MMHG | OXYGEN SATURATION: 98 % | SYSTOLIC BLOOD PRESSURE: 110 MMHG | RESPIRATION RATE: 16 BRPM | HEART RATE: 88 BPM

## 2025-04-29 PROCEDURE — G0157 HHC PT ASSISTANT EA 15: HCPCS | Mod: CQ,HHH

## 2025-04-29 SDOH — HEALTH STABILITY: PHYSICAL HEALTH: EXERCISE COMMENTS: PT COMPLETED BLE SEATED THER EX 1 X10 REPS:  MARCHES  LAQS  APS  HIP ABD/ADD  HAMSTRING CURLS

## 2025-04-29 ASSESSMENT — ENCOUNTER SYMPTOMS
ARTHRALGIAS: 1
MUSCLE WEAKNESS: 1
PAIN LOCATION - PAIN SEVERITY: 8/10
PAIN LOCATION: HEAD
PAIN LOCATION - PAIN QUALITY: ACHE
PAIN: 1
PERSON REPORTING PAIN: PATIENT
HIGHEST PAIN SEVERITY IN PAST 24 HOURS: 9/10
SUBJECTIVE PAIN PROGRESSION: WAXING AND WANING
LOWEST PAIN SEVERITY IN PAST 24 HOURS: 6/10
LIMITED RANGE OF MOTION: 1

## 2025-04-29 ASSESSMENT — ACTIVITIES OF DAILY LIVING (ADL)
AMBULATION ASSISTANCE: STAND BY ASSIST
AMBULATION ASSISTANCE ON FLAT SURFACES: 1
CURRENT_FUNCTION: STAND BY ASSIST

## 2025-05-01 ENCOUNTER — HOME CARE VISIT (OUTPATIENT)
Dept: HOME HEALTH SERVICES | Facility: HOME HEALTH | Age: 60
End: 2025-05-01
Payer: MEDICARE

## 2025-05-01 VITALS
RESPIRATION RATE: 18 BRPM | SYSTOLIC BLOOD PRESSURE: 128 MMHG | HEART RATE: 86 BPM | TEMPERATURE: 97.8 F | OXYGEN SATURATION: 97 % | DIASTOLIC BLOOD PRESSURE: 80 MMHG

## 2025-05-01 PROCEDURE — G0157 HHC PT ASSISTANT EA 15: HCPCS | Mod: CQ,HHH

## 2025-05-01 ASSESSMENT — ENCOUNTER SYMPTOMS
PERSON REPORTING PAIN: DIRECT OBSERVATION
PAIN LOCATION: BACK
HIGHEST PAIN SEVERITY IN PAST 24 HOURS: 10/10
PAIN: 1
ARTHRALGIAS: 1
LOWEST PAIN SEVERITY IN PAST 24 HOURS: 7/10
PAIN LOCATION - PAIN SEVERITY: 8/10
MUSCLE WEAKNESS: 1
LIMITED RANGE OF MOTION: 1
SUBJECTIVE PAIN PROGRESSION: WAXING AND WANING

## 2025-05-05 ENCOUNTER — HOME CARE VISIT (OUTPATIENT)
Dept: HOME HEALTH SERVICES | Facility: HOME HEALTH | Age: 60
End: 2025-05-05
Payer: MEDICARE

## 2025-05-05 VITALS
RESPIRATION RATE: 16 BRPM | OXYGEN SATURATION: 98 % | HEART RATE: 86 BPM | DIASTOLIC BLOOD PRESSURE: 85 MMHG | SYSTOLIC BLOOD PRESSURE: 145 MMHG

## 2025-05-05 PROCEDURE — G0157 HHC PT ASSISTANT EA 15: HCPCS | Mod: CQ,HHH

## 2025-05-05 ASSESSMENT — ENCOUNTER SYMPTOMS
PERSON REPORTING PAIN: PATIENT
PAIN LOCATION - PAIN SEVERITY: 7/10
LOWEST PAIN SEVERITY IN PAST 24 HOURS: 6/10
PAIN: 1
ARTHRALGIAS: 1
HIGHEST PAIN SEVERITY IN PAST 24 HOURS: 8/10
PAIN LOCATION: BACK
LIMITED RANGE OF MOTION: 1
MUSCLE WEAKNESS: 1
PAIN LOCATION - PAIN FREQUENCY: CONSTANT

## 2025-05-05 ASSESSMENT — ACTIVITIES OF DAILY LIVING (ADL)
CURRENT_FUNCTION: STAND BY ASSIST
AMBULATION ASSISTANCE: STAND BY ASSIST

## 2025-05-09 ENCOUNTER — HOME CARE VISIT (OUTPATIENT)
Dept: HOME HEALTH SERVICES | Facility: HOME HEALTH | Age: 60
End: 2025-05-09
Payer: MEDICARE

## 2025-05-10 ENCOUNTER — HOME CARE VISIT (OUTPATIENT)
Dept: HOME HEALTH SERVICES | Facility: HOME HEALTH | Age: 60
End: 2025-05-10
Payer: MEDICARE

## 2025-05-11 ASSESSMENT — ACTIVITIES OF DAILY LIVING (ADL)
HOME_HEALTH_OASIS: 01
OASIS_M1830: 02

## 2025-05-19 ENCOUNTER — TELEPHONE (OUTPATIENT)
Dept: UROLOGY | Facility: HOSPITAL | Age: 60
End: 2025-05-19
Payer: MEDICARE

## 2025-05-19 NOTE — TELEPHONE ENCOUNTER
Received a call to refill Keflex from her [facility]; LM for patient to call us back if having urinary symptoms.    Yadi Kim LPN

## 2025-06-06 ENCOUNTER — APPOINTMENT (OUTPATIENT)
Dept: ORTHOPEDIC SURGERY | Facility: CLINIC | Age: 60
End: 2025-06-06
Payer: MEDICARE

## 2025-06-06 NOTE — ED PROCEDURE NOTE
Procedure  Splint Application    Performed by: Vania Klein MD  Authorized by: Vania Klein MD    Consent:     Consent obtained:  Verbal  Pre-procedure details:     Distal neurologic exam:  Normal    Distal perfusion: distal pulses strong    Procedure details:     Location:  Leg    Leg location:  R lower leg    Cast type:  Short leg    Splint type:  Short leg    Supplies:  Prefabricated splint    Splint applied by::  Resident    Supervision: I personally supervised and inspected the splint/strap which was applied. The extremity is appropriately immobilized. Patient neurovascularly intact before and after the splint application.      Treatment: Splint/Sling represents a definitive treatment for fracture identified    Post-procedure details:     Distal neurologic exam:  Normal    Distal perfusion: distal pulses strong      Post-procedure imaging: not applicable                 Vania Klein MD  06/06/25 0717

## 2025-06-10 ENCOUNTER — APPOINTMENT (OUTPATIENT)
Dept: ORTHOPEDIC SURGERY | Facility: CLINIC | Age: 60
End: 2025-06-10
Payer: MEDICARE

## 2025-06-11 DIAGNOSIS — G20.C PARKINSONISM, UNSPECIFIED PARKINSONISM TYPE (MULTI): ICD-10-CM

## 2025-06-11 RX ORDER — CARBIDOPA AND LEVODOPA 25; 100 MG/1; MG/1
1.5 TABLET ORAL 3 TIMES DAILY
Qty: 405 TABLET | Refills: 3 | OUTPATIENT
Start: 2025-06-11

## 2025-06-17 ENCOUNTER — APPOINTMENT (OUTPATIENT)
Dept: PULMONOLOGY | Facility: CLINIC | Age: 60
End: 2025-06-17
Payer: MEDICARE

## 2025-06-23 ENCOUNTER — APPOINTMENT (OUTPATIENT)
Dept: NEUROLOGY | Facility: CLINIC | Age: 60
End: 2025-06-23
Payer: MEDICARE

## 2025-06-23 VITALS
HEIGHT: 66 IN | TEMPERATURE: 97.7 F | HEART RATE: 84 BPM | WEIGHT: 224 LBS | BODY MASS INDEX: 36 KG/M2 | DIASTOLIC BLOOD PRESSURE: 87 MMHG | SYSTOLIC BLOOD PRESSURE: 151 MMHG

## 2025-06-23 DIAGNOSIS — M43.6 TORTICOLLIS: Primary | ICD-10-CM

## 2025-06-23 PROCEDURE — 3079F DIAST BP 80-89 MM HG: CPT | Performed by: PSYCHIATRY & NEUROLOGY

## 2025-06-23 PROCEDURE — 3044F HG A1C LEVEL LT 7.0%: CPT | Performed by: PSYCHIATRY & NEUROLOGY

## 2025-06-23 PROCEDURE — 3077F SYST BP >= 140 MM HG: CPT | Performed by: PSYCHIATRY & NEUROLOGY

## 2025-06-23 PROCEDURE — 64616 CHEMODENERV MUSC NECK DYSTON: CPT | Performed by: PSYCHIATRY & NEUROLOGY

## 2025-06-23 PROCEDURE — 3008F BODY MASS INDEX DOCD: CPT | Performed by: PSYCHIATRY & NEUROLOGY

## 2025-06-23 PROCEDURE — 1036F TOBACCO NON-USER: CPT | Performed by: PSYCHIATRY & NEUROLOGY

## 2025-06-23 NOTE — PROGRESS NOTES
"Subjective     Prema Yudy Hair is a 60 y.o. year old female here for botulinum toxin injections.    Last injection was in 8/2024 and it was highly effective, as usual.  Once it wore off, she has used cervical collars to straighten her neck muscles.    Allergies[1]    Objective     /87   Pulse 84   Temp 36.5 °C (97.7 °F)   Ht 1.676 m (5' 6\")   Wt 102 kg (224 lb)   BMI 36.15 kg/m²     Botox Injection - Neck Pain, Headaches   Procedure: Botox injection.   Indication: spasmodic torticollis .   Risk, benefits, alternatives, risk of worsening pain, risk of URI, risk of dysphagia, risk of focal weakness and risk of facial paresis were discussed with the patient. Verbal consent was obtained prior to the procedure. Alcohol was used to prep the area.  Procedure Note:   The patient was placed in the upright position. 200 units of Botulinum Toxin were injected 80 u right SCM in 4 sites, 20 u right trapezius in 2 sites, 80 u left splenius capitis in 4 sites, 20 u left trapezius in 2 sites.  Lot number: [id number]. Expiration date: 10/2027.  Post-Procedure: the patient tolerated the procedure well. Complications: None.   Follow-up in the office in 12 weeks for repeat injection(s).    Assessment/Plan   Diagnoses and all orders for this visit:  Torticollis  -     onabotulinumtoxinA (Botox) injection 200 Units      David Swanson Jr., M.D., FAAN         [1]   Allergies  Allergen Reactions    Lisinopril Swelling     "

## 2025-07-08 ENCOUNTER — APPOINTMENT (OUTPATIENT)
Facility: CLINIC | Age: 60
End: 2025-07-08
Payer: MEDICARE

## 2025-07-08 ENCOUNTER — APPOINTMENT (OUTPATIENT)
Dept: CARDIOLOGY | Facility: HOSPITAL | Age: 60
End: 2025-07-08
Payer: MEDICARE

## 2025-07-24 ENCOUNTER — HOSPITAL ENCOUNTER (OUTPATIENT)
Facility: HOSPITAL | Age: 60
Setting detail: OBSERVATION
End: 2025-07-24
Attending: EMERGENCY MEDICINE | Admitting: INTERNAL MEDICINE
Payer: MEDICARE

## 2025-07-24 ENCOUNTER — APPOINTMENT (OUTPATIENT)
Dept: RADIOLOGY | Facility: HOSPITAL | Age: 60
End: 2025-07-24
Payer: MEDICARE

## 2025-07-24 ENCOUNTER — APPOINTMENT (OUTPATIENT)
Dept: CARDIOLOGY | Facility: HOSPITAL | Age: 60
End: 2025-07-24
Payer: MEDICARE

## 2025-07-24 DIAGNOSIS — M54.50 ACUTE LOW BACK PAIN, UNSPECIFIED BACK PAIN LATERALITY, UNSPECIFIED WHETHER SCIATICA PRESENT: ICD-10-CM

## 2025-07-24 DIAGNOSIS — C73 CANCER OF THYROID (MULTI): ICD-10-CM

## 2025-07-24 DIAGNOSIS — R29.6 FREQUENT FALLS: Primary | ICD-10-CM

## 2025-07-24 DIAGNOSIS — R53.1 WEAKNESS: ICD-10-CM

## 2025-07-24 LAB
ALBUMIN SERPL BCP-MCNC: 3.9 G/DL (ref 3.4–5)
ALP SERPL-CCNC: 125 U/L (ref 33–136)
ALT SERPL W P-5'-P-CCNC: 15 U/L (ref 7–45)
ANION GAP SERPL CALC-SCNC: 13 MMOL/L (ref 10–20)
APPEARANCE UR: CLEAR
APTT PPP: 30 SECONDS (ref 26–36)
AST SERPL W P-5'-P-CCNC: 14 U/L (ref 9–39)
BASOPHILS # BLD AUTO: 0.02 X10*3/UL (ref 0–0.1)
BASOPHILS NFR BLD AUTO: 0.4 %
BILIRUB SERPL-MCNC: 0.3 MG/DL (ref 0–1.2)
BILIRUB UR STRIP.AUTO-MCNC: NEGATIVE MG/DL
BUN SERPL-MCNC: 13 MG/DL (ref 6–23)
CALCIUM SERPL-MCNC: 8.8 MG/DL (ref 8.6–10.3)
CARDIAC TROPONIN I PNL SERPL HS: 4 NG/L (ref 0–13)
CHLORIDE SERPL-SCNC: 105 MMOL/L (ref 98–107)
CO2 SERPL-SCNC: 27 MMOL/L (ref 21–32)
COLOR UR: NORMAL
CREAT SERPL-MCNC: 0.78 MG/DL (ref 0.5–1.05)
EGFRCR SERPLBLD CKD-EPI 2021: 87 ML/MIN/1.73M*2
EOSINOPHIL # BLD AUTO: 0.04 X10*3/UL (ref 0–0.7)
EOSINOPHIL NFR BLD AUTO: 0.8 %
ERYTHROCYTE [DISTWIDTH] IN BLOOD BY AUTOMATED COUNT: 18.5 % (ref 11.5–14.5)
GLUCOSE BLD MANUAL STRIP-MCNC: 139 MG/DL (ref 74–99)
GLUCOSE SERPL-MCNC: 98 MG/DL (ref 74–99)
GLUCOSE UR STRIP.AUTO-MCNC: NORMAL MG/DL
HCT VFR BLD AUTO: 38.1 % (ref 36–46)
HGB BLD-MCNC: 11.6 G/DL (ref 12–16)
IMM GRANULOCYTES # BLD AUTO: 0.02 X10*3/UL (ref 0–0.7)
IMM GRANULOCYTES NFR BLD AUTO: 0.4 % (ref 0–0.9)
INR PPP: 1 (ref 0.9–1.1)
KETONES UR STRIP.AUTO-MCNC: NEGATIVE MG/DL
LEUKOCYTE ESTERASE UR QL STRIP.AUTO: NEGATIVE
LYMPHOCYTES # BLD AUTO: 1.09 X10*3/UL (ref 1.2–4.8)
LYMPHOCYTES NFR BLD AUTO: 20.7 %
MCH RBC QN AUTO: 26 PG (ref 26–34)
MCHC RBC AUTO-ENTMCNC: 30.4 G/DL (ref 32–36)
MCV RBC AUTO: 85 FL (ref 80–100)
MONOCYTES # BLD AUTO: 0.53 X10*3/UL (ref 0.1–1)
MONOCYTES NFR BLD AUTO: 10.1 %
NEUTROPHILS # BLD AUTO: 3.56 X10*3/UL (ref 1.2–7.7)
NEUTROPHILS NFR BLD AUTO: 67.6 %
NITRITE UR QL STRIP.AUTO: NEGATIVE
NRBC BLD-RTO: 0 /100 WBCS (ref 0–0)
PH UR STRIP.AUTO: 6.5 [PH]
PLATELET # BLD AUTO: 260 X10*3/UL (ref 150–450)
POTASSIUM SERPL-SCNC: 3.6 MMOL/L (ref 3.5–5.3)
PROT SERPL-MCNC: 6.6 G/DL (ref 6.4–8.2)
PROT UR STRIP.AUTO-MCNC: NEGATIVE MG/DL
PROTHROMBIN TIME: 10.9 SECONDS (ref 9.8–12.4)
RBC # BLD AUTO: 4.46 X10*6/UL (ref 4–5.2)
RBC # UR STRIP.AUTO: NEGATIVE MG/DL
SODIUM SERPL-SCNC: 141 MMOL/L (ref 136–145)
SP GR UR STRIP.AUTO: 1.01
UROBILINOGEN UR STRIP.AUTO-MCNC: NORMAL MG/DL
WBC # BLD AUTO: 5.3 X10*3/UL (ref 4.4–11.3)

## 2025-07-24 PROCEDURE — 82947 ASSAY GLUCOSE BLOOD QUANT: CPT | Mod: 59

## 2025-07-24 PROCEDURE — 85610 PROTHROMBIN TIME: CPT | Performed by: EMERGENCY MEDICINE

## 2025-07-24 PROCEDURE — 85025 COMPLETE CBC W/AUTO DIFF WBC: CPT | Performed by: EMERGENCY MEDICINE

## 2025-07-24 PROCEDURE — 2500000001 HC RX 250 WO HCPCS SELF ADMINISTERED DRUGS (ALT 637 FOR MEDICARE OP)

## 2025-07-24 PROCEDURE — 72125 CT NECK SPINE W/O DYE: CPT | Performed by: RADIOLOGY

## 2025-07-24 PROCEDURE — G0378 HOSPITAL OBSERVATION PER HR: HCPCS

## 2025-07-24 PROCEDURE — 70450 CT HEAD/BRAIN W/O DYE: CPT

## 2025-07-24 PROCEDURE — 96375 TX/PRO/DX INJ NEW DRUG ADDON: CPT

## 2025-07-24 PROCEDURE — 94660 CPAP INITIATION&MGMT: CPT

## 2025-07-24 PROCEDURE — 72125 CT NECK SPINE W/O DYE: CPT

## 2025-07-24 PROCEDURE — 73564 X-RAY EXAM KNEE 4 OR MORE: CPT | Mod: BILATERAL PROCEDURE | Performed by: RADIOLOGY

## 2025-07-24 PROCEDURE — 84075 ASSAY ALKALINE PHOSPHATASE: CPT | Performed by: EMERGENCY MEDICINE

## 2025-07-24 PROCEDURE — 36415 COLL VENOUS BLD VENIPUNCTURE: CPT | Performed by: EMERGENCY MEDICINE

## 2025-07-24 PROCEDURE — 99285 EMERGENCY DEPT VISIT HI MDM: CPT | Mod: 25 | Performed by: EMERGENCY MEDICINE

## 2025-07-24 PROCEDURE — 84484 ASSAY OF TROPONIN QUANT: CPT | Performed by: EMERGENCY MEDICINE

## 2025-07-24 PROCEDURE — 70450 CT HEAD/BRAIN W/O DYE: CPT | Performed by: RADIOLOGY

## 2025-07-24 PROCEDURE — 81003 URINALYSIS AUTO W/O SCOPE: CPT | Performed by: EMERGENCY MEDICINE

## 2025-07-24 PROCEDURE — 96374 THER/PROPH/DIAG INJ IV PUSH: CPT

## 2025-07-24 PROCEDURE — 51798 US URINE CAPACITY MEASURE: CPT

## 2025-07-24 PROCEDURE — 93005 ELECTROCARDIOGRAM TRACING: CPT

## 2025-07-24 PROCEDURE — 96361 HYDRATE IV INFUSION ADD-ON: CPT

## 2025-07-24 PROCEDURE — 73564 X-RAY EXAM KNEE 4 OR MORE: CPT | Mod: 50

## 2025-07-24 PROCEDURE — 2500000004 HC RX 250 GENERAL PHARMACY W/ HCPCS (ALT 636 FOR OP/ED)

## 2025-07-24 PROCEDURE — 85730 THROMBOPLASTIN TIME PARTIAL: CPT | Performed by: EMERGENCY MEDICINE

## 2025-07-24 PROCEDURE — 2500000005 HC RX 250 GENERAL PHARMACY W/O HCPCS

## 2025-07-24 RX ORDER — METHOCARBAMOL 100 MG/ML
1000 INJECTION, SOLUTION INTRAMUSCULAR; INTRAVENOUS ONCE
Status: COMPLETED | OUTPATIENT
Start: 2025-07-24 | End: 2025-07-24

## 2025-07-24 RX ORDER — ACETAMINOPHEN 325 MG/1
650 TABLET ORAL ONCE
Status: COMPLETED | OUTPATIENT
Start: 2025-07-24 | End: 2025-07-24

## 2025-07-24 RX ORDER — KETOROLAC TROMETHAMINE 30 MG/ML
15 INJECTION, SOLUTION INTRAMUSCULAR; INTRAVENOUS ONCE
Status: COMPLETED | OUTPATIENT
Start: 2025-07-24 | End: 2025-07-24

## 2025-07-24 RX ORDER — LIDOCAINE 560 MG/1
1 PATCH PERCUTANEOUS; TOPICAL; TRANSDERMAL ONCE
Status: COMPLETED | OUTPATIENT
Start: 2025-07-24 | End: 2025-07-25

## 2025-07-24 RX ORDER — MORPHINE SULFATE 4 MG/ML
6 INJECTION, SOLUTION INTRAMUSCULAR; INTRAVENOUS ONCE
Status: COMPLETED | OUTPATIENT
Start: 2025-07-24 | End: 2025-07-24

## 2025-07-24 RX ORDER — ONDANSETRON HYDROCHLORIDE 2 MG/ML
4 INJECTION, SOLUTION INTRAVENOUS ONCE
Status: COMPLETED | OUTPATIENT
Start: 2025-07-24 | End: 2025-07-24

## 2025-07-24 RX ADMIN — ACETAMINOPHEN 650 MG: 325 TABLET ORAL at 20:02

## 2025-07-24 RX ADMIN — MORPHINE SULFATE 6 MG: 4 INJECTION, SOLUTION INTRAMUSCULAR; INTRAVENOUS at 18:20

## 2025-07-24 RX ADMIN — HYDROMORPHONE HYDROCHLORIDE 0.4 MG: 1 INJECTION, SOLUTION INTRAMUSCULAR; INTRAVENOUS; SUBCUTANEOUS at 20:02

## 2025-07-24 RX ADMIN — KETOROLAC TROMETHAMINE 15 MG: 30 INJECTION, SOLUTION INTRAMUSCULAR at 20:02

## 2025-07-24 RX ADMIN — ONDANSETRON 4 MG: 2 INJECTION, SOLUTION INTRAMUSCULAR; INTRAVENOUS at 18:20

## 2025-07-24 RX ADMIN — METHOCARBAMOL 1000 MG: 100 INJECTION INTRAMUSCULAR; INTRAVENOUS at 20:02

## 2025-07-24 RX ADMIN — LIDOCAINE 4% 1 PATCH: 40 PATCH TOPICAL at 18:20

## 2025-07-24 RX ADMIN — Medication: at 19:54

## 2025-07-24 RX ADMIN — SODIUM CHLORIDE 500 ML: 0.9 INJECTION, SOLUTION INTRAVENOUS at 21:00

## 2025-07-24 ASSESSMENT — PAIN DESCRIPTION - LOCATION
LOCATION: OTHER (COMMENT)
LOCATION: BACK
LOCATION: BACK

## 2025-07-24 ASSESSMENT — PAIN DESCRIPTION - PAIN TYPE: TYPE: ACUTE PAIN

## 2025-07-24 ASSESSMENT — PAIN - FUNCTIONAL ASSESSMENT
PAIN_FUNCTIONAL_ASSESSMENT: 0-10

## 2025-07-24 ASSESSMENT — PAIN SCALES - GENERAL
PAINLEVEL_OUTOF10: 7
PAINLEVEL_OUTOF10: 4

## 2025-07-24 NOTE — ED PROVIDER NOTES
Emergency Department Provider Note       History of Present Illness     History provided by: Patient  Limitations to History: None  External Records Reviewed with Brief Summary: Patient with history of DM, HTN, thyroid cancer, OA, drug-induced Parkinson's, hyperlipidemia    HPI:  Prema Hair is a 60 y.o. female presents to the emergency department after frequent falls.  EMS initiated stroke alert due to generalized weakness.  Patient states that she fell 3 times today.  She did not tell anybody after the first 2 falls but her family witnessed the third fall and called 911.  The patient reports pain in both knees which is chronic.  Patient states that her first fall was earlier today but cannot give a specific time.    Physical Exam   Triage vitals:  T 36.4 °C (97.6 °F)  HR 73  /75  RR 16  O2 100 % None (Room air)    Physical Exam  Vitals and nursing note reviewed.   HENT:      Head: Normocephalic and atraumatic.      Nose: Nose normal.     Eyes:      Conjunctiva/sclera: Conjunctivae normal.       Cardiovascular:      Rate and Rhythm: Normal rate and regular rhythm.      Pulses: Normal pulses.      Heart sounds: Normal heart sounds.   Pulmonary:      Effort: Pulmonary effort is normal.      Breath sounds: Normal breath sounds.   Abdominal:      General: Bowel sounds are normal.      Palpations: Abdomen is soft.     Musculoskeletal:         General: Normal range of motion.      Cervical back: Normal range of motion and neck supple.      Right knee: Tenderness present.      Left knee: Tenderness present.     Skin:     Findings: No rash.     Neurological:      General: No focal deficit present.      Mental Status: She is alert and oriented to person, place, and time.     Psychiatric:         Mood and Affect: Mood normal.           Medical Decision Making & ED Course   Medical Decision Makin y.o. female presents to the emergency department after frequent falls.  Patient was seen at arrival and  sent directly to CT scan.  EKG performed showed a normal sinus rhythm, heart rate 73, normal axis, no ST elevation.  Scans of her head and C-spine showed no acute traumatic injuries or intracranial hemorrhage.  Bilateral knee x-rays did not show any acute findings.  CBC showed a normal white blood count.  ----      Differential diagnoses considered include but are not limited to: CVA, TIA, intracranial hemorrhage, tumor, intoxication, hypoperfusion, complex migraine, vertigo    Social Determinants of Health which Significantly Impact Care: Social Determinants of Health which Significantly Impact Care: None identified     EKG Independent Interpretation: EKG interpreted by myself. Please see ED Course for full interpretation.    Independent Result Review and Interpretation: Relevant laboratory and radiographic results were reviewed and independently interpreted by myself.  As necessary, they are commented on in the ED Course.    Chronic conditions affecting the patient's care: As documented above in [facility]    The patient was discussed with the following consultants/services: None    Care Considerations: As documented above in [facility]    ED Course:       Disposition   Patient was signed out to [facility] at 1600 pending completion of their work-up.  Please see the next provider's transition of care note for the remainder of the patient's care.     Procedures   Procedures        Jin Adorno MD  Emergency Medicine                                                       Jin Adorno MD  07/24/25 3487

## 2025-07-24 NOTE — ED TRIAGE NOTES
Pt to the ED from home for multiple falls today, pt has hx of parkinsons and ambulates with rollator, pt states BLE weakness and that she is having lower back pain, denies LOC, -thinners, pt a&o x3 in triage, LKW unknown

## 2025-07-24 NOTE — PROGRESS NOTES
Emergency Department Transition of Care Note       Signout   I received Prema Hair in signout from Dr. Adorno.  Please see the ED Provider Note for all HPI, PE and MDM up to the time of signout at 4:15 PM. This is in addition to the primary record.    In brief Prema Hair is an 60 y.o. female presenting for frequent falls and extremity weakness.    At the time of signout we were awaiting:  - Follow-up on labs including urine.  - Check with family with regards to whether they would be able to look after the patient at home.    ED Course & Medical Decision Making   Medical Decision Making:  Under my care, the patient remained stable.    Please see ED course below.    Ultimately patient admitted to hospital for frequent falls.    Patient admitted by Dr. Rodriguez.    ED Course:  ED Course as of 07/25/25 0208   Thu Jul 24, 2025 1607 Signout received from Dr. Adorno  Presents as stroke alert  Frequent falls  Plan at signout includes:  - Chat with family   - Check Urine  - Follow labs   [JS]   1810 Discussion was had with the family with regards to ultimate disposition.  Patient adamant that she wants to be discharged after she provides a urine sample.  Family is in agreement. [JS]   1811 Patient also requesting analgesia for back pain. [JS]   1941 Patient on BiPAP at night.  Requesting this here. [JS]   2157 Reevaluation of the patient, she remained stable and sleeping at this time.  In discussion with the family, they intermittently straight catheter because she sometimes retains.  They are asking if they can have a straight cath for urine. [JS]   2245 Further discussion with the family, they are asking if the patient can be admitted for OT PT evaluation due to frequent falls. [JS]      ED Course User Index  [JS] Rhys Escobar MD         Diagnoses as of 07/25/25 0208   Frequent falls   Weakness       Disposition   Admit    Procedures   Procedures    Rhys Escobar MD  Emergency Medicine

## 2025-07-25 LAB
ATRIAL RATE: 73 BPM
GLUCOSE BLD MANUAL STRIP-MCNC: 101 MG/DL (ref 74–99)
GLUCOSE BLD MANUAL STRIP-MCNC: 104 MG/DL (ref 74–99)
GLUCOSE BLD MANUAL STRIP-MCNC: 118 MG/DL (ref 74–99)
GLUCOSE BLD MANUAL STRIP-MCNC: 99 MG/DL (ref 74–99)
P AXIS: 68 DEGREES
P OFFSET: 186 MS
P ONSET: 122 MS
PR INTERVAL: 194 MS
Q ONSET: 219 MS
QRS COUNT: 12 BEATS
QRS DURATION: 80 MS
QT INTERVAL: 398 MS
QTC CALCULATION(BAZETT): 438 MS
QTC FREDERICIA: 425 MS
R AXIS: 47 DEGREES
T AXIS: 18 DEGREES
T OFFSET: 418 MS
VENTRICULAR RATE: 73 BPM

## 2025-07-25 PROCEDURE — 2500000005 HC RX 250 GENERAL PHARMACY W/O HCPCS: Performed by: INTERNAL MEDICINE

## 2025-07-25 PROCEDURE — 94640 AIRWAY INHALATION TREATMENT: CPT

## 2025-07-25 PROCEDURE — 82947 ASSAY GLUCOSE BLOOD QUANT: CPT | Mod: 91

## 2025-07-25 PROCEDURE — 96372 THER/PROPH/DIAG INJ SC/IM: CPT | Performed by: INTERNAL MEDICINE

## 2025-07-25 PROCEDURE — 2500000004 HC RX 250 GENERAL PHARMACY W/ HCPCS (ALT 636 FOR OP/ED): Performed by: INTERNAL MEDICINE

## 2025-07-25 PROCEDURE — G0378 HOSPITAL OBSERVATION PER HR: HCPCS

## 2025-07-25 PROCEDURE — 94660 CPAP INITIATION&MGMT: CPT

## 2025-07-25 PROCEDURE — 2500000001 HC RX 250 WO HCPCS SELF ADMINISTERED DRUGS (ALT 637 FOR MEDICARE OP): Performed by: INTERNAL MEDICINE

## 2025-07-25 PROCEDURE — 2500000002 HC RX 250 W HCPCS SELF ADMINISTERED DRUGS (ALT 637 FOR MEDICARE OP, ALT 636 FOR OP/ED): Performed by: INTERNAL MEDICINE

## 2025-07-25 PROCEDURE — 2500000005 HC RX 250 GENERAL PHARMACY W/O HCPCS

## 2025-07-25 RX ORDER — ONDANSETRON HYDROCHLORIDE 2 MG/ML
4 INJECTION, SOLUTION INTRAVENOUS EVERY 6 HOURS PRN
Status: DISCONTINUED | OUTPATIENT
Start: 2025-07-25 | End: 2025-07-28 | Stop reason: HOSPADM

## 2025-07-25 RX ORDER — INSULIN LISPRO 100 [IU]/ML
0-5 INJECTION, SOLUTION INTRAVENOUS; SUBCUTANEOUS
Status: DISCONTINUED | OUTPATIENT
Start: 2025-07-25 | End: 2025-07-28 | Stop reason: HOSPADM

## 2025-07-25 RX ORDER — POTASSIUM CHLORIDE 1.5 G/1.58G
80 POWDER, FOR SOLUTION ORAL 2 TIMES DAILY
Status: DISCONTINUED | OUTPATIENT
Start: 2025-07-25 | End: 2025-07-28 | Stop reason: HOSPADM

## 2025-07-25 RX ORDER — CLONAZEPAM 1 MG/1
1 TABLET ORAL 2 TIMES DAILY
Status: DISCONTINUED | OUTPATIENT
Start: 2025-07-25 | End: 2025-07-25

## 2025-07-25 RX ORDER — BUMETANIDE 1 MG/1
2 TABLET ORAL 2 TIMES DAILY
Status: DISCONTINUED | OUTPATIENT
Start: 2025-07-25 | End: 2025-07-28 | Stop reason: HOSPADM

## 2025-07-25 RX ORDER — ACETAMINOPHEN 325 MG/1
650 TABLET ORAL EVERY 4 HOURS PRN
Status: DISCONTINUED | OUTPATIENT
Start: 2025-07-25 | End: 2025-07-28 | Stop reason: HOSPADM

## 2025-07-25 RX ORDER — SERTRALINE HYDROCHLORIDE 50 MG/1
200 TABLET, FILM COATED ORAL DAILY
Status: DISCONTINUED | OUTPATIENT
Start: 2025-07-25 | End: 2025-07-28 | Stop reason: HOSPADM

## 2025-07-25 RX ORDER — PANTOPRAZOLE SODIUM 40 MG/1
40 TABLET, DELAYED RELEASE ORAL
Status: DISCONTINUED | OUTPATIENT
Start: 2025-07-26 | End: 2025-07-28 | Stop reason: HOSPADM

## 2025-07-25 RX ORDER — ACETAMINOPHEN 325 MG/1
650 TABLET ORAL EVERY 4 HOURS PRN
Status: DISCONTINUED | OUTPATIENT
Start: 2025-07-25 | End: 2025-07-25

## 2025-07-25 RX ORDER — LEVOTHYROXINE SODIUM 100 UG/1
100 TABLET ORAL
Status: DISCONTINUED | OUTPATIENT
Start: 2025-07-26 | End: 2025-07-26

## 2025-07-25 RX ORDER — ASPIRIN 81 MG/1
81 TABLET ORAL DAILY
Status: DISCONTINUED | OUTPATIENT
Start: 2025-07-26 | End: 2025-07-28 | Stop reason: HOSPADM

## 2025-07-25 RX ORDER — HEPARIN SODIUM 5000 [USP'U]/ML
5000 INJECTION, SOLUTION INTRAVENOUS; SUBCUTANEOUS EVERY 8 HOURS
Status: DISCONTINUED | OUTPATIENT
Start: 2025-07-25 | End: 2025-07-28 | Stop reason: HOSPADM

## 2025-07-25 RX ORDER — CARVEDILOL 6.25 MG/1
6.25 TABLET ORAL
Status: DISCONTINUED | OUTPATIENT
Start: 2025-07-25 | End: 2025-07-28 | Stop reason: HOSPADM

## 2025-07-25 RX ORDER — BUDESONIDE 0.25 MG/2ML
0.25 INHALANT ORAL
Status: DISCONTINUED | OUTPATIENT
Start: 2025-07-25 | End: 2025-07-28 | Stop reason: HOSPADM

## 2025-07-25 RX ORDER — IPRATROPIUM BROMIDE AND ALBUTEROL SULFATE 2.5; .5 MG/3ML; MG/3ML
3 SOLUTION RESPIRATORY (INHALATION) EVERY 4 HOURS PRN
Status: DISCONTINUED | OUTPATIENT
Start: 2025-07-25 | End: 2025-07-25

## 2025-07-25 RX ORDER — IPRATROPIUM BROMIDE AND ALBUTEROL SULFATE 2.5; .5 MG/3ML; MG/3ML
3 SOLUTION RESPIRATORY (INHALATION) EVERY 2 HOUR PRN
Status: DISCONTINUED | OUTPATIENT
Start: 2025-07-25 | End: 2025-07-28 | Stop reason: HOSPADM

## 2025-07-25 RX ORDER — LEVOTHYROXINE SODIUM 100 UG/1
200 TABLET ORAL
Status: DISCONTINUED | OUTPATIENT
Start: 2025-07-28 | End: 2025-07-26

## 2025-07-25 RX ORDER — METFORMIN HYDROCHLORIDE 500 MG/1
500 TABLET ORAL
Status: DISCONTINUED | OUTPATIENT
Start: 2025-07-25 | End: 2025-07-28 | Stop reason: HOSPADM

## 2025-07-25 RX ORDER — FORMOTEROL FUMARATE 20 UG/2ML
20 SOLUTION RESPIRATORY (INHALATION)
Status: DISCONTINUED | OUTPATIENT
Start: 2025-07-25 | End: 2025-07-28 | Stop reason: HOSPADM

## 2025-07-25 RX ORDER — TOPIRAMATE 100 MG/1
200 TABLET, FILM COATED ORAL 2 TIMES DAILY
Status: DISCONTINUED | OUTPATIENT
Start: 2025-07-25 | End: 2025-07-28 | Stop reason: HOSPADM

## 2025-07-25 RX ORDER — ATORVASTATIN CALCIUM 20 MG/1
20 TABLET, FILM COATED ORAL NIGHTLY
Status: DISCONTINUED | OUTPATIENT
Start: 2025-07-25 | End: 2025-07-28 | Stop reason: HOSPADM

## 2025-07-25 RX ORDER — DOCUSATE SODIUM 100 MG/1
100 CAPSULE, LIQUID FILLED ORAL 3 TIMES DAILY
Status: DISCONTINUED | OUTPATIENT
Start: 2025-07-25 | End: 2025-07-28 | Stop reason: HOSPADM

## 2025-07-25 RX ORDER — PRAZOSIN HYDROCHLORIDE 5 MG/1
5 CAPSULE ORAL NIGHTLY
Status: DISCONTINUED | OUTPATIENT
Start: 2025-07-25 | End: 2025-07-28 | Stop reason: HOSPADM

## 2025-07-25 RX ORDER — AMMONIUM LACTATE 12 G/100G
1 LOTION TOPICAL AS NEEDED
Status: DISCONTINUED | OUTPATIENT
Start: 2025-07-25 | End: 2025-07-28 | Stop reason: HOSPADM

## 2025-07-25 RX ORDER — TRAMADOL HYDROCHLORIDE 50 MG/1
50 TABLET, FILM COATED ORAL 3 TIMES DAILY PRN
Status: DISCONTINUED | OUTPATIENT
Start: 2025-07-25 | End: 2025-07-28 | Stop reason: HOSPADM

## 2025-07-25 RX ORDER — GABAPENTIN 400 MG/1
1200 CAPSULE ORAL 3 TIMES DAILY
Status: DISCONTINUED | OUTPATIENT
Start: 2025-07-25 | End: 2025-07-28 | Stop reason: HOSPADM

## 2025-07-25 RX ORDER — DEXTROSE 50 % IN WATER (D50W) INTRAVENOUS SYRINGE
25
Status: DISCONTINUED | OUTPATIENT
Start: 2025-07-25 | End: 2025-07-28 | Stop reason: HOSPADM

## 2025-07-25 RX ORDER — CEPHALEXIN 500 MG/1
500 CAPSULE ORAL DAILY
Status: DISCONTINUED | OUTPATIENT
Start: 2025-07-25 | End: 2025-07-28 | Stop reason: HOSPADM

## 2025-07-25 RX ORDER — CYCLOBENZAPRINE HCL 5 MG
10 TABLET ORAL NIGHTLY PRN
Status: DISCONTINUED | OUTPATIENT
Start: 2025-07-25 | End: 2025-07-28 | Stop reason: HOSPADM

## 2025-07-25 RX ORDER — BUSPIRONE HYDROCHLORIDE 5 MG/1
5 TABLET ORAL
Status: DISCONTINUED | OUTPATIENT
Start: 2025-07-25 | End: 2025-07-28 | Stop reason: HOSPADM

## 2025-07-25 RX ORDER — POLYETHYLENE GLYCOL 3350 17 G/17G
17 POWDER, FOR SOLUTION ORAL DAILY
Status: DISCONTINUED | OUTPATIENT
Start: 2025-07-25 | End: 2025-07-28 | Stop reason: HOSPADM

## 2025-07-25 RX ORDER — LOSARTAN POTASSIUM 25 MG/1
12.5 TABLET ORAL DAILY
Status: DISCONTINUED | OUTPATIENT
Start: 2025-07-25 | End: 2025-07-28 | Stop reason: HOSPADM

## 2025-07-25 RX ORDER — CARBIDOPA AND LEVODOPA 25; 100 MG/1; MG/1
1.5 TABLET ORAL 3 TIMES DAILY
Status: DISCONTINUED | OUTPATIENT
Start: 2025-07-25 | End: 2025-07-28 | Stop reason: HOSPADM

## 2025-07-25 RX ORDER — DICLOFENAC SODIUM 10 MG/G
4 GEL TOPICAL 3 TIMES DAILY PRN
Status: DISCONTINUED | OUTPATIENT
Start: 2025-07-25 | End: 2025-07-28 | Stop reason: HOSPADM

## 2025-07-25 RX ORDER — QUETIAPINE FUMARATE 100 MG/1
800 TABLET, FILM COATED ORAL NIGHTLY
Status: DISCONTINUED | OUTPATIENT
Start: 2025-07-25 | End: 2025-07-28 | Stop reason: HOSPADM

## 2025-07-25 RX ORDER — OXCARBAZEPINE 300 MG/1
1200 TABLET, FILM COATED ORAL NIGHTLY
Status: DISCONTINUED | OUTPATIENT
Start: 2025-07-25 | End: 2025-07-28 | Stop reason: HOSPADM

## 2025-07-25 RX ORDER — OXCARBAZEPINE 300 MG/1
600 TABLET, FILM COATED ORAL EVERY MORNING
Status: DISCONTINUED | OUTPATIENT
Start: 2025-07-26 | End: 2025-07-28 | Stop reason: HOSPADM

## 2025-07-25 RX ORDER — BACLOFEN 10 MG/1
10 TABLET ORAL 3 TIMES DAILY
Status: DISCONTINUED | OUTPATIENT
Start: 2025-07-25 | End: 2025-07-28 | Stop reason: HOSPADM

## 2025-07-25 RX ORDER — DEXTROSE 50 % IN WATER (D50W) INTRAVENOUS SYRINGE
12.5
Status: DISCONTINUED | OUTPATIENT
Start: 2025-07-25 | End: 2025-07-28 | Stop reason: HOSPADM

## 2025-07-25 RX ORDER — LIDOCAINE 560 MG/1
1 PATCH PERCUTANEOUS; TOPICAL; TRANSDERMAL DAILY
Status: DISCONTINUED | OUTPATIENT
Start: 2025-07-25 | End: 2025-07-28 | Stop reason: HOSPADM

## 2025-07-25 RX ADMIN — BACLOFEN 10 MG: 10 TABLET ORAL at 15:33

## 2025-07-25 RX ADMIN — TOPIRAMATE 200 MG: 100 TABLET, FILM COATED ORAL at 11:55

## 2025-07-25 RX ADMIN — CARBIDOPA AND LEVODOPA 1.5 TABLET: 25; 100 TABLET ORAL at 11:55

## 2025-07-25 RX ADMIN — GABAPENTIN 1200 MG: 400 CAPSULE ORAL at 15:33

## 2025-07-25 RX ADMIN — BACLOFEN 10 MG: 10 TABLET ORAL at 11:56

## 2025-07-25 RX ADMIN — GABAPENTIN 1200 MG: 400 CAPSULE ORAL at 11:55

## 2025-07-25 RX ADMIN — HEPARIN SODIUM 5000 UNITS: 5000 INJECTION INTRAVENOUS; SUBCUTANEOUS at 11:56

## 2025-07-25 RX ADMIN — Medication: at 03:02

## 2025-07-25 RX ADMIN — TOPIRAMATE 200 MG: 100 TABLET, FILM COATED ORAL at 21:16

## 2025-07-25 RX ADMIN — FORMOTEROL FUMARATE DIHYDRATE 20 MCG: 20 SOLUTION RESPIRATORY (INHALATION) at 11:20

## 2025-07-25 RX ADMIN — DOCUSATE SODIUM 100 MG: 100 CAPSULE, LIQUID FILLED ORAL at 21:09

## 2025-07-25 RX ADMIN — ATORVASTATIN CALCIUM 20 MG: 20 TABLET, FILM COATED ORAL at 21:09

## 2025-07-25 RX ADMIN — Medication 1 DOSE: at 23:01

## 2025-07-25 RX ADMIN — Medication: at 06:57

## 2025-07-25 RX ADMIN — CARBIDOPA AND LEVODOPA 1.5 TABLET: 25; 100 TABLET ORAL at 15:33

## 2025-07-25 RX ADMIN — BUDESONIDE 0.25 MG: 0.25 INHALANT RESPIRATORY (INHALATION) at 19:19

## 2025-07-25 RX ADMIN — Medication: at 01:55

## 2025-07-25 RX ADMIN — DOCUSATE SODIUM 100 MG: 100 CAPSULE, LIQUID FILLED ORAL at 11:54

## 2025-07-25 RX ADMIN — CARBIDOPA AND LEVODOPA 1.5 TABLET: 25; 100 TABLET ORAL at 21:16

## 2025-07-25 RX ADMIN — BUMETANIDE 2 MG: 1 TABLET ORAL at 21:09

## 2025-07-25 RX ADMIN — METFORMIN HYDROCHLORIDE 500 MG: 500 TABLET, FILM COATED ORAL at 17:46

## 2025-07-25 RX ADMIN — CEPHALEXIN 500 MG: 500 CAPSULE ORAL at 11:55

## 2025-07-25 RX ADMIN — QUETIAPINE FUMARATE 800 MG: 100 TABLET ORAL at 21:09

## 2025-07-25 RX ADMIN — DOCUSATE SODIUM 100 MG: 100 CAPSULE, LIQUID FILLED ORAL at 15:33

## 2025-07-25 RX ADMIN — BUDESONIDE 0.25 MG: 0.25 INHALANT RESPIRATORY (INHALATION) at 11:19

## 2025-07-25 RX ADMIN — POTASSIUM CHLORIDE 80 MEQ: 1.5 POWDER, FOR SOLUTION ORAL at 11:55

## 2025-07-25 RX ADMIN — POTASSIUM CHLORIDE 80 MEQ: 1.5 POWDER, FOR SOLUTION ORAL at 21:11

## 2025-07-25 RX ADMIN — BUSPIRONE HYDROCHLORIDE 5 MG: 5 TABLET ORAL at 11:54

## 2025-07-25 RX ADMIN — PRAZOSIN HYDROCHLORIDE 5 MG: 5 CAPSULE ORAL at 21:16

## 2025-07-25 RX ADMIN — FORMOTEROL FUMARATE DIHYDRATE 20 MCG: 20 SOLUTION RESPIRATORY (INHALATION) at 19:19

## 2025-07-25 RX ADMIN — SERTRALINE 200 MG: 50 TABLET, FILM COATED ORAL at 11:55

## 2025-07-25 RX ADMIN — OXCARBAZEPINE 1200 MG: 300 TABLET, FILM COATED ORAL at 21:11

## 2025-07-25 RX ADMIN — POLYETHYLENE GLYCOL 3350 17 G: 17 POWDER, FOR SOLUTION ORAL at 11:56

## 2025-07-25 RX ADMIN — CARVEDILOL 6.25 MG: 6.25 TABLET, FILM COATED ORAL at 17:46

## 2025-07-25 RX ADMIN — BUMETANIDE 2 MG: 1 TABLET ORAL at 11:55

## 2025-07-25 RX ADMIN — LIDOCAINE 4% 1 PATCH: 40 PATCH TOPICAL at 11:56

## 2025-07-25 RX ADMIN — GABAPENTIN 1200 MG: 400 CAPSULE ORAL at 21:09

## 2025-07-25 RX ADMIN — LOSARTAN POTASSIUM 12.5 MG: 25 TABLET, FILM COATED ORAL at 11:54

## 2025-07-25 RX ADMIN — ACETAMINOPHEN 650 MG: 325 TABLET ORAL at 11:52

## 2025-07-25 RX ADMIN — TRAMADOL HYDROCHLORIDE 50 MG: 50 TABLET, COATED ORAL at 21:10

## 2025-07-25 RX ADMIN — BACLOFEN 10 MG: 10 TABLET ORAL at 21:11

## 2025-07-25 SDOH — ECONOMIC STABILITY: FOOD INSECURITY: WITHIN THE PAST 12 MONTHS, YOU WORRIED THAT YOUR FOOD WOULD RUN OUT BEFORE YOU GOT THE MONEY TO BUY MORE.: NEVER TRUE

## 2025-07-25 SDOH — HEALTH STABILITY: PHYSICAL HEALTH: ON AVERAGE, HOW MANY DAYS PER WEEK DO YOU ENGAGE IN MODERATE TO STRENUOUS EXERCISE (LIKE A BRISK WALK)?: 0 DAYS

## 2025-07-25 SDOH — SOCIAL STABILITY: SOCIAL INSECURITY: HAVE YOU HAD ANY THOUGHTS OF HARMING ANYONE ELSE?: NO

## 2025-07-25 SDOH — ECONOMIC STABILITY: HOUSING INSECURITY: IN THE LAST 12 MONTHS, WAS THERE A TIME WHEN YOU WERE NOT ABLE TO PAY THE MORTGAGE OR RENT ON TIME?: NO

## 2025-07-25 SDOH — SOCIAL STABILITY: SOCIAL INSECURITY: ARE YOU MARRIED, WIDOWED, DIVORCED, SEPARATED, NEVER MARRIED, OR LIVING WITH A PARTNER?: NEVER MARRIED

## 2025-07-25 SDOH — SOCIAL STABILITY: SOCIAL NETWORK
DO YOU BELONG TO ANY CLUBS OR ORGANIZATIONS SUCH AS CHURCH GROUPS, UNIONS, FRATERNAL OR ATHLETIC GROUPS, OR SCHOOL GROUPS?: NO

## 2025-07-25 SDOH — ECONOMIC STABILITY: FOOD INSECURITY: WITHIN THE PAST 12 MONTHS, THE FOOD YOU BOUGHT JUST DIDN'T LAST AND YOU DIDN'T HAVE MONEY TO GET MORE.: NEVER TRUE

## 2025-07-25 SDOH — SOCIAL STABILITY: SOCIAL NETWORK: HOW OFTEN DO YOU ATTEND MEETINGS OF THE CLUBS OR ORGANIZATIONS YOU BELONG TO?: MORE THAN 4 TIMES PER YEAR

## 2025-07-25 SDOH — ECONOMIC STABILITY: HOUSING INSECURITY: AT ANY TIME IN THE PAST 12 MONTHS, WERE YOU HOMELESS OR LIVING IN A SHELTER (INCLUDING NOW)?: NO

## 2025-07-25 SDOH — SOCIAL STABILITY: SOCIAL INSECURITY: HAVE YOU HAD THOUGHTS OF HARMING ANYONE ELSE?: NO

## 2025-07-25 SDOH — SOCIAL STABILITY: SOCIAL NETWORK: HOW OFTEN DO YOU GET TOGETHER WITH FRIENDS OR RELATIVES?: MORE THAN THREE TIMES A WEEK

## 2025-07-25 SDOH — SOCIAL STABILITY: SOCIAL INSECURITY: DOES ANYONE TRY TO KEEP YOU FROM HAVING/CONTACTING OTHER FRIENDS OR DOING THINGS OUTSIDE YOUR HOME?: NO

## 2025-07-25 SDOH — HEALTH STABILITY: MENTAL HEALTH
DO YOU FEEL STRESS - TENSE, RESTLESS, NERVOUS, OR ANXIOUS, OR UNABLE TO SLEEP AT NIGHT BECAUSE YOUR MIND IS TROUBLED ALL THE TIME - THESE DAYS?: NOT AT ALL

## 2025-07-25 SDOH — SOCIAL STABILITY: SOCIAL INSECURITY: WITHIN THE LAST YEAR, HAVE YOU BEEN AFRAID OF YOUR PARTNER OR EX-PARTNER?: NO

## 2025-07-25 SDOH — SOCIAL STABILITY: SOCIAL INSECURITY: HAS ANYONE EVER THREATENED TO HURT YOUR FAMILY OR YOUR PETS?: NO

## 2025-07-25 SDOH — HEALTH STABILITY: PHYSICAL HEALTH
HOW OFTEN DO YOU NEED TO HAVE SOMEONE HELP YOU WHEN YOU READ INSTRUCTIONS, PAMPHLETS, OR OTHER WRITTEN MATERIAL FROM YOUR DOCTOR OR PHARMACY?: NEVER

## 2025-07-25 SDOH — HEALTH STABILITY: PHYSICAL HEALTH: ON AVERAGE, HOW MANY MINUTES DO YOU ENGAGE IN EXERCISE AT THIS LEVEL?: 0 MIN

## 2025-07-25 SDOH — ECONOMIC STABILITY: FOOD INSECURITY: HOW HARD IS IT FOR YOU TO PAY FOR THE VERY BASICS LIKE FOOD, HOUSING, MEDICAL CARE, AND HEATING?: NOT VERY HARD

## 2025-07-25 SDOH — SOCIAL STABILITY: SOCIAL NETWORK: HOW OFTEN DO YOU ATTEND CHURCH OR RELIGIOUS SERVICES?: MORE THAN 4 TIMES PER YEAR

## 2025-07-25 SDOH — ECONOMIC STABILITY: INCOME INSECURITY: IN THE PAST 12 MONTHS HAS THE ELECTRIC, GAS, OIL, OR WATER COMPANY THREATENED TO SHUT OFF SERVICES IN YOUR HOME?: NO

## 2025-07-25 SDOH — HEALTH STABILITY: MENTAL HEALTH: HOW OFTEN DO YOU HAVE SIX OR MORE DRINKS ON ONE OCCASION?: NEVER

## 2025-07-25 SDOH — SOCIAL STABILITY: SOCIAL INSECURITY: DO YOU FEEL ANYONE HAS EXPLOITED OR TAKEN ADVANTAGE OF YOU FINANCIALLY OR OF YOUR PERSONAL PROPERTY?: NO

## 2025-07-25 SDOH — SOCIAL STABILITY: SOCIAL INSECURITY: ABUSE: ADULT

## 2025-07-25 SDOH — HEALTH STABILITY: MENTAL HEALTH: HOW MANY DRINKS CONTAINING ALCOHOL DO YOU HAVE ON A TYPICAL DAY WHEN YOU ARE DRINKING?: PATIENT DOES NOT DRINK

## 2025-07-25 SDOH — SOCIAL STABILITY: SOCIAL INSECURITY: ARE YOU OR HAVE YOU BEEN THREATENED OR ABUSED PHYSICALLY, EMOTIONALLY, OR SEXUALLY BY ANYONE?: NO

## 2025-07-25 SDOH — SOCIAL STABILITY: SOCIAL INSECURITY: DO YOU FEEL UNSAFE GOING BACK TO THE PLACE WHERE YOU ARE LIVING?: NO

## 2025-07-25 SDOH — SOCIAL STABILITY: SOCIAL INSECURITY: WERE YOU ABLE TO COMPLETE ALL THE BEHAVIORAL HEALTH SCREENINGS?: YES

## 2025-07-25 SDOH — HEALTH STABILITY: MENTAL HEALTH: HOW OFTEN DO YOU HAVE A DRINK CONTAINING ALCOHOL?: NEVER

## 2025-07-25 SDOH — ECONOMIC STABILITY: TRANSPORTATION INSECURITY: IN THE PAST 12 MONTHS, HAS LACK OF TRANSPORTATION KEPT YOU FROM MEDICAL APPOINTMENTS OR FROM GETTING MEDICATIONS?: NO

## 2025-07-25 SDOH — ECONOMIC STABILITY: FOOD INSECURITY: HOW HARD IS IT FOR YOU TO PAY FOR THE VERY BASICS LIKE FOOD, HOUSING, MEDICAL CARE, AND HEATING?: NOT HARD AT ALL

## 2025-07-25 SDOH — SOCIAL STABILITY: SOCIAL INSECURITY: WITHIN THE LAST YEAR, HAVE YOU BEEN HUMILIATED OR EMOTIONALLY ABUSED IN OTHER WAYS BY YOUR PARTNER OR EX-PARTNER?: NO

## 2025-07-25 SDOH — SOCIAL STABILITY: SOCIAL INSECURITY: ARE THERE ANY APPARENT SIGNS OF INJURIES/BEHAVIORS THAT COULD BE RELATED TO ABUSE/NEGLECT?: NO

## 2025-07-25 SDOH — SOCIAL STABILITY: SOCIAL NETWORK: IN A TYPICAL WEEK, HOW MANY TIMES DO YOU TALK ON THE PHONE WITH FAMILY, FRIENDS, OR NEIGHBORS?: NEVER

## 2025-07-25 ASSESSMENT — LIFESTYLE VARIABLES
HOW MANY STANDARD DRINKS CONTAINING ALCOHOL DO YOU HAVE ON A TYPICAL DAY: PATIENT DOES NOT DRINK
HOW OFTEN DO YOU HAVE A DRINK CONTAINING ALCOHOL: NEVER
SKIP TO QUESTIONS 9-10: 1
AUDIT-C TOTAL SCORE: 0
SKIP TO QUESTIONS 9-10: 1
HOW OFTEN DO YOU HAVE A DRINK CONTAINING ALCOHOL: NEVER
AUDIT-C TOTAL SCORE: 0
SUBSTANCE_ABUSE_PAST_12_MONTHS: NO
HOW MANY STANDARD DRINKS CONTAINING ALCOHOL DO YOU HAVE ON A TYPICAL DAY: PATIENT DOES NOT DRINK
SUBSTANCE_ABUSE_PAST_12_MONTHS: NO
SKIP TO QUESTIONS 9-10: 1
PRESCIPTION_ABUSE_PAST_12_MONTHS: NO
PRESCIPTION_ABUSE_PAST_12_MONTHS: NO
HOW OFTEN DO YOU HAVE 6 OR MORE DRINKS ON ONE OCCASION: NEVER
AUDIT-C TOTAL SCORE: 0
HOW OFTEN DO YOU HAVE 6 OR MORE DRINKS ON ONE OCCASION: NEVER

## 2025-07-25 ASSESSMENT — COGNITIVE AND FUNCTIONAL STATUS - GENERAL
TOILETING: A LOT
MOVING TO AND FROM BED TO CHAIR: A LOT
HELP NEEDED FOR BATHING: A LITTLE
MOVING FROM LYING ON BACK TO SITTING ON SIDE OF FLAT BED WITH BEDRAILS: A LITTLE
DRESSING REGULAR LOWER BODY CLOTHING: A LITTLE
MOVING FROM LYING ON BACK TO SITTING ON SIDE OF FLAT BED WITH BEDRAILS: A LITTLE
HELP NEEDED FOR BATHING: A LITTLE
DAILY ACTIVITIY SCORE: 19
WALKING IN HOSPITAL ROOM: A LOT
DRESSING REGULAR LOWER BODY CLOTHING: A LITTLE
WALKING IN HOSPITAL ROOM: A LOT
WALKING IN HOSPITAL ROOM: A LITTLE
TOILETING: A LOT
MOBILITY SCORE: 17
STANDING UP FROM CHAIR USING ARMS: A LOT
DRESSING REGULAR LOWER BODY CLOTHING: A LITTLE
MOBILITY SCORE: 14
PERSONAL GROOMING: A LITTLE
DRESSING REGULAR UPPER BODY CLOTHING: A LITTLE
DRESSING REGULAR LOWER BODY CLOTHING: A LITTLE
DRESSING REGULAR UPPER BODY CLOTHING: A LITTLE
MOVING TO AND FROM BED TO CHAIR: A LITTLE
TOILETING: A LOT
MOVING TO AND FROM BED TO CHAIR: A LOT
TURNING FROM BACK TO SIDE WHILE IN FLAT BAD: A LITTLE
TOILETING: A LOT
MOVING FROM LYING ON BACK TO SITTING ON SIDE OF FLAT BED WITH BEDRAILS: A LITTLE
CLIMB 3 TO 5 STEPS WITH RAILING: A LOT
CLIMB 3 TO 5 STEPS WITH RAILING: A LOT
TURNING FROM BACK TO SIDE WHILE IN FLAT BAD: A LITTLE
PERSONAL GROOMING: A LITTLE
TURNING FROM BACK TO SIDE WHILE IN FLAT BAD: A LITTLE
TOILETING: A LITTLE
CLIMB 3 TO 5 STEPS WITH RAILING: A LOT
HELP NEEDED FOR BATHING: A LITTLE
CLIMB 3 TO 5 STEPS WITH RAILING: A LOT
MOVING TO AND FROM BED TO CHAIR: A LOT
DAILY ACTIVITIY SCORE: 18
WALKING IN HOSPITAL ROOM: A LOT
MOVING FROM LYING ON BACK TO SITTING ON SIDE OF FLAT BED WITH BEDRAILS: A LITTLE
MOVING FROM LYING ON BACK TO SITTING ON SIDE OF FLAT BED WITH BEDRAILS: A LITTLE
TURNING FROM BACK TO SIDE WHILE IN FLAT BAD: A LITTLE
STANDING UP FROM CHAIR USING ARMS: A LOT
PERSONAL GROOMING: A LITTLE
HELP NEEDED FOR BATHING: A LITTLE
PERSONAL GROOMING: A LITTLE
DRESSING REGULAR UPPER BODY CLOTHING: A LITTLE
PERSONAL GROOMING: A LITTLE
TURNING FROM BACK TO SIDE WHILE IN FLAT BAD: A LITTLE
STANDING UP FROM CHAIR USING ARMS: A LOT
WALKING IN HOSPITAL ROOM: A LOT
DRESSING REGULAR UPPER BODY CLOTHING: A LITTLE
STANDING UP FROM CHAIR USING ARMS: A LOT
DRESSING REGULAR LOWER BODY CLOTHING: A LITTLE
MOVING TO AND FROM BED TO CHAIR: A LOT
HELP NEEDED FOR BATHING: A LITTLE
CLIMB 3 TO 5 STEPS WITH RAILING: A LOT
STANDING UP FROM CHAIR USING ARMS: A LITTLE
DRESSING REGULAR UPPER BODY CLOTHING: A LITTLE

## 2025-07-25 ASSESSMENT — ACTIVITIES OF DAILY LIVING (ADL)
ADEQUATE_TO_COMPLETE_ADL: YES
LACK_OF_TRANSPORTATION: NO
ASSISTIVE_DEVICE: WALKER
PATIENT'S MEMORY ADEQUATE TO SAFELY COMPLETE DAILY ACTIVITIES?: YES
HEARING - LEFT EAR: FUNCTIONAL
TOILETING: INDEPENDENT
JUDGMENT_ADEQUATE_SAFELY_COMPLETE_DAILY_ACTIVITIES: YES
DRESSING YOURSELF: NEEDS ASSISTANCE
GROOMING: NEEDS ASSISTANCE
LACK_OF_TRANSPORTATION: NO
BATHING: NEEDS ASSISTANCE
HEARING - RIGHT EAR: FUNCTIONAL
WALKS IN HOME: NEEDS ASSISTANCE
FEEDING YOURSELF: INDEPENDENT
LACK_OF_TRANSPORTATION: NO

## 2025-07-25 ASSESSMENT — PAIN SCALES - GENERAL
PAINLEVEL_OUTOF10: 7
PAINLEVEL_OUTOF10: 6
PAINLEVEL_OUTOF10: 8
PAINLEVEL_OUTOF10: 9

## 2025-07-25 ASSESSMENT — PAIN - FUNCTIONAL ASSESSMENT
PAIN_FUNCTIONAL_ASSESSMENT: 0-10
PAIN_FUNCTIONAL_ASSESSMENT: 0-10
PAIN_FUNCTIONAL_ASSESSMENT: FLACC (FACE, LEGS, ACTIVITY, CRY, CONSOLABILITY)
PAIN_FUNCTIONAL_ASSESSMENT: 0-10
PAIN_FUNCTIONAL_ASSESSMENT: 0-10

## 2025-07-25 ASSESSMENT — PAIN DESCRIPTION - DESCRIPTORS
DESCRIPTORS: THROBBING;SHARP
DESCRIPTORS: ACHING

## 2025-07-25 ASSESSMENT — PAIN DESCRIPTION - LOCATION
LOCATION: BACK
LOCATION: BACK

## 2025-07-25 NOTE — CARE PLAN
The patient's goals for the shift include rest    The clinical goals for the shift include remain safe    Care plan in progress. Will continue to monitor.

## 2025-07-25 NOTE — PROGRESS NOTES
07/25/25 0727   Discharge Planning   Living Arrangements Alone   Support Systems Children   Type of Residence Private residence   Do you have animals or pets at home? No   Who is requesting discharge planning? Patient   Home or Post Acute Services None   Expected Discharge Disposition Home H   Does the patient need discharge transport arranged? Yes   [facility] coordination needed? Yes   Has discharge transport been arranged? No   Financial Resource Strain   How hard is it for you to pay for the very basics like food, housing, medical care, and heating? Not very   Housing Stability   In the last 12 months, was there a time when you were not able to pay the mortgage or rent on time? N   At any time in the past 12 months, were you homeless or living in a shelter (including now)? N   Transportation Needs   In the past 12 months, has lack of transportation kept you from medical appointments or from getting medications? no   In the past 12 months, has lack of transportation kept you from meetings, work, or from getting things needed for daily living? No     I met with this patient at her bedside to discuss discharge planing, she is alertx3 at her baseline she does live at home, she has support from her children, she does use a rolator and a quad cane while at home, she did state she is not interested in placement and wants to return home, she is agreeable to [facility] for PT/OT services, she does not use home o2, currently on oxygen, will need o2 eval prior to discharge. She has verified all her information to be correct on her face sheet. I will continue to monitor for discharge planing.

## 2025-07-25 NOTE — H&P
HISTORY AND PHYSICAL EXAMINATION    PATIENT NAME: Prema Hair    MRN: [id number]  SERVICE DATE: 7/25/2025       PRIMARY CARE PHYSICIAN: Shabana Rodriguez MD          ASSESSMENT AND PLAN     Drug-induced parkinsonism G21.19 causing Impaired mobility & recurrent falls at home.   DM2, controlled. A1c 5.7 3/25  HTN, variable control  Hyperlipidemia  Hx CA thyroid on supplemental therapy (Z85.850)  Obesity, class I  Hx torticollis  Osteoarthrosis of the knees  Hx urinary retention      PLAN:  Fall prec.  Cont home meds  PT OT  Check TSH/A1c. Accuch. SSI. Hypogly protocol. Diab diet.  Continue Keflex daily for UTI prophylaxis.  Continue levothyroxine supplement, aim for suppressed TSH.    Needs ongoing outpatient psychiatric follow-up and medication titration.  DVT prophylaxis      Discussed with nurses/case management team and the specialists involved in this patient's care. Reviewed the EMR and documentation from other care-givers.    SUBJECTIVE  CHIEF COMPLAINT:  fall at home    HPI: This is a 60 y.o. female who was brought to ER from home after she had a fall and was found on the floor.  Patient claims that she wakes up in the middle of the night more often since she stopped taking the Klonopin from January.  Her mind starts racing and she gets out of the bed and ends up getting unstable and sometimes falling.  Patient reports having had multiple falls.  No overt injuries from the fall, but patient reports having pain in multiple locations.  Currently she is able to walk with a walker and able to use the restroom by herself with some supervision.    She is agreeable to go to SNF if warranted.      PAST MEDICAL HISTORY: Medical History[1]  PAST SURGICAL HISTORY: Surgical History[2]  FAMILY HISTORY: Family History[3]  SOCIAL HISTORY: Social History[4]    MEDICATIONS: Prior to Admission Medications  Prescriptions Prior to Admission[5]   CURRENT ALLERGIES: RX Allergies[6]    COMPLETE REVIEW OF SYSTEMS:       GENERAL: No fever, appetite stable.  HEENT: No epistaxis, no mouth ulcers  NECK: no neck pain  RESPIRATORY: No new resp symptoms.  CARDIOVASCULAR: No cp, no leg edema, No orthopnea  GI: No NVD, no GI Bleed  : No hematuria, no dysuria  MUSCULOSKELETAL: No new jt pains or swelling  SKIN: No rashes, no ulcers  PSYCH: +anxious  HEMATOLOGY/LYMPHOLOGY: No hx of VTE  ENDOCRINE: +hx of DM  NEURO: No hx of seizures       OBJECTIVE  PHYSICAL EXAM:       7/24/2025    11:30 PM 7/25/2025    12:00 AM 7/25/2025    12:30 AM 7/25/2025     1:00 AM 7/25/2025     2:00 AM 7/25/2025     5:32 AM 7/25/2025     8:27 AM   Vitals   Systolic 102 99 104 115 143 138 154   Diastolic 72 71 69 85 76 87 107   BP Location      Left arm Left arm   Heart Rate 60 61 63 63 66 97 79   Temp     36.2 °C (97.2 °F) 36.2 °C (97.2 °F) 36.6 °C (97.8 °F)   Resp 18 16 15 16 18 18 18     Body mass index is 34.29 kg/m².    GENERAL: awake, alert, Ox3, cooperative resting comfortably. Obese  SKIN: Skin turgor normal. No rashes  HEENT: EOMI, no epistaxis, Moist mucosa.  LUNGS: Bilat breath sounds, with no added sounds  CARDIAC: REGULAR. no rubs, no murmur  ABDOMEN: soft, non-tender. +BS.  EXTREMITIES: No edema, Good capillary refill.   NEURO: Insight GOOD. No invol movements. Gait not assessed  MUSCULOSKELETAL: No acute inflammation.  Torticollis to left       .Current Medications[7]    DATA:   Diagnostic tests reviewed for today's visit:    Most recent labs  Results from last 7 days   Lab Units 07/24/25  1531   WBC AUTO x10*3/uL 5.3   HEMOGLOBIN g/dL 11.6*   HEMATOCRIT % 38.1   PLATELETS AUTO x10*3/uL 260     Results from last 7 days   Lab Units 07/24/25  1531   SODIUM mmol/L 141   POTASSIUM mmol/L 3.6   CHLORIDE mmol/L 105   CO2 mmol/L 27   BUN mg/dL 13   CREATININE mg/dL 0.78   CALCIUM mg/dL 8.8   PROTEIN TOTAL g/dL 6.6   BILIRUBIN TOTAL mg/dL 0.3   ALK PHOS U/L 125   ALT U/L 15   AST U/L 14   GLUCOSE mg/dL 98             No results found for:  "\"TROPONINT\"      Results from last 7 days   Lab Units 07/25/25  0832 07/24/25  1525   POCT GLUCOSE mg/dL 99 139*          XR knee 4+ views bilateral   Final Result   1. No acute bony abnormality left knee.   2. Right total knee arthroplasty.   3. Remote right MCL injury.   4. Healed proximal fibular diaphyseal fracture.   5. No acute bony abnormality right knee.                  MACRO:   None        Signed by: Anusha Mejia 7/24/2025 3:34 PM   Dictation workstation:   [id number]      CT cervical spine wo IV contrast   Final Result   No acute cervical vertebral displacement or acute displaced fracture.   Reversal of the cervical lordosis with multilevel degenerative   changes and mild spondylolisthesis, as detailed above, similar to   03/14/2025.        MACRO:   None.        Signed by: Carly Gaston 7/24/2025 3:19 PM   Dictation workstation:   [id number]      CT brain attack head wo IV contrast   Final Result   No acute intracranial hemorrhage or mass-effect.        MACRO:   Carly Gaston discussed the significance and urgency of this critical   finding by EPIC secure chat with  BEATRIZ MORSE on 7/24/2025 at 3:12   pm.  (**-RCF-**) Findings:  See findings.             Signed by: Carly Gaston 7/24/2025 3:12 PM   Dictation workstation:   [id number]            SIGNATURE: Shabana Rodriguez MD  DATE: July 25, 2025  TIME: 11:00 AM         [1]   Past Medical History:  Diagnosis Date    CATALINA positive     Arthritis     Asthma     Bell's palsy     x 2    Bilateral leg edema     Bipolar disorder     CHF (congestive heart failure)     Chronic allergic rhinitis     Chronic constipation     Diabetes mellitus (Multi)     Diabetic neuropathy (Multi)     Dysphonia 07/05/2022    Muscle tension dysphonia    Fibromyalgia     GERD (gastroesophageal reflux disease)     under control with medication    High pulmonary arterial pressure (Multi)     moderately elevated on ECHO 10.5.23    Hyperlipidemia, unspecified 01/03/2023    " Dyslipidemia    Hypertension     Hypokalemia     1.1.24- K 3.8 - on oral supplements    Hypothyroidism     Low back pain, unspecified 02/22/2021    Low back pain    Moderate tricuspid regurgitation     Obstructive sleep apnea (adult) (pediatric) 01/03/2023    ZEINA on CPAP    Paralysis of vocal cords and larynx, 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    Laryngeal paresis    Parkinsonism (Multi)     Pseudoaneurysm of carotid artery 2020    s/p right pipeline and coil embolization of Right ICA pseudoaneurysm    PTSD (post-traumatic stress disorder)     Thyroid cancer (Multi)     s/p total thyroidectomy, residual tissue on left side - FNA benign 2020 and 2021    Torticollis     botox injections    Vitamin D deficiency    [2]   Past Surgical History:  Procedure Laterality Date    COLONOSCOPY      KNEE ARTHROPLASTY      OTHER SURGICAL HISTORY Right 2020    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    TOTAL THYROIDECTOMY  2017    US GUIDED THYROID BIOPSY  11/19/2020    US GUIDED THYROID BIOPSY 11/19/2020 [facility]    US GUIDED THYROID BIOPSY  11/19/2020    US GUIDED THYROID BIOPSY 11/19/2020 [facility] [place] [facility]   [3]   Family History  Problem Relation Name Age of Onset    Heart failure Mother      Pancreatic cancer Mother      Heart failure Father      Parkinsonism Father      Breast cancer Sister     [4]   Social History  Tobacco Use    Smoking status: Never    Smokeless tobacco: Never   Substance Use Topics    Alcohol use: Not Currently    Drug use: Never     Comment: medical marijuana last used >1 year ago   [5]   Medications Prior to Admission   Medication Sig Dispense Refill Last Dose/Taking    acetaminophen (Tylenol) 500 mg tablet Take 1 tablet (500 mg) by mouth every 4 hours if needed (pain).   7/24/2025    albuterol 90 mcg/actuation inhaler Inhale 2 puffs every 4 hours if needed for shortness of breath. 54 g 3 7/24/2025    ammonium lactate (Lac-Hydrin) 12 % lotion Apply 1 Application topically if needed for dry skin or  irritation.   7/24/2025 at  8:00 AM    ascorbic acid (Vitamin C) 500 mg tablet Take 1 tablet (500 mg) by mouth early in the morning..   7/24/2025 Morning    aspirin 81 mg EC tablet Take 1 tablet (81 mg) by mouth once daily.   7/25/2025    atorvastatin (Lipitor) 20 mg tablet Take 1 tablet (20 mg) by mouth once daily.   7/24/2025 Evening    azelastine (Astelin) 137 mcg (0.1 %) nasal spray Administer 1 spray into each nostril 2 times a day.   7/24/2025 Evening    baclofen (Lioresal) 20 mg tablet Take 0.5 tablets (10 mg) by mouth 3 times a day.   7/24/2025 Evening    bumetanide (Bumex) 2 mg tablet Take 1 tablet (2 mg) by mouth 2 times a day.   7/24/2025 Evening    calcium carbonate (Oscal) 500 mg calcium (1,250 mg) tablet Take 1 tablet by mouth once daily.   7/24/2025 Morning    carbidopa-levodopa (Sinemet)  mg tablet Take 1.5 tablets by mouth 3 times a day. 8am, 12 noon and 4 pm 405 tablet 3 7/24/2025 Evening    cephalexin (Keflex) 250 mg capsule Take 2 capsules (500 mg) by mouth once daily.   7/24/2025 Noon    cholecalciferol (Vitamin D-3) 50 MCG (2000 UT) tablet Take 1 tablet (50 mcg) by mouth once daily.   7/24/2025 Noon    diclofenac sodium (Voltaren) 1 % gel gel Apply 4.5 inches topically 3 times a day as needed.   7/24/2025 Evening    docusate sodium (Colace) 100 mg capsule Take 1 capsule (100 mg) by mouth 3 times a day.   7/24/2025 Evening    doxycycline (Vibramycin) 100 mg capsule Take 1 capsule (100 mg) by mouth once daily.   7/24/2025 Morning    estradiol (Estrace) 0.01 % (0.1 mg/gram) vaginal cream Insert 1 Application into the vagina 3 (three) times a week. Pea-sized amount   Past Week Noon    fluticasone (Flonase) 50 mcg/actuation nasal spray Administer 1 spray into each nostril once daily. Shake gently. Before first use, prime pump. After use, clean tip and replace cap. 48 g 3 Past Week Noon    gabapentin (Neurontin) 600 mg tablet Take 2 tablets (1,200 mg) by mouth 3 times a day.   7/24/2025 Evening     ipratropium-albuteroL (Duo-Neb) 0.5-2.5 mg/3 mL nebulizer solution Inhale 3 mL every 4 hours if needed.   7/24/2025 Noon    levothyroxine (Synthroid, Levoxyl) 100 mcg tablet Take 100mcg on Sat Sun and 200 mcg on Mon to Fri - 1 hour before breakfast   7/24/2025 Morning    lidocaine (Lidoderm) 5 % patch APPLY 1 PATCH AND LEAVE IN PLACE FOR 12 HOURS, THEN REMOVE AND LEAVE OFF FOR 12 HOURS 30 patch 11 7/24/2025 Evening    nystatin (Mycostatin) cream Apply 1 Application topically 2 times a day. 1 Application externally two times a day   7/24/2025 Morning    omega-3 fatty acids-fish oil 360-1,200 mg capsule Take 1 capsule (1,200 mg) by mouth once daily.   Past Month Morning    omeprazole (PriLOSEC) 40 mg DR capsule Take 1 capsule (40 mg) by mouth once daily. With dinner   7/24/2025 Morning    OXcarbazepine (Trileptal) 600 mg tablet Take 1 tablet (600 mg) by mouth once daily in the morning.   7/25/2025 Morning    OXcarbazepine (Trileptal) 600 mg tablet Take 2 tablets (1,200 mg) by mouth once daily at bedtime.   7/24/2025 Evening    polyethylene glycol (Glycolax, Miralax) 17 gram/dose powder Mix 17 g of powder and drink once daily. In 8oz of water, juice, or tea and drink daily   7/24/2025 Morning    potassium chloride (KCL-40 ORAL) Take 80 mEq by mouth 2 times a day.   7/24/2025 Evening    prazosin (Minipress) 5 mg capsule Take 1 capsule (5 mg) by mouth once daily at bedtime.   7/24/2025 Bedtime    QUEtiapine (SeroqueL) 400 mg tablet Take 2 tablets (800 mg) by mouth once daily at bedtime.   7/24/2025 Bedtime    sertraline (Zoloft) 100 mg tablet Take 2 tablets (200 mg) by mouth once daily. Do not start before January 2, 2024. 30 tablet 0 7/24/2025 Noon    topiramate (Topamax) 200 mg tablet TAKE ONE TABLET (200 MG) BY MOUTH TWICE DAILY 60 tablet 3 7/24/2025 Bedtime    atorvastatin (Lipitor) 20 mg tablet Take 1 tablet (20 mg) by mouth once daily at bedtime. (Patient not taking: Reported on 7/25/2025) 90 tablet 3 Not Taking     budesonide-formoteroL (Symbicort) 80-4.5 mcg/actuation inhaler Inhale 2 puffs 2 times a day. Rinse mouth with water after use to reduce aftertaste and incidence of candidiasis. Do not swallow. 30.6 g 2     busPIRone (Buspar) 10 mg tablet Take 0.5 tablets (5 mg) by mouth every 12 hours.       carvedilol (Coreg) 6.25 mg tablet Take 1 tablet (6.25 mg) by mouth 2 times daily (morning and late afternoon). 60 tablet 1     clonazePAM (KlonoPIN) 1 mg tablet Take 1-2 tablets (1-2 mg) by mouth 2 times a day. Take 1 tablet in the morning and 2 tablets at night as needed       cyclobenzaprine (Flexeril) 10 mg tablet Take 1 tablet (10 mg) by mouth as needed at bedtime for muscle spasms.       formoterol (Perforomist) 20 mcg/2 mL nebulizer solution Inhale 2 mL (20 mcg) every 12 hours.       glucagon (Glucagen) 1 mg injection Inject 1 mg into the muscle every 15 minutes if needed for low blood sugar - see comments (For blood glucose less than or equal to 40 mg/dL and no IV access). 1 each 12     lancets misc 1 each 3 times a day.       losartan (Cozaar) 25 mg tablet Take 0.5 tablets (12.5 mg) by mouth once daily. Do not fill before March 18, 2025. 15 tablet 1     metFORMIN (Glucophage) 500 mg tablet Take 1 tablet (500 mg) by mouth 2 times daily (morning and late afternoon). 60 tablet 1    [6]   Allergies  Allergen Reactions    Lisinopril Swelling   [7]   Current Facility-Administered Medications:     acetaminophen (Tylenol) tablet 650 mg, 650 mg, oral, q4h PRN, Shabana Rodriguez MD    ammonium lactate (Lac-Hydrin) 12 % lotion 1 Application, 1 Application, Topical, PRN, Shabana Rodriguez MD    [START ON 7/26/2025] aspirin EC tablet 81 mg, 81 mg, oral, Daily, Shabana Rodriguze MD    atorvastatin (Lipitor) tablet 20 mg, 20 mg, oral, Nightly, Shabana Rodriguez MD    baclofen (Lioresal) tablet 10 mg, 10 mg, oral, TID, Shabana Rodriguez MD    budesonide (Pulmicort) 0.25 mg/2 mL nebulizer solution 0.25 mg, 0.25 mg,  nebulization, BID **AND** formoterol (Perforomist) 20 mcg/2 mL nebulizer solution 20 mcg, 20 mcg, nebulization, q12h, Shabana Rodriguez MD    bumetanide (Bumex) tablet 2 mg, 2 mg, oral, BID, Shabana Rodriguez MD    busPIRone (Buspar) tablet 5 mg, 5 mg, oral, q12h KATHERINE, Shabana Rodriguez MD    carbidopa-levodopa (Sinemet)  mg per tablet 1.5 tablet, 1.5 tablet, oral, TID, Shabana Rodriguez MD    carvedilol (Coreg) tablet 6.25 mg, 6.25 mg, oral, BID, Shabana Rodriguez MD    cephalexin (Keflex) capsule 500 mg, 500 mg, oral, Daily, Shabana Rodriguez MD    cyclobenzaprine (Flexeril) tablet 10 mg, 10 mg, oral, Nightly PRN, Shabana Rodriguez MD    dextrose 50 % injection 12.5 g, 12.5 g, intravenous, q15 min PRN, Shabana Rodriguez MD    dextrose 50 % injection 25 g, 25 g, intravenous, q15 min PRN, Shabana Rodriguez MD    diclofenac sodium (Voltaren) 1 % gel 4 g, 4 g, Topical, TID PRN, Shabana Rodriguez MD    docusate sodium (Colace) capsule 100 mg, 100 mg, oral, TID, Shabana Rodriguez MD    gabapentin (Neurontin) capsule 1,200 mg, 1,200 mg, oral, TID, Shabana Rodriguez MD    glucagon (Glucagen) injection 1 mg, 1 mg, intramuscular, q15 min PRN, Shabana Rodriguez MD    glucagon (Glucagen) injection 1 mg, 1 mg, intramuscular, q15 min PRN, Shabana Rodriguez MD    heparin (porcine) injection 5,000 Units, 5,000 Units, subcutaneous, q8h, Shabana Rodriguez MD    insulin lispro injection 0-5 Units, 0-5 Units, subcutaneous, TID AC, Shabana Rodriguez MD    ipratropium-albuteroL (Duo-Neb) 0.5-2.5 mg/3 mL nebulizer solution 3 mL, 3 mL, nebulization, q4h PRN, Shabana Rodriguez MD    [START ON 7/26/2025] levothyroxine (Synthroid, Levoxyl) tablet 100 mcg, 100 mcg, oral, Once per day on Sunday Saturday, Shabana oRdriguez MD    [START ON 7/28/2025] levothyroxine (Synthroid, Levoxyl) tablet 200 mcg, 200 mcg, oral, Once per day on Monday Tuesday Wednesday Thursday Friday, Shabana Rodriguez MD     lidocaine 4 % patch 1 patch, 1 patch, transdermal, Daily, Shabana Rodriguez MD    losartan (Cozaar) tablet 12.5 mg, 12.5 mg, oral, Daily, Shabana Rodriguez MD    metFORMIN (Glucophage) tablet 500 mg, 500 mg, oral, BID, Shabana Rodriguez MD    ondansetron (Zofran) injection 4 mg, 4 mg, intravenous, q6h PRN, Shabana Rodriguez MD    OXcarbazepine (Trileptal) tablet 1,200 mg, 1,200 mg, oral, Nightly, Shabana Rodriguez MD    [START ON 7/26/2025] OXcarbazepine (Trileptal) tablet 600 mg, 600 mg, oral, q AM, Shabana Rodriguez MD    oxygen (O2) therapy, 1 Dose, inhalation, Continuous - O2/gases, Rhys Escobar MD, [facility] at 07/25/25 0657    [START ON 7/26/2025] pantoprazole (ProtoNix) EC tablet 40 mg, 40 mg, oral, Daily before breakfast, Shabana Rodriguez MD    polyethylene glycol (Glycolax, Miralax) powder bulk bottle 17 g, 17 g, oral, Daily, Shabana Rodriguez MD    potassium chloride (Klor-Con) packet 80 mEq, 80 mEq, oral, BID, Shabana Rodriguez MD    prazosin (Minipress) capsule 5 mg, 5 mg, oral, Nightly, Shabana Rodriguez MD    QUEtiapine (SEROquel) tablet 800 mg, 800 mg, oral, Nightly, Shabana Rodriguez MD    sertraline (Zoloft) tablet 200 mg, 200 mg, oral, Daily, Shabana Rodriguez MD    topiramate (Topamax) tablet 200 mg, 200 mg, oral, BID, Shabana Rodriguez MD

## 2025-07-25 NOTE — PROGRESS NOTES
07/25/25 1212   Discharge Planning   Assistance Needed Discussion needed with daughter regarding d/c versus snf     Referral from [facility] to reach out to daughter who has concerns regarding patient being discharged home as opposed to snf. Attempted to reach daughter however unable to reach and mailbox full. Will attempt again later.    1530: Attempted again to reach daughter to discuss her concerns regarding discharge of patient. Unable to reach VM full.    1547: Call back from daughter Ad she would like snf list emailed to her. Also if at all possible wanted AR, I explained therapy has to make that referral. She further stated that she had spoken at length with patient regarding need for st placement and that patient was in agreement. Snf list emailed.

## 2025-07-25 NOTE — PROGRESS NOTES
Pharmacy Medication History     Source of Information: Patient    Additional concerns with the patient's PTA list.   N/a  Notified Provider via Haiku : No    The following updates were made to the Prior to Admission medication list:     Medications ADDED:   N/a  Medications CHANGED:  N/a  Medications REMOVED:   N/a  Medications NOT TAKING:   N/a    Allergy reviewed : Yes    Meds 2 Beds : No    Outpatient pharmacy confirmed and updated in chart : Yes    Pharmacy name: [facility] [place]    The list below reflectives the updated PTA list. Please review each medication in order reconciliation for additional clarification and justification.    Prior to Admission Medications   Prescriptions Last Dose Informant   OXcarbazepine (Trileptal) 600 mg tablet 7/25/2025 Morning Self   Sig: Take 1 tablet (600 mg) by mouth once daily in the morning.   OXcarbazepine (Trileptal) 600 mg tablet 7/24/2025 Evening Self   Sig: Take 2 tablets (1,200 mg) by mouth once daily at bedtime.   QUEtiapine (SeroqueL) 400 mg tablet 7/24/2025 Bedtime Self   Sig: Take 2 tablets (800 mg) by mouth once daily at bedtime.   acetaminophen (Tylenol) 500 mg tablet 7/24/2025 Self   Sig: Take 1 tablet (500 mg) by mouth every 4 hours if needed (pain).   albuterol 90 mcg/actuation inhaler 7/24/2025 Self   Sig: Inhale 2 puffs every 4 hours if needed for shortness of breath.   ammonium lactate (Lac-Hydrin) 12 % lotion 7/24/2025 at  8:00 AM Self   Sig: Apply 1 Application topically if needed for dry skin or irritation.   ascorbic acid (Vitamin C) 500 mg tablet 7/24/2025 Morning Self   Sig: Take 1 tablet (500 mg) by mouth early in the morning..   aspirin 81 mg EC tablet 7/25/2025 Self   Sig: Take 1 tablet (81 mg) by mouth once daily.   atorvastatin (Lipitor) 20 mg tablet Not Taking    Sig: Take 1 tablet (20 mg) by mouth once daily at bedtime.   Patient not taking: Reported on 7/25/2025   atorvastatin (Lipitor) 20 mg tablet 7/24/2025 Evening Self   Sig: Take 1 tablet  (20 mg) by mouth once daily.   azelastine (Astelin) 137 mcg (0.1 %) nasal spray 7/24/2025 Evening Self   Sig: Administer 1 spray into each nostril 2 times a day.   baclofen (Lioresal) 20 mg tablet 7/24/2025 Evening Self   Sig: Take 0.5 tablets (10 mg) by mouth 3 times a day.   budesonide-formoteroL (Symbicort) 80-4.5 mcg/actuation inhaler     Sig: Inhale 2 puffs 2 times a day. Rinse mouth with water after use to reduce aftertaste and incidence of candidiasis. Do not swallow.   bumetanide (Bumex) 2 mg tablet 7/24/2025 Evening Self   Sig: Take 1 tablet (2 mg) by mouth 2 times a day.   busPIRone (Buspar) 10 mg tablet  Self   Sig: Take 0.5 tablets (5 mg) by mouth every 12 hours.   calcium carbonate (Oscal) 500 mg calcium (1,250 mg) tablet 7/24/2025 Morning Self   Sig: Take 1 tablet by mouth once daily.   carbidopa-levodopa (Sinemet)  mg tablet 7/24/2025 Evening Self   Sig: Take 1.5 tablets by mouth 3 times a day. 8am, 12 noon and 4 pm   carvedilol (Coreg) 6.25 mg tablet     Sig: Take 1 tablet (6.25 mg) by mouth 2 times daily (morning and late afternoon).   cephalexin (Keflex) 250 mg capsule 7/24/2025 Noon Self   Sig: Take 2 capsules (500 mg) by mouth once daily.   cholecalciferol (Vitamin D-3) 50 MCG (2000 UT) tablet 7/24/2025 Noon Self   Sig: Take 1 tablet (50 mcg) by mouth once daily.   clonazePAM (KlonoPIN) 1 mg tablet  Self   Sig: Take 1-2 tablets (1-2 mg) by mouth 2 times a day. Take 1 tablet in the morning and 2 tablets at night as needed   cyclobenzaprine (Flexeril) 10 mg tablet  Self   Sig: Take 1 tablet (10 mg) by mouth as needed at bedtime for muscle spasms.   diclofenac sodium (Voltaren) 1 % gel gel 7/24/2025 Evening Self   Sig: Apply 4.5 inches topically 3 times a day as needed.   docusate sodium (Colace) 100 mg capsule 7/24/2025 Evening Self   Sig: Take 1 capsule (100 mg) by mouth 3 times a day.   doxycycline (Vibramycin) 100 mg capsule 7/24/2025 Morning Self   Sig: Take 1 capsule (100 mg) by mouth  once daily.   estradiol (Estrace) 0.01 % (0.1 mg/gram) vaginal cream Past Week Noon Self   Sig: Insert 1 Application into the vagina 3 (three) times a week. Pea-sized amount   fluticasone (Flonase) 50 mcg/actuation nasal spray Past Week Noon Self   Sig: Administer 1 spray into each nostril once daily. Shake gently. Before first use, prime pump. After use, clean tip and replace cap.   formoterol (Perforomist) 20 mcg/2 mL nebulizer solution  Self   Sig: Inhale 2 mL (20 mcg) every 12 hours.   gabapentin (Neurontin) 600 mg tablet 2025 Evening Self   Sig: Take 2 tablets (1,200 mg) by mouth 3 times a day.   glucagon (Glucagen) 1 mg injection  Self   Sig: Inject 1 mg into the muscle every 15 minutes if needed for low blood sugar - see comments (For blood glucose less than or equal to 40 mg/dL and no IV access).   ipratropium-albuteroL (Duo-Neb) 0.5-2.5 mg/3 mL nebulizer solution 2025 Noon Self   Sig: Inhale 3 mL every 4 hours if needed.   lancets misc  Self   Si each 3 times a day.   levothyroxine (Synthroid, Levoxyl) 100 mcg tablet 2025 Morning Self   Sig: Take 100mcg on Sat Sun and 200 mcg on Mon to Fri - 1 hour before breakfast   lidocaine (Lidoderm) 5 % patch 2025 Evening Self   Sig: APPLY 1 PATCH AND LEAVE IN PLACE FOR 12 HOURS, THEN REMOVE AND LEAVE OFF FOR 12 HOURS   losartan (Cozaar) 25 mg tablet     Sig: Take 0.5 tablets (12.5 mg) by mouth once daily. Do not fill before 2025.   metFORMIN (Glucophage) 500 mg tablet     Sig: Take 1 tablet (500 mg) by mouth 2 times daily (morning and late afternoon).   nystatin (Mycostatin) cream 2025 Morning Self   Sig: Apply 1 Application topically 2 times a day. 1 Application externally two times a day   omega-3 fatty acids-fish oil 360-1,200 mg capsule Past Month Morning Self   Sig: Take 1 capsule (1,200 mg) by mouth once daily.   omeprazole (PriLOSEC) 40 mg DR capsule 2025 Morning Self   Sig: Take 1 capsule (40 mg) by mouth once daily.  With dinner   polyethylene glycol (Glycolax, Miralax) 17 gram/dose powder 7/24/2025 Morning Self   Sig: Mix 17 g of powder and drink once daily. In 8oz of water, juice, or tea and drink daily   potassium chloride (KCL-40 ORAL) 7/24/2025 Evening Self   Sig: Take 80 mEq by mouth 2 times a day.   prazosin (Minipress) 5 mg capsule 7/24/2025 Bedtime Self   Sig: Take 1 capsule (5 mg) by mouth once daily at bedtime.   sertraline (Zoloft) 100 mg tablet 7/24/2025 Noon Self   Sig: Take 2 tablets (200 mg) by mouth once daily. Do not start before January 2, 2024.   topiramate (Topamax) 200 mg tablet 7/24/2025 Bedtime Self   Sig: TAKE ONE TABLET (200 MG) BY MOUTH TWICE DAILY      Facility-Administered Medications: None       The list below reflectives the updated allergy list. Please review each documented allergy for additional clarification and justification.    Allergies   Allergen Reactions    Lisinopril Swelling          07/25/25 at 11:04 AM - Ghada Fermin

## 2025-07-25 NOTE — CARE PLAN
The patient's goals for the shift include rest    The clinical goals for the shift include remain safe    Over the shift, the patient did make progress toward the following goals.     Problem: Safety - Adult  Goal: Free from fall injury  Outcome: Progressing     Problem: Discharge Planning  Goal: Discharge to home or other facility with appropriate resources  Outcome: Progressing     Problem: Chronic Conditions and Co-morbidities  Goal: Patient's chronic conditions and co-morbidity symptoms are monitored and maintained or improved  Outcome: Progressing     Problem: Nutrition  Goal: Nutrient intake appropriate for maintaining nutritional needs  Outcome: Progressing     Problem: Pain - Adult  Goal: Verbalizes/displays adequate comfort level or baseline comfort level  Outcome: Progressing

## 2025-07-26 LAB
ALBUMIN SERPL BCP-MCNC: 3.4 G/DL (ref 3.4–5)
ALP SERPL-CCNC: 120 U/L (ref 33–136)
ALT SERPL W P-5'-P-CCNC: 3 U/L (ref 7–45)
ANION GAP SERPL CALC-SCNC: 9 MMOL/L (ref 10–20)
AST SERPL W P-5'-P-CCNC: 11 U/L (ref 9–39)
BILIRUB SERPL-MCNC: 0.2 MG/DL (ref 0–1.2)
BUN SERPL-MCNC: 11 MG/DL (ref 6–23)
CALCIUM SERPL-MCNC: 8.4 MG/DL (ref 8.6–10.3)
CHLORIDE SERPL-SCNC: 107 MMOL/L (ref 98–107)
CO2 SERPL-SCNC: 29 MMOL/L (ref 21–32)
CREAT SERPL-MCNC: 0.61 MG/DL (ref 0.5–1.05)
EGFRCR SERPLBLD CKD-EPI 2021: >90 ML/MIN/1.73M*2
ERYTHROCYTE [DISTWIDTH] IN BLOOD BY AUTOMATED COUNT: 18.8 % (ref 11.5–14.5)
EST. AVERAGE GLUCOSE BLD GHB EST-MCNC: 126 MG/DL
GLUCOSE BLD MANUAL STRIP-MCNC: 85 MG/DL (ref 74–99)
GLUCOSE BLD MANUAL STRIP-MCNC: 87 MG/DL (ref 74–99)
GLUCOSE BLD MANUAL STRIP-MCNC: 94 MG/DL (ref 74–99)
GLUCOSE SERPL-MCNC: 86 MG/DL (ref 74–99)
HBA1C MFR BLD: 6 % (ref ?–5.7)
HCT VFR BLD AUTO: 39.1 % (ref 36–46)
HGB BLD-MCNC: 11.6 G/DL (ref 12–16)
MCH RBC QN AUTO: 26 PG (ref 26–34)
MCHC RBC AUTO-ENTMCNC: 29.7 G/DL (ref 32–36)
MCV RBC AUTO: 88 FL (ref 80–100)
NRBC BLD-RTO: 0 /100 WBCS (ref 0–0)
PLATELET # BLD AUTO: 201 X10*3/UL (ref 150–450)
POTASSIUM SERPL-SCNC: 3.7 MMOL/L (ref 3.5–5.3)
PROT SERPL-MCNC: 5.7 G/DL (ref 6.4–8.2)
RBC # BLD AUTO: 4.46 X10*6/UL (ref 4–5.2)
SODIUM SERPL-SCNC: 141 MMOL/L (ref 136–145)
TSH SERPL-ACNC: 2.83 MIU/L (ref 0.44–3.98)
WBC # BLD AUTO: 3.7 X10*3/UL (ref 4.4–11.3)

## 2025-07-26 PROCEDURE — 2500000005 HC RX 250 GENERAL PHARMACY W/O HCPCS: Performed by: INTERNAL MEDICINE

## 2025-07-26 PROCEDURE — 84075 ASSAY ALKALINE PHOSPHATASE: CPT | Performed by: INTERNAL MEDICINE

## 2025-07-26 PROCEDURE — 94640 AIRWAY INHALATION TREATMENT: CPT

## 2025-07-26 PROCEDURE — G0378 HOSPITAL OBSERVATION PER HR: HCPCS

## 2025-07-26 PROCEDURE — 94660 CPAP INITIATION&MGMT: CPT

## 2025-07-26 PROCEDURE — 36415 COLL VENOUS BLD VENIPUNCTURE: CPT | Performed by: INTERNAL MEDICINE

## 2025-07-26 PROCEDURE — 96372 THER/PROPH/DIAG INJ SC/IM: CPT | Performed by: INTERNAL MEDICINE

## 2025-07-26 PROCEDURE — 2500000002 HC RX 250 W HCPCS SELF ADMINISTERED DRUGS (ALT 637 FOR MEDICARE OP, ALT 636 FOR OP/ED): Performed by: INTERNAL MEDICINE

## 2025-07-26 PROCEDURE — 84443 ASSAY THYROID STIM HORMONE: CPT | Performed by: INTERNAL MEDICINE

## 2025-07-26 PROCEDURE — 97165 OT EVAL LOW COMPLEX 30 MIN: CPT | Mod: GO

## 2025-07-26 PROCEDURE — 2500000001 HC RX 250 WO HCPCS SELF ADMINISTERED DRUGS (ALT 637 FOR MEDICARE OP): Performed by: INTERNAL MEDICINE

## 2025-07-26 PROCEDURE — 82947 ASSAY GLUCOSE BLOOD QUANT: CPT

## 2025-07-26 PROCEDURE — 85027 COMPLETE CBC AUTOMATED: CPT | Performed by: INTERNAL MEDICINE

## 2025-07-26 PROCEDURE — 2500000004 HC RX 250 GENERAL PHARMACY W/ HCPCS (ALT 636 FOR OP/ED): Performed by: INTERNAL MEDICINE

## 2025-07-26 PROCEDURE — 83036 HEMOGLOBIN GLYCOSYLATED A1C: CPT | Mod: AHULAB | Performed by: INTERNAL MEDICINE

## 2025-07-26 RX ORDER — LEVOTHYROXINE SODIUM 100 UG/1
200 TABLET ORAL
Status: DISCONTINUED | OUTPATIENT
Start: 2025-07-27 | End: 2025-07-28 | Stop reason: HOSPADM

## 2025-07-26 RX ORDER — LEVOTHYROXINE SODIUM 100 UG/1
100 TABLET ORAL
Status: DISCONTINUED | OUTPATIENT
Start: 2025-08-02 | End: 2025-07-28 | Stop reason: HOSPADM

## 2025-07-26 RX ADMIN — LIDOCAINE 4% 1 PATCH: 40 PATCH TOPICAL at 08:53

## 2025-07-26 RX ADMIN — METFORMIN HYDROCHLORIDE 500 MG: 500 TABLET, FILM COATED ORAL at 18:00

## 2025-07-26 RX ADMIN — BUSPIRONE HYDROCHLORIDE 5 MG: 5 TABLET ORAL at 05:43

## 2025-07-26 RX ADMIN — TRAMADOL HYDROCHLORIDE 50 MG: 50 TABLET, COATED ORAL at 14:08

## 2025-07-26 RX ADMIN — FORMOTEROL FUMARATE DIHYDRATE 20 MCG: 20 SOLUTION RESPIRATORY (INHALATION) at 20:14

## 2025-07-26 RX ADMIN — BACLOFEN 10 MG: 10 TABLET ORAL at 21:49

## 2025-07-26 RX ADMIN — HEPARIN SODIUM 5000 UNITS: 5000 INJECTION INTRAVENOUS; SUBCUTANEOUS at 18:57

## 2025-07-26 RX ADMIN — HEPARIN SODIUM 5000 UNITS: 5000 INJECTION INTRAVENOUS; SUBCUTANEOUS at 12:30

## 2025-07-26 RX ADMIN — PRAZOSIN HYDROCHLORIDE 5 MG: 5 CAPSULE ORAL at 21:50

## 2025-07-26 RX ADMIN — METFORMIN HYDROCHLORIDE 500 MG: 500 TABLET, FILM COATED ORAL at 08:41

## 2025-07-26 RX ADMIN — ASPIRIN 81 MG: 81 TABLET, DELAYED RELEASE ORAL at 08:39

## 2025-07-26 RX ADMIN — Medication: at 03:26

## 2025-07-26 RX ADMIN — TRAMADOL HYDROCHLORIDE 50 MG: 50 TABLET, COATED ORAL at 05:46

## 2025-07-26 RX ADMIN — OXCARBAZEPINE 1200 MG: 300 TABLET, FILM COATED ORAL at 21:49

## 2025-07-26 RX ADMIN — ATORVASTATIN CALCIUM 20 MG: 20 TABLET, FILM COATED ORAL at 21:49

## 2025-07-26 RX ADMIN — OXCARBAZEPINE 600 MG: 300 TABLET, FILM COATED ORAL at 08:40

## 2025-07-26 RX ADMIN — DOCUSATE SODIUM 100 MG: 100 CAPSULE, LIQUID FILLED ORAL at 08:39

## 2025-07-26 RX ADMIN — FORMOTEROL FUMARATE DIHYDRATE 20 MCG: 20 SOLUTION RESPIRATORY (INHALATION) at 07:17

## 2025-07-26 RX ADMIN — BUSPIRONE HYDROCHLORIDE 5 MG: 5 TABLET ORAL at 18:00

## 2025-07-26 RX ADMIN — CARVEDILOL 6.25 MG: 6.25 TABLET, FILM COATED ORAL at 18:00

## 2025-07-26 RX ADMIN — DOCUSATE SODIUM 100 MG: 100 CAPSULE, LIQUID FILLED ORAL at 21:49

## 2025-07-26 RX ADMIN — POTASSIUM CHLORIDE 80 MEQ: 1.5 POWDER, FOR SOLUTION ORAL at 08:39

## 2025-07-26 RX ADMIN — CARBIDOPA AND LEVODOPA 1.5 TABLET: 25; 100 TABLET ORAL at 08:39

## 2025-07-26 RX ADMIN — POLYETHYLENE GLYCOL 3350 17 G: 17 POWDER, FOR SOLUTION ORAL at 08:40

## 2025-07-26 RX ADMIN — CARBIDOPA AND LEVODOPA 1.5 TABLET: 25; 100 TABLET ORAL at 14:07

## 2025-07-26 RX ADMIN — CARVEDILOL 6.25 MG: 6.25 TABLET, FILM COATED ORAL at 08:41

## 2025-07-26 RX ADMIN — BUDESONIDE 0.25 MG: 0.25 INHALANT RESPIRATORY (INHALATION) at 07:17

## 2025-07-26 RX ADMIN — TOPIRAMATE 200 MG: 100 TABLET, FILM COATED ORAL at 21:49

## 2025-07-26 RX ADMIN — TRAMADOL HYDROCHLORIDE 50 MG: 50 TABLET, COATED ORAL at 21:57

## 2025-07-26 RX ADMIN — TOPIRAMATE 200 MG: 100 TABLET, FILM COATED ORAL at 08:38

## 2025-07-26 RX ADMIN — QUETIAPINE FUMARATE 800 MG: 100 TABLET ORAL at 21:50

## 2025-07-26 RX ADMIN — BACLOFEN 10 MG: 10 TABLET ORAL at 14:08

## 2025-07-26 RX ADMIN — POTASSIUM CHLORIDE 80 MEQ: 1.5 POWDER, FOR SOLUTION ORAL at 21:50

## 2025-07-26 RX ADMIN — GABAPENTIN 1200 MG: 400 CAPSULE ORAL at 21:49

## 2025-07-26 RX ADMIN — DOCUSATE SODIUM 100 MG: 100 CAPSULE, LIQUID FILLED ORAL at 14:08

## 2025-07-26 RX ADMIN — BACLOFEN 10 MG: 10 TABLET ORAL at 08:40

## 2025-07-26 RX ADMIN — GABAPENTIN 1200 MG: 400 CAPSULE ORAL at 14:07

## 2025-07-26 RX ADMIN — BUMETANIDE 2 MG: 1 TABLET ORAL at 21:49

## 2025-07-26 RX ADMIN — CARBIDOPA AND LEVODOPA 1.5 TABLET: 25; 100 TABLET ORAL at 21:49

## 2025-07-26 RX ADMIN — CEPHALEXIN 500 MG: 500 CAPSULE ORAL at 08:40

## 2025-07-26 RX ADMIN — SERTRALINE 200 MG: 50 TABLET, FILM COATED ORAL at 08:40

## 2025-07-26 RX ADMIN — GABAPENTIN 1200 MG: 400 CAPSULE ORAL at 08:38

## 2025-07-26 RX ADMIN — PANTOPRAZOLE SODIUM 40 MG: 40 TABLET, DELAYED RELEASE ORAL at 05:43

## 2025-07-26 RX ADMIN — BUDESONIDE 0.25 MG: 0.25 INHALANT RESPIRATORY (INHALATION) at 20:13

## 2025-07-26 RX ADMIN — LEVOTHYROXINE SODIUM 100 MCG: 0.1 TABLET ORAL at 05:43

## 2025-07-26 RX ADMIN — BUMETANIDE 2 MG: 1 TABLET ORAL at 08:40

## 2025-07-26 RX ADMIN — LOSARTAN POTASSIUM 12.5 MG: 25 TABLET, FILM COATED ORAL at 08:40

## 2025-07-26 ASSESSMENT — PAIN SCALES - GENERAL
PAINLEVEL_OUTOF10: 7
PAINLEVEL_OUTOF10: 0 - NO PAIN
PAINLEVEL_OUTOF10: 8
PAINLEVEL_OUTOF10: 0 - NO PAIN
PAINLEVEL_OUTOF10: 8
PAINLEVEL_OUTOF10: 8
PAINLEVEL_OUTOF10: 3

## 2025-07-26 ASSESSMENT — COGNITIVE AND FUNCTIONAL STATUS - GENERAL
DRESSING REGULAR LOWER BODY CLOTHING: A LITTLE
PERSONAL GROOMING: A LITTLE
CLIMB 3 TO 5 STEPS WITH RAILING: A LOT
PERSONAL GROOMING: A LITTLE
PERSONAL GROOMING: A LITTLE
MOVING FROM LYING ON BACK TO SITTING ON SIDE OF FLAT BED WITH BEDRAILS: A LITTLE
MOBILITY SCORE: 14
PERSONAL GROOMING: A LITTLE
DRESSING REGULAR LOWER BODY CLOTHING: A LITTLE
STANDING UP FROM CHAIR USING ARMS: A LOT
STANDING UP FROM CHAIR USING ARMS: A LOT
TURNING FROM BACK TO SIDE WHILE IN FLAT BAD: A LITTLE
MOVING FROM LYING ON BACK TO SITTING ON SIDE OF FLAT BED WITH BEDRAILS: A LITTLE
MOBILITY SCORE: 14
TOILETING: A LOT
HELP NEEDED FOR BATHING: A LITTLE
TOILETING: A LOT
WALKING IN HOSPITAL ROOM: A LOT
CLIMB 3 TO 5 STEPS WITH RAILING: A LOT
DRESSING REGULAR UPPER BODY CLOTHING: A LITTLE
STANDING UP FROM CHAIR USING ARMS: A LOT
MOVING FROM LYING ON BACK TO SITTING ON SIDE OF FLAT BED WITH BEDRAILS: A LITTLE
MOVING TO AND FROM BED TO CHAIR: A LOT
MOVING TO AND FROM BED TO CHAIR: A LOT
CLIMB 3 TO 5 STEPS WITH RAILING: A LOT
DRESSING REGULAR LOWER BODY CLOTHING: A LITTLE
DRESSING REGULAR LOWER BODY CLOTHING: A LITTLE
WALKING IN HOSPITAL ROOM: A LOT
WALKING IN HOSPITAL ROOM: A LOT
HELP NEEDED FOR BATHING: A LITTLE
STANDING UP FROM CHAIR USING ARMS: A LOT
DRESSING REGULAR UPPER BODY CLOTHING: A LITTLE
DRESSING REGULAR UPPER BODY CLOTHING: A LITTLE
DAILY ACTIVITIY SCORE: 18
TURNING FROM BACK TO SIDE WHILE IN FLAT BAD: A LITTLE
TOILETING: A LOT
DRESSING REGULAR LOWER BODY CLOTHING: A LITTLE
DRESSING REGULAR LOWER BODY CLOTHING: A LITTLE
TURNING FROM BACK TO SIDE WHILE IN FLAT BAD: A LITTLE
STANDING UP FROM CHAIR USING ARMS: A LOT
DAILY ACTIVITIY SCORE: 18
CLIMB 3 TO 5 STEPS WITH RAILING: A LOT
DRESSING REGULAR LOWER BODY CLOTHING: A LITTLE
MOVING TO AND FROM BED TO CHAIR: A LOT
WALKING IN HOSPITAL ROOM: A LOT
STANDING UP FROM CHAIR USING ARMS: A LOT
CLIMB 3 TO 5 STEPS WITH RAILING: A LOT
TOILETING: A LITTLE
DRESSING REGULAR UPPER BODY CLOTHING: A LITTLE
CLIMB 3 TO 5 STEPS WITH RAILING: A LOT
DRESSING REGULAR UPPER BODY CLOTHING: A LITTLE
TURNING FROM BACK TO SIDE WHILE IN FLAT BAD: A LITTLE
TURNING FROM BACK TO SIDE WHILE IN FLAT BAD: A LITTLE
TOILETING: A LOT
MOVING FROM LYING ON BACK TO SITTING ON SIDE OF FLAT BED WITH BEDRAILS: A LITTLE
HELP NEEDED FOR BATHING: A LITTLE
HELP NEEDED FOR BATHING: A LITTLE
CLIMB 3 TO 5 STEPS WITH RAILING: A LOT
MOVING TO AND FROM BED TO CHAIR: A LOT
MOVING FROM LYING ON BACK TO SITTING ON SIDE OF FLAT BED WITH BEDRAILS: A LITTLE
HELP NEEDED FOR BATHING: A LITTLE
MOBILITY SCORE: 14
WALKING IN HOSPITAL ROOM: A LOT
TOILETING: A LOT
HELP NEEDED FOR BATHING: A LITTLE
MOVING TO AND FROM BED TO CHAIR: A LOT
CLIMB 3 TO 5 STEPS WITH RAILING: A LOT
MOVING FROM LYING ON BACK TO SITTING ON SIDE OF FLAT BED WITH BEDRAILS: A LITTLE
MOVING FROM LYING ON BACK TO SITTING ON SIDE OF FLAT BED WITH BEDRAILS: A LITTLE
DRESSING REGULAR UPPER BODY CLOTHING: A LITTLE
DRESSING REGULAR UPPER BODY CLOTHING: A LITTLE
TURNING FROM BACK TO SIDE WHILE IN FLAT BAD: A LITTLE
DRESSING REGULAR UPPER BODY CLOTHING: A LITTLE
MOVING TO AND FROM BED TO CHAIR: A LOT
STANDING UP FROM CHAIR USING ARMS: A LOT
DAILY ACTIVITIY SCORE: 18
STANDING UP FROM CHAIR USING ARMS: A LOT
WALKING IN HOSPITAL ROOM: A LOT
TURNING FROM BACK TO SIDE WHILE IN FLAT BAD: A LITTLE
TOILETING: A LOT
HELP NEEDED FOR BATHING: A LITTLE
PERSONAL GROOMING: A LITTLE
TOILETING: A LOT
WALKING IN HOSPITAL ROOM: A LOT
MOVING FROM LYING ON BACK TO SITTING ON SIDE OF FLAT BED WITH BEDRAILS: A LITTLE
PERSONAL GROOMING: A LITTLE
WALKING IN HOSPITAL ROOM: A LOT
PERSONAL GROOMING: A LITTLE
MOVING TO AND FROM BED TO CHAIR: A LOT
DRESSING REGULAR LOWER BODY CLOTHING: A LITTLE
MOVING TO AND FROM BED TO CHAIR: A LOT
HELP NEEDED FOR BATHING: A LITTLE
TOILETING: A LOT
DAILY ACTIVITIY SCORE: 22
PERSONAL GROOMING: A LITTLE
DRESSING REGULAR LOWER BODY CLOTHING: A LITTLE
TURNING FROM BACK TO SIDE WHILE IN FLAT BAD: A LITTLE

## 2025-07-26 ASSESSMENT — PAIN DESCRIPTION - LOCATION
LOCATION: BACK

## 2025-07-26 ASSESSMENT — PAIN DESCRIPTION - DESCRIPTORS
DESCRIPTORS: ACHING
DESCRIPTORS: ACHING;SORE;THROBBING
DESCRIPTORS: ACHING;DISCOMFORT;SORE

## 2025-07-26 ASSESSMENT — PAIN - FUNCTIONAL ASSESSMENT
PAIN_FUNCTIONAL_ASSESSMENT: FLACC (FACE, LEGS, ACTIVITY, CRY, CONSOLABILITY)
PAIN_FUNCTIONAL_ASSESSMENT: 0-10

## 2025-07-26 ASSESSMENT — PAIN DESCRIPTION - ORIENTATION
ORIENTATION: MID
ORIENTATION: LOWER

## 2025-07-26 ASSESSMENT — ACTIVITIES OF DAILY LIVING (ADL): ADL_ASSISTANCE: INDEPENDENT

## 2025-07-26 NOTE — PROGRESS NOTES
PROGRESS NOTE - INTERNAL MEDICINE     PATIENT NAME:  Prema Hair    MRN:  [id number]  SERVICE DATE:  7/26/2025       ADMITTING PHYSICIAN:  Shabana Rodriguez MD    ASSESSMENT AND PLAN    Drug-induced parkinsonism G21.19 causing Impaired mobility & recurrent falls at home.   DM2, controlled. A1c 5.7 3/25  HTN, variable control  Hyperlipidemia  Hx CA thyroid on supplemental therapy (Z85.850). TSH 2.8.   Obesity, class I  Hx torticollis  Osteoarthrosis of the knees  Hx urinary retention        PLAN:  Fall prec.  Cont home meds  PT OT  Check A1c. Accuch. SSI. Hypogly protocol. Diab diet.  Incr levothy to 200mcg 6d/wk, 100 mcg once a week  Continue Keflex daily for UTI prophylaxis.  Continue levothyroxine supplement, aim for suppressed TSH.    Needs ongoing outpatient psychiatric follow-up and medication titration.  DVT prophylaxis      DISPOSITION PLAN:  ?SNF pending PT OT inpute    INTERVAL HISTORY OF PRESENT ILLNESS:  No chest pain/sob. No n/v/d. Oral intake good. Gen pain as before post fall. No fever/chills. acute events from last 24 hrs reviewed.    Pertinent ROS:  No abdominal pain / No Bleeding / No rashes    Discussed with nursing and case management team and the specialists involved in this patient's care. Reviewed the EMR and documentation from other care-givers.      OBJECTIVE  PHYSICAL EXAM:     GENERAL: AAOx3, cooperative resting comfortably. Obese  SKIN: Skin turgor normal. No rashes  HEENT: EOMI, no epistaxis, Moist mucosa.  LUNGS: Bilat breath sounds, with no added sounds  CARDIAC: REGULAR. no rubs, no murmur  ABDOMEN: soft, non-tender. +BS.  EXTREMITIES: No edema, Good capillary refill.   NEURO: Insight GOOD. +invol movements. Gait slow with walker  MUSCULOSKELETAL: No acute inflammation.  Torticollis to left            7/25/2025    12:14 PM 7/25/2025     5:30 PM 7/25/2025     8:20 PM 7/25/2025    11:46 PM 7/26/2025     5:44 AM 7/26/2025     7:56 AM 7/26/2025     8:53 AM   Vitals   Systolic 156  120 121 108 124 153 144   Diastolic 100 85 81 73 80 102 96   BP Location Left arm Left arm Left arm Left arm Left arm Left arm    Heart Rate 82 89 77 78 73 70 70   Temp 36.3 °C (97.3 °F) 36.6 °C (97.9 °F) 36.2 °C (97.1 °F) 36.6 °C (97.9 °F) 36.2 °C (97.2 °F) 36.6 °C (97.9 °F)    Resp 18 20 18 18 18       Body mass index is 34.29 kg/m².    Intake/Output Summary (Last 24 hours) at 7/26/2025 1133  Last data filed at 7/26/2025 0544  Gross per 24 hour   Intake 1140 ml   Output 860 ml   Net 280 ml         Current Medications[1]    DATA:   Diagnostic tests reviewed for today's visit:    Most recent labs  Results from last 7 days   Lab Units 07/26/25  0619 07/24/25  1531   WBC AUTO x10*3/uL 3.7* 5.3   HEMOGLOBIN g/dL 11.6* 11.6*   HEMATOCRIT % 39.1 38.1   PLATELETS AUTO x10*3/uL 201 260     Results from last 7 days   Lab Units 07/26/25  0619 07/24/25  1531   SODIUM mmol/L 141 141   POTASSIUM mmol/L 3.7 3.6   CHLORIDE mmol/L 107 105   CO2 mmol/L 29 27   BUN mg/dL 11 13   CREATININE mg/dL 0.61 0.78   CALCIUM mg/dL 8.4* 8.8   PROTEIN TOTAL g/dL 5.7* 6.6   BILIRUBIN TOTAL mg/dL 0.2 0.3   ALK PHOS U/L 120 125   ALT U/L 3* 15   AST U/L 11 14   GLUCOSE mg/dL 86 98             Results from last 7 days   Lab Units 07/26/25  0758 07/25/25  2031 07/25/25  1728 07/25/25  1251 07/25/25  0832 07/24/25  1525   POCT GLUCOSE mg/dL 94 101* 104* 118* 99 139*        XR knee 4+ views bilateral   Final Result   1. No acute bony abnormality left knee.   2. Right total knee arthroplasty.   3. Remote right MCL injury.   4. Healed proximal fibular diaphyseal fracture.   5. No acute bony abnormality right knee.                  MACRO:   None        Signed by: Anusha Mejia 7/24/2025 3:34 PM   Dictation workstation:   [id number]      CT cervical spine wo IV contrast   Final Result   No acute cervical vertebral displacement or acute displaced fracture.   Reversal of the cervical lordosis with multilevel degenerative   changes and mild  spondylolisthesis, as detailed above, similar to   03/14/2025.        MACRO:   None.        Signed by: Carly Gaston 7/24/2025 3:19 PM   Dictation workstation:   [id number]      CT brain attack head wo IV contrast   Final Result   No acute intracranial hemorrhage or mass-effect.        MACRO:   Carly Gaston discussed the significance and urgency of this critical   finding by EPIC secure chat with  BEATRIZ MORSE on 7/24/2025 at 3:12   pm.  (**-RCF-**) Findings:  See findings.             Signed by: Carly Gaston 7/24/2025 3:12 PM   Dictation workstation:   [id number]            SIGNATURE: Shabana Rodriguez MD PATIENT NAME: Prema Hair   DATE: 7/26/2025 MRN: [id number]   TIME: 11:33 AM             [1]   Current Facility-Administered Medications:     acetaminophen (Tylenol) tablet 650 mg, 650 mg, oral, q4h PRN, Shabana Rodriguez MD    ammonium lactate (Lac-Hydrin) 12 % lotion 1 Application, 1 Application, Topical, PRN, Shabana Rodriguez MD    aspirin EC tablet 81 mg, 81 mg, oral, Daily, Shabana Rodriguez MD, 81 mg at 07/26/25 0839    atorvastatin (Lipitor) tablet 20 mg, 20 mg, oral, Nightly, Shabana Rodriguez MD, 20 mg at 07/25/25 2109    baclofen (Lioresal) tablet 10 mg, 10 mg, oral, TID, Shabana Rodriguez MD, 10 mg at 07/26/25 0840    budesonide (Pulmicort) 0.25 mg/2 mL nebulizer solution 0.25 mg, 0.25 mg, nebulization, BID, 0.25 mg at 07/26/25 0717 **AND** formoterol (Perforomist) 20 mcg/2 mL nebulizer solution 20 mcg, 20 mcg, nebulization, q12h, Sahbana Rodriguez MD, 20 mcg at 07/26/25 0717    bumetanide (Bumex) tablet 2 mg, 2 mg, oral, BID, Shabana Rodriguez MD, 2 mg at 07/26/25 0840    busPIRone (Buspar) tablet 5 mg, 5 mg, oral, q12h KATHERINE, Shabana Rodriguez MD, 5 mg at 07/26/25 0543    carbidopa-levodopa (Sinemet)  mg per tablet 1.5 tablet, 1.5 tablet, oral, TID, Shabana Rodriguez MD, 1.5 tablet at 07/26/25 0839    carvedilol (Coreg) tablet 6.25 mg, 6.25 mg, oral, BID, Shabana  JULI Rodriguez MD, 6.25 mg at 07/26/25 0841    cephalexin (Keflex) capsule 500 mg, 500 mg, oral, Daily, Shabana Rodriguez MD, 500 mg at 07/26/25 0840    cyclobenzaprine (Flexeril) tablet 10 mg, 10 mg, oral, Nightly PRN, Shabana Rodriguez MD    dextrose 50 % injection 12.5 g, 12.5 g, intravenous, q15 min PRN, Shabana Rodriguez MD    dextrose 50 % injection 25 g, 25 g, intravenous, q15 min PRN, Shabana Rodriguez MD    diclofenac sodium (Voltaren) 1 % gel 4 g, 4 g, Topical, TID PRN, Shabana Rodriguez MD    docusate sodium (Colace) capsule 100 mg, 100 mg, oral, TID, Shabana Rodriguez MD, 100 mg at 07/26/25 0839    gabapentin (Neurontin) capsule 1,200 mg, 1,200 mg, oral, TID, Shabana Rodriguez MD, 1,200 mg at 07/26/25 0838    glucagon (Glucagen) injection 1 mg, 1 mg, intramuscular, q15 min PRN, Shabana Rodriguez MD    glucagon (Glucagen) injection 1 mg, 1 mg, intramuscular, q15 min PRN, Shabana Rodriguez MD    heparin (porcine) injection 5,000 Units, 5,000 Units, subcutaneous, q8h, Shabana Rodriguez MD, 5,000 Units at 07/25/25 1156    insulin lispro injection 0-5 Units, 0-5 Units, subcutaneous, TID AC, Shabana Rodriguez MD    ipratropium-albuteroL (Duo-Neb) 0.5-2.5 mg/3 mL nebulizer solution 3 mL, 3 mL, nebulization, q2h PRN, Shabana Rodriguez MD    levothyroxine (Synthroid, Levoxyl) tablet 100 mcg, 100 mcg, oral, Once per day on Sunday Saturday, Shabana Rodriguez MD, 100 mcg at 07/26/25 0543    [START ON 7/28/2025] levothyroxine (Synthroid, Levoxyl) tablet 200 mcg, 200 mcg, oral, Once per day on Monday Tuesday Wednesday Thursday Friday, Shabana Rodriguez MD    lidocaine 4 % patch 1 patch, 1 patch, transdermal, Daily, Shabana Rodriguez MD, 1 patch at 07/26/25 0853    losartan (Cozaar) tablet 12.5 mg, 12.5 mg, oral, Daily, Shabana Rodriguez MD, 12.5 mg at 07/26/25 0840    metFORMIN (Glucophage) tablet 500 mg, 500 mg, oral, BID, Shabana Rodriguez MD, 500 mg at 07/26/25 0841    ondansetron  (Zofran) injection 4 mg, 4 mg, intravenous, q6h PRN, Shabana Rodriguez MD    OXcarbazepine (Trileptal) tablet 1,200 mg, 1,200 mg, oral, Nightly, Shabana Rodriguez MD, 1,200 mg at 07/25/25 2111    OXcarbazepine (Trileptal) tablet 600 mg, 600 mg, oral, q AM, Shabana Rodriguez MD, 600 mg at 07/26/25 0840    oxygen (O2) therapy, 1 Dose, inhalation, Continuous - O2/gases, Rhys Escobar MD, Rate Verify Medical Gas at 07/26/25 0326    pantoprazole (ProtoNix) EC tablet 40 mg, 40 mg, oral, Daily before breakfast, Shabana Rodriguez MD, 40 mg at 07/26/25 0543    polyethylene glycol (Glycolax, Miralax) packet 17 g, 17 g, oral, Daily, Shabana Rodriguez MD, 17 g at 07/26/25 0840    potassium chloride (Klor-Con) packet 80 mEq, 80 mEq, oral, BID, Shabana Rodriguez MD, 80 mEq at 07/26/25 0839    prazosin (Minipress) capsule 5 mg, 5 mg, oral, Nightly, Shabana Rodriguez MD, 5 mg at 07/25/25 2116    QUEtiapine (SEROquel) tablet 800 mg, 800 mg, oral, Nightly, Shabana Rodriguez MD, 800 mg at 07/25/25 2109    sertraline (Zoloft) tablet 200 mg, 200 mg, oral, Daily, Shabana Rodriguez MD, 200 mg at 07/26/25 0840    topiramate (Topamax) tablet 200 mg, 200 mg, oral, BID, Shabana Rodriguez MD, 200 mg at 07/26/25 0838    traMADol (Ultram) tablet 50 mg, 50 mg, oral, TID PRN, Shabana Rodriguez MD, 50 mg at 07/26/25 0546

## 2025-07-26 NOTE — PROGRESS NOTES
Occupational Therapy    Evaluation    Patient Name: Prema Hair  MRN: [id number]  Department: [place] [facility] [address]  Room: [address] [place] [address]  Today's Date: 7/26/2025  Time Calculation  Start Time: 1306  Stop Time: 1324  Time Calculation (min): 18 min        Assessment:  OT Assessment: Pt presents with generalized weakness and hx falls. Despite aforementioned, pt appears to be near functional baseline in regards to ADLs and functional mobility. Pt would benefit from LOW intensity occupational therapy to reduce risk for future falls and for potential environmental modifications to optimize patient's safety at home.  Prognosis: Good  Barriers to Discharge Home: No anticipated barriers  Evaluation/Treatment Tolerance: Patient tolerated treatment well  Medical Staff Made Aware: Yes  End of Session Communication: Bedside nurse  End of Session Patient Position: Alarm on, Up in chair  OT Assessment Results: Decreased ADL status, Decreased endurance, Decreased functional mobility  Prognosis: Good  Barriers to Discharge: None  Evaluation/Treatment Tolerance: Patient tolerated treatment well  Medical Staff Made Aware: Yes  Strengths: Ability to acquire knowledge, Attitude of self, Coping skills  Barriers to Participation: Comorbidities  Plan:  Treatment Interventions: ADL retraining, UE strengthening/ROM, Functional transfer training  OT Frequency: 3 times per week  OT Discharge Recommendations: Low intensity level of continued care  Equipment Recommended upon Discharge: Wheeled walker  OT Recommended Transfer Status: Stand by assist, Assist of 1  OT - OK to Discharge: Yes (Per POC)  Treatment Interventions: ADL retraining, UE strengthening/ROM, Functional transfer training    Subjective   Current Problem:  1. Frequent falls        2. Weakness          OT Visit Info:  OT Received On: 07/26/25  General:  General  Reason for Referral: Generalized weakness, frequent falls. Hx Parkinson's Disease and torticollis.  Referred By: Dr. Michael Singh  Medical History Relevant to Rehab: CHF, Bell's Palsy, chronic UTI, DM2, DLD, fibromyalgia, paresthesia, bipolar d/o, fall, PTSD, hx fracture of distal R fibula  Family/Caregiver Present: No  Prior to Session Communication: Bedside nurse  Patient Position Received: Bed, 3 rail up, Alarm off, not on at start of session  Preferred Learning Style: auditory, visual, verbal  General Comment: Pt cooperative and agreeable with OT evaluation  Precautions:  Medical Precautions: Fall precautions (Wearing soft c-collar for torticollis)     Date/Time Vitals Session Patient Position Pulse Resp SpO2 BP MAP (mmHg)    07/26/25 1211 --  --  78  --  99 %  123/81  95            Pain:  Pain Assessment  Pain Assessment: 0-10  0-10 (Numeric) Pain Score: 0 - No pain    Objective   Cognition:  Overall Cognitive Status: Within Functional Limits  Orientation Level: Oriented X4  Attention: Within Functional Limits  Safety/Judgement: Within Functional Limits  Processing Speed: Within funtional limits           Home Living:  Type of Home: House  Lives With: Alone (Although family visits daily)  Home Adaptive Equipment:  (Rollator)  Home Layout: One level  Home Access: No concerns (0 MARILYNN)  Bathroom Shower/Tub: Tub/shower unit  Bathroom Toilet: Standard  Bathroom Equipment: Tub transfer bench  Prior Function:  Level of [place]: Independent with ADLs and functional transfers, Needs assistance with homemaking  Receives Help From: Family (Daughters assist daily)  ADL Assistance: Independent  Homemaking Assistance: Needs assistance (Daughter completes IADLs)  Ambulatory Assistance: Independent (Uses rollator)  IADL History:  Homemaking Responsibilities: No (Family assists)  ADL:  Eating Assistance: Independent  Grooming Assistance: Stand by  UE Dressing Assistance: Stand by  LE Dressing Assistance: Stand by  LE Dressing Deficit: Steadying, Pull up over hips  Activity Tolerance:     Bed Mobility/Transfers: Bed Mobility  Bed Mobility: Yes  Bed  Mobility 1  Bed Mobility 1: Supine to sitting, Sitting to supine  Level of Assistance 1: Modified independent    Transfers  Transfer: Yes  Transfer 1  Transfer From 1: Sit to  Transfer to 1: Stand  Technique 1: Sit to stand, Stand to sit  Transfer Device 1: Walker  Transfer Level of Assistance 1: Contact guard      Functional Mobility:  Functional Mobility  Functional Mobility Performed: Yes (Functional mobility in patient's room with use of ww, CGA provided. No signs of unsteadiness or LOB.)  Sitting Balance:  Static Sitting Balance  Static Sitting-Balance Support: Bilateral upper extremity supported, Feet supported  Static Sitting-Level of Assistance: Independent  Dynamic Sitting Balance  Dynamic Sitting-Balance Support: Bilateral upper extremity supported, Feet supported  Dynamic Sitting-Level of Assistance: Close supervision  Dynamic Sitting-Balance: Forward lean  Standing Balance:  Static Standing Balance  Static Standing-Balance Support: Bilateral upper extremity supported  Static Standing-Level of Assistance: Contact guard  Dynamic Standing Balance  Dynamic Standing-Balance Support: Bilateral upper extremity supported  Dynamic Standing-Level of Assistance: Contact guard  Dynamic Standing-Balance: Turning, Staggered stance   Modalities:     Vision:Vision - Basic Assessment  Current Vision: Wears glasses all the time  Sensation:  Light Touch: No apparent deficits  Strength:  Strength Comments: BUE grossly 4/5  Perception:     Coordination:  Movements are Fluid and Coordinated: No (Potential tremor noted)   Hand Function:  Gross Grasp: Functional  Coordination: Functional      Outcome Measures:[facility] Daily Activity  Putting on and taking off regular lower body clothing: A little  Bathing (including washing, rinsing, drying): None  Putting on and taking off regular upper body clothing: None  Toileting, which includes using toilet, bedpan or urinal: A little  Taking care of personal grooming such as brushing teeth:  None  Eating Meals: None  Daily Activity - Total Score: 22        Education Documentation  Handouts, taught by Virginia Jarrett OT at 7/26/2025  1:45 PM.  Learner: Patient  Readiness: Acceptance  Method: Explanation  Response: Verbalizes Understanding    Body Mechanics, taught by Virginia Jarrett OT at 7/26/2025  1:45 PM.  Learner: Patient  Readiness: Acceptance  Method: Explanation  Response: Verbalizes Understanding    Precautions, taught by Virginia Jarrett OT at 7/26/2025  1:45 PM.  Learner: Patient  Readiness: Acceptance  Method: Explanation  Response: Verbalizes Understanding    ADL Training, taught by Virginia Jarrett OT at 7/26/2025  1:45 PM.  Learner: Patient  Readiness: Acceptance  Method: Explanation  Response: Verbalizes Understanding    Education Comments  No comments found.        OP EDUCATION:  Education  Individual(s) Educated: Patient  Education Provided: Fall precautons, Risk and benefits of OT discussed with patient or other  Risk and Benefits Discussed with Patient/Caregiver/Other: yes  Patient/Caregiver Demonstrated Understanding: yes  Plan of Care Discussed and Agreed Upon: yes  Patient Response to Education: Patient/Caregiver Verbalized Understanding of Information    Goals:  Encounter Problems       Encounter Problems (Active)       ADLs       Patient will perform UB and LB bathing with set-up level of assistance and raised toilet seat and grab bars.       Start:  07/26/25    Expected End:  08/09/25            Patient with complete upper body dressing with set-up level of assistance donning and doffing all UE clothes with PRN adaptive equipment while edge of bed        Start:  07/26/25    Expected End:  08/09/25            Patient with complete lower body dressing with supervision level of assistance donning and doffing all LE clothes  with PRN adaptive equipment while edge of bed        Start:  07/26/25    Expected End:  08/09/25            Patient will  complete daily grooming tasks brushing teeth and washing face/hair with set-up level of assistance and PRN adaptive equipment while edge of bed .       Start:  07/26/25    Expected End:  08/09/25            Patient will complete toileting including hygiene clothing management/hygiene with supervision level of assistance and raised toilet seat and grab bars.       Start:  07/26/25    Expected End:  08/09/25               MOBILITY       Patient will perform Functional mobility with modified independent level of assistance and least restrictive device in order to improve safety and functional mobility.       Start:  07/26/25    Expected End:  08/09/25               TRANSFERS       Patient will perform bed mobility modified independent level of assistance and bed rails in order to improve safety and independence with mobility       Start:  07/26/25    Expected End:  08/09/25            Patient will complete functional transfer to toilet with least restrictive device with supervision.       Start:  07/26/25    Expected End:  08/09/25            Patient will complete sit to stand transfer with supervision level of assistance and least restrictive device in order to improve safety and prepare for out of bed mobility.       Start:  07/26/25    Expected End:  08/09/25

## 2025-07-26 NOTE — CARE PLAN
The patient's goals for the shift include rest    The clinical goals for the shift include Patient will remain safe.      Problem: Pain - Adult  Goal: Verbalizes/displays adequate comfort level or baseline comfort level  Outcome: Progressing     Problem: Safety - Adult  Goal: Free from fall injury  Outcome: Progressing     Problem: Chronic Conditions and Co-morbidities  Goal: Patient's chronic conditions and co-morbidity symptoms are monitored and maintained or improved  Outcome: Progressing

## 2025-07-27 VITALS
OXYGEN SATURATION: 99 % | HEART RATE: 73 BPM | DIASTOLIC BLOOD PRESSURE: 86 MMHG | HEIGHT: 68 IN | SYSTOLIC BLOOD PRESSURE: 125 MMHG | RESPIRATION RATE: 16 BRPM | TEMPERATURE: 96.8 F | BODY MASS INDEX: 34.18 KG/M2 | WEIGHT: 225.53 LBS

## 2025-07-27 LAB
GLUCOSE BLD MANUAL STRIP-MCNC: 109 MG/DL (ref 74–99)
GLUCOSE BLD MANUAL STRIP-MCNC: 114 MG/DL (ref 74–99)
GLUCOSE BLD MANUAL STRIP-MCNC: 81 MG/DL (ref 74–99)
GLUCOSE BLD MANUAL STRIP-MCNC: 82 MG/DL (ref 74–99)

## 2025-07-27 PROCEDURE — 82947 ASSAY GLUCOSE BLOOD QUANT: CPT | Mod: 91

## 2025-07-27 PROCEDURE — 94660 CPAP INITIATION&MGMT: CPT

## 2025-07-27 PROCEDURE — 2500000002 HC RX 250 W HCPCS SELF ADMINISTERED DRUGS (ALT 637 FOR MEDICARE OP, ALT 636 FOR OP/ED): Performed by: INTERNAL MEDICINE

## 2025-07-27 PROCEDURE — 97161 PT EVAL LOW COMPLEX 20 MIN: CPT | Mod: GP | Performed by: PHYSICAL THERAPIST

## 2025-07-27 PROCEDURE — 2500000001 HC RX 250 WO HCPCS SELF ADMINISTERED DRUGS (ALT 637 FOR MEDICARE OP): Performed by: INTERNAL MEDICINE

## 2025-07-27 PROCEDURE — 96372 THER/PROPH/DIAG INJ SC/IM: CPT | Performed by: INTERNAL MEDICINE

## 2025-07-27 PROCEDURE — 94640 AIRWAY INHALATION TREATMENT: CPT

## 2025-07-27 PROCEDURE — G0378 HOSPITAL OBSERVATION PER HR: HCPCS

## 2025-07-27 PROCEDURE — 2500000005 HC RX 250 GENERAL PHARMACY W/O HCPCS: Performed by: INTERNAL MEDICINE

## 2025-07-27 PROCEDURE — 2500000004 HC RX 250 GENERAL PHARMACY W/ HCPCS (ALT 636 FOR OP/ED): Performed by: INTERNAL MEDICINE

## 2025-07-27 RX ADMIN — OXCARBAZEPINE 1200 MG: 300 TABLET, FILM COATED ORAL at 22:11

## 2025-07-27 RX ADMIN — QUETIAPINE FUMARATE 800 MG: 100 TABLET ORAL at 22:11

## 2025-07-27 RX ADMIN — CEPHALEXIN 500 MG: 500 CAPSULE ORAL at 09:09

## 2025-07-27 RX ADMIN — CARVEDILOL 6.25 MG: 6.25 TABLET, FILM COATED ORAL at 09:11

## 2025-07-27 RX ADMIN — TRAMADOL HYDROCHLORIDE 50 MG: 50 TABLET, COATED ORAL at 09:08

## 2025-07-27 RX ADMIN — TOPIRAMATE 200 MG: 100 TABLET, FILM COATED ORAL at 09:08

## 2025-07-27 RX ADMIN — HEPARIN SODIUM 5000 UNITS: 5000 INJECTION INTRAVENOUS; SUBCUTANEOUS at 05:48

## 2025-07-27 RX ADMIN — POTASSIUM CHLORIDE 80 MEQ: 1.5 POWDER, FOR SOLUTION ORAL at 22:10

## 2025-07-27 RX ADMIN — GABAPENTIN 1200 MG: 400 CAPSULE ORAL at 16:15

## 2025-07-27 RX ADMIN — SERTRALINE 200 MG: 50 TABLET, FILM COATED ORAL at 09:09

## 2025-07-27 RX ADMIN — METFORMIN HYDROCHLORIDE 500 MG: 500 TABLET, FILM COATED ORAL at 16:15

## 2025-07-27 RX ADMIN — LIDOCAINE 4% 1 PATCH: 40 PATCH TOPICAL at 09:10

## 2025-07-27 RX ADMIN — ASPIRIN 81 MG: 81 TABLET, DELAYED RELEASE ORAL at 09:08

## 2025-07-27 RX ADMIN — LOSARTAN POTASSIUM 12.5 MG: 25 TABLET, FILM COATED ORAL at 09:10

## 2025-07-27 RX ADMIN — DOCUSATE SODIUM 100 MG: 100 CAPSULE, LIQUID FILLED ORAL at 09:10

## 2025-07-27 RX ADMIN — BUDESONIDE 0.25 MG: 0.25 INHALANT RESPIRATORY (INHALATION) at 07:15

## 2025-07-27 RX ADMIN — GABAPENTIN 1200 MG: 400 CAPSULE ORAL at 09:20

## 2025-07-27 RX ADMIN — HEPARIN SODIUM 5000 UNITS: 5000 INJECTION INTRAVENOUS; SUBCUTANEOUS at 20:15

## 2025-07-27 RX ADMIN — BUDESONIDE 0.25 MG: 0.25 INHALANT RESPIRATORY (INHALATION) at 20:57

## 2025-07-27 RX ADMIN — BACLOFEN 10 MG: 10 TABLET ORAL at 22:11

## 2025-07-27 RX ADMIN — PANTOPRAZOLE SODIUM 40 MG: 40 TABLET, DELAYED RELEASE ORAL at 07:41

## 2025-07-27 RX ADMIN — BUMETANIDE 2 MG: 1 TABLET ORAL at 09:10

## 2025-07-27 RX ADMIN — DOCUSATE SODIUM 100 MG: 100 CAPSULE, LIQUID FILLED ORAL at 16:15

## 2025-07-27 RX ADMIN — BACLOFEN 10 MG: 10 TABLET ORAL at 16:15

## 2025-07-27 RX ADMIN — CARVEDILOL 6.25 MG: 6.25 TABLET, FILM COATED ORAL at 16:15

## 2025-07-27 RX ADMIN — FORMOTEROL FUMARATE DIHYDRATE 20 MCG: 20 SOLUTION RESPIRATORY (INHALATION) at 20:57

## 2025-07-27 RX ADMIN — TOPIRAMATE 200 MG: 100 TABLET, FILM COATED ORAL at 22:11

## 2025-07-27 RX ADMIN — PRAZOSIN HYDROCHLORIDE 5 MG: 5 CAPSULE ORAL at 22:11

## 2025-07-27 RX ADMIN — DOCUSATE SODIUM 100 MG: 100 CAPSULE, LIQUID FILLED ORAL at 22:11

## 2025-07-27 RX ADMIN — HEPARIN SODIUM 5000 UNITS: 5000 INJECTION INTRAVENOUS; SUBCUTANEOUS at 12:18

## 2025-07-27 RX ADMIN — ATORVASTATIN CALCIUM 20 MG: 20 TABLET, FILM COATED ORAL at 22:11

## 2025-07-27 RX ADMIN — POTASSIUM CHLORIDE 80 MEQ: 1.5 POWDER, FOR SOLUTION ORAL at 09:10

## 2025-07-27 RX ADMIN — OXCARBAZEPINE 600 MG: 300 TABLET, FILM COATED ORAL at 09:08

## 2025-07-27 RX ADMIN — CARBIDOPA AND LEVODOPA 1.5 TABLET: 25; 100 TABLET ORAL at 16:16

## 2025-07-27 RX ADMIN — LEVOTHYROXINE SODIUM 200 MCG: 0.1 TABLET ORAL at 05:49

## 2025-07-27 RX ADMIN — FORMOTEROL FUMARATE DIHYDRATE 20 MCG: 20 SOLUTION RESPIRATORY (INHALATION) at 07:15

## 2025-07-27 RX ADMIN — POLYETHYLENE GLYCOL 3350 17 G: 17 POWDER, FOR SOLUTION ORAL at 09:10

## 2025-07-27 RX ADMIN — GABAPENTIN 1200 MG: 400 CAPSULE ORAL at 22:11

## 2025-07-27 RX ADMIN — METFORMIN HYDROCHLORIDE 500 MG: 500 TABLET, FILM COATED ORAL at 09:09

## 2025-07-27 RX ADMIN — CARBIDOPA AND LEVODOPA 1.5 TABLET: 25; 100 TABLET ORAL at 22:11

## 2025-07-27 RX ADMIN — CARBIDOPA AND LEVODOPA 1.5 TABLET: 25; 100 TABLET ORAL at 09:08

## 2025-07-27 RX ADMIN — BUMETANIDE 2 MG: 1 TABLET ORAL at 22:11

## 2025-07-27 RX ADMIN — Medication: at 02:45

## 2025-07-27 RX ADMIN — BACLOFEN 10 MG: 10 TABLET ORAL at 09:10

## 2025-07-27 RX ADMIN — TRAMADOL HYDROCHLORIDE 50 MG: 50 TABLET, COATED ORAL at 22:09

## 2025-07-27 ASSESSMENT — COGNITIVE AND FUNCTIONAL STATUS - GENERAL
DRESSING REGULAR UPPER BODY CLOTHING: A LITTLE
WALKING IN HOSPITAL ROOM: A LITTLE
STANDING UP FROM CHAIR USING ARMS: A LITTLE
DRESSING REGULAR UPPER BODY CLOTHING: A LITTLE
TOILETING: A LITTLE
MOBILITY SCORE: 14
TOILETING: A LITTLE
CLIMB 3 TO 5 STEPS WITH RAILING: A LOT
MOVING TO AND FROM BED TO CHAIR: A LITTLE
DAILY ACTIVITIY SCORE: 20
MOVING FROM LYING ON BACK TO SITTING ON SIDE OF FLAT BED WITH BEDRAILS: A LITTLE
TOILETING: A LOT
MOBILITY SCORE: 18
DRESSING REGULAR LOWER BODY CLOTHING: A LITTLE
HELP NEEDED FOR BATHING: A LITTLE
STANDING UP FROM CHAIR USING ARMS: A LITTLE
TURNING FROM BACK TO SIDE WHILE IN FLAT BAD: A LITTLE
DAILY ACTIVITIY SCORE: 20
WALKING IN HOSPITAL ROOM: A LITTLE
MOVING FROM LYING ON BACK TO SITTING ON SIDE OF FLAT BED WITH BEDRAILS: A LITTLE
TURNING FROM BACK TO SIDE WHILE IN FLAT BAD: A LITTLE
DRESSING REGULAR LOWER BODY CLOTHING: A LITTLE
HELP NEEDED FOR BATHING: A LITTLE
CLIMB 3 TO 5 STEPS WITH RAILING: A LOT
TURNING FROM BACK TO SIDE WHILE IN FLAT BAD: A LITTLE
TURNING FROM BACK TO SIDE WHILE IN FLAT BAD: A LITTLE
MOVING TO AND FROM BED TO CHAIR: A LITTLE
HELP NEEDED FOR BATHING: A LITTLE
CLIMB 3 TO 5 STEPS WITH RAILING: A LOT
PERSONAL GROOMING: A LITTLE
DRESSING REGULAR UPPER BODY CLOTHING: A LITTLE
CLIMB 3 TO 5 STEPS WITH RAILING: A LITTLE
WALKING IN HOSPITAL ROOM: A LITTLE
STANDING UP FROM CHAIR USING ARMS: A LOT
MOVING FROM LYING ON BACK TO SITTING ON SIDE OF FLAT BED WITH BEDRAILS: A LITTLE
MOBILITY SCORE: 17
MOVING TO AND FROM BED TO CHAIR: A LOT
STANDING UP FROM CHAIR USING ARMS: A LITTLE
PATIENT BASELINE BEDBOUND: NO
MOVING FROM LYING ON BACK TO SITTING ON SIDE OF FLAT BED WITH BEDRAILS: A LITTLE
DRESSING REGULAR LOWER BODY CLOTHING: A LITTLE
DAILY ACTIVITIY SCORE: 18
MOVING TO AND FROM BED TO CHAIR: A LITTLE
MOBILITY SCORE: 17
WALKING IN HOSPITAL ROOM: A LOT

## 2025-07-27 ASSESSMENT — ACTIVITIES OF DAILY LIVING (ADL)
WALKS IN HOME: NEEDS ASSISTANCE
HEARING - RIGHT EAR: FUNCTIONAL
GROOMING: NEEDS ASSISTANCE
TOILETING: INDEPENDENT
BATHING: NEEDS ASSISTANCE
JUDGMENT_ADEQUATE_SAFELY_COMPLETE_DAILY_ACTIVITIES: YES
HEARING - LEFT EAR: FUNCTIONAL
PATIENT'S MEMORY ADEQUATE TO SAFELY COMPLETE DAILY ACTIVITIES?: YES
FEEDING YOURSELF: INDEPENDENT
ADEQUATE_TO_COMPLETE_ADL: YES
ASSISTIVE_DEVICE: WALKER
ADL_ASSISTANCE: INDEPENDENT
DRESSING YOURSELF: NEEDS ASSISTANCE

## 2025-07-27 ASSESSMENT — PAIN - FUNCTIONAL ASSESSMENT
PAIN_FUNCTIONAL_ASSESSMENT: 0-10

## 2025-07-27 ASSESSMENT — PAIN SCALES - GENERAL
PAINLEVEL_OUTOF10: 8
PAINLEVEL_OUTOF10: 5 - MODERATE PAIN
PAINLEVEL_OUTOF10: 9
PAINLEVEL_OUTOF10: 7
PAINLEVEL_OUTOF10: 8

## 2025-07-27 ASSESSMENT — PAIN DESCRIPTION - ORIENTATION: ORIENTATION: LOWER

## 2025-07-27 ASSESSMENT — PAIN DESCRIPTION - LOCATION
LOCATION: BACK
LOCATION: BACK

## 2025-07-27 ASSESSMENT — PAIN DESCRIPTION - DESCRIPTORS: DESCRIPTORS: ACHING;DISCOMFORT

## 2025-07-27 NOTE — PROGRESS NOTES
PROGRESS NOTE - INTERNAL MEDICINE     PATIENT NAME:  Prema Hair    MRN:  [id number]  SERVICE DATE:  7/27/2025       ADMITTING PHYSICIAN:  Shabana Rodriguez MD    ASSESSMENT AND PLAN    Drug-induced parkinsonism G21.19 causing Impaired mobility & recurrent falls at home.   DM2, controlled. A1c 6 7/25  HTN, variable control  Hyperlipidemia  Hx CA thyroid on supplemental therapy (Z85.850). TSH 2.8.   Obesity, class I  Hx torticollis  Osteoarthrosis of the knees  Hx urinary retention        PLAN:  Fall prec.  Cont home meds  [place]uch. SSI. Hypogly protocol. Diab diet.  Incr levothy to 200mcg 6d/wk, 100 mcg once a week. aim for suppressed TSH.    Continue Keflex daily for UTI prophylaxis.  Needs ongoing outpatient psychiatric follow-up and medication titration.  Await PT  input. OT recomm noted.  DVT prophylaxis        DISPOSITION PLAN:  DC plans pending PT inpute       INTERVAL HISTORY OF PRESENT ILLNESS:  No chest pain/sob. No n/v/d. Oral intake good. Gen pain R>L post fall. No fever/chills. acute events from last 24 hrs reviewed.     Pertinent ROS:  No abdominal pain / No Bleeding / No rashes     Discussed with nursing and case management team and the specialists involved in this patient's care. Reviewed the EMR and documentation from other care-givers.        OBJECTIVE  PHYSICAL EXAM:      GENERAL: AAOx3, cooperative resting comfortably. Obese  SKIN: Skin turgor normal. No rashes  HEENT: EOMI, no epistaxis, Moist mucosa.  LUNGS: Bilat breath sounds, with no added sounds  CARDIAC: REGULAR. no rubs, no murmur  ABDOMEN: soft, non-tender. +BS.  EXTREMITIES: No edema, Good capillary refill.   NEURO: Insight GOOD. +invol movements. Gait slow with walker  MUSCULOSKELETAL: No acute inflammation.  Torticollis to left            7/26/2025     8:53 AM 7/26/2025    12:11 PM 7/26/2025     3:59 PM 7/26/2025     8:29 PM 7/26/2025    11:17 PM 7/26/2025    11:22 PM 7/27/2025     3:14 AM   Vitals   Systolic 144 123 100 111  104  93   Diastolic 96 81 73 72 67  60   BP Location  Left arm Left arm Left arm Left arm  Left arm   Heart Rate 70 78 67 69 73  65   Temp  36.1 °C (97 °F) 36.4 °C (97.6 °F) 36.3 °C (97.4 °F) 36.1 °C (97 °F)  36 °C (96.8 °F)   Resp    16 16 17 17     Body mass index is 34.29 kg/m².    Intake/Output Summary (Last 24 hours) at 7/27/2025 0710  Last data filed at 7/26/2025 2153  Gross per 24 hour   Intake 240 ml   Output 600 ml   Net -360 ml         Current Medications[1]    DATA:   Diagnostic tests reviewed for today's visit:    Most recent labs  Results from last 7 days   Lab Units 07/26/25  0619 07/24/25  1531   WBC AUTO x10*3/uL 3.7* 5.3   HEMOGLOBIN g/dL 11.6* 11.6*   HEMATOCRIT % 39.1 38.1   PLATELETS AUTO x10*3/uL 201 260     Results from last 7 days   Lab Units 07/26/25  0619 07/24/25  1531   SODIUM mmol/L 141 141   POTASSIUM mmol/L 3.7 3.6   CHLORIDE mmol/L 107 105   CO2 mmol/L 29 27   BUN mg/dL 11 13   CREATININE mg/dL 0.61 0.78   CALCIUM mg/dL 8.4* 8.8   PROTEIN TOTAL g/dL 5.7* 6.6   BILIRUBIN TOTAL mg/dL 0.2 0.3   ALK PHOS U/L 120 125   ALT U/L 3* 15   AST U/L 11 14   GLUCOSE mg/dL 86 98             Results from last 7 days   Lab Units 07/26/25  1602 07/26/25  1215 07/26/25  0758 07/25/25  2031 07/25/25  1728 07/25/25  1251 07/25/25  0832 07/24/25  1525   POCT GLUCOSE mg/dL 87 85 94 101* 104* 118* 99 139*        XR knee 4+ views bilateral   Final Result   1. No acute bony abnormality left knee.   2. Right total knee arthroplasty.   3. Remote right MCL injury.   4. Healed proximal fibular diaphyseal fracture.   5. No acute bony abnormality right knee.                  MACRO:   None        Signed by: Anusha Mejia 7/24/2025 3:34 PM   Dictation workstation:   [id number]      CT cervical spine wo IV contrast   Final Result   No acute cervical vertebral displacement or acute displaced fracture.   Reversal of the cervical lordosis with multilevel degenerative   changes and mild spondylolisthesis, as detailed  above, similar to   03/14/2025.        MACRO:   None.        Signed by: Carly Gaston 7/24/2025 3:19 PM   Dictation workstation:   [id number]      CT brain attack head wo IV contrast   Final Result   No acute intracranial hemorrhage or mass-effect.        MACRO:   Carly Gaston discussed the significance and urgency of this critical   finding by EPIC secure chat with  BEATRIZ MORSE on 7/24/2025 at 3:12   pm.  (**-RCF-**) Findings:  See findings.             Signed by: Carly Gaston 7/24/2025 3:12 PM   Dictation workstation:   [id number]            SIGNATURE: Shabana Rodriguez MD PATIENT NAME: Prema Hair   DATE: 7/27/2025 MRN: [id number]   TIME: 7:10 AM             [1]   Current Facility-Administered Medications:     acetaminophen (Tylenol) tablet 650 mg, 650 mg, oral, q4h PRN, Shabana Rodriguez MD    ammonium lactate (Lac-Hydrin) 12 % lotion 1 Application, 1 Application, Topical, PRN, Shabana Rodriguez MD    aspirin EC tablet 81 mg, 81 mg, oral, Daily, Shabana Rodriguez MD, 81 mg at 07/26/25 0839    atorvastatin (Lipitor) tablet 20 mg, 20 mg, oral, Nightly, Shabana Rodriguez MD, 20 mg at 07/26/25 2149    baclofen (Lioresal) tablet 10 mg, 10 mg, oral, TID, Shabana Rodriguez MD, 10 mg at 07/26/25 2149    budesonide (Pulmicort) 0.25 mg/2 mL nebulizer solution 0.25 mg, 0.25 mg, nebulization, BID, 0.25 mg at 07/26/25 2013 **AND** formoterol (Perforomist) 20 mcg/2 mL nebulizer solution 20 mcg, 20 mcg, nebulization, q12h, Shabana Rodriguez MD, 20 mcg at 07/26/25 2014    bumetanide (Bumex) tablet 2 mg, 2 mg, oral, BID, Shabana Rodriguez MD, 2 mg at 07/26/25 2149    busPIRone (Buspar) tablet 5 mg, 5 mg, oral, q12h KATHERINE, Shabana Rodriguez MD, 5 mg at 07/26/25 1800    carbidopa-levodopa (Sinemet)  mg per tablet 1.5 tablet, 1.5 tablet, oral, TID, Shabana Rodriguez MD, 1.5 tablet at 07/26/25 2149    carvedilol (Coreg) tablet 6.25 mg, 6.25 mg, oral, BID, Shabana Rodriguez MD, 6.25 mg at  07/26/25 1800    cephalexin (Keflex) capsule 500 mg, 500 mg, oral, Daily, Shabana Rodriguez MD, 500 mg at 07/26/25 0840    cyclobenzaprine (Flexeril) tablet 10 mg, 10 mg, oral, Nightly PRN, Shabana Rodriguez MD    dextrose 50 % injection 12.5 g, 12.5 g, intravenous, q15 min PRN, Shabana Rodriguez MD    dextrose 50 % injection 25 g, 25 g, intravenous, q15 min PRN, Shabana Rodriguez MD    diclofenac sodium (Voltaren) 1 % gel 4 g, 4 g, Topical, TID PRN, Shabana Rodriguez MD    docusate sodium (Colace) capsule 100 mg, 100 mg, oral, TID, Shabana Rodriguez MD, 100 mg at 07/26/25 2149    gabapentin (Neurontin) capsule 1,200 mg, 1,200 mg, oral, TID, Shabana Rodriguez MD, 1,200 mg at 07/26/25 2149    glucagon (Glucagen) injection 1 mg, 1 mg, intramuscular, q15 min PRN, hSabana Rodriguez MD    glucagon (Glucagen) injection 1 mg, 1 mg, intramuscular, q15 min PRN, Shabana Rodriguez MD    heparin (porcine) injection 5,000 Units, 5,000 Units, subcutaneous, q8h, Shabana Rodriguez MD, 5,000 Units at 07/27/25 0548    insulin lispro injection 0-5 Units, 0-5 Units, subcutaneous, TID AC, Shabana Rodriguez MD    ipratropium-albuteroL (Duo-Neb) 0.5-2.5 mg/3 mL nebulizer solution 3 mL, 3 mL, nebulization, q2h PRN, Shabana Rodriguez MD    [START ON 8/2/2025] levothyroxine (Synthroid, Levoxyl) tablet 100 mcg, 100 mcg, oral, Every Saturday, Shabana Rodriguez MD    levothyroxine (Synthroid, Levoxyl) tablet 200 mcg, 200 mcg, oral, Once per day on Fabian Monday Tuesday Wednesday Thursday Friday, Shabana Rodriguez MD, 200 mcg at 07/27/25 0549    lidocaine 4 % patch 1 patch, 1 patch, transdermal, Daily, Shabana Rodriguez MD, 1 patch at 07/26/25 0853    losartan (Cozaar) tablet 12.5 mg, 12.5 mg, oral, Daily, Shabana Rodriguez MD, 12.5 mg at 07/26/25 0840    metFORMIN (Glucophage) tablet 500 mg, 500 mg, oral, BID, Shabana Rodriguez MD, 500 mg at 07/26/25 1800    ondansetron (Zofran) injection 4 mg, 4 mg,  intravenous, q6h PRN, Shabana Rodriguez MD    OXcarbazepine (Trileptal) tablet 1,200 mg, 1,200 mg, oral, Nightly, Shabana Rodriguez MD, 1,200 mg at 07/26/25 2149    OXcarbazepine (Trileptal) tablet 600 mg, 600 mg, oral, q AM, Shabana Rodriguez MD, 600 mg at 07/26/25 0840    oxygen (O2) therapy, 1 Dose, inhalation, Continuous - O2/gases, Rhys Escobar MD, Rate Verify Medical Gas at 07/27/25 0245    pantoprazole (ProtoNix) EC tablet 40 mg, 40 mg, oral, Daily before breakfast, Shabana Rodriguez MD, 40 mg at 07/26/25 0543    polyethylene glycol (Glycolax, Miralax) packet 17 g, 17 g, oral, Daily, Shabana Rodriguez MD, 17 g at 07/26/25 0840    potassium chloride (Klor-Con) packet 80 mEq, 80 mEq, oral, BID, Shabana Rodriguez MD, 80 mEq at 07/26/25 2150    prazosin (Minipress) capsule 5 mg, 5 mg, oral, Nightly, Shabana Rodriguez MD, 5 mg at 07/26/25 2150    QUEtiapine (SEROquel) tablet 800 mg, 800 mg, oral, Nightly, Shabana Rodriguez MD, 800 mg at 07/26/25 2150    sertraline (Zoloft) tablet 200 mg, 200 mg, oral, Daily, Shabana Rodriguez MD, 200 mg at 07/26/25 0840    topiramate (Topamax) tablet 200 mg, 200 mg, oral, BID, Shabana Rodriguez MD, 200 mg at 07/26/25 2149    traMADol (Ultram) tablet 50 mg, 50 mg, oral, TID PRN, Shabana Rodriguez MD, 50 mg at 07/26/25 2157

## 2025-07-27 NOTE — CARE PLAN
"The patient's goals for the shift include \"less back pain\"    The clinical goals for the shift include reported pain <5/10 by end of shift      "

## 2025-07-27 NOTE — CARE PLAN
"The patient's goals for the shift include \"less back pain\"    The clinical goals for the shift include reported pain <5/10 by end of shift    Over the shift, the patient did make progress toward the following goals.   Problem: Pain - Adult  Goal: Verbalizes/displays adequate comfort level or baseline comfort level  Outcome: Progressing     Problem: Safety - Adult  Goal: Free from fall injury  Outcome: Progressing     Problem: Discharge Planning  Goal: Discharge to home or other facility with appropriate resources  Outcome: Progressing     Problem: Chronic Conditions and Co-morbidities  Goal: Patient's chronic conditions and co-morbidity symptoms are monitored and maintained or improved  Outcome: Progressing     Problem: Fall/Injury  Goal: Not fall by end of shift  Outcome: Progressing     Problem: Fall/Injury  Goal: Pace activities to prevent fatigue by end of the shift  Outcome: Progressing     Problem: Diabetes  Goal: Achieve decreasing blood glucose levels by end of shift  Outcome: Progressing     Problem: Diabetes  Goal: No changes in neurological exam by end of shift  Outcome: Progressing     Problem: Diabetes  Goal: Vital signs within normal range for age by end of shift  Outcome: Progressing       "

## 2025-07-27 NOTE — PROGRESS NOTES
07/27/25 1301   Discharge Planning   Type of Home Care Services Home PT;Home OT;Home health aide   Expected Discharge Disposition Home H  ([facility])   Stroke Family Assessment   Stroke Family Assessment Needed No   Intensity of Service   Intensity of Service 0-30 min     Met with patient to discuss discharge plan.  PT [facility] 14, OT [facility] 22  Provided a SNF to patient for her to review with her daughter. [facility] requested she select 3 choices and we would send referrals for her.  She would like to discuss the options with her daughter.  Tamanna Christiansen RN

## 2025-07-27 NOTE — PROGRESS NOTES
Physical Therapy    Physical Therapy Evaluation    Patient Name: Prema Hair  MRN: [id number]  Department: [place] [address]  Room: [address] [place] [address]  Today's Date: 7/27/2025   Time Calculation  Start Time: 1048  Stop Time: 1100  Time Calculation (min): 12 min    Assessment/Plan   PT Assessment  PT Assessment Results: Decreased strength, Decreased endurance, Impaired balance, Decreased mobility, Pain  Rehab Prognosis: Good  Barriers to Discharge Home: No anticipated barriers  Evaluation/Treatment Tolerance: Patient limited by fatigue, Patient limited by pain  Medical Staff Made Aware: Yes  Strengths: Ability to acquire knowledge, Access to adaptive/assistive products, Housing layout, Support of extended family/friends  Barriers to Participation: Comorbidities  End of Session Communication: Bedside nurse  Assessment Comment: 60 year-old F s/p falls x 3 in one day presents with pain, weakness, decreased bed mobility, ambulation, and transfers, as well as unsteadiness; Patient scored 14/28 on Tinetti Gait and Balance Test indicating: <19 considered Almo Risk. can benefit from skilled PT intervention to assist with discharge planning and address the aforementioned issues to enable her to return to her prior level of function, which was IND transfers and household ambulation with Rollator.   End of Session Patient Position: Bed, 3 rail up, Alarm off, not on at start of session (needs in reach; RN aware)  IP OR SWING BED PT PLAN  Inpatient or Swing Bed: Inpatient  PT Plan  Treatment/Interventions: Bed mobility, Transfer training, Gait training, Balance training, Neuromuscular re-education, Strengthening, Endurance training, Therapeutic exercise, Therapeutic activity, Postural re-education  PT Plan: Ongoing PT  PT Frequency: 3 times per week  PT Discharge Recommendations: Low intensity level of continued care  Equipment Recommended upon Discharge: Wheeled walker (pt owns a Rollator)  PT Recommended Transfer Status: Stand by  assist, Assistive device (walker)  PT - OK to Discharge: Yes (per PT POC)    Subjective     PT Visit Info:  PT Received On: 07/27/25  General Visit Information:  General  Reason for Referral: 61 y/o F admitted following three falls at home in one day (last one was witnessed by family which prompted them to call EMS.)  Referred By: Shabana Rodriguez MD  Past Medical History Relevant to Rehab: CHF, Bell's Palsy, chronic UTI, DM2, DLD, fibromyalgia, paresthesia, bipolar d/o, fall, PTSD, hx fracture of distal R fibula, thyroid CA, parkinsonism, mixed urinary incontinence (+pessary), hypertensive heart dz, torticollis, diabetic neuropathy, R TKR, c-spine disc d/o with radiculopathy, h/o drop attack,  sacroiliitis.  Family/Caregiver Present: No  Prior to Session Communication: Bedside nurse  Patient Position Received: Bed, 3 rail up, Alarm off, not on at start of session  Preferred Learning Style: auditory, visual, verbal  General Comment: Pt cooperative and agreeable with PT evaluation  Home Living:  Home Living  Type of Home: House  Lives With: Alone (daughters and granddaughter visit and assist as needed daily)  Home Adaptive Equipment: Other (Comment) (Rollator; lift chair)  Home Layout: One level  Home Access: Level entry  Bathroom Shower/Tub: Tub/shower unit  Bathroom Toilet: Standard  Bathroom Equipment: Tub transfer bench  Home Living Comments: Pt reports sleeping in a lift chair.  Prior Level of Function:  Prior Function Per Pt/Caregiver Report  Level of [place]: Independent with ADLs and functional transfers, Needs assistance with homemaking  Receives Help From: Family (Daughters assist daily)  ADL Assistance: Independent  Homemaking Assistance: Needs assistance (dtrs complete IADLs)  Driving/Transportation: Family/Friend (pt has not driven in four years)  Ambulatory Assistance: Independent (household distances with Rollator)  Prior Function Comments: Pt reports h/o multiple falls; has no memory of the  falls that lead to this admission though.  Precautions:  Precautions  Medical Precautions: Fall precautions  Prosthesis/Orthosis Used: Neck orthosis (Wearing soft c-collar for torticollis)      Date/Time Vitals Session Patient Position Pulse Resp SpO2 BP MAP (mmHg)    07/27/25 1228 --  --  78  17  96 %  120/84  96            Objective   Pain:  Pain Assessment  Pain Assessment: 0-10  0-10 (Numeric) Pain Score: 8  Pain Type: Chronic pain  Pain Location: Back (R knee)  Pain Orientation: Right, Lower  Pain Frequency: Constant/continuous  Pain Onset: Ongoing  Pain Interventions: Ambulation/increased activity  Response to Interventions: Absence of non-verbal indicators of pain, No change in pain (PT to pt tolerence)  Cognition:  Cognition  Overall Cognitive Status: Within Functional Limits  Orientation Level: Oriented X4  Attention: Within Functional Limits  Memory: Within Funtional Limits  Safety/Judgement: Within Functional Limits  Insight: Within function limits  Impulsive: Within functional limits  Processing Speed: Within funtional limits    General Assessments:  Activity Tolerance  Endurance: Tolerates less than 10 min exercise, no significant change in vital signs    Sensation  Light Touch: No apparent deficits    Coordination  Movements are Fluid and Coordinated: Yes    Postural Control  Postural Control: Within Functional Limits  Trunk Control: torticollis  Posture Comment: flexed trunk    Static Sitting Balance  Static Sitting-Balance Support: Bilateral upper extremity supported, Feet supported  Static Sitting-Level of Assistance: Independent  Static Sitting-Comment/Number of Minutes: on EOB  Dynamic Sitting Balance  Dynamic Sitting-Balance Support: Bilateral upper extremity supported, Feet supported  Dynamic Sitting-Level of Assistance: Modified independent  Dynamic Sitting-Balance:  (lateral scooting)  Dynamic Sitting-Comments: on EOB    Static Standing Balance  Static Standing-Balance Support: Bilateral upper  extremity supported  Static Standing-Level of Assistance: Close supervision  Static Standing-Comment/Number of Minutes: with RW  Dynamic Standing Balance  Dynamic Standing-Balance Support: Bilateral upper extremity supported  Dynamic Standing-Level of Assistance: Close supervision  Dynamic Standing-Balance: Turning  Dynamic Standing-Comments: with RW  Functional Assessments:  Bed Mobility  Bed Mobility: Yes  Bed Mobility 1  Bed Mobility 1: Supine to sitting, Sitting to supine  Level of Assistance 1: Modified independent  Bed Mobility Comments 1: HOB elevated    Transfers  Transfer: Yes  Transfer 1  Transfer From 1: Bed to  Transfer to 1: Stand  Technique 1: Sit to stand, Stand to sit  Transfer Device 1: Walker  Transfer Level of Assistance 1: Close supervision  Trials/Comments 1: from EOB    Ambulation/Gait Training  Ambulation/Gait Training Performed: Yes  Ambulation/Gait Training 1  Surface 1: Level tile  Device 1: Rolling walker  Assistance 1: Close supervision  Quality of Gait 1: Forward flexed posture, Decreased step length, Shuffling gait  Comments/Distance (ft) 1: 20'x2  Extremity/Trunk Assessments:  RUE   RUE : Within Functional Limits  LUE   LUE: Within Functional Limits  RLE   RLE : Exceptions to WFL  Strength RLE  RLE Overall Strength: Greater than or equal to 3/5 as evidenced by functional mobility  LLE   LLE : Exceptions to WFL  Strength LLE  LLE Overall Strength:  (grossly 2/5 hip, 3/5 knee, 2/5 ankle.)  Outcome Measures:  [facility] Basic Mobility  Turning from your back to your side while in a flat bed without using bedrails: A little  Moving from lying on your back to sitting on the side of a flat bed without using bedrails: A little  Moving to and from bed to chair (including a wheelchair): A little  Standing up from a chair using your arms (e.g. wheelchair or bedside chair): A little  To walk in hospital room: A little  Climbing 3-5 steps with railing: A little  Basic Mobility - Total Score:  18    Tinetti  Sitting Balance: Steady, safe  Arises: Able, uses arms to help  Attempts to Arise: Able to arise, one attempt  Immediate Standing Balance (First 5 Seconds): Steady but uses walker or other support  Standing Balance: Steady but wide stance, uses cane or other support  Nudged: Staggers, grabs, catches self  Eyes Closed: Unsteady  Turned 360 Degrees: Steadiness: Unsteady (Grabs, staggers)  Turned 360 Degrees: Continuity of Steps: Discontinuous steps  Sitting Down: Uses arms or not a smooth motion  Balance Score: 8  Initiation of Gait: No hesitancy  Step Height: R Swing Foot: Right foot complete clears floor  Step Length: R Swing Foot: Does not pass left stance foot with step  Step Height: L Swing Foot: Left foot complete clears floor  Step Length: L Swing Foot: Does not pass right stance foot with step  Step Symmetry: Right and left step appear equal  Step Continuity: Steps appear continuous  Path: Mild/moderate deviation or uses walking aid  Trunk: Marked sway or uses walking aid  Walking Time: Heels apart  Gait Score: 6  Total Score: 14    Encounter Problems       Encounter Problems (Active)       Balance       Goal 1- Pt will score >/= 19/28 on Tinetti gait and balance test to be at no risk for falls.         Start:  07/27/25    Expected End:  08/10/25               Mobility       STG - Patient will ambulate >50' with RW independently on level surfaces.       Start:  07/27/25    Expected End:  08/10/25               PT Transfers       STG - Patient to transfer to and from sit to supine independently with HOB raised.        Start:  07/27/25    Expected End:  08/10/25            STG - Patient will transfer sit to and from stand independently with RW.       Start:  07/27/25    Expected End:  08/10/25            Goal 1- Pt will improve B LE strength by >/= 1/3 MMT grade to enable achievement of transfer and gait goals.         Start:  07/27/25    Expected End:  08/10/25                   Education  Documentation  Body Mechanics, taught by Shayna Chaudhry PT at 7/27/2025  1:31 PM.  Learner: Patient  Readiness: Acceptance  Method: Explanation  Response: Verbalizes Understanding  Comment: Pt educated in role of PT, PT POC, as well as d/c and equipment recommendations.    Mobility Training, taught by Shayna Chaudhry PT at 7/27/2025  1:31 PM.  Learner: Patient  Readiness: Acceptance  Method: Explanation  Response: Verbalizes Understanding  Comment: Pt educated in role of PT, PT POC, as well as d/c and equipment recommendations.    Education Comments  No comments found.

## 2025-07-28 ENCOUNTER — DOCUMENTATION (OUTPATIENT)
Dept: HOME HEALTH SERVICES | Facility: HOME HEALTH | Age: 60
End: 2025-07-28
Payer: MEDICARE

## 2025-07-28 ENCOUNTER — HOME HEALTH ADMISSION (OUTPATIENT)
Dept: HOME HEALTH SERVICES | Facility: HOME HEALTH | Age: 60
End: 2025-07-28
Payer: MEDICARE

## 2025-07-28 VITALS
DIASTOLIC BLOOD PRESSURE: 86 MMHG | WEIGHT: 225.53 LBS | HEART RATE: 86 BPM | OXYGEN SATURATION: 97 % | BODY MASS INDEX: 34.18 KG/M2 | HEIGHT: 68 IN | RESPIRATION RATE: 16 BRPM | SYSTOLIC BLOOD PRESSURE: 115 MMHG | TEMPERATURE: 96.5 F

## 2025-07-28 LAB
ALBUMIN SERPL BCP-MCNC: 3.5 G/DL (ref 3.4–5)
ALP SERPL-CCNC: 122 U/L (ref 33–136)
ALT SERPL W P-5'-P-CCNC: 5 U/L (ref 7–45)
ANION GAP SERPL CALC-SCNC: 14 MMOL/L (ref 10–20)
AST SERPL W P-5'-P-CCNC: 10 U/L (ref 9–39)
BILIRUB SERPL-MCNC: 0.2 MG/DL (ref 0–1.2)
BUN SERPL-MCNC: 10 MG/DL (ref 6–23)
CALCIUM SERPL-MCNC: 8.5 MG/DL (ref 8.6–10.3)
CHLORIDE SERPL-SCNC: 103 MMOL/L (ref 98–107)
CO2 SERPL-SCNC: 26 MMOL/L (ref 21–32)
CREAT SERPL-MCNC: 0.58 MG/DL (ref 0.5–1.05)
EGFRCR SERPLBLD CKD-EPI 2021: >90 ML/MIN/1.73M*2
ERYTHROCYTE [DISTWIDTH] IN BLOOD BY AUTOMATED COUNT: 18.9 % (ref 11.5–14.5)
GLUCOSE BLD MANUAL STRIP-MCNC: 104 MG/DL (ref 74–99)
GLUCOSE BLD MANUAL STRIP-MCNC: 108 MG/DL (ref 74–99)
GLUCOSE BLD MANUAL STRIP-MCNC: 118 MG/DL (ref 74–99)
GLUCOSE SERPL-MCNC: 96 MG/DL (ref 74–99)
HCT VFR BLD AUTO: 36 % (ref 36–46)
HGB BLD-MCNC: 11.3 G/DL (ref 12–16)
MCH RBC QN AUTO: 26.3 PG (ref 26–34)
MCHC RBC AUTO-ENTMCNC: 31.4 G/DL (ref 32–36)
MCV RBC AUTO: 84 FL (ref 80–100)
NRBC BLD-RTO: 0 /100 WBCS (ref 0–0)
PLATELET # BLD AUTO: 231 X10*3/UL (ref 150–450)
POTASSIUM SERPL-SCNC: 4.2 MMOL/L (ref 3.5–5.3)
PROT SERPL-MCNC: 5.7 G/DL (ref 6.4–8.2)
RBC # BLD AUTO: 4.29 X10*6/UL (ref 4–5.2)
SODIUM SERPL-SCNC: 139 MMOL/L (ref 136–145)
WBC # BLD AUTO: 4 X10*3/UL (ref 4.4–11.3)

## 2025-07-28 PROCEDURE — 80053 COMPREHEN METABOLIC PANEL: CPT | Performed by: INTERNAL MEDICINE

## 2025-07-28 PROCEDURE — G0378 HOSPITAL OBSERVATION PER HR: HCPCS

## 2025-07-28 PROCEDURE — 2500000001 HC RX 250 WO HCPCS SELF ADMINISTERED DRUGS (ALT 637 FOR MEDICARE OP): Performed by: INTERNAL MEDICINE

## 2025-07-28 PROCEDURE — 96372 THER/PROPH/DIAG INJ SC/IM: CPT | Performed by: INTERNAL MEDICINE

## 2025-07-28 PROCEDURE — 94660 CPAP INITIATION&MGMT: CPT

## 2025-07-28 PROCEDURE — 2500000004 HC RX 250 GENERAL PHARMACY W/ HCPCS (ALT 636 FOR OP/ED): Performed by: INTERNAL MEDICINE

## 2025-07-28 PROCEDURE — 82947 ASSAY GLUCOSE BLOOD QUANT: CPT | Mod: 59

## 2025-07-28 PROCEDURE — 94664 DEMO&/EVAL PT USE INHALER: CPT | Mod: 59

## 2025-07-28 PROCEDURE — 94640 AIRWAY INHALATION TREATMENT: CPT

## 2025-07-28 PROCEDURE — 2500000002 HC RX 250 W HCPCS SELF ADMINISTERED DRUGS (ALT 637 FOR MEDICARE OP, ALT 636 FOR OP/ED): Performed by: INTERNAL MEDICINE

## 2025-07-28 PROCEDURE — 2500000005 HC RX 250 GENERAL PHARMACY W/O HCPCS: Performed by: INTERNAL MEDICINE

## 2025-07-28 PROCEDURE — 36415 COLL VENOUS BLD VENIPUNCTURE: CPT | Performed by: INTERNAL MEDICINE

## 2025-07-28 PROCEDURE — 85027 COMPLETE CBC AUTOMATED: CPT | Performed by: INTERNAL MEDICINE

## 2025-07-28 RX ORDER — LEVOTHYROXINE SODIUM 100 UG/1
TABLET ORAL
Start: 2025-07-28

## 2025-07-28 RX ORDER — TRAMADOL HYDROCHLORIDE 50 MG/1
25-50 TABLET, FILM COATED ORAL 3 TIMES DAILY PRN
Qty: 10 TABLET | Refills: 0 | Status: SHIPPED | OUTPATIENT
Start: 2025-07-28

## 2025-07-28 RX ADMIN — TRAMADOL HYDROCHLORIDE 50 MG: 50 TABLET, COATED ORAL at 09:18

## 2025-07-28 RX ADMIN — HEPARIN SODIUM 5000 UNITS: 5000 INJECTION INTRAVENOUS; SUBCUTANEOUS at 18:53

## 2025-07-28 RX ADMIN — METFORMIN HYDROCHLORIDE 500 MG: 500 TABLET, FILM COATED ORAL at 09:12

## 2025-07-28 RX ADMIN — BUSPIRONE HYDROCHLORIDE 5 MG: 5 TABLET ORAL at 17:54

## 2025-07-28 RX ADMIN — DOCUSATE SODIUM 100 MG: 100 CAPSULE, LIQUID FILLED ORAL at 15:08

## 2025-07-28 RX ADMIN — FORMOTEROL FUMARATE DIHYDRATE 20 MCG: 20 SOLUTION RESPIRATORY (INHALATION) at 07:05

## 2025-07-28 RX ADMIN — LIDOCAINE 4% 1 PATCH: 40 PATCH TOPICAL at 09:07

## 2025-07-28 RX ADMIN — DOCUSATE SODIUM 100 MG: 100 CAPSULE, LIQUID FILLED ORAL at 09:12

## 2025-07-28 RX ADMIN — ASPIRIN 81 MG: 81 TABLET, DELAYED RELEASE ORAL at 09:12

## 2025-07-28 RX ADMIN — LOSARTAN POTASSIUM 12.5 MG: 25 TABLET, FILM COATED ORAL at 09:11

## 2025-07-28 RX ADMIN — SERTRALINE 200 MG: 50 TABLET, FILM COATED ORAL at 09:09

## 2025-07-28 RX ADMIN — METFORMIN HYDROCHLORIDE 500 MG: 500 TABLET, FILM COATED ORAL at 17:54

## 2025-07-28 RX ADMIN — CEPHALEXIN 500 MG: 500 CAPSULE ORAL at 09:08

## 2025-07-28 RX ADMIN — TOPIRAMATE 200 MG: 100 TABLET, FILM COATED ORAL at 09:09

## 2025-07-28 RX ADMIN — BACLOFEN 10 MG: 10 TABLET ORAL at 15:09

## 2025-07-28 RX ADMIN — BUMETANIDE 2 MG: 1 TABLET ORAL at 09:09

## 2025-07-28 RX ADMIN — CARVEDILOL 6.25 MG: 6.25 TABLET, FILM COATED ORAL at 17:54

## 2025-07-28 RX ADMIN — GABAPENTIN 1200 MG: 400 CAPSULE ORAL at 09:09

## 2025-07-28 RX ADMIN — HEPARIN SODIUM 5000 UNITS: 5000 INJECTION INTRAVENOUS; SUBCUTANEOUS at 12:03

## 2025-07-28 RX ADMIN — GABAPENTIN 1200 MG: 400 CAPSULE ORAL at 15:09

## 2025-07-28 RX ADMIN — POLYETHYLENE GLYCOL 3350 17 G: 17 POWDER, FOR SOLUTION ORAL at 09:11

## 2025-07-28 RX ADMIN — PANTOPRAZOLE SODIUM 40 MG: 40 TABLET, DELAYED RELEASE ORAL at 06:48

## 2025-07-28 RX ADMIN — TRAMADOL HYDROCHLORIDE 50 MG: 50 TABLET, COATED ORAL at 15:13

## 2025-07-28 RX ADMIN — CARBIDOPA AND LEVODOPA 1.5 TABLET: 25; 100 TABLET ORAL at 15:09

## 2025-07-28 RX ADMIN — POTASSIUM CHLORIDE 80 MEQ: 1.5 POWDER, FOR SOLUTION ORAL at 09:10

## 2025-07-28 RX ADMIN — CARBIDOPA AND LEVODOPA 1.5 TABLET: 25; 100 TABLET ORAL at 09:10

## 2025-07-28 RX ADMIN — BUDESONIDE 0.25 MG: 0.25 INHALANT RESPIRATORY (INHALATION) at 07:05

## 2025-07-28 RX ADMIN — LEVOTHYROXINE SODIUM 200 MCG: 0.1 TABLET ORAL at 06:48

## 2025-07-28 RX ADMIN — OXCARBAZEPINE 600 MG: 300 TABLET, FILM COATED ORAL at 09:08

## 2025-07-28 RX ADMIN — BACLOFEN 10 MG: 10 TABLET ORAL at 09:09

## 2025-07-28 RX ADMIN — HEPARIN SODIUM 5000 UNITS: 5000 INJECTION INTRAVENOUS; SUBCUTANEOUS at 04:18

## 2025-07-28 RX ADMIN — CARVEDILOL 6.25 MG: 6.25 TABLET, FILM COATED ORAL at 09:11

## 2025-07-28 ASSESSMENT — PAIN DESCRIPTION - LOCATION
LOCATION: BACK
LOCATION: BACK

## 2025-07-28 ASSESSMENT — PAIN DESCRIPTION - DESCRIPTORS
DESCRIPTORS: ACHING
DESCRIPTORS: ACHING

## 2025-07-28 ASSESSMENT — PAIN - FUNCTIONAL ASSESSMENT
PAIN_FUNCTIONAL_ASSESSMENT: 0-10

## 2025-07-28 ASSESSMENT — PAIN SCALES - GENERAL
PAINLEVEL_OUTOF10: 8
PAINLEVEL_OUTOF10: 4
PAINLEVEL_OUTOF10: 8
PAINLEVEL_OUTOF10: 7

## 2025-07-28 ASSESSMENT — COGNITIVE AND FUNCTIONAL STATUS - GENERAL
HELP NEEDED FOR BATHING: A LITTLE
MOBILITY SCORE: 17
CLIMB 3 TO 5 STEPS WITH RAILING: A LOT
STANDING UP FROM CHAIR USING ARMS: A LITTLE
DRESSING REGULAR LOWER BODY CLOTHING: A LITTLE
TOILETING: A LITTLE
WALKING IN HOSPITAL ROOM: A LITTLE
TURNING FROM BACK TO SIDE WHILE IN FLAT BAD: A LITTLE
MOVING TO AND FROM BED TO CHAIR: A LITTLE
DAILY ACTIVITIY SCORE: 20
MOVING FROM LYING ON BACK TO SITTING ON SIDE OF FLAT BED WITH BEDRAILS: A LITTLE
DRESSING REGULAR UPPER BODY CLOTHING: A LITTLE

## 2025-07-28 ASSESSMENT — PAIN DESCRIPTION - ORIENTATION
ORIENTATION: LOWER
ORIENTATION: LOWER

## 2025-07-28 NOTE — PROGRESS NOTES
07/28/25 0914   Discharge Planning   Expected Discharge Disposition Home H     Per notes patient's family given SNF list to review, PT/OT both rec'd low , patient will not qualify for SNF placement, I have reached out to SW to advise family, patient is med ready for discharge, I will continue to monitor for discharge planing.    10:58 called and left  message for Dr Rodriguez at [phone] [facility] needs a different Dx code for referral.

## 2025-07-28 NOTE — CARE PLAN
"The patient's goals for the shift include \"less back pain\"    The clinical goals for the shift include Pt to remain safe      Problem: Pain - Adult  Goal: Verbalizes/displays adequate comfort level or baseline comfort level  Outcome: Progressing     Problem: Safety - Adult  Goal: Free from fall injury  Outcome: Progressing     Problem: Discharge Planning  Goal: Discharge to home or other facility with appropriate resources  Outcome: Met     Problem: Chronic Conditions and Co-morbidities  Goal: Patient's chronic conditions and co-morbidity symptoms are monitored and maintained or improved  Outcome: Progressing     Problem: Nutrition  Goal: Nutrient intake appropriate for maintaining nutritional needs  Outcome: Progressing     Problem: Fall/Injury  Goal: Not fall by end of shift  Outcome: Progressing     "

## 2025-07-28 NOTE — CARE PLAN
"The patient's goals for the shift include \"less back pain\"    The clinical goals for the shift include Pt to remain safe      Problem: Pain - Adult  Goal: Verbalizes/displays adequate comfort level or baseline comfort level  Outcome: Progressing     Problem: Safety - Adult  Goal: Free from fall injury  Outcome: Progressing     Problem: Discharge Planning  Goal: Discharge to home or other facility with appropriate resources  Outcome: Progressing     Problem: Chronic Conditions and Co-morbidities  Goal: Patient's chronic conditions and co-morbidity symptoms are monitored and maintained or improved  Outcome: Progressing     Problem: Nutrition  Goal: Nutrient intake appropriate for maintaining nutritional needs  Outcome: Progressing     Problem: Skin  Goal: Decreased wound size/increased tissue granulation at next dressing change  Outcome: Progressing  Goal: Participates in plan/prevention/treatment measures  Outcome: Progressing  Goal: Prevent/manage excess moisture  Outcome: Progressing  Goal: Prevent/minimize sheer/friction injuries  Outcome: Progressing  Goal: Promote/optimize nutrition  Outcome: Progressing  Goal: Promote skin healing  Outcome: Progressing       "

## 2025-07-28 NOTE — PROGRESS NOTES
PROGRESS NOTE - INTERNAL MEDICINE     PATIENT NAME:  Prema Hair    MRN:  [id number]  SERVICE DATE:  7/28/2025       ADMITTING PHYSICIAN:  Shabana Rodriguez MD    ASSESSMENT AND PLAN    Drug-induced parkinsonism G21.19 causing Impaired mobility & recurrent falls at home.   DM2, controlled. A1c 6 7/25  HTN, variable control  Hyperlipidemia  Hx CA thyroid on supplemental therapy (Z85.850). TSH 2.8.   Obesity, class I  Hx torticollis  Osteoarthrosis of the knees  Hx urinary retention        PLAN:  Fall prec.  Cont home meds  Accuch. SSI. Hypogly protocol. Diab diet.  Incr levothy to 200mcg 6d/wk, 100 mcg once a week. aim for suppressed TSH.    Continue Keflex daily for UTI prophylaxis.  Needs ongoing outpatient psychiatric follow-up and medication titration.  Await PT  input. OT recomm noted.  DVT prophylaxis     Discharge is planned for today to home with [facility]. Hospital course , medication changes and Investigation's conducted were reviewed with the patient / Family. Activity, Diet and Medications after discharge were reviewed with the patient / Family. Medication reconciliation done - pls refer to medication reconciliation sheet.Follow up with Primary Care Physician were reviewed with the patient / Family. Discharge planning was discussed with staff. Refer to discharge orders sheet. Total time to coordinate disch process incl counseling family, coordinating with other care givers,  and pharmacist was >35 min.         INTERVAL HISTORY OF PRESENT ILLNESS:  No chest pain/sob. No n/v/d. Oral intake good. Gen pain R>L post fall. No fever/chills. acute events from last 24 hrs reviewed.     Pertinent ROS:  No abdominal pain / No Bleeding / No rashes     Discussed with nursing and case management team and the specialists involved in this patient's care. Reviewed the EMR and documentation from other care-givers.        OBJECTIVE  PHYSICAL EXAM:      GENERAL: AAOx3, cooperative resting comfortably.  Obese  SKIN: Skin turgor normal. No rashes  HEENT: EOMI, no epistaxis, Moist mucosa.  LUNGS: Bilat breath sounds, with no added sounds  CARDIAC: REGULAR. no rubs, no murmur  ABDOMEN: soft, non-tender. +BS.  EXTREMITIES: No edema, Good capillary refill.   NEURO: Insight GOOD. +invol movements. Gait slow with walker  MUSCULOSKELETAL: No acute inflammation.  Torticollis to left            7/27/2025     3:27 PM 7/27/2025     7:42 PM 7/27/2025    10:47 PM 7/27/2025    11:20 PM 7/28/2025     3:13 AM 7/28/2025     3:29 AM 7/28/2025     8:08 AM   Vitals   Systolic 119 109  125  107 152   Diastolic 78 66  86  75 102   BP Location Left arm Left arm  Left arm  Left arm Left arm   Heart Rate 83 80  73  65 88   Temp 36.7 °C (98.1 °F) 36.1 °C (97 °F)  36 °C (96.8 °F)  36.1 °C (97 °F) 36.7 °C (98.1 °F)   Resp 17 18 12 16 11 16 17     Body mass index is 34.29 kg/m².    Intake/Output Summary (Last 24 hours) at 7/28/2025 0900  Last data filed at 7/27/2025 2245  Gross per 24 hour   Intake 360 ml   Output --   Net 360 ml         Current Medications[1]    DATA:   Diagnostic tests reviewed for today's visit:    Most recent labs  Results from last 7 days   Lab Units 07/28/25  0513 07/26/25  0619 07/24/25  1531   WBC AUTO x10*3/uL 4.0* 3.7* 5.3   HEMOGLOBIN g/dL 11.3* 11.6* 11.6*   HEMATOCRIT % 36.0 39.1 38.1   PLATELETS AUTO x10*3/uL 231 201 260     Results from last 7 days   Lab Units 07/28/25  0512 07/26/25  0619 07/24/25  1531   SODIUM mmol/L 139 141 141   POTASSIUM mmol/L 4.2 3.7 3.6   CHLORIDE mmol/L 103 107 105   CO2 mmol/L 26 29 27   BUN mg/dL 10 11 13   CREATININE mg/dL 0.58 0.61 0.78   CALCIUM mg/dL 8.5* 8.4* 8.8   PROTEIN TOTAL g/dL 5.7* 5.7* 6.6   BILIRUBIN TOTAL mg/dL 0.2 0.2 0.3   ALK PHOS U/L 122 120 125   ALT U/L 5* 3* 15   AST U/L 10 11 14   GLUCOSE mg/dL 96 86 98             Results from last 7 days   Lab Units 07/28/25  0758 07/27/25  1947 07/27/25  1530 07/27/25  1232 07/27/25  0809 07/26/25  1602 07/26/25  1215  07/26/25  0758 07/25/25  2031 07/25/25  1728 07/25/25  1251 07/25/25  0832   POCT GLUCOSE mg/dL 108* 114* 109* 82 81 87 85 94 101* 104* 118* 99        XR knee 4+ views bilateral   Final Result   1. No acute bony abnormality left knee.   2. Right total knee arthroplasty.   3. Remote right MCL injury.   4. Healed proximal fibular diaphyseal fracture.   5. No acute bony abnormality right knee.                  MACRO:   None        Signed by: Anusha Mejia 7/24/2025 3:34 PM   Dictation workstation:   [id number]      CT cervical spine wo IV contrast   Final Result   No acute cervical vertebral displacement or acute displaced fracture.   Reversal of the cervical lordosis with multilevel degenerative   changes and mild spondylolisthesis, as detailed above, similar to   03/14/2025.        MACRO:   None.        Signed by: Carly Gaston 7/24/2025 3:19 PM   Dictation workstation:   [id number]      CT brain attack head wo IV contrast   Final Result   No acute intracranial hemorrhage or mass-effect.        MACRO:   Carly Gaston discussed the significance and urgency of this critical   finding by EPIC secure chat with  BEATRIZ MORSE on 7/24/2025 at 3:12   pm.  (**-RCF-**) Findings:  See findings.             Signed by: Carly Gaston 7/24/2025 3:12 PM   Dictation workstation:   [id number]            SIGNATURE: Shabana Rodriguez MD PATIENT NAME: Prema Hair   DATE: 7/28/2025 MRN: [id number]   TIME: 9:00 AM             [1]   Current Facility-Administered Medications:     acetaminophen (Tylenol) tablet 650 mg, 650 mg, oral, q4h PRN, Shabana Rodriguez MD    ammonium lactate (Lac-Hydrin) 12 % lotion 1 Application, 1 Application, Topical, PRN, Shabana Rodriguez MD    aspirin EC tablet 81 mg, 81 mg, oral, Daily, Shabana Rodriguez MD, 81 mg at 07/27/25 0908    atorvastatin (Lipitor) tablet 20 mg, 20 mg, oral, Nightly, Shabana Rodriguez MD, 20 mg at 07/27/25 2211    baclofen (Lioresal) tablet 10 mg, 10 mg, oral, TID,  Shabana Rodriguez MD, 10 mg at 07/27/25 2211    budesonide (Pulmicort) 0.25 mg/2 mL nebulizer solution 0.25 mg, 0.25 mg, nebulization, BID, 0.25 mg at 07/28/25 0705 **AND** formoterol (Perforomist) 20 mcg/2 mL nebulizer solution 20 mcg, 20 mcg, nebulization, q12h, Shabana Rodriguez MD, 20 mcg at 07/28/25 0705    bumetanide (Bumex) tablet 2 mg, 2 mg, oral, BID, Shabana Rodriguez MD, 2 mg at 07/27/25 2211    busPIRone (Buspar) tablet 5 mg, 5 mg, oral, q12h KATHERINE, Shabana Rodriguez MD, 5 mg at 07/26/25 1800    carbidopa-levodopa (Sinemet)  mg per tablet 1.5 tablet, 1.5 tablet, oral, TID, Shabana Rodriguez MD, 1.5 tablet at 07/27/25 2211    carvedilol (Coreg) tablet 6.25 mg, 6.25 mg, oral, BID, Shabana Rodriguez MD, 6.25 mg at 07/27/25 1615    cephalexin (Keflex) capsule 500 mg, 500 mg, oral, Daily, Shabana Rodriguez MD, 500 mg at 07/27/25 0909    cyclobenzaprine (Flexeril) tablet 10 mg, 10 mg, oral, Nightly PRN, Shabana Rodriguez MD    dextrose 50 % injection 12.5 g, 12.5 g, intravenous, q15 min PRN, Shabana Rodriguez MD    dextrose 50 % injection 25 g, 25 g, intravenous, q15 min PRN, Shabana Rodriguez MD    diclofenac sodium (Voltaren) 1 % gel 4 g, 4 g, Topical, TID PRN, Shabana Rodriguez MD    docusate sodium (Colace) capsule 100 mg, 100 mg, oral, TID, Shabana Rodriguez MD, 100 mg at 07/27/25 2211    gabapentin (Neurontin) capsule 1,200 mg, 1,200 mg, oral, TID, Sahbana Rodriguez MD, 1,200 mg at 07/27/25 2211    glucagon (Glucagen) injection 1 mg, 1 mg, intramuscular, q15 min PRN, Shabana Rodriguez MD    glucagon (Glucagen) injection 1 mg, 1 mg, intramuscular, q15 min PRN, Shabana Rodriguez MD    heparin (porcine) injection 5,000 Units, 5,000 Units, subcutaneous, q8h, Shabana Rodriguez MD, 5,000 Units at 07/28/25 0418    insulin lispro injection 0-5 Units, 0-5 Units, subcutaneous, TID AC, Shabana Rodriguez MD    ipratropium-albuteroL (Duo-Neb) 0.5-2.5 mg/3 mL nebulizer solution 3  mL, 3 mL, nebulization, q2h PRN, Shabana Rodriguez MD    [START ON 8/2/2025] levothyroxine (Synthroid, Levoxyl) tablet 100 mcg, 100 mcg, oral, Every Saturday, Shabana Rodriguez MD    levothyroxine (Synthroid, Levoxyl) tablet 200 mcg, 200 mcg, oral, Once per day on Sunday Monday Tuesday Wednesday Thursday Friday, Shabana Rodriguez MD, 200 mcg at 07/28/25 0648    lidocaine 4 % patch 1 patch, 1 patch, transdermal, Daily, Shabana Rodriguez MD, 1 patch at 07/27/25 0910    losartan (Cozaar) tablet 12.5 mg, 12.5 mg, oral, Daily, Shabana Rodriguez MD, 12.5 mg at 07/27/25 0910    metFORMIN (Glucophage) tablet 500 mg, 500 mg, oral, BID, Shabana Rodriguez MD, 500 mg at 07/27/25 1615    ondansetron (Zofran) injection 4 mg, 4 mg, intravenous, q6h PRN, Shabana Rodriguez MD    OXcarbazepine (Trileptal) tablet 1,200 mg, 1,200 mg, oral, Nightly, Shabana Rodriguez MD, 1,200 mg at 07/27/25 2211    OXcarbazepine (Trileptal) tablet 600 mg, 600 mg, oral, q AM, Shabana Rodriguez MD, 600 mg at 07/27/25 0908    oxygen (O2) therapy, 1 Dose, inhalation, Continuous - O2/gases, Rhys Escobar MD, Rate Verify Medical Gas at 07/27/25 0245    pantoprazole (ProtoNix) EC tablet 40 mg, 40 mg, oral, Daily before breakfast, Shabana Rodriguez MD, 40 mg at 07/28/25 0648    polyethylene glycol (Glycolax, Miralax) packet 17 g, 17 g, oral, Daily, Shabana Rodriguez MD, 17 g at 07/27/25 0910    potassium chloride (Klor-Con) packet 80 mEq, 80 mEq, oral, BID, Shabana Rodriguez MD, 80 mEq at 07/27/25 2210    prazosin (Minipress) capsule 5 mg, 5 mg, oral, Nightly, Shabana Rodriguez MD, 5 mg at 07/27/25 2211    QUEtiapine (SEROquel) tablet 800 mg, 800 mg, oral, Nightly, Shabana Rodriguez MD, 800 mg at 07/27/25 2211    sertraline (Zoloft) tablet 200 mg, 200 mg, oral, Daily, Shabana Rodriguez MD, 200 mg at 07/27/25 0909    topiramate (Topamax) tablet 200 mg, 200 mg, oral, BID, Shabana Rodriguez MD, 200 mg at 07/27/25 2211    traMADol  (Ultram) tablet 50 mg, 50 mg, oral, TID PRN, Shabana Rodriguez MD, 50 mg at 07/27/25 0397

## 2025-07-28 NOTE — NURSING NOTE
Discharge instructions provided using teachback method.  Patient's health-related risk factors discussed with patient.  Patient educated to look for worsening signs and symptoms and educated to seek medical attention if experiencing medical emergency.  Patient aware of needs to follow up with outpatient clinics as scheduled. Home going meds reviewed with patient.  Patient verbalized understanding of disposition and discharge instructions.  All questions answered to patient's satisfaction and within nursing scope of practice.  Vitals stable; bedside nurse will remove IV(s).

## 2025-07-28 NOTE — PROGRESS NOTES
7/28/2025 10:09 AM I spoke with patient's daughter. I discussed PT progress. Daughter agrees for patient to return home with home PT and OT. Daughter is interested in home health care  [facility] services. I discussed passport services. I provided information on passport for patient's daughter. Emilia Ayoub [facility]

## 2025-07-28 NOTE — HH CARE COORDINATION
Home Care received a Referral for Nursing, Physical Therapy, and Occupational Therapy. We have processed the referral for a Start of Care within 48 hours of 7/30.     If you have any questions or concerns, please feel free to contact us at [phone]. Follow the prompts, enter your five digit zip code, and you will be directed to your care team on [facility].

## 2025-07-29 NOTE — DISCHARGE SUMMARY
Discharge Summary    Admit Date: 7/24/2025  Discharge Date: 7/28/2025      Discharge Diagnosis  Frequent falls  Drug-induced parkinsonism G21.19 causing Impaired mobility & recurrent falls at home.   DM2, controlled. A1c 6 7/25  HTN, variable control  Hyperlipidemia  Hx CA thyroid on supplemental therapy (Z85.850). TSH 2.8.   Obesity, class I  Hx torticollis  Osteoarthrosis of the knees  Hx urinary retention    Issues Requiring Follow-Up      Test Results Pending At Discharge  Pending Labs       Order Current Status    Extra Urine Gray Tube Collected (07/24/25 2225)    Urinalysis with Reflex Culture and Microscopic In process            Hospital Course   Admitted following recurrent falls at home.  No musculoskeletal injuries noted.  Family felt that patient would benefit from placement.  After PT/OT evaluation, it was recommended that she may be able to go home with home health care.  Patient opted to pursue that option and she was discharged home in stable condition.    Pertinent Physical Exam At Time of Discharge  Physical Exam    Home Medications     Medication List      START taking these medications     traMADol 50 mg tablet; Commonly known as: Ultram; Take 0.5-1 tablets   (25-50 mg) by mouth 3 times a day as needed for severe pain (7 - 10) for   up to 10 doses.     CHANGE how you take these medications     atorvastatin 20 mg tablet; Commonly known as: Lipitor; Take 1 tablet (20   mg) by mouth once daily at bedtime.; What changed: Another medication with   the same name was removed. Continue taking this medication, and follow the   directions you see here.   levothyroxine 100 mcg tablet; Commonly known as: Synthroid, Levoxyl;   Take 100mcg on Sun and 200 mcg on Mon to Sat - 1 hour before breakfast;   What changed: additional instructions     CONTINUE taking these medications     acetaminophen 500 mg tablet; Commonly known as: Tylenol   albuterol 90 mcg/actuation inhaler; Inhale 2 puffs every 4 hours if    needed for shortness of breath.   ammonium lactate 12 % lotion; Commonly known as: Lac-Hydrin   ascorbic acid 500 mg tablet; Commonly known as: Vitamin C   aspirin 81 mg EC tablet   azelastine 137 mcg (0.1 %) nasal spray; Commonly known as: Astelin   baclofen 20 mg tablet; Commonly known as: Lioresal; Take 0.5 tablets (10   mg) by mouth 3 times a day.   budesonide-formoterol 80-4.5 mcg/actuation inhaler; Commonly known as:   Symbicort; Inhale 2 puffs 2 times a day. Rinse mouth with water after use   to reduce aftertaste and incidence of candidiasis. Do not swallow.   bumetanide 2 mg tablet; Commonly known as: Bumex   calcium carbonate 1,250 mg (500 mg elemental) tablet; Commonly known as:   Os-William   carbidopa-levodopa  mg tablet; Commonly known as: Sinemet; Take   1.5 tablets by mouth 3 times a day. 8am, 12 noon and 4 pm   carvedilol 6.25 mg tablet; Commonly known as: Coreg; Take 1 tablet (6.25   mg) by mouth 2 times daily (morning and late afternoon).   cephalexin 250 mg capsule; Commonly known as: Keflex; Take 2 capsules   (500 mg) by mouth once daily.   cholecalciferol 50 mcg (2,000 units) tablet; Commonly known as: Vitamin   D-3   cyclobenzaprine 10 mg tablet; Commonly known as: Flexeril   diclofenac sodium 1 % gel; Commonly known as: Voltaren   docusate sodium 100 mg capsule; Commonly known as: Colace   doxycycline 100 mg capsule; Commonly known as: Vibramycin   estradiol 0.01 % (0.1 mg/gram) vaginal cream; Commonly known as: Estrace   fluticasone 50 mcg/actuation nasal spray; Commonly known as: Flonase;   Administer 1 spray into each nostril once daily. Shake gently. Before   first use, prime pump. After use, clean tip and replace cap.   gabapentin 600 mg tablet; Commonly known as: Neurontin   Glucagon Emergency Kit (human) 1 mg injection; Generic drug: glucagon;   Inject 1 mg into the muscle every 15 minutes if needed for low blood sugar   - see comments (For blood glucose less than or equal to 40  mg/dL and no IV   access).   ipratropium-albuteroL 0.5-2.5 mg/3 mL nebulizer solution; Commonly known   as: Duo-Neb   KCL-40 ORAL   lancets misc   lidocaine 5 % patch; Commonly known as: Lidoderm; APPLY 1 PATCH AND   LEAVE IN PLACE FOR 12 HOURS, THEN REMOVE AND LEAVE OFF FOR 12 HOURS   losartan 25 mg tablet; Commonly known as: Cozaar; Take 0.5 tablets (12.5   mg) by mouth once daily. Do not fill before March 18, 2025.   metFORMIN 500 mg tablet; Commonly known as: Glucophage; Take 1 tablet   (500 mg) by mouth 2 times daily (morning and late afternoon).   nystatin cream; Commonly known as: Mycostatin   omega-3 fatty acids-fish oil 360-1,200 mg capsule   omeprazole 40 mg DR capsule; Commonly known as: PriLOSEC   * OXcarbazepine 600 mg tablet; Commonly known as: Trileptal   * OXcarbazepine 600 mg tablet; Commonly known as: Trileptal   polyethylene glycol 17 gram/dose powder; Commonly known as: Glycolax,   Miralax   prazosin 5 mg capsule; Commonly known as: Minipress   SeroqueL 400 mg tablet; Generic drug: QUEtiapine   sertraline 100 mg tablet; Commonly known as: Zoloft; Take 2 tablets (200   mg) by mouth once daily. Do not start before January 2, 2024.   topiramate 200 mg tablet; Commonly known as: Topamax; TAKE ONE TABLET   (200 MG) BY MOUTH TWICE DAILY  * This list has 2 medication(s) that are the same as other medications   prescribed for you. Read the directions carefully, and ask your doctor or   other care provider to review them with you.     STOP taking these medications     busPIRone 10 mg tablet; Commonly known as: Buspar   clonazePAM 1 mg tablet; Commonly known as: KlonoPIN   formoterol 20 mcg/2 mL nebulizer solution; Commonly known as:   Perforomist       Outpatient Follow-Up  Future Appointments   Date Time Provider Department [place]   8/6/2025  9:30 AM Sanjiv GAYTAN MD [id number] [facility]   10/1/2025  1:00 PM David Swanson MD [id number] [facility]       Shabana Rodriguez MD

## 2025-07-30 LAB
ATRIAL RATE: 73 BPM
P AXIS: 68 DEGREES
P OFFSET: 186 MS
P ONSET: 122 MS
PR INTERVAL: 194 MS
Q ONSET: 219 MS
QRS COUNT: 12 BEATS
QRS DURATION: 80 MS
QT INTERVAL: 398 MS
QTC CALCULATION(BAZETT): 438 MS
QTC FREDERICIA: 425 MS
R AXIS: 47 DEGREES
T AXIS: 18 DEGREES
T OFFSET: 418 MS
VENTRICULAR RATE: 73 BPM

## 2025-08-04 ENCOUNTER — TELEPHONE (OUTPATIENT)
Dept: HOME HEALTH SERVICES | Facility: HOME HEALTH | Age: 60
End: 2025-08-04
Payer: MEDICARE

## 2025-08-04 NOTE — TELEPHONE ENCOUNTER
Hello,    Your recent home care referral for [facility] has been made a Non Admit with  Home Care due to Inability to Contact Patient. Staff has called all available numbers and contacts for this patient over a 3-4 day period, left messages, and received no return call. If you have further questions, feel free to reach out to our office at [phone].     Thank you,   [facility]

## 2025-10-01 ENCOUNTER — APPOINTMENT (OUTPATIENT)
Dept: NEUROLOGY | Facility: CLINIC | Age: 60
End: 2025-10-01
Payer: MEDICARE

## (undated) DEVICE — DRESSING, MEPILEX BORDER, POST-OP AG, 4 X 10 IN

## (undated) DEVICE — SUTURE, V-LOC, 3-0, 18IN, P-12

## (undated) DEVICE — SUTURE, STRATAFIX, 1 PDO CTX 36 X 36CM

## (undated) DEVICE — SOLUTION, IRRIGATION, STERILE WATER, 1000 ML, POUR BOTTLE

## (undated) DEVICE — BLADE, SAW, DUAL SAGITTAL, 1.9 X 90 X 30

## (undated) DEVICE — Device

## (undated) DEVICE — SLEEVE, SCD EXPRESS, KNEE LENGTH-MEDIUM

## (undated) DEVICE — NEEDLE, SPINAL, QUINCKE, 18 G X 3.5 IN, PINK HUB

## (undated) DEVICE — GLOVE, SURGICAL, PROTEXIS PI ORTHO, 8.0, PF, LF

## (undated) DEVICE — SUTURE, VICRYL, 2-0, 27 IN, FSL, UNDYED

## (undated) DEVICE — CUFF, TOURNIQUET, DUAL PORT, SNG BLADDER, 34 IN, PLC

## (undated) DEVICE — CLOSURE SYSTEM, DERMABOND, PRINEO, 22CM, STERILE

## (undated) DEVICE — TOWEL, SURGICAL, NEURO, O/R, 16 X 26, BLUE, STERILE

## (undated) DEVICE — SUTURE, VICRYL, 1, 27 IN, CT-1, VIOLET

## (undated) DEVICE — GLOVE, SURGICAL, PROTEXIS PI MICRO, 8.0, PF, LF

## (undated) DEVICE — SOLUTION, IRRIGATION, SODIUM CHLORIDE 0.9%, 1000 ML, POUR BOTTLE

## (undated) DEVICE — BLADE, SAW, RECIPROCATING, DOUBLE-SIDED, 70 X 12.5 X 1 MM, STAINLESS STEEL